# Patient Record
Sex: FEMALE | Race: BLACK OR AFRICAN AMERICAN | Employment: UNEMPLOYED | URBAN - METROPOLITAN AREA
[De-identification: names, ages, dates, MRNs, and addresses within clinical notes are randomized per-mention and may not be internally consistent; named-entity substitution may affect disease eponyms.]

---

## 2015-01-14 LAB — COLONOSCOPY, EXTERNAL: NORMAL

## 2017-01-17 ENCOUNTER — OFFICE VISIT (OUTPATIENT)
Dept: VASCULAR SURGERY | Age: 52
End: 2017-01-17

## 2017-01-17 VITALS
HEIGHT: 65 IN | DIASTOLIC BLOOD PRESSURE: 74 MMHG | HEART RATE: 60 BPM | BODY MASS INDEX: 18.33 KG/M2 | SYSTOLIC BLOOD PRESSURE: 110 MMHG | WEIGHT: 110 LBS

## 2017-01-17 DIAGNOSIS — I83.93 SPIDER VEINS OF BOTH LOWER EXTREMITIES: Primary | ICD-10-CM

## 2017-01-17 NOTE — MR AVS SNAPSHOT
Visit Information Date & Time Provider Department Dept. Phone Encounter #  
 1/17/2017 11:45 AM Chuyita Amin, [address] [facility] [phone] [id number] Your Appointments 2/13/2017 11:00 AM  
ROUTINE CARE with MD Yasmany Ramirez ([facility]) Appt Note: 3 mo f/u  
 [address] [address]  
[phone]  
  
   
 14 [address] Upcoming Health Maintenance Date Due COLONOSCOPY 11/26/1981 DTaP/Tdap/Td series (1 - Tdap) 11/26/1984 PAP AKA CERVICAL CYTOLOGY 11/26/1984 INFLUENZA AGE 9 TO ADULT 8/1/2016 BREAST CANCER SCRN MAMMOGRAM 12/13/2018 Allergies as of 1/17/2017  Review Complete On: 1/17/2017 By: Erica Bland LPN No Known Allergies Current Immunizations  Never Reviewed No immunizations on file. Not reviewed this visit Vitals BP Pulse Height(growth percentile) Weight(growth percentile) BMI OB Status 110/74 (BP 1 Location: Left arm, BP Patient Position: Sitting) 60 5' 5\" (1.651 m) 110 lb (49.9 kg) 18.3 kg/m2 Hysterectomy Smoking Status Former Smoker Vitals History BMI and BSA Data Body Mass Index Body Surface Area  
 18.3 kg/m 2 1.51 m 2 Preferred Pharmacy Pharmacy Name Phone [facility] [address] [place] [address] 32 Your Updated Medication List  
  
   
This list is accurate as of: 1/17/17 12:08 PM.  Always use your most recent med list.  
  
  
  
  
 dexlansoprazole 30 mg capsule Commonly known as:  DEXILANT Take 1 Cap by mouth daily. hydrocortisone 2.5 % rectal cream  
Commonly known as:  PROCTOZONE-HC Insert  into rectum four (4) times daily. methylPREDNISolone 4 mg tablet Commonly known as:  Cleta Loss Per dose pack instructions  
  
 ondansetron 4 mg disintegrating tablet Commonly known as:  ZOFRAN ODT Take 1 Tab by mouth every eight (8) hours as needed for Nausea. penicillin v potassium 500 mg tablet Commonly known as:  VEETID Take 500 mg by mouth four (4) times daily. traMADol 50 mg tablet Commonly known as:  ULTRAM  
Take 1 Tab by mouth every six (6) hours as needed for Pain. Max Daily Amount: 200 mg. Introducing [facility]! Thuy Melendez introduces [employer] patient portal. Now you can access parts of your medical record, email your doctor's office, and request medication refills online. 1. In your internet browser, go to [email or web address] 2. Click on the First Time User? Click Here link in the Sign In box. You will see the New Member Sign Up page. 3. Enter your [employer] Access Code exactly as it appears below. You will not need to use this code after youve completed the sign-up process. If you do not sign up before the expiration date, you must request a new code. · [employer] Access Code: [email or web address] Expires: 2/9/2017 12:42 PM 
 
4. Enter the last four digits of your Social Security Number (xxxx) and Date of Birth (mm/dd/yyyy) as indicated and click Submit. You will be taken to the next sign-up page. 5. Create a [employer] ID. This will be your [employer] login ID and cannot be changed, so think of one that is secure and easy to remember. 6. Create a [employer] password. You can change your password at any time. 7. Enter your Password Reset Question and Answer. This can be used at a later time if you forget your password. 8. Enter your e-mail address. You will receive e-mail notification when new information is available in [address] 9. Click Sign Up. You can now view and download portions of your medical record. 10. Click the Download Summary menu link to download a portable copy of your medical information.  
 
If you have questions, please visit the Frequently Asked Questions section of the [employer]. Remember, [email or web address] is NOT to be used for urgent needs. For medical emergencies, dial 911. Now available from your iPhone and Android! Please provide this summary of care documentation to your next provider. Your primary care clinician is listed as Claude Hicks. If you have any questions after today's visit, please call [phone].

## 2017-01-18 NOTE — PROGRESS NOTES
Ms Braden Painter is here for follow up of her venous reflux study  She had presented in the fall with complaints of burning, stiffness, and heaviness in her legs  She has numerous superficial small varicose and spider veins scattered throughout both legs, including her thighs and shins    She had to delay her reflux study due to having been out of town. She did have this done and is here to follow up today    I had previously explained need to initiate use of compression stockings which she has done but she cannot tell that they are having any effect  She cannot take NSAIDs due to chronic GI issues    I reviewed her reflux study as follows:  Bilateral Reflux:  1. No evidence of deep vein thrombosis or reflux in the common  femoral, proximal deep femoral, femoral, popliteal, posterior tibial  and peroneal veins bilaterally. 2. Pulsatile venous flow bilaterally. 3. The great and small saphenous veins bilaterally were too small to  insonate with doppler. 4. Multiphasic posterior tibial artery flow bilaterally. I explained she is not a candidate for any ablation or surgical procedures for her veins  With what veins she does have, sclerotherapy would be the only option    I did explain the process of sclerotherapy and what would be expected, but no guarantees that doing injections would have any effect on her symptoms  Explained that some of her symptoms could be other etiology but that we could certainly try to do the injections and see what the outcome would be  She questions if insurance would approve it.  We will look into it for her and let her know

## 2017-02-06 ENCOUNTER — OFFICE VISIT (OUTPATIENT)
Dept: NEUROLOGY | Age: 52
End: 2017-02-06

## 2017-02-06 VITALS
SYSTOLIC BLOOD PRESSURE: 120 MMHG | WEIGHT: 108.2 LBS | TEMPERATURE: 98.7 F | HEIGHT: 65 IN | OXYGEN SATURATION: 98 % | RESPIRATION RATE: 18 BRPM | BODY MASS INDEX: 18.03 KG/M2 | DIASTOLIC BLOOD PRESSURE: 77 MMHG | HEART RATE: 73 BPM

## 2017-02-06 DIAGNOSIS — F33.9 EPISODE OF RECURRENT MAJOR DEPRESSIVE DISORDER, UNSPECIFIED DEPRESSION EPISODE SEVERITY (HCC): ICD-10-CM

## 2017-02-06 DIAGNOSIS — G89.4 CHRONIC PAIN SYNDROME: Primary | ICD-10-CM

## 2017-02-06 DIAGNOSIS — F22 PARANOID DISORDER (HCC): ICD-10-CM

## 2017-02-06 NOTE — LETTER
2/6/2017 2:05 PM 
 
Patient:  Sharlene Solis YOB: 1965 Date of Visit: 2/6/2017 Dear Shannon Orona MD 
[address] [place] [address] [address] [facility] [address] VIA In Basket 
 : Thank you for referring Ms. Farzana Soriano to me for evaluation/treatment. Below are the relevant portions of my assessment and plan of care. Sharlene Solis is a 46 y.o., left handed female, with no significant medical history who comes in with numerous complaints. She says that her entire body is aching. This has been going on for years. This seemed to get worse in the last several months. She also complains of stiffness in the left leg and left arm. She feels stiffness in the left arm and gets spasm in the left arm. She gets spasms and weakness of the left leg after walking. She gets pain in the knees and in the wrists. This is there daily. She gets clumsy with walking due to this. She has shortness of breathe with ambulation. She also gets shortness of breath if she's just sitting. 
 
constant blurring of her vision. She has watering of the eyes and twitching of her eyes. She also has left sided headaches. She has these every day for several hours each day. She has constant tingling of her hands and feet. Her chest has tightness in it all the time. She has tightness of her abdomen. She has itching of the teeth. She admits to a hairy feeling of the tongue. She basically answers yes to everything I ask. She complains of memory and thinking problems for years. .  She just can't concentrate to remember anything. Social History; she's single, lives alone. Has one 32year old son. Quit smoking about 1 year ago. Prior was smoking 1/2 pack per day. Doesn't drink nor use illicit drugs. Last worked in 2004 as an  for the department of .   She stopped working because of irritable bowel syndrome and some sort of cognitive disability. Family History; father  from heart disease, also had diabetes. Mother alive with dementia. Brothers with hypertension, sister with hypertension, diabetes. Current Outpatient Prescriptions Medication Sig Dispense Refill  LACTOBACILLUS ACIDOPHILUS (PROBIOTIC PO) Take  by mouth.  penicillin v potassium (VEETID) 500 mg tablet Take 500 mg by mouth four (4) times daily.  ondansetron (ZOFRAN ODT) 4 mg disintegrating tablet Take 1 Tab by mouth every eight (8) hours as needed for Nausea. 12 Tab 0  
 traMADol (ULTRAM) 50 mg tablet Take 1 Tab by mouth every six (6) hours as needed for Pain. Max Daily Amount: 200 mg. 20 Tab 0  
 hydrocortisone (PROCTOZONE-HC) 2.5 % rectal cream Insert  into rectum four (4) times daily. 30 g 0  
 methylPREDNISolone (MEDROL DOSEPACK) 4 mg tablet Per dose pack instructions 1 Dose Pack 0  
 dexlansoprazole (DEXILANT) 30 mg capsule Take 1 Cap by mouth daily. 30 Cap 1 Past Medical History Diagnosis Date  Chest pain 2014  
  neg EKG, non recurrent  Chronic pain   
  lower back and legs  Depression  Fibroids  Headache(784.0)  Hiatal hernia  IBS (irritable bowel syndrome)  Microscopic hematuria  Rectal bleeding Past Surgical History Procedure Laterality Date  Hx hysterectomy  Hx gi  Hx breast biopsy Right 2015 RIGHT BREAST BIOPSY WITH NEEDLE LOCALIZATION ULTRASOUND performed by Alis Torres MD at [address] Cholecystectomy  Hx tubal ligation Allergies Allergen Reactions  Amoxicillin Rash and Nausea Only Patient Active Problem List  
Diagnosis Code  Microscopic hematuria R31.29  
 Hematuria, undiagnosed cause R31.9  S/P cystoscopy Z98.890  
 Rectal bleeding K62.5  Chronic pain G89.29  Diffuse cystic mastopathy N60.19 Review of Systems: she admits to having every complaint under the ROS. As above otherwise 11 point review of systems positive including;  
Constitutional no fever or chills Skin has rash or itching HENT  has tinnitus, hearing lose Eyes has diplopia vision lose Respiratory has shortness of breath Cardiovascular has chest pain, dyspnea on exertion Gastrointestinal has nausea,  constipation Genitourinary has incontinence as well as urinary frequency Musculoskeletal has joint pain or swelling Endocrine has weight change lost weight fluctuations. Neurological as above in HPI PHYSICAL EXAMINATION:   
 
VITAL SIGNS:   
Visit Vitals  /77  Pulse 73  Temp 98.7 °F (37.1 °C) (Oral)  Resp 18  Ht 5' 5\" (1.651 m)  Wt 49.1 kg (108 lb 3.2 oz)  SpO2 98%  BMI 18.01 kg/m2 GENERAL: The patient is well developed, well nourished, and in no apparent distress. EXTREMITIES: No clubbing, cyanosis, or edema is identified. Pulses 2+ and symmetrical.  Muscle tone is normal. 
HEAD:   Ear, nose, and throat appear to be without trauma. The patient is normocephalic. NEUROLOGIC EXAMINATION 
 
MENTAL STATUS: The patient is awake, alert, and oriented x 4. Fund of knowledge is adequate. Speech is fluent and memory appears to be intact, both long and short term. She can remember 2/3 objects in 3 minutes. She does display some paranoid behavior during the exam.   
CRANIAL NERVES: II  Visual fields are full to confrontation. Funduscopic examination reveals flat disks bilaterally. Pupils are both 4 mm and briskly reactive to light and accommodation. III, IV, VI  Extraocular movements are intact and there is no nystagmus. V  Facial sensation is intact to pinprick and light touch. VII  Face is symmetrical.  
VIII - Hearing is present. IX, X, [address] rises symmetrically. Gag is present. Tongue is in the midline. XI - Shoulder shrugging and head turning intact MOTOR:  The patient is 5/5 in all four limbs without any drift. Fine finger movements are symmetrical.  Isolated motor group testing reveals no focal abnormalities. Tone is normal.  Sensory examination is intact to pinprick, light touch and position sense testing. Reflexes are 2+ and symmetrical. Plantars are down going. Cerebellar examination reveals no gross ataxia or dysmetria. Gait is very stiff legged, she stumbles and is hesitant but doesn't fall. The gait is an atasia abasia type gait. CBC:  
Lab Results Component Value Date/Time WBC 5.3 12/14/2016 08:00 AM  
 RBC 4.38 12/14/2016 08:00 AM  
 HGB 13.5 12/14/2016 08:00 AM  
 HCT 39.5 12/14/2016 08:00 AM  
 PLATELET 324 56/84/1403 08:00 AM  
 
BMP:  
Lab Results Component Value Date/Time Glucose 84 12/14/2016 08:00 AM  
 Sodium 137 12/14/2016 08:00 AM  
 Potassium 3.4 12/14/2016 08:00 AM  
 Chloride 99 12/14/2016 08:00 AM  
 CO2 31 12/14/2016 08:00 AM  
 BUN 15 12/14/2016 08:00 AM  
 Creatinine 0.87 12/14/2016 08:00 AM  
 Calcium 9.2 12/14/2016 08:00 AM  
 
CMP:  
Lab Results Component Value Date/Time Glucose 84 12/14/2016 08:00 AM  
 Sodium 137 12/14/2016 08:00 AM  
 Potassium 3.4 12/14/2016 08:00 AM  
 Chloride 99 12/14/2016 08:00 AM  
 CO2 31 12/14/2016 08:00 AM  
 BUN 15 12/14/2016 08:00 AM  
 Creatinine 0.87 12/14/2016 08:00 AM  
 Calcium 9.2 12/14/2016 08:00 AM  
 Anion gap 7 12/14/2016 08:00 AM  
 BUN/Creatinine ratio 17 12/14/2016 08:00 AM  
 Alk. phosphatase 75 12/14/2016 08:00 AM  
 Protein, total 8.6 12/14/2016 08:00 AM  
 Albumin 4.0 12/14/2016 08:00 AM  
 Globulin 4.6 12/14/2016 08:00 AM  
 A-G Ratio 0.9 12/14/2016 08:00 AM  
 
Coagulation: No results found for: PTP, INR, APTT, PTTT Cardiac markers:  
Lab Results Component Value Date/Time   11/11/2014 01:35 PM  
 CK-MB Index 0.6 11/11/2014 01:35 PM  
  
 
 
Impression: Patient with numerous constitutional complaints but nothing abnormal on her examination except some paranoid ideation. I don't think this patient is suffering from a primary neurologic problem, but more likely is suffering from some form of pervasive psychiatric disorder. The plethora of complaints, abnormal gait without falling and the prior disability from what sounds like a psychiatric disorder, convinces me this is not a primary neurologic problem. Plan: Would strongly suggest getting this unfortunate woman to psychiatric care for what appears to be a depression and possibly other diagnosis. No workup to be performed at this time from my standpoint. I'm not going to schedule a follow up, but please don't hesitate to contact me for any changes in her status you'd like me to address. If you have questions, please do not hesitate to call me. I look forward to following Ms. Elsa Lofton along with you.  
 
 
 
Sincerely, 
 
 
Jessy Turner MD

## 2017-02-06 NOTE — COMMUNICATION BODY
Alfonzo Cornelius is a 46 y.o., left handed female, with no significant medical history who comes in with numerous complaints. She says that her entire body is aching. This has been going on for years. This seemed to get worse in the last several months. She also complains of stiffness in the left leg and left arm. She feels stiffness in the left arm and gets spasm in the left arm. She gets spasms and weakness of the left leg after walking. She gets pain in the knees and in the wrists. This is there daily. She gets clumsy with walking due to this. She has shortness of breathe with ambulation. She also gets shortness of breath if she's just sitting.    constant blurring of her vision. She has watering of the eyes and twitching of her eyes. She also has left sided headaches. She has these every day for several hours each day. She has constant tingling of her hands and feet. Her chest has tightness in it all the time. She has tightness of her abdomen. She has itching of the teeth. She admits to a hairy feeling of the tongue. She basically answers yes to everything I ask. She complains of memory and thinking problems for years. .  She just can't concentrate to remember anything. Social History; she's single, lives alone. Has one 32year old son. Quit smoking about 1 year ago. Prior was smoking 1/2 pack per day. Doesn't drink nor use illicit drugs. Last worked in  as an  for the department of . She stopped working because of irritable bowel syndrome and some sort of cognitive disability. Family History; father  from heart disease, also had diabetes. Mother alive with dementia. Brothers with hypertension, sister with hypertension, diabetes. Current Outpatient Prescriptions   Medication Sig Dispense Refill    LACTOBACILLUS ACIDOPHILUS (PROBIOTIC PO) Take  by mouth.       penicillin v potassium (VEETID) 500 mg tablet Take 500 mg by mouth four (4) times daily.  ondansetron (ZOFRAN ODT) 4 mg disintegrating tablet Take 1 Tab by mouth every eight (8) hours as needed for Nausea. 12 Tab 0    traMADol (ULTRAM) 50 mg tablet Take 1 Tab by mouth every six (6) hours as needed for Pain. Max Daily Amount: 200 mg. 20 Tab 0    hydrocortisone (PROCTOZONE-HC) 2.5 % rectal cream Insert  into rectum four (4) times daily. 30 g 0    methylPREDNISolone (MEDROL DOSEPACK) 4 mg tablet Per dose pack instructions 1 Dose Pack 0    dexlansoprazole (DEXILANT) 30 mg capsule Take 1 Cap by mouth daily. 30 Cap 1       Past Medical History   Diagnosis Date    Chest pain 11/2014     neg EKG, non recurrent    Chronic pain      lower back and legs    Depression     Fibroids     Headache(784.0)     Hiatal hernia     IBS (irritable bowel syndrome)     Microscopic hematuria     Rectal bleeding        Past Surgical History   Procedure Laterality Date    Hx hysterectomy      Hx gi      Hx breast biopsy Right 1/21/2015     RIGHT BREAST BIOPSY WITH NEEDLE LOCALIZATION ULTRASOUND performed by Stephane Ma MD at [facility]    Cholecystectomy      Hx tubal ligation         Allergies   Allergen Reactions    Amoxicillin Rash and Nausea Only       Patient Active Problem List   Diagnosis Code    Microscopic hematuria R31.29    Hematuria, undiagnosed cause R31.9    S/P cystoscopy Z98.890    Rectal bleeding K62.5    Chronic pain G89.29    Diffuse cystic mastopathy N60.19         Review of Systems: she admits to having every complaint under the ROS.   As above otherwise 11 point review of systems positive including;   Constitutional no fever or chills  Skin has rash or itching  HENT  has tinnitus, hearing lose  Eyes has diplopia vision lose  Respiratory has shortness of breath  Cardiovascular has chest pain, dyspnea on exertion  Gastrointestinal has nausea,  constipation  Genitourinary has incontinence as well as urinary frequency  Musculoskeletal has joint pain or swelling  Endocrine has weight change lost weight fluctuations. Neurological as above in HPI      PHYSICAL EXAMINATION:      VITAL SIGNS:    Visit Vitals    /77    Pulse 73    Temp 98.7 °F (37.1 °C) (Oral)    Resp 18    Ht 5' 5\" (1.651 m)    Wt 49.1 kg (108 lb 3.2 oz)    SpO2 98%    BMI 18.01 kg/m2       GENERAL: The patient is well developed, well nourished, and in no apparent distress. EXTREMITIES: No clubbing, cyanosis, or edema is identified. Pulses 2+ and symmetrical.  Muscle tone is normal.  HEAD:   Ear, nose, and throat appear to be without trauma. The patient is normocephalic. NEUROLOGIC EXAMINATION    MENTAL STATUS: The patient is awake, alert, and oriented x 4. Fund of knowledge is adequate. Speech is fluent and memory appears to be intact, both long and short term. She can remember 2/3 objects in 3 minutes. She does display some paranoid behavior during the exam.    CRANIAL NERVES: II - Visual fields are full to confrontation. Funduscopic examination reveals flat disks bilaterally. Pupils are both 4 mm and briskly reactive to light and accommodation. III, IV, VI - Extraocular movements are intact and there is no nystagmus. V - Facial sensation is intact to pinprick and light touch. VII - Face is symmetrical.   VIII - Hearing is present. IX, X, XII- Palate rises symmetrically. Gag is present. Tongue is in the midline. XI - Shoulder shrugging and head turning intact  MOTOR:  The patient is 5/5 in all four limbs without any drift. Fine finger movements are symmetrical.  Isolated motor group testing reveals no focal abnormalities. Tone is normal.  Sensory examination is intact to pinprick, light touch and position sense testing. Reflexes are 2+ and symmetrical. Plantars are down going. Cerebellar examination reveals no gross ataxia or dysmetria. Gait is very stiff legged, she stumbles and is hesitant but doesn't fall.   The gait is an atasia abasia type gait.       CBC:   Lab Results   Component Value Date/Time    WBC 5.3 12/14/2016 08:00 AM    RBC 4.38 12/14/2016 08:00 AM    HGB 13.5 12/14/2016 08:00 AM    HCT 39.5 12/14/2016 08:00 AM    PLATELET 342 46/78/1473 08:00 AM     BMP:   Lab Results   Component Value Date/Time    Glucose 84 12/14/2016 08:00 AM    Sodium 137 12/14/2016 08:00 AM    Potassium 3.4 12/14/2016 08:00 AM    Chloride 99 12/14/2016 08:00 AM    CO2 31 12/14/2016 08:00 AM    BUN 15 12/14/2016 08:00 AM    Creatinine 0.87 12/14/2016 08:00 AM    Calcium 9.2 12/14/2016 08:00 AM     CMP:   Lab Results   Component Value Date/Time    Glucose 84 12/14/2016 08:00 AM    Sodium 137 12/14/2016 08:00 AM    Potassium 3.4 12/14/2016 08:00 AM    Chloride 99 12/14/2016 08:00 AM    CO2 31 12/14/2016 08:00 AM    BUN 15 12/14/2016 08:00 AM    Creatinine 0.87 12/14/2016 08:00 AM    Calcium 9.2 12/14/2016 08:00 AM    Anion gap 7 12/14/2016 08:00 AM    BUN/Creatinine ratio 17 12/14/2016 08:00 AM    Alk. phosphatase 75 12/14/2016 08:00 AM    Protein, total 8.6 12/14/2016 08:00 AM    Albumin 4.0 12/14/2016 08:00 AM    Globulin 4.6 12/14/2016 08:00 AM    A-G Ratio 0.9 12/14/2016 08:00 AM     Coagulation: No results found for: PTP, INR, APTT, PTTT  Cardiac markers:   Lab Results   Component Value Date/Time     11/11/2014 01:35 PM    CK-MB Index 0.6 11/11/2014 01:35 PM          Impression: Patient with numerous constitutional complaints but nothing abnormal on her examination except some paranoid ideation. I don't think this patient is suffering from a primary neurologic problem, but more likely is suffering from some form of pervasive psychiatric disorder. The plethora of complaints, abnormal gait without falling and the prior disability from what sounds like a psychiatric disorder, convinces me this is not a primary neurologic problem.     Plan: Would strongly suggest getting this unfortunate woman to psychiatric care for what appears to be a depression and possibly other diagnosis. No workup to be performed at this time from my standpoint. I'm not going to schedule a follow up, but please don't hesitate to contact me for any changes in her status you'd like me to address.

## 2017-02-06 NOTE — PROGRESS NOTES
Cassidy Osorio is a 46 y.o., left handed female, with no significant medical history who comes in with numerous complaints. She says that her entire body is aching. This has been going on for years. This seemed to get worse in the last several months. She also complains of stiffness in the left leg and left arm. She feels stiffness in the left arm and gets spasm in the left arm. She gets spasms and weakness of the left leg after walking. She gets pain in the knees and in the wrists. This is there daily. She gets clumsy with walking due to this. She has shortness of breathe with ambulation. She also gets shortness of breath if she's just sitting.    constant blurring of her vision. She has watering of the eyes and twitching of her eyes. She also has left sided headaches. She has these every day for several hours each day. She has constant tingling of her hands and feet. Her chest has tightness in it all the time. She has tightness of her abdomen. She has itching of the teeth. She admits to a hairy feeling of the tongue. She basically answers yes to everything I ask. She complains of memory and thinking problems for years. .  She just can't concentrate to remember anything. Social History; she's single, lives alone. Has one 32year old son. Quit smoking about 1 year ago. Prior was smoking 1/2 pack per day. Doesn't drink nor use illicit drugs. Last worked in  as an  for the department of . She stopped working because of irritable bowel syndrome and some sort of cognitive disability. Family History; father  from heart disease, also had diabetes. Mother alive with dementia. Brothers with hypertension, sister with hypertension, diabetes. Current Outpatient Prescriptions   Medication Sig Dispense Refill    LACTOBACILLUS ACIDOPHILUS (PROBIOTIC PO) Take  by mouth.       penicillin v potassium (VEETID) 500 mg tablet Take 500 mg by mouth four (4) times daily.  ondansetron (ZOFRAN ODT) 4 mg disintegrating tablet Take 1 Tab by mouth every eight (8) hours as needed for Nausea. 12 Tab 0    traMADol (ULTRAM) 50 mg tablet Take 1 Tab by mouth every six (6) hours as needed for Pain. Max Daily Amount: 200 mg. 20 Tab 0    hydrocortisone (PROCTOZONE-HC) 2.5 % rectal cream Insert  into rectum four (4) times daily. 30 g 0    methylPREDNISolone (MEDROL DOSEPACK) 4 mg tablet Per dose pack instructions 1 Dose Pack 0    dexlansoprazole (DEXILANT) 30 mg capsule Take 1 Cap by mouth daily. 30 Cap 1       Past Medical History   Diagnosis Date    Chest pain 11/2014     neg EKG, non recurrent    Chronic pain      lower back and legs    Depression     Fibroids     Headache(784.0)     Hiatal hernia     IBS (irritable bowel syndrome)     Microscopic hematuria     Rectal bleeding        Past Surgical History   Procedure Laterality Date    Hx hysterectomy      Hx gi      Hx breast biopsy Right 1/21/2015     RIGHT BREAST BIOPSY WITH NEEDLE LOCALIZATION ULTRASOUND performed by Celina Woodard MD at [address] [facility]    Cholecystectomy      Hx tubal ligation         Allergies   Allergen Reactions    Amoxicillin Rash and Nausea Only       Patient Active Problem List   Diagnosis Code    Microscopic hematuria R31.29    Hematuria, undiagnosed cause R31.9    S/P cystoscopy Z98.890    Rectal bleeding K62.5    Chronic pain G89.29    Diffuse cystic mastopathy N60.19         Review of Systems: she admits to having every complaint under the ROS.   As above otherwise 11 point review of systems positive including;   Constitutional no fever or chills  Skin has rash or itching  HENT  has tinnitus, hearing lose  Eyes has diplopia vision lose  Respiratory has shortness of breath  Cardiovascular has chest pain, dyspnea on exertion  Gastrointestinal has nausea,  constipation  Genitourinary has incontinence as well as urinary frequency  Musculoskeletal has joint pain or swelling  Endocrine has weight change lost weight fluctuations. Neurological as above in HPI      PHYSICAL EXAMINATION:      VITAL SIGNS:    Visit Vitals    /77    Pulse 73    Temp 98.7 °F (37.1 °C) (Oral)    Resp 18    Ht 5' 5\" (1.651 m)    Wt 49.1 kg (108 lb 3.2 oz)    SpO2 98%    BMI 18.01 kg/m2       GENERAL: The patient is well developed, well nourished, and in no apparent distress. EXTREMITIES: No clubbing, cyanosis, or edema is identified. Pulses 2+ and symmetrical.  Muscle tone is normal.  HEAD:   Ear, nose, and throat appear to be without trauma. The patient is normocephalic. NEUROLOGIC EXAMINATION    MENTAL STATUS: The patient is awake, alert, and oriented x 4. Fund of knowledge is adequate. Speech is fluent and memory appears to be intact, both long and short term. She can remember 2/3 objects in 3 minutes. She does display some paranoid behavior during the exam.    CRANIAL NERVES: II - Visual fields are full to confrontation. Funduscopic examination reveals flat disks bilaterally. Pupils are both 4 mm and briskly reactive to light and accommodation. III, IV, VI - Extraocular movements are intact and there is no nystagmus. V - Facial sensation is intact to pinprick and light touch. VII - Face is symmetrical.   VIII - Hearing is present. IX, X, XII- Palate rises symmetrically. Gag is present. Tongue is in the midline. XI - Shoulder shrugging and head turning intact  MOTOR:  The patient is 5/5 in all four limbs without any drift. Fine finger movements are symmetrical.  Isolated motor group testing reveals no focal abnormalities. Tone is normal.  Sensory examination is intact to pinprick, light touch and position sense testing. Reflexes are 2+ and symmetrical. Plantars are down going. Cerebellar examination reveals no gross ataxia or dysmetria. Gait is very stiff legged, she stumbles and is hesitant but doesn't fall.   The gait is an atasia abasia type gait.       CBC:   Lab Results   Component Value Date/Time    WBC 5.3 12/14/2016 08:00 AM    RBC 4.38 12/14/2016 08:00 AM    HGB 13.5 12/14/2016 08:00 AM    HCT 39.5 12/14/2016 08:00 AM    PLATELET 476 88/05/2182 08:00 AM     BMP:   Lab Results   Component Value Date/Time    Glucose 84 12/14/2016 08:00 AM    Sodium 137 12/14/2016 08:00 AM    Potassium 3.4 12/14/2016 08:00 AM    Chloride 99 12/14/2016 08:00 AM    CO2 31 12/14/2016 08:00 AM    BUN 15 12/14/2016 08:00 AM    Creatinine 0.87 12/14/2016 08:00 AM    Calcium 9.2 12/14/2016 08:00 AM     CMP:   Lab Results   Component Value Date/Time    Glucose 84 12/14/2016 08:00 AM    Sodium 137 12/14/2016 08:00 AM    Potassium 3.4 12/14/2016 08:00 AM    Chloride 99 12/14/2016 08:00 AM    CO2 31 12/14/2016 08:00 AM    BUN 15 12/14/2016 08:00 AM    Creatinine 0.87 12/14/2016 08:00 AM    Calcium 9.2 12/14/2016 08:00 AM    Anion gap 7 12/14/2016 08:00 AM    BUN/Creatinine ratio 17 12/14/2016 08:00 AM    Alk. phosphatase 75 12/14/2016 08:00 AM    Protein, total 8.6 12/14/2016 08:00 AM    Albumin 4.0 12/14/2016 08:00 AM    Globulin 4.6 12/14/2016 08:00 AM    A-G Ratio 0.9 12/14/2016 08:00 AM     Coagulation: No results found for: PTP, INR, APTT, PTTT  Cardiac markers:   Lab Results   Component Value Date/Time     11/11/2014 01:35 PM    CK-MB Index 0.6 11/11/2014 01:35 PM          Impression: Patient with numerous constitutional complaints but nothing abnormal on her examination except some paranoid ideation. I don't think this patient is suffering from a primary neurologic problem, but more likely is suffering from some form of pervasive psychiatric disorder. The plethora of complaints, abnormal gait without falling and the prior disability from what sounds like a psychiatric disorder, convinces me this is not a primary neurologic problem.     Plan: Would strongly suggest getting this unfortunate woman to psychiatric care for what appears to be a depression and possibly other diagnosis. No workup to be performed at this time from my standpoint. I'm not going to schedule a follow up, but please don't hesitate to contact me for any changes in her status you'd like me to address.

## 2017-02-13 ENCOUNTER — OFFICE VISIT (OUTPATIENT)
Dept: FAMILY MEDICINE CLINIC | Facility: CLINIC | Age: 52
End: 2017-02-13

## 2017-02-13 VITALS
RESPIRATION RATE: 14 BRPM | TEMPERATURE: 96.5 F | HEART RATE: 66 BPM | HEIGHT: 65 IN | OXYGEN SATURATION: 100 % | DIASTOLIC BLOOD PRESSURE: 75 MMHG | SYSTOLIC BLOOD PRESSURE: 115 MMHG | WEIGHT: 107 LBS | BODY MASS INDEX: 17.83 KG/M2

## 2017-02-13 DIAGNOSIS — F22 PARANOID DELUSION (HCC): ICD-10-CM

## 2017-02-13 DIAGNOSIS — K59.00 CONSTIPATION, UNSPECIFIED CONSTIPATION TYPE: ICD-10-CM

## 2017-02-13 DIAGNOSIS — M54.50 RIGHT-SIDED LOW BACK PAIN WITHOUT SCIATICA, UNSPECIFIED CHRONICITY: Primary | ICD-10-CM

## 2017-02-13 DIAGNOSIS — J02.9 PHARYNGITIS, UNSPECIFIED ETIOLOGY: ICD-10-CM

## 2017-02-13 DIAGNOSIS — J30.2 SEASONAL ALLERGIC RHINITIS, UNSPECIFIED ALLERGIC RHINITIS TRIGGER: ICD-10-CM

## 2017-02-13 RX ORDER — AMOXICILLIN 250 MG
2 CAPSULE ORAL DAILY
Qty: 60 TAB | Refills: 3 | Status: SHIPPED | OUTPATIENT
Start: 2017-02-13 | End: 2017-09-26 | Stop reason: SDUPTHER

## 2017-02-13 NOTE — PATIENT INSTRUCTIONS
Managing Your Allergies: Care Instructions  Your Care Instructions  Managing your allergies is an important part of staying healthy. Your doctor will help you find out what may be causing the allergies. Common causes of allergy symptoms are house dust and dust mites, animal dander, mold, and pollen. As soon as you know what triggers your symptoms, try to reduce your exposure to your triggers. This can help prevent allergy symptoms, asthma, and other health problems. Ask your doctor about allergy medicine or immunotherapy. These treatments may help reduce or prevent allergy symptoms. Follow-up care is a key part of your treatment and safety. Be sure to make and go to all appointments, and call your doctor if you are having problems. It's also a good idea to know your test results and keep a list of the medicines you take. How can you care for yourself at home? · If you think that dust or dust mites are causing your allergies:  ¨ Wash sheets, pillowcases, and other bedding every week in hot water. ¨ Use airtight, dust-proof covers for pillows, duvets, and mattresses. Avoid plastic covers, because they tend to tear quickly and do not \"breathe. \" Wash according to the instructions. ¨ Remove extra blankets and pillows that you don't need. ¨ Use blankets that are machine-washable. ¨ Don't use home humidifiers. They can help mites live longer. · Use air-conditioning. Change or clean all filters every month. Keep windows closed. Use high-efficiency air filters. Don't use window or attic fans, which draw dust into the air. · If you're allergic to pet dander, keep pets outside or, at the very least, out of your bedroom. Old carpet and cloth-covered furniture can hold a lot of animal dander. You may need to replace them. · Look for signs of cockroaches. Use cockroach baits to get rid of them. Then clean your home well. · If you're allergic to mold, don't keep indoor plants, because molds can grow in soil.  Get rid of furniture, rugs, and drapes that smell musty. Check for mold in the bathroom. · If you're allergic to pollen, stay inside when pollen counts are high. · Don't smoke or let anyone else smoke in your house. Don't use fireplaces or wood-burning stoves. Avoid paint fumes, perfumes, and other strong odors. When should you call for help? Give an epinephrine shot if:  · You think you are having a severe allergic reaction. · You have symptoms in more than one body area, such as mild nausea and an itchy mouth. After giving an epinephrine shot call 911, even if you feel better. Call 911 if:  · You have symptoms of a severe allergic reaction. These may include:  ¨ Sudden raised, red areas (hives) all over your body. ¨ Swelling of the throat, mouth, lips, or tongue. ¨ Trouble breathing. ¨ Passing out (losing consciousness). Or you may feel very lightheaded or suddenly feel weak, confused, or restless. · You have been given an epinephrine shot, even if you feel better. Call your doctor now or seek immediate medical care if:  · You have symptoms of an allergic reaction, such as:  ¨ A rash or hives (raised, red areas on the skin). ¨ Itching. ¨ Swelling. ¨ Belly pain, nausea, or vomiting. Watch closely for changes in your health, and be sure to contact your doctor if:  · Your allergies get worse. · You need help controlling your allergies. · You have questions about allergy testing. · You do not get better as expected. Where can you learn more? Go to http://lazarus-delano.info/. Enter L249 in the search box to learn more about \"Managing Your Allergies: Care Instructions. \"  Current as of: February 12, 2016  Content Version: 11.1  © 1046-9158 [employer]. Care instructions adapted under license by [employer] (which disclaims liability or warranty for this information).  If you have questions about a medical condition or this instruction, always ask your healthcare professional. [address] any warranty or liability for your use of this information. Back Stretches: Exercises  Your Care Instructions  Here are some examples of exercises for stretching your back. Start each exercise slowly. Ease off the exercise if you start to have pain. Your doctor or physical therapist will tell you when you can start these exercises and which ones will work best for you. How to do the exercises  Overhead stretch    1. Stand comfortably with your feet shoulder-width apart. 2. Looking straight ahead, raise both arms over your head and reach toward the ceiling. Do not allow your head to tilt back. 3. Hold for 15 to 30 seconds, then lower your arms to your sides. 4. Repeat 2 to 4 times. Side stretch    1. Stand comfortably with your feet shoulder-width apart. 2. Raise one arm over your head, and then lean to the other side. 3. Slide your hand down your leg as you let the weight of your arm gently stretch your side muscles. Hold for 15 to 30 seconds. 4. Repeat 2 to 4 times on each side. Press-up    1. Lie on your stomach, supporting your body with your forearms. 2. Press your elbows down into the floor to raise your upper back. As you do this, relax your stomach muscles and allow your back to arch without using your back muscles. As your press up, do not let your hips or pelvis come off the floor. 3. Hold for 15 to 30 seconds, then relax. 4. Repeat 2 to 4 times. Relax and rest    1. Lie on your back with a rolled towel under your neck and a pillow under your knees. Extend your arms comfortably to your sides. 2. Relax and breathe normally. 3. Remain in this position for about 10 minutes. 4. If you can, do this 2 or 3 times each day. Follow-up care is a key part of your treatment and safety. Be sure to make and go to all appointments, and call your doctor if you are having problems.  It's also a good idea to know your test results and keep a list of the medicines you take. Where can you learn more? Go to http://lazarus-delano.info/. Enter B094 in the search box to learn more about \"Back Stretches: Exercises. \"  Current as of: May 23, 2016  Content Version: 11.1  © 5576-9207 [employer]. Care instructions adapted under license by [facility] (which disclaims liability or warranty for this information). If you have questions about a medical condition or this instruction, always ask your healthcare professional. [address] any warranty or liability for your use of this information. Constipation: Care Instructions  Your Care Instructions  Constipation means that you have a hard time passing stools (bowel movements). People pass stools from 3 times a day to once every 3 days. What is normal for you may be different. Constipation may occur with pain in the rectum and cramping. The pain may get worse when you try to pass stools. Sometimes there are small amounts of bright red blood on toilet paper or the surface of stools. This is because of enlarged veins near the rectum (hemorrhoids). A few changes in your diet and lifestyle may help you avoid ongoing constipation. Your doctor may also prescribe medicine to help loosen your stool. Some medicines can cause constipation. These include pain medicines and antidepressants. Tell your doctor about all the medicines you take. Your doctor may want to make a medicine change to ease your symptoms. Follow-up care is a key part of your treatment and safety. Be sure to make and go to all appointments, and call your doctor if you are having problems. It's also a good idea to know your test results and keep a list of the medicines you take. How can you care for yourself at home? · Drink plenty of fluids, enough so that your urine is light yellow or clear like water.  If you have kidney, heart, or liver disease and have to limit fluids, talk with your doctor before you increase the amount of fluids you drink. · Include high-fiber foods in your diet each day. These include fruits, vegetables, beans, and whole grains. · Get at least 30 minutes of exercise on most days of the week. Walking is a good choice. You also may want to do other activities, such as running, swimming, cycling, or playing tennis or team sports. · Take a fiber supplement, such as Citrucel or Metamucil, every day. Read and follow all instructions on the label. · Schedule time each day for a bowel movement. A daily routine may help. Take your time having your bowel movement. · Support your feet with a small step stool when you sit on the toilet. This helps flex your hips and places your pelvis in a squatting position. · Your doctor may recommend an over-the-counter laxative to relieve your constipation. Examples are Milk of Magnesia and MiraLax. Read and follow all instructions on the label. Do not use laxatives on a long-term basis. When should you call for help? Call your doctor now or seek immediate medical care if:  · You have new or worse belly pain. · You have new or worse nausea or vomiting. · You have blood in your stools. Watch closely for changes in your health, and be sure to contact your doctor if:  · Your constipation is getting worse. · You do not get better as expected. Where can you learn more? Go to http://lazarus-delano.info/. Enter [phone] in the search box to learn more about \"Constipation: Care Instructions. \"  Current as of: May 27, 2016  Content Version: 11.1  © 7873-7136 [employer]. Care instructions adapted under license by [employer] (which disclaims liability or warranty for this information). If you have questions about a medical condition or this instruction, always ask your healthcare professional. [address] any warranty or liability for your use of this information.        Sore Throat: Care Instructions  Your Care Instructions    Infection by bacteria or a virus causes most sore throats. Cigarette smoke, dry air, air pollution, allergies, and yelling can also cause a sore throat. Sore throats can be painful and annoying. Fortunately, most sore throats go away on their own. If you have a bacterial infection, your doctor may prescribe antibiotics. Follow-up care is a key part of your treatment and safety. Be sure to make and go to all appointments, and call your doctor if you are having problems. It's also a good idea to know your test results and keep a list of the medicines you take. How can you care for yourself at home? · If your doctor prescribed antibiotics, take them as directed. Do not stop taking them just because you feel better. You need to take the full course of antibiotics. · Gargle with warm salt water once an hour to help reduce swelling and relieve discomfort. Use 1 teaspoon of salt mixed in 1 cup of warm water. · Take an over-the-counter pain medicine, such as acetaminophen (Tylenol), ibuprofen (Advil, Motrin), or naproxen (Aleve). Read and follow all instructions on the label. · Be careful when taking over-the-counter cold or flu medicines and Tylenol at the same time. Many of these medicines have acetaminophen, which is Tylenol. Read the labels to make sure that you are not taking more than the recommended dose. Too much acetaminophen (Tylenol) can be harmful. · Drink plenty of fluids. Fluids may help soothe an irritated throat. Hot fluids, such as tea or soup, may help decrease throat pain. · Use over-the-counter throat lozenges to soothe pain. Regular cough drops or hard candy may also help. These should not be given to young children because of the risk of choking. · Do not smoke or allow others to smoke around you. If you need help quitting, talk to your doctor about stop-smoking programs and medicines. These can increase your chances of quitting for good.   · Use a vaporizer or humidifier to add moisture to your bedroom. Follow the directions for cleaning the machine. When should you call for help? Call your doctor now or seek immediate medical care if:  · You have new or worse trouble swallowing. · Your sore throat gets much worse on one side. Watch closely for changes in your health, and be sure to contact your doctor if you do not get better as expected. Where can you learn more? Go to http://lazarus-delano.info/. Enter [phone] in the search box to learn more about \"Sore Throat: Care Instructions. \"  Current as of: July 29, 2016  Content Version: 11.1  © 9675-5242 [employer]. Care instructions adapted under license by [employer] (which disclaims liability or warranty for this information). If you have questions about a medical condition or this instruction, always ask your healthcare professional. [place] [address] any warranty or liability for your use of this information.

## 2017-02-13 NOTE — PROGRESS NOTES
S:    HPI:    Dana Hoffmann is a 47 yo [facility] [employer] female presenting to the clinic for a 3mo f/u concerning chronic pain. Pt endorses \"pain in everything\". She specifically points to her LUQ stating this pain has been present since \"2002 or 2004\" and has progressively gotten worse. Pain rated 8/10. This pain is exasperated with eating. Pt also reports L sided frontal HA, 6/10, that comes and goes 2-3x per wk. Denies photophobia, auras, nausea, vomiting. Pt states she has tried ibuprofen \"a long time ago\" with moderate relief. Pt denies, fever, chills, SOB, chest pain. Endorses constipation with BM every 3 days. Of note, pt discussed a more thorough h/o her cc's with Dr. Ligia Del Rio:    Per patient's request, Wilman Roland did not perform PE      *ATTENTION:  This note has been created by a medical student for educational purposes only. Please do not refer to the content of this note for clinical decision-making, billing, or other purposes. Please see attending physicians note to obtain clinical information on this patient. *

## 2017-02-13 NOTE — MR AVS SNAPSHOT
Visit Information Date & Time Provider Department Dept. Phone Encounter #  
 2/13/2017 11:00 AM Bolivar Vega MD [facility] [phone] Follow-up Instructions Return in about 3 months (around 5/13/2017), or if symptoms worsen or fail to improve, for constipation, back pain. Upcoming Health Maintenance Date Due COLONOSCOPY 11/26/1981 DTaP/Tdap/Td series (1 - Tdap) 11/26/1984 PAP AKA CERVICAL CYTOLOGY 11/26/1984 INFLUENZA AGE 9 TO ADULT 8/1/2016 BREAST CANCER SCRN MAMMOGRAM 12/13/2018 Allergies as of 2/13/2017  Review Complete On: 2/13/2017 By: Tayla Pacheco Severity Noted Reaction Type Reactions Amoxicillin  02/06/2017    Rash, Nausea Only Current Immunizations  Never Reviewed No immunizations on file. Not reviewed this visit You Were Diagnosed With   
  
 Codes Comments Right-sided low back pain without sciatica, unspecified chronicity    -  Primary ICD-10-CM: M54.5 ICD-9-CM: 724.2 Pharyngitis, unspecified etiology     ICD-10-CM: J02.9 ICD-9-CM: 078 Seasonal allergic rhinitis, unspecified allergic rhinitis trigger     ICD-10-CM: J30.2 ICD-9-CM: 477.9 Constipation, unspecified constipation type     ICD-10-CM: K59.00 ICD-9-CM: 564.00 Vitals BP Pulse Temp Resp Height(growth percentile) Weight(growth percentile) 115/75 66 96.5 °F (35.8 °C) 14 5' 5\" (1.651 m) 107 lb (48.5 kg) SpO2 BMI OB Status Smoking Status 100% 17.81 kg/m2 Hysterectomy Former Smoker Vitals History BMI and BSA Data Body Mass Index Body Surface Area  
 17.81 kg/m 2 1.49 m 2 Preferred Pharmacy [facility] Phone [email or web address] [address] [facility] Your Updated Medication List  
  
   
This list is accurate as of: 2/13/17 11:16 AM.  Always use your most recent med list.  
  
  
  
  
 dexlansoprazole 30 mg capsule Commonly known as:  DEXILANT Take 1 Cap by mouth daily. hydrocortisone 2.5 % rectal cream  
Commonly known as:  PROCTOZONE-HC Insert  into rectum four (4) times daily. methylPREDNISolone 4 mg tablet Commonly known as:  Elverna Lizzy Per dose pack instructions  
  
 ondansetron 4 mg disintegrating tablet Commonly known as:  ZOFRAN ODT Take 1 Tab by mouth every eight (8) hours as needed for Nausea. penicillin v potassium 500 mg tablet Commonly known as:  VEETID Take 500 mg by mouth four (4) times daily. PROBIOTIC PO Take  by mouth. senna-docusate 8.6-50 mg per tablet Commonly known as:  SENNA WITH DOCUSATE SODIUM Take 2 Tabs by mouth daily. traMADol 50 mg tablet Commonly known as:  ULTRAM  
Take 1 Tab by mouth every six (6) hours as needed for Pain. Max Daily Amount: 200 mg. Prescriptions Sent to Pharmacy Refills  
 senna-docusate (SENNA WITH DOCUSATE SODIUM) 8.6-50 mg per tablet 3 Sig: Take 2 Tabs by mouth daily. Class: Normal  
 Pharmacy: [address] #: [phone] Route: Oral  
  
We Performed the Following REFERRAL TO PHYSICAL THERAPY [ZAI24 Custom] Comments:  
 Please evaluate patient for back pain. Follow-up Instructions Return in about 3 months (around 5/13/2017), or if symptoms worsen or fail to improve, for constipation, back pain. Referral Information Referral ID Referred By Referred To  
  
 [id number] [place] Not Available Visits Status Start Date End Date 1 New Request 2/13/17 2/13/18 If your referral has a status of pending review or denied, additional information will be sent to support the outcome of this decision. Patient Instructions Managing Your Allergies: Care Instructions Your Care Instructions Managing your allergies is an important part of staying healthy. Your doctor will help you find out what may be causing the allergies. Common causes of allergy symptoms are house dust and dust mites, animal dander, mold, and pollen. As soon as you know what triggers your symptoms, try to reduce your exposure to your triggers. This can help prevent allergy symptoms, asthma, and other health problems. Ask your doctor about allergy medicine or immunotherapy. These treatments may help reduce or prevent allergy symptoms. Follow-up care is a key part of your treatment and safety. Be sure to make and go to all appointments, and call your doctor if you are having problems. It's also a good idea to know your test results and keep a list of the medicines you take. How can you care for yourself at home? · If you think that dust or dust mites are causing your allergies: 
¨ Wash sheets, pillowcases, and other bedding every week in hot water. ¨ Use airtight, dust-proof covers for pillows, duvets, and mattresses. Avoid plastic covers, because they tend to tear quickly and do not \"breathe. \" Wash according to the instructions. ¨ Remove extra blankets and pillows that you don't need. ¨ Use blankets that are machine-washable. ¨ Don't use home humidifiers. They can help mites live longer. · Use air-conditioning. Change or clean all filters every month. Keep windows closed. Use high-efficiency air filters. Don't use window or attic fans, which draw dust into the air. · If you're allergic to pet dander, keep pets outside or, at the very least, out of your bedroom. Old carpet and cloth-covered furniture can hold a lot of animal dander. You may need to replace them. · Look for signs of cockroaches. Use cockroach baits to get rid of them. Then clean your home well. · If you're allergic to mold, don't keep indoor plants, because molds can grow in soil. Get rid of furniture, rugs, and drapes that smell musty. Check for mold in the bathroom. · If you're allergic to pollen, stay inside when pollen counts are high. · Don't smoke or let anyone else smoke in your house. Don't use fireplaces or wood-burning stoves. Avoid paint fumes, perfumes, and other strong odors. When should you call for help? Give an epinephrine shot if: 
· You think you are having a severe allergic reaction. · You have symptoms in more than one body area, such as mild nausea and an itchy mouth. After giving an epinephrine shot call 911, even if you feel better. Call 911 if: 
· You have symptoms of a severe allergic reaction. These may include: 
¨ Sudden raised, red areas (hives) all over your body. ¨ Swelling of the throat, mouth, lips, or tongue. ¨ Trouble breathing. ¨ Passing out (losing consciousness). Or you may feel very lightheaded or suddenly feel weak, confused, or restless. · You have been given an epinephrine shot, even if you feel better. Call your doctor now or seek immediate medical care if: 
· You have symptoms of an allergic reaction, such as: ¨ A rash or hives (raised, red areas on the skin). ¨ Itching. ¨ Swelling. ¨ Belly pain, nausea, or vomiting. Watch closely for changes in your health, and be sure to contact your doctor if: 
· Your allergies get worse. · You need help controlling your allergies. · You have questions about allergy testing. · You do not get better as expected. Where can you learn more? Go to http://lazarus-delano.info/. Enter L249 in the search box to learn more about \"Managing Your Allergies: Care Instructions. \" Current as of: February 12, 2016 Content Version: 11.1 © 5868-5122 [employer]. Care instructions adapted under license by [employer] (which disclaims liability or warranty for this information).  If you have questions about a medical condition or this instruction, always ask your healthcare professional. [employer] disclaims any warranty or liability for your use of this information. Back Stretches: Exercises Your Care Instructions Here are some examples of exercises for stretching your back. Start each exercise slowly. Ease off the exercise if you start to have pain. Your doctor or physical therapist will tell you when you can start these exercises and which ones will work best for you. How to do the exercises Overhead stretch 1. Stand comfortably with your feet shoulder-width apart. 2. Looking straight ahead, raise both arms over your head and reach toward the ceiling. Do not allow your head to tilt back. 3. Hold for 15 to 30 seconds, then lower your arms to your sides. 4. Repeat 2 to 4 times. Side stretch 1. Stand comfortably with your feet shoulder-width apart. 2. Raise one arm over your head, and then lean to the other side. 3. Slide your hand down your leg as you let the weight of your arm gently stretch your side muscles. Hold for 15 to 30 seconds. 4. Repeat 2 to 4 times on each side. Press-up 1. Lie on your stomach, supporting your body with your forearms. 2. Press your elbows down into the floor to raise your upper back. As you do this, relax your stomach muscles and allow your back to arch without using your back muscles. As your press up, do not let your hips or pelvis come off the floor. 3. Hold for 15 to 30 seconds, then relax. 4. Repeat 2 to 4 times. Relax and rest 
 
1. Lie on your back with a rolled towel under your neck and a pillow under your knees. Extend your arms comfortably to your sides. 2. Relax and breathe normally. 3. Remain in this position for about 10 minutes. 4. If you can, do this 2 or 3 times each day. Follow-up care is a key part of your treatment and safety. Be sure to make and go to all appointments, and call your doctor if you are having problems. It's also a good idea to know your test results and keep a list of the medicines you take. Where can you learn more? Go to http://lazarus-delano.info/. Enter B639 in the search box to learn more about \"Back Stretches: Exercises. \" Current as of: May 23, 2016 Content Version: 11.1 © 2725-5265 [employer]rooms. Care instructions adapted under license by [employer] (which disclaims liability or warranty for this information). If you have questions about a medical condition or this instruction, always ask your healthcare professional. [place] [address] any warranty or liability for your use of this information. Constipation: Care Instructions Your Care Instructions Constipation means that you have a hard time passing stools (bowel movements). People pass stools from 3 times a day to once every 3 days. What is normal for you may be different. Constipation may occur with pain in the rectum and cramping. The pain may get worse when you try to pass stools. Sometimes there are small amounts of bright red blood on toilet paper or the surface of stools. This is because of enlarged veins near the rectum (hemorrhoids). A few changes in your diet and lifestyle may help you avoid ongoing constipation. Your doctor may also prescribe medicine to help loosen your stool. Some medicines can cause constipation. These include pain medicines and antidepressants. Tell your doctor about all the medicines you take. Your doctor may want to make a medicine change to ease your symptoms. Follow-up care is a key part of your treatment and safety. Be sure to make and go to all appointments, and call your doctor if you are having problems. It's also a good idea to know your test results and keep a list of the medicines you take. How can you care for yourself at home? · Drink plenty of fluids, enough so that your urine is light yellow or clear like water.  If you have kidney, heart, or liver disease and have to limit fluids, talk with your doctor before you increase the amount of fluids you drink. · Include high-fiber foods in your diet each day. These include fruits, vegetables, beans, and whole grains. · Get at least 30 minutes of exercise on most days of the week. Walking is a good choice. You also may want to do other activities, such as running, swimming, cycling, or playing tennis or team sports. · Take a fiber supplement, such as Citrucel or Metamucil, every day. Read and follow all instructions on the label. · Schedule time each day for a bowel movement. A daily routine may help. Take your time having your bowel movement. · Support your feet with a small step stool when you sit on the toilet. This helps flex your hips and places your pelvis in a squatting position. · Your doctor may recommend an over-the-counter laxative to relieve your constipation. Examples are Milk of Magnesia and MiraLax. Read and follow all instructions on the label. Do not use laxatives on a long-term basis. When should you call for help? Call your doctor now or seek immediate medical care if: 
· You have new or worse belly pain. · You have new or worse nausea or vomiting. · You have blood in your stools. Watch closely for changes in your health, and be sure to contact your doctor if: 
· Your constipation is getting worse. · You do not get better as expected. Where can you learn more? Go to http://lazarus-delano.info/. Enter [phone] in the search box to learn more about \"Constipation: Care Instructions. \" Current as of: May 27, 2016 Content Version: 11.1 © 1784-3150 [employer]. Care instructions adapted under license by [employer] (which disclaims liability or warranty for this information).  If you have questions about a medical condition or this instruction, always ask your healthcare professional. [address] any warranty or liability for your use of this information. Sore Throat: Care Instructions Your Care Instructions Infection by bacteria or a virus causes most sore throats. Cigarette smoke, dry air, air pollution, allergies, and yelling can also cause a sore throat. Sore throats can be painful and annoying. Fortunately, most sore throats go away on their own. If you have a bacterial infection, your doctor may prescribe antibiotics. Follow-up care is a key part of your treatment and safety. Be sure to make and go to all appointments, and call your doctor if you are having problems. It's also a good idea to know your test results and keep a list of the medicines you take. How can you care for yourself at home? · If your doctor prescribed antibiotics, take them as directed. Do not stop taking them just because you feel better. You need to take the full course of antibiotics. · Gargle with warm salt water once an hour to help reduce swelling and relieve discomfort. Use 1 teaspoon of salt mixed in 1 cup of warm water. · Take an over-the-counter pain medicine, such as acetaminophen (Tylenol), ibuprofen (Advil, Motrin), or naproxen (Aleve). Read and follow all instructions on the label. · Be careful when taking over-the-counter cold or flu medicines and Tylenol at the same time. Many of these medicines have acetaminophen, which is Tylenol. Read the labels to make sure that you are not taking more than the recommended dose. Too much acetaminophen (Tylenol) can be harmful. · Drink plenty of fluids. Fluids may help soothe an irritated throat. Hot fluids, such as tea or soup, may help decrease throat pain. · Use over-the-counter throat lozenges to soothe pain. Regular cough drops or hard candy may also help. These should not be given to young children because of the risk of choking. · Do not smoke or allow others to smoke around you. If you need help quitting, talk to your doctor about stop-smoking programs and medicines. These can increase your chances of quitting for good. · Use a vaporizer or humidifier to add moisture to your bedroom. Follow the directions for cleaning the machine. When should you call for help? Call your doctor now or seek immediate medical care if: 
· You have new or worse trouble swallowing. · Your sore throat gets much worse on one side. Watch closely for changes in your health, and be sure to contact your doctor if you do not get better as expected. Where can you learn more? Go to http://lazarus-delano.info/. Enter [phone] in the search box to learn more about \"Sore Throat: Care Instructions. \" Current as of: July 29, 2016 Content Version: 11.1 © 1200-6915 [employer]. Care instructions adapted under license by [employer] (which disclaims liability or warranty for this information). If you have questions about a medical condition or this instruction, always ask your healthcare professional. [place] [address] any warranty or liability for your use of this information. Introducing [facility]! Rizwana Allred introduces Efficient Frontier patient portal. Now you can access parts of your medical record, email your doctor's office, and request medication refills online. 1. In your internet browser, go to [email or web address] 2. Click on the First Time User? Click Here link in the Sign In box. You will see the New Member Sign Up page. 3. Enter your Efficient Frontier Access Code exactly as it appears below. You will not need to use this code after youve completed the sign-up process. If you do not sign up before the expiration date, you must request a new code. · Efficient Frontier Access Code: [email or web address] Expires: 5/14/2017 11:16 AM 
 
4. Enter the last four digits of your Social Security Number (xxxx) and Date of Birth (mm/dd/yyyy) as indicated and click Submit. You will be taken to the next sign-up page. 5. Create a DNage ID. This will be your DNage login ID and cannot be changed, so think of one that is secure and easy to remember. 6. Create a DNage password. You can change your password at any time. 7. Enter your Password Reset Question and Answer. This can be used at a later time if you forget your password. 8. Enter your e-mail address. You will receive e-mail notification when new information is available in [address] 9. Click Sign Up. You can now view and download portions of your medical record. 10. Click the Download Summary menu link to download a portable copy of your medical information. If you have questions, please visit the Frequently Asked Questions section of the DNage website. Remember, DNage is NOT to be used for urgent needs. For medical emergencies, dial 911. Now available from your iPhone and Android! Please provide this summary of care documentation to your next provider. Your primary care clinician is listed as Jyoti Nicholson. If you have any questions after today's visit, please call [phone].

## 2017-02-13 NOTE — PROGRESS NOTES
Chief Complaint   Patient presents with    Pain Upper Quadrant     HPI:    Chronic abdominal pain with hx of constipation. She tells me she was having generalized abd pain and nausea. Pt  has had pain \"for years\" but worse last night. She sees Berto  (GI) for this and cannot take lactulose due to making her nauseated. Stopped Prilosec due to rectal bleeding. No fever, chills, vomiting, diarrhea, urinary complaints, or any other complaints or symptoms. Last EGD/colonoscopy 2015. She requests a pill she can carry in her purse as she feels \"people are putting stuff in her medication\"    She has been seeing [facility]. She tells me they tried to put her on medication the first visit and was not happy about this. + paranoia + anxiety + insomnia denies SI and HI. She has a follow-up appt. Sore throat, dry mouth, hx of allergies, not taking any medication. Not interested in Rx denies cold symptoms, chest pain, wheezing, sob.    c/o back pain primarily right sided, non-radiating denies numbness, fever, tingling or bowel and bladder changes.  She was seeing ortho in past for this and has done physical therapy which has helped in past.    Patient Active Problem List   Diagnosis Code    Microscopic hematuria R31.29    Hematuria, undiagnosed cause R31.9    S/P cystoscopy Z98.890    Rectal bleeding K62.5    Chronic pain G89.29    Diffuse cystic mastopathy N60.19       Review of Systems   Complete ROS negative except where noted in HPI    Objective:     Visit Vitals    /75    Pulse 66    Temp 96.5 °F (35.8 °C)    Resp 14    Ht 5' 5\" (1.651 m)    Wt 107 lb (48.5 kg)    SpO2 100%    BMI 17.81 kg/m2     No exam data present    Constitutional: The patient appears well, NAD, Alert & Oriented x 3  Psych: Normal Mood, Normal Behavior  ENT: RAMSEY, EOMI, no scleral icterus  Lungs: CTAB,  Normal effort , Good air entry, No W/R/R   Cardiovascular:RRR, No M/R/G, S1 and S2 normal  GI: soft, non tender, +BS, no guarding or rebound  Extremities: negative LE edema, feet: warm, good capillary refill    Jackson was seen today for pain upper quadrant. Diagnoses and all orders for this visit:    Right-sided low back pain without sciatica, unspecified chronicity  -     REFERRAL TO PHYSICAL THERAPY    Pharyngitis, unspecified etiology    Seasonal allergic rhinitis, unspecified allergic rhinitis trigger    Constipation, unspecified constipation type  -     senna-docusate (SENNA WITH DOCUSATE SODIUM) 8.6-50 mg per tablet; Take 2 Tabs by mouth daily. Paranoid delusion [facility])    Patient does not want any new medications for her throat. Discussed home remedies. Discussed continuing to see psychiatry for counseling and medication management  Reviewed Neurology note    I have discussed the diagnosis with the patient and the intended plan as seen in the above orders. The patient has received an after-visit summary and questions were answered concerning future plans. I have discussed medication side effects and warnings with the patient as well. I have reviewed the plan of care with the patient, accepted their input and they are in agreement with the treatment goals. Patient verbalizes understanding. Follow-up Disposition:  Return in about 3 months (around 5/13/2017), or if symptoms worsen or fail to improve, for constipation, back pain.

## 2017-08-03 ENCOUNTER — HOSPITAL ENCOUNTER (EMERGENCY)
Age: 52
Discharge: HOME | End: 2017-08-03
Attending: EMERGENCY MEDICINE
Payer: MEDICARE

## 2017-08-03 ENCOUNTER — APPOINTMENT (OUTPATIENT)
Dept: CT IMAGING | Age: 52
End: 2017-08-03
Attending: EMERGENCY MEDICINE
Payer: MEDICARE

## 2017-08-03 VITALS
OXYGEN SATURATION: 100 % | RESPIRATION RATE: 18 BRPM | HEART RATE: 78 BPM | DIASTOLIC BLOOD PRESSURE: 76 MMHG | TEMPERATURE: 98.1 F | HEIGHT: 63 IN | WEIGHT: 111 LBS | SYSTOLIC BLOOD PRESSURE: 106 MMHG | BODY MASS INDEX: 19.67 KG/M2

## 2017-08-03 DIAGNOSIS — G89.29 CHRONIC RIGHT-SIDED LOW BACK PAIN WITHOUT SCIATICA: ICD-10-CM

## 2017-08-03 DIAGNOSIS — R11.2 NON-INTRACTABLE VOMITING WITH NAUSEA, UNSPECIFIED VOMITING TYPE: ICD-10-CM

## 2017-08-03 DIAGNOSIS — R10.32 ABDOMINAL PAIN, LLQ (LEFT LOWER QUADRANT): Primary | ICD-10-CM

## 2017-08-03 DIAGNOSIS — M54.50 CHRONIC RIGHT-SIDED LOW BACK PAIN WITHOUT SCIATICA: ICD-10-CM

## 2017-08-03 DIAGNOSIS — K76.89 LIVER CYST: ICD-10-CM

## 2017-08-03 LAB
ALBUMIN SERPL BCP-MCNC: 4 G/DL (ref 3.4–5)
ALBUMIN/GLOB SERPL: 0.9 {RATIO} (ref 0.8–1.7)
ALP SERPL-CCNC: 60 U/L (ref 45–117)
ALT SERPL-CCNC: 15 U/L (ref 13–56)
ANION GAP BLD CALC-SCNC: 5 MMOL/L (ref 3–18)
APPEARANCE UR: CLEAR
AST SERPL W P-5'-P-CCNC: 7 U/L (ref 15–37)
BASOPHILS # BLD AUTO: 0 K/UL (ref 0–0.06)
BASOPHILS # BLD: 0 % (ref 0–2)
BILIRUB SERPL-MCNC: 0.4 MG/DL (ref 0.2–1)
BILIRUB UR QL: NEGATIVE
BUN SERPL-MCNC: 15 MG/DL (ref 7–18)
BUN/CREAT SERPL: 19 (ref 12–20)
CALCIUM SERPL-MCNC: 9 MG/DL (ref 8.5–10.1)
CHLORIDE SERPL-SCNC: 105 MMOL/L (ref 100–108)
CO2 SERPL-SCNC: 30 MMOL/L (ref 21–32)
COLOR UR: YELLOW
CREAT SERPL-MCNC: 0.81 MG/DL (ref 0.6–1.3)
DIFFERENTIAL METHOD BLD: NORMAL
EOSINOPHIL # BLD: 0.1 K/UL (ref 0–0.4)
EOSINOPHIL NFR BLD: 2 % (ref 0–5)
ERYTHROCYTE [DISTWIDTH] IN BLOOD BY AUTOMATED COUNT: 13.5 % (ref 11.6–14.5)
GLOBULIN SER CALC-MCNC: 4.3 G/DL (ref 2–4)
GLUCOSE SERPL-MCNC: 89 MG/DL (ref 74–99)
GLUCOSE UR STRIP.AUTO-MCNC: NEGATIVE MG/DL
HCT VFR BLD AUTO: 38.8 % (ref 35–45)
HGB BLD-MCNC: 13 G/DL (ref 12–16)
HGB UR QL STRIP: NEGATIVE
KETONES UR QL STRIP.AUTO: NEGATIVE MG/DL
LEUKOCYTE ESTERASE UR QL STRIP.AUTO: NEGATIVE
LIPASE SERPL-CCNC: 381 U/L (ref 73–393)
LYMPHOCYTES # BLD AUTO: 49 % (ref 21–52)
LYMPHOCYTES # BLD: 3 K/UL (ref 0.9–3.6)
MCH RBC QN AUTO: 29.8 PG (ref 24–34)
MCHC RBC AUTO-ENTMCNC: 33.5 G/DL (ref 31–37)
MCV RBC AUTO: 89 FL (ref 74–97)
MONOCYTES # BLD: 0.3 K/UL (ref 0.05–1.2)
MONOCYTES NFR BLD AUTO: 4 % (ref 3–10)
NEUTS SEG # BLD: 2.7 K/UL (ref 1.8–8)
NEUTS SEG NFR BLD AUTO: 45 % (ref 40–73)
NITRITE UR QL STRIP.AUTO: NEGATIVE
PH UR STRIP: 5.5 [PH] (ref 5–8)
PLATELET # BLD AUTO: 216 K/UL (ref 135–420)
PMV BLD AUTO: 10.7 FL (ref 9.2–11.8)
POTASSIUM SERPL-SCNC: 3.6 MMOL/L (ref 3.5–5.5)
PROT SERPL-MCNC: 8.3 G/DL (ref 6.4–8.2)
PROT UR STRIP-MCNC: NEGATIVE MG/DL
RBC # BLD AUTO: 4.36 M/UL (ref 4.2–5.3)
SODIUM SERPL-SCNC: 140 MMOL/L (ref 136–145)
SP GR UR REFRACTOMETRY: 1.02 (ref 1–1.03)
UROBILINOGEN UR QL STRIP.AUTO: 0.2 EU/DL (ref 0.2–1)
WBC # BLD AUTO: 6 K/UL (ref 4.6–13.2)

## 2017-08-03 PROCEDURE — 85025 COMPLETE CBC W/AUTO DIFF WBC: CPT | Performed by: EMERGENCY MEDICINE

## 2017-08-03 PROCEDURE — 81003 URINALYSIS AUTO W/O SCOPE: CPT | Performed by: EMERGENCY MEDICINE

## 2017-08-03 PROCEDURE — 87086 URINE CULTURE/COLONY COUNT: CPT | Performed by: EMERGENCY MEDICINE

## 2017-08-03 PROCEDURE — 74011250636 HC RX REV CODE- 250/636: Performed by: EMERGENCY MEDICINE

## 2017-08-03 PROCEDURE — 99284 EMERGENCY DEPT VISIT MOD MDM: CPT

## 2017-08-03 PROCEDURE — 96375 TX/PRO/DX INJ NEW DRUG ADDON: CPT

## 2017-08-03 PROCEDURE — 96374 THER/PROPH/DIAG INJ IV PUSH: CPT

## 2017-08-03 PROCEDURE — 80053 COMPREHEN METABOLIC PANEL: CPT | Performed by: EMERGENCY MEDICINE

## 2017-08-03 PROCEDURE — 83690 ASSAY OF LIPASE: CPT | Performed by: EMERGENCY MEDICINE

## 2017-08-03 PROCEDURE — 74176 CT ABD & PELVIS W/O CONTRAST: CPT

## 2017-08-03 RX ORDER — MORPHINE SULFATE 2 MG/ML
2 INJECTION, SOLUTION INTRAMUSCULAR; INTRAVENOUS
Status: COMPLETED | OUTPATIENT
Start: 2017-08-03 | End: 2017-08-03

## 2017-08-03 RX ORDER — TRAMADOL HYDROCHLORIDE 50 MG/1
50 TABLET ORAL
Qty: 14 TAB | Refills: 0 | Status: SHIPPED | OUTPATIENT
Start: 2017-08-03 | End: 2017-08-11

## 2017-08-03 RX ORDER — ONDANSETRON 2 MG/ML
4 INJECTION INTRAMUSCULAR; INTRAVENOUS
Status: COMPLETED | OUTPATIENT
Start: 2017-08-03 | End: 2017-08-03

## 2017-08-03 RX ORDER — ONDANSETRON 4 MG/1
4 TABLET, FILM COATED ORAL
Qty: 8 TAB | Refills: 0 | Status: SHIPPED | OUTPATIENT
Start: 2017-08-03 | End: 2017-09-26

## 2017-08-03 RX ADMIN — ONDANSETRON 4 MG: 2 INJECTION INTRAMUSCULAR; INTRAVENOUS at 11:39

## 2017-08-03 RX ADMIN — Medication 2 MG: at 11:39

## 2017-08-03 NOTE — DISCHARGE INSTRUCTIONS
Abdominal Pain: Care Instructions  Your Care Instructions    Abdominal pain has many possible causes. Some aren't serious and get better on their own in a few days. Others need more testing and treatment. If your pain continues or gets worse, you need to be rechecked and may need more tests to find out what is wrong. You may need surgery to correct the problem. Don't ignore new symptoms, such as fever, nausea and vomiting, urination problems, pain that gets worse, and dizziness. These may be signs of a more serious problem. Your doctor may have recommended a follow-up visit in the next 8 to 12 hours. If you are not getting better, you may need more tests or treatment. The doctor has checked you carefully, but problems can develop later. If you notice any problems or new symptoms, get medical treatment right away. Follow-up care is a key part of your treatment and safety. Be sure to make and go to all appointments, and call your doctor if you are having problems. It's also a good idea to know your test results and keep a list of the medicines you take. How can you care for yourself at home? · Rest until you feel better. · To prevent dehydration, drink plenty of fluids, enough so that your urine is light yellow or clear like water. Choose water and other caffeine-free clear liquids until you feel better. If you have kidney, heart, or liver disease and have to limit fluids, talk with your doctor before you increase the amount of fluids you drink. · If your stomach is upset, eat mild foods, such as rice, dry toast or crackers, bananas, and applesauce. Try eating several small meals instead of two or three large ones. · Wait until 48 hours after all symptoms have gone away before you have spicy foods, alcohol, and drinks that contain caffeine. · Do not eat foods that are high in fat. · Avoid anti-inflammatory medicines such as aspirin, ibuprofen (Advil, Motrin), and naproxen (Aleve).  These can cause stomach upset. Talk to your doctor if you take daily aspirin for another health problem. When should you call for help? Call 911 anytime you think you may need emergency care. For example, call if:  · You passed out (lost consciousness). · You pass maroon or very bloody stools. · You vomit blood or what looks like coffee grounds. · You have new, severe belly pain. Call your doctor now or seek immediate medical care if:  · Your pain gets worse, especially if it becomes focused in one area of your belly. · You have a new or higher fever. · Your stools are black and look like tar, or they have streaks of blood. · You have unexpected vaginal bleeding. · You have symptoms of a urinary tract infection. These may include:  ¨ Pain when you urinate. ¨ Urinating more often than usual.  ¨ Blood in your urine. · You are dizzy or lightheaded, or you feel like you may faint. Watch closely for changes in your health, and be sure to contact your doctor if:  · You are not getting better after 1 day (24 hours). Where can you learn more? Go to http://lazarusExtension Entertainmentdelano.info/. Enter V264 in the search box to learn more about \"Abdominal Pain: Care Instructions. \"  Current as of: March 20, 2017  Content Version: 11.3  © 7405-0244 [employer]. Care instructions adapted under license by [employer] (which disclaims liability or warranty for this information). If you have questions about a medical condition or this instruction, always ask your healthcare professional. [place] [address] any warranty or liability for your use of this information. Nausea and Vomiting: Care Instructions  Your Care Instructions    When you are nauseated, you may feel weak and sweaty and notice a lot of saliva in your mouth. Nausea often leads to vomiting. Most of the time you do not need to worry about nausea and vomiting, but they can be signs of other illnesses.   Two common causes of nausea and vomiting are stomach flu and food poisoning. Nausea and vomiting from viral stomach flu will usually start to improve within 24 hours. Nausea and vomiting from food poisoning may last from 12 to 48 hours. The doctor has checked you carefully, but problems can develop later. If you notice any problems or new symptoms, get medical treatment right away. Follow-up care is a key part of your treatment and safety. Be sure to make and go to all appointments, and call your doctor if you are having problems. It's also a good idea to know your test results and keep a list of the medicines you take. How can you care for yourself at home? · To prevent dehydration, drink plenty of fluids, enough so that your urine is light yellow or clear like water. Choose water and other caffeine-free clear liquids until you feel better. If you have kidney, heart, or liver disease and have to limit fluids, talk with your doctor before you increase the amount of fluids you drink. · Rest in bed until you feel better. · When you are able to eat, try clear soups, mild foods, and liquids until all symptoms are gone for 12 to 48 hours. Other good choices include dry toast, crackers, cooked cereal, and gelatin dessert, such as Jell-O. When should you call for help? Call 911 anytime you think you may need emergency care. For example, call if:  · You passed out (lost consciousness). Call your doctor now or seek immediate medical care if:  · You have symptoms of dehydration, such as:  ¨ Dry eyes and a dry mouth. ¨ Passing only a little dark urine. ¨ Feeling thirstier than usual.  · You have new or worsening belly pain. · You have a new or higher fever. · You vomit blood or what looks like coffee grounds. Watch closely for changes in your health, and be sure to contact your doctor if:  · You have ongoing nausea and vomiting. · Your vomiting is getting worse. · Your vomiting lasts longer than 2 days.   · You are not getting better as expected. Where can you learn more? Go to http://lazarus-delano.info/. Enter 25 [id number] in the search box to learn more about \"Nausea and Vomiting: Care Instructions. \"  Current as of: March 20, 2017  Content Version: 11.3  © 8937-9757 [employer]. Care instructions adapted under license by [employer] (which disclaims liability or warranty for this information). If you have questions about a medical condition or this instruction, always ask your healthcare professional. [place] [address] any warranty or liability for your use of this information. Low Back Pain: Exercises  Your Care Instructions  Here are some examples of typical rehabilitation exercises for your condition. Start each exercise slowly. Ease off the exercise if you start to have pain. Your doctor or physical therapist will tell you when you can start these exercises and which ones will work best for you. How to do the exercises  Press-up    1. Lie on your stomach, supporting your body with your forearms. 2. Press your elbows down into the floor to raise your upper back. As you do this, relax your stomach muscles and allow your back to arch without using your back muscles. As your press up, do not let your hips or pelvis come off the floor. 3. Hold for 15 to 30 seconds, then relax. 4. Repeat 2 to 4 times. Alternate arm and leg (bird dog) exercise    Note: Do this exercise slowly. Try to keep your body straight at all times, and do not let one hip drop lower than the other. 1. Start on the floor, on your hands and knees. 2. Tighten your belly muscles. 3. Raise one leg off the floor, and hold it straight out behind you. Be careful not to let your hip drop down, because that will twist your trunk. 4. Hold for about 6 seconds, then lower your leg and switch to the other leg. 5. Repeat 8 to 12 times on each leg.   6. Over time, work up to holding for 10 to 30 seconds each time.  7. If you feel stable and secure with your leg raised, try raising the opposite arm straight out in front of you at the same time. Knee-to-chest exercise    1. Lie on your back with your knees bent and your feet flat on the floor. 2. Bring one knee to your chest, keeping the other foot flat on the floor (or keeping the other leg straight, whichever feels better on your lower back). 3. Keep your lower back pressed to the floor. Hold for at least 15 to 30 seconds. 4. Relax, and lower the knee to the starting position. 5. Repeat with the other leg. Repeat 2 to 4 times with each leg. 6. To get more stretch, put your other leg flat on the floor while pulling your knee to your chest.  Curl-ups    1. Lie on the floor on your back with your knees bent at a 90-degree angle. Your feet should be flat on the floor, about 12 inches from your buttocks. 2. Cross your arms over your chest. If this bothers your neck, try putting your hands behind your neck (not your head), with your elbows spread apart. 3. Slowly tighten your belly muscles and raise your shoulder blades off the floor. 4. Keep your head in line with your body, and do not press your chin to your chest.  5. Hold this position for 1 or 2 seconds, then slowly lower yourself back down to the floor. 6. Repeat 8 to 12 times. Pelvic tilt exercise    1. Lie on your back with your knees bent. 2. \"Brace\" your stomach. This means to tighten your muscles by pulling in and imagining your belly button moving toward your spine. You should feel like your back is pressing to the floor and your hips and pelvis are rocking back. 3. Hold for about 6 seconds while you breathe smoothly. 4. Repeat 8 to 12 times. Heel dig bridging    1. Lie on your back with both knees bent and your ankles bent so that only your heels are digging into the floor. Your knees should be bent about 90 degrees.   2. Then push your heels into the floor, squeeze your buttocks, and lift your hips off the floor until your shoulders, hips, and knees are all in a straight line. 3. Hold for about 6 seconds as you continue to breathe normally, and then slowly lower your hips back down to the floor and rest for up to 10 seconds. 4. Do 8 to 12 repetitions. Hamstring stretch in doorway    1. Lie on your back in a doorway, with one leg through the open door. 2. Slide your leg up the wall to straighten your knee. You should feel a gentle stretch down the back of your leg. 3. Hold the stretch for at least 15 to 30 seconds. Do not arch your back, point your toes, or bend either knee. Keep one heel touching the floor and the other heel touching the wall. 4. Repeat with your other leg. 5. Do 2 to 4 times for each leg. Hip flexor stretch    1. Kneel on the floor with one knee bent and one leg behind you. Place your forward knee over your foot. Keep your other knee touching the floor. 2. Slowly push your hips forward until you feel a stretch in the upper thigh of your rear leg. 3. Hold the stretch for at least 15 to 30 seconds. Repeat with your other leg. 4. Do 2 to 4 times on each side. Wall sit    1. Stand with your back 10 to 12 inches away from a wall. 2. Lean into the wall until your back is flat against it. 3. Slowly slide down until your knees are slightly bent, pressing your lower back into the wall. 4. Hold for about 6 seconds, then slide back up the wall. 5. Repeat 8 to 12 times. Follow-up care is a key part of your treatment and safety. Be sure to make and go to all appointments, and call your doctor if you are having problems. It's also a good idea to know your test results and keep a list of the medicines you take. Where can you learn more? Go to http://lazarus-delano.info/. Enter J203 in the search box to learn more about \"Low Back Pain: Exercises. \"  Current as of: March 21, 2017  Content Version: 11.3  © 8882-5352 [employer].  Care instructions adapted under license by [address] (which disclaims liability or warranty for this information). If you have questions about a medical condition or this instruction, always ask your healthcare professional. [address] any warranty or liability for your use of this information.

## 2017-08-03 NOTE — ED PROVIDER NOTES
HPI Comments: 10:19 AM    Antonio Brewster is a 46 y.o. female presents to ED c/o abd pain with nausea and vomiting onset 1 week ago. Pt states that her abd pain is located on the left side of her abd and is 8/10 in severity. She also has body aches primarily in the lower right back for a \"long time\" but no current back pain. Pt notes that she has had some weight lost but admits this is over past several years. PMHx include irritable bowel syndrome. PSHx include colonoscopy (3 times; last one was 2 years ago). FHx include HTN and diabetes. NKDA. Pt denies fever, diarrhea, dysuria, fever, SOB, cough, rhinorrhea, vaginal problems and any other sxs or complaints. Patient is a 46 y.o. female presenting with vomiting and dizziness. The history is provided by the patient. No  was used. Vomiting    Associated symptoms include abdominal pain (left side). Pertinent negatives include no chills, no fever, no diarrhea, no headaches, no myalgias, no cough and no headaches. Dizziness    Associated symptoms include abdominal pain (left side), nausea, vomiting and light-headedness. Pertinent negatives include no chest pain, no fever, no congestion, no headaches and no back pain.         Past Medical History:   Diagnosis Date    Chest pain 11/2014    neg EKG, non recurrent    Chronic pain     lower back and legs    Depression     Fibroids     Headache     Hiatal hernia     IBS (irritable bowel syndrome)     Microscopic hematuria     Rectal bleeding        Past Surgical History:   Procedure Laterality Date    COLONOSCOPY,DIAGNOSTIC      HX BREAST BIOPSY Right 1/21/2015    RIGHT BREAST BIOPSY WITH NEEDLE LOCALIZATION ULTRASOUND performed by Carina Calvillo MD at [facility] MAIN OR    HX GI      HX HYSTERECTOMY      HX TUBAL LIGATION           Family History:   Problem Relation Age of Onset    HIV/AIDS Brother     Diabetes Father     Cancer Brother     Hypertension Sister     Diabetes Brother     Hypertension Sister     Hypertension Sister     Hypertension Brother        Social History     Social History    Marital status: SINGLE     Spouse name: N/A    Number of children: N/A    Years of education: N/A     Occupational History    Not on file. Social History Main Topics    Smoking status: Former Smoker     Packs/day: 0.00    Smokeless tobacco: Former User     Quit date: 02/2016    Alcohol use No    Drug use: No    Sexual activity: Yes     Partners: Male     Other Topics Concern    Not on file     Social History Narrative         ALLERGIES: Amoxicillin    Review of Systems   Constitutional: Negative for chills and fever. HENT: Negative for congestion, rhinorrhea and sore throat. Eyes: Negative for redness and visual disturbance. Respiratory: Negative for cough, shortness of breath and wheezing. Cardiovascular: Negative for chest pain. Gastrointestinal: Positive for abdominal pain (left side), nausea and vomiting. Negative for diarrhea. Genitourinary: Negative for dysuria, frequency, hematuria, pelvic pain, vaginal bleeding, vaginal discharge and vaginal pain. Musculoskeletal: Negative for back pain, myalgias, neck pain and neck stiffness. Skin: Negative for pallor and rash. Neurological: Positive for light-headedness. Negative for headaches. Hematological: Does not bruise/bleed easily. Psychiatric/Behavioral: Negative. All other systems reviewed and are negative. Vitals:    08/03/17 0950 08/03/17 1042 08/03/17 1046   BP: 106/76     Pulse: 78     Resp: 18     Temp: 98.1 °F (36.7 °C)     SpO2: 100%  100%   Weight: 45.9 kg (101 lb 2 oz) 50.3 kg (111 lb)    Height:  5' 3\" (1.6 m)             Physical Exam   Constitutional: She is oriented to person, place, and time. She appears well-developed and well-nourished. No distress. HENT:   Head: Normocephalic and atraumatic.    Mouth/Throat: Oropharynx is clear and moist.   Eyes: Conjunctivae are normal. Pupils are equal, round, and reactive to light. No scleral icterus. Neck: Normal range of motion. Neck supple. Cardiovascular: Intact distal pulses. Capillary refill < 3 seconds   Pulmonary/Chest: Effort normal and breath sounds normal. No respiratory distress. She has no wheezes. Abdominal: Soft. Bowel sounds are normal. She exhibits no distension. Epigastric and left abd tenderness. Mild guarding on LLQ. Abd soft. Good bowel sounds. Musculoskeletal: Normal range of motion. She exhibits no edema. No back TTP. No midline spinal TTP. Lymphadenopathy:     She has no cervical adenopathy. Neurological: She is alert and oriented to person, place, and time. No cranial nerve deficit. Coordination normal.   Skin: Skin is warm and dry. No rash noted. She is not diaphoretic. No erythema. Psychiatric: She has a normal mood and affect. Nursing note and vitals reviewed. MDM  Number of Diagnoses or Management Options  Abdominal pain, LLQ (left lower quadrant):   Chronic right-sided low back pain without sciatica:   Liver cyst:   Non-intractable vomiting with nausea, unspecified vomiting type:   Diagnosis management comments: ddx infectious, metabolic, diverticular, biliary, food poison, dehydration, neoplasm, hernia, IBS    Labs, IVF, analgesia, zofran, CT abd pelvis    Labs reassuring    Nothing acute on CT    I have reassessed the patient. I have discussed the workup, results and plan with the patient and patient is in agreement. Patient is feeling better. Patient will be prescribed tramadol, zofran. Patient was discharge in stable condition. Patient was given outpatient follow up. Patient is to return to emergency department if any new or worsening condition.         Amount and/or Complexity of Data Reviewed  Clinical lab tests: ordered and reviewed  Tests in the radiology section of CPT®: ordered and reviewed  Tests in the medicine section of CPT®: ordered and reviewed  Review and summarize past medical records: yes  Independent visualization of images, tracings, or specimens: yes    Risk of Complications, Morbidity, and/or Mortality  Presenting problems: moderate  Diagnostic procedures: moderate  Management options: moderate    Patient Progress  Patient progress: improved    ED Course       Procedures    Vitals:  Patient Vitals for the past 12 hrs:   Temp Pulse Resp BP SpO2   08/03/17 1046 - - - - 100 %   08/03/17 0950 98.1 °F (36.7 °C) 78 18 106/76 100 %       Medications ordered:   Medications   ondansetron (ZOFRAN) injection 4 mg (4 mg IntraVENous Given 8/3/17 1139)   morphine injection 2 mg (2 mg IntraVENous Given 8/3/17 1139)         Lab findings:  Recent Results (from the past 12 hour(s))   CBC WITH AUTOMATED DIFF    Collection Time: 08/03/17 10:23 AM   Result Value Ref Range    WBC 6.0 4.6 - 13.2 K/uL    RBC 4.36 4.20 - 5.30 M/uL    HGB 13.0 12.0 - 16.0 g/dL    HCT 38.8 35.0 - 45.0 %    MCV 89.0 74.0 - 97.0 FL    MCH 29.8 24.0 - 34.0 PG    MCHC 33.5 31.0 - 37.0 g/dL    RDW 13.5 11.6 - 14.5 %    PLATELET 863 949 - 880 K/uL    MPV 10.7 9.2 - 11.8 FL    NEUTROPHILS 45 40 - 73 %    LYMPHOCYTES 49 21 - 52 %    MONOCYTES 4 3 - 10 %    EOSINOPHILS 2 0 - 5 %    BASOPHILS 0 0 - 2 %    ABS. NEUTROPHILS 2.7 1.8 - 8.0 K/UL    ABS. LYMPHOCYTES 3.0 0.9 - 3.6 K/UL    ABS. MONOCYTES 0.3 0.05 - 1.2 K/UL    ABS. EOSINOPHILS 0.1 0.0 - 0.4 K/UL    ABS.  BASOPHILS 0.0 0.0 - 0.06 K/UL    DF AUTOMATED     METABOLIC PANEL, COMPREHENSIVE    Collection Time: 08/03/17 10:23 AM   Result Value Ref Range    Sodium 140 136 - 145 mmol/L    Potassium 3.6 3.5 - 5.5 mmol/L    Chloride 105 100 - 108 mmol/L    CO2 30 21 - 32 mmol/L    Anion gap 5 3.0 - 18 mmol/L    Glucose 89 74 - 99 mg/dL    BUN 15 7.0 - 18 MG/DL    Creatinine 0.81 0.6 - 1.3 MG/DL    BUN/Creatinine ratio 19 12 - 20      GFR est AA >60 >60 ml/min/1.73m2    GFR est non-AA >60 >60 ml/min/1.73m2    Calcium 9.0 8.5 - 10.1 MG/DL    Bilirubin, total 0.4 0.2 - 1.0 MG/DL ALT (SGPT) 15 13 - 56 U/L    AST (SGOT) 7 (L) 15 - 37 U/L    Alk. phosphatase 60 45 - 117 U/L    Protein, total 8.3 (H) 6.4 - 8.2 g/dL    Albumin 4.0 3.4 - 5.0 g/dL    Globulin 4.3 (H) 2.0 - 4.0 g/dL    A-G Ratio 0.9 0.8 - 1.7     LIPASE    Collection Time: 08/03/17 10:23 AM   Result Value Ref Range    Lipase 381 73 - 393 U/L         X-Ray, CT or other radiology findings or impressions:  CT ABD PELV WO CONT   Final Result      IMPRESSION  IMPRESSION:     1. No acute findings along the gastrointestinal tract.     2.  Tiny hepatic hypodensities suggestive of hepatic cysts.     3. Atypical ill-defined calcific appearance to the renal pyramids, potentially  suggestive of medullary sponge kidney. Tiny punctate stone suggested in the  right kidney lower pole region.     4.  Nonspecific questionable prominence of the right side of the vaginal cuff. Possibly an artifact related to the presence of contracted distal small bowel  producing volume averaging artifact. Recommend correlation with vaginal exam.         Disposition:  Diagnosis:   1. Abdominal pain, LLQ (left lower quadrant)    2. Non-intractable vomiting with nausea, unspecified vomiting type    3. Chronic right-sided low back pain without sciatica    4. Liver cyst        Disposition: home    Follow-up Information     Follow up With Details Comments Contact Info    Chandrika Andrade MD Call in 1 day Call primary care physician in 1 day for follow up. [address] [place]      [facility] EMERGENCY DEPT  If symptoms worsen [address] [place] [address] [address] [facility]    Tresa Milan MD Call in 2 days Call in two days for follow up [facility]  [phone]             Patient's Medications   Start Taking    ONDANSETRON HCL (ZOFRAN, AS HYDROCHLORIDE,) 4 MG TABLET    Take 1 Tab by mouth every eight (8) hours as needed for Nausea. TRAMADOL (ULTRAM) 50 MG TABLET    Take 1 Tab by mouth every six (6) hours as needed for Pain. Max Daily Amount: 200 mg. Continue Taking    LACTOBACILLUS ACIDOPHILUS (PROBIOTIC PO)    Take  by mouth. SENNA-DOCUSATE (SENNA WITH DOCUSATE SODIUM) 8.6-50 MG PER TABLET    Take 2 Tabs by mouth daily. These Medications have changed    No medications on file   Stop Taking    No medications on file       Scribe Attestation:   Marie Villeda, acting as a scribe for and in the presence of Michelle Retana DO on August 03, 2017 at 10:20 AM     Signed by: Mike Day, August 03, 2017 at 10:20 AM     Provider Attestation:   I personally performed the services described in the documentation, reviewed the documentation, as recorded by the scribe in my presence, and it accurately and completely records my words and actions.      Reviewed and signed by:  Michelle Retana DO

## 2017-08-04 ENCOUNTER — PATIENT OUTREACH (OUTPATIENT)
Dept: FAMILY MEDICINE CLINIC | Facility: CLINIC | Age: 52
End: 2017-08-04

## 2017-08-04 NOTE — PROGRESS NOTES
Nurse Navigator attempted to contact patient seen at [facility] ED on 8/3/17 for c/o abdominal pain and nause. Message left introducing myself, the purpose of the call and giving my contact information.   Requested that patient call back at his/her earliest convenience

## 2017-08-05 LAB
BACTERIA SPEC CULT: NORMAL
SERVICE CMNT-IMP: NORMAL

## 2017-08-06 ENCOUNTER — HOSPITAL ENCOUNTER (EMERGENCY)
Age: 52
Discharge: HOME | End: 2017-08-06
Attending: EMERGENCY MEDICINE | Admitting: EMERGENCY MEDICINE
Payer: MEDICARE

## 2017-08-06 VITALS
OXYGEN SATURATION: 100 % | WEIGHT: 100.5 LBS | HEART RATE: 70 BPM | TEMPERATURE: 98 F | DIASTOLIC BLOOD PRESSURE: 74 MMHG | RESPIRATION RATE: 14 BRPM | BODY MASS INDEX: 17.81 KG/M2 | SYSTOLIC BLOOD PRESSURE: 102 MMHG | HEIGHT: 63 IN

## 2017-08-06 DIAGNOSIS — K58.9 IRRITABLE BOWEL SYNDROME, UNSPECIFIED TYPE: ICD-10-CM

## 2017-08-06 DIAGNOSIS — R10.32 ABDOMINAL PAIN, LLQ (LEFT LOWER QUADRANT): Primary | ICD-10-CM

## 2017-08-06 LAB
ALBUMIN SERPL BCP-MCNC: 3.8 G/DL (ref 3.4–5)
ALBUMIN/GLOB SERPL: 0.9 {RATIO} (ref 0.8–1.7)
ALP SERPL-CCNC: 58 U/L (ref 45–117)
ALT SERPL-CCNC: 14 U/L (ref 13–56)
ANION GAP BLD CALC-SCNC: 4 MMOL/L (ref 3–18)
APPEARANCE UR: NORMAL
AST SERPL W P-5'-P-CCNC: 7 U/L (ref 15–37)
BASOPHILS # BLD AUTO: 0 K/UL (ref 0–0.1)
BASOPHILS # BLD: 0 % (ref 0–2)
BILIRUB SERPL-MCNC: 0.3 MG/DL (ref 0.2–1)
BILIRUB UR QL: NEGATIVE
BUN SERPL-MCNC: 15 MG/DL (ref 7–18)
BUN/CREAT SERPL: 19 (ref 12–20)
CALCIUM SERPL-MCNC: 8.9 MG/DL (ref 8.5–10.1)
CHLORIDE SERPL-SCNC: 106 MMOL/L (ref 100–108)
CO2 SERPL-SCNC: 31 MMOL/L (ref 21–32)
COLOR UR: YELLOW
CREAT SERPL-MCNC: 0.78 MG/DL (ref 0.6–1.3)
DIFFERENTIAL METHOD BLD: ABNORMAL
EOSINOPHIL # BLD: 0.1 K/UL (ref 0–0.4)
EOSINOPHIL NFR BLD: 2 % (ref 0–5)
ERYTHROCYTE [DISTWIDTH] IN BLOOD BY AUTOMATED COUNT: 13.5 % (ref 11.6–14.5)
GLOBULIN SER CALC-MCNC: 4.2 G/DL (ref 2–4)
GLUCOSE SERPL-MCNC: 82 MG/DL (ref 74–99)
GLUCOSE UR STRIP.AUTO-MCNC: NEGATIVE MG/DL
HCT VFR BLD AUTO: 35.8 % (ref 35–45)
HGB BLD-MCNC: 12.2 G/DL (ref 12–16)
HGB UR QL STRIP: NEGATIVE
KETONES UR QL STRIP.AUTO: NEGATIVE MG/DL
LEUKOCYTE ESTERASE UR QL STRIP.AUTO: NEGATIVE
LYMPHOCYTES # BLD AUTO: 49 % (ref 21–52)
LYMPHOCYTES # BLD: 3 K/UL (ref 0.9–3.6)
MCH RBC QN AUTO: 30.3 PG (ref 24–34)
MCHC RBC AUTO-ENTMCNC: 34.1 G/DL (ref 31–37)
MCV RBC AUTO: 89.1 FL (ref 74–97)
MONOCYTES # BLD: 0.4 K/UL (ref 0.05–1.2)
MONOCYTES NFR BLD AUTO: 7 % (ref 3–10)
NEUTS SEG # BLD: 2.6 K/UL (ref 1.8–8)
NEUTS SEG NFR BLD AUTO: 42 % (ref 40–73)
NITRITE UR QL STRIP.AUTO: NEGATIVE
PH UR STRIP: 6 [PH] (ref 5–8)
PLATELET # BLD AUTO: 208 K/UL (ref 135–420)
PMV BLD AUTO: 10.9 FL (ref 9.2–11.8)
POTASSIUM SERPL-SCNC: 3.8 MMOL/L (ref 3.5–5.5)
PROT SERPL-MCNC: 8 G/DL (ref 6.4–8.2)
PROT UR STRIP-MCNC: NEGATIVE MG/DL
RBC # BLD AUTO: 4.02 M/UL (ref 4.2–5.3)
SODIUM SERPL-SCNC: 141 MMOL/L (ref 136–145)
SP GR UR REFRACTOMETRY: 1.02 (ref 1–1.03)
UROBILINOGEN UR QL STRIP.AUTO: 1 EU/DL (ref 0.2–1)
WBC # BLD AUTO: 6.1 K/UL (ref 4.6–13.2)

## 2017-08-06 PROCEDURE — 81003 URINALYSIS AUTO W/O SCOPE: CPT | Performed by: EMERGENCY MEDICINE

## 2017-08-06 PROCEDURE — 77030005563 HC CATH URETH INT MMGH -A

## 2017-08-06 PROCEDURE — 96361 HYDRATE IV INFUSION ADD-ON: CPT

## 2017-08-06 PROCEDURE — 85025 COMPLETE CBC W/AUTO DIFF WBC: CPT | Performed by: EMERGENCY MEDICINE

## 2017-08-06 PROCEDURE — 99284 EMERGENCY DEPT VISIT MOD MDM: CPT

## 2017-08-06 PROCEDURE — 74011250636 HC RX REV CODE- 250/636: Performed by: EMERGENCY MEDICINE

## 2017-08-06 PROCEDURE — 51701 INSERT BLADDER CATHETER: CPT

## 2017-08-06 PROCEDURE — 96360 HYDRATION IV INFUSION INIT: CPT

## 2017-08-06 PROCEDURE — 80053 COMPREHEN METABOLIC PANEL: CPT | Performed by: EMERGENCY MEDICINE

## 2017-08-06 RX ADMIN — SODIUM CHLORIDE 500 ML: 900 INJECTION, SOLUTION INTRAVENOUS at 11:07

## 2017-08-06 NOTE — ED TRIAGE NOTES
Patient arrived via friend due to nausea/vomiting, worsening abdominal pain, back pain and rectal bleeding. Patient states she was seen here a few days ago for the same thing but the pain is worse and she has noted blood in her stool. Patient unsure of fever at home but c/o sweating and chills. Unsteady gait noted in triage. Patient also states, \"My hands get red and wrinkle up sometimes and I dont know why. \"

## 2017-08-06 NOTE — ED PROVIDER NOTES
HPI Comments: Pt with history of IBS, chronic pain and depression, presents to ED with complaint of ongoing LLQ abdominal pain when she eats. She states she has also seen some red blood on her stools recently. She reports having regular bowel movements. No nausea or vomiting, no lightheadedness or dizziness, no palpitations, no dyspnea, no fevers or chills \"but I'm always cold but then I sweat\". She has not followed up with her PMD and doesn't know when she is scheduled to see GI. She states she is not being treated for her chronic pain at this time. She states that she would like a repeat urine test done today, UA and UCx from 8/3 are negative and she denies any active urinary complaints. No other acute symptoms or complaints were noted. Past Medical History:   Diagnosis Date    Chest pain 11/2014    neg EKG, non recurrent    Chronic pain     lower back and legs    Depression     Fibroids     Headache     Hiatal hernia     IBS (irritable bowel syndrome)     Microscopic hematuria     Rectal bleeding        Past Surgical History:   Procedure Laterality Date    COLONOSCOPY,DIAGNOSTIC      HX BREAST BIOPSY Right 1/21/2015    RIGHT BREAST BIOPSY WITH NEEDLE LOCALIZATION ULTRASOUND performed by Willis Marvin MD at [facility] MAIN OR    HX GI      HX HYSTERECTOMY      HX TUBAL LIGATION           Family History:   Problem Relation Age of Onset    HIV/AIDS Brother     Diabetes Father     Cancer Brother     Hypertension Sister     Diabetes Brother     Hypertension Sister     Hypertension Sister     Hypertension Brother        Social History     Social History    Marital status: SINGLE     Spouse name: N/A    Number of children: N/A    Years of education: N/A     Occupational History    Not on file.      Social History Main Topics    Smoking status: Former Smoker     Packs/day: 0.00    Smokeless tobacco: Former User     Quit date: 02/2016    Alcohol use No    Drug use: No    Sexual activity: Yes     Partners: Male     Other Topics Concern    Not on file     Social History Narrative         ALLERGIES: Amoxicillin    Review of Systems   Constitutional: Negative for chills, diaphoresis and fever. HENT: Negative. Eyes: Negative for visual disturbance. Respiratory: Negative for chest tightness and shortness of breath. Cardiovascular: Negative for chest pain, palpitations and leg swelling. Gastrointestinal: Positive for abdominal pain and anal bleeding. Negative for constipation, diarrhea, nausea and vomiting. Endocrine: Negative. Genitourinary: Negative for dysuria and hematuria. Musculoskeletal: Negative. Negative for back pain, neck pain and neck stiffness. Skin: Negative for rash. Allergic/Immunologic: Negative. Neurological: Negative for dizziness, syncope, light-headedness, numbness and headaches. Hematological: Does not bruise/bleed easily. Vitals:    08/06/17 1051   BP: 111/77   Pulse: 76   Resp: 14   Temp: 98 °F (36.7 °C)   SpO2: 100%   Weight: 45.6 kg (100 lb 8 oz)   Height: 5' 3\" (1.6 m)            Physical Exam   Constitutional: She is oriented to person, place, and time. She appears well-developed and well-nourished. No distress. Resting comfortably on stretcher. Thin body habitus   HENT:   Head: Normocephalic and atraumatic. MM moist   Eyes: Conjunctivae and EOM are normal. No scleral icterus. Sclera clear bilaterally   Neck: Neck supple. No JVD present. Non-tender to palpation   Cardiovascular: Normal rate, regular rhythm and normal heart sounds. Exam reveals no gallop and no friction rub. No murmur heard. Pulmonary/Chest: Effort normal and breath sounds normal. No respiratory distress. She has no wheezes. She has no rales. She exhibits no tenderness. No crepitance with palpation   Abdominal: Soft. Bowel sounds are normal. She exhibits no distension and no mass. There is no tenderness. There is no rebound and no guarding. Genitourinary:   Genitourinary Comments: No CVA tenderness   Musculoskeletal: She exhibits no edema or tenderness. Normal inspection of upper extremities. No edema noted to bilateral lower extremities   Lymphadenopathy:     She has no cervical adenopathy. Neurological: She is alert and oriented to person, place, and time. No cranial nerve deficit. She exhibits normal muscle tone. Normal motor and sensation bilaterally to upper and lower extremities   Skin: Skin is warm and dry. No rash noted. She is not diaphoretic. Psychiatric:   Normal mood and affect. Vitals reviewed. MDM  Number of Diagnoses or Management Options  Abdominal pain, LLQ (left lower quadrant):   Irritable bowel syndrome, unspecified type:   Diagnosis management comments: Pt with complaint of ongoing LLQ abdominal pain, no acute change in symptoms since her previous ED eval done 8/3. Abdominal exam is benign, VS are reassuring. Reviewed lab and CT findings from 8/3, no acute process identified. Will repeat labs and UA per pt request.  Reassess but anticipate discharge to home with PMD and GI follow up as planned. Pt advised that she missed her May follow up appointment with her PMD and advised that the office tried to contact her on Friday without success. Pt states she did not call her PMD office to schedule follow up as recommended at her last ED discharge. Encouraged her to schedule follow up appointment tomorrow morning. Reviewed results with pt. She states she will follow up with her PMD as suggested.        Amount and/or Complexity of Data Reviewed  Clinical lab tests: ordered and reviewed  Tests in the radiology section of CPT®: reviewed  Decide to obtain previous medical records or to obtain history from someone other than the patient: yes  Obtain history from someone other than the patient: yes  Independent visualization of images, tracings, or specimens: yes    Risk of Complications, Morbidity, and/or Mortality  Presenting problems: moderate  Management options: moderate    Patient Progress  Patient progress: stable    ED Course       Procedures

## 2017-08-06 NOTE — DISCHARGE INSTRUCTIONS
Abdominal Pain: Care Instructions  Your Care Instructions    Abdominal pain has many possible causes. Some aren't serious and get better on their own in a few days. Others need more testing and treatment. If your pain continues or gets worse, you need to be rechecked and may need more tests to find out what is wrong. You may need surgery to correct the problem. Don't ignore new symptoms, such as fever, nausea and vomiting, urination problems, pain that gets worse, and dizziness. These may be signs of a more serious problem. Your doctor may have recommended a follow-up visit in the next 8 to 12 hours. If you are not getting better, you may need more tests or treatment. The doctor has checked you carefully, but problems can develop later. If you notice any problems or new symptoms, get medical treatment right away. Follow-up care is a key part of your treatment and safety. Be sure to make and go to all appointments, and call your doctor if you are having problems. It's also a good idea to know your test results and keep a list of the medicines you take. How can you care for yourself at home? · Rest until you feel better. · To prevent dehydration, drink plenty of fluids, enough so that your urine is light yellow or clear like water. Choose water and other caffeine-free clear liquids until you feel better. If you have kidney, heart, or liver disease and have to limit fluids, talk with your doctor before you increase the amount of fluids you drink. · If your stomach is upset, eat mild foods, such as rice, dry toast or crackers, bananas, and applesauce. Try eating several small meals instead of two or three large ones. · Wait until 48 hours after all symptoms have gone away before you have spicy foods, alcohol, and drinks that contain caffeine. · Do not eat foods that are high in fat. · Avoid anti-inflammatory medicines such as aspirin, ibuprofen (Advil, Motrin), and naproxen (Aleve).  These can cause stomach upset. Talk to your doctor if you take daily aspirin for another health problem. When should you call for help? Call 911 anytime you think you may need emergency care. For example, call if:  · You passed out (lost consciousness). · You pass maroon or very bloody stools. · You vomit blood or what looks like coffee grounds. · You have new, severe belly pain. Call your doctor now or seek immediate medical care if:  · Your pain gets worse, especially if it becomes focused in one area of your belly. · You have a new or higher fever. · Your stools are black and look like tar, or they have streaks of blood. · You have unexpected vaginal bleeding. · You have symptoms of a urinary tract infection. These may include:  ¨ Pain when you urinate. ¨ Urinating more often than usual.  ¨ Blood in your urine. · You are dizzy or lightheaded, or you feel like you may faint. Watch closely for changes in your health, and be sure to contact your doctor if:  · You are not getting better after 1 day (24 hours). Where can you learn more? Go to http://lazarusAcronisdelano.info/. Enter M875 in the search box to learn more about \"Abdominal Pain: Care Instructions. \"  Current as of: March 20, 2017  Content Version: 11.3  © 0552-1091 [employer]. Care instructions adapted under license by [employer] (which disclaims liability or warranty for this information). If you have questions about a medical condition or this instruction, always ask your healthcare professional. [place] [address] any warranty or liability for your use of this information. Irritable Bowel Syndrome: Care Instructions  Your Care Instructions  Irritable bowel syndrome, or IBS, is a problem with the intestines that causes belly pain, bloating, gas, constipation, and diarrhea. The cause of IBS is not well known.  IBS can last for many years, but it does not get worse over time or lead to serious disease. Most people can control their symptoms by changing their diet and reducing stress. Follow-up care is a key part of your treatment and safety. Be sure to make and go to all appointments, and call your doctor if you are having problems. It's also a good idea to know your test results and keep a list of the medicines you take. How can you care for yourself at home? · For constipation:  ¨ Include fruits, vegetables, beans, and whole grains in your diet each day. These foods are high in fiber. ¨ Drink plenty of fluids, enough so that your urine is light yellow or clear like water. If you have kidney, heart, or liver disease and have to limit fluids, talk with your doctor before you increase the amount of fluids you drink. ¨ Get some exercise every day. Build up slowly to 30 to 60 minutes a day on 5 or more days of the week. ¨ Take a fiber supplement, such as Citrucel or Metamucil, every day if needed. Read and follow all instructions on the label. ¨ Schedule time each day for a bowel movement. Having a daily routine may help. Take your time and do not strain when having a bowel movement. · If you often have diarrhea, limit foods and drinks that make it worse. These are different for each person but may include caffeine (found in coffee, tea, chocolate, and cola drinks), alcohol, fatty foods, gas-producing foods (such as beans, cabbage, and broccoli), some dairy products, and spicy foods. Do not eat candy or gum that contains sorbitol. · Keep a daily diary of what you eat and what symptoms you have. This may help find foods that cause you problems. · Eat slowly. Try to make mealtime relaxing. · Find ways to reduce stress. · Get at least 30 minutes of exercise on most days of the week. Exercise can help reduce tension and prevent constipation. Walking is a good choice. You also may want to do other activities, such as running, swimming, cycling, or playing tennis or team sports.   When should you call for help? Call your doctor now or seek immediate medical care if:  · Your pain is different than usual or occurs with fever. · You lose weight without trying, or you lose your appetite and you do not know why. · Your symptoms often wake you from sleep. · Your stools are black and tarlike or have streaks of blood. Watch closely for changes in your health, and be sure to contact your doctor if:  · Your IBS symptoms get worse or begin to disrupt your day-to-day life. · You become more tired than usual.  · Your home treatment stops working. Where can you learn more? Go to http://lazarus-delano.info/. Enter X738 in the search box to learn more about \"Irritable Bowel Syndrome: Care Instructions. \"  Current as of: August 9, 2016  Content Version: 11.3  © 9634-2017 [employer]. Care instructions adapted under license by [employer] (which disclaims liability or warranty for this information). If you have questions about a medical condition or this instruction, always ask your healthcare professional. [place] [address] any warranty or liability for your use of this information.

## 2017-08-06 NOTE — ED NOTES
Assumed care of patient at this time. Blood drawn and sent to the lab. Patient tolerated well call bell within reach. No additional wants or needs noted. Patient is currently complaining of scattered bruising and being able to see her veins since she he lost weight.

## 2017-08-06 NOTE — ED NOTES
Pt was in ED a few days ago for abd pain, and has returned today because of bleeding in her rectum area. She says when she wipes her bottom she finds dark red blood on the toilet paper. Pt says all up and down her stomach is painful. Pt states that her body starts tingling and every time she eats her hands shrivel up.

## 2017-08-07 ENCOUNTER — PATIENT OUTREACH (OUTPATIENT)
Dept: FAMILY MEDICINE CLINIC | Facility: CLINIC | Age: 52
End: 2017-08-07

## 2017-08-07 NOTE — PROGRESS NOTES
Patient seen in Ed for abdominal pain and rectal bleeding. Nurse Navigator attempted to contact patient to set up Terrie Contreras appointment. Message left introducing myself, the purpose of the call and giving my contact information.   Requested that patient call back at his/her earliest convenience

## 2017-08-11 ENCOUNTER — OFFICE VISIT (OUTPATIENT)
Dept: FAMILY MEDICINE CLINIC | Facility: CLINIC | Age: 52
End: 2017-08-11

## 2017-08-11 VITALS
DIASTOLIC BLOOD PRESSURE: 83 MMHG | SYSTOLIC BLOOD PRESSURE: 124 MMHG | HEIGHT: 63 IN | OXYGEN SATURATION: 99 % | TEMPERATURE: 96.7 F | WEIGHT: 100 LBS | RESPIRATION RATE: 12 BRPM | HEART RATE: 75 BPM | BODY MASS INDEX: 17.72 KG/M2

## 2017-08-11 DIAGNOSIS — R35.0 URINARY FREQUENCY: Primary | ICD-10-CM

## 2017-08-11 DIAGNOSIS — R53.83 FATIGUE, UNSPECIFIED TYPE: ICD-10-CM

## 2017-08-11 DIAGNOSIS — E55.9 VITAMIN D DEFICIENCY: ICD-10-CM

## 2017-08-11 DIAGNOSIS — N30.01 ACUTE CYSTITIS WITH HEMATURIA: ICD-10-CM

## 2017-08-11 DIAGNOSIS — I83.813 VARICOSE VEINS OF BOTH LOWER EXTREMITIES WITH PAIN: ICD-10-CM

## 2017-08-11 DIAGNOSIS — M54.50 LEFT-SIDED LOW BACK PAIN WITHOUT SCIATICA, UNSPECIFIED CHRONICITY: ICD-10-CM

## 2017-08-11 DIAGNOSIS — L98.9 SKIN LESION OF BACK: ICD-10-CM

## 2017-08-11 DIAGNOSIS — M79.10 MYALGIA: ICD-10-CM

## 2017-08-11 DIAGNOSIS — G25.81 RESTLESS LEGS SYNDROME: ICD-10-CM

## 2017-08-11 LAB
BILIRUB UR QL STRIP: NEGATIVE
GLUCOSE UR-MCNC: NEGATIVE MG/DL
KETONES P FAST UR STRIP-MCNC: NEGATIVE MG/DL
PH UR STRIP: 5.5 [PH] (ref 4.6–8)
PROT UR QL STRIP: NORMAL MG/DL
SP GR UR STRIP: 1.02 (ref 1–1.03)
UA UROBILINOGEN AMB POC: NORMAL (ref 0.2–1)
URINALYSIS CLARITY POC: CLEAR
URINALYSIS COLOR POC: YELLOW
URINE BLOOD POC: NORMAL
URINE LEUKOCYTES POC: NORMAL
URINE NITRITES POC: NEGATIVE

## 2017-08-11 RX ORDER — HYDROCORTISONE ACETATE 0.5 %
300 CREAM (GRAM) TOPICAL DAILY
Qty: 90 CAP | Refills: 1 | Status: SHIPPED | OUTPATIENT
Start: 2017-08-11 | End: 2017-09-26

## 2017-08-11 RX ORDER — TIZANIDINE 4 MG/1
4 TABLET ORAL
Qty: 90 TAB | Refills: 0 | Status: SHIPPED | OUTPATIENT
Start: 2017-08-11 | End: 2017-09-26 | Stop reason: SDDI

## 2017-08-11 RX ORDER — HYDROCODONE BITARTRATE AND ACETAMINOPHEN 5; 325 MG/1; MG/1
TABLET ORAL
COMMUNITY
Start: 2017-08-10 | End: 2017-08-20

## 2017-08-11 RX ORDER — PRAMIPEXOLE DIHYDROCHLORIDE 0.25 MG/1
0.25 TABLET ORAL
Qty: 30 TAB | Refills: 1 | Status: SHIPPED | OUTPATIENT
Start: 2017-08-11 | End: 2017-09-26 | Stop reason: SDDI

## 2017-08-11 RX ORDER — CEPHALEXIN 250 MG/1
500 CAPSULE ORAL
COMMUNITY
Start: 2017-08-10 | End: 2017-08-17

## 2017-08-11 NOTE — PATIENT INSTRUCTIONS
Back Stretches: Exercises  Your Care Instructions  Here are some examples of exercises for stretching your back. Start each exercise slowly. Ease off the exercise if you start to have pain. Your doctor or physical therapist will tell you when you can start these exercises and which ones will work best for you. How to do the exercises  Overhead stretch    1. Stand comfortably with your feet shoulder-width apart. 2. Looking straight ahead, raise both arms over your head and reach toward the ceiling. Do not allow your head to tilt back. 3. Hold for 15 to 30 seconds, then lower your arms to your sides. 4. Repeat 2 to 4 times. Side stretch    1. Stand comfortably with your feet shoulder-width apart. 2. Raise one arm over your head, and then lean to the other side. 3. Slide your hand down your leg as you let the weight of your arm gently stretch your side muscles. Hold for 15 to 30 seconds. 4. Repeat 2 to 4 times on each side. Press-up    1. Lie on your stomach, supporting your body with your forearms. 2. Press your elbows down into the floor to raise your upper back. As you do this, relax your stomach muscles and allow your back to arch without using your back muscles. As your press up, do not let your hips or pelvis come off the floor. 3. Hold for 15 to 30 seconds, then relax. 4. Repeat 2 to 4 times. Relax and rest    1. Lie on your back with a rolled towel under your neck and a pillow under your knees. Extend your arms comfortably to your sides. 2. Relax and breathe normally. 3. Remain in this position for about 10 minutes. 4. If you can, do this 2 or 3 times each day. Follow-up care is a key part of your treatment and safety. Be sure to make and go to all appointments, and call your doctor if you are having problems. It's also a good idea to know your test results and keep a list of the medicines you take. Where can you learn more? Go to http://lazarus-delano.info/.   Enter Y434 in the search box to learn more about \"Back Stretches: Exercises. \"  Current as of: March 21, 2017  Content Version: 11.3  © 7684-8789 [employer]. Care instructions adapted under license by [employer] (which disclaims liability or warranty for this information). If you have questions about a medical condition or this instruction, always ask your healthcare professional. [address] any warranty or liability for your use of this information. Fatigue: Care Instructions  Your Care Instructions  Fatigue is a feeling of tiredness, exhaustion, or lack of energy. You may feel fatigue because of too much or not enough activity. It can also come from stress, lack of sleep, boredom, and poor diet. Many medical problems, such as viral infections, can cause fatigue. Emotional problems, especially depression, are often the cause of fatigue. Fatigue is most often a symptom of another problem. Treatment for fatigue depends on the cause. For example, if you have fatigue because you have a certain health problem, treating this problem also treats your fatigue. If depression or anxiety is the cause, treatment may help. Follow-up care is a key part of your treatment and safety. Be sure to make and go to all appointments, and call your doctor if you are having problems. It's also a good idea to know your test results and keep a list of the medicines you take. How can you care for yourself at home? · Get regular exercise. But don't overdo it. Go back and forth between rest and exercise. · Get plenty of rest.  · Eat a healthy diet. Do not skip meals, especially breakfast.  · Reduce your use of caffeine, tobacco, and alcohol. Caffeine is most often found in coffee, tea, cola drinks, and chocolate. · Limit medicines that can cause fatigue. This includes tranquilizers and cold and allergy medicines. When should you call for help?   Watch closely for changes in your health, and be sure to contact your doctor if:  · You have new symptoms such as fever or a rash. · Your fatigue gets worse. · You have been feeling down, depressed, or hopeless. Or you may have lost interest in things that you usually enjoy. · You are not getting better as expected. Where can you learn more? Go to http://lazarus-delano.info/. Enter N154 in the search box to learn more about \"Fatigue: Care Instructions. \"  Current as of: March 20, 2017  Content Version: 11.3  © 7676-7117 [employer]. Care instructions adapted under license by [employer] (which disclaims liability or warranty for this information). If you have questions about a medical condition or this instruction, always ask your healthcare professional. [address] any warranty or liability for your use of this information. Varicose Veins: Care Instructions  Your Care Instructions  Varicose veins are twisted, enlarged veins near the surface of the skin. They develop most often in the legs and ankles. Some people may be more likely than others to get varicose veins because of aging or hormone changes or because a parent has them. Being overweight or pregnant can make varicose veins worse. Jobs that require standing for long periods of time also can make them worse. Follow-up care is a key part of your treatment and safety. Be sure to make and go to all appointments, and call your doctor if you are having problems. It's also a good idea to know your test results and keep a list of the medicines you take. How can you care for yourself at home? · Wear compression stockings during the day to help relieve symptoms. They improve blood flow and are the main treatment for varicose veins. Talk to your doctor about which ones to get and where to get them. · Prop up your legs at or above the level of your heart when possible.  This helps keep the blood from pooling in your lower legs and improves blood flow to the rest of your body. · Avoid sitting and standing for long periods. This puts added stress on your veins. · Get regular exercise, and control your weight. Walk, bicycle, or swim to improve blood flow in your legs. · If you bump your leg so hard that you know it is likely to bruise, prop up your leg and put ice or a cold pack on the area for 10 to 20 minutes at a time. Try to do this every 1 to 2 hours for the next 3 days (when you are awake) or until the swelling goes down. Put a thin cloth between the ice and your skin. · If you cut or scratch the skin over a vein, it may bleed a lot. Prop up your leg and apply firm pressure with a clean bandage over the site of the bleeding. Continue to apply pressure for a full 15 minutes. Do not check sooner to see if the bleeding has stopped. If the bleeding has not stopped after 15 minutes, apply pressure again for another 15 minutes. You can repeat this up to 3 times for a total of 45 minutes. If you have a blood clot in a varicose vein, you may have tenderness and swelling over the vein. The vein may feel firm. Be sure to call your doctor right away if you have these symptoms. If your doctor has told you how to care for the clot, follow his or her instructions. Care may include the following:  · Prop up your leg and apply heat with a warm, damp cloth or a heating pad set on low (put a towel or cloth between your leg and the heating pad to prevent burns). · Ask your doctor if you can take an over-the-counter pain medicine, such as acetaminophen (Tylenol), ibuprofen (Advil, Motrin), or naproxen (Aleve). Be safe with medicines. Read and follow all instructions on the label. When should you call for help? Call 911 anytime you think you may need emergency care. For example, call if:  · You have sudden chest pain and shortness of breath, or you cough up blood.   Call your doctor now or seek immediate medical care if:  · You have signs of a blood clot, such as:  ¨ Pain in your calf, back of the knee, thigh, or groin. ¨ Redness and swelling in your leg or groin. · A varicose vein begins to bleed and you cannot stop it. · You have a tender lump in your leg. · You get an open sore. Watch closely for changes in your health, and be sure to contact your doctor if:  · Your varicose vein symptoms do not improve with home treatment. Where can you learn more? Go to http://lazarus-delano.info/. Enter H728 in the search box to learn more about \"Varicose Veins: Care Instructions. \"  Current as of: March 20, 2017  Content Version: 11.3  © 9714-7733 [employer]. Care instructions adapted under license by [employer] (which disclaims liability or warranty for this information). If you have questions about a medical condition or this instruction, always ask your healthcare professional. [address] any warranty or liability for your use of this information.

## 2017-08-11 NOTE — MR AVS SNAPSHOT
Visit Information Date & Time Provider Department Dept. Phone Encounter #  
 8/11/2017  9:45 AM Jasmin Fernández MD [facility] [phone] [id number] Follow-up Instructions Return in about 4 weeks (around 9/8/2017), or if symptoms worsen or fail to improve, for Chronic complaints. Upcoming Health Maintenance Date Due COLONOSCOPY 11/26/1981 DTaP/Tdap/Td series (1 - Tdap) 11/26/1984 PAP AKA CERVICAL CYTOLOGY 11/26/1984 INFLUENZA AGE 9 TO ADULT 8/1/2017 BREAST CANCER SCRN MAMMOGRAM 12/13/2018 Allergies as of 8/11/2017  Review Complete On: 8/11/2017 By: Senia Marcos Severity Noted Reaction Type Reactions Amoxicillin  02/06/2017    Rash, Nausea Only Current Immunizations  Never Reviewed No immunizations on file. Not reviewed this visit You Were Diagnosed With   
  
 Codes Comments Urinary frequency    -  Primary ICD-10-CM: R35.0 ICD-9-CM: 788.41 Acute cystitis with hematuria     ICD-10-CM: N30.01 
ICD-9-CM: 595.0 Left-sided low back pain without sciatica, unspecified chronicity     ICD-10-CM: M54.5 ICD-9-CM: 724.2 Varicose veins of both lower extremities with pain     ICD-10-CM: P20.466 ICD-9-CM: 454.8 Restless legs syndrome     ICD-10-CM: G25.81 ICD-9-CM: 333.94 Skin lesion of back     ICD-10-CM: L98.9 ICD-9-CM: 709.9 Myalgia     ICD-10-CM: M79.1 ICD-9-CM: 729.1 Fatigue, unspecified type     ICD-10-CM: R53.83 ICD-9-CM: 780.79 Vitals BP Pulse Temp Resp Height(growth percentile) Weight(growth percentile) 124/83 75 96.7 °F (35.9 °C) 12 5' 3\" (1.6 m) 100 lb (45.4 kg) SpO2 BMI OB Status Smoking Status 99% 17.71 kg/m2 Hysterectomy Former Smoker Vitals History BMI and BSA Data Body Mass Index Body Surface Area  
 17.71 kg/m 2 1.42 m 2 Preferred Pharmacy Pharmacy Name Phone [facility] [address] [place] [address] [address] Your Updated Medication List  
  
   
This list is accurate as of: 8/11/17 10:31 AM.  Always use your most recent med list.  
  
  
  
  
 cephALEXin 250 mg capsule Commonly known as:  KEFLEX  
500 mg.  
  
 horse chestnut 300 mg Cap Take 300 mg by mouth daily. HYDROcodone-acetaminophen 5-325 mg per tablet Commonly known as:  Fletcher Schwabwig Take 1 Tab by Mouth Every 4 Hours As Needed for Pain for up to 10 days. ondansetron hcl 4 mg tablet Commonly known as:  ZOFRAN (AS HYDROCHLORIDE) Take 1 Tab by mouth every eight (8) hours as needed for Nausea. pramipexole 0.25 mg tablet Commonly known as:  MIRAPEX Take 1 Tab by mouth nightly. PROBIOTIC PO Take  by mouth. senna-docusate 8.6-50 mg per tablet Commonly known as:  SENNA WITH DOCUSATE SODIUM Take 2 Tabs by mouth daily. tiZANidine 4 mg tablet Commonly known as:  Gage Vigil Take 1 Tab by mouth three (3) times daily as needed. traMADol 50 mg tablet Commonly known as:  ULTRAM  
Take 1 Tab by mouth every six (6) hours as needed for Pain. Max Daily Amount: 200 mg. Prescriptions Sent to Pharmacy Refills  
 tiZANidine (ZANAFLEX) 4 mg tablet 0 Sig: Take 1 Tab by mouth three (3) times daily as needed. Class: Normal  
 Pharmacy: [facility] [address] [place] [address] Ph #: [phone] Route: Oral  
 horse chestnut 300 mg cap 1 Sig: Take 300 mg by mouth daily. Class: Normal  
 Pharmacy: [facility] [address] [place] [address] Ph #: [phone] Route: Oral  
 pramipexole (MIRAPEX) 0.25 mg tablet 1 Sig: Take 1 Tab by mouth nightly. Class: Normal  
 Pharmacy: [facility] [address] [place] [address] Ph #: [phone] Route: Oral  
  
We Performed the Following AMB POC URINALYSIS DIP STICK AUTO W/O MICRO [13231 CPT(R)] REFERRAL TO DERMATOLOGY [REF19 Custom] Comments:  
 Please evaluate patient for skin lesion on back. Follow-up Instructions Return in about 4 weeks (around 9/8/2017), or if symptoms worsen or fail to improve, for Chronic complaints. To-Do List   
 08/11/2017 Lab:  KALLIE COMPREHENSIVE PANEL   
  
 08/11/2017 Lab:  FOLATE   
  
 08/11/2017 Lab:  RA + CCP ABS   
  
 08/11/2017 Lab:  SED RATE (ESR)   
  
 08/11/2017 Lab:  TSH 3RD GENERATION   
  
 08/11/2017 Lab:  VITAMIN B12   
  
 08/11/2017 Lab:  VITAMIN D, 25 HYDROXY Referral Information Referral ID Referred By Referred To  
  
 [id number] Rolan Christiansen Not Available Visits Status Start Date End Date 1 New Request 8/11/17 8/11/18 If your referral has a status of pending review or denied, additional information will be sent to support the outcome of this decision. Patient Instructions Back Stretches: Exercises Your Care Instructions Here are some examples of exercises for stretching your back. Start each exercise slowly. Ease off the exercise if you start to have pain. Your doctor or physical therapist will tell you when you can start these exercises and which ones will work best for you. How to do the exercises Overhead stretch 1. Stand comfortably with your feet shoulder-width apart. 2. Looking straight ahead, raise both arms over your head and reach toward the ceiling. Do not allow your head to tilt back. 3. Hold for 15 to 30 seconds, then lower your arms to your sides. 4. Repeat 2 to 4 times. Side stretch 1. Stand comfortably with your feet shoulder-width apart. 2. Raise one arm over your head, and then lean to the other side. 3. Slide your hand down your leg as you let the weight of your arm gently stretch your side muscles. Hold for 15 to 30 seconds. 4. Repeat 2 to 4 times on each side. Press-up 1. Lie on your stomach, supporting your body with your forearms. 2. Press your elbows down into the floor to raise your upper back. As you do this, relax your stomach muscles and allow your back to arch without using your back muscles. As your press up, do not let your hips or pelvis come off the floor. 3. Hold for 15 to 30 seconds, then relax. 4. Repeat 2 to 4 times. Relax and rest 
 
1. Lie on your back with a rolled towel under your neck and a pillow under your knees. Extend your arms comfortably to your sides. 2. Relax and breathe normally. 3. Remain in this position for about 10 minutes. 4. If you can, do this 2 or 3 times each day. Follow-up care is a key part of your treatment and safety. Be sure to make and go to all appointments, and call your doctor if you are having problems. It's also a good idea to know your test results and keep a list of the medicines you take. Where can you learn more? Go to http://lazarus-delano.info/. Enter D186 in the search box to learn more about \"Back Stretches: Exercises. \" Current as of: March 21, 2017 Content Version: 11.3 © 3408-3410 [employer]. Care instructions adapted under license by [employer] (which disclaims liability or warranty for this information). If you have questions about a medical condition or this instruction, always ask your healthcare professional. [place] [address] any warranty or liability for your use of this information. Fatigue: Care Instructions Your Care Instructions Fatigue is a feeling of tiredness, exhaustion, or lack of energy. You may feel fatigue because of too much or not enough activity. It can also come from stress, lack of sleep, boredom, and poor diet. Many medical problems, such as viral infections, can cause fatigue. Emotional problems, especially depression, are often the cause of fatigue. Fatigue is most often a symptom of another problem. Treatment for fatigue depends on the cause. For example, if you have fatigue because you have a certain health problem, treating this problem also treats your fatigue. If depression or anxiety is the cause, treatment may help. Follow-up care is a key part of your treatment and safety. Be sure to make and go to all appointments, and call your doctor if you are having problems. It's also a good idea to know your test results and keep a list of the medicines you take. How can you care for yourself at home? · Get regular exercise. But don't overdo it. Go back and forth between rest and exercise. · Get plenty of rest. 
· Eat a healthy diet. Do not skip meals, especially breakfast. 
· Reduce your use of caffeine, tobacco, and alcohol. Caffeine is most often found in coffee, tea, cola drinks, and chocolate. · Limit medicines that can cause fatigue. This includes tranquilizers and cold and allergy medicines. When should you call for help? Watch closely for changes in your health, and be sure to contact your doctor if: 
· You have new symptoms such as fever or a rash. · Your fatigue gets worse. · You have been feeling down, depressed, or hopeless. Or you may have lost interest in things that you usually enjoy. · You are not getting better as expected. Where can you learn more? Go to http://lazarus-delano.info/. Enter Q745 in the search box to learn more about \"Fatigue: Care Instructions. \" Current as of: March 20, 2017 Content Version: 11.3 © 2418-9671 [employer]. Care instructions adapted under license by [employer] (which disclaims liability or warranty for this information). If you have questions about a medical condition or this instruction, always ask your healthcare professional. [address] any warranty or liability for your use of this information. Varicose Veins: Care Instructions Your Care Instructions Varicose veins are twisted, enlarged veins near the surface of the skin. They develop most often in the legs and ankles. Some people may be more likely than others to get varicose veins because of aging or hormone changes or because a parent has them. Being overweight or pregnant can make varicose veins worse. Jobs that require standing for long periods of time also can make them worse. Follow-up care is a key part of your treatment and safety. Be sure to make and go to all appointments, and call your doctor if you are having problems. It's also a good idea to know your test results and keep a list of the medicines you take. How can you care for yourself at home? · Wear compression stockings during the day to help relieve symptoms. They improve blood flow and are the main treatment for varicose veins. Talk to your doctor about which ones to get and where to get them. · Prop up your legs at or above the level of your heart when possible. This helps keep the blood from pooling in your lower legs and improves blood flow to the rest of your body. · Avoid sitting and standing for long periods. This puts added stress on your veins. · Get regular exercise, and control your weight. Walk, bicycle, or swim to improve blood flow in your legs. · If you bump your leg so hard that you know it is likely to bruise, prop up your leg and put ice or a cold pack on the area for 10 to 20 minutes at a time. Try to do this every 1 to 2 hours for the next 3 days (when you are awake) or until the swelling goes down. Put a thin cloth between the ice and your skin. · If you cut or scratch the skin over a vein, it may bleed a lot. Prop up your leg and apply firm pressure with a clean bandage over the site of the bleeding. Continue to apply pressure for a full 15 minutes. Do not check sooner to see if the bleeding has stopped.  If the bleeding has not stopped after 15 minutes, apply pressure again for another 15 minutes. You can repeat this up to 3 times for a total of 45 minutes. If you have a blood clot in a varicose vein, you may have tenderness and swelling over the vein. The vein may feel firm. Be sure to call your doctor right away if you have these symptoms. If your doctor has told you how to care for the clot, follow his or her instructions. Care may include the following: · Prop up your leg and apply heat with a warm, damp cloth or a heating pad set on low (put a towel or cloth between your leg and the heating pad to prevent burns). · Ask your doctor if you can take an over-the-counter pain medicine, such as acetaminophen (Tylenol), ibuprofen (Advil, Motrin), or naproxen (Aleve). Be safe with medicines. Read and follow all instructions on the label. When should you call for help? Call 911 anytime you think you may need emergency care. For example, call if: 
· You have sudden chest pain and shortness of breath, or you cough up blood. Call your doctor now or seek immediate medical care if: 
· You have signs of a blood clot, such as: 
¨ Pain in your calf, back of the knee, thigh, or groin. ¨ Redness and swelling in your leg or groin. · A varicose vein begins to bleed and you cannot stop it. · You have a tender lump in your leg. · You get an open sore. Watch closely for changes in your health, and be sure to contact your doctor if: 
· Your varicose vein symptoms do not improve with home treatment. Where can you learn more? Go to http://lazarus-delano.info/. Enter E350 in the search box to learn more about \"Varicose Veins: Care Instructions. \" Current as of: March 20, 2017 Content Version: 11.3 © 7697-9836 [employer]. Care instructions adapted under license by [employer] (which disclaims liability or warranty for this information).  If you have questions about a medical condition or this instruction, always ask your healthcare professional. [facility] any warranty or liability for your use of this information. Introducing [facility]! Caitlin Dempsey introduces [employer] patient portal. Now you can access parts of your medical record, email your doctor's office, and request medication refills online. 1. In your internet browser, go to [email or web address] 2. Click on the First Time User? Click Here link in the Sign In box. You will see the New Member Sign Up page. 3. Enter your [employer] Access Code exactly as it appears below. You will not need to use this code after youve completed the sign-up process. If you do not sign up before the expiration date, you must request a new code. · [employer] Access Code: [email or web address] Expires: 10/22/2017  1:07 PM 
 
4. Enter the last four digits of your Social Security Number (xxxx) and Date of Birth (mm/dd/yyyy) as indicated and click Submit. You will be taken to the next sign-up page. 5. Create a [employer] ID. This will be your [employer] login ID and cannot be changed, so think of one that is secure and easy to remember. 6. Create a [employer] password. You can change your password at any time. 7. Enter your Password Reset Question and Answer. This can be used at a later time if you forget your password. 8. Enter your e-mail address. You will receive e-mail notification when new information is available in [address] 9. Click Sign Up. You can now view and download portions of your medical record. 10. Click the Download Summary menu link to download a portable copy of your medical information. If you have questions, please visit the Frequently Asked Questions section of the [employer] website. Remember, [employer] is NOT to be used for urgent needs. For medical emergencies, dial 911. Now available from your iPhone and Android! Please provide this summary of care documentation to your next provider. Your primary care clinician is listed as Katty Kelly. If you have any questions after today's visit, please call [phone].

## 2017-08-11 NOTE — PROGRESS NOTES
Chief Complaint   Patient presents with    Pain Upper Quadrant     left    Back Pain    Leg Pain     both,feet   [facility] Follow Up     seen at [facility]     both    Urinary Frequency     HPI:  Patient complains of pain and burning with urination, blood in urine, increased frequency and urgency. She was seen two weeks ago at [facility] ED for symptoms. Patient says urinalysis and CT were negative. She was seen at [facility] last night for symptoms. Urinalysis revealed UTI. She was given IV ABs and a prescription for AB. She plans on getting prescription filled after leaving office today. Chronic abdominal pain with hx of constipation. She tells me she was having generalized/ left upper abd pain and nausea. Pt  has had pain \"for years\" but worse last night. She sees Berto  (GI) for this and cannot take lactulose due to making her nauseated. Stopped Prilosec due to rectal bleeding. No fever, chills, vomiting, diarrhea, urinary complaints, or any other complaints or symptoms. Last EGD/colonoscopy 2015. She requests a pill she can carry in her purse as she feels \"people are putting stuff in her medication\"    She has been seeing [facility]. She tells me they tried to put her on medication the first visit and was not happy about this. + paranoia + anxiety + insomnia denies SI and HI. She has a follow-up appt. c/o back pain primarily right sided, non-radiating denies numbness, fever, tingling or bowel and bladder changes. She was seeing ortho in past for this and has done physical therapy which has helped in past.    numerous complaints. She says that her entire body is aching. This has been going on for years. This seemed to get worse in the last several months. She also complains of stiffness in the bilateral legs. She feels stiffness and spasm. She gets spasms and weakness of the legs after walking.     She gets pain in the knees and in the wrists. This is there daily. She gets clumsy with walking due to this. constant blurring of her vision. She has watering of the eyes and twitching of her eyes. She has seen eye specialist who has given reading glasses. She has seen Neurology who states she needs psychiatric counseling and treatment.     She also has left sided headaches. She has these every day for several hours each day.       She has constant tingling of her hands and feet. Her chest has tightness in it all the time. She admits to a hairy feeling of the tongue. Patient Active Problem List   Diagnosis Code    Microscopic hematuria R31.29    Hematuria, undiagnosed cause R31.9    S/P cystoscopy Z98.890    Rectal bleeding K62.5    Chronic pain G89.29    Diffuse cystic mastopathy N60.19       Review of Systems   Complete ROS negative except where noted in HPI    Objective:     Visit Vitals    /83    Pulse 75    Temp 96.7 °F (35.9 °C)    Resp 12    Ht 5' 3\" (1.6 m)    Wt 100 lb (45.4 kg)    SpO2 99%    BMI 17.71 kg/m2     No exam data present    Constitutional: The patient appears well, NAD, Alert & Oriented x 3  Psych: Normal Mood, Normal Behavior  ENT: RAMSEY, EOMI, no scleral icterus  Lungs: CTAB,  Normal effort , Good air entry, No W/R/R   Cardiovascular:RRR, No M/R/G, S1 and S2 normal  GI: soft, non tender, +BS, no guarding or rebound  Extremities: negative LE edema, feet: warm, good capillary refill + varicose veins, no Jose's sign  Skin: 2 cm black skin lesion non-erythematous nonblanching    Diagnoses and all orders for this visit:    1. Urinary frequency  -     AMB POC URINALYSIS DIP STICK AUTO W/O MICRO    2. Acute cystitis with hematuria    3. Left-sided low back pain without sciatica, unspecified chronicity  -     tiZANidine (ZANAFLEX) 4 mg tablet; Take 1 Tab by mouth three (3) times daily as needed. 4. Varicose veins of both lower extremities with pain  -     horse chestnut 300 mg cap; Take 300 mg by mouth daily.   -     Comp Stocking,Knee,Regular,Sml misc; Use daily and remove at night    5. Restless legs syndrome  -     pramipexole (MIRAPEX) 0.25 mg tablet; Take 1 Tab by mouth nightly. 6. Skin lesion of back  -     REFERRAL TO DERMATOLOGY    7. Myalgia  -     VITAMIN D, 25 HYDROXY; Future  -     VITAMIN B12; Future  -     SED RATE (ESR); Future  -     RA + CCP ABS; Future  -     KALLIE COMPREHENSIVE PANEL; Future  -     FOLATE; Future  -     TSH 3RD GENERATION; Future    8. Fatigue, unspecified type  -     VITAMIN D, 25 HYDROXY; Future  -     VITAMIN B12; Future  -     SED RATE (ESR); Future  -     RA + CCP ABS; Future  -     KALLIE COMPREHENSIVE PANEL; Future  -     FOLATE; Future  -     TSH 3RD GENERATION; Future    9. Vitamin D deficiency  -     VITAMIN D, 25 HYDROXY; Future      Discussed continuing to see psychiatry for counseling and medication management  Reviewed Neurology and Vascular Surgery note  ER visits and labwork reviewed. Urge patient to pickup zofran and keflex. I will refer to urology if her symptoms persist.    I have discussed the diagnosis with the patient and the intended plan as seen in the above orders. The patient has received an after-visit summary and questions were answered concerning future plans. I have discussed medication side effects and warnings with the patient as well. I have reviewed the plan of care with the patient, accepted their input and they are in agreement with the treatment goals. Patient verbalizes understanding. Follow-up Disposition:  Return in about 4 weeks (around 9/8/2017), or if symptoms worsen or fail to improve, for Chronic complaints.

## 2017-08-11 NOTE — PROGRESS NOTES
*ATTENTION:  This note has been created by a medical student for educational purposes only. Please do not refer to the content of this note for clinical decision-making, billing, or other purposes. Please see attending physicians note to obtain clinical information on this patient. *     Chester Parks, a 46year old female, presents today with leg and foot pain , urinary frequency, and chronic abdominal pain. Patient complains of \"pain everywhere\" for years. Today, her primary pain concern is in her feet and legs. She endorses foot and ankle discoloration, lower leg tingling, and swelling bilaterally. Patient is currently in pain and says she has pain everyday. Patient complains of pain and burning with urination, blood in urine, increased frequency and urgency. She was seen two weeks ago at [facility] ED for symptoms. Patient says urinalysis and CT were negative. She was seen at [facility] last night for symptoms. Urinalysis revealed UTI. She was given IV ABs and a prescription for AB. She plans on getting prescription filled after leaving office today. Patient reports abdominal pain with nausea and constipation for years. She denies any vomiting or diarrhea. She endorses a history of rectal bleeding with hemorrhoids. Patient is concerned about losing weight. She says she eats small portions because her stomach is causing her pain. She sees gastroenterology Monday. Patient has seen psychiatry in the past and plans to make an appointment again in the near future. She does not take any medication or currently see any other physicians for care.

## 2017-08-13 LAB — BACTERIA UR CULT: NO GROWTH

## 2017-08-14 ENCOUNTER — HOSPITAL ENCOUNTER (OUTPATIENT)
Dept: LAB | Age: 52
Discharge: HOME | End: 2017-08-14

## 2017-08-14 PROCEDURE — 99001 SPECIMEN HANDLING PT-LAB: CPT | Performed by: FAMILY MEDICINE

## 2017-08-16 LAB
25(OH)D3+25(OH)D2 SERPL-MCNC: 30.3 NG/ML (ref 30–100)
CCP IGA+IGG SERPL IA-ACNC: 7 UNITS (ref 0–19)
CENTROMERE B AB SER-ACNC: <0.2 AI (ref 0–0.9)
CHROMATIN AB SERPL-ACNC: <0.2 AI (ref 0–0.9)
DSDNA AB SER-ACNC: <1 IU/ML (ref 0–9)
ENA JO1 AB SER-ACNC: <0.2 AI (ref 0–0.9)
ENA RNP AB SER-ACNC: <0.2 AI (ref 0–0.9)
ENA SCL70 AB SER-ACNC: <0.2 AI (ref 0–0.9)
ENA SM AB SER-ACNC: <0.2 AI (ref 0–0.9)
ENA SS-A AB SER-ACNC: <0.2 AI (ref 0–0.9)
ENA SS-B AB SER-ACNC: <0.2 AI (ref 0–0.9)
ERYTHROCYTE [SEDIMENTATION RATE] IN BLOOD BY WESTERGREN METHOD: 12 MM/HR (ref 0–40)
FOLATE SERPL-MCNC: 12.3 NG/ML
RHEUMATOID FACT SERPL-ACNC: <10 IU/ML (ref 0–13.9)
SEE BELOW, 164869: NORMAL
TSH SERPL DL<=0.005 MIU/L-ACNC: 1.07 UIU/ML (ref 0.45–4.5)
VIT B12 SERPL-MCNC: 1078 PG/ML (ref 211–946)

## 2017-09-14 ENCOUNTER — HOSPITAL ENCOUNTER (EMERGENCY)
Age: 52
Discharge: HOME | End: 2017-09-14
Attending: EMERGENCY MEDICINE
Payer: MEDICARE

## 2017-09-14 ENCOUNTER — APPOINTMENT (OUTPATIENT)
Dept: GENERAL RADIOLOGY | Age: 52
End: 2017-09-14
Attending: EMERGENCY MEDICINE
Payer: MEDICARE

## 2017-09-14 ENCOUNTER — APPOINTMENT (OUTPATIENT)
Dept: CT IMAGING | Age: 52
End: 2017-09-14
Attending: EMERGENCY MEDICINE
Payer: MEDICARE

## 2017-09-14 VITALS
TEMPERATURE: 98.5 F | RESPIRATION RATE: 18 BRPM | HEART RATE: 82 BPM | DIASTOLIC BLOOD PRESSURE: 65 MMHG | OXYGEN SATURATION: 99 % | SYSTOLIC BLOOD PRESSURE: 99 MMHG

## 2017-09-14 DIAGNOSIS — R93.89 ABNORMAL CT SCAN: ICD-10-CM

## 2017-09-14 DIAGNOSIS — S01.81XA FACIAL LACERATION, INITIAL ENCOUNTER: Primary | ICD-10-CM

## 2017-09-14 DIAGNOSIS — S09.90XA HEAD TRAUMA, INITIAL ENCOUNTER: ICD-10-CM

## 2017-09-14 LAB
ALBUMIN SERPL-MCNC: 3.6 G/DL (ref 3.4–5)
ALBUMIN/GLOB SERPL: 0.8 {RATIO} (ref 0.8–1.7)
ALP SERPL-CCNC: 63 U/L (ref 45–117)
ALT SERPL-CCNC: 18 U/L (ref 13–56)
ANION GAP SERPL CALC-SCNC: 5 MMOL/L (ref 3–18)
AST SERPL-CCNC: 11 U/L (ref 15–37)
ATRIAL RATE: 83 BPM
BASOPHILS # BLD: 0 K/UL (ref 0–0.1)
BASOPHILS NFR BLD: 0 % (ref 0–2)
BILIRUB SERPL-MCNC: 0.3 MG/DL (ref 0.2–1)
BUN SERPL-MCNC: 14 MG/DL (ref 7–18)
BUN/CREAT SERPL: 17 (ref 12–20)
CALCIUM SERPL-MCNC: 8.9 MG/DL (ref 8.5–10.1)
CALCULATED P AXIS, ECG09: 82 DEGREES
CALCULATED R AXIS, ECG10: 83 DEGREES
CALCULATED T AXIS, ECG11: 82 DEGREES
CHLORIDE SERPL-SCNC: 104 MMOL/L (ref 100–108)
CK MB CFR SERPL CALC: NORMAL % (ref 0–4)
CK MB SERPL-MCNC: <1 NG/ML (ref 5–25)
CK SERPL-CCNC: 92 U/L (ref 26–192)
CO2 SERPL-SCNC: 30 MMOL/L (ref 21–32)
CREAT SERPL-MCNC: 0.81 MG/DL (ref 0.6–1.3)
DIAGNOSIS, 93000: NORMAL
DIFFERENTIAL METHOD BLD: ABNORMAL
EOSINOPHIL # BLD: 0.1 K/UL (ref 0–0.4)
EOSINOPHIL NFR BLD: 1 % (ref 0–5)
ERYTHROCYTE [DISTWIDTH] IN BLOOD BY AUTOMATED COUNT: 13.5 % (ref 11.6–14.5)
GLOBULIN SER CALC-MCNC: 4.4 G/DL (ref 2–4)
GLUCOSE SERPL-MCNC: 130 MG/DL (ref 74–99)
HCT VFR BLD AUTO: 36.2 % (ref 35–45)
HGB BLD-MCNC: 12.5 G/DL (ref 12–16)
LYMPHOCYTES # BLD: 1.9 K/UL (ref 0.9–3.6)
LYMPHOCYTES NFR BLD: 30 % (ref 21–52)
MCH RBC QN AUTO: 30.6 PG (ref 24–34)
MCHC RBC AUTO-ENTMCNC: 34.5 G/DL (ref 31–37)
MCV RBC AUTO: 88.5 FL (ref 74–97)
MONOCYTES # BLD: 0.4 K/UL (ref 0.05–1.2)
MONOCYTES NFR BLD: 6 % (ref 3–10)
NEUTS SEG # BLD: 4 K/UL (ref 1.8–8)
NEUTS SEG NFR BLD: 63 % (ref 40–73)
P-R INTERVAL, ECG05: 176 MS
PLATELET # BLD AUTO: 222 K/UL (ref 135–420)
PMV BLD AUTO: 10.6 FL (ref 9.2–11.8)
POTASSIUM SERPL-SCNC: 3.5 MMOL/L (ref 3.5–5.5)
PROT SERPL-MCNC: 8 G/DL (ref 6.4–8.2)
Q-T INTERVAL, ECG07: 378 MS
QRS DURATION, ECG06: 84 MS
QTC CALCULATION (BEZET), ECG08: 444 MS
RBC # BLD AUTO: 4.09 M/UL (ref 4.2–5.3)
SODIUM SERPL-SCNC: 139 MMOL/L (ref 136–145)
TROPONIN I SERPL-MCNC: <0.02 NG/ML (ref 0–0.04)
VENTRICULAR RATE, ECG03: 83 BPM
WBC # BLD AUTO: 6.3 K/UL (ref 4.6–13.2)

## 2017-09-14 PROCEDURE — 70450 CT HEAD/BRAIN W/O DYE: CPT

## 2017-09-14 PROCEDURE — 99284 EMERGENCY DEPT VISIT MOD MDM: CPT

## 2017-09-14 PROCEDURE — 93005 ELECTROCARDIOGRAM TRACING: CPT

## 2017-09-14 PROCEDURE — 75810000293 HC SIMP/SUPERF WND  RPR

## 2017-09-14 PROCEDURE — 82550 ASSAY OF CK (CPK): CPT | Performed by: EMERGENCY MEDICINE

## 2017-09-14 PROCEDURE — 77030002996 HC SUT SLK J&J -A

## 2017-09-14 PROCEDURE — 77030031132 HC SUT NYL COVD -A

## 2017-09-14 PROCEDURE — 85025 COMPLETE CBC W/AUTO DIFF WBC: CPT | Performed by: EMERGENCY MEDICINE

## 2017-09-14 PROCEDURE — 80053 COMPREHEN METABOLIC PANEL: CPT | Performed by: EMERGENCY MEDICINE

## 2017-09-14 PROCEDURE — 73130 X-RAY EXAM OF HAND: CPT

## 2017-09-14 NOTE — ED NOTES
I have reviewed discharge instructions with the patient. The patient verbalized understanding. Patientt looks comfortable, left ED in stable condition. Vital signs stable. Sutures intact to right side of face. No acute distress noted, no new complaints noted at this time.

## 2017-09-14 NOTE — DISCHARGE INSTRUCTIONS
REMOVE SUTURES IN 5 DAYS    HEAD CT DOES NOT INDICATE TRAUMA BUT IT IS NOT NORMAL; FOLLOW UP WITH YOUR DOCTOR TO HAVE IT EVALUATED. Head Injury: Care Instructions  Your Care Instructions  Most injuries to the head are minor. Bumps, cuts, and scrapes on the head and face usually heal well and can be treated the same as injuries to other parts of the body. Although it's rare, once in a while a more serious problem shows up after you are home. So it's good to be on the lookout for symptoms for a day or two. Follow-up care is a key part of your treatment and safety. Be sure to make and go to all appointments, and call your doctor if you are having problems. It's also a good idea to know your test results and keep a list of the medicines you take. How can you care for yourself at home? · Follow your doctor's instructions. He or she will tell you if you need someone to watch you closely for the next 24 hours or longer. · Take it easy for the next few days or more if you are not feeling well. · Ask your doctor when it's okay for you to go back to activities like driving a car, riding a bike, or operating machinery. When should you call for help? Call 911 anytime you think you may need emergency care. For example, call if:  · You have a seizure. · You passed out (lost consciousness). · You are confused or can't stay awake. Call your doctor now or seek immediate medical care if:  · You have new or worse vomiting. · You feel less alert. · You have new weakness or numbness in any part of your body. Watch closely for changes in your health, and be sure to contact your doctor if:  · You do not get better as expected. · You have new symptoms, such as headaches, trouble concentrating, or changes in mood. Where can you learn more? Go to http://lazarus-delano.info/. Enter L725 in the search box to learn more about \"Head Injury: Care Instructions. \"  Current as of: October 14, 2016  Content Version: 11.3  © 0761-3337 [employer]. Care instructions adapted under license by [employer] (which disclaims liability or warranty for this information). If you have questions about a medical condition or this instruction, always ask your healthcare professional. [address] any warranty or liability for your use of this information. Cuts on the Face Closed With Stitches: Care Instructions  Your Care Instructions  A cut on your face can be on your chin, cheek, nose, forehead, eyelid, lip, or ear. The doctor used stitches to close the cut. Using stitches helps the cut heal and reduces scarring. The doctor may also have called in a specialist, such as a plastic surgeon, to close the cut. If the cut went deep and through the skin, the doctor may have put in two layers of stitches. The deeper layer brings the deep part of the cut together. These stitches will dissolve and don't need to be removed. The stitches in the upper layer are the ones you see on the cut. You will probably have a bandage. You will need to have the stitches removed, usually in 3 to 5 days. The doctor has checked you carefully, but problems can develop later. If you notice any problems or new symptoms, get medical treatment right away. Follow-up care is a key part of your treatment and safety. Be sure to make and go to all appointments, and call your doctor if you are having problems. It's also a good idea to know your test results and keep a list of the medicines you take. How can you care for yourself at home? · Keep the cut dry for the first 24 to 48 hours. After this, you can shower if your doctor okays it. Pat the cut dry. · Don't soak the cut, such as in a bathtub. Your doctor will tell you when it's safe to get the cut wet. · If your doctor told you how to care for your cut, follow your doctor's instructions.  If you did not get instructions, follow this general advice:  ¨ After the first 24 to 48 hours, wash around the cut with clean water 2 times a day. Don't use hydrogen peroxide or alcohol, which can slow healing. ¨ You may cover the cut with a thin layer of petroleum jelly, such as Vaseline, and a nonstick bandage. ¨ Apply more petroleum jelly and replace the bandage as needed. · Avoid any activity that could cause your cut to reopen. · Do not remove the stitches on your own. Your doctor will tell you when to come back to have the stitches removed. · Be safe with medicines. Take pain medicines exactly as directed. ¨ If the doctor gave you a prescription medicine for pain, take it as prescribed. ¨ If you are not taking a prescription pain medicine, ask your doctor if you can take an over-the-counter medicine. When should you call for help? Call your doctor now or seek immediate medical care if:  · You have new pain, or your pain gets worse. · The skin near the cut is cold or pale or changes color. · You have tingling, weakness, or numbness near the cut. · The cut starts to bleed, and blood soaks through the bandage. Oozing small amounts of blood is normal.  · You have symptoms of infection, such as:  ¨ Increased pain, swelling, warmth, or redness around the cut. ¨ Red streaks leading from the cut. ¨ Pus draining from the cut. ¨ A fever. Watch closely for changes in your health, and be sure to contact your doctor if:  · You do not get better as expected. Where can you learn more? Go to http://lazarus-delano.info/. Enter H091 in the search box to learn more about \"Cuts on the Face Closed With Stitches: Care Instructions. \"  Current as of: March 20, 2017  Content Version: 11.3  © 9771-8991 [employer]. Care instructions adapted under license by [employer] (which disclaims liability or warranty for this information).  If you have questions about a medical condition or this instruction, always ask your healthcare professional. [employer] disclaims any warranty or liability for your use of this information.

## 2017-09-14 NOTE — ED PROVIDER NOTES
HPI Comments: 2:50 AM  The patient is a 46 y.o. female, former smoker with noted past medical hx who is transported to the ED via EMS for an episode of syncope which occured just PTA. Pt states that at the time of the incident, she had bent her leg to put on her socks but she suddenly felt dizzy, fell, hit her head and lost consciousness. Pt presents with 2cm facial laceration to the right of her right eye from the fall as well as with back pain, chest pain, right hand pain and an abrasion between 4th and 5th fingers of her right hand. No medication intake reported. No other complaints at this time. The history is provided by the patient. No  was used. Past Medical History:   Diagnosis Date    Chest pain 11/2014    neg EKG, non recurrent    Chronic pain     lower back and legs    Depression     Fibroids     Headache     Hiatal hernia     IBS (irritable bowel syndrome)     Microscopic hematuria     Rectal bleeding        Past Surgical History:   Procedure Laterality Date    COLONOSCOPY,DIAGNOSTIC      HX BREAST BIOPSY Right 1/21/2015    RIGHT BREAST BIOPSY WITH NEEDLE LOCALIZATION ULTRASOUND performed by Carina Calvillo MD at [facility] MAIN OR    HX GI      HX HYSTERECTOMY      HX TUBAL LIGATION           Family History:   Problem Relation Age of Onset    HIV/AIDS Brother     Diabetes Father     Cancer Brother     Hypertension Sister     Diabetes Brother     Hypertension Sister     Hypertension Sister     Hypertension Brother        Social History     Social History    Marital status: SINGLE     Spouse name: N/A    Number of children: N/A    Years of education: N/A     Occupational History    Not on file.      Social History Main Topics    Smoking status: Former Smoker     Packs/day: 0.00    Smokeless tobacco: Former User     Quit date: 02/2016    Alcohol use No    Drug use: No    Sexual activity: Yes     Partners: Male     Other Topics Concern    Not on file     Social History Narrative         ALLERGIES: Amoxicillin    Review of Systems   Cardiovascular: Positive for chest pain. Musculoskeletal: Positive for back pain. Right hand pain   Skin:        Laceration to the right of right eye   Abrasion b/w 4th and 5th fingers of right hand over [address]   Neurological: Positive for dizziness and syncope. Positive for fall    All other systems reviewed and are negative. Vitals:    09/14/17 0201   BP: 108/73   Pulse: 86   Temp: 97.9 °F (36.6 °C)   SpO2: 100%            Physical Exam   Cardiovascular: Normal rate, regular rhythm and normal heart sounds. Exam reveals no gallop and no friction rub. No murmur heard. Pulmonary/Chest: Effort normal and breath sounds normal. No respiratory distress. Skin:   2cm laceration lateral to the right eye  Small laceration b/w 4th and 5th fingers of right hand over MC  No bruises noticed on the pt's back        MDM  Number of Diagnoses or Management Options  Abnormal CT scan:   Facial laceration, initial encounter:   Head trauma, initial encounter:   Diagnosis management comments: 46year old female fell in the bathroom and sustained a right facial lac. There was a reported LOC. Will order a CT, repair the laceration. Pain right hand admitted to . Will order an X ray       Amount and/or Complexity of Data Reviewed  Clinical lab tests: ordered and reviewed  Tests in the radiology section of CPT®: ordered and reviewed      ED Course       Wound Repair  Date/Time: 9/14/2017 3:38 AM  Performed by: attendingPreparation: skin prepped with ChloraPrep and sterile field established  Pre-procedure re-eval: Immediately prior to the procedure, the patient was reevaluated and found suitable for the planned procedure and any planned medications.   Location details: face (right side of right eye)  Wound length:2.5 cm or less  Anesthesia: local infiltration    Anesthesia:  Local Anesthetic: lidocaine 1% without epinephrine  Foreign bodies: no foreign bodies  Skin closure: silk and 6-0 nylon  Technique: simple  Patient tolerance: Patient tolerated the procedure well with no immediate complications  My total time at bedside, performing this procedure was 1-15 minutes. Vitals:  Patient Vitals for the past 12 hrs:   Temp Pulse BP SpO2   09/14/17 0201 97.9 °F (36.6 °C) 86 108/73 100 %       Medications Ordered:  Medications - No data to display    Lab Findings:  Recent Results (from the past 12 hour(s))   EKG, 12 LEAD, INITIAL    Collection Time: 09/14/17  2:06 AM   Result Value Ref Range    Ventricular Rate 83 BPM    Atrial Rate 83 BPM    P-R Interval 176 ms    QRS Duration 84 ms    Q-T Interval 378 ms    QTC Calculation (Bezet) 444 ms    Calculated P Axis 82 degrees    Calculated R Axis 83 degrees    Calculated T Axis 82 degrees    Diagnosis       Normal sinus rhythm  Possible Left atrial enlargement  Borderline ECG  When compared with ECG of 12-JAN-2015 15:12,  No significant change was found     CBC WITH AUTOMATED DIFF    Collection Time: 09/14/17  2:28 AM   Result Value Ref Range    WBC 6.3 4.6 - 13.2 K/uL    RBC 4.09 (L) 4.20 - 5.30 M/uL    HGB 12.5 12.0 - 16.0 g/dL    HCT 36.2 35.0 - 45.0 %    MCV 88.5 74.0 - 97.0 FL    MCH 30.6 24.0 - 34.0 PG    MCHC 34.5 31.0 - 37.0 g/dL    RDW 13.5 11.6 - 14.5 %    PLATELET 760 356 - 874 K/uL    MPV 10.6 9.2 - 11.8 FL    NEUTROPHILS 63 40 - 73 %    LYMPHOCYTES 30 21 - 52 %    MONOCYTES 6 3 - 10 %    EOSINOPHILS 1 0 - 5 %    BASOPHILS 0 0 - 2 %    ABS. NEUTROPHILS 4.0 1.8 - 8.0 K/UL    ABS. LYMPHOCYTES 1.9 0.9 - 3.6 K/UL    ABS. MONOCYTES 0.4 0.05 - 1.2 K/UL    ABS. EOSINOPHILS 0.1 0.0 - 0.4 K/UL    ABS.  BASOPHILS 0.0 0.0 - 0.1 K/UL    DF AUTOMATED     METABOLIC PANEL, COMPREHENSIVE    Collection Time: 09/14/17  2:28 AM   Result Value Ref Range    Sodium 139 136 - 145 mmol/L    Potassium 3.5 3.5 - 5.5 mmol/L    Chloride 104 100 - 108 mmol/L    CO2 30 21 - 32 mmol/L    Anion gap 5 3.0 - 18 mmol/L    Glucose 130 (H) 74 - 99 mg/dL    BUN 14 7.0 - 18 MG/DL    Creatinine 0.81 0.6 - 1.3 MG/DL    BUN/Creatinine ratio 17 12 - 20      GFR est AA >60 >60 ml/min/1.73m2    GFR est non-AA >60 >60 ml/min/1.73m2    Calcium 8.9 8.5 - 10.1 MG/DL    Bilirubin, total 0.3 0.2 - 1.0 MG/DL    ALT (SGPT) 18 13 - 56 U/L    AST (SGOT) 11 (L) 15 - 37 U/L    Alk. phosphatase 63 45 - 117 U/L    Protein, total 8.0 6.4 - 8.2 g/dL    Albumin 3.6 3.4 - 5.0 g/dL    Globulin 4.4 (H) 2.0 - 4.0 g/dL    A-G Ratio 0.8 0.8 - 1.7     CARDIAC PANEL,(CK, CKMB & TROPONIN)    Collection Time: 09/14/17  2:28 AM   Result Value Ref Range    CK 92 26 - 192 U/L    CK - MB <1.0 <3.6 ng/ml    CK-MB Index  0.0 - 4.0 %     CALCULATION NOT PERFORMED WHEN RESULT IS BELOW LINEAR LIMIT    Troponin-I, Qt. <0.02 0.0 - 0.045 NG/ML       EKG Interpretation by ED physician:  Rhythm: NSR  Rate: 83 bpm  Interpretation: Possible left atrial enlargement; No STEMI   EKG interpret by Mckinley Saleh MD 2:15 AM          X-ray, CT or radiology findings or impressions:  XR HAND RT MIN 3 V  3:56 AM  Preliminary Reading by Dr. Surinder House    No acute findings      Ct Head Wo Cont    Result Date: 9/14/2017  CT of the head without contrast HISTORY: Dizziness, fall, possible syncopal episode. COMPARISON: None All CT scans at this facility are performed using dose optimization technique as appropriate to a performed exam, to include automated exposure control, adjustment of the MA and/or kV according to patient size (including appropriate matching for site-specific examinations) or use of  iterative reconstruction technique. FINDINGS: Patchy white matter lucencies in the posterior periventricular, deep parietal lobes and centrum semiovale. Mild symmetric hypodensities in the central cerebellar hemispheres. Normal gray-white matter differentiation and no acute cortical infarct is identified. No mass effect or masses.  Normal brain volumes and ventricular spaces for age. No acute intracranial hemorrhage. Visualized paranasal sinuses and mastoid air cells are clear. No acute osseous abnormality. Impression: Nonspecific patchy cerebral white matter and cerebellar lucencies. Possibly age indeterminate ischemic changes or areas of demyelination. Diagnosis:   1. Facial laceration, initial encounter    2. Head trauma, initial encounter    3. Abnormal CT scan        Disposition: Home    Follow-up Information     Follow up With Details Comments Contact Info    Jasmin Fernández MD Schedule an appointment as soon as possible for a visit in 2 days follow up for abnormal Head CT result  [address]      [facility] EMERGENCY DEPT  As needed, If symptoms worsen [address] [facility] [address]  [phone]           Patient's Medications   Start Taking    No medications on file   Continue Taking    COMP STOCKING,KNEE,REGULAR,SML MISC    Use daily and remove at night    HORSE CHESTNUT 300 MG CAP    Take 300 mg by mouth daily. ONDANSETRON HCL (ZOFRAN, AS HYDROCHLORIDE,) 4 MG TABLET    Take 1 Tab by mouth every eight (8) hours as needed for Nausea. PRAMIPEXOLE (MIRAPEX) 0.25 MG TABLET    Take 1 Tab by mouth nightly. SENNA-DOCUSATE (SENNA WITH DOCUSATE SODIUM) 8.6-50 MG PER TABLET    Take 2 Tabs by mouth daily. TIZANIDINE (ZANAFLEX) 4 MG TABLET    Take 1 Tab by mouth three (3) times daily as needed. These Medications have changed    No medications on file   Stop Taking    No medications on file       Scribe Attestation      Fatuma Billingsley acting as a scribe for and in the presence of Clarence Edwards MD      September 14, 2017 at 2:51 AM       Provider Attestation:      I personally performed the services described in the documentation, reviewed the documentation, as recorded by the scribe in my presence, and it accurately and completely records my words and actions.  September 14, 2017 at 2:51 AM - Ivonne Dalton MD

## 2017-09-14 NOTE — ED NOTES
Patient arrived via medic for dizziness , fall and possible syncopal episode while in the bathroom.  Patient is now alert and oriented, she reports recalling

## 2017-09-15 ENCOUNTER — PATIENT OUTREACH (OUTPATIENT)
Dept: FAMILY MEDICINE CLINIC | Facility: CLINIC | Age: 52
End: 2017-09-15

## 2017-09-15 NOTE — PROGRESS NOTES
Nurse Navigator attempted to contact patient. Patient was discharged from [facility] ED on 9/14/17 for c/o fall/syncope. Message left introducing myself, the purpose of the call and giving my contact information.   Requested that patient call back at his/her earliest convenience

## 2017-09-19 ENCOUNTER — PATIENT OUTREACH (OUTPATIENT)
Dept: FAMILY MEDICINE CLINIC | Facility: CLINIC | Age: 52
End: 2017-09-19

## 2017-09-19 NOTE — LETTER
9/19/2017 4:40 PM 
 
Ms. Lorie Rojas [address] [facility] [address] Dear Ms. Lorie Rojas I am a Nurse Navigator working with Thanh Grayson MD 
 Part of my job is to follow up with patients who have been in the emergency department or hospitalized to see how they are feeling, answer any questions they may have about their visit and also make sure they have a follow-up appointment to see their primary care doctor. I have been unable to reach you by telephone and wanted to make sure that you scheduled a follow-up appointment to come in and talk to Greta Reinoso MD about your recent visit to the hospital. Please call our office to schedule your appointment. In the meantime, if you have any questions or concerns, please feel free to call me at the number listed above. Thank you for allowing us to participate in your care! Sincerely, Wendi Fitzgerald RN

## 2017-09-19 NOTE — PROGRESS NOTES
Call placed to patient at only number listed for patient. No answer and there was no way to leave a message. Unable to reach patient by phone x 2 attempts letter sent to arrange DUY BUSTOS appointment.

## 2017-09-21 ENCOUNTER — DOCUMENTATION ONLY (OUTPATIENT)
Dept: FAMILY MEDICINE CLINIC | Facility: CLINIC | Age: 52
End: 2017-09-21

## 2017-09-26 ENCOUNTER — OFFICE VISIT (OUTPATIENT)
Dept: FAMILY MEDICINE CLINIC | Facility: CLINIC | Age: 52
End: 2017-09-26

## 2017-09-26 VITALS
SYSTOLIC BLOOD PRESSURE: 105 MMHG | RESPIRATION RATE: 14 BRPM | WEIGHT: 98 LBS | HEART RATE: 72 BPM | TEMPERATURE: 97.6 F | BODY MASS INDEX: 17.36 KG/M2 | HEIGHT: 63 IN | DIASTOLIC BLOOD PRESSURE: 83 MMHG

## 2017-09-26 DIAGNOSIS — R90.89 ABNORMAL CT OF BRAIN: ICD-10-CM

## 2017-09-26 DIAGNOSIS — R55 SYNCOPE, UNSPECIFIED SYNCOPE TYPE: ICD-10-CM

## 2017-09-26 DIAGNOSIS — R30.0 BURNING WITH URINATION: ICD-10-CM

## 2017-09-26 DIAGNOSIS — R07.81 RIB PAIN ON RIGHT SIDE: ICD-10-CM

## 2017-09-26 DIAGNOSIS — F20.0 PARANOID SCHIZOPHRENIA (HCC): ICD-10-CM

## 2017-09-26 DIAGNOSIS — K62.5 RECTAL BLEEDING: ICD-10-CM

## 2017-09-26 DIAGNOSIS — K59.00 CONSTIPATION, UNSPECIFIED CONSTIPATION TYPE: Primary | ICD-10-CM

## 2017-09-26 RX ORDER — QUETIAPINE FUMARATE 50 MG/1
50 TABLET, FILM COATED ORAL
Qty: 30 TAB | Refills: 1 | Status: SHIPPED | OUTPATIENT
Start: 2017-09-26 | End: 2018-03-14

## 2017-09-26 RX ORDER — AMOXICILLIN 250 MG
2 CAPSULE ORAL DAILY
Qty: 60 TAB | Refills: 3 | Status: SHIPPED | OUTPATIENT
Start: 2017-09-26 | End: 2018-03-14

## 2017-09-26 NOTE — MR AVS SNAPSHOT
Visit Information Date & Time Provider Department Dept. Phone Encounter #  
 9/26/2017 12:45 PM Batool Pratt MD [phone] Follow-up Instructions Return in about 4 weeks (around 10/24/2017), or if symptoms worsen or fail to improve, for Schizophrenia, Paranoia. Upcoming Health Maintenance Date Due COLONOSCOPY 11/26/1981 DTaP/Tdap/Td series (1 - Tdap) 11/26/1984 PAP AKA CERVICAL CYTOLOGY 11/26/1984 INFLUENZA AGE 9 TO ADULT 8/1/2017 BREAST CANCER SCRN MAMMOGRAM 12/13/2018 Allergies as of 9/26/2017  Review Complete On: 9/26/2017 By: Batool Pratt MD  
  
 Severity Noted Reaction Type Reactions Amoxicillin  02/06/2017    Rash, Nausea Only Current Immunizations  Never Reviewed No immunizations on file. Not reviewed this visit You Were Diagnosed With   
  
 Codes Comments Constipation, unspecified constipation type    -  Primary ICD-10-CM: K59.00 ICD-9-CM: 564.00 Rectal bleeding     ICD-10-CM: K62.5 ICD-9-CM: 569.3 Burning with urination     ICD-10-CM: R30.0 ICD-9-CM: 729. 1 [facility] ([facility]     ICD-10-CM: F20.0 ICD-9-CM: 295.30 Syncope, unspecified syncope type     ICD-10-CM: R55 
ICD-9-CM: 780. 2 Abnormal CT of brain     ICD-10-CM: R90.89 ICD-9-CM: 793.0 Vitals BP Pulse Temp Resp Height(growth percentile) Weight(growth percentile) 105/83 72 97.6 °F (36.4 °C) 14 5' 3\" (1.6 m) 98 lb (44.5 kg) BMI OB Status Smoking Status 17.36 kg/m2 Hysterectomy Former Smoker Vitals History BMI and BSA Data Body Mass Index Body Surface Area  
 17.36 kg/m 2 1.41 m 2 Preferred Pharmacy Pharm[facility] Name Phone [facility] Your Updated Medication List  
  
   
This list is accurate as of: 9/26/17  1:57 PM.  Always use your most recent med list.  
  
  
  
  
 ondansetron hcl 4 mg tablet Commonly known as:  ZOFRAN (AS HYDROCHLORIDE) Take 1 Tab by mouth every eight (8) hours as needed for Nausea. QUEtiapine 50 mg tablet Commonly known as:  SEROquel Take 1 Tab by mouth nightly. senna-docusate 8.6-50 mg per tablet Commonly known as:  SENNA WITH DOCUSATE SODIUM Take 2 Tabs by mouth daily. Prescriptions Sent to Pharmacy Refills  
 senna-docusate (SENNA WITH DOCUSATE SODIUM) 8.6-50 mg per tablet 3 Sig: Take 2 Tabs by mouth daily. Class: Normal  
 Pharmacy: [address], [facility] Ph #: [phone] Route: Oral  
 QUEtiapine (SEROQUEL) 50 mg tablet 1 Sig: Take 1 Tab by mouth nightly. Class: Normal  
 Pharmacy: [place] [address] [address] [facility] Ph #: [phone] Route: Oral  
  
We Performed the Following AMB POC URINALYSIS DIP STICK AUTO W/O MICRO [18362 CPT(R)] REFERRAL TO NEUROLOGY [KRJ88 Custom] Comments:  
 Please evaluate patient for syncope. REFERRAL TO NEUROPSYCHOLOGY [WAX81 Custom] Comments:  
 Paranoid schizophrenia Follow-up Instructions Return in about 4 weeks (around 10/24/2017), or if symptoms worsen or fail to improve, for Schizophrenia, Paranoia. Referral Information Referral ID Referred By Referred To  
  
 [id number] Natalie Sou[facility] Nicholas Stoddard [address] [facility], [address] Phone: [phone] Fax: [phone] Visits Status Start Date End Date 1 New Request 9/26/17 9/26/18 If your referral has a status of pending review or denied, additional information will be sent to support the outcome of this decision. Referral ID Referred By Referred To  
 [id number] [facility], [address] [facility], PHD  
   [place] [address] [address] Yasmany Johnson Phone: [phone] Fax: [phone] Visits Status Start Date End Date 1 New Request 9/26/17 9/26/18 If your referral has a status of pending review or denied, additional information will be sent to support the outcome of this decision. Patient Instructions Constipation: Care Instructions Your Care Instructions Constipation means that you have a hard time passing stools (bowel movements). People pass stools from 3 times a day to once every 3 days. What is normal for you may be different. Constipation may occur with pain in the rectum and cramping. The pain may get worse when you try to pass stools. Sometimes there are small amounts of bright red blood on toilet paper or the surface of stools. This is because of enlarged veins near the rectum (hemorrhoids). A few changes in your diet and lifestyle may help you avoid ongoing constipation. Your doctor may also prescribe medicine to help loosen your stool. Some medicines can cause constipation. These include pain medicines and antidepressants. Tell your doctor about all the medicines you take. Your doctor may want to make a medicine change to ease your symptoms. Follow-up care is a key part of your treatment and safety. Be sure to make and go to all appointments, and call your doctor if you are having problems. It's also a good idea to know your test results and keep a list of the medicines you take. How can you care for yourself at home? · Drink plenty of fluids, enough so that your urine is light yellow or clear like water. If you have kidney, heart, or liver disease and have to limit fluids, talk with your doctor before you increase the amount of fluids you drink. · Include high-fiber foods in your diet each day. These include fruits, vegetables, beans, and whole grains. · Get at least 30 minutes of exercise on most days of the week.  Walking is a good choice. You also may want to do other activities, such as running, swimming, cycling, or playing tennis or team sports. · Take a fiber supplement, such as Citrucel or Metamucil, every day. Read and follow all instructions on the label. · Schedule time each day for a bowel movement. A daily routine may help. Take your time having your bowel movement. · Support your feet with a small step stool when you sit on the toilet. This helps flex your hips and places your pelvis in a squatting position. · Your doctor may recommend an over-the-counter laxative to relieve your constipation. Examples are Milk of Magnesia and MiraLax. Read and follow all instructions on the label. Do not use laxatives on a long-term basis. When should you call for help? Call your doctor now or seek immediate medical care if: 
· You have new or worse belly pain. · You have new or worse nausea or vomiting. · You have blood in your stools. Watch closely for changes in your health, and be sure to contact your doctor if: 
· Your constipation is getting worse. · You do not get better as expected. Where can you learn more? Go to http://lazarus-delano.info/. Enter 21  in the search box to learn more about \"Constipation: Care Instructions. \" Current as of: March 20, 2017 Content Version: 11.3 © 4176-6301 VenueJam. Care instructions adapted under license by [employer] (which disclaims liability or warranty for this information). If you have questions about a medical condition or this instruction, always ask your healthcare professional. [place] [address] any warranty or liability for your use of this information. Medicine for Schizophrenia: Care Instructions Your Care Instructions Medicine is the best treatment for schizophrenia. But it can be hard to take the medicine. This may be because: 
· You have severe side effects. · You don't believe you are ill. · You feel better. You may think you no longer need medicine. · You forget to take your medicine. This might be because of confused thinking or depression. · You have a drug or alcohol problem that gets in the way. · You don't want to be reminded that you have a mental health problem. Taking medicine every day reminds you. But if you stop taking your medicine, you probably will have a relapse. A relapse means your symptoms return or get worse after you have been feeling better. As long as you are taking medicines, you will need to see your doctor on a regular basis. You may need to go to a hospital while you are changing or stopping medicines. Follow-up care is a key part of your treatment and safety. Be sure to make and go to all appointments, and call your doctor if you are having problems. It's also a good idea to know your test results and keep a list of the medicines you take. What medicines are used for schizophrenia? Many types of medicines can help you. It might be best to use more than one, but it may take time to find which medicines work well for you. This may be frustrating. But your doctor and family can support you during this time. Medicines used most often include: · First-generation antipsychotics. Examples are chlorpromazine, haloperidol (Haldol), and perphenazine. They are used to reduce anxiety and agitation. They also keep you from hearing or seeing things that aren't there (hallucinations) and from believing things that aren't true (delusions). · Second-generation antipsychotics. Examples are aripiprazole (Abilify) and risperidone (Risperdal). These medicines keep you from hearing or seeing things that aren't there (hallucinations) and from believing things that aren't true (delusions). They also help the negative symptoms, like not caring about things or finding it hard to say how you feel. These medicines may have fewer side effects than first-generation medicines. These medicines sometimes have severe side effects. Always talk to your doctor about how they are working and how you are feeling. If you feel that a medicine isn't right for you, your doctor can help you find a new one. Don't stop taking your medicines unless you talk to your doctor. How can you care for yourself at home? Take your medicine · Be safe with medicines. Take your medicines exactly as prescribed. Call your doctor if you think you are having a problem with your medicine. · If you are having trouble taking your medicines or feel you don't need to take them, talk to your doctor. Your doctor may be able to change the medicine or the amount you take. Ask about long-acting medicines · Ask your doctor about long-acting medicines that are injected (shots). You get a shot every week or every few weeks. This may be a good choice because: 
¨ You have a set day and time to get the shot. ¨ If you don't show up for your shot, your doctor knows right away. ¨ The medicine stays in your body longer. If you are a little late for a shot, you have more time to get help before your symptoms return. ¨ You are not reminded every day that you have a mental health problem. ¨ You don't have to carry pills with you. Have a routine · Make a schedule for taking your medicines. Follow it every day. · Identify things you do every day at the same time, such as brushing your teeth. Use these activities to help remind you to take your medicines. · Set your watch alarm or a kitchen timer to remind you when to take your medicines. Or ask a family member to help you remember to take your medicines. · Keep the numbers for these national suicide hotlines: 6-467-489-TALK (4-106.300.4599) and 9-881-IFJFFGG (5-372.361.6008). If you or someone you know talks about suicide or about feeling hopeless, get help right away. Use a pillbox · Use a plastic pillbox with dividers for each day's medicines.  It can have a few or many compartments. Some have timers you can program. Choose one that fits your needs. · Put your pillbox in a place where it will remind you to take your medicines. For example, if you need to take medicine 3 times a day with meals, put those medicines in a pillbox near where you eat. · Keep one pill in its original bottle. Then if you forget what a pill is for, you can find the bottle it came from. When should you call for help? Call 911 anytime you think you may need emergency care. For example, call if: 
· You are thinking about suicide or are threatening suicide. · You feel you cannot stop from hurting yourself or someone else. · You hear voices that tell you to hurt yourself or someone else or to do something illegal, such as destroy property or steal. 
Call your doctor now or seek immediate medical care if: 
· You show warning signs of suicide, such as talking about death or spending long periods of time alone. · You hear voices. · You think someone is trying to harm you. · You cannot concentrate or are easily confused. · You are drinking a lot of alcohol or using illegal drugs. · You have a hard time taking care of basic needs, such as grooming. · You have signs of neuroleptic malignant syndrome, a side effect of a medicine you may be taking. Signs include: ¨ A fever of 102°F to 103°F. 
¨ A fast or irregular heartbeat. ¨ Rapid breathing. ¨ Severe sweating. · You have signs of tardive dyskinesia, a side effect of a medicine you may be taking. These include: ¨ Lip-smacking or continuous chewing. ¨ Tongue-twitching or thrusting the tongue out of the mouth. ¨ Quick and jerky movements (tics) of the head. Watch closely for changes in your health, and be sure to contact your doctor if: 
· Your symptoms come back or are getting worse after you have been getting better. · You cannot go to your counseling sessions. · You are not taking your medicines or you are thinking about not taking them. Where can you learn more? Go to http://lazarus-delano.info/. Enter W840 in the search box to learn more about \"Medicine for Schizophrenia: Care Instructions. \" Current as of: August 10, 2016 Content Version: 11.3 © 3459-4692 Shelfari. Care instructions adapted under license by [employer] (which disclaims liability or warranty for this information). If you have questions about a medical condition or this instruction, always ask your healthcare professional. [address] any warranty or liability for your use of this information. Introducing [facility] [place] [facility]! [facility] introduces ReferralCandy patient portal. Now you can access parts of your medical record, email your doctor's office, and request medication refills online. 1. In your internet browser, go to [email or web address] 2. Click on the First Time User? Click Here link in the Sign In box. You will see the New Member Sign Up page. 3. Enter your ReferralCandy Access Code exactly as it appears below. You will not need to use this code after youve completed the sign-up process. If you do not sign up before the expiration date, you must request a new code. · ReferralCandy Access Code: [email or web address] Expires: 10/22/2017  1:07 PM 
 
4. Enter the last four digits of your Social Security Number (xxxx) and Date of Birth (mm/dd/yyyy) as indicated and click Submit. You will be taken to the next sign-up page. 5. Create a Tarenat ID. This will be your ReferralCandy login ID and cannot be changed, so think of one that is secure and easy to remember. 6. Create a ReferralCandy password. You can change your password at any time. 7. Enter your Password Reset Question and Answer. This can be used at a later time if you forget your password. 8. Enter your e-mail address.  You will receive e-mail notification when new information is available in Cellrox. 9. Click Sign Up. You can now view and download portions of your medical record. 10. Click the Download Summary menu link to download a portable copy of your medical information. If you have questions, please visit the Frequently Asked Questions section of the Cellrox website. Remember, Cellrox is NOT to be used for urgent needs. For medical emergencies, dial 911. Now available from your iPhone and Android! Please provide this summary of care documentation to your next provider. Your primary care clinician is listed as Melinda Oneal. If you have any questions after today's visit, please call [phone].

## 2017-09-26 NOTE — PROGRESS NOTES
Chief Complaint   Patient presents with   [facility] Annual Wellness Visit    Sweats    Loss of Consciousness     seen at [facility] 09/14/[facility], stitches on right side of face    Urinary Burning     HPI:  Hospital follow-up  Syncope which occured before arriving at hospital. Pt states that at the time of the incident, she had bent her leg to put on her socks but she suddenly felt dizzy, fell, hit her head and lost consciousness. Pt presented with 2cm facial laceration to the right of her right eye from the fall as well as with back pain, chest pain, right hand pain and an abrasion between 4th and 5th fingers of her right hand. She would like suture removed today. She is still having right sided chest and back pain.     Rectal bleeding with plan to f/u with GI, tells me she has not picked up medication for hemorrhoids  Urinary burning    Patient friend/partner John Lezama presents for visit to discuss patient plan of care. They went to see her psychiatrist and patient has schizophrenia and paranoia. They request started medication until patient can see a medication manager at [address] [facility]. Patient denies SI and HI.     Concern for vaginal cyst    Patient Active Problem List   Diagnosis Code    Microscopic hematuria R31.29    Hematuria, undiagnosed cause R31.9    S/P cystoscopy Z98.890    Rectal bleeding K62.5    Chronic pain G89.29    Diffuse cystic mastopathy N60.19       Review of Systems   Complete ROS negative except where noted in HPI    Objective:     Visit Vitals    /83    Pulse 72    Temp 97.6 °F (36.4 °C)    Resp 14    Ht 5' 3\" (1.6 m)    Wt 98 lb (44.5 kg)    BMI 17.36 kg/m2     No exam data present    Constitutional: The patient appears well, NAD, Alert & Oriented x 3  Psych: Normal Mood, Normal Behavior  ENT: RAMSEY, EOMI, no scleral icterus  Lungs: CTAB,  Normal effort , Good air entry, No W/R/R   Cardiovascular:RRR, No M/R/G, S1 and S2 normal  Chest: ttp on right chest wall  GI: soft, nttp, +BS  : exam unremarkable  Extremities: negative LE edema, feet: warm, good capillary refill    Diagnoses and all orders for this visit:    1. Constipation, unspecified constipation type  -     senna-docusate (SENNA WITH DOCUSATE SODIUM) 8.6-50 mg per tablet; Take 2 Tabs by mouth daily. 2. Rectal bleeding    3. Burning with urination    4. Paranoid schizophrenia (HCC)  -     REFERRAL TO NEUROPSYCHOLOGY  -     QUEtiapine (SEROQUEL) 50 mg tablet; Take 1 Tab by mouth nightly. 5. Syncope, unspecified syncope type  -     REFERRAL TO NEUROLOGY    6. Abnormal CT of brain    7. Rib pain on right side  -     XR RIBS RT UNI 2 V; Future    Patient unable to give urine sample. Recommend she return for this. Reassured patient of normal vaginal/perineal tissue. Medical records reviewed. Medical records requested from Psychiatry. I have discussed the diagnosis with the patient and the intended plan as seen in the above orders. The patient has received an after-visit summary and questions were answered concerning future plans. I have discussed medication side effects and warnings with the patient as well. I have reviewed the plan of care with the patient, accepted their input and they are in agreement with the treatment goals. Patient verbalizes understanding. Follow-up Disposition:  Return in about 4 weeks (around 10/24/2017), or if symptoms worsen or fail to improve, for Schizophrenia, Paranoia.

## 2017-09-26 NOTE — PATIENT INSTRUCTIONS
Constipation: Care Instructions  Your Care Instructions  Constipation means that you have a hard time passing stools (bowel movements). People pass stools from 3 times a day to once every 3 days. What is normal for you may be different. Constipation may occur with pain in the rectum and cramping. The pain may get worse when you try to pass stools. Sometimes there are small amounts of bright red blood on toilet paper or the surface of stools. This is because of enlarged veins near the rectum (hemorrhoids). A few changes in your diet and lifestyle may help you avoid ongoing constipation. Your doctor may also prescribe medicine to help loosen your stool. Some medicines can cause constipation. These include pain medicines and antidepressants. Tell your doctor about all the medicines you take. Your doctor may want to make a medicine change to ease your symptoms. Follow-up care is a key part of your treatment and safety. Be sure to make and go to all appointments, and call your doctor if you are having problems. It's also a good idea to know your test results and keep a list of the medicines you take. How can you care for yourself at home? · Drink plenty of fluids, enough so that your urine is light yellow or clear like water. If you have kidney, heart, or liver disease and have to limit fluids, talk with your doctor before you increase the amount of fluids you drink. · Include high-fiber foods in your diet each day. These include fruits, vegetables, beans, and whole grains. · Get at least 30 minutes of exercise on most days of the week. Walking is a good choice. You also may want to do other activities, such as running, swimming, cycling, or playing tennis or team sports. · Take a fiber supplement, such as Citrucel or Metamucil, every day. Read and follow all instructions on the label. · Schedule time each day for a bowel movement. A daily routine may help.  Take your time having your bowel movement. · Support your feet with a small step stool when you sit on the toilet. This helps flex your hips and places your pelvis in a squatting position. · Your doctor may recommend an over-the-counter laxative to relieve your constipation. Examples are Milk of Magnesia and MiraLax. Read and follow all instructions on the label. Do not use laxatives on a long-term basis. When should you call for help? Call your doctor now or seek immediate medical care if:  · You have new or worse belly pain. · You have new or worse nausea or vomiting. · You have blood in your stools. Watch closely for changes in your health, and be sure to contact your doctor if:  · Your constipation is getting worse. · You do not get better as expected. Where can you learn more? Go to http://lazarus-delano.info/. Enter [phone] in the search box to learn more about \"Constipation: Care Instructions. \"  Current as of: March 20, 2017  Content Version: 11.3  © 6919-5764 [employer]. Care instructions adapted under license by [employer] (which disclaims liability or warranty for this information). If you have questions about a medical condition or this instruction, always ask your healthcare professional. [address] any warranty or liability for your use of this information. Medicine for Schizophrenia: Care Instructions  Your Care Instructions  Medicine is the best treatment for schizophrenia. But it can be hard to take the medicine. This may be because:  · You have severe side effects. · You don't believe you are ill. · You feel better. You may think you no longer need medicine. · You forget to take your medicine. This might be because of confused thinking or depression. · You have a drug or alcohol problem that gets in the way. · You don't want to be reminded that you have a mental health problem. Taking medicine every day reminds you.   But if you stop taking your medicine, you probably will have a relapse. A relapse means your symptoms return or get worse after you have been feeling better. As long as you are taking medicines, you will need to see your doctor on a regular basis. You may need to go to a hospital while you are changing or stopping medicines. Follow-up care is a key part of your treatment and safety. Be sure to make and go to all appointments, and call your doctor if you are having problems. It's also a good idea to know your test results and keep a list of the medicines you take. What medicines are used for schizophrenia? Many types of medicines can help you. It might be best to use more than one, but it may take time to find which medicines work well for you. This may be frustrating. But your doctor and family can support you during this time. Medicines used most often include:  · First-generation antipsychotics. Examples are chlorpromazine, haloperidol (Haldol), and perphenazine. They are used to reduce anxiety and agitation. They also keep you from hearing or seeing things that aren't there (hallucinations) and from believing things that aren't true (delusions). · Second-generation antipsychotics. Examples are aripiprazole (Abilify) and risperidone (Risperdal). These medicines keep you from hearing or seeing things that aren't there (hallucinations) and from believing things that aren't true (delusions). They also help the negative symptoms, like not caring about things or finding it hard to say how you feel. These medicines may have fewer side effects than first-generation medicines. These medicines sometimes have severe side effects. Always talk to your doctor about how they are working and how you are feeling. If you feel that a medicine isn't right for you, your doctor can help you find a new one. Don't stop taking your medicines unless you talk to your doctor. How can you care for yourself at home? Take your medicine  · Be safe with medicines. Take your medicines exactly as prescribed. Call your doctor if you think you are having a problem with your medicine. · If you are having trouble taking your medicines or feel you don't need to take them, talk to your doctor. Your doctor may be able to change the medicine or the amount you take. Ask about long-acting medicines  · Ask your doctor about long-acting medicines that are injected (shots). You get a shot every week or every few weeks. This may be a good choice because:  ¨ You have a set day and time to get the shot. ¨ If you don't show up for your shot, your doctor knows right away. ¨ The medicine stays in your body longer. If you are a little late for a shot, you have more time to get help before your symptoms return. ¨ You are not reminded every day that you have a mental health problem. ¨ You don't have to carry pills with you. Have a routine  · Make a schedule for taking your medicines. Follow it every day. · Identify things you do every day at the same time, such as brushing your teeth. Use these activities to help remind you to take your medicines. · Set your watch alarm or a kitchen timer to remind you when to take your medicines. Or ask a family member to help you remember to take your medicines. · Keep the numbers for these national suicide hotlines: 1-214-800-TALK (1-387.634.7039) and 0-373-WMXBJSO (7-317.218.6445). If you or someone you know talks about suicide or about feeling hopeless, get help right away. Use a pillbox  · Use a plastic pillbox with dividers for each day's medicines. It can have a few or many compartments. Some have timers you can program. Choose one that fits your needs. · Put your pillbox in a place where it will remind you to take your medicines. For example, if you need to take medicine 3 times a day with meals, put those medicines in a pillbox near where you eat. · Keep one pill in its original bottle.  Then if you forget what a pill is for, you can find the bottle it came from. When should you call for help? Call 911 anytime you think you may need emergency care. For example, call if:  · You are thinking about suicide or are threatening suicide. · You feel you cannot stop from hurting yourself or someone else. · You hear voices that tell you to hurt yourself or someone else or to do something illegal, such as destroy property or steal.  Call your doctor now or seek immediate medical care if:  · You show warning signs of suicide, such as talking about death or spending long periods of time alone. · You hear voices. · You think someone is trying to harm you. · You cannot concentrate or are easily confused. · You are drinking a lot of alcohol or using illegal drugs. · You have a hard time taking care of basic needs, such as grooming. · You have signs of neuroleptic malignant syndrome, a side effect of a medicine you may be taking. Signs include:  ¨ A fever of 102°F to 103°F.  ¨ A fast or irregular heartbeat. ¨ Rapid breathing. ¨ Severe sweating. · You have signs of tardive dyskinesia, a side effect of a medicine you may be taking. These include:  ¨ Lip-smacking or continuous chewing. ¨ Tongue-twitching or thrusting the tongue out of the mouth. ¨ Quick and jerky movements (tics) of the head. Watch closely for changes in your health, and be sure to contact your doctor if:  · Your symptoms come back or are getting worse after you have been getting better. · You cannot go to your counseling sessions. · You are not taking your medicines or you are thinking about not taking them. Where can you learn more? Go to http://lazarus-delano.info/. Enter Q679 in the search box to learn more about \"Medicine for Schizophrenia: Care Instructions. \"  Current as of: August 10, 2016  Content Version: 11.3  © 5023-8599 [employer], [employer].  Care instructions adapted under license by [employer] (which disclaims liability or warranty for this information). If you have questions about a medical condition or this instruction, always ask your healthcare professional. [place] [address] any warranty or liability for your use of this information.

## 2017-10-17 ENCOUNTER — DOCUMENTATION ONLY (OUTPATIENT)
Dept: FAMILY MEDICINE CLINIC | Facility: CLINIC | Age: 52
End: 2017-10-17

## 2018-03-14 ENCOUNTER — OFFICE VISIT (OUTPATIENT)
Dept: FAMILY MEDICINE CLINIC | Facility: CLINIC | Age: 53
End: 2018-03-14

## 2018-03-14 VITALS
WEIGHT: 113 LBS | HEIGHT: 63 IN | RESPIRATION RATE: 12 BRPM | DIASTOLIC BLOOD PRESSURE: 71 MMHG | SYSTOLIC BLOOD PRESSURE: 119 MMHG | TEMPERATURE: 97 F | BODY MASS INDEX: 20.02 KG/M2 | OXYGEN SATURATION: 100 % | HEART RATE: 81 BPM

## 2018-03-14 DIAGNOSIS — Z71.1 CONCERN ABOUT STD IN FEMALE WITHOUT DIAGNOSIS: ICD-10-CM

## 2018-03-14 DIAGNOSIS — R82.90 BAD ODOR OF URINE: ICD-10-CM

## 2018-03-14 DIAGNOSIS — M79.10 MYALGIA: ICD-10-CM

## 2018-03-14 DIAGNOSIS — N95.1 HOT FLUSHES, PERIMENOPAUSAL: Primary | ICD-10-CM

## 2018-03-14 DIAGNOSIS — R73.09 ELEVATED GLUCOSE: ICD-10-CM

## 2018-03-14 DIAGNOSIS — F41.9 ANXIETY: ICD-10-CM

## 2018-03-14 NOTE — PROGRESS NOTES
HISTORY OF PRESENT ILLNESS  Reyes Free is a 45 y.o. female. HPI Comments: Presents with multiple somatic complaints, including generalized pain (for many years), hot flashes/sweats, frequent urination. She has many vague complaints of strange feelings in her hands and feet. She admits to paranoid feelings, but she is no longer seeing Psychiatry. She says that her care coordinator advised her to ask me whether she can get help at home, because she has trouble getting up her stairs. She also says that her tongue bleeds (will be seeing a dentist soon). She did have a mildly elevated glucose in August (other labs normal). She would like to be tested for HIV, and she notes a bad smell of her urine. Excessive Sweating   Associated symptoms include abdominal pain. Pertinent negatives include no chest pain and no shortness of breath. Chills    Pertinent negatives include no chest pain and no shortness of breath. Leg Pain    Associated symptoms include back pain. Abdominal Pain   Associated symptoms include abdominal pain. Pertinent negatives include no chest pain and no shortness of breath. Fatigue   Associated symptoms include abdominal pain. Pertinent negatives include no chest pain and no shortness of breath.        Past Medical History:   Diagnosis Date    Chest pain 11/2014    neg EKG, non recurrent    Chronic pain     lower back and legs    Depression     Fibroids     Headache(784.0)     Hiatal hernia     IBS (irritable bowel syndrome)     Microscopic hematuria     Rectal bleeding        Past Surgical History:   Procedure Laterality Date    COLONOSCOPY,DIAGNOSTIC      HX BREAST BIOPSY Right 1/21/2015    RIGHT BREAST BIOPSY WITH NEEDLE LOCALIZATION ULTRASOUND performed by Paty Dacosta MD at [facility] MAIN OR    HX GI      HX HYSTERECTOMY      HX TUBAL LIGATION         History   Smoking Status    Former Smoker    Packs/day: 0.00   Smokeless Tobacco    Former User    Quit date: 02/2016       Review of Systems   Constitutional: Positive for chills, diaphoresis and fatigue. Respiratory: Negative for shortness of breath. Cardiovascular: Negative for chest pain. Gastrointestinal: Positive for abdominal pain. Musculoskeletal: Positive for back pain, joint pain and myalgias. Visit Vitals    /71    Pulse 81    Temp 97 °F (36.1 °C)    Resp 12    Ht 5' 3\" (1.6 m)    Wt 113 lb (51.3 kg)    SpO2 100%    BMI 20.02 kg/m2       Physical Exam   Constitutional: She is oriented to person, place, and time. She appears well-developed and well-nourished. No distress. HENT:   Right Ear: Tympanic membrane, external ear and ear canal normal.   Left Ear: Tympanic membrane, external ear and ear canal normal.   Nose: Nose normal.   Mouth/Throat: Oropharynx is clear and moist.   No tongue lesions   Neck: Neck supple. Carotid bruit is not present. No thyromegaly present. Cardiovascular: Normal rate, regular rhythm and intact distal pulses. Exam reveals no gallop and no friction rub. No murmur heard. Pulmonary/Chest: Effort normal and breath sounds normal. No respiratory distress. Musculoskeletal: She exhibits no edema. Lymphadenopathy:     She has no cervical adenopathy. Neurological: She is alert and oriented to person, place, and time. Skin: Skin is warm and dry. No rash noted. No erythema. Psychiatric: Her speech is normal and behavior is normal. Judgment normal. Her mood appears anxious. Thought content is paranoid. Cognition and memory are normal.       ASSESSMENT and PLAN    ICD-10-CM ICD-9-CM    1. Hot flushes, perimenopausal N95.1 627.2 [facility] AND    2. Myalgia M79.1 729.1    3. Concern about STD in female without diagnosis Z71.1 V65.5 HIV 1/2 AG/AB, 4TH GENERATION,W RFLX CONFIRM   4. Anxiety F41.9 300.00    5. Bad odor of urine R82.90 791.9 CULTURE, URINE      URINALYSIS W/ RFLX MICROSCOPIC   6.  Elevated glucose M97.31 151.45 METABOLIC PANEL, BASIC     Follow-up Disposition:  Return in about 3 months (around 6/14/2018). the following changes in treatment are made: Okay to take Tylenol for pain. lab results and schedule of future lab studies reviewed with patient  Advised to call [facility] about returning there, as I believe she needs medication. Plan of care reviewed - patient verbalize(s) understanding and agreement.

## 2018-03-14 NOTE — MR AVS SNAPSHOT
[address] [facility] [address] 
[phone] Patient: Juan Rudolph MRN: [id number] :1965 Visit Information Date & Time Provider Department Dept. Phone Encounter #  
 3/14/2018 10:15 AM Trell Metzger MD [facility] [phone] [id number] Follow-up Instructions Return in about 3 months (around 2018). Upcoming Health Maintenance Date Due DTaP/Tdap/Td series (1 - Tdap) 1984 BREAST CANCER SCRN MAMMOGRAM 2018 COLONOSCOPY 2025 Allergies as of 3/14/2018  Review Complete On: 3/14/2018 By: Trell Metzger MD  
  
 Severity Noted Reaction Type Reactions Amoxicillin  2017    Rash, Nausea Only Current Immunizations  Never Reviewed No immunizations on file. Not reviewed this visit You Were Diagnosed With   
  
 Codes Comments Hot flushes, perimenopausal    -  Primary ICD-10-CM: N95.1 ICD-9-CM: 627.2 Myalgia     ICD-10-CM: M79.1 ICD-9-CM: 729.1 Concern about STD in female without diagnosis     ICD-10-CM: Z71.1 ICD-9-CM: V65.5 Anxiety     ICD-10-CM: F41.9 ICD-9-CM: 300.00 Bad odor of urine     ICD-10-CM: R82.90 ICD-9-CM: 791.9 Elevated glucose     ICD-10-CM: R73.09 
ICD-9-CM: 790.29 Vitals BP Pulse Temp Resp Height(growth percentile) Weight(growth percentile) 119/71 81 97 °F (36.1 °C) 12 5' 3\" (1.6 m) 113 lb (51.3 kg) SpO2 BMI OB Status Smoking Status 100% 20.02 kg/m2 Hysterectomy Former Smoker Vitals History BMI and BSA Data Body Mass Index Body Surface Area 20.02 kg/m 2 1.51 m 2 Preferred Pharmacy Pharmacy Name Phone [facility] [address], [address] [place] [phone] Your Updated Medication List  
  
Notice  As of 3/14/2018 11:56 AM  
 You have not been prescribed any medications. Follow-up Instructions Return in about 3 months (around 6/14/2018). To-Do List   
 Around 03/14/2018 Microbiology:  CULTURE, URINE   
  
 03/14/2018 Lab:  [address] AND [facility] Around 03/14/2018 Lab:  HIV 1/2 AG/AB, 4TH GENERATION,W RFLX CONFIRM Around 03/14/2018 Lab:  METABOLIC PANEL, BASIC   
  
 03/14/2018 Lab:  URINALYSIS W/ RFLX MICROSCOPIC Introducing [facility]! [employer] introduces [employer] patient portal. Now you can access parts of your medical record, email your doctor's office, and request medication refills online. 1. In your internet browser, go to [email or web address] 2. Click on the First Time User? Click Here link in the Sign In box. You will see the New Member Sign Up page. 3. Enter your [employer] Access Code exactly as it appears below. You will not need to use this code after youve completed the sign-up process. If you do not sign up before the expiration date, you must request a new code. · [employer] Access Code: [email or web address] Expires: 6/12/2018 11:56 AM 
 
4. Enter the last four digits of your Social Security Number (xxxx) and Date of Birth (mm/dd/yyyy) as indicated and click Submit. You will be taken to the next sign-up page. 5. Create a [employer] ID. This will be your [employer] login ID and cannot be changed, so think of one that is secure and easy to remember. 6. Create a [employer] password. You can change your password at any time. 7. Enter your Password Reset Question and Answer. This can be used at a later time if you forget your password. 8. Enter your e-mail address. You will receive e-mail notification when new information is available in [address] 9. Click Sign Up. You can now view and download portions of your medical record. 10. Click the Download Summary menu link to download a portable copy of your medical information.  
 
If you have questions, please visit the Frequently Asked Questions section of the [email or web address]. Remember, [employer] is NOT to be used for urgent needs. For medical emergencies, dial 911. Now available from your iPhone and Android! Please provide this summary of care documentation to your next provider. Your primary care clinician is listed as Bryn Mayfield. If you have any questions after today's visit, please call [phone].

## 2018-03-21 ENCOUNTER — TELEPHONE (OUTPATIENT)
Dept: FAMILY MEDICINE CLINIC | Facility: CLINIC | Age: 53
End: 2018-03-21

## 2018-03-21 NOTE — TELEPHONE ENCOUNTER
Mrs. Carlos Wyatt RN case manager for [employer] called stating that patient need a Cane and [facility] services she needs home health to help her with her ADL's.

## 2018-03-26 NOTE — TELEPHONE ENCOUNTER
Chart reviewed. Her problem list doesn't include any diagnosis that might justify either Home Health or a cane. Please call back and find out what is going on.

## 2018-05-15 ENCOUNTER — TELEPHONE (OUTPATIENT)
Dept: FAMILY MEDICINE CLINIC | Facility: CLINIC | Age: 53
End: 2018-05-15

## 2018-05-15 NOTE — TELEPHONE ENCOUNTER
Per Dr. Brian Kohler certificate of patient status for [place] , for patient single  visit on 03/14/18 signed/dated/faxed on 05/15/18 to # [phone].

## 2018-05-19 ENCOUNTER — HOSPITAL ENCOUNTER (EMERGENCY)
Age: 53
Discharge: HOME | End: 2018-05-19
Attending: EMERGENCY MEDICINE
Payer: MEDICARE

## 2018-05-19 ENCOUNTER — APPOINTMENT (OUTPATIENT)
Dept: GENERAL RADIOLOGY | Age: 53
End: 2018-05-19
Attending: EMERGENCY MEDICINE
Payer: MEDICARE

## 2018-05-19 VITALS
TEMPERATURE: 98.2 F | RESPIRATION RATE: 16 BRPM | SYSTOLIC BLOOD PRESSURE: 112 MMHG | WEIGHT: 113 LBS | OXYGEN SATURATION: 100 % | DIASTOLIC BLOOD PRESSURE: 65 MMHG | HEART RATE: 87 BPM | BODY MASS INDEX: 20.02 KG/M2

## 2018-05-19 DIAGNOSIS — M79.10 MYALGIA: ICD-10-CM

## 2018-05-19 DIAGNOSIS — R60.9 DEPENDENT EDEMA: Primary | ICD-10-CM

## 2018-05-19 LAB
ALBUMIN SERPL-MCNC: 3.6 G/DL (ref 3.4–5)
ALBUMIN/GLOB SERPL: 0.9 {RATIO} (ref 0.8–1.7)
ALP SERPL-CCNC: 73 U/L (ref 45–117)
ALT SERPL-CCNC: 23 U/L (ref 13–56)
ANION GAP SERPL CALC-SCNC: 6 MMOL/L (ref 3–18)
APPEARANCE UR: CLEAR
APTT PPP: 27.5 SEC (ref 23–36.4)
AST SERPL-CCNC: 16 U/L (ref 15–37)
BASOPHILS # BLD: 0 K/UL (ref 0–0.06)
BASOPHILS NFR BLD: 0 % (ref 0–2)
BILIRUB SERPL-MCNC: 0.2 MG/DL (ref 0.2–1)
BILIRUB UR QL: NEGATIVE
BNP SERPL-MCNC: 23 PG/ML (ref 0–900)
BUN SERPL-MCNC: 10 MG/DL (ref 7–18)
BUN/CREAT SERPL: 13 (ref 12–20)
CALCIUM SERPL-MCNC: 9 MG/DL (ref 8.5–10.1)
CHLORIDE SERPL-SCNC: 103 MMOL/L (ref 100–108)
CO2 SERPL-SCNC: 31 MMOL/L (ref 21–32)
COLOR UR: YELLOW
CREAT SERPL-MCNC: 0.76 MG/DL (ref 0.6–1.3)
D DIMER PPP FEU-MCNC: <0.27 UG/ML(FEU)
DIFFERENTIAL METHOD BLD: ABNORMAL
EOSINOPHIL # BLD: 0.1 K/UL (ref 0–0.4)
EOSINOPHIL NFR BLD: 1 % (ref 0–5)
ERYTHROCYTE [DISTWIDTH] IN BLOOD BY AUTOMATED COUNT: 13.7 % (ref 11.6–14.5)
GLOBULIN SER CALC-MCNC: 3.9 G/DL (ref 2–4)
GLUCOSE SERPL-MCNC: 94 MG/DL (ref 74–99)
GLUCOSE UR STRIP.AUTO-MCNC: NEGATIVE MG/DL
HCT VFR BLD AUTO: 34.5 % (ref 35–45)
HGB BLD-MCNC: 11.6 G/DL (ref 12–16)
HGB UR QL STRIP: NEGATIVE
INR PPP: 1 (ref 0.8–1.2)
KETONES UR QL STRIP.AUTO: NEGATIVE MG/DL
LEUKOCYTE ESTERASE UR QL STRIP.AUTO: NEGATIVE
LYMPHOCYTES # BLD: 2.4 K/UL (ref 0.9–3.6)
LYMPHOCYTES NFR BLD: 55 % (ref 21–52)
MAGNESIUM SERPL-MCNC: 2.1 MG/DL (ref 1.6–2.6)
MCH RBC QN AUTO: 29.8 PG (ref 24–34)
MCHC RBC AUTO-ENTMCNC: 33.6 G/DL (ref 31–37)
MCV RBC AUTO: 88.7 FL (ref 74–97)
MONOCYTES # BLD: 0.3 K/UL (ref 0.05–1.2)
MONOCYTES NFR BLD: 7 % (ref 3–10)
NEUTS SEG # BLD: 1.7 K/UL (ref 1.8–8)
NEUTS SEG NFR BLD: 37 % (ref 40–73)
NITRITE UR QL STRIP.AUTO: NEGATIVE
PH UR STRIP: 6 [PH] (ref 5–8)
PLATELET # BLD AUTO: 208 K/UL (ref 135–420)
PMV BLD AUTO: 10.4 FL (ref 9.2–11.8)
POTASSIUM SERPL-SCNC: 4.1 MMOL/L (ref 3.5–5.5)
PROT SERPL-MCNC: 7.5 G/DL (ref 6.4–8.2)
PROT UR STRIP-MCNC: NEGATIVE MG/DL
PROTHROMBIN TIME: 12.7 SEC (ref 11.5–15.2)
RBC # BLD AUTO: 3.89 M/UL (ref 4.2–5.3)
SODIUM SERPL-SCNC: 140 MMOL/L (ref 136–145)
SP GR UR REFRACTOMETRY: 1.02 (ref 1–1.03)
TROPONIN I SERPL-MCNC: <0.02 NG/ML (ref 0–0.04)
TSH SERPL DL<=0.05 MIU/L-ACNC: 0.73 UIU/ML (ref 0.36–3.74)
UROBILINOGEN UR QL STRIP.AUTO: 1 EU/DL (ref 0.2–1)
WBC # BLD AUTO: 4.5 K/UL (ref 4.6–13.2)

## 2018-05-19 PROCEDURE — 85730 THROMBOPLASTIN TIME PARTIAL: CPT | Performed by: EMERGENCY MEDICINE

## 2018-05-19 PROCEDURE — 85025 COMPLETE CBC W/AUTO DIFF WBC: CPT | Performed by: EMERGENCY MEDICINE

## 2018-05-19 PROCEDURE — 83001 ASSAY OF GONADOTROPIN (FSH): CPT | Performed by: EMERGENCY MEDICINE

## 2018-05-19 PROCEDURE — 85610 PROTHROMBIN TIME: CPT | Performed by: EMERGENCY MEDICINE

## 2018-05-19 PROCEDURE — 80053 COMPREHEN METABOLIC PANEL: CPT | Performed by: EMERGENCY MEDICINE

## 2018-05-19 PROCEDURE — 93005 ELECTROCARDIOGRAM TRACING: CPT

## 2018-05-19 PROCEDURE — 85379 FIBRIN DEGRADATION QUANT: CPT | Performed by: EMERGENCY MEDICINE

## 2018-05-19 PROCEDURE — 81003 URINALYSIS AUTO W/O SCOPE: CPT | Performed by: EMERGENCY MEDICINE

## 2018-05-19 PROCEDURE — 71046 X-RAY EXAM CHEST 2 VIEWS: CPT

## 2018-05-19 PROCEDURE — 84443 ASSAY THYROID STIM HORMONE: CPT | Performed by: EMERGENCY MEDICINE

## 2018-05-19 PROCEDURE — 99284 EMERGENCY DEPT VISIT MOD MDM: CPT

## 2018-05-19 PROCEDURE — 87086 URINE CULTURE/COLONY COUNT: CPT | Performed by: EMERGENCY MEDICINE

## 2018-05-19 PROCEDURE — 83735 ASSAY OF MAGNESIUM: CPT | Performed by: EMERGENCY MEDICINE

## 2018-05-19 PROCEDURE — 84484 ASSAY OF TROPONIN QUANT: CPT | Performed by: EMERGENCY MEDICINE

## 2018-05-19 PROCEDURE — 83880 ASSAY OF NATRIURETIC PEPTIDE: CPT | Performed by: EMERGENCY MEDICINE

## 2018-05-19 RX ORDER — FUROSEMIDE 10 MG/ML
20 INJECTION INTRAMUSCULAR; INTRAVENOUS
Status: DISCONTINUED | OUTPATIENT
Start: 2018-05-19 | End: 2018-05-19 | Stop reason: HOSPADM

## 2018-05-19 RX ORDER — CYCLOBENZAPRINE HCL 10 MG
10 TABLET ORAL
Qty: 15 TAB | Refills: 0 | Status: SHIPPED | OUTPATIENT
Start: 2018-05-19 | End: 2018-06-15

## 2018-05-19 RX ORDER — NAPROXEN 500 MG/1
500 TABLET ORAL 2 TIMES DAILY WITH MEALS
Qty: 20 TAB | Refills: 0 | Status: SHIPPED | OUTPATIENT
Start: 2018-05-19 | End: 2018-05-29

## 2018-05-19 NOTE — ED PROVIDER NOTES
EMERGENCY DEPARTMENT HISTORY AND PHYSICAL EXAM    8:56 AM      Date: 5/19/2018  Patient Name: Shonda Figueredo    History of Presenting Illness     Chief Complaint   Patient presents with    Leg Pain    Leg Swelling         History Provided By: Patient    Chief Complaint: Leg swelling and Leg Pain   Duration:  3 days   Timing:  Constant  Location: Bilateral calf swelling and leg pain   Quality: Reports tingling sensation in legs   Severity: Patient did not dictate   Modifying Factors: Patient reports swelling and pain after walking and lifting heavy boxes while moving into her apartment. Associated Symptoms:  Also reports SOB that is brought on at night. Also notes other symptoms, such as tingling in back and bilateral legs, headache, and bilateral hand pain. Denies other symptoms. Additional History (Context): Shonda Figueredo is a 45 y.o. female with PMHx of Headache, Depression, Chronic Pain, IBS, Fibroids, Rectal Bleeding, and a PSHx of a Breast Biopsy, and a Cholecystectomy who presents with leg edema and leg pain onset 3 days ago. Patient states that she was moving into her new apartment, and was lifting heavy boxes and walking. Notes that after moving, she noticed bilateral leg swelling. Notes that she is currently sleeping on an air mattress, and is not keeping her legs elevated. Also reports SOB that is brought on at night. Also notes other chronic symptoms, such as tingling in back and bilateral legs, headache, and bilateral hand pain. Denies other symptoms. Patient notes that her PCP is Aparna Hinkle, who sent the patient in for blood work. Patient notes that the blood work was ordered a few weeks ago, but was unable to come in for it until today. Patient reports former Tobacco use, and denies EtOH use. Notes that she is currently unemployed due to her inability to lift more than 5 pounds due to her back. No other concerns were expressed at this time.      PCP: Te Rueda Tere Haney MD        Past History     Past Medical History:  Past Medical History:   Diagnosis Date    Chest pain 11/2014    neg EKG, non recurrent    Chronic pain     lower back and legs    Depression     Fibroids     Headache(784.0)     Hiatal hernia     IBS (irritable bowel syndrome)     Microscopic hematuria     Rectal bleeding        Past Surgical History:  Past Surgical History:   Procedure Laterality Date    COLONOSCOPY,DIAGNOSTIC      HX BREAST BIOPSY Right 1/21/2015    RIGHT BREAST BIOPSY WITH NEEDLE LOCALIZATION ULTRASOUND performed by Jj Ramey MD at [facility] MAIN OR    HX GI      HX HYSTERECTOMY      HX TUBAL LIGATION         Family History:  Family History   Problem Relation Age of Onset    HIV/AIDS Brother     Diabetes Father     Cancer Brother     Hypertension Sister     Diabetes Brother     Hypertension Sister     Hypertension Sister     Hypertension Brother        Social History:  Social History   Substance Use Topics    Smoking status: Former Smoker     Packs/day: 0.00    Smokeless tobacco: Former User     Quit date: 02/2016    Alcohol use No       Allergies: Allergies   Allergen Reactions    Amoxicillin Rash and Nausea Only         Review of Systems     Review of Systems   Constitutional: Negative for activity change, fatigue and fever. HENT: Negative for congestion and rhinorrhea. Eyes: Negative for visual disturbance. Respiratory: Positive for shortness of breath (at night ). Cardiovascular: Positive for leg swelling (bilateral). Negative for chest pain and palpitations. Gastrointestinal: Negative for abdominal pain, diarrhea, nausea and vomiting. Genitourinary: Negative for dysuria and hematuria. Musculoskeletal: Positive for arthralgias (bilateral hand pain), back pain (chronic ) and myalgias (Bilateral leg pain ). Skin: Negative for rash. Neurological: Positive for headaches. Negative for dizziness, weakness and light-headedness.    All other systems reviewed and are negative. Physical Exam     Visit Vitals    /72 (BP 1 Location: Left arm, BP Patient Position: At rest)    Pulse 76    Temp 98.2 °F (36.8 °C)    Resp 16    Wt 51.3 kg (113 lb)    SpO2 100%    BMI 20.02 kg/m2     Physical Exam   Constitutional: She is oriented to person, place, and time. She appears well-developed. No distress. Thin    HENT:   Head: Normocephalic and atraumatic. Right Ear: External ear normal.   Left Ear: External ear normal.   Nose: Nose normal.   Mouth/Throat: Oropharynx is clear and moist.   Eyes: Conjunctivae and EOM are normal. Pupils are equal, round, and reactive to light. No scleral icterus. Neck: Normal range of motion. Neck supple. No JVD present. No tracheal deviation present. No thyromegaly present. Diffuse neck pain, FROM    Cardiovascular: Normal rate, regular rhythm, normal heart sounds and intact distal pulses. Exam reveals no gallop and no friction rub. No murmur heard. Pulmonary/Chest: Effort normal and breath sounds normal. She exhibits no tenderness. Abdominal: Soft. Bowel sounds are normal. She exhibits no distension. There is tenderness. There is no rebound and no guarding. Diffuse pain    Musculoskeletal: She exhibits no edema or tenderness. Pitting edema B LE, distal pulses are equal    Lymphadenopathy:     She has no cervical adenopathy. Neurological: She is alert and oriented to person, place, and time. No cranial nerve deficit. Coordination normal.   Diffuse paresthesia, no arm or leg drift    Skin: Skin is warm and dry. Psychiatric:   Peculiar affect, hard to keep focused   Nursing note and vitals reviewed.         Diagnostic Study Results     Labs -  Recent Results (from the past 12 hour(s))   EKG, 12 LEAD, INITIAL    Collection Time: 05/19/18  9:14 AM   Result Value Ref Range    Ventricular Rate 79 BPM    Atrial Rate 79 BPM    P-R Interval 192 ms    QRS Duration 94 ms    Q-T Interval 398 ms    QTC Calculation (Bezet) 456 ms    Calculated P Axis 77 degrees    Calculated R Axis 80 degrees    Calculated T Axis 71 degrees    Diagnosis       Normal sinus rhythm  Normal ECG  When compared with ECG of 14-SEP-2017 02:06,  No significant change was found     CBC WITH AUTOMATED DIFF    Collection Time: 05/19/18  9:17 AM   Result Value Ref Range    WBC 4.5 (L) 4.6 - 13.2 K/uL    RBC 3.89 (L) 4.20 - 5.30 M/uL    HGB 11.6 (L) 12.0 - 16.0 g/dL    HCT 34.5 (L) 35.0 - 45.0 %    MCV 88.7 74.0 - 97.0 FL    MCH 29.8 24.0 - 34.0 PG    MCHC 33.6 31.0 - 37.0 g/dL    RDW 13.7 11.6 - 14.5 %    PLATELET 739 426 - 951 K/uL    MPV 10.4 9.2 - 11.8 FL    NEUTROPHILS 37 (L) 40 - 73 %    LYMPHOCYTES 55 (H) 21 - 52 %    MONOCYTES 7 3 - 10 %    EOSINOPHILS 1 0 - 5 %    BASOPHILS 0 0 - 2 %    ABS. NEUTROPHILS 1.7 (L) 1.8 - 8.0 K/UL    ABS. LYMPHOCYTES 2.4 0.9 - 3.6 K/UL    ABS. MONOCYTES 0.3 0.05 - 1.2 K/UL    ABS. EOSINOPHILS 0.1 0.0 - 0.4 K/UL    ABS. BASOPHILS 0.0 0.0 - 0.06 K/UL    DF AUTOMATED     METABOLIC PANEL, COMPREHENSIVE    Collection Time: 05/19/18  9:17 AM   Result Value Ref Range    Sodium 140 136 - 145 mmol/L    Potassium 4.1 3.5 - 5.5 mmol/L    Chloride 103 100 - 108 mmol/L    CO2 31 21 - 32 mmol/L    Anion gap 6 3.0 - 18 mmol/L    Glucose 94 74 - 99 mg/dL    BUN 10 7.0 - 18 MG/DL    Creatinine 0.76 0.6 - 1.3 MG/DL    BUN/Creatinine ratio 13 12 - 20      GFR est AA >60 >60 ml/min/1.73m2    GFR est non-AA >60 >60 ml/min/1.73m2    Calcium 9.0 8.5 - 10.1 MG/DL    Bilirubin, total 0.2 0.2 - 1.0 MG/DL    ALT (SGPT) 23 13 - 56 U/L    AST (SGOT) 16 15 - 37 U/L    Alk.  phosphatase 73 45 - 117 U/L    Protein, total 7.5 6.4 - 8.2 g/dL    Albumin 3.6 3.4 - 5.0 g/dL    Globulin 3.9 2.0 - 4.0 g/dL    A-G Ratio 0.9 0.8 - 1.7     MAGNESIUM    Collection Time: 05/19/18  9:17 AM   Result Value Ref Range    Magnesium 2.1 1.6 - 2.6 mg/dL   PROTHROMBIN TIME + INR    Collection Time: 05/19/18  9:17 AM   Result Value Ref Range    Prothrombin time 12.7 11.5 - 15.2 sec    INR 1.0 0.8 - 1.2     PTT    Collection Time: 05/19/18  9:17 AM   Result Value Ref Range    aPTT 27.5 23.0 - 36.4 SEC   TSH 3RD GENERATION    Collection Time: 05/19/18  9:17 AM   Result Value Ref Range    TSH 0.73 0.36 - 3.74 uIU/mL   NT-PRO BNP    Collection Time: 05/19/18  9:17 AM   Result Value Ref Range    NT pro-BNP 23 0 - 900 PG/ML   TROPONIN I    Collection Time: 05/19/18  9:17 AM   Result Value Ref Range    Troponin-I, Qt. <0.02 0.0 - 0.045 NG/ML   D DIMER    Collection Time: 05/19/18  9:17 AM   Result Value Ref Range    D DIMER <0.27 <0.46 ug/ml(FEU)   URINALYSIS W/ RFLX MICROSCOPIC    Collection Time: 05/19/18  9:50 AM   Result Value Ref Range    Color YELLOW      Appearance CLEAR      Specific gravity 1.025 1.005 - 1.030      pH (UA) 6.0 5.0 - 8.0      Protein NEGATIVE  NEG mg/dL    Glucose NEGATIVE  NEG mg/dL    Ketone NEGATIVE  NEG mg/dL    Bilirubin NEGATIVE  NEG      Blood NEGATIVE  NEG      Urobilinogen 1.0 0.2 - 1.0 EU/dL    Nitrites NEGATIVE  NEG      Leukocyte Esterase NEGATIVE  NEG         Radiologic Studies -   XR CHEST PA LAT    (Results Pending)         Medical Decision Making   I am the first provider for this patient. I reviewed the vital signs, available nursing notes, past medical history, past surgical history, family history and social history. Vital Signs-Reviewed the patient's vital signs. Pulse Oximetry Analysis -  100% on room air (Interpretation)    Cardiac Monitor:  Rate: 76bpm  Rhythm:  Normal Sinus Rhythm     EKG: Interpreted by the EP. Time Interpreted: 9:14AM   Rate: 79bpm   Rhythm: Normal Sinus Rhythm    Interpretation: No STEMI    Comparison: When compared with EKG of 9/14/17, no significant change was found.      Records Reviewed: Nursing Notes (Time of Review: 8:56 AM)    CXR NAP, mild hyperinflation read Feliciana Councilman, DO 10:52 AM      ED Course: Progress Notes, Reevaluation, and Consults:    Provider Notes (Medical Decision Making): Pt is a 48yo female with a hx of chronic pain, depression, IBS, ROMAN presents to the ED with multiple vague complaints of pain, paresthesia but after a lengthy interview appears to be mostly focused on her new B LE edema that began after she moved to a new apt. Pt has no dyspnea and has been sleeping on an air matress. Pt has been connected with [facility] and was seen in March. She was supposed to have labs done including HIV, FSH/LH. Will follow cardiac labs, add the PMD requested meds, CXR then reevaluate. Kurt Randhawa, DO 9:24 AM    Pt cardiac labs, d-dimer, albumin, CXR are reassuring. Suspect she has dependent edema and will give one dose of lasix, compression stockings, and plan for close outpatient care. Pt is to follow with PMD this week to follow HIV, hormone levels. Kurt Randhawa DO 10:56 AM      Diagnosis     Clinical Impression:   1. Dependent edema    2. Myalgia        Disposition: Discharged     Follow-up Information     Follow up With Details Comments Contact Sandoval Khan MD Go in 2 days  [facility] [address] [place] [address]  [phone]      [facility] EMERGENCY DEPT Go to If symptoms worsen [address] [place] [address]  [phone]           Patient's Medications   Start Taking    COMP [address]    Wear daily while walking to prevent swelling    CYCLOBENZAPRINE (FLEXERIL) 10 MG TABLET    Take 1 Tab by mouth three (3) times daily as needed for Muscle Spasm(s). NAPROXEN (NAPROSYN) 500 MG TABLET    Take 1 Tab by mouth two (2) times daily (with meals) for 10 days.    Continue Taking    No medications on file   These Medications have changed    No medications on file   Stop Taking    No medications on file     _______________________________    Attestations:  Scribe [id number] [id number] acting as a scribe for and in the presence of Ric Sanabria MD      May 19, 2018 at 8:56 AM       Provider Attestation:      I personally performed the services described in the documentation, reviewed the documentation, as recorded by the scribe in my presence, and it accurately and completely records my words and actions.  May 19, 2018 at 8:56 AM - Pj Loaiza MD    _______________________________

## 2018-05-19 NOTE — DISCHARGE INSTRUCTIONS
Leg and Ankle Edema: Care Instructions  Your Care Instructions  Swelling in the legs, ankles, and feet is called edema. It is common after you sit or stand for a while. Long plane flights or car rides often cause swelling in the legs and feet. You may also have swelling if you have to stand for long periods of time at your job. Problems with the veins in the legs (varicose veins) and changes in hormones can also cause swelling. Sometimes the swelling in the ankles and feet is caused by a more serious problem, such as heart failure, infection, blood clots, or liver or kidney disease. Follow-up care is a key part of your treatment and safety. Be sure to make and go to all appointments, and call your doctor if you are having problems. It's also a good idea to know your test results and keep a list of the medicines you take. How can you care for yourself at home? · If your doctor gave you medicine, take it as prescribed. Call your doctor if you think you are having a problem with your medicine. · Whenever you are resting, raise your legs up. Try to keep the swollen area higher than the level of your heart. · Take breaks from standing or sitting in one position. ¨ Walk around to increase the blood flow in your lower legs. ¨ Move your feet and ankles often while you stand, or tighten and relax your leg muscles. · Wear support stockings. Put them on in the morning, before swelling gets worse. · Eat a balanced diet. Lose weight if you need to. · Limit the amount of salt (sodium) in your diet. Salt holds fluid in the body and may increase swelling. When should you call for help? Call 911 anytime you think you may need emergency care. For example, call if:  ? · You have symptoms of a blood clot in your lung (called a pulmonary embolism). These may include:  ¨ Sudden chest pain. ¨ Trouble breathing. ¨ Coughing up blood.    ?Call your doctor now or seek immediate medical care if:  ? · You have signs of a blood clot, such as:  ¨ Pain in your calf, back of the knee, thigh, or groin. ¨ Redness and swelling in your leg or groin. ? · You have symptoms of infection, such as:  ¨ Increased pain, swelling, warmth, or redness. ¨ Red streaks or pus. ¨ A fever. ? Watch closely for changes in your health, and be sure to contact your doctor if:  ? · Your swelling is getting worse. ? · You have new or worsening pain in your legs. ? · You do not get better as expected. Where can you learn more? Go to http://lazarus-delano.info/. Enter D528 in the search box to learn more about \"Leg and Ankle Edema: Care Instructions. \"  Current as of: March 20, 2017  Content Version: 11.4  © 7071-5383 [employer]. Care instructions adapted under license by [employer] (which disclaims liability or warranty for this information). If you have questions about a medical condition or this instruction, always ask your healthcare professional. [place] [address] any warranty or liability for your use of this information. Muscle Aches: Care Instructions  Your Care Instructions  Muscle aches have many possible causes. Some common ones are overuse, tension, and injuries such as a strained muscle. An infection such as the flu can cause muscle aches. Or the aches may be caused by some medicines, such as statins. Muscle aches may also be a symptom of a disease like lupus or fibromyalgia. Myalgia is the medical term for muscle aches. The doctor will do a physical exam and ask questions to try to find what is causing your pain. You may also have tests such as blood tests or imaging tests like X-rays. These can help find or rule out serious problems. The doctor has checked you carefully, but problems can develop later. If you notice any problems or new symptoms, get medical treatment right away. Follow-up care is a key part of your treatment and safety.  Be sure to make and go to all appointments, and call your doctor if you are having problems. It's also a good idea to know your test results and keep a list of the medicines you take. How can you care for yourself at home? · Rest the area that hurts. You may need to stop or reduce the activity that causes your symptoms. Then you can return to it slowly. · Put ice or a cold pack on the area for 10 to 20 minutes at a time to ease pain. Put a thin cloth between the ice and your skin. · Take an over-the-counter pain medicine, such as acetaminophen (Tylenol), ibuprofen (Advil, Motrin), or naproxen (Aleve). Be safe with medicines. Read and follow all instructions on the label. When should you call for help? Call your doctor now or seek immediate medical care if:  · Your pain gets worse. · You have new symptoms, such as a fever, swelling, or a rash. Watch closely for changes in your health, and be sure to contact your doctor if:  · You do not get better as expected. Where can you learn more? Go to Flashtalking.be  Enter G355 in the search box to learn more about \"Muscle Aches: Care Instructions. \"   © 9885-9213 [employer]. Care instructions adapted under license by [employer] (which disclaims liability or warranty for this information). This care instruction is for use with your licensed healthcare professional. If you have questions about a medical condition or this instruction, always ask your healthcare professional. [place] [address] any warranty or liability for your use of this information.   Content Version: 61.5.214969; Current as of: November 20, 2015

## 2018-05-20 LAB
ATRIAL RATE: 79 BPM
BACTERIA SPEC CULT: NORMAL
CALCULATED P AXIS, ECG09: 77 DEGREES
CALCULATED R AXIS, ECG10: 80 DEGREES
CALCULATED T AXIS, ECG11: 71 DEGREES
DIAGNOSIS, 93000: NORMAL
P-R INTERVAL, ECG05: 192 MS
Q-T INTERVAL, ECG07: 398 MS
QRS DURATION, ECG06: 94 MS
QTC CALCULATION (BEZET), ECG08: 456 MS
SERVICE CMNT-IMP: NORMAL
VENTRICULAR RATE, ECG03: 79 BPM

## 2018-05-21 LAB
FSH SERPL-ACNC: 123 MIU/ML
LH SERPL-ACNC: 57.7 MIU/ML

## 2018-05-25 ENCOUNTER — HOSPITAL ENCOUNTER (EMERGENCY)
Age: 53
Discharge: HOME | End: 2018-05-25
Attending: EMERGENCY MEDICINE
Payer: MEDICARE

## 2018-05-25 DIAGNOSIS — K64.2 GRADE III HEMORRHOIDS: Primary | ICD-10-CM

## 2018-05-25 LAB
ALBUMIN SERPL-MCNC: 4.2 G/DL (ref 3.4–5)
ALBUMIN/GLOB SERPL: 1.1 {RATIO} (ref 0.8–1.7)
ALP SERPL-CCNC: 70 U/L (ref 45–117)
ALT SERPL-CCNC: 23 U/L (ref 13–56)
ANION GAP SERPL CALC-SCNC: 4 MMOL/L (ref 3–18)
AST SERPL-CCNC: 15 U/L (ref 15–37)
BASOPHILS # BLD: 0 K/UL (ref 0–0.06)
BASOPHILS NFR BLD: 0 % (ref 0–2)
BILIRUB SERPL-MCNC: 0.4 MG/DL (ref 0.2–1)
BUN SERPL-MCNC: 10 MG/DL (ref 7–18)
BUN/CREAT SERPL: 15 (ref 12–20)
CALCIUM SERPL-MCNC: 9.3 MG/DL (ref 8.5–10.1)
CHLORIDE SERPL-SCNC: 104 MMOL/L (ref 100–108)
CO2 SERPL-SCNC: 31 MMOL/L (ref 21–32)
CREAT SERPL-MCNC: 0.68 MG/DL (ref 0.6–1.3)
DIFFERENTIAL METHOD BLD: ABNORMAL
EOSINOPHIL # BLD: 0 K/UL (ref 0–0.4)
EOSINOPHIL NFR BLD: 1 % (ref 0–5)
ERYTHROCYTE [DISTWIDTH] IN BLOOD BY AUTOMATED COUNT: 13.8 % (ref 11.6–14.5)
ERYTHROCYTE [SEDIMENTATION RATE] IN BLOOD: 29 MM/HR (ref 0–30)
GLOBULIN SER CALC-MCNC: 3.7 G/DL (ref 2–4)
GLUCOSE SERPL-MCNC: 82 MG/DL (ref 74–99)
HCT VFR BLD AUTO: 34.4 % (ref 35–45)
HGB BLD-MCNC: 11.4 G/DL (ref 12–16)
LYMPHOCYTES # BLD: 2.3 K/UL (ref 0.9–3.6)
LYMPHOCYTES NFR BLD: 48 % (ref 21–52)
MAGNESIUM SERPL-MCNC: 2.1 MG/DL (ref 1.6–2.6)
MCH RBC QN AUTO: 29.4 PG (ref 24–34)
MCHC RBC AUTO-ENTMCNC: 33.1 G/DL (ref 31–37)
MCV RBC AUTO: 88.7 FL (ref 74–97)
MONOCYTES # BLD: 0.2 K/UL (ref 0.05–1.2)
MONOCYTES NFR BLD: 5 % (ref 3–10)
NEUTS SEG # BLD: 2.1 K/UL (ref 1.8–8)
NEUTS SEG NFR BLD: 46 % (ref 40–73)
PLATELET # BLD AUTO: 202 K/UL (ref 135–420)
PMV BLD AUTO: 10.7 FL (ref 9.2–11.8)
POTASSIUM SERPL-SCNC: 3.5 MMOL/L (ref 3.5–5.5)
PROT SERPL-MCNC: 7.9 G/DL (ref 6.4–8.2)
RBC # BLD AUTO: 3.88 M/UL (ref 4.2–5.3)
SODIUM SERPL-SCNC: 139 MMOL/L (ref 136–145)
WBC # BLD AUTO: 4.7 K/UL (ref 4.6–13.2)

## 2018-05-25 PROCEDURE — 85025 COMPLETE CBC W/AUTO DIFF WBC: CPT | Performed by: EMERGENCY MEDICINE

## 2018-05-25 PROCEDURE — 99283 EMERGENCY DEPT VISIT LOW MDM: CPT

## 2018-05-25 PROCEDURE — 85652 RBC SED RATE AUTOMATED: CPT | Performed by: EMERGENCY MEDICINE

## 2018-05-25 PROCEDURE — 96374 THER/PROPH/DIAG INJ IV PUSH: CPT

## 2018-05-25 PROCEDURE — 83735 ASSAY OF MAGNESIUM: CPT | Performed by: EMERGENCY MEDICINE

## 2018-05-25 PROCEDURE — 74011250636 HC RX REV CODE- 250/636: Performed by: EMERGENCY MEDICINE

## 2018-05-25 PROCEDURE — 93970 EXTREMITY STUDY: CPT

## 2018-05-25 PROCEDURE — 80053 COMPREHEN METABOLIC PANEL: CPT | Performed by: EMERGENCY MEDICINE

## 2018-05-25 RX ORDER — KETOROLAC TROMETHAMINE 30 MG/ML
30 INJECTION, SOLUTION INTRAMUSCULAR; INTRAVENOUS
Status: COMPLETED | OUTPATIENT
Start: 2018-05-25 | End: 2018-05-25

## 2018-05-25 RX ORDER — HYDROCORTISONE 25 MG/G
CREAM TOPICAL 4 TIMES DAILY
Qty: 30 G | Refills: 0 | Status: SHIPPED | OUTPATIENT
Start: 2018-05-25 | End: 2018-06-15

## 2018-05-25 RX ADMIN — KETOROLAC TROMETHAMINE 30 MG: 30 INJECTION INTRAMUSCULAR; INTRAVENOUS at 13:51

## 2018-05-25 RX ADMIN — SODIUM CHLORIDE 1000 ML: 900 INJECTION, SOLUTION INTRAVENOUS at 13:51

## 2018-05-25 NOTE — ED TRIAGE NOTES
Pt to ED with EMS with c/o vaginal spotting that has become worse today.  Pt also has complaints of left sided abdominal pain and lower extremity swelling

## 2018-05-25 NOTE — DISCHARGE INSTRUCTIONS
Hemorrhoids: Care Instructions  Your Care Instructions    Hemorrhoids are enlarged veins that develop in the anal canal. Bleeding during bowel movements, itching, swelling, and rectal pain are the most common symptoms. They can be uncomfortable at times, but hemorrhoids rarely are a serious problem. You can treat most hemorrhoids with simple changes to your diet and bowel habits. These changes include eating more fiber and not straining to pass stools. Most hemorrhoids do not need surgery or other treatment unless they are very large and painful or bleed a lot. Follow-up care is a key part of your treatment and safety. Be sure to make and go to all appointments, and call your doctor if you are having problems. It's also a good idea to know your test results and keep a list of the medicines you take. How can you care for yourself at home? · Sit in a few inches of warm water (sitz bath) 3 times a day and after bowel movements. The warm water helps with pain and itching. · Put ice on your anal area several times a day for 10 minutes at a time. Put a thin cloth between the ice and your skin. Follow this by placing a warm, wet towel on the area for another 10 to 20 minutes. · Take pain medicines exactly as directed. ¨ If the doctor gave you a prescription medicine for pain, take it as prescribed. ¨ If you are not taking a prescription pain medicine, ask your doctor if you can take an over-the-counter medicine. · Keep the anal area clean, but be gentle. Use water and a fragrance-free soap, such as [place], or use baby wipes or medicated pads, such as Tucks. · Wear cotton underwear and loose clothing to decrease moisture in the anal area. · Eat more fiber. Include foods such as whole-grain breads and cereals, raw vegetables, raw and dried fruits, and beans. · Drink plenty of fluids, enough so that your urine is light yellow or clear like water.  If you have kidney, heart, or liver disease and have to limit fluids, talk with your doctor before you increase the amount of fluids you drink. · Use a stool softener that contains bran or psyllium. You can save money by buying bran or psyllium (available in bulk at most health food stores) and sprinkling it on foods or stirring it into fruit juice. Or you can use a product such as Metamucil or Hydrocil. · Practice healthy bowel habits. ¨ Go to the bathroom as soon as you have the urge. ¨ Avoid straining to pass stools. Relax and give yourself time to let things happen naturally. ¨ Do not hold your breath while passing stools. ¨ Do not read while sitting on the toilet. Get off the toilet as soon as you have finished. · Take your medicines exactly as prescribed. Call your doctor if you think you are having a problem with your medicine. When should you call for help? Call 911 anytime you think you may need emergency care. For example, call if:  ? · You pass maroon or very bloody stools. ?Call your doctor now or seek immediate medical care if:  ? · You have increased pain. ? · You have increased bleeding. ? Watch closely for changes in your health, and be sure to contact your doctor if:  ? · Your symptoms have not improved after 3 or 4 days. Where can you learn more? Go to http://lazarus-delano.info/. Enter F228 in the search box to learn more about \"Hemorrhoids: Care Instructions. \"  Current as of: May 12, 2017  Content Version: 11.4  © 9263-5950 [employer]. Care instructions adapted under license by [employer] (which disclaims liability or warranty for this information). If you have questions about a medical condition or this instruction, always ask your healthcare professional. [place] [address] any warranty or liability for your use of this information.

## 2018-05-25 NOTE — ED PROVIDER NOTES
EMERGENCY DEPARTMENT HISTORY AND PHYSICAL EXAM    1:19 PM      Date: 5/25/2018  Patient Name: Stephen Gilmore    History of Presenting Illness     Chief Complaint   Patient presents with    Abdominal Pain    Vaginal Bleeding    Ankle swelling         History Provided By: Patient    Chief Complaint: Rectal Bleeding   Duration:  Today  Timing:  Worsening  Location: Rectum; abdomen  Quality: Cramping  Severity: Moderate  Modifying Factors: Notes the use of 1 sanitary pad. Reports compliance with her lasix, but notes that it has not improved her swelling. Associated Symptoms: Patient also reports dizziness, abdominal cramping, and bilateral leg swelling. Patient also reports other complaints, such as bilateral hand and feet tingling, bilateral leg pain, and back tingling, which are chronic for approximately 1-2 years. Additional History (Context): Stephen Gilmore is a 45 y.o. female with Headache, Depression, Rectal Bleeding, IBS, GERD, and PSHx of Hysterectomy who presents with rectal bleeding onset Today. Patient reports rectal spotting for days, but notes that she noticed worsening rectal bleeding today after having a bowel movement. Notes the use of 1 sanitary pad. Also reports dizziness, abdominal cramping, and bilateral leg swelling. Notes that she was here on 5/19/18 for bilateral lower extremity swelling, for which she was given Lasix. Reports compliance with her lasix, but notes that it has not improved her swelling. Notes that she sees her PCP next week. Patient also reports other complaints, such as bilateral hand and feet tingling, bilateral leg pain, and back tingling, which are chronic for approximately 1-2 years. No other concerns were expressed at this time. PCP: Crissy Griffin MD    Current Outpatient Prescriptions   Medication Sig Dispense Refill    hydrocortisone (ANUSOL-HC) 2.5 % rectal cream Insert  into rectum four (4) times daily.  30 g 0    naproxen (NAPROSYN) 500 mg tablet Take 1 Tab by mouth two (2) times daily (with meals) for 10 days. 20 Tab 0    cyclobenzaprine (FLEXERIL) 10 mg tablet Take 1 Tab by mouth three (3) times daily as needed for Muscle Spasm(s). 15 Tab 0    Comp Stocking,Knee,Long,Medium misc Wear daily while walking to prevent swelling 1 Each 0       Past History     Past Medical History:  Past Medical History:   Diagnosis Date    Chest pain 11/2014    neg EKG, non recurrent    Chronic pain     lower back and legs    Depression     Fibroids     Headache(784.0)     Hiatal hernia     IBS (irritable bowel syndrome)     Microscopic hematuria     Rectal bleeding        Past Surgical History:  Past Surgical History:   Procedure Laterality Date    COLONOSCOPY,DIAGNOSTIC      HX BREAST BIOPSY Right 1/21/2015    RIGHT BREAST BIOPSY WITH NEEDLE LOCALIZATION ULTRASOUND performed by Romayne Bennetts, MD at [facility] MAIN OR    HX GI      HX HYSTERECTOMY      HX TUBAL LIGATION         Family History:  Family History   Problem Relation Age of Onset    HIV/AIDS Brother     Diabetes Father     Cancer Brother     Hypertension Sister     Diabetes Brother     Hypertension Sister     Hypertension Sister     Hypertension Brother        Social History:  Social History   Substance Use Topics    Smoking status: Former Smoker     Packs/day: 0.00    Smokeless tobacco: Former User     Quit date: 02/2016    Alcohol use No       Allergies: Allergies   Allergen Reactions    Amoxicillin Rash and Nausea Only         Review of Systems     Review of Systems   Constitutional: Negative for chills and fever. Respiratory: Negative for shortness of breath. Cardiovascular: Positive for leg swelling. Negative for chest pain. Gastrointestinal: Positive for abdominal pain and anal bleeding. Negative for diarrhea, nausea and vomiting.    Musculoskeletal: Positive for arthralgias (+) bilateral feet pain, bilateral hand pain, back pain (chronic ) and myalgias (+) bilateral leg pain . Neurological: Positive for dizziness. All other systems reviewed and are negative. Physical Exam   There were no vitals taken for this visit. Physical Exam   Constitutional: She is oriented to person, place, and time. She appears well-developed and well-nourished. No distress. HENT:   Head: Normocephalic and atraumatic. Eyes: Conjunctivae and EOM are normal. Right eye exhibits no discharge. Left eye exhibits no discharge. No scleral icterus. Neck: Normal range of motion. Neck supple. No tracheal deviation present. Cardiovascular: Normal rate, regular rhythm and normal heart sounds. No murmur heard. Pulmonary/Chest: Effort normal and breath sounds normal. No respiratory distress. She has no wheezes. She has no rales. Abdominal: Soft. She exhibits no distension. There is no tenderness. There is no rebound and no guarding. Genitourinary:   Genitourinary Comments: Rectal Exam:  -External Hemorrhoid at the 6 o'clock position  -No active bleeding  -brown hemoccult positive stool    Musculoskeletal: Normal range of motion. She exhibits no edema or deformity. TTP to bilateral calves   Neurological: She is alert and oriented to person, place, and time. No cranial nerve deficit. Skin: Skin is warm and dry. She is not diaphoretic. Psychiatric: She has a normal mood and affect.  Her behavior is normal. Judgment and thought content normal.         Diagnostic Study Results     Labs -  Recent Results (from the past 12 hour(s))   CBC WITH AUTOMATED DIFF    Collection Time: 05/25/18  1:55 PM   Result Value Ref Range    WBC 4.7 4.6 - 13.2 K/uL    RBC 3.88 (L) 4.20 - 5.30 M/uL    HGB 11.4 (L) 12.0 - 16.0 g/dL    HCT 34.4 (L) 35.0 - 45.0 %    MCV 88.7 74.0 - 97.0 FL    MCH 29.4 24.0 - 34.0 PG    MCHC 33.1 31.0 - 37.0 g/dL    RDW 13.8 11.6 - 14.5 %    PLATELET 579 952 - 864 K/uL    MPV 10.7 9.2 - 11.8 FL    NEUTROPHILS 46 40 - 73 %    LYMPHOCYTES 48 21 - 52 %    MONOCYTES 5 3 - 10 %    EOSINOPHILS 1 0 - 5 %    BASOPHILS 0 0 - 2 %    ABS. NEUTROPHILS 2.1 1.8 - 8.0 K/UL    ABS. LYMPHOCYTES 2.3 0.9 - 3.6 K/UL    ABS. MONOCYTES 0.2 0.05 - 1.2 K/UL    ABS. EOSINOPHILS 0.0 0.0 - 0.4 K/UL    ABS. BASOPHILS 0.0 0.0 - 0.06 K/UL    DF AUTOMATED     METABOLIC PANEL, COMPREHENSIVE    Collection Time: 05/25/18  1:55 PM   Result Value Ref Range    Sodium 139 136 - 145 mmol/L    Potassium 3.5 3.5 - 5.5 mmol/L    Chloride 104 100 - 108 mmol/L    CO2 31 21 - 32 mmol/L    Anion gap 4 3.0 - 18 mmol/L    Glucose 82 74 - 99 mg/dL    BUN 10 7.0 - 18 MG/DL    Creatinine 0.68 0.6 - 1.3 MG/DL    BUN/Creatinine ratio 15 12 - 20      GFR est AA >60 >60 ml/min/1.73m2    GFR est non-AA >60 >60 ml/min/1.73m2    Calcium 9.3 8.5 - 10.1 MG/DL    Bilirubin, total 0.4 0.2 - 1.0 MG/DL    ALT (SGPT) 23 13 - 56 U/L    AST (SGOT) 15 15 - 37 U/L    Alk. phosphatase 70 45 - 117 U/L    Protein, total 7.9 6.4 - 8.2 g/dL    Albumin 4.2 3.4 - 5.0 g/dL    Globulin 3.7 2.0 - 4.0 g/dL    A-G Ratio 1.1 0.8 - 1.7     MAGNESIUM    Collection Time: 05/25/18  1:55 PM   Result Value Ref Range    Magnesium 2.1 1.6 - 2.6 mg/dL   SED RATE (ESR)    Collection Time: 05/25/18  1:55 PM   Result Value Ref Range    Sed rate, automated 29 0 - 30 mm/hr       Radiologic Studies -   DUPLEX LOWER EXT VENOUS BILAT      Right leg :  1. Deep veins visualized include the common femoral, femoral,  popliteal, posterior tibial and peroneal veins. 2. No evidence of deep venous thrombosis detected in the veins  visualized. 3. Superficial veins visualized include the great saphenous vein. 4. No evidence of superficial thrombosis detected. 5. Normal multiphasic flow in the posterior tibial artery. Left leg :  1. Deep veins visualized include the common femoral, femoral,  popliteal, posterior tibial and peroneal veins. 2. No evidence of deep venous thrombosis detected in the veins  visualized. 3. Superficial veins visualized include the great saphenous vein.   4. No evidence of superficial thrombosis detected. 5. Normal multiphasic flow in the posterior tibial artery. Medical Decision Making   I am the first provider for this patient. I reviewed the vital signs, available nursing notes, past medical history, past surgical history, family history and social history. Vital Signs-Reviewed the patient's vital signs. Records Reviewed: Nursing Notes and Triage notes  (Time of Review: 1:19 PM)      Provider Notes (Medical Decision Making): Patient presents with rectal bleeding from hemorrhoid. Other labs are unremarkable. Will discharge the patient with Anusol. Patient was instructed to follow-up with her PCP for her leg pain and chronic issues. Diagnosis     Clinical Impression:   1. Grade III hemorrhoids        Disposition: Discharged     Follow-up Information     Follow up With Details Comments Re[address], MD Schedule an appointment as soon as possible for a visit  [address] [address]  [phone]      [facility] EMERGENCY DEPT  If symptoms worsen [address] [place] [id number]  [phone]           Patient's Medications   Start Taking    HYDROCORTISONE (ANUSOL-HC) 2.5 % RECTAL CREAM    Insert  into rectum four (4) times daily. Continue Taking    COMP STOCKING,KNEE,LONG,MEDIUM MISC    Wear daily while walking to prevent swelling    CYCLOBENZAPRINE (FLEXERIL) 10 MG TABLET    Take 1 Tab by mouth three (3) times daily as needed for Muscle Spasm(s). NAPROXEN (NAPROSYN) 500 MG TABLET    Take 1 Tab by mouth two (2) times daily (with meals) for 10 days.    These Medications have changed    No medications on file   Stop Taking    No medications on file     _______________________________    Attestations:  [address] [place] [address] acting as a scribe for and in the presence of Elizabeth Pearson MD      May 25, 2018 at 1:19 PM       Provider Attestation:      I personally performed the services described in the documentation, reviewed the documentation, as recorded by the scribe in my presence, and it accurately and completely records my words and actions.  May 25, 2018 at 1:19 PM - Radha Qureshi MD    _______________________________

## 2018-05-25 NOTE — PROCEDURES
[facility]  *** [facility] ***    Name: Fer Valdivia  MRN: [id number]  : 1965  HIS Order #: [id number]  [id number] [facility] Visit #: [id number]  Date: 25 May 2018    TYPE OF TEST: Peripheral Venous Testing    REASON FOR TEST  Limb swelling    Right Leg:-  Deep venous thrombosis:           No  Superficial venous thrombosis:    No  Deep venous insufficiency:        Not examined  Superficial venous insufficiency: Not examined    Left Leg:-  Deep venous thrombosis:           No  Superficial venous thrombosis:    No  Deep venous insufficiency:        Not examined  Superficial venous insufficiency: Not examined      INTERPRETATION/FINDINGS  Duplex images were obtained using 2-D gray scale, color flow, and  spectral Doppler analysis. Right leg :  1. Deep veins visualized include the common femoral, femoral,  popliteal, posterior tibial and peroneal veins. 2. No evidence of deep venous thrombosis detected in the veins  visualized. 3. Superficial veins visualized include the great saphenous vein. 4. No evidence of superficial thrombosis detected. 5. Normal multiphasic flow in the posterior tibial artery. Left leg :  1. Deep veins visualized include the common femoral, femoral,  popliteal, posterior tibial and peroneal veins. 2. No evidence of deep venous thrombosis detected in the veins  visualized. 3. Superficial veins visualized include the great saphenous vein. 4. No evidence of superficial thrombosis detected. 5. Normal multiphasic flow in the posterior tibial artery. ADDITIONAL COMMENTS    I have personally reviewed the data relevant to the interpretation of  this  study.     TECHNOLOGIST: Hazel Galindo, [address] [place] [address]  Signed: 2018 02:50 PM    PHYSICIAN: Bhargav Greco MD  Signed: 2018 04:35 PM

## 2018-06-15 ENCOUNTER — OFFICE VISIT (OUTPATIENT)
Dept: FAMILY MEDICINE CLINIC | Facility: CLINIC | Age: 53
End: 2018-06-15

## 2018-06-15 VITALS
RESPIRATION RATE: 16 BRPM | HEIGHT: 63 IN | DIASTOLIC BLOOD PRESSURE: 71 MMHG | TEMPERATURE: 96.6 F | WEIGHT: 110 LBS | HEART RATE: 91 BPM | BODY MASS INDEX: 19.49 KG/M2 | OXYGEN SATURATION: 98 % | SYSTOLIC BLOOD PRESSURE: 126 MMHG

## 2018-06-15 DIAGNOSIS — H69.81 DYSFUNCTION OF RIGHT EUSTACHIAN TUBE: ICD-10-CM

## 2018-06-15 DIAGNOSIS — K64.9 HEMORRHOIDS, UNSPECIFIED HEMORRHOID TYPE: Primary | ICD-10-CM

## 2018-06-15 DIAGNOSIS — F20.0 PARANOID SCHIZOPHRENIA (HCC): ICD-10-CM

## 2018-06-15 DIAGNOSIS — R26.81 UNSTEADY GAIT: ICD-10-CM

## 2018-06-15 DIAGNOSIS — L30.9 ECZEMA, UNSPECIFIED TYPE: ICD-10-CM

## 2018-06-15 DIAGNOSIS — Z11.4 SCREENING FOR HIV WITHOUT PRESENCE OF RISK FACTORS: ICD-10-CM

## 2018-06-15 RX ORDER — FLUTICASONE PROPIONATE 50 MCG
2 SPRAY, SUSPENSION (ML) NASAL DAILY
Qty: 1 BOTTLE | Refills: 2 | Status: SHIPPED | OUTPATIENT
Start: 2018-06-15 | End: 2018-09-26 | Stop reason: SDUPTHER

## 2018-06-15 NOTE — MR AVS SNAPSHOT
[address] [place] [address] 
[phone] Patient: Kaylah Brito MRN: [id number] :1965 Visit Information Date & Time Provider Department Dept. Phone Encounter #  
 6/15/2018 10:15 AM Maldonado Mullins MD [facility] [phone] [id number] Follow-up Instructions Return in about 3 months (around 9/15/2018). Upcoming Health Maintenance Date Due DTaP/Tdap/Td series (1 - Tdap) 1984 MEDICARE YEARLY EXAM 3/14/2018 Influenza Age 5 to Adult 2018 BREAST CANCER SCRN MAMMOGRAM 2018 COLONOSCOPY 2025 Allergies as of 6/15/2018  Review Complete On: 6/15/2018 By: Maldonado Mullins MD  
  
 Severity Noted Reaction Type Reactions Amoxicillin  2017    Rash, Nausea Only Current Immunizations  Never Reviewed No immunizations on file. Not reviewed this visit You Were Diagnosed With   
  
 Codes Comments Hemorrhoids, unspecified hemorrhoid type    -  Primary ICD-10-CM: K64.9 ICD-9-CM: 403. 6 Dysfunction of right eustachian tube     ICD-10-CM: H69.81 ICD-9-CM: 381.81 Unsteady gait     ICD-10-CM: R26.81 
ICD-9-CM: 917. 2 Paranoid schizophrenia ([facility].)     ICD-10-CM: F20.0 ICD-9-CM: 295.30 Eczema, unspecified type     ICD-10-CM: L30.9 ICD-9-CM: 692.9 Vitals BP Pulse Temp Resp Height(growth percentile) Weight(growth percentile) 126/71 (BP 1 Location: Left arm, BP Patient Position: Sitting) 91 96.6 °F (35.9 °C) (Oral) 16 5' 3\" (1.6 m) 110 lb (49.9 kg) SpO2 BMI OB Status Smoking Status 98% 19.49 kg/m2 Hysterectomy Former Smoker BMI and BSA Data Body Mass Index Body Surface Area  
 19.49 kg/m 2 1.49 m 2 Preferred Pharmacy Pharmacy Name Phone [address], [address] [place] [phone] Your Updated Medication List  
  
 This list is accurate as of 6/15/18 11:48 AM.  Always use your most recent med list.  
  
  
  
  
 Adam Howelld Wear daily while walking to prevent swelling  
  
 fluticasone 50 mcg/actuation nasal spray Commonly known as:  [place] 2 Sprays by Both Nostrils route daily. Indications: Allergic Rhinitis  
  
 white petrolatum-mineral oil topical cream  
Commonly known as:  EUCERIN Apply  to affected area two (2) times daily as needed for Dry Skin. Prescriptions Sent to Pharmacy Refills  
 fluticasone (FLONASE) 50 mcg/actuation nasal spray 2 Si Sprays by Both Nostrils route daily. Indications: Allergic Rhinitis Class: Normal  
 Pharmacy: [facility] DR YURY ALVAREZ [address] [place] [address] [facility] Ph #: [phone] Route: Both Nostrils  
 white petrolatum-mineral oil (EUCERIN) topical cream 5 Sig: Apply  to affected area two (2) times daily as needed for Dry Skin. Class: Normal  
 Pharmacy: [facility] DR YURY ALVAREZ [address] [place] [address] [facility] Ph #: [phone] Route: Topical  
  
We Performed the Following REFERRAL TO COLON AND RECTAL SURGERY [REF17 Custom] Comments:  
 Please evaluate for rectal bleeding, attributed to hemorrhoids. Had colonoscopy in . REFERRAL TO PHYSICAL THERAPY [MDY27 Custom] Comments:  
 Please evaluate patient with unsteady gait, for consideration of a cane or other assistive device. Follow-up Instructions Return in about 3 months (around 9/15/2018). Referral Information Referral ID Referred By Referred To  
  
 [id number] Kaitlin Deluca, [facility], [address] Phone: [phone] Fax: [phone] Visits Status Start Date End Date 1 New Request 6/15/18 6/15/19  If your referral has a status of pending review or denied, additional information will be sent to support the outcome of this decision. Referral ID Referred By Referred To  
 [id number] [facility] [address] [facility]  
   [facility] [facility]  
   [address] Phone: [phone] Fax: [phone] Visits Status Start Date End Date 1 New Request 6/15/18 6/15/19 If your referral has a status of pending review or denied, additional information will be sent to support the outcome of this decision. Introducing [facility]! Grant Solo introduces [employer] patient portal. Now you can access parts of your medical record, email your doctor's office, and request medication refills online. 1. In your internet browser, go to [email or web address] 2. Click on the First Time User? Click Here link in the Sign In box. You will see the New Member Sign Up page. 3. Enter your [employer] Access Code exactly as it appears below. You will not need to use this code after youve completed the sign-up process. If you do not sign up before the expiration date, you must request a new code. · [employer] Access Code: [email or web address] Expires: 9/13/2018 11:48 AM 
 
4. Enter the last four digits of your Social Security Number (xxxx) and Date of Birth (mm/dd/yyyy) as indicated and click Submit. You will be taken to the next sign-up page. 5. Create a [employer] ID. This will be your [employer] login ID and cannot be changed, so think of one that is secure and easy to remember. 6. Create a [employer] password. You can change your password at any time. 7. Enter your Password Reset Question and Answer. This can be used at a later time if you forget your password. 8. Enter your e-mail address. You will receive e-mail notification when new information is available in [address] 9. Click Sign Up. You can now view and download portions of your medical record.  
10. Click the Download Summary menu link to download a portable copy of your medical information. If you have questions, please visit the Frequently Asked Questions section of the [employer] website. Remember, [employer] is NOT to be used for urgent needs. For medical emergencies, dial 911. Now available from your iPhone and Android! Please provide this summary of care documentation to your next provider. Your primary care clinician is listed as Madie Pretty. If you have any questions after today's visit, please call [phone].

## 2018-06-15 NOTE — PROGRESS NOTES
Chief Complaint   Patient presents with    Results     S/P ER visit    Anxiety     follow up       Pt preferred language for health care discussion is english. Is someone accompanying this pt? no    Is the patient using any DME equipment during OV? no    Depression Screening:  PHQ over the last two weeks 6/15/2018   PHQ Not Done -   Little interest or pleasure in doing things Several days   Feeling down, depressed or hopeless Several days   Total Score PHQ 2 2   Trouble falling or staying asleep, or sleeping too much -   Feeling tired or having little energy -   Poor appetite or overeating -   Feeling bad about yourself - or that you are a failure or have let yourself or your family down -   Trouble concentrating on things such as school, work, reading or watching TV -   Moving or speaking so slowly that other people could have noticed; or the opposite being so fidgety that others notice -   Thoughts of being better off dead, or hurting yourself in some way -   PHQ 9 Score -   How difficult have these problems made it for you to do your work, take care of your home and get along with others -       Learning Assessment:  Learning Assessment 6/15/2018   PRIMARY LEARNER Patient   CO-LEARNER CAREGIVER -   PRIMARY LANGUAGE ENGLISH   LEARNER PREFERENCE PRIMARY DEMONSTRATION   ANSWERED BY patient   RELATIONSHIP SELF       Health Maintenance reviewed and discussed per provider. Yes    Pt is due for   Health Maintenance Due   Topic Date Due    DTaP/Tdap/Td series (1 - Tdap) 11/26/1984    MEDICARE YEARLY EXAM  03/14/2018   . Please order/place referral if appropriate. Coordination of Care:  1. Have you been to the ER, urgent care clinic since your last visit? Hospitalized since your last visit? Yes, [facility]    2. Have you seen or consulted any other health care providers outside of the [facility] since your last visit? Include any pap smears or colon screening.  no

## 2018-06-15 NOTE — PROGRESS NOTES
HISTORY OF PRESENT ILLNESS  Zoila Reveles is a 45 y.o. female. HPI Comments: Presents in follow-up after ER visits in late May for leg swelling, hemorrhoidal bleeding. She says that the suppositories she got were not effective. She had a colonoscopy in 2015, but has never had hemorrhoids before. She declines any psychiatric treatment, but feels that she needs treatment for anxiety. . Continues to complain of tingling in many areas of her body, including the left side of her face. She says that she is unsteady on her feet, and has trouble walking around. She had a normal neurological exam a few years ago (medication for hemorrhoids was on her medication list at that time). Her right ear hurts intermittently. She wants an HIV test.    Results   Pertinent negatives include no chest pain and no shortness of breath. Anxiety   Pertinent negatives include no chest pain and no shortness of breath. Past Medical History:   Diagnosis Date    Chest pain 11/2014    neg EKG, non recurrent    Chronic pain     lower back and legs    Depression     Fibroids     Headache(784.0)     Hiatal hernia     IBS (irritable bowel syndrome)     Microscopic hematuria     Rectal bleeding        Past Surgical History:   Procedure Laterality Date    COLONOSCOPY,DIAGNOSTIC      HX BREAST BIOPSY Right 1/21/2015    RIGHT BREAST BIOPSY WITH NEEDLE LOCALIZATION ULTRASOUND performed by Romayne Bennetts, MD at [facility] MAIN OR    HX GI      HX HYSTERECTOMY      HX TUBAL LIGATION         History   Smoking Status    Former Smoker    Packs/day: 0.00   Smokeless Tobacco    Former User    Quit date: 02/2016     Current Outpatient Prescriptions   Medication Sig    Comp Stocking,Knee,Long,Medium misc Wear daily while walking to prevent swelling     No current facility-administered medications for this visit. Review of Systems   Constitutional: Negative for chills and fever. HENT: Positive for ear pain.     Respiratory: Negative for shortness of breath. Cardiovascular: Positive for leg swelling. Negative for chest pain. Gastrointestinal: Negative for nausea and vomiting. Visit Vitals    /71 (BP 1 Location: Left arm, BP Patient Position: Sitting)    Pulse 91    Temp 96.6 °F (35.9 °C) (Oral)    Resp 16    Ht 5' 3\" (1.6 m)    Wt 110 lb (49.9 kg)    SpO2 98%    BMI 19.49 kg/m2       Physical Exam   Constitutional: She is oriented to person, place, and time. She appears well-developed and well-nourished. No distress. HENT:   Right Ear: Tympanic membrane, external ear and ear canal normal.   Left Ear: Tympanic membrane, external ear and ear canal normal.   Nose: Mucosal edema present. Mouth/Throat: Oropharynx is clear and moist.   Neck: Neck supple. No thyromegaly present. Cardiovascular: Normal rate and regular rhythm. Exam reveals no gallop and no friction rub. No murmur heard. Pulmonary/Chest: Effort normal and breath sounds normal. No respiratory distress. Musculoskeletal: She exhibits no edema. Lymphadenopathy:     She has no cervical adenopathy. Neurological: She is alert and oriented to person, place, and time. Skin: Skin is warm and dry. Psychiatric: She has a normal mood and affect. Her behavior is normal. Judgment and thought content normal.       ASSESSMENT and PLAN    ICD-10-CM ICD-9-CM    1. Hemorrhoids, unspecified hemorrhoid type K64.9 455.6 REFERRAL TO COLON AND RECTAL SURGERY   2. Dysfunction of right eustachian tube H69.81 381.81 fluticasone (FLONASE) 50 mcg/actuation nasal spray   3. Unsteady gait R26.81 781.2 REFERRAL TO PHYSICAL THERAPY   4. Paranoid schizophrenia ([facility] F20.0 295.30    5. Eczema, unspecified type L30.9 692.9 white petrolatum-mineral oil (EUCERIN) topical cream   6. Screening for HIV without presence of risk factors Z11.4 V73.89 HIV 1/2 AG/AB, 4TH GENERATION,W RFLX CONFIRM     Follow-up Disposition:  Return in about 3 months (around 9/15/2018).   the following changes in treatment are made: Add Flonase to reduce right ear pain. Referral to PT for selection of suitable cane, and training in use. Referral to Colorectal for hemorrhoids. HIV testing at her request.    I explained to the patient that I believe that many of her physical symptoms are the result of her untreated schizophrenia (basically somatic delusions), and that her life could be significantly improved with medications for this. She indicated that she will call [facility] about returning there    reviewed medications and side effects in detail  Plan of care reviewed - patient verbalize(s) understanding and agreement.

## 2018-06-28 ENCOUNTER — OFFICE VISIT (OUTPATIENT)
Dept: SURGERY | Age: 53
End: 2018-06-28

## 2018-06-28 VITALS
SYSTOLIC BLOOD PRESSURE: 94 MMHG | BODY MASS INDEX: 19.84 KG/M2 | RESPIRATION RATE: 18 BRPM | HEIGHT: 63 IN | TEMPERATURE: 98.1 F | HEART RATE: 75 BPM | DIASTOLIC BLOOD PRESSURE: 60 MMHG | WEIGHT: 112 LBS

## 2018-06-28 DIAGNOSIS — K64.2 GRADE III HEMORRHOIDS: Primary | ICD-10-CM

## 2018-06-28 NOTE — MR AVS SNAPSHOT
101[address] [facility] 240 200 [facility] 
[phone] Patient: Antonio Brewster MRN: [id number] :1965 Visit Information Date & Time Provider Department Dept. Phone Encounter #  
 2018 10:00 AM Gage Kelly  [address] [id number] Your Appointments 2018 11:15 AM  
ROUTINE CARE with Rober Tafoya MD  
[facility] ([facility]) Appt Note: 3 month f/u appt. 14 [facility] 1 [facility] [address]  
[phone]  
  
   
 [address] Upcoming Health Maintenance Date Due DTaP/Tdap/Td series (1 - Tdap) 1986 MEDICARE YEARLY EXAM 3/14/2018 Influenza Age 5 to Adult 2018 BREAST CANCER SCRN MAMMOGRAM 2018 COLONOSCOPY 2025 Allergies as of 2018  Review Complete On: 2018 By: Gage Kelly MD  
  
 Severity Noted Reaction Type Reactions Amoxicillin  2017    Rash, Nausea Only Current Immunizations  Never Reviewed No immunizations on file. Not reviewed this visit You Were Diagnosed With   
  
 Codes Comments Grade III hemorrhoids    -  Primary ICD-10-CM: I43.0 ICD-9-CM: 455.0 Vitals BP Pulse Temp Resp Height(growth percentile) Weight(growth percentile) 94/60 75 98.1 °F (36.7 °C) 18 5' 3\" (1.6 m) 112 lb (50.8 kg) BMI OB Status Smoking Status 19.84 kg/m2 Hysterectomy Former Smoker BMI and BSA Data Body Mass Index Body Surface Area  
 19.84 kg/m 2 1.5 m 2 Preferred Pharmacy Pharmacy Name Phone [address] [place] [address], [address] [place] [address] [phone] Your Updated Medication List  
  
   
This list is accurate as of 18 10:50 AM.  Always use your most recent med list.  
  
  
  
  
 [address] Wear daily while walking to prevent swelling  
  
 fluticasone 50 mcg/actuation nasal spray Commonly known as:  Leafy Few 2 Sprays by Both Nostrils route daily. Indications: Allergic Rhinitis  
  
 white petrolatum-mineral oil topical cream  
Commonly known as:  EUCERIN Apply  to affected area two (2) times daily as needed for Dry Skin. To-Do List   
 06/29/2018 12:00 PM  
  Appointment with Lorraine Razo, PT at [facility] PT [address] [facility] ([phone]) Patient Instructions High fiber diet, start citrucel fiber powder one adult dose in large glass of water daily if symptons do not improve call office to make appointment to discuss possible surgery Introducing [facility]! Gisele Jordan introduces Spacious App patient portal. Now you can access parts of your medical record, email your doctor's office, and request medication refills online. 1. In your internet browser, go to [email or web address] 2. Click on the First Time User? Click Here link in the Sign In box. You will see the New Member Sign Up page. 3. Enter your Spacious App Access Code exactly as it appears below. You will not need to use this code after youve completed the sign-up process. If you do not sign up before the expiration date, you must request a new code. · Spacious App Access Code: [email or web address] Expires: 9/13/2018 11:48 AM 
 
4. Enter the last four digits of your Social Security Number (xxxx) and Date of Birth (mm/dd/yyyy) as indicated and click Submit. You will be taken to the next sign-up page. 5. Create a Gojeet ID. This will be your Spacious App login ID and cannot be changed, so think of one that is secure and easy to remember. 6. Create a Spacious App password. You can change your password at any time. 7. Enter your Password Reset Question and Answer. This can be used at a later time if you forget your password. 8. Enter your e-mail address. You will receive e-mail notification when new information is available in [address] 9. Click Sign Up. You can now view and download portions of your medical record. 10. Click the Download Summary menu link to download a portable copy of your medical information. If you have questions, please visit the Frequently Asked Questions section of the [employer] website. Remember, [employer] Project is NOT to be used for urgent needs. For medical emergencies, dial 911. Now available from your iPhone and Android! Please provide this summary of care documentation to your next provider. Your primary care clinician is listed as Monet Smith. If you have any questions after today's visit, please call [phone].

## 2018-06-28 NOTE — LETTER
6/28/2018 10:51 AM 
 
Patient:  Dia Son YOB: 1965 Date of Visit: 6/28/2018 Renan Valente MD 
14 [facility] [address] [place] [address] VIA In Basket Dear Luis Alberto, I saw Ricarda Barajas in the office today regarding her hemorrhoids. She is noted symptoms of itching, pain and swelling over the last 6-7 years. She also sees blood on the toilet paper when wiping. She does have some underlying constipation and is not currently on a bowel regimen. Her exam shows grade 3 hemorrhoidal disease. I tell her that given her underlying constipation a trial of fiber supplementation with Citrucel is warranted. Because of the advanced nature of her hemorrhoids this may not be sufficient, and I asked her to return to the office to discuss hemorrhoidectomy if her symptoms persist. 
 
Thank you very much for your referral of Ms. Veronika Cormier. If you have questions, please do not hesitate to call me. I look forward to following Ms. Muñiz Even along with you and will keep you updated as to her progress. Sincerely, Monet Ortiz MD

## 2018-06-28 NOTE — PATIENT INSTRUCTIONS
High fiber diet, start citrucel fiber powder one adult dose in large glass of water daily if symptons do not improve call office to make appointment to discuss possible surgery

## 2018-06-29 ENCOUNTER — HOSPITAL ENCOUNTER (OUTPATIENT)
Dept: PHYSICAL THERAPY | Age: 53
Discharge: HOME | End: 2018-06-29
Payer: MEDICARE

## 2018-06-29 PROCEDURE — 97162 PT EVAL MOD COMPLEX 30 MIN: CPT

## 2018-06-29 PROCEDURE — G8978 MOBILITY CURRENT STATUS: HCPCS

## 2018-06-29 PROCEDURE — 97110 THERAPEUTIC EXERCISES: CPT

## 2018-06-29 PROCEDURE — G8979 MOBILITY GOAL STATUS: HCPCS

## 2018-06-29 NOTE — PROGRESS NOTES
PT DAILY TREATMENT NOTE/NEURO EVAL 3-16    Patient Name: Hoang Gilman  Date:2018  : 1965  [x]  Patient  Verified  Payor: BLUE CROSS MEDICARE / Plan: [address] [place] [address] HMO / Product Type: Managed Care Medicare /    In time: 12:05   Out time: 12:50  Total Treatment Time (min): 45  Total Timed Codes (min): 8     Visit #: 1 of     Treatment Area: Unsteadiness on feet [R26.81]  Unsteady gait [R26.81]    SUBJECTIVE  Pain Level (0-10 scale): 8/10  []constant []intermittent []improving []worsening []no change since onset    Any medication changes, allergies to medications, adverse drug reactions, diagnosis change, or new procedure performed?: [x] No    [] Yes (see summary sheet for update)  Subjective functional status/changes:     Mechanism of Injury: pt. Reports she leans to the side when walking. She gets foot pain and tingling in her legs. It also occurs in standing and sitting with feet down on ground. Has been to the hospital for this is the past. Reports her feet also get swollen. Last fall was in May, fell after standing up and when bending over to pick something up. Felt some dizziness and just off balance. Reports gets dizzy when walking. Reports left eye has burning pain and sensitivity to light, saw an eye doctor yesterday. Has leg and back pain that has been going on for many years. Difficulty going up and down stairs. Painful to stand on kitchen floor. Has a friend that comes over and helps some. Painful to clean. Recent weight loss because she hasn't had much of an appetite because she has been feeling nauseous. Physician is aware. Increased urinary frequency. Jaw pain on left side.    Living Situation: lives alone  Pt Goals: to walk better    OBJECTIVE/EXAMINATION    8 min Therapeutic Exercise:  [] See flow sheet : HEP   Rationale: increase ROM and increase strength to improve the patients ability to increase ease of ADLs          With   [x] TE   [] TA   [] neuro   [] other: Patient Education: [x] Review HEP    [] Progressed/Changed HEP based on:   [] positioning   [] body mechanics   [] transfers   [] heat/ice application    [] other:      Physical Therapy Evaluation - Neurologic      Gait: [] Normal    [x] Abnormal    Device: none     Describe: ataxic gait pattern. Decreased speed and increased instability when attempted to use cane. Strength (MMT):  Shoulder L (1-5) R (1-5)   Shoulder Flexion     Shoulder Ext     Shoulder ABD     Shoulder ADD     Shoulder IR     Shoulder ER                                               Hip L (1-5) R (1-5)   Hip Flexion 4- 3   Hip Ext     Hip ABD     Hip ADD     Hip ER     Hip IR       Knee L (1-5) R (1-5)   Knee Flexion 3 3   Knee Extension 4- 4-   Ankle PF 4- 3   Ankle DF 4- 3   Other     Ankle MMT tested in sitting    Sensation: good sensation to light palpation in B LE    Reflexes: [] Not Tested   Left Right   Biceps (C5)     Brachioradiais (C6)     Triceps (C7)     Knee Jerk (L4) 3+ 3+   Ankle Jerk (SI) 2+ 2+     Babinski test. Negative bilaterally         Standing Balance: Static:   [] Good    [x] Fair    [] Poor     Dynamic:   [] Good    [] Fair    [x] Poor       Functional Mobility        Other test /comments:  30 second sit to stand test: with UE support: 4x  Pt. Has excessive posterior lean during sit to stand transfers, with poor weight bearing throughout left side.    TUG test: 22 seconds       Pain Level (0-10 scale) post treatment: 8/10    ASSESSMENT/Changes in Function:      [x]  See Plan of Care  []  See progress note/recertification  []  See Discharge Summary         Progress towards goals / Updated goals:  See POC    PLAN  []  Upgrade activities as tolerated     [x]  Continue plan of care  []  Update interventions per flow sheet       []  Discharge due to:_  []  Other:_      Nilo Fonseca, PT 6/29/2018  12:06 PM

## 2018-06-29 NOTE — PROGRESS NOTES
[facility]  [address] [place] [address]  [phone] fax    Plan of Care/ Statement of Necessity for Physical Therapy Services    Patient name: Liliana Sales Start of Care: 2018   Referral source: Tiffanie Arshad MD : 1965    Medical Diagnosis: Unsteadiness on feet [R26.81]  Unsteady gait [R26.81]   Onset Date: gradually worsened over the years    Treatment Diagnosis:  Difficulty with ambulation   Prior Hospitalization: see medical history Provider#: [id number]   Medications: Verified on Patient summary List    Comorbidities: depression, arthritis, latex allergy    Prior Level of Function: Ind with ambulation, lives alone but has a friend that stops by sometimes      The Plan of Care and following information is based on the information from the initial evaluation. Assessment/ key information:  Pt. Is a 45year old female c/o difficulty with walking as well and LE and back pain. Most recent fall was in May and occurred when bending over to pick something off the ground. She reports having some dizziness and sensitivity to light in left eye. She also has difficulty going up/down stairs and with standing long enough to cook or clean. She has a significant decrease in LE strength bilaterally with more weakness on right compared to left side. She has good sensation to light touch in B LE. Patella reflexes with 3+ bilaterally and achilles was 2+ bilaterally. Negative Babinski test. She has decreased balance with Rhomberg eyes open and refused to perform with eyes closed secondary to fear of falling. She required UE support to perform a sit to stand transfer and was able to perform 4 in 30 seconds. During sit to stand transfers she has excessive posterior lean. TUG test was 22 seconds. Pt. Gait pattern worsened when attempted to use a cane, so pt.  May be more appropriate for a walker at this time to improve safety with ambulation. Skilled PT is medically necessary in order to improve LE strength and balance for increased ease of ambulation and improved safety at home. Evaluation Complexity History MEDIUM  Complexity : 1-2 comorbidities / personal factors will impact the outcome/ POC ; Examination MEDIUM Complexity : 3 Standardized tests and measures addressing body structure, function, activity limitation and / or participation in recreation  ;Presentation MEDIUM Complexity : Evolving with changing characteristics  ; Clinical Decision Making MEDIUM Complexity : FOTO score of 26-74  Overall Complexity Rating: MEDIUM  Problem List: pain affecting function, decrease ROM, decrease strength, impaired gait/ balance, decrease ADL/ functional abilitiies, decrease activity tolerance, decrease flexibility/ joint mobility and decrease transfer abilities   Treatment Plan may include any combination of the following: Therapeutic exercise, Therapeutic activities, Neuromuscular re-education, Physical agent/modality, Gait/balance training, Manual therapy, Patient education, Self Care training, Functional mobility training and Home safety training  Patient / Family readiness to learn indicated by: asking questions  Persons(s) to be included in education: patient (P)  Barriers to Learning/Limitations: only able to attend once a week  Patient Goal (s): to walk better  Patient Self Reported Health Status: poor  Rehabilitation Potential: fair    Short Term Goals: To be accomplished in 1 weeks:  1. Patient will demonstrate compliance with HEP in order to improve LE strength    Long Term Goals: To be accomplished in 6 weeks:  1. Patient will improve FOTO score by 11 points in order to demonstrate a significant improvement in function. 2. Patient will improve 30 second sit to stand test to 8x in order to increase ease of transfers at home. 3. Patient will improve TUG test to 15 seconds in order to increase ease of ambulation at home.    4. Patient will improve B knee strength to 4/5 in order to increase ease of ambulation. Frequency / Duration: Patient to be seen 1-2 times per week for 6 weeks. Patient/ Caregiver education and instruction: Diagnosis, prognosis, exercises   [x]  Plan of care has been reviewed with LEISA    G-Codes (GP)  Mobility   Current  CL= 60-79%   Goal  CL= 60-79%    The severity rating is based on clinical judgment and the FOTO score. Certification Period: 6/29/18-8/28/18  Nellie Nolen, PT 6/29/2018 1:39 PM    ________________________________________________________________________    I certify that the above Therapy Services are being furnished while the patient is under my care. I agree with the treatment plan and certify that this therapy is necessary.     Physician's Signature:____________________  Date:____________Time: _________    Please sign and return to [facility] [employer]  [address] [place] [address]  [phone] fax

## 2018-07-11 ENCOUNTER — HOSPITAL ENCOUNTER (OUTPATIENT)
Dept: PHYSICAL THERAPY | Age: 53
Discharge: HOME | End: 2018-07-11
Payer: MEDICARE

## 2018-07-11 ENCOUNTER — HOSPITAL ENCOUNTER (OUTPATIENT)
Dept: LAB | Age: 53
Discharge: HOME | End: 2018-07-11

## 2018-07-11 PROCEDURE — 97110 THERAPEUTIC EXERCISES: CPT

## 2018-07-11 PROCEDURE — 99001 SPECIMEN HANDLING PT-LAB: CPT | Performed by: FAMILY MEDICINE

## 2018-07-11 PROCEDURE — 97116 GAIT TRAINING THERAPY: CPT

## 2018-07-11 NOTE — PROGRESS NOTES
PT DAILY TREATMENT NOTE - [facility]     Patient Name: Alfonzo Cornelius  Date:2018  : 1965  [x]  Patient  Verified  Payor: BLUE CROSS MEDICARE / Plan: [address] [place] [address] HMO / Product Type: Managed Care Medicare /    In time:130  Out time:201  Total Treatment Time (min): 31  Total Timed Codes (min): 31  1:1 Treatment Time ( only): 31   Visit #: 2 of     Treatment Area: Unsteadiness on feet [R26.81]  Unsteady gait [R26.81]    SUBJECTIVE  Pain Level (0-10 scale): 8/10  Any medication changes, allergies to medications, adverse drug reactions, diagnosis change, or new procedure performed?: [x] No    [] Yes (see summary sheet for update)  Subjective functional status/changes:   [] No changes reported  Pt stated that she has pain in both legs and feet    OBJECTIVE    23 min Therapeutic Exercise:  [x] See flow sheet :   Rationale: increase ROM and increase strength to improve the patients ability to increase ease with ADLs    8 min Gait Training:  __75_ feet with _RW__ device on level surfaces with _CG__ level of assist   Rationale: With   [x] TE   [] TA   [] neuro   [] other: Patient Education: [x] Review HEP    [] Progressed/Changed HEP based on:   [] positioning   [] body mechanics   [] transfers   [] heat/ice application    [] other:      Other Objective/Functional Measures:   Pt was very unsafe with ambulating using RW, needed contact guard assist  Had significant difficulty with turning walker  Has massimo cross gate at times  Had minimal LOB with static balance exercises  Got dizzy when lying supine even with HOB raised     Pain Level (0-10 scale) post treatment: 8/10    ASSESSMENT/Changes in Function:   Initiated therex today per flow sheet.  Pt put forth good effort with all activities    Patient will continue to benefit from skilled PT services to address functional mobility deficits, address ROM deficits, address strength deficits and address imbalance/dizziness to attain remaining goals. [x]  See Plan of Care  []  See progress note/recertification  []  See Discharge Summary         Progress towards goals / Updated goals:  Short Term Goals: To be accomplished in 1 weeks:  1. Patient will demonstrate compliance with HEP in order to improve LE strength     Long Term Goals: To be accomplished in 6 weeks:  1. Patient will improve FOTO score by 11 points in order to demonstrate a significant improvement in function. 2. Patient will improve 30 second sit to stand test to 8x in order to increase ease of transfers at home. 3. Patient will improve TUG test to 15 seconds in order to increase ease of ambulation at home. 4. Patient will improve B knee strength to 4/5 in order to increase ease of ambulation.      PLAN  []  Upgrade activities as tolerated     [x]  Continue plan of care  []  Update interventions per flow sheet       []  Discharge due to:_  []  Other:_      Tanya Hollins PTA 7/11/2018  3:50 PM    Future Appointments  Date Time Provider Gus Dumont   7/18/2018 1:30 PM Tanya Hollins PTA [facility]   7/25/2018 11:00 AM Tanya Hollins PTA [facility]   8/1/2018 11:00 AM Ruben Romano, PT TIYTOSA [facility]   8/8/2018 10:00 AM Ruben Romano, PT UHZMIAM [facility]   8/14/2018 3:30 PM MD CYNTHIA Winslow DORIE [facility]   8/15/2018 10:00 AM Ruben Romano, PT OHXRMLN [facility]   9/17/2018 11:15 AM MD CYNTHIA Winslow [place]  10.

## 2018-07-12 LAB — HIV 1+2 AB+HIV1 P24 AG SERPL QL IA: NON REACTIVE

## 2018-07-18 ENCOUNTER — APPOINTMENT (OUTPATIENT)
Dept: PHYSICAL THERAPY | Age: 53
End: 2018-07-18
Payer: MEDICARE

## 2018-07-25 ENCOUNTER — APPOINTMENT (OUTPATIENT)
Dept: PHYSICAL THERAPY | Age: 53
End: 2018-07-25
Payer: MEDICARE

## 2018-07-31 NOTE — PROGRESS NOTES
Attempted to contact pt at  number, no answer. Lvm for pt to return call to office at [phone]. Will continue to try to contact pt.

## 2018-08-01 ENCOUNTER — APPOINTMENT (OUTPATIENT)
Dept: PHYSICAL THERAPY | Age: 53
End: 2018-08-01
Payer: MEDICARE

## 2018-08-08 ENCOUNTER — APPOINTMENT (OUTPATIENT)
Dept: PHYSICAL THERAPY | Age: 53
End: 2018-08-08
Payer: MEDICARE

## 2018-08-14 ENCOUNTER — OFFICE VISIT (OUTPATIENT)
Dept: FAMILY MEDICINE CLINIC | Facility: CLINIC | Age: 53
End: 2018-08-14

## 2018-08-14 VITALS
HEIGHT: 63 IN | WEIGHT: 117 LBS | OXYGEN SATURATION: 99 % | HEART RATE: 72 BPM | DIASTOLIC BLOOD PRESSURE: 70 MMHG | RESPIRATION RATE: 18 BRPM | TEMPERATURE: 95.9 F | SYSTOLIC BLOOD PRESSURE: 128 MMHG | BODY MASS INDEX: 20.73 KG/M2

## 2018-08-14 DIAGNOSIS — L65.9 HAIR LOSS: ICD-10-CM

## 2018-08-14 DIAGNOSIS — R60.0 PEDAL EDEMA: ICD-10-CM

## 2018-08-14 DIAGNOSIS — J30.9 ALLERGIC RHINITIS, UNSPECIFIED SEASONALITY, UNSPECIFIED TRIGGER: Primary | ICD-10-CM

## 2018-08-14 DIAGNOSIS — K64.2 GRADE III HEMORRHOIDS: ICD-10-CM

## 2018-08-14 RX ORDER — LORATADINE 10 MG/1
10 TABLET ORAL DAILY
Qty: 30 TAB | Refills: 3 | Status: SHIPPED | OUTPATIENT
Start: 2018-08-14 | End: 2018-09-26 | Stop reason: SDUPTHER

## 2018-08-14 NOTE — PROGRESS NOTES
Chief Complaint   Patient presents with    Leg Swelling       Pt preferred language for health care discussion is English.     Is someone accompanying this pt? no    Is the patient using any DME equipment during OV? no    Pt currently taking Antiplatelet therapy? no    Depression Screening:  PHQ over the last two weeks 8/14/2018 6/15/2018 3/14/2018 9/26/2017 8/11/2017 2/13/2017 12/23/2016   PHQ Not Done - - Active Diagnosis of Depression or Bipolar Disorder - - - -   Little interest or pleasure in doing things Not at all Several days - Not at all Nearly every day Not at all Not at all   Feeling down, depressed, irritable, or hopeless Not at all Several days - Several days Nearly every day Not at all Not at all   Total Score PHQ 2 0 2 - 1 6 0 0   Trouble falling or staying asleep, or sleeping too much - - - - Nearly every day - -   Feeling tired or having little energy - - - - Nearly every day - -   Poor appetite, weight loss, or overeating - - - - Nearly every day - -   Feeling bad about yourself - or that you are a failure or have let yourself or your family down - - - - Nearly every day - -   Trouble concentrating on things such as school, work, reading, or watching TV - - - - More than half the days - -   Moving or speaking so slowly that other people could have noticed; or the opposite being so fidgety that others notice - - - - Several days - -   Thoughts of being better off dead, or hurting yourself in some way - - - - Not at all - -   PHQ 9 Score - - - - 21 - -   How difficult have these problems made it for you to do your work, take care of your home and get along with others - - - - Very difficult - -       Learning Assessment:  Learning Assessment 6/15/2018 1/17/2017 10/6/2016 6/10/2015   PRIMARY LEARNER Patient Patient Patient Patient   CO-LEARNER CAREGIVER - - - No   PRIMARY LANGUAGE ENGLISH ENGLISH ENGLISH ENGLISH   LEARNER PREFERENCE PRIMARY DEMONSTRATION LISTENING LISTENING LISTENING   ANSWERED BY patient patient patient patient   RELATIONSHIP SELF SELF SELF SELF       Abuse Screening:  Abuse Screening Questionnaire 8/14/2018   Do you ever feel afraid of your partner? N   Are you in a relationship with someone who physically or mentally threatens you? N   Is it safe for you to go home? Y       Health Maintenance reviewed, discussed with patient and ordered per Provider. Health Maintenance Due   Topic Date Due    DTaP/Tdap/Td series (1 - Tdap) 11/26/1984    MEDICARE YEARLY EXAM  03/14/2018    Influenza Age 9 to Adult  08/01/2018       Any and all required ARINA forms filled out by patient and faxed, confirmation received. Coordination of Care:  1. Have you been to the ER, urgent care clinic since your last visit? Hospitalized in the last 30 days? no    2. Have you seen or consulted any other health care providers outside of the [address] [place] [address] in the last 30 days? Include any pap smears or colon screening.  no

## 2018-08-14 NOTE — PROGRESS NOTES
Spoke with patient, patient stated that she would prefer to receive any and all results today at appointment. Understanding verbalized.

## 2018-08-14 NOTE — MR AVS SNAPSHOT
[address] [facility] 
 
 
 [address] [facility] [address] [facility] [address] 
[phone] Patient: Marshal Mireles MRN: [id number] :1965 Visit Information Date & Time Provider Department Dept. Phone Encounter #  
 2018  3:30 PM Kayla Henley MD [facility] [phone] [id number] Follow-up Instructions Return as planned. Your Appointments 2018 11:15 AM  
ROUTINE CARE with MD [facility] ([facility]) Appt Note: 3 month f/u appt. [address] [facility] [address] [facility] [address]  
[phone]  
  
   
 14 [address] Upcoming Health Maintenance Date Due DTaP/Tdap/Td series (1 - Tdap) 1984 MEDICARE YEARLY EXAM 3/14/2018 Influenza Age 5 to Adult 2018 BREAST CANCER SCRN MAMMOGRAM 2018 COLONOSCOPY 2025 Allergies as of 2018  Review Complete On: 2018 By: Kayla Henley MD  
  
 Severity Noted Reaction Type Reactions Amoxicillin  2017    Rash, Nausea Only Current Immunizations  Never Reviewed No immunizations on file. Not reviewed this visit You Were Diagnosed With   
  
 Codes Comments Allergic rhinitis, unspecified seasonality, unspecified trigger    -  Primary ICD-10-CM: J30.9 ICD-9-CM: 477.9 Grade III hemorrhoids     ICD-10-CM: A36.0 ICD-9-CM: 455.0 Hair loss     ICD-10-CM: L65.9 ICD-9-CM: 704.00 Pedal edema     ICD-10-CM: R60.0 ICD-9-CM: 537. 3 Vitals BP Pulse Temp Resp Height(growth percentile) Weight(growth percentile) 128/70 72 95.9 °F (35.5 °C) (Oral) 18 5' 3\" (1.6 m) 117 lb (53.1 kg) SpO2 BMI OB Status Smoking Status 99% 20.73 kg/m2 Hysterectomy Former Smoker BMI and BSA Data Body Mass Index Body Surface Area 20.73 kg/m 2 1.54 m 2 Preferred Pharmacy Pharmacy Name Phone [facility] [address] [facility], [address] [facility] [phone] Your Updated Medication List  
  
   
This list is accurate as of 8/14/18  5:01 PM.  Always use your most recent med list.  
  
  
  
  
 Milena Reusing Wear daily while walking to prevent swelling  
  
 fluticasone 50 mcg/actuation nasal spray Commonly known as:  Fowler Blizzard 2 Sprays by Both Nostrils route daily. Indications: Allergic Rhinitis  
  
 loratadine 10 mg tablet Commonly known as:  [place] Fleeting Take 1 Tab by mouth daily. Indications: Allergic Rhinitis  
  
 white petrolatum-mineral oil topical cream  
Commonly known as:  EUCERIN Apply  to affected area two (2) times daily as needed for Dry Skin. Prescriptions Sent to Pharmacy Refills  
 loratadine (CLARITIN) 10 mg tablet 3 Sig: Take 1 Tab by mouth daily. Indications: Allergic Rhinitis Class: Normal  
 Pharmacy: [address] [facility] [address] [facility], [address] [facility] #: [phone] Route: Oral  
  
We Performed the Following REFERRAL TO DERMATOLOGY [REF19 Custom] Comments:  
 Please evaluate for hair loss and facial eruptions. Follow-up Instructions Return as planned. To-Do List   
 08/24/2018 11:00 AM  
  Appointment with Ruben Romano PT at [facility] PT [address] [facility] ([phone]) Referral Information Referral ID Referred By Referred To  
  
 [id number] Josephine Matias MD   
   [address] [address] Phone: [phone] Fax: [phone] Visits Status Start Date End Date 1 New Request 8/14/18 8/14/19 If your referral has a status of pending review or denied, additional information will be sent to support the outcome of this decision. Introducing [facility]!    
 [employer] introduces "[employer] patient portal. Now you can access parts of your medical record, email your doctor's office, and request medication refills online. 1. In your internet browser, go to [email or web address] 2. Click on the First Time User? Click Here link in the Sign In box. You will see the New Member Sign Up page. 3. Enter your [employer] Access Code exactly as it appears below. You will not need to use this code after youve completed the sign-up process. If you do not sign up before the expiration date, you must request a new code. · [employer] Access Code: [email or web address] Expires: 9/13/2018 11:48 AM 
 
4. Enter the last four digits of your Social Security Number (xxxx) and Date of Birth (mm/dd/yyyy) as indicated and click Submit. You will be taken to the next sign-up page. 5. Create a [employer] ID. This will be your [employer] login ID and cannot be changed, so think of one that is secure and easy to remember. 6. Create a [employer] password. You can change your password at any time. 7. Enter your Password Reset Question and Answer. This can be used at a later time if you forget your password. 8. Enter your e-mail address. You will receive e-mail notification when new information is available in [address] 9. Click Sign Up. You can now view and download portions of your medical record. 10. Click the Download Summary menu link to download a portable copy of your medical information. If you have questions, please visit the Frequently Asked Questions section of the [employer] website. Remember, [employer] is NOT to be used for urgent needs. For medical emergencies, dial 911. Now available from your iPhone and Android! Please provide this summary of care documentation to your next provider. Your primary care clinician is listed as Sariah Castillo. If you have any questions after today's visit, please call [phone].

## 2018-08-14 NOTE — PROGRESS NOTES
HISTORY OF PRESENT ILLNESS  Maria Teresa Richey is a 45 y.o. female. HPI Comments: Seen today for a variety of complaints. She is still complaining of swelling in her legs - found she couldn't wear the compression stocking that were prescribed for her (by the podiatrist). She does have an appointment soon with him. She is still having trouble with her skin despite Eucerin, and trouble with crackling in her ears despite Flonase. She has been seeing PT, but not as often as she would like due to other appointments. She didn't realized she was supposed to follow-up with Dr. Irvin Schumacher if her hemorrhoids continued to bother her (and they do). Also complains of hair loss and scalp itching. Her therapist thinks she needs to be medicaied, but she needs to see a psychiatrist for this. Leg Swelling         Past Medical History:   Diagnosis Date    Chest pain 11/2014    neg EKG, non recurrent    Chronic pain     lower back and legs    Depression     Fibroids     GERD (gastroesophageal reflux disease)     Headache(784.0)     Hiatal hernia     IBS (irritable bowel syndrome)     Microscopic hematuria     Rectal bleeding     Stool color black     irritable bowel       Past Surgical History:   Procedure Laterality Date    COLONOSCOPY,DIAGNOSTIC      HX BREAST BIOPSY Right 1/21/2015    RIGHT BREAST BIOPSY WITH NEEDLE LOCALIZATION ULTRASOUND performed by Charlie Garcia MD at [facility] MAIN OR    HX CHOLECYSTECTOMY      HX GI      HX HYSTERECTOMY      HX TUBAL LIGATION         History   Smoking Status    Former Smoker    Packs/day: 0.00    Quit date: 6/28/2016   Smokeless Tobacco    Former User    Quit date: 02/2016     Current Outpatient Prescriptions   Medication Sig    fluticasone (FLONASE) 50 mcg/actuation nasal spray 2 Sprays by Both Nostrils route daily. Indications:  Allergic Rhinitis    white petrolatum-mineral oil (EUCERIN) topical cream Apply  to affected area two (2) times daily as needed for Dry Skin.    Comp Stocking,Knee,Long,Medium misc Wear daily while walking to prevent swelling     No current facility-administered medications for this visit. Review of Systems   HENT: Positive for congestion. Cardiovascular: Positive for leg swelling. Musculoskeletal: Positive for back pain. Skin: Positive for itching and rash. Neurological: Positive for weakness. Visit Vitals    /70    Pulse 72    Temp 95.9 °F (35.5 °C) (Oral)    Resp 18    Ht 5' 3\" (1.6 m)    Wt 117 lb (53.1 kg)    SpO2 99%    BMI 20.73 kg/m2       Physical Exam   Constitutional: She is oriented to person, place, and time. She appears well-developed and well-nourished. No distress. HENT:   Right Ear: Tympanic membrane, external ear and ear canal normal.   Left Ear: Tympanic membrane, external ear and ear canal normal.   Nose: Mucosal edema present. Mouth/Throat: Oropharynx is clear and moist.   Neck: Neck supple. No thyromegaly present. Cardiovascular: Normal rate and intact distal pulses. Exam reveals no gallop and no friction rub. No murmur heard. Pulmonary/Chest: Effort normal and breath sounds normal. No respiratory distress. Musculoskeletal: She exhibits edema (trace at worst). Lymphadenopathy:     She has no cervical adenopathy. Neurological: She is alert and oriented to person, place, and time. Skin: Skin is warm and dry. No rash noted. Scalp normal except for loss around the edges  Skin on legs entirely normal   Psychiatric: She has a normal mood and affect. Her behavior is normal. Judgment and thought content normal.       ASSESSMENT and PLAN    ICD-10-CM ICD-9-CM    1. Allergic rhinitis, unspecified seasonality, unspecified trigger J30.9 477.9 loratadine (CLARITIN) 10 mg tablet   2. Grade III hemorrhoids K64.2 455.0    3. Hair loss L65.9 704.00 REFERRAL TO DERMATOLOGY   4. Pedal edema R60.0 782.3      Follow-up Disposition:  Return as planned.   the following changes in treatment are made: Add Claritin for nasal symptoms. Referral to Derm. Advised to arrange follow-up with her podiatrist, Dr. Leslie Peña, and a psychiatrist at [address] [place], [place] [address]where her therapist works. I remain convinced that she needs psychiatric medication. I have tried to reassure her that her legs are normal, without rash, redness or significant swelling. reviewed medications and side effects in detail  Plan of care reviewed - patient verbalize(s) understanding and agreement.

## 2018-08-15 ENCOUNTER — APPOINTMENT (OUTPATIENT)
Dept: PHYSICAL THERAPY | Age: 53
End: 2018-08-15
Payer: MEDICARE

## 2018-08-24 ENCOUNTER — HOSPITAL ENCOUNTER (OUTPATIENT)
Dept: PHYSICAL THERAPY | Age: 53
Discharge: HOME | End: 2018-08-24
Payer: MEDICARE

## 2018-08-24 PROCEDURE — G8979 MOBILITY GOAL STATUS: HCPCS

## 2018-08-24 PROCEDURE — 97164 PT RE-EVAL EST PLAN CARE: CPT

## 2018-08-24 PROCEDURE — 97110 THERAPEUTIC EXERCISES: CPT

## 2018-08-24 PROCEDURE — G8978 MOBILITY CURRENT STATUS: HCPCS

## 2018-08-24 NOTE — PROGRESS NOTES
PT DAILY TREATMENT NOTE - [facility]     Patient Name: Santiago Leslie  Date:2018  : 1965  [x]  Patient  Verified  Payor: BLUE CROSS MEDICARE / Plan: [address] [place] [address] HMO / Product Type: Managed Care Medicare /    In time: 11:00  Out time: 11:30  Total Treatment Time (min): 30  Total Timed Codes (min): 30  1:1 Treatment Time ( [address] only): 30   Visit #: 3 of     Treatment Area: Unsteadiness on feet [R26.81]  Unsteady gait [R26.81]    SUBJECTIVE  Pain Level (0-10 scale): 8/10  Any medication changes, allergies to medications, adverse drug reactions, diagnosis change, or new procedure performed?: [x] No    [] Yes (see summary sheet for update)  Subjective functional status/changes:   [] No changes reported  Pt. Reports she is feeling about the same.  She reports gap in treatment from having a lot of other appointments    OBJECTIVE    15 min []Eval                  [x]Re-Eval       15 min Therapeutic Exercise:  [x] See flow sheet :   Rationale: increase ROM, increase strength and improve balance to improve the patients ability to increase ease of ambulation           With   [] TE   [] TA   [] neuro   [] other: Patient Education: [x] Review HEP    [] Progressed/Changed HEP based on:   [] positioning   [] body mechanics   [] transfers   [] heat/ice application    [] other:      Other Objective/Functional Measures:   30 second sit to stand test: unable to perform without UE support  TUG test: 14 seconds, 13 seconds, 14 seconds  Knee MMT ext: right: left: 4+ flex 4+/5 : right: 4+/5 left: 4+/5    Pain Level (0-10 scale) post treatment: 8/10    ASSESSMENT/Changes in Function:      []  See Plan of Care  [x]  See progress note/recertification  []  See Discharge Summary         Progress towards goals / Updated goals:  See re-cert note    PLAN  []  Upgrade activities as tolerated     [x]  Continue plan of care  []  Update interventions per flow sheet       []  Discharge due to:_  [] Other:_      Britany Balbuena, PT 8/24/2018  11:02 AM    Future Appointments  Date Time Provider Gus Dumont   9/26/2018 11:30 AM Clayton Aguilar MD [facility] 10.

## 2018-09-11 ENCOUNTER — HOSPITAL ENCOUNTER (OUTPATIENT)
Dept: PHYSICAL THERAPY | Age: 53
Discharge: HOME | End: 2018-09-11
Payer: MEDICARE

## 2018-09-11 PROCEDURE — 97530 THERAPEUTIC ACTIVITIES: CPT

## 2018-09-11 PROCEDURE — 97110 THERAPEUTIC EXERCISES: CPT

## 2018-09-11 NOTE — PROGRESS NOTES
PT DAILY TREATMENT NOTE - [facility]     Patient Name: Renee Perez  Date:2018  : 1965  [x]  Patient  Verified  Payor: Rosendo Poornima / Plan: [address] [place] [address] HMO / Product Type: Managed Care Medicare /    In time:1030  Out time:1100  Total Treatment Time (min): 38  Total Timed Codes (min): 38  1:1 Treatment Time ([facility] only): 45   Visit #: 4 of 12    Treatment Area: Unsteadiness on feet [R26.81]  Unsteady gait [R26.81]    SUBJECTIVE  Pain Level (0-10 scale): 7/10  Any medication changes, allergies to medications, adverse drug reactions, diagnosis change, or new procedure performed?: [x] No    [] Yes (see summary sheet for update)  Subjective functional status/changes:   [] No changes reported  Reports her back hurts a lot and she continues to be worried about loss of balance. She had a fall a long time ago and has never been the same since. OBJECTIVE    23 min Therapeutic Exercise:  [] See flow sheet :   Rationale: increase ROM and increase strength to improve the patients ability to eas ease with ADL's    15 min Therapeutic Activity:  []  See flow sheet :   Rationale: improve coordination, improve balance and increase proprioception  to improve the patients ability to decrease fall risk             With   [] TE   [] TA   [] neuro   [] other: Patient Education: [x] Review HEP    [] Progressed/Changed HEP based on:   [] positioning   [] body mechanics   [] transfers   [] heat/ice application    [] other:      Other Objective/Functional Measures: Ambulate 161 ft with SPC with HHA x1. Pt is unstable with SPC but refuses to use a rolling walker. Pt fatigues quickly and reports back pain during standing activities. Performed sit to stand from arnoldo /low table w/o UE (med high)     Pain Level (0-10 scale) post treatment: 7/10    ASSESSMENT/Changes in Function: slow progress. Pt presents with Neuro like sx with severe gait Ataxia. She does not use an AD currently.  She agrees to get a a caneShe does not like where she lives because the house is old and uneven. She reports not having much family and friend support. Patient will continue to benefit from skilled PT services to modify and progress therapeutic interventions, address functional mobility deficits, address ROM deficits, address strength deficits, analyze and address soft tissue restrictions, analyze and cue movement patterns, analyze and modify body mechanics/ergonomics, assess and modify postural abnormalities and address imbalance/dizziness to attain remaining goals. []  See Plan of Care  []  See progress note/recertification  []  See Discharge Summary         Progress towards goals / Updated goals:  1. Patient will improve FOTO score by 11 points in order to demonstrate a significant improvement in function. 2. Patient will perform a sit to stand transfer without UE support in order to increase ease of transfers at home. MET. 9/11/18  3.  Patient will improve TUG test to 10 seconds in order to increase ease of ambulation at home.        PLAN  [x]  Upgrade activities as tolerated     [x]  Continue plan of care  []  Update interventions per flow sheet       []  Discharge due to:_  []  Other:_      Peng Dallas, PT 9/11/2018  10:47 AM    Future Appointments  Date Time Provider Gus Dumont   9/26/2018 11:30 AM MD [facility] [facility].

## 2018-09-26 ENCOUNTER — OFFICE VISIT (OUTPATIENT)
Dept: FAMILY MEDICINE CLINIC | Facility: CLINIC | Age: 53
End: 2018-09-26

## 2018-09-26 VITALS
HEIGHT: 63 IN | BODY MASS INDEX: 22.01 KG/M2 | OXYGEN SATURATION: 98 % | RESPIRATION RATE: 16 BRPM | TEMPERATURE: 97.9 F | HEART RATE: 73 BPM | WEIGHT: 124.2 LBS | DIASTOLIC BLOOD PRESSURE: 70 MMHG | SYSTOLIC BLOOD PRESSURE: 117 MMHG

## 2018-09-26 DIAGNOSIS — M54.42 CHRONIC LEFT-SIDED LOW BACK PAIN WITH LEFT-SIDED SCIATICA: Primary | ICD-10-CM

## 2018-09-26 DIAGNOSIS — R68.89 SPELLS OF DECREASED ATTENTIVENESS: ICD-10-CM

## 2018-09-26 DIAGNOSIS — J30.89 PERENNIAL ALLERGIC RHINITIS: ICD-10-CM

## 2018-09-26 DIAGNOSIS — R26.81 UNSTEADY GAIT: ICD-10-CM

## 2018-09-26 DIAGNOSIS — R90.89 ABNORMAL CT OF BRAIN: ICD-10-CM

## 2018-09-26 DIAGNOSIS — G89.29 CHRONIC LEFT-SIDED LOW BACK PAIN WITH LEFT-SIDED SCIATICA: Primary | ICD-10-CM

## 2018-09-26 RX ORDER — LORATADINE 10 MG/1
10 TABLET ORAL DAILY
Qty: 30 TAB | Refills: 11 | Status: SHIPPED | OUTPATIENT
Start: 2018-09-26 | End: 2019-01-21 | Stop reason: SINTOL

## 2018-09-26 RX ORDER — MELOXICAM 15 MG/1
15 TABLET ORAL DAILY
Qty: 30 TAB | Refills: 11 | Status: SHIPPED | OUTPATIENT
Start: 2018-09-26 | End: 2019-01-21

## 2018-09-26 RX ORDER — FLUTICASONE PROPIONATE 50 MCG
2 SPRAY, SUSPENSION (ML) NASAL DAILY
Qty: 1 BOTTLE | Refills: 11 | Status: SHIPPED | OUTPATIENT
Start: 2018-09-26 | End: 2019-08-27 | Stop reason: SDUPTHER

## 2018-09-26 NOTE — PROGRESS NOTES
Chief Complaint   Patient presents with    Pain (Chronic)     pt still complains of back, leg and bilateral foot pain       Pt preferred language for health care discussion is English.     Is someone accompanying this pt? no    Is the patient using any DME equipment during OV? no    Pt currently taking Antiplatelet therapy? no    Depression Screening:  PHQ over the last two weeks 9/26/2018 8/14/2018 6/15/2018 3/14/2018 9/26/2017 8/11/2017 2/13/2017   PHQ Not Done Active Diagnosis of Depression or Bipolar Disorder - - Active Diagnosis of Depression or Bipolar Disorder - - -   Little interest or pleasure in doing things Not at all Not at all Several days - Not at all Nearly every day Not at all   Feeling down, depressed, irritable, or hopeless More than half the days Not at all Several days - Several days Nearly every day Not at all   Total Score PHQ 2 2 0 2 - 1 6 0   Trouble falling or staying asleep, or sleeping too much - - - - - Nearly every day -   Feeling tired or having little energy - - - - - Nearly every day -   Poor appetite, weight loss, or overeating - - - - - Nearly every day -   Feeling bad about yourself - or that you are a failure or have let yourself or your family down - - - - - Nearly every day -   Trouble concentrating on things such as school, work, reading, or watching TV - - - - - More than half the days -   Moving or speaking so slowly that other people could have noticed; or the opposite being so fidgety that others notice - - - - - Several days -   Thoughts of being better off dead, or hurting yourself in some way - - - - - Not at all -   PHQ 9 Score - - - - - 21 -   How difficult have these problems made it for you to do your work, take care of your home and get along with others - - - - - Very difficult -       Learning Assessment:  Learning Assessment 6/15/2018 1/17/2017 10/6/2016 6/10/2015   PRIMARY LEARNER Patient Patient Patient Patient   CO-LEARNER CAREGIVER - - - No   PRIMARY LANGUAGE ENGLISH ENGLISH ENGLISH ENGLISH   LEARNER PREFERENCE PRIMARY DEMONSTRATION LISTENING LISTENING LISTENING   ANSWERED BY patient patient patient patient   RELATIONSHIP SELF SELF SELF SELF       Abuse Screening:  Abuse Screening Questionnaire 8/14/2018   Do you ever feel afraid of your partner? N   Are you in a relationship with someone who physically or mentally threatens you? N   Is it safe for you to go home? Y       Health Maintenance reviewed, discussed with patient and ordered per Provider. Health Maintenance Due   Topic Date Due    DTaP/Tdap/Td series (1 - Tdap) 11/26/1984    Shingrix Vaccine Age 50> (1 of 2) 11/26/2013    Influenza Age 5 to Adult  08/01/2018    MEDICARE YEARLY EXAM  09/24/2018    BREAST CANCER SCRN MAMMOGRAM  12/13/2018       Any and all required ARINA forms filled out by patient and faxed, confirmation received. Coordination of Care:  1. Have you been to the ER, urgent care clinic since your last visit? Hospitalized in the last 30 days? no    2. Have you seen or consulted any other health care providers outside of the [address] [place] [address] in the last 30 days? Include any pap smears or colon screening.  no

## 2018-09-26 NOTE — PROGRESS NOTES
HISTORY OF PRESENT ILLNESS  Jordy Valdivia is a 45 y.o. female. HPI Comments: Presents with complaint of gradual worsening of her left-sided LBP with left sciatica for years. She is still going to PT. Also concerned about her history of headaches and spells - had a CT after a fall in 2017 that showed possible demyelination - never saw Neurology about this. She finds Flonase somewhat helpful, but never got Claritin from the pharmacy. Past Medical History:   Diagnosis Date    Chest pain 11/2014    neg EKG, non recurrent    Chronic pain     lower back and legs    Depression     Fibroids     GERD (gastroesophageal reflux disease)     Headache(784.0)     Hiatal hernia     IBS (irritable bowel syndrome)     Microscopic hematuria     Rectal bleeding     Stool color black     irritable bowel       Past Surgical History:   Procedure Laterality Date    COLONOSCOPY,DIAGNOSTIC      HX BREAST BIOPSY Right 1/21/2015    RIGHT BREAST BIOPSY WITH NEEDLE LOCALIZATION ULTRASOUND performed by Alyson Forman MD at [facility] MAIN OR    HX CHOLECYSTECTOMY      HX GI      HX HYSTERECTOMY      HX TUBAL LIGATION         History   Smoking Status    Former Smoker    Packs/day: 0.00    Quit date: 6/28/2016   Smokeless Tobacco    Former User    Quit date: 02/2016     Current Outpatient Prescriptions   Medication Sig    fluticasone (FLONASE) 50 mcg/actuation nasal spray 2 Sprays by Both Nostrils route daily. Indications: Allergic Rhinitis    white petrolatum-mineral oil (EUCERIN) topical cream Apply  to affected area two (2) times daily as needed for Dry Skin.  Comp Stocking,Knee,Long,Medium misc Wear daily while walking to prevent swelling    loratadine (CLARITIN) 10 mg tablet Take 1 Tab by mouth daily. Indications: Allergic Rhinitis     No current facility-administered medications for this visit. Review of Systems   Constitutional: Negative for chills and fever. HENT: Positive for congestion. Respiratory: Negative for shortness of breath. Cardiovascular: Negative for chest pain. Gastrointestinal: Negative for nausea and vomiting. Musculoskeletal: Positive for back pain. Neurological: Positive for headaches. Spells     Visit Vitals    /70    Pulse 73    Temp 97.9 °F (36.6 °C) (Oral)    Resp 16    Ht 5' 3\" (1.6 m)    Wt 124 lb 3.2 oz (56.3 kg)    SpO2 98%    BMI 22 kg/m2       Physical Exam   Constitutional: She is oriented to person, place, and time. She appears well-developed and well-nourished. No distress. Neck: Neck supple. No thyromegaly present. Cardiovascular: Normal rate and regular rhythm. Exam reveals no gallop and no friction rub. No murmur heard. Pulmonary/Chest: Effort normal and breath sounds normal. No respiratory distress. Musculoskeletal:        Lumbar back: She exhibits decreased range of motion (small limitation of flexion) and tenderness (left PVM - minimal on right). She exhibits no spasm. Lymphadenopathy:     She has no cervical adenopathy. Neurological: She is alert and oriented to person, place, and time. Difficulty rising from a chair, unsteady on her feet   Skin: Skin is warm and dry. Psychiatric: She has a normal mood and affect. Her behavior is normal. Judgment and thought content normal.       ASSESSMENT and PLAN    ICD-10-CM ICD-9-CM    1. Chronic left-sided low back pain with left-sided sciatica M54.42 724.2 meloxicam (MOBIC) 15 mg tablet    G89.29 724.3      338.29    2. Spells of decreased attentiveness R68.89 780.99 REFERRAL TO NEUROLOGY   3. Abnormal CT of brain R90.89 793.0 REFERRAL TO NEUROLOGY   4. Unsteady gait R26.81 781.2 REFERRAL TO NEUROLOGY   5. Perennial allergic rhinitis J30.89 477.8 fluticasone (FLONASE) 50 mcg/actuation nasal spray      loratadine (CLARITIN) 10 mg tablet     Follow-up Disposition:  Return in about 3 months (around 12/26/2018).   the following changes in treatment are made: Add Meloxicam for back pain. Refill Flonase and Claritin. Referral to Neurology. She has also agreed to see a psychiatrist (will try to arrange this through her therapist). reviewed medications and side effects in detail  Plan of care reviewed - patient verbalize(s) understanding and agreement.

## 2018-09-26 NOTE — MR AVS SNAPSHOT
[address] [place] [address] [address] [place] [address] 
[phone] Patient: Maria Teresa Richey MRN: [id number] :1965 Visit Information Date & Time Provider Department Dept. Phone Encounter #  
 2018 11:30 AM Briana Carballo MD [facility] [phone] [id number] Follow-up Instructions Return in about 3 months (around 2018). Upcoming Health Maintenance Date Due DTaP/Tdap/Td series (1 - Tdap) 1984 Shingrix Vaccine Age 50> (1 of 2) 2013 Influenza Age 5 to Adult 2018 MEDICARE YEARLY EXAM 2018 BREAST CANCER SCRN MAMMOGRAM 2018 COLONOSCOPY 2025 Allergies as of 2018  Review Complete On: 2018 By: Briana Carballo MD  
  
 Severity Noted Reaction Type Reactions Amoxicillin  2017    Rash, Nausea Only Current Immunizations  Never Reviewed No immunizations on file. Not reviewed this visit You Were Diagnosed With   
  
 Codes Comments Chronic left-sided low back pain with left-sided sciatica    -  Primary ICD-10-CM: M54.42, G89.29 ICD-9-CM: 724.2, 724.3, 338.29 Spells of decreased attentiveness     ICD-10-CM: R68.89 ICD-9-CM: 780.99 Abnormal CT of brain     ICD-10-CM: R90.89 ICD-9-CM: 793.0 Unsteady gait     ICD-10-CM: R26.81 
ICD-9-CM: 203. 2 Perennial allergic rhinitis     ICD-10-CM: J30.89 ICD-9-CM: 477.8 Vitals BP Pulse Temp Resp Height(growth percentile) Weight(growth percentile) 117/70 73 97.9 °F (36.6 °C) (Oral) 16 5' 3\" (1.6 m) 124 lb 3.2 oz (56.3 kg) SpO2 BMI OB Status Smoking Status 98% 22 kg/m2 Hysterectomy Former Smoker BMI and BSA Data Body Mass Index Body Surface Area  
 22 kg/m 2 1.58 m 2 Preferred Pharmacy Pharmacy Name Phone [facility] [address] [place] [address], [facility] [phone] Your Updated Medication List  
  
   
This list is accurate as of 18 12:39 PM.  Always use your most recent med list.  
  
  
  
  
 Comp [address] Wear daily while walking to prevent swelling  
  
 fluticasone 50 mcg/actuation nasal spray Commonly known as:  Lennice Boss 2 Sprays by Both Nostrils route daily. Indications: Allergic Rhinitis  
  
 loratadine 10 mg tablet Commonly known as:  Nidhi Deshler Take 1 Tab by mouth daily. Indications: Allergic Rhinitis  
  
 meloxicam 15 mg tablet Commonly known as:  MOBIC Take 1 Tab by mouth daily. white petrolatum-mineral oil topical cream  
Commonly known as:  EUCERIN Apply  to affected area two (2) times daily as needed for Dry Skin. Prescriptions Sent to Pharmacy Refills  
 fluticasone (FLONASE) 50 mcg/actuation nasal spray 11 Si Sprays by Both Nostrils route daily. Indications: Allergic Rhinitis Class: Normal  
 Pharmacy: [facility] DR YURY ALVAREZ [address] [place] [address] [facility] Ph #: [phone] Route: Both Nostrils  
 meloxicam (MOBIC) 15 mg tablet 11 Sig: Take 1 Tab by mouth daily. Class: Normal  
 Pharmacy: [facility] DR YURY ALVAREZ [address] [place] [address] [facility] Ph #: [phone] Route: Oral  
 loratadine (CLARITIN) 10 mg tablet 11 Sig: Take 1 Tab by mouth daily. Indications: Allergic Rhinitis Class: Normal  
 Pharmacy: [facility] DR YURY ALVAREZ [address] [place] [address] [facility] Ph #: [phone] Route: Oral  
  
We Performed the Following REFERRAL TO NEUROLOGY [KUB16 Custom] Comments:  
 Please evaluate for persistent unsteady gait, difficulty in rising from chairs, spells of inattentiveness - had a CT last year that suggested possible demyelinating disease. Does have paranoid schizophrenia, but I don't think this explains her symptoms. Follow-up Instructions Return in about 3 months (around 2018). Referral Information Referral ID Referred By Referred To  
  
 [id number] Helen Hicks, [address] Roberto steve, [address] Phone: [phone] Fax: [phone] Visits Status Start Date End Date 1 New Request 9/26/18 9/26/19 If your referral has a status of pending review or denied, additional information will be sent to support the outcome of this decision. Introducing [facility]! [employer] introduces [employer] patient portal. Now you can access parts of your medical record, email your doctor's office, and request medication refills online. 1. In your internet browser, go to [email or web address] 2. Click on the First Time User? Click Here link in the Sign In box. You will see the New Member Sign Up page. 3. Enter your [employer] Access Code exactly as it appears below. You will not need to use this code after youve completed the sign-up process. If you do not sign up before the expiration date, you must request a new code. · [employer] Access Code: [email or web address] Expires: 12/25/2018 12:39 PM 
 
4. Enter the last four digits of your Social Security Number (xxxx) and Date of Birth (mm/dd/yyyy) as indicated and click Submit. You will be taken to the next sign-up page. 5. Create a [employer] ID. This will be your [employer] login ID and cannot be changed, so think of one that is secure and easy to remember. 6. Create a [employer] password. You can change your password at any time. 7. Enter your Password Reset Question and Answer. This can be used at a later time if you forget your password. 8. Enter your e-mail address. You will receive e-mail notification when new information is available in [address] 9. Click Sign Up. You can now view and download portions of your medical record. 10. Click the Download Summary menu link to download a portable copy of your medical information. If you have questions, please visit the Frequently Asked Questions section of the [employer] website. Remember, Zoom is NOT to be used for urgent needs. For medical emergencies, dial 911. Now available from your iPhone and Android! Please provide this summary of care documentation to your next provider. Your primary care clinician is listed as Al Stanford. If you have any questions after today's visit, please call [phone].

## 2018-10-16 ENCOUNTER — TELEPHONE (OUTPATIENT)
Dept: FAMILY MEDICINE CLINIC | Facility: CLINIC | Age: 53
End: 2018-10-16

## 2018-10-16 DIAGNOSIS — R26.81 UNSTEADY GAIT: Primary | ICD-10-CM

## 2018-10-16 NOTE — TELEPHONE ENCOUNTER
Patient called requesting a new referral for physical therapy  At the [facility] on [facility] she needs it for back legs and balance please advise.

## 2018-10-30 ENCOUNTER — PATIENT OUTREACH (OUTPATIENT)
Dept: FAMILY MEDICINE CLINIC | Age: 53
End: 2018-10-30

## 2018-10-30 NOTE — PROGRESS NOTES
Patient has graduated from the Transitions of Care Coordination  program on 10/30/2018. Patient's symptoms are stable at this time. Patient/family has the ability to self-manage. Care management goals have been completed at this time. No further nurse navigator follow up scheduled. Goals Addressed     None          Pt has nurse navigator's contact information for any further questions, concerns, or needs.   Patients upcoming visits:    Future Appointments   Date Time Provider Gus Dumont   1/21/2019 11:00 AM MD CYNTHIA Wade

## 2018-11-26 NOTE — PROGRESS NOTES
[facility] - Ezio Cordon  [address] [facility]  [phone] fax    Physical Therapy Discharge Summary    Patient name: Patria Perkins Start of Care:  18   Referral source: Gladis Traylor MD : 1965   Medical/Treatment Diagnosis: Unsteadiness on feet [R26.81]  Unsteady gait [R26.81]  Payor: BLUE CROSS MEDICARE / Plan: [address] [place] [address] PPO / Product Type: Managed Care Medicare /  Onset Date: gradually worsened over the years     Prior Hospitalization: see medical history Provider#: [id number]   Medications: Verified on Patient Summary List    Comorbidities:  Depression, arthritis, latex allergy  Prior Level of Function: Ind with ambulation, lives alone but has a friend that stops by sometimes      Visits from Start of Care: 4    Missed Visits: 0    Reporting Period : 18 to 18    Summary of Care:  Goal:  Patient will improve FOTO score by 11 points in order to demonstrate a significant improvement in function. Status at last note/certification: 27  Status at discharge: not met    Goal: Patient will perform a sit to stand transfer without UE support in order to increase ease of transfers at home. Status at last note/certification: unable  Status at discharge: not met    Goal: Patient will improve TUG test to 10 seconds in order to increase ease of ambulation at home. Status at last note/certification: 13 seconds  Status at discharge: not met    Pt. Was seen for 1 visit following last progress note and then did not return to PT. She will be D/C at this time. The severity rating is based on clinical judgment and the FOTO score.       ASSESSMENT/RECOMMENDATIONS:  [x]Discontinue therapy: []Patient has reached or is progressing toward set goals      [x]Patient is non-compliant or has abdicated      []Due to lack of appreciable progress towards set goals    Fani Singh, PT 2018 3:24 PM

## 2019-01-16 ENCOUNTER — HOSPITAL ENCOUNTER (OUTPATIENT)
Dept: VASCULAR SURGERY | Age: 54
Discharge: HOME | End: 2019-01-16
Attending: PODIATRIST
Payer: MEDICARE

## 2019-01-16 DIAGNOSIS — M79.661 RIGHT CALF PAIN: ICD-10-CM

## 2019-01-16 DIAGNOSIS — I73.9 PAD (PERIPHERAL ARTERY DISEASE) (HCC): ICD-10-CM

## 2019-01-16 LAB
LEFT ABI: 0.96
LEFT ANTERIOR TIBIAL: 115 MMHG
LEFT ARM BP: 121 MMHG
LEFT CALF PRESSURE: 118 MMHG
LEFT POSTERIOR TIBIAL: 116 MMHG
RIGHT ABI: 1
RIGHT ANTERIOR TIBIAL: 115 MMHG
RIGHT ARM BP: 115 MMHG
RIGHT CALF PRESSURE: 122 MMHG
RIGHT POSTERIOR TIBIAL: 121 MMHG

## 2019-01-16 PROCEDURE — 93923 UPR/LXTR ART STDY 3+ LVLS: CPT

## 2019-01-21 ENCOUNTER — DOCUMENTATION ONLY (OUTPATIENT)
Dept: FAMILY MEDICINE CLINIC | Facility: CLINIC | Age: 54
End: 2019-01-21

## 2019-01-21 ENCOUNTER — OFFICE VISIT (OUTPATIENT)
Dept: FAMILY MEDICINE CLINIC | Facility: CLINIC | Age: 54
End: 2019-01-21

## 2019-01-21 VITALS
HEIGHT: 63 IN | DIASTOLIC BLOOD PRESSURE: 74 MMHG | OXYGEN SATURATION: 98 % | TEMPERATURE: 97.4 F | SYSTOLIC BLOOD PRESSURE: 127 MMHG | HEART RATE: 70 BPM | WEIGHT: 122 LBS | BODY MASS INDEX: 21.62 KG/M2 | RESPIRATION RATE: 16 BRPM

## 2019-01-21 DIAGNOSIS — F20.0 PARANOID SCHIZOPHRENIA (HCC): ICD-10-CM

## 2019-01-21 DIAGNOSIS — M25.561 RIGHT KNEE PAIN, UNSPECIFIED CHRONICITY: ICD-10-CM

## 2019-01-21 DIAGNOSIS — Z13.39 SCREENING FOR ALCOHOLISM: ICD-10-CM

## 2019-01-21 DIAGNOSIS — Z00.00 INITIAL MEDICARE ANNUAL WELLNESS VISIT: Primary | ICD-10-CM

## 2019-01-21 DIAGNOSIS — Z12.31 ENCOUNTER FOR SCREENING MAMMOGRAM FOR MALIGNANT NEOPLASM OF BREAST: ICD-10-CM

## 2019-01-21 DIAGNOSIS — R26.81 UNSTEADY GAIT: ICD-10-CM

## 2019-01-21 NOTE — PROGRESS NOTES
This is an Initial Medicare Annual Wellness Exam (AWV) (Performed 12 months after IPPE or effective date of Medicare Part B enrollment, Once in a lifetime)    I have reviewed the patient's medical history in detail and updated the computerized patient record. History     Past Medical History:   Diagnosis Date    Chest pain 2014    neg EKG, non recurrent    Chronic pain     lower back and legs    Depression     Fibroids     GERD (gastroesophageal reflux disease)     Headache(784.0)     Hiatal hernia     IBS (irritable bowel syndrome)     Microscopic hematuria     Rectal bleeding     Stool color black     irritable bowel      Past Surgical History:   Procedure Laterality Date    COLONOSCOPY,DIAGNOSTIC      HX BREAST BIOPSY Right 2015    RIGHT BREAST BIOPSY WITH NEEDLE LOCALIZATION ULTRASOUND performed by Joanne Ormond, MD at [address] [place] [address] HX CHOLECYSTECTOMY      HX GI      HX HYSTERECTOMY      HX TUBAL LIGATION       Current Outpatient Medications   Medication Sig Dispense Refill    fluticasone (FLONASE) 50 mcg/actuation nasal spray 2 Sprays by Both Nostrils route daily. Indications:  Allergic Rhinitis 1 Bottle 11    Comp Stocking,Knee,Long,Medium misc Wear daily while walking to prevent swelling 1 Each 0     Allergies   Allergen Reactions    Amoxicillin Rash and Nausea Only     Family History   Problem Relation Age of Onset    HIV/AIDS Brother     Diabetes Father     Cancer Brother     Hypertension Sister     Diabetes Brother     Hypertension Sister     Hypertension Sister     Hypertension Brother      Social History     Tobacco Use    Smoking status: Former Smoker     Packs/day: 0.00     Last attempt to quit: 2016     Years since quittin.5    Smokeless tobacco: Former User     Quit date: 2016   Substance Use Topics    Alcohol use: No     Alcohol/week: 6.0 oz     Types: 10 Cans of beer per week     Patient Active Problem List   Diagnosis Code    Microscopic hematuria R31.29    Rectal bleeding K62.5    Chronic pain G89.29    Diffuse cystic mastopathy N60.19    Paranoid schizophrenia (HCC) F20.0    Chronic left-sided low back pain with left-sided sciatica M54.42, G89.29    Spells of decreased attentiveness R68.89    Unsteady gait R26.81    Perennial allergic rhinitis J30.89       Depression Risk Factor Screening:     PHQ over the last two weeks 1/21/2019   PHQ Not Done -   Little interest or pleasure in doing things Several days   Feeling down, depressed, irritable, or hopeless Not at all   Total Score PHQ 2 1   Trouble falling or staying asleep, or sleeping too much -   Feeling tired or having little energy -   Poor appetite, weight loss, or overeating -   Feeling bad about yourself - or that you are a failure or have let yourself or your family down -   Trouble concentrating on things such as school, work, reading, or watching TV -   Moving or speaking so slowly that other people could have noticed; or the opposite being so fidgety that others notice -   Thoughts of being better off dead, or hurting yourself in some way -   PHQ 9 Score -   How difficult have these problems made it for you to do your work, take care of your home and get along with others -     Alcohol Risk Factor Screening: You do not drink alcohol or very rarely. Functional Ability and Level of Safety:     Hearing Loss  Hearing is good. Activities of Daily Living  The home contains: no safety equipment. Patient does total self care    Fall Risk  No flowsheet data found.     Abuse Screen  Patient is not abused    Cognitive Screening   Evaluation of Cognitive Function:  Has your family/caregiver stated any concerns about your memory: no  Normal    Patient Care Team   Patient Care Team:  Marce King MD as PCP - General [facility])  Dickson Pedroza as Physician Assistant (Physician Assistant)  Chaparro Puga MD (Obstetrics & Gynecology)  Dickson Najera (Physician Assistant)    Assessment/Plan   Education and counseling provided:  Are appropriate based on today's review and evaluation  Screening Mammography    Diagnoses and all orders for this visit:    1. Initial Medicare annual wellness visit    2. Screening for alcoholism  -     [place] ANNUAL ALCOHOL SCREEN 15 MIN    3. Encounter for screening mammogram for malignant neoplasm of breast  -     ANDRA MAMMO BI SCREENING INCL CAD;  Future         Health Maintenance Due   Topic Date Due    DTaP/Tdap/Td series (1 - Tdap) 11/26/1984    Shingrix Vaccine Age 50> (1 of 2) 11/26/2013    BREAST CANCER SCRN MAMMOGRAM  12/13/2018

## 2019-01-21 NOTE — PROGRESS NOTES
HISTORY OF PRESENT ILLNESS  Gabino Terrazas is a 52 y.o. female. Presents with several issues. She wonders if she is eligible for a personal care aide to help her with food preparation and other household tasks. She complains that her shoes hurt her feet, despite recommendations from Podiatry (sees them in follow-up this week). She still complains of leg pain, heaviness and swelling, and she isn't in PT because she had trouble doing it. Her balance is still off. She complains of pain and popping in her right knee. Review of Systems   Respiratory: Negative for shortness of breath. Cardiovascular: Negative for chest pain. Musculoskeletal:        Leg pain and swelling   Skin: Positive for rash. Neurological:        Balance trouble     Visit Vitals  /74 (BP 1 Location: Right arm, BP Patient Position: Sitting)   Pulse 70   Temp 97.4 °F (36.3 °C) (Oral)   Resp 16   Ht 5' 3\" (1.6 m)   Wt 122 lb (55.3 kg)   LMP  (LMP Unknown)   SpO2 98%   BMI 21.61 kg/m²       Physical Exam   Constitutional: She is oriented to person, place, and time. She appears well-developed and well-nourished. No distress. Neck: Neck supple. Carotid bruit is not present. No thyromegaly present. Cardiovascular: Normal rate, regular rhythm and intact distal pulses. Exam reveals no gallop and no friction rub. No murmur heard. Pulmonary/Chest: Effort normal and breath sounds normal. No respiratory distress. Lymphadenopathy:     She has no cervical adenopathy. Neurological: She is alert and oriented to person, place, and time. Skin: Skin is warm and dry. Psychiatric: She has a normal mood and affect. Her behavior is normal. Judgment and thought content normal.       ASSESSMENT and PLAN    ICD-10-CM ICD-9-CM    1. Right knee pain, unspecified chronicity M25.561 719.46 REFERRAL TO ORTHOPEDICS   2. Unsteady gait R26.81 781.2 REFERRAL TO HOME HEALTH      AMB SUPPLY ORDER   3.  Paranoid schizophrenia ([facility].) F20.0 295.30      Follow-up Disposition:  Return in about 1 year (around 1/21/2020), or if symptoms worsen or fail to improve. Three months for routine visist.  the following changes in treatment are made: Referral to Ortho. Will request home health evaluation, and order a cane for her. reviewed medications and side effects in detail  Plan of care reviewed - patient verbalize(s) understanding and agreement.

## 2019-01-21 NOTE — PATIENT INSTRUCTIONS
Medicare Wellness Visit, Female     The best way to live healthy is to have a lifestyle where you eat a well-balanced diet, exercise regularly, limit alcohol use, and quit all forms of tobacco/nicotine, if applicable. Regular preventive services are another way to keep healthy. Preventive services (vaccines, screening tests, monitoring & exams) can help personalize your care plan, which helps you manage your own care. Screening tests can find health problems at the earliest stages, when they are easiest to treat. [facility] follows the current, evidence-based guidelines published by the [facility] ([facility]) when recommending preventive services for our patients. Because we follow these guidelines, sometimes recommendations change over time as research supports it. (For example, mammograms used to be recommended annually. Even though Medicare will still pay for an annual mammogram, the newer guidelines recommend a mammogram every two years for women of average risk.)  Of course, you and your doctor may decide to screen more often for some diseases, based on your risk and your health status. Preventive services for you include:  - Medicare offers their members a free annual wellness visit, which is time for you and your primary care provider to discuss and plan for your preventive service needs. Take advantage of this benefit every year!  -All adults over the age of 72 should receive the recommended pneumonia vaccines. Current USPSTF guidelines recommend a series of two vaccines for the best pneumonia protection.   -All adults should have a flu vaccine yearly and a tetanus vaccine every 10 years. All adults age 61 and older should receive a shingles vaccine once in their lifetime.    -A bone mass density test is recommended when a woman turns 65 to screen for osteoporosis. This test is only recommended one time, as a screening.  Some providers will use this same test as a disease monitoring tool if you already have osteoporosis. -All adults age 38-68 who are overweight should have a diabetes screening test once every three years.   -Other screening tests and preventive services for persons with diabetes include: an eye exam to screen for diabetic retinopathy, a kidney function test, a foot exam, and stricter control over your cholesterol.   -Cardiovascular screening for adults with routine risk involves an electrocardiogram (ECG) at intervals determined by your doctor.   -Colorectal cancer screenings should be done for adults age 54-65 with no increased risk factors for colorectal cancer. There are a number of acceptable methods of screening for this type of cancer. Each test has its own benefits and drawbacks. Discuss with your doctor what is most appropriate for you during your annual wellness visit. The different tests include: colonoscopy (considered the best screening method), a fecal occult blood test, a fecal DNA test, and sigmoidoscopy. -Breast cancer screenings are recommended every other year for women of normal risk, age 54-69.  -Cervical cancer screenings for women over age 72 are only recommended with certain risk factors.   -All adults born between [facility] should be screened once for Hepatitis C. Here is a list of your current Health Maintenance items (your personalized list of preventive services) with a due date:  Health Maintenance Due   Topic Date Due    DTaP/Tdap/Td  (1 - Tdap) 11/26/1984    Shingles Vaccine (1 of 2) 11/26/2013    Flu Vaccine  08/01/2018    Annual Well Visit  10/12/2018    Breast Cancer Screening  12/13/2018         Medicare Wellness Visit, Female     The best way to live healthy is to have a lifestyle where you eat a well-balanced diet, exercise regularly, limit alcohol use, and quit all forms of tobacco/nicotine, if applicable. Regular preventive services are another way to keep healthy.  Preventive services (vaccines, screening tests, monitoring & exams) can help personalize your care plan, which helps you manage your own care. Screening tests can find health problems at the earliest stages, when they are easiest to treat. [facility] follows the current, evidence-based guidelines published by the [facility] ([facility]) when recommending preventive services for our patients. Because we follow these guidelines, sometimes recommendations change over time as research supports it. (For example, mammograms used to be recommended annually. Even though Medicare will still pay for an annual mammogram, the newer guidelines recommend a mammogram every two years for women of average risk.)  Of course, you and your doctor may decide to screen more often for some diseases, based on your risk and your health status. Preventive services for you include:  - Medicare offers their members a free annual wellness visit, which is time for you and your primary care provider to discuss and plan for your preventive service needs. Take advantage of this benefit every year!  -All adults over the age of 72 should receive the recommended pneumonia vaccines. Current USPSTF guidelines recommend a series of two vaccines for the best pneumonia protection.   -All adults should have a flu vaccine yearly and a tetanus vaccine every 10 years. All adults age 61 and older should receive a shingles vaccine once in their lifetime.    -A bone mass density test is recommended when a woman turns 65 to screen for osteoporosis. This test is only recommended one time, as a screening. Some providers will use this same test as a disease monitoring tool if you already have osteoporosis.   -All adults age 38-68 who are overweight should have a diabetes screening test once every three years.   -Other screening tests and preventive services for persons with diabetes include: an eye exam to screen for diabetic retinopathy, a kidney function test, a foot exam, and stricter control over your cholesterol.   -Cardiovascular screening for adults with routine risk involves an electrocardiogram (ECG) at intervals determined by your doctor.   -Colorectal cancer screenings should be done for adults age 54-65 with no increased risk factors for colorectal cancer. There are a number of acceptable methods of screening for this type of cancer. Each test has its own benefits and drawbacks. Discuss with your doctor what is most appropriate for you during your annual wellness visit. The different tests include: colonoscopy (considered the best screening method), a fecal occult blood test, a fecal DNA test, and sigmoidoscopy. -Breast cancer screenings are recommended every other year for women of normal risk, age 54-69.  -Cervical cancer screenings for women over age 72 are only recommended with certain risk factors.   -All adults born between [facility] should be screened once for Hepatitis C.      Here is a list of your current Health Maintenance items (your personalized list of preventive services) with a due date:  Health Maintenance Due   Topic Date Due    DTaP/Tdap/Td  (1 - Tdap) 11/26/1984    Shingles Vaccine (1 of 2) 11/26/2013    Breast Cancer Screening  12/13/2018

## 2019-01-21 NOTE — PROGRESS NOTES
Faxed over order for single point cane to [facility] [phone], as well as faxed over referral for a personal care aide to [address] [place] [address]. Both with confirmations. Will await to see if either place will be able to provide services and if insurance will cover.

## 2019-01-21 NOTE — PROGRESS NOTES
This is the Subsequent Medicare Annual Wellness Exam, performed 12 months or more after the Initial AWV or the last Subsequent AWV I have reviewed the patient's medical history in detail and updated the computerized patient record. History Past Medical History:  
Diagnosis Date  Chest pain 11/2014  
 neg EKG, non recurrent  Chronic pain   
 lower back and legs  Depression  Fibroids  GERD (gastroesophageal reflux disease)  Headache(784.0)  Hiatal hernia  IBS (irritable bowel syndrome)  Microscopic hematuria  Rectal bleeding  Stool color black   
 irritable bowel Past Surgical History:  
Procedure Laterality Date  COLONOSCOPY,DIAGNOSTIC    
 HX BREAST BIOPSY Right 1/21/2015 RIGHT BREAST BIOPSY WITH NEEDLE LOCALIZATION ULTRASOUND performed by Ediosn De Souza MD at [place] [address]  HX GI    
 HX HYSTERECTOMY  HX TUBAL LIGATION Current Outpatient Medications Medication Sig Dispense Refill  fluticasone (FLONASE) 50 mcg/actuation nasal spray 2 Sprays by Both Nostrils route daily. Indications: Allergic Rhinitis 1 Bottle 11  Comp Stocking,Knee,Long,Medium misc Wear daily while walking to prevent swelling 1 Each 0  
 meloxicam (MOBIC) 15 mg tablet Take 1 Tab by mouth daily. 30 Tab 11  
 loratadine (CLARITIN) 10 mg tablet Take 1 Tab by mouth daily. Indications: Allergic Rhinitis 30 Tab 11  
 white petrolatum-mineral oil (EUCERIN) topical cream Apply  to affected area two (2) times daily as needed for Dry Skin. 454 g 5 Allergies Allergen Reactions  Amoxicillin Rash and Nausea Only Family History Problem Relation Age of Onset  HIV/AIDS Brother  Diabetes Father  Cancer Brother  Hypertension Sister  Diabetes Brother  Hypertension Sister  Hypertension Sister  Hypertension Brother Social History Tobacco Use  Smoking status: Former Smoker Packs/day: 0.00 Last attempt to quit: 2016 Years since quittin.5  Smokeless tobacco: Former User Quit date: 2016 Substance Use Topics  Alcohol use: No  
  Alcohol/week: 6.0 oz Types: 10 Cans of beer per week Patient Active Problem List  
Diagnosis Code  Microscopic hematuria R31.29  
 Rectal bleeding K62.5  Chronic pain G89.29  Diffuse cystic mastopathy N60.19  
 Paranoid schizophrenia ([facility] [address] F20.0  Chronic left-sided low back pain with left-sided sciatica M54.42, G89.29  Spells of decreased attentiveness R68.89  
 Unsteady gait R26.81  
 Perennial allergic rhinitis J30.89 Depression Risk Factor Screening: PHQ over the last two weeks 2019 PHQ Not Done - Little interest or pleasure in doing things Several days Feeling down, depressed, irritable, or hopeless Not at all Total Score PHQ 2 1 Trouble falling or staying asleep, or sleeping too much - Feeling tired or having little energy - Poor appetite, weight loss, or overeating - Feeling bad about yourself - or that you are a failure or have let yourself or your family down - Trouble concentrating on things such as school, work, reading, or watching TV - Moving or speaking so slowly that other people could have noticed; or the opposite being so fidgety that others notice - Thoughts of being better off dead, or hurting yourself in some way -  
PHQ 9 Score - How difficult have these problems made it for you to do your work, take care of your home and get along with others - Alcohol Risk Factor Screening: You do not drink alcohol or very rarely. Functional Ability and Level of Safety:  
Hearing Loss Hearing is good. Activities of Daily Living The home contains: no safety equipment. Patient does total self care Fall Risk No flowsheet data found. Abuse Screen Patient is not abused Cognitive Screening Evaluation of Cognitive Function: Has your family/caregiver stated any concerns about your memory: no 
Normal 
 
Patient Care Team  
Patient Care Team: 
Etienne Mcdaniels MD as PCP - General (Family Practice) Jaiden Donaldson, RN as Nurse Navigator [facility], [address] [place] [address] as Physician Assistant (Physician Assistant) Edgardo Rashid MD (Vascular Surgery) Julieta Stephens MD (Obstetrics & Gynecology) Karrie Lima MD as Physician (Gynecologic Oncology) RODO Elizondo (Physician Assistant) Assessment/Plan Education and counseling provided: 
Are appropriate based on today's review and evaluation {There are no diagnoses linked to this encounter. (Refresh or delete this SmartLink)} Health Maintenance Due Topic Date Due  
 DTaP/Tdap/Td series (1 - Tdap) 11/26/1984  Shingrix Vaccine Age 50> (1 of 2) 11/26/2013  Influenza Age 5 to Adult  08/01/2018  MEDICARE YEARLY EXAM  10/12/2018  BREAST CANCER SCRN MAMMOGRAM  12/13/2018

## 2019-01-31 ENCOUNTER — TELEPHONE (OUTPATIENT)
Dept: ORTHOPEDIC SURGERY | Facility: CLINIC | Age: 54
End: 2019-01-31

## 2019-01-31 ENCOUNTER — OFFICE VISIT (OUTPATIENT)
Dept: ORTHOPEDIC SURGERY | Facility: CLINIC | Age: 54
End: 2019-01-31

## 2019-01-31 VITALS
DIASTOLIC BLOOD PRESSURE: 69 MMHG | RESPIRATION RATE: 18 BRPM | TEMPERATURE: 95.3 F | SYSTOLIC BLOOD PRESSURE: 133 MMHG | OXYGEN SATURATION: 96 % | HEART RATE: 66 BPM | BODY MASS INDEX: 22.68 KG/M2 | WEIGHT: 128 LBS | HEIGHT: 63 IN

## 2019-01-31 DIAGNOSIS — M22.41 CHONDROMALACIA OF RIGHT PATELLA: ICD-10-CM

## 2019-01-31 DIAGNOSIS — M54.16 LUMBAR RADICULOPATHY: ICD-10-CM

## 2019-01-31 DIAGNOSIS — M17.0 PRIMARY OSTEOARTHRITIS OF BOTH KNEES: Primary | ICD-10-CM

## 2019-01-31 DIAGNOSIS — G89.29 CHRONIC PAIN OF LEFT KNEE: ICD-10-CM

## 2019-01-31 DIAGNOSIS — M94.262 CHONDROMALACIA, LEFT KNEE: ICD-10-CM

## 2019-01-31 DIAGNOSIS — G89.29 CHRONIC PAIN OF RIGHT KNEE: ICD-10-CM

## 2019-01-31 DIAGNOSIS — M25.561 CHRONIC PAIN OF RIGHT KNEE: ICD-10-CM

## 2019-01-31 DIAGNOSIS — M25.562 CHRONIC PAIN OF LEFT KNEE: ICD-10-CM

## 2019-01-31 DIAGNOSIS — M54.50 LUMBAR PAIN: ICD-10-CM

## 2019-01-31 RX ORDER — BUPIVACAINE HYDROCHLORIDE 2.5 MG/ML
8 INJECTION, SOLUTION EPIDURAL; INFILTRATION; INTRACAUDAL ONCE
Qty: 8 ML | Refills: 0
Start: 2019-01-31 | End: 2019-01-31

## 2019-01-31 RX ORDER — BETAMETHASONE SODIUM PHOSPHATE AND BETAMETHASONE ACETATE 3; 3 MG/ML; MG/ML
6 INJECTION, SUSPENSION INTRA-ARTICULAR; INTRALESIONAL; INTRAMUSCULAR; SOFT TISSUE ONCE
Qty: 0.5 ML | Refills: 0
Start: 2019-01-31 | End: 2019-01-31

## 2019-01-31 RX ORDER — BUPIVACAINE HYDROCHLORIDE 2.5 MG/ML
4 INJECTION, SOLUTION EPIDURAL; INFILTRATION; INTRACAUDAL ONCE
Qty: 4 ML | Refills: 0
Start: 2019-01-31 | End: 2019-01-31

## 2019-01-31 NOTE — TELEPHONE ENCOUNTER
Patient called for . Patient said she saw Jak Boo today . That  gave her an injection in her knee and back. Patient is requesting to know the name of the injections he gave her. Patient tel. [phone].

## 2019-01-31 NOTE — PROGRESS NOTES
Patient: Jesica Crow                MRN: [id number]       SSN: [id number]  YOB: 1965        AGE: 52 y.o. SEX: female    PCP: Carlito Owen MD  01/31/19    Chief Complaint   Patient presents with    Knee Pain     Right    Back Pain   total body pain  HISTORY:  Jesica Crow is a 52 y.o. female who is seen for bilateral knee and back pain. She notes total body pain -arms, legs, back. She notes numbness and tingling in her legs and popping in both knees. She notes significant back pain. She has been limping recently which she attributes to stiffness over her entire body. She notes swelling in her feet and calves. She has a fullness in her right popliteal space. She was previously in physical therapy. She has been experiencing knee pain for the past few years. She reports no h/o injury. She notes pain with standing, walking, and stair climbing. She reports startup pain and difficulty with everyday knee activities. Pain Assessment  1/31/2019   Location of Pain Back   Location Modifiers -   Severity of Pain 9   Quality of Pain Aching; Throbbing   Duration of Pain Persistent   Frequency of Pain Constant   Aggravating Factors Bending   Limiting Behavior Yes   Relieving Factors Nothing   Relieving Factors Comment -   Result of Injury No     Occupation, etc:  Ms. Gladis Valentine  receives social security disability benefits for depression, IBS, and chronic pain since 2010. She does not like too much noise and states that she previously lived in loud places. She was previously a  for an  with the [employer] but has not worked since 2006. She has a male friend who comes to see her often. She has a son, a grand son, and 2 grand daughters who he does not see much since her son is busy. She is a diet controlled diabetic. Current weight is 128 pounds. She is 5'3\" tall.       Lab Results   Component Value Date/Time Hemoglobin A1c 5.9 03/31/2010 02:24 PM    Hemoglobin A1c (POC) 5.5 11/11/2016 01:00 PM     Weight Metrics 1/31/2019 1/21/2019 9/26/2018 8/14/2018 6/28/2018 6/15/2018 5/19/2018   Weight 128 lb 122 lb 124 lb 3.2 oz 117 lb 112 lb 110 lb 113 lb   BMI 22.67 kg/m2 21.61 kg/m2 22 kg/m2 20.73 kg/m2 19.84 kg/m2 19.49 kg/m2 20.02 kg/m2       Patient Active Problem List   Diagnosis Code    Microscopic hematuria R31.29    Rectal bleeding K62.5    Chronic pain G89.29    Diffuse cystic mastopathy N60.19    Paranoid schizophrenia ([facility] [address] F20.0    Chronic left-sided low back pain with left-sided sciatica M54.42, G89.29    Spells of decreased attentiveness R68.89    Unsteady gait R26.81    Perennial allergic rhinitis J30.89     REVIEW OF SYSTEMS: All Below are Negative except: See HPI   Constitutional: negative for fever, chills, and weight loss. Cardiovascular: negative for chest pain, claudication, leg swelling, SOB, PABLO   Gastrointestinal: Negative for pain, N/V/C/D, Blood in stool or urine, dysuria,  hematuria, incontinence, pelvic pain. Musculoskeletal: See HPI   Neurological: Negative for dizziness and weakness. Negative for headaches, Visual changes, confusion, seizures   Phychiatric/Behavioral: Negative for depression, memory loss, substance  abuse. Extremities: Negative for hair changes, rash, or skin lesion changes. Hematologic: Negative for bleeding problems, bruising, pallor or swollen lymph  nodes   Peripheral Vascular: No calf pain, no circulation deficits.     Social History     Socioeconomic History    Marital status: SINGLE     Spouse name: Not on file    Number of children: Not on file    Years of education: Not on file    Highest education level: Not on file   Social Needs    Financial resource strain: Not on file    Food insecurity - worry: Not on file    Food insecurity - inability: Not on file    Transportation needs - medical: Not on file   [employer] needs - non-medical: Not on file   Occupational History    Not on file   Tobacco Use    Smoking status: Former Smoker     Packs/day: 0.00     Last attempt to quit: 2016     Years since quittin.5    Smokeless tobacco: Former User     Quit date: 2016   Substance and Sexual Activity    Alcohol use: No     Alcohol/week: 6.0 oz     Types: 10 Cans of beer per week    Drug use: No    Sexual activity: Yes     Partners: Male   Other Topics Concern    Not on file   Social History Narrative    Not on file      Allergies   Allergen Reactions    Amoxicillin Rash and Nausea Only      Current Outpatient Medications   Medication Sig    betamethasone (CELESTONE SOLUSPAN) 6 mg/mL injection 1 mL by Intra artICUlar route once for 1 dose.  bupivacaine, PF, (MARCAINE, PF,) 0.25 % (2.5 mg/mL) injection 8 mL by Intra artICUlar route once for 1 dose.  betamethasone (CELESTONE SOLUSPAN) 6 mg/mL injection 1 mL by Intra artICUlar route once for 1 dose.  bupivacaine, PF, (MARCAINE, PF,) 0.25 % (2.5 mg/mL) injection 4 mL by Intra artICUlar route once for 1 dose.  fluticasone (FLONASE) 50 mcg/actuation nasal spray 2 Sprays by Both Nostrils route daily. Indications: Allergic Rhinitis    Comp Stocking,Knee,Long,Medium misc Wear daily while walking to prevent swelling     No current facility-administered medications for this visit.        PHYSICAL EXAMINATION:  Visit Vitals  /69   Pulse 66   Temp 95.3 °F (35.2 °C) (Oral)   Resp 18   Ht 5' 3\" (1.6 m)   Wt 128 lb (58.1 kg)   LMP  (LMP Unknown)   SpO2 96%   BMI 22.67 kg/m²      ORTHO EXAMINATION:  Examination Right knee Left knee   Skin Intact Intact   Range of motion 120-0 120-0   Effusion - -   Medial joint line tenderness + +   Lateral joint line tenderness - -   Popliteal tenderness - -   Osteophytes palpable + +   Kwakus - -   Patella crepitus + at 30 degrees + at 30 degrees   Anterior drawer - -   Lateral laxity - -   Medial laxity - -   Varus deformity - -   Valgus deformity - - Pretibial edema +1 +1   Calf tenderness - -     Examination Lumbar Thoracic   Skin Intact Intact   Tenderness + -   Tightness - -   Lordosis Normal N/A   Kyphosis N/A Normal   Scoliosis - -   Flexion Fingertips to ankle N/A   Extension 10 N/A   Knee reflexes Normal N/A   Ankle reflexes Normal N/A   Straight leg raise - N/A   Calf tenderness - N/A     TIMEOUT:  Chart reviewed for the following:   Patito Talley MD, have reviewed the History, Physical and updated the Allergic reactions for [place] [address]to [address] [place] performed immediately prior to start of procedure:  Patito Talley MD, have performed the following reviews on Dia Son prior to the start of the procedure:            * Patient was identified by name and date of birth   * Agreement on procedure being performed was verified  * Risks and Benefits explained to the patient  * Procedure site verified and marked as necessary  * Patient was positioned for comfort  * Consent was obtained     Time: 10:38 AM     Date of procedure: 1/31/2019    Procedure performed by:  Mercedes Rushing MD    Ms. Antonino Tucker tolerated the procedure well with no complications. DUPLEXLOWER EXT VENOUS 5/25/18  INTERPRETATION/FINDINGS  Duplex images were obtained using 2-D gray scale, color flow, and spectral Doppler analysis. Right leg :  1. Deep veins visualized include the common femoral, femoral, popliteal, posterior tibial and peroneal veins. 2. No evidence of deep venous thrombosis detected in the veins visualized. 3. Superficial veins visualized include the great saphenous vein. 4. No evidence of superficial thrombosis detected. 5. Normal multiphasic flow in the posterior tibial artery. Left leg :  1. Deep veins visualized include the common femoral, femoral, popliteal, posterior tibial and peroneal veins. 2. No evidence of deep venous thrombosis detected in the veins visualized.   3. Superficial veins visualized include the great saphenous vein. 4. No evidence of superficial thrombosis detected. 5. Normal multiphasic flow in the posterior tibial artery. RADIOGRAPHS:   XR RIGHT KNEE 1/31/19 SAMANTHA  IMPRESSION:  Three views - No fractures, no effusion, mild medial joint space narrowing, + osteophytes present. IKDC Grade B. Mild condylar squaring     IMPRESSION:      ICD-10-CM ICD-9-CM    1. Primary osteoarthritis of both knees M17.0 715.16 betamethasone (CELESTONE SOLUSPAN) 6 mg/mL injection      BETAMETHASONE ACETATE & SODIUM PHOSPHATE INJECTION 3 MG EA.      DRAIN/INJECT LARGE JOINT/BURSA      bupivacaine, PF, (MARCAINE, PF,) 0.25 % (2.5 mg/mL) injection   2. Chronic pain of right knee M25.561 719.46 betamethasone (CELESTONE SOLUSPAN) 6 mg/mL injection    G89.29 338.29 BETAMETHASONE ACETATE & SODIUM PHOSPHATE INJECTION 3 MG EA.      DRAIN/INJECT LARGE JOINT/BURSA      bupivacaine, PF, (MARCAINE, PF,) 0.25 % (2.5 mg/mL) injection      AMB POC X-RAY KNEE 3 VIEW   3. Chondromalacia of right patella M22.41 717.7 betamethasone (CELESTONE SOLUSPAN) 6 mg/mL injection      BETAMETHASONE ACETATE & SODIUM PHOSPHATE INJECTION 3 MG EA.      DRAIN/INJECT LARGE JOINT/BURSA      bupivacaine, PF, (MARCAINE, PF,) 0.25 % (2.5 mg/mL) injection      AMB POC X-RAY KNEE 3 VIEW   4. Lumbar pain M54.5 724.2 betamethasone (CELESTONE SOLUSPAN) 6 mg/mL injection      BETAMETHASONE ACETATE & SODIUM PHOSPHATE INJECTION 3 MG EA. THER/PROPH/DIAG INJECTION, SUBCUT/IM      bupivacaine, PF, (MARCAINE, PF,) 0.25 % (2.5 mg/mL) injection      REFERRAL TO SPINE SURGERY   5. Lumbar radiculopathy M54.16 724.4 betamethasone (CELESTONE SOLUSPAN) 6 mg/mL injection      BETAMETHASONE ACETATE & SODIUM PHOSPHATE INJECTION 3 MG EA. THER/PROPH/DIAG INJECTION, SUBCUT/IM      bupivacaine, PF, (MARCAINE, PF,) 0.25 % (2.5 mg/mL) injection      REFERRAL TO SPINE SURGERY   6.  Chronic pain of left knee M25.562 719.46 betamethasone (CELESTONE SOLUSPAN) 6 mg/mL injection G89.29 338.29 BETAMETHASONE ACETATE & SODIUM PHOSPHATE INJECTION 3 MG EA.      DRAIN/INJECT LARGE JOINT/BURSA      bupivacaine, PF, (MARCAINE, PF,) 0.25 % (2.5 mg/mL) injection   7. Chondromalacia, left knee M94.262 717.7 betamethasone (CELESTONE SOLUSPAN) 6 mg/mL injection      BETAMETHASONE ACETATE & SODIUM PHOSPHATE INJECTION 3 MG EA.      DRAIN/INJECT LARGE JOINT/BURSA      bupivacaine, PF, (MARCAINE, PF,) 0.25 % (2.5 mg/mL) injection     PLAN:  After discussing treatment options, patient's knees and paralumbar regions were injected with 4 cc Marcaine and 1/2 cc Celestone. There is no need for surgery at this time. She will follow up at the spine center.      Scribed by Des Dailey MD [facility]) as dictated by Des Dailey MD

## 2019-02-01 ENCOUNTER — HOSPITAL ENCOUNTER (OUTPATIENT)
Dept: MAMMOGRAPHY | Age: 54
Discharge: HOME | End: 2019-02-01
Attending: FAMILY MEDICINE
Payer: MEDICAID

## 2019-02-01 DIAGNOSIS — Z12.31 ENCOUNTER FOR SCREENING MAMMOGRAM FOR MALIGNANT NEOPLASM OF BREAST: ICD-10-CM

## 2019-02-01 PROCEDURE — 77067 SCR MAMMO BI INCL CAD: CPT

## 2019-02-06 ENCOUNTER — TELEPHONE (OUTPATIENT)
Dept: FAMILY MEDICINE CLINIC | Facility: CLINIC | Age: 54
End: 2019-02-06

## 2019-02-06 NOTE — TELEPHONE ENCOUNTER
Phone call to [facility] with [facility] in regards to patient getting a PCA (personal care aide). She states that their office is only able to provide a bath aide, however per her supervisor  should be contacted in order get a PCA out to her home. Inquired at to how our office was to go about doing that and was informed by [facility] that she will get all information needed from her supervisor and give our office a call back with update and/or procedure and process of how to go about contacting  and getting patient a personal care aide to assist with ADL's. Verbal agreement and understanding met.

## 2019-02-06 NOTE — TELEPHONE ENCOUNTER
[facility] [phone] she needs to get in contact with  so they can do a UAI in order for elig for bta services

## 2019-02-12 ENCOUNTER — DOCUMENTATION ONLY (OUTPATIENT)
Dept: NEUROLOGY | Age: 54
End: 2019-02-12

## 2019-02-12 NOTE — PROGRESS NOTES
Chart review reveals scheduled visit for patient previously seen chronic pain syndrome. Referred back by Halle Hedrick MD to discuss spells of decreased attentiveness, abnormal brain CT, and unsteady gait.

## 2019-02-15 ENCOUNTER — APPOINTMENT (OUTPATIENT)
Dept: GENERAL RADIOLOGY | Age: 54
End: 2019-02-15
Attending: EMERGENCY MEDICINE
Payer: MEDICARE

## 2019-02-15 ENCOUNTER — HOSPITAL ENCOUNTER (EMERGENCY)
Age: 54
Discharge: HOME | End: 2019-02-15
Attending: EMERGENCY MEDICINE
Payer: MEDICARE

## 2019-02-15 VITALS
OXYGEN SATURATION: 100 % | TEMPERATURE: 97.1 F | DIASTOLIC BLOOD PRESSURE: 67 MMHG | HEART RATE: 80 BPM | SYSTOLIC BLOOD PRESSURE: 138 MMHG | RESPIRATION RATE: 20 BRPM

## 2019-02-15 DIAGNOSIS — S63.502A SPRAIN OF LEFT WRIST, INITIAL ENCOUNTER: Primary | ICD-10-CM

## 2019-02-15 LAB
ANION GAP BLD CALC-SCNC: 14 MMOL/L (ref 10–20)
BUN BLD-MCNC: 9 MG/DL (ref 7–18)
CA-I BLD-MCNC: 1.14 MMOL/L (ref 1.12–1.32)
CHLORIDE BLD-SCNC: 102 MMOL/L (ref 100–108)
CO2 BLD-SCNC: 28 MMOL/L (ref 19–24)
CREAT UR-MCNC: 0.7 MG/DL (ref 0.6–1.3)
GLUCOSE BLD STRIP.AUTO-MCNC: 96 MG/DL (ref 74–106)
HCT VFR BLD CALC: 36 % (ref 36–49)
HGB BLD-MCNC: 12.2 G/DL (ref 12–16)
POTASSIUM BLD-SCNC: 4 MMOL/L (ref 3.5–5.5)
SODIUM BLD-SCNC: 139 MMOL/L (ref 136–145)

## 2019-02-15 PROCEDURE — 80047 BASIC METABLC PNL IONIZED CA: CPT

## 2019-02-15 PROCEDURE — 74011250636 HC RX REV CODE- 250/636: Performed by: EMERGENCY MEDICINE

## 2019-02-15 PROCEDURE — 73110 X-RAY EXAM OF WRIST: CPT

## 2019-02-15 PROCEDURE — 99283 EMERGENCY DEPT VISIT LOW MDM: CPT

## 2019-02-15 PROCEDURE — 96372 THER/PROPH/DIAG INJ SC/IM: CPT

## 2019-02-15 RX ORDER — NAPROXEN 500 MG/1
500 TABLET ORAL 2 TIMES DAILY WITH MEALS
Qty: 20 TAB | Refills: 0 | Status: SHIPPED | OUTPATIENT
Start: 2019-02-15 | End: 2019-02-20 | Stop reason: SINTOL

## 2019-02-15 RX ORDER — KETOROLAC TROMETHAMINE 30 MG/ML
60 INJECTION, SOLUTION INTRAMUSCULAR; INTRAVENOUS
Status: COMPLETED | OUTPATIENT
Start: 2019-02-15 | End: 2019-02-15

## 2019-02-15 RX ADMIN — KETOROLAC TROMETHAMINE 60 MG: 30 INJECTION, SOLUTION INTRAMUSCULAR at 13:26

## 2019-02-15 NOTE — DISCHARGE INSTRUCTIONS
Patient Education   ICE AND ELEVATE. KEEP SPLINT ON FOR 10-14 DAYS     Hand Sprain: Care Instructions  Your Care Instructions  A hand sprain occurs when you stretch or tear a ligament in your hand. Ligaments are the tough tissues that connect one bone to another. Most hand sprains will heal with treatment you can do at home. Follow-up care is a key part of your treatment and safety. Be sure to make and go to all appointments, and call your doctor if you are having problems. It's also a good idea to know your test results and keep a list of the medicines you take. How can you care for yourself at home? · If your doctor gave you a splint or immobilizer, wear it as directed. This will help keep swelling down and help your hand heal.  · Follow your doctor's directions for exercise and other activity. · For the first 2 days after your injury, avoid things that might increase swelling, such as hot showers, hot tubs, or hot packs. · Put ice or a cold pack on your hand for 10 to 20 minutes at a time to stop swelling. Try this every 1 to 2 hours for 3 days (when you are awake) or until the swelling goes down. Put a thin cloth between the ice pack and your skin. Keep your splint dry. · After 2 or 3 days, if your swelling is gone, put a heating pad (set on low) or a warm cloth on your hand. Some experts suggest that you go back and forth between hot and cold treatments. · Prop up your hand on a pillow when you ice it or anytime you sit or lie down. Try to keep it above the level of your heart. This will help reduce swelling. · Take pain medicines exactly as directed. ? If the doctor gave you a prescription medicine for pain, take it as prescribed. ? If you are not taking a prescription pain medicine, ask your doctor if you can take an over-the-counter medicine. · Return to your usual level of activity slowly. When should you call for help?   Call your doctor now or seek immediate medical care if:    · Your pain is worse.     · You have new or increased swelling in your hand.     · You cannot move your hand.     · You have tingling, weakness, or numbness in your hand or fingers.     · Your hand or fingers are cool or pale or change color.     · You have a fever.     · Your hand or fingers are red.    Watch closely for changes in your health, and be sure to contact your doctor if:    · Your hand does not get better as expected. Where can you learn more? Go to http://lazarus-delano.info/. Enter M151 in the search box to learn more about \"Hand Sprain: Care Instructions. \"  Current as of: September 20, 2018  Content Version: 11.9  © 8189-0956 [employer]. Care instructions adapted under license by [employer] (which disclaims liability or warranty for this information). If you have questions about a medical condition or this instruction, always ask your healthcare professional. [address] any warranty or liability for your use of this information.

## 2019-02-15 NOTE — ED PROVIDER NOTES
EMERGENCY DEPARTMENT HISTORY AND PHYSICAL EXAM    11:57 AM      Date: 2/15/2019  Patient Name: Sarita Carrasquillo    History of Presenting Illness     Chief Complaint   Patient presents with    Arm Pain         History Provided By: Patient    Additional History (Context): Sarita Carrasquillo is a 52 y.o. female with Gout who presents with acute left arm stiffness/pain that started PTA. Pt has been in PT for arthritis for the last week. Takes no medications. Pt normally walks with a staggered gait. She has an allergy to muscle relaxer's. She says she gets dizzy when she stands up. She uses gold bond eczema medication on her hands. She has not been dx with carpal tunnel. PCP: Pamela Hooks MD      Chief Complaint: left arm pain  Duration:  PTA  Timing:  Acute  Location: left arm  Quality: stiffness  Modifying Factors: She says she gets dizzy when she stands up. Associated Symptoms: dizziness upon standing    Current Outpatient Medications   Medication Sig Dispense Refill    naproxen (NAPROSYN) 500 mg tablet Take 1 Tab by mouth two (2) times daily (with meals). 20 Tab 0    naproxen (NAPROSYN) 500 mg tablet Take 1 Tab by mouth two (2) times daily (with meals). 20 Tab 0    fluticasone (FLONASE) 50 mcg/actuation nasal spray 2 Sprays by Both Nostrils route daily. Indications:  Allergic Rhinitis 1 Bottle 11    Comp Stocking,Knee,Long,Medium misc Wear daily while walking to prevent swelling 1 Each 0       Past History     Past Medical History:  Past Medical History:   Diagnosis Date    Chest pain 11/2014    neg EKG, non recurrent    Chronic pain     lower back and legs    Depression     Fibroids     GERD (gastroesophageal reflux disease)     Headache(784.0)     Hiatal hernia     IBS (irritable bowel syndrome)     Microscopic hematuria     Rectal bleeding     Stool color black     irritable bowel       Past Surgical History:  Past Surgical History:   Procedure Laterality Date    COLONOSCOPY,DIAGNOSTIC      HX BREAST BIOPSY Right 2015    RIGHT BREAST BIOPSY WITH NEEDLE LOCALIZATION ULTRASOUND performed by Basilio Shields MD at [facility] MAIN OR    HX CHOLECYSTECTOMY      HX GI      HX HYSTERECTOMY      HX TUBAL LIGATION         Family History:  Family History   Problem Relation Age of Onset    HIV/AIDS Brother     Diabetes Father     Cancer Brother     Hypertension Sister     Diabetes Brother     Hypertension Sister     Hypertension Sister     Hypertension Brother        Social History:  Social History     Tobacco Use    Smoking status: Former Smoker     Packs/day: 0.00     Last attempt to quit: 2016     Years since quittin.6    Smokeless tobacco: Former User     Quit date: 2016   Substance Use Topics    Alcohol use: No     Alcohol/week: 6.0 oz     Types: 10 Cans of beer per week    Drug use: No       Allergies: Allergies   Allergen Reactions    Amoxicillin Rash and Nausea Only         Review of Systems       Review of Systems   Constitutional: Negative. Negative for chills, diaphoresis and fever. HENT: Negative. Negative for congestion, rhinorrhea and sore throat. Eyes: Negative. Negative for pain, discharge and redness. Respiratory: Negative. Negative for cough, chest tightness, shortness of breath and wheezing. Cardiovascular: Negative. Negative for chest pain. Gastrointestinal: Negative. Negative for abdominal pain, constipation, diarrhea, nausea and vomiting. Genitourinary: Negative. Negative for dysuria, flank pain, frequency, hematuria and urgency. Musculoskeletal: Negative. Negative for back pain and neck pain. Positive for left arm stiffness   Skin: Negative. Negative for rash. Neurological: Positive for dizziness. Negative for syncope, weakness, numbness and headaches. Psychiatric/Behavioral: Negative. All other systems reviewed and are negative.         Physical Exam     Visit Vitals  /67 (BP 1 Location: Right arm, BP Patient Position: Sitting)   Pulse 80   Temp 97.1 °F (36.2 °C)   Resp 20   LMP  (LMP Unknown)   SpO2 100%         Physical Exam   Constitutional: She is oriented to person, place, and time. She appears well-developed and well-nourished. Non-toxic appearance. She does not have a sickly appearance. She does not appear ill. No distress. HENT:   Head: Normocephalic and atraumatic. Mouth/Throat: Oropharynx is clear and moist. No oropharyngeal exudate. Eyes: Conjunctivae and EOM are normal. Pupils are equal, round, and reactive to light. No scleral icterus. Neck: Normal range of motion. Neck supple. No hepatojugular reflux and no JVD present. No tracheal deviation present. No thyromegaly present. Cardiovascular: Normal rate, regular rhythm, S1 normal, S2 normal, normal heart sounds, intact distal pulses and normal pulses. Exam reveals no gallop, no S3 and no S4. No murmur heard. Pulses:       Radial pulses are 2+ on the right side, and 2+ on the left side. Dorsalis pedis pulses are 2+ on the right side, and 2+ on the left side. Pulmonary/Chest: Effort normal and breath sounds normal. No respiratory distress. She has no decreased breath sounds. She has no wheezes. She has no rhonchi. She has no rales. Abdominal: Soft. Normal appearance and bowel sounds are normal. She exhibits no distension and no mass. There is no hepatosplenomegaly. There is no tenderness. There is no rigidity, no rebound, no guarding, no CVA tenderness, no tenderness at McBurney's point and negative Washington's sign. Musculoskeletal: Normal range of motion. She exhibits no edema. Left wrist: She exhibits tenderness. She exhibits normal range of motion, no bony tenderness, no swelling, no effusion, no crepitus, no deformity and no laceration. Unable to straighten up wrist  No pain illicited with movement    Strength 5/5 throughout with exception of left wrist 3/5.      Lymphadenopathy:        Head (right side): No submental, no submandibular, no preauricular and no occipital adenopathy present. Head (left side): No submental, no submandibular, no preauricular and no occipital adenopathy present. She has no cervical adenopathy. Right: No supraclavicular adenopathy present. Left: No supraclavicular adenopathy present. Neurological: She is alert and oriented to person, place, and time. She has normal strength and normal reflexes. She is not disoriented. No cranial nerve deficit or sensory deficit. Coordination and gait normal. GCS eye subscore is 4. GCS verbal subscore is 5. GCS motor subscore is 6. Grossly intact    Skin: Skin is warm, dry and intact. No rash noted. She is not diaphoretic. Psychiatric: She has a normal mood and affect. Her speech is normal and behavior is normal. Judgment and thought content normal. Cognition and memory are normal.   Nursing note and vitals reviewed. Diagnostic Study Results     Labs -  Recent Results (from the past 12 hour(s))   POC CHEM8    Collection Time: 02/15/19  2:18 PM   Result Value Ref Range    CO2, POC 28 (H) 19 - 24 MMOL/L    Glucose, POC 96 74 - 106 MG/DL    BUN, POC 9 7 - 18 MG/DL    Creatinine, POC 0.7 0.6 - 1.3 MG/DL    GFRAA, POC >60 >60 ml/min/1.73m2    GFRNA, POC >60 >60 ml/min/1.73m2    Sodium,  136 - 145 MMOL/L    Potassium, POC 4.0 3.5 - 5.5 MMOL/L    Calcium, ionized (POC) 1.14 1.12 - 1.32 mmol/L    Chloride,  100 - 108 MMOL/L    Anion gap, POC 14 10 - 20      Hematocrit, POC 36 36 - 49 %    Hemoglobin, POC 12.2 12 - 16 G/DL       Radiologic Studies -   XR WRIST LT AP/LAT/OBL MIN 3V   Final Result   IMPRESSION:   No evidence of acute fracture or dislocation. If there is clinical concern for   radiographically occult fracture (i.e. anatomic snuffbox tenderness) recommend   splinting and short-term follow-up radiograph in 10-14 days.             Medical Decision Making   Provider Notes (Medical Decision Making):  MDM  Number of Diagnoses or Management Options  Sprain of left wrist, initial encounter:   Diagnosis management comments: Wrist contusion  Arthritis   Gout        I am the first provider for this patient. I reviewed the vital signs, available nursing notes, past medical history, past surgical history, family history and social history. Vital Signs-Reviewed the patient's vital signs. Records Reviewed: Nursing Notes and Old Medical Records (Time of Review: 11:57 AM)    ED Course: Progress Notes, Reevaluation, and Consults:  Labs essentially normal. Chest X-Ray showed No acute process. 11:57 AM 2/15/2019      Diagnosis       I have reassessed the patient. Patient is feeling better and will be discharged with wrist splint. Patient will be prescribed Naproxen. Patient was discharged in stable condition. Patient is to return to emergency department if any new or worsening condition. Clinical Impression:   1. Sprain of left wrist, initial encounter        Disposition: discharge    Follow-up Information     Follow up With Specialties Details Why Contact Info    Santosh Piedra MD [facility] Go in 3 days For Follow up [address] [address]  [phone]      [facility] EMERGENCY DEPT Emergency Medicine Go to As needed, If symptoms worsen [address]  [phone]           _______________________________    Attestations:  [address] acting as a scribe for and in the presence of [address] [place], [place] [address]Marivel DO      February 15, 2019 at 12:02 PM       Provider Attestation:      I personally performed the services described in the documentation, reviewed the documentation, as recorded by the scribe in my presence, and it accurately and completely records my words and actions.  February 15, 2019 at 12:02 PM - Lesly Hilario DO    _______________________________

## 2019-02-15 NOTE — ED TRIAGE NOTES
Patient arrived via EMS c/o left arm stiffness. Patient was able to ambulate from EMS stretcher to the bed with assistance from Paramedic and this nurse. Patient advised that she has recently started PT and reports being stiff all over.

## 2019-02-18 ENCOUNTER — TELEPHONE (OUTPATIENT)
Dept: FAMILY MEDICINE CLINIC | Facility: CLINIC | Age: 54
End: 2019-02-18

## 2019-02-18 DIAGNOSIS — G89.29 CHRONIC LEFT-SIDED LOW BACK PAIN WITH LEFT-SIDED SCIATICA: Primary | ICD-10-CM

## 2019-02-18 DIAGNOSIS — M54.42 CHRONIC LEFT-SIDED LOW BACK PAIN WITH LEFT-SIDED SCIATICA: Primary | ICD-10-CM

## 2019-02-18 NOTE — TELEPHONE ENCOUNTER
Patient calling requesting a medication to be sent to the pharmacy. When she was in the er they prescribed her naproxen but fell out and its harsh on her stomach. Wants to know can she be called in something else. Please advise. And also something cheap.

## 2019-02-19 ENCOUNTER — OFFICE VISIT (OUTPATIENT)
Dept: NEUROLOGY | Age: 54
End: 2019-02-19

## 2019-02-19 VITALS
HEIGHT: 63 IN | HEART RATE: 76 BPM | WEIGHT: 131 LBS | TEMPERATURE: 98.1 F | SYSTOLIC BLOOD PRESSURE: 112 MMHG | OXYGEN SATURATION: 99 % | DIASTOLIC BLOOD PRESSURE: 60 MMHG | BODY MASS INDEX: 23.21 KG/M2 | RESPIRATION RATE: 18 BRPM

## 2019-02-19 DIAGNOSIS — G47.10 HYPERSOMNOLENCE: Primary | ICD-10-CM

## 2019-02-19 DIAGNOSIS — R40.20 LOSS OF CONSCIOUSNESS (HCC): ICD-10-CM

## 2019-02-19 NOTE — PROGRESS NOTES
Re:  Ruby Jacobs,Follow up visit     2/19/2019 11:43 AM    SSN: [id number]    Subjective:   Stephen Gilmore returns for follow up of lose of consciousness spells. who comes in with spells of inattentiveness. She states she has constant episodes of lose of consciousness all day long. She does this numerous times during the day. She claims to feel sleepy all the time. She states that she doesn't sleep well at night, getting no better than 2-3 hours of broken sleep. When she falls unconscious during the day she can sleep for an hour. Additionally she has complaints of chronic pain throughout her body in various locations. Back pain, arm pain, arm spasms etc.      Medications:    Current Outpatient Medications   Medication Sig Dispense Refill    fluticasone (FLONASE) 50 mcg/actuation nasal spray 2 Sprays by Both Nostrils route daily. Indications: Allergic Rhinitis 1 Bottle 11    naproxen (NAPROSYN) 500 mg tablet Take 1 Tab by mouth two (2) times daily (with meals). 20 Tab 0    naproxen (NAPROSYN) 500 mg tablet Take 1 Tab by mouth two (2) times daily (with meals).  20 Tab 0    Comp Stocking,Knee,Long,Medium misc Wear daily while walking to prevent swelling 1 Each 0       Vital signs:    Visit Vitals  /60 (BP 1 Location: Left arm, BP Patient Position: Sitting)   Pulse 76   Temp 98.1 °F (36.7 °C) (Oral)   Resp 18   Ht 5' 3\" (1.6 m)   Wt 59.4 kg (131 lb)   LMP  (LMP Unknown)   SpO2 99%   BMI 23.21 kg/m²       Review of Systems:   As above otherwise 11 point review of systems negative including;   Constitutional no fever or chills  Skin denies rash or itching  HEENT  Denies tinnitus, hearing lose  Eyes denies diplopia vision lose  Respiratory denies sortness of breath  Cardiovascular denies chest pain, dyspnea on exertion  Gastrointestinal denies nausea, vomiting, diarrhea, constipation  Genitourinary denies incontinence  Musculoskeletal denies joint pain or swelling  Endocrine denies weight change  Hematology denies easy bruising or bleeding   Neurological as above in HPI      Patient Active Problem List   Diagnosis Code    Microscopic hematuria R31.29    Rectal bleeding K62.5    Chronic pain G89.29    Diffuse cystic mastopathy N60.19    Paranoid schizophrenia ([address] F20.0    Chronic left-sided low back pain with left-sided sciatica M54.42, G89.29    Spells of decreased attentiveness R68.89    Unsteady gait R26.81    Perennial allergic rhinitis J30.89         Objective: The patient is awake, alert, and oriented x 4. Fund of knowledge is adequate. Speech is fluent and memory is intact. Cranial Nerves: II - Visual fields are full to confrontation. III, IV, VI - Extraocular movements are intact. There is no nystagmus. V - Facial sensation is intact to pinprick. VII - Face is symmetrical.  VIII - Hearing is present. IX, X, XII - Palate is symmetrical.   XI - Shoulder shrugging and head turning intact  Motor: The patient moves all four limbs fairly well and symmetrically. Tone is normal. Reflexes are 2+ and symmetrical. Plantars are down going. Gait is, antalgic, very unsteady, but she doesn't fall.     CBC:   Lab Results   Component Value Date/Time    WBC 4.7 05/25/2018 01:55 PM    RBC 3.88 (L) 05/25/2018 01:55 PM    HGB 11.4 (L) 05/25/2018 01:55 PM    HCT 34.4 (L) 05/25/2018 01:55 PM    PLATELET 693 47/79/9278 01:55 PM     BMP:   Lab Results   Component Value Date/Time    Glucose 82 05/25/2018 01:55 PM    Sodium 139 05/25/2018 01:55 PM    Potassium 3.5 05/25/2018 01:55 PM    Chloride 104 05/25/2018 01:55 PM    CO2 31 05/25/2018 01:55 PM    BUN 10 05/25/2018 01:55 PM    Creatinine 0.68 05/25/2018 01:55 PM    Calcium 9.3 05/25/2018 01:55 PM     CMP:   Lab Results   Component Value Date/Time    Glucose 82 05/25/2018 01:55 PM    Sodium 139 05/25/2018 01:55 PM    Potassium 3.5 05/25/2018 01:55 PM    Chloride 104 05/25/2018 01:55 PM    CO2 31 05/25/2018 01:55 PM    BUN 10 05/25/2018 01:55 PM    Creatinine 0.68 05/25/2018 01:55 PM    Calcium 9.3 05/25/2018 01:55 PM    Anion gap 4 05/25/2018 01:55 PM    BUN/Creatinine ratio 15 05/25/2018 01:55 PM    Alk. phosphatase 70 05/25/2018 01:55 PM    Protein, total 7.9 05/25/2018 01:55 PM    Albumin 4.2 05/25/2018 01:55 PM    Globulin 3.7 05/25/2018 01:55 PM    A-G Ratio 1.1 05/25/2018 01:55 PM     Coagulation:   Lab Results   Component Value Date/Time    Prothrombin time 12.7 05/19/2018 09:17 AM    INR 1.0 05/19/2018 09:17 AM    aPTT 27.5 05/19/2018 09:17 AM     Cardiac markers:   Lab Results   Component Value Date/Time    CK 92 09/14/2017 02:28 AM    CK-MB Index  09/14/2017 02:28 AM     CALCULATION NOT PERFORMED WHEN RESULT IS BELOW LINEAR LIMIT       Assessment:  What sounds like hypersomnolence in this patient with significant long term depession. Plan: Will get a routine EEG and a sleep study. RTC in 6 weeks. Sincerely,        Garrison Ortiz.  Noris Ortega M.D.

## 2019-02-19 NOTE — LETTER
2/19/2019 11:52 AM 
 
Patient:  Gabino Terrazas YOB: 1965 Date of Visit: 2/19/2019 Dear Holly Gay MD 
[address] [facility] [address] [place] [address] VIA In Basket 
 : Thank you for referring Ms. Amish Nuñez to me for evaluation/treatment. Below are the relevant portions of my assessment and plan of care. If you have questions, please do not hesitate to call me. I look forward to following Ms. Lilibeth Meza along with you.  
 
 
 
Sincerely, 
 
 
Galileo Ewing MD

## 2019-02-19 NOTE — PROGRESS NOTES
Jesica Crow is a 52 y.o. female  patient in to discuss spells of decreased attentiveness, abnormal brain CT, and unsteady gait; previously seen for chronic pain syndrome; referred back by Alina Arora MD.

## 2019-02-20 RX ORDER — NABUMETONE 500 MG/1
500 TABLET, FILM COATED ORAL 2 TIMES DAILY
Qty: 60 TAB | Refills: 2 | Status: SHIPPED | OUTPATIENT
Start: 2019-02-20 | End: 2019-08-27 | Stop reason: SDUPTHER

## 2019-02-20 NOTE — TELEPHONE ENCOUNTER
Phone call to patient advised of new prescription being sent in as well as previous message per Dr. Olivier Ruiz in regards to cost. Patient will contact pharmacy and if any questions or concerns will contact our office. Otherwise patient will  new prescription.

## 2019-02-25 ENCOUNTER — OFFICE VISIT (OUTPATIENT)
Dept: FAMILY MEDICINE CLINIC | Facility: CLINIC | Age: 54
End: 2019-02-25

## 2019-02-25 VITALS
DIASTOLIC BLOOD PRESSURE: 60 MMHG | SYSTOLIC BLOOD PRESSURE: 120 MMHG | HEART RATE: 85 BPM | HEIGHT: 63 IN | BODY MASS INDEX: 23.57 KG/M2 | TEMPERATURE: 96.8 F | RESPIRATION RATE: 20 BRPM | OXYGEN SATURATION: 97 % | WEIGHT: 133 LBS

## 2019-02-25 DIAGNOSIS — S69.92XD INJURY OF LEFT WRIST, SUBSEQUENT ENCOUNTER: Primary | ICD-10-CM

## 2019-02-25 DIAGNOSIS — L30.9 ECZEMA, UNSPECIFIED TYPE: ICD-10-CM

## 2019-02-25 RX ORDER — TRIAMCINOLONE ACETONIDE 1 MG/G
CREAM TOPICAL 2 TIMES DAILY
Qty: 45 G | Refills: 11 | Status: SHIPPED | OUTPATIENT
Start: 2019-02-25 | End: 2020-03-11

## 2019-02-25 NOTE — PROGRESS NOTES
HISTORY OF PRESENT ILLNESS  Rocio Velázquez is a 52 y.o. female. Seen in follow-up after her ER visit for left wrist sprain in PT. She also has at least 20 other complaints (various rashes, multiple joint problems, nose cracking). She does say that she feels better in the left wrist splint. Relafen may be helping her pain somewhat, though she thinks that it makes her a bit short of breath. When asked about her psychiatric treatment, she says she would like something for anxiety, but that her therapist can't prescribe and sent her back to me for medication. Review of Systems   Respiratory: Positive for shortness of breath. Cardiovascular: Negative for chest pain. Musculoskeletal: Positive for back pain and joint pain. Skin: Positive for rash. Visit Vitals  /60   Pulse 85   Temp 96.8 °F (36 °C) (Oral)   Resp 20   Ht 5' 3\" (1.6 m)   Wt 133 lb (60.3 kg)   LMP  (LMP Unknown)   SpO2 97%   BMI 23.56 kg/m²       Physical Exam   Constitutional: She is oriented to person, place, and time. She appears well-developed and well-nourished. No distress. Neck: Neck supple. No thyromegaly present. Cardiovascular: Normal rate and regular rhythm. Exam reveals no gallop and no friction rub. No murmur heard. Pulmonary/Chest: Effort normal and breath sounds normal. No respiratory distress. Musculoskeletal:   Left hand - no swelling or tenderness, but she holds the hand in ulnar deviation and slight flexion, and actively resists movement away from this position   Lymphadenopathy:     She has no cervical adenopathy. Neurological: She is alert and oriented to person, place, and time. Skin: Skin is warm and dry. Rash (dry rash on dorsum of right hand) noted. Psychiatric: She has a normal mood and affect. Her behavior is normal. Judgment and thought content normal.       ASSESSMENT and PLAN    ICD-10-CM ICD-9-CM    1. Injury of left wrist, subsequent encounter S69. 92XD V58.89 REFERRAL TO ORTHOPEDICS     959.3    2. Eczema, unspecified type L30.9 692.9 triamcinolone acetonide (KENALOG) 0.1 % topical cream     Follow-up Disposition:  Return as planned in April.  the following changes in treatment are made: Add TAC cream for eczema. Referral to Hand Surgery (I think there may be something to her left hand problem). I continue to believe that her untreated schizophrenia is producing almost all of her many somatic complaints, but she is resistant to this idea. reviewed medications and side effects in detail  Plan of care reviewed - patient verbalize(s) understanding and agreement.

## 2019-03-11 ENCOUNTER — HOSPITAL ENCOUNTER (OUTPATIENT)
Dept: NEUROLOGY | Age: 54
Discharge: HOME | End: 2019-03-11
Attending: PSYCHIATRY & NEUROLOGY
Payer: MEDICARE

## 2019-03-11 DIAGNOSIS — R40.20 LOSS OF CONSCIOUSNESS (HCC): ICD-10-CM

## 2019-03-11 DIAGNOSIS — G47.10 HYPERSOMNOLENCE: ICD-10-CM

## 2019-03-11 PROCEDURE — 95819 EEG AWAKE AND ASLEEP: CPT

## 2019-03-13 NOTE — PROCEDURES
[facility]  EEG    Name:  Merly Pedro  MR#:   [id number]  :  1965  ACCOUNT #:  [de-identified]  DATE OF SERVICE:  2019    REFERRING PHYSICIAN:  Ardia Kussmaul, MD    EEG Number:  [id number]    CLINICAL HISTORY:  A 78-year-old female with episodes of alterations of consciousness and inattentiveness. MEDICATIONS:  Naproxen and fluticasone. EEG REPORT:  This is a 16-channel routine EEG done using the international 10-20 electrode placement system. The predominant background consists of 8 Hz posterior predominant rhythms which attenuate with eye opening. There were no abnormal photoparoxysmal responses. Hyperventilation did not alter the background. Periods of 6 to 7 Hz central predominant slowing consistent with drowsiness, which is more irregular and during the second part of the test, some staged sleep is observed evidenced by central vertex waves, K-complexes, and few sleep spindles. No epileptiform abnormalities were observed. IMPRESSION:  This is a normal awake and sleep EEG.         Ion Centeno MD      [id number]  D:  2019 16:24  T:  2019 16:22  JOB #:  [id number]

## 2019-03-25 ENCOUNTER — HOSPITAL ENCOUNTER (OUTPATIENT)
Dept: SLEEP MEDICINE | Age: 54
Discharge: HOME | End: 2019-03-25
Payer: MEDICARE

## 2019-03-25 DIAGNOSIS — G47.10 HYPERSOMNOLENCE: ICD-10-CM

## 2019-03-25 DIAGNOSIS — R40.20 LOSS OF CONSCIOUSNESS (HCC): ICD-10-CM

## 2019-03-25 PROCEDURE — 95810 POLYSOM 6/> YRS 4/> PARAM: CPT

## 2019-03-26 VITALS — HEART RATE: 75 BPM | SYSTOLIC BLOOD PRESSURE: 100 MMHG | DIASTOLIC BLOOD PRESSURE: 65 MMHG

## 2019-03-27 ENCOUNTER — OFFICE VISIT (OUTPATIENT)
Dept: ORTHOPEDIC SURGERY | Facility: CLINIC | Age: 54
End: 2019-03-27

## 2019-03-27 ENCOUNTER — DOCUMENTATION ONLY (OUTPATIENT)
Dept: NEUROLOGY | Age: 54
End: 2019-03-27

## 2019-03-27 VITALS
RESPIRATION RATE: 16 BRPM | TEMPERATURE: 97.5 F | OXYGEN SATURATION: 95 % | BODY MASS INDEX: 24.17 KG/M2 | SYSTOLIC BLOOD PRESSURE: 131 MMHG | WEIGHT: 136.4 LBS | DIASTOLIC BLOOD PRESSURE: 74 MMHG | HEIGHT: 63 IN | HEART RATE: 81 BPM

## 2019-03-27 DIAGNOSIS — S63.502A SPRAIN OF LEFT WRIST, INITIAL ENCOUNTER: Primary | ICD-10-CM

## 2019-03-27 DIAGNOSIS — M25.532 LEFT WRIST PAIN: ICD-10-CM

## 2019-03-27 DIAGNOSIS — S62.025A CLOSED NONDISPLACED FRACTURE OF MIDDLE THIRD OF SCAPHOID BONE OF LEFT WRIST, INITIAL ENCOUNTER: ICD-10-CM

## 2019-03-27 NOTE — PATIENT INSTRUCTIONS
Wrist Sprain: Care Instructions  Your Care Instructions    Your wrist hurts because you have stretched or torn ligaments, which connect the bones in your wrist.  Wrist sprains usually take from 2 to 10 weeks to heal, but some take longer. Usually, the more pain you have, the more severe your wrist sprain is and the longer it will take to heal. You can heal faster and regain strength in your wrist with good home treatment. Follow-up care is a key part of your treatment and safety. Be sure to make and go to all appointments, and call your doctor if you are having problems. It's also a good idea to know your test results and keep a list of the medicines you take. How can you care for yourself at home? · Prop up your arm on a pillow when you ice it or anytime you sit or lie down for the next 3 days. Try to keep your wrist above the level of your heart. This will help reduce swelling. · Put ice or cold packs on your wrist for 10 to 20 minutes at a time. Try to do this every 1 to 2 hours for the next 3 days (when you are awake) or until the swelling goes down. Put a thin cloth between the ice pack and your skin. · After 2 or 3 days, if your swelling is gone, apply a heating pad set on low or a warm cloth to your wrist. This helps keep your wrist flexible. Some doctors suggest that you go back and forth between hot and cold. · If you have an elastic bandage, keep it on for the next 24 to 36 hours. The bandage should be snug but not so tight that it causes numbness or tingling. To rewrap the wrist, wrap the bandage around the hand a few times, beginning at the fingers. Then wrap it around the hand between the thumb and index finger, ending by circling the wrist several times. · If your doctor gave you a splint or brace, wear it as directed to protect your wrist until it has healed. · Take pain medicines exactly as directed. ? If the doctor gave you a prescription medicine for pain, take it as prescribed. ?  If you are not taking a prescription pain medicine, ask your doctor if you can take an over-the-counter medicine. · Try not to use your injured wrist and hand. When should you call for help? Call your doctor now or seek immediate medical care if:    · Your hand or fingers are cool or pale or change color.    Watch closely for changes in your health, and be sure to contact your doctor if:    · Your pain gets worse.     · Your wrist has not improved after 1 week. Where can you learn more? Go to http://alzarus-delano.info/. Enter G541 in the search box to learn more about \"Wrist Sprain: Care Instructions. \"  Current as of: September 20, 2018  Content Version: 11.9  © 7666-4006 [employer]. Care instructions adapted under license by [employer] (which disclaims liability or warranty for this information). If you have questions about a medical condition or this instruction, always ask your healthcare professional. [address] any warranty or liability for your use of this information.

## 2019-03-27 NOTE — PROGRESS NOTES
Antonio Brewster is a 52 y.o. female right handed individual, not currently working. Worker's Compensation and legal considerations: not known. Vitals:    03/27/19 1022   BP: 131/74   Pulse: 81   Resp: 16   Temp: 97.5 °F (36.4 °C)   TempSrc: Oral   SpO2: 95%   Weight: 136 lb 6.4 oz (61.9 kg)   Height: 5' 3\" (1.6 m)   PainSc:   8           Chief Complaint   Patient presents with    Wrist Pain     Left wrist pain          HPI: Patient comes in today after injuring her left wrist 6 weeks ago by getting it stuck in between the air conditioner in the wall. She went to the emergency department and had negative x-rays at that time was given a brace for a sprain. Today is her first appointment with me due to scheduling issues with the patient. She reports no improvement in her pain since the injury.     Date of onset: 2/15/2019    Injury: Yes: Comment: Wrist stuck between wall and air conditioner    Prior Treatment:  Yes: Comment: ED placed into a Velcro splint    Numbness/ Tingling: No    ROS: Review of Systems - General ROS: negative  Respiratory ROS: no cough, shortness of breath, or wheezing  Cardiovascular ROS: no chest pain or dyspnea on exertion  Musculoskeletal ROS: positive for - pain in wrist - left  Neurological ROS: negative  Dermatological ROS: negative    Past Medical History:   Diagnosis Date    Chest pain 11/2014    neg EKG, non recurrent    Chronic pain     lower back and legs    Depression     Fibroids     GERD (gastroesophageal reflux disease)     Headache(784.0)     Hiatal hernia     IBS (irritable bowel syndrome)     Microscopic hematuria     Rectal bleeding     Stool color black     irritable bowel       Past Surgical History:   Procedure Laterality Date    COLONOSCOPY,DIAGNOSTIC      HX BREAST BIOPSY Right 1/21/2015    RIGHT BREAST BIOPSY WITH NEEDLE LOCALIZATION ULTRASOUND performed by Carina Calvillo MD at [address] [place] [address] HX CHOLECYSTECTOMY      HX GI      HX HYSTERECTOMY      HX TUBAL LIGATION         Current Outpatient Medications   Medication Sig Dispense Refill    triamcinolone acetonide (KENALOG) 0.1 % topical cream Apply  to affected area two (2) times a day. 45 g 11    nabumetone (RELAFEN) 500 mg tablet Take 1 Tab by mouth two (2) times a day. 60 Tab 2    fluticasone (FLONASE) 50 mcg/actuation nasal spray 2 Sprays by Both Nostrils route daily. Indications: Allergic Rhinitis 1 Bottle 11    Comp Stocking,Knee,Long,Medium misc Wear daily while walking to prevent swelling 1 Each 0       Allergies   Allergen Reactions    Naproxen Shortness of Breath    Amoxicillin Rash and Nausea Only         PE:     Left Wrist: There is tenderness to palpation about the wrist diffusely especially with range of motion. There may be some tenderness to palpation about the anatomic snuffbox and the scaphoid tubercle. Overall it is difficult to focally pinpoint her tenderness. Imaging: Plain films of the left wrist 3 view including scaphoid view does not show any acute fracture dislocation. There may be a cortical irregularity noted about the mid aspect of the scaphoid. Previous plane films in ED on 2/15/2019 were positioned poorly but with no obvious fracture or dislocation as below per the radiologist  IMPRESSION:  No evidence of acute fracture or dislocation. If there is clinical concern for  radiographically occult fracture (i.e. anatomic snuffbox tenderness) recommend  splinting and short-term follow-up radiograph in 10-14 days. ICD-10-CM ICD-9-CM    1. Sprain of left wrist, initial encounter S63.502A 842.00 MRI WRIST LT WO CONT      AMB SUPPLY ORDER   2.  Left wrist pain M25.532 719.43 AMB POC XRAY, WRIST; COMPLETE, 3+ VIE      AMB SUPPLY ORDER   3. Closed nondisplaced fracture of middle third of scaphoid bone of left wrist, initial encounter S62.025A 814.01 MRI WRIST LT WO CONT      AMB SUPPLY ORDER       Plan:     Left thumb spica brace    Left MRI of the wrist without contrast follow-up after MRI I told the patient to keep her    Brace on with the exception of showering in the MRI.     Plan was reviewed with patient, who verbalized agreement and understanding of the plan

## 2019-04-01 ENCOUNTER — OFFICE VISIT (OUTPATIENT)
Dept: NEUROLOGY | Age: 54
End: 2019-04-01

## 2019-04-01 VITALS
HEIGHT: 63 IN | BODY MASS INDEX: 24.03 KG/M2 | DIASTOLIC BLOOD PRESSURE: 82 MMHG | SYSTOLIC BLOOD PRESSURE: 120 MMHG | RESPIRATION RATE: 18 BRPM | HEART RATE: 58 BPM | WEIGHT: 135.6 LBS | OXYGEN SATURATION: 98 % | TEMPERATURE: 97.7 F

## 2019-04-01 DIAGNOSIS — G89.4 CHRONIC PAIN SYNDROME: ICD-10-CM

## 2019-04-01 DIAGNOSIS — G47.10 HYPERSOMNOLENCE: Primary | ICD-10-CM

## 2019-04-01 RX ORDER — NORTRIPTYLINE HYDROCHLORIDE 25 MG/1
25 CAPSULE ORAL
Qty: 60 CAP | Refills: 5 | Status: SHIPPED | OUTPATIENT
Start: 2019-04-01 | End: 2019-05-30 | Stop reason: SDUPTHER

## 2019-04-01 NOTE — PROGRESS NOTES
Re:  Britany Jacobs,Follow up visit     4/1/2019 11:14 AM    SSN: [id number]    Subjective:   Ken Fuchs returns for follow up of lose of consciousness spells. who comes in with spells of inattentiveness. She states she has constant episodes of lose of consciousness all day long. She does this numerous times during the day. She claims to feel sleepy all the time. She states that she doesn't sleep well at night, getting no better than 2-3 hours of broken sleep. When she falls unconscious during the day she can sleep for an hour. There's been nochange in her symptoms. Additionally she has complaints of chronic pain throughout her body in various locations. Back pain, arm pain, arm spasms etc.      Medications:    Current Outpatient Medications   Medication Sig Dispense Refill    triamcinolone acetonide (KENALOG) 0.1 % topical cream Apply  to affected area two (2) times a day. 45 g 11    nabumetone (RELAFEN) 500 mg tablet Take 1 Tab by mouth two (2) times a day. 60 Tab 2    fluticasone (FLONASE) 50 mcg/actuation nasal spray 2 Sprays by Both Nostrils route daily. Indications:  Allergic Rhinitis 1 Bottle 11    Comp Stocking,Knee,Long,Medium misc Wear daily while walking to prevent swelling 1 Each 0       Vital signs:    Visit Vitals  /82 (BP 1 Location: Left arm, BP Patient Position: Sitting)   Pulse (!) 58   Temp 97.7 °F (36.5 °C) (Oral)   Resp 18   Ht 5' 3\" (1.6 m)   Wt 61.5 kg (135 lb 9.6 oz)   LMP  (LMP Unknown)   SpO2 98%   BMI 24.02 kg/m²       Review of Systems:   As above otherwise 11 point review of systems negative including;   Constitutional no fever or chills  Skin denies rash or itching  HEENT  Denies tinnitus, hearing lose  Eyes denies diplopia vision lose  Respiratory denies sortness of breath  Cardiovascular denies chest pain, dyspnea on exertion  Gastrointestinal denies nausea, vomiting, diarrhea, constipation  Genitourinary denies incontinence  Musculoskeletal denies joint pain or swelling  Endocrine denies weight change  Hematology denies easy bruising or bleeding   Neurological as above in HPI      Patient Active Problem List   Diagnosis Code    Microscopic hematuria R31.29    Rectal bleeding K62.5    Chronic pain G89.29    Diffuse cystic mastopathy N60.19    Paranoid schizophrenia ([facility] [address] F20.0    Chronic left-sided low back pain with left-sided sciatica M54.42, G89.29    Spells of decreased attentiveness R68.89    Unsteady gait R26.81    Perennial allergic rhinitis J30.89         Objective: The patient is awake, alert, and oriented x 4. Fund of knowledge is adequate. Speech is fluent and memory is intact. Cranial Nerves: II - Visual fields are full to confrontation. III, IV, VI - Extraocular movements are intact. There is no nystagmus. V - Facial sensation is intact to pinprick. VII - Face is symmetrical.  VIII - Hearing is present. IX, X, XII - Palate is symmetrical.   XI - Shoulder shrugging and head turning intact  Motor: The patient moves all four limbs fairly well and symmetrically. Tone is normal. Reflexes are 2+ and symmetrical. Plantars are down going. Gait is, antalgic, very unsteady, but she doesn't fall, no change from last time. She denies falling.       CBC:   Lab Results   Component Value Date/Time    WBC 4.7 05/25/2018 01:55 PM    RBC 3.88 (L) 05/25/2018 01:55 PM    HGB 11.4 (L) 05/25/2018 01:55 PM    HCT 34.4 (L) 05/25/2018 01:55 PM    PLATELET 114 74/51/0600 01:55 PM     BMP:   Lab Results   Component Value Date/Time    Glucose 82 05/25/2018 01:55 PM    Sodium 139 05/25/2018 01:55 PM    Potassium 3.5 05/25/2018 01:55 PM    Chloride 104 05/25/2018 01:55 PM    CO2 31 05/25/2018 01:55 PM    BUN 10 05/25/2018 01:55 PM    Creatinine 0.68 05/25/2018 01:55 PM    Calcium 9.3 05/25/2018 01:55 PM     CMP:   Lab Results   Component Value Date/Time    Glucose 82 05/25/2018 01:55 PM    Sodium 139 05/25/2018 01:55 PM Potassium 3.5 2018 01:55 PM    Chloride 104 2018 01:55 PM    CO2 31 2018 01:55 PM    BUN 10 2018 01:55 PM    Creatinine 0.68 2018 01:55 PM    Calcium 9.3 2018 01:55 PM    Anion gap 4 2018 01:55 PM    BUN/Creatinine ratio 15 2018 01:55 PM    Alk. phosphatase 70 2018 01:55 PM    Protein, total 7.9 2018 01:55 PM    Albumin 4.2 2018 01:55 PM    Globulin 3.7 2018 01:55 PM    A-G Ratio 1.1 2018 01:55 PM     Coagulation:   Lab Results   Component Value Date/Time    Prothrombin time 12.7 2018 09:17 AM    INR 1.0 2018 09:17 AM    aPTT 27.5 2018 09:17 AM     Cardiac markers:   Lab Results   Component Value Date/Time    CK 92 2017 02:28 AM    CK-MB Index  2017 02:28 AM     CALCULATION NOT PERFORMED WHEN RESULT IS BELOW LINEAR LIMIT     [facility]  EEG     Name:  Ronny Llamas  MR#:   [id number]  :  1965  ACCOUNT #:  [de-identified]  DATE OF SERVICE:  2019     REFERRING PHYSICIAN:  Chiara Lau MD     EEG Number:  [id number]     CLINICAL HISTORY:  A 57-year-old female with episodes of alterations of consciousness and inattentiveness.     MEDICATIONS:  Naproxen and fluticasone.     EEG REPORT:  This is a 16-channel routine EEG done using the international 10-20 electrode placement system. The predominant background consists of 8 Hz posterior predominant rhythms which attenuate with eye opening. There were no abnormal photoparoxysmal responses. Hyperventilation did not alter the background. Periods of 6 to 7 Hz central predominant slowing consistent with drowsiness, which is more irregular and during the second part of the test, some staged sleep is observed evidenced by central vertex waves, K-complexes, and few sleep spindles. No epileptiform abnormalities were observed.     IMPRESSION:  This is a normal awake and sleep EEG.   Petros Rivas MD     She had the sleep study done last week, but it hasn't been scored as of yet. Assessment:  What sounds like hypersomnolence in this patient with significant long term depession. Plan:  Need to get sleep study scored. Will start Pamelor to help with sleep and chronic pain. RTC in 6 weeks. Sincerely,        Dio Crump.  Clary Mcintyre M.D.

## 2019-04-01 NOTE — PROGRESS NOTES
Reba Duffy is a 52 y.o. female in today for follow-up on hypersomnolence and EEG results. Learning assessment previously completed 6/15/2018; primary language is [place]. 1. Have you been to the ER, urgent care clinic since your last visit? Hospitalized since your last visit? No    2. Have you seen or consulted any other health care providers outside of the [address] [place] [address] since your last visit? Include any pap smears or colon screening.  No

## 2019-04-11 ENCOUNTER — HOSPITAL ENCOUNTER (OUTPATIENT)
Dept: MRI IMAGING | Age: 54
Discharge: HOME | End: 2019-04-11
Attending: ORTHOPAEDIC SURGERY
Payer: MEDICARE

## 2019-04-11 DIAGNOSIS — S63.502A SPRAIN OF LEFT WRIST, INITIAL ENCOUNTER: ICD-10-CM

## 2019-04-11 DIAGNOSIS — S62.025A CLOSED NONDISPLACED FRACTURE OF MIDDLE THIRD OF SCAPHOID BONE OF LEFT WRIST, INITIAL ENCOUNTER: ICD-10-CM

## 2019-04-11 PROCEDURE — 73221 MRI JOINT UPR EXTREM W/O DYE: CPT

## 2019-04-17 ENCOUNTER — OFFICE VISIT (OUTPATIENT)
Dept: FAMILY MEDICINE CLINIC | Facility: CLINIC | Age: 54
End: 2019-04-17

## 2019-04-17 VITALS
DIASTOLIC BLOOD PRESSURE: 74 MMHG | OXYGEN SATURATION: 96 % | HEART RATE: 66 BPM | TEMPERATURE: 97.3 F | WEIGHT: 134 LBS | RESPIRATION RATE: 16 BRPM | BODY MASS INDEX: 23.74 KG/M2 | SYSTOLIC BLOOD PRESSURE: 124 MMHG | HEIGHT: 63 IN

## 2019-04-17 DIAGNOSIS — M79.604 BILATERAL LEG PAIN: ICD-10-CM

## 2019-04-17 DIAGNOSIS — M79.605 BILATERAL LEG PAIN: ICD-10-CM

## 2019-04-17 DIAGNOSIS — Z87.898 HISTORY OF PERIPHERAL EDEMA: ICD-10-CM

## 2019-04-17 DIAGNOSIS — H93.12 TINNITUS OF LEFT EAR: ICD-10-CM

## 2019-04-17 DIAGNOSIS — Z13.6 SCREENING FOR CARDIOVASCULAR CONDITION: ICD-10-CM

## 2019-04-17 DIAGNOSIS — Z13.21 ENCOUNTER FOR VITAMIN DEFICIENCY SCREENING: ICD-10-CM

## 2019-04-17 DIAGNOSIS — M79.604 PAIN IN BOTH LOWER EXTREMITIES: ICD-10-CM

## 2019-04-17 DIAGNOSIS — Z13.29 SCREENING FOR THYROID DISORDER: ICD-10-CM

## 2019-04-17 DIAGNOSIS — R05.9 COUGH: ICD-10-CM

## 2019-04-17 DIAGNOSIS — Z13.6 SCREENING FOR CARDIOVASCULAR CONDITION: Primary | ICD-10-CM

## 2019-04-17 DIAGNOSIS — M79.605 PAIN IN BOTH LOWER EXTREMITIES: ICD-10-CM

## 2019-04-17 DIAGNOSIS — M79.672 PAIN IN BOTH FEET: ICD-10-CM

## 2019-04-17 DIAGNOSIS — M79.671 PAIN IN BOTH FEET: ICD-10-CM

## 2019-04-17 RX ORDER — GABAPENTIN 100 MG/1
100 CAPSULE ORAL 2 TIMES DAILY
Qty: 60 CAP | Refills: 2 | Status: SHIPPED | OUTPATIENT
Start: 2019-04-17 | End: 2019-04-24 | Stop reason: SINTOL

## 2019-04-17 NOTE — PROGRESS NOTES
Maria Isabel Bui is a 52 y.o. female presenting today for Establish Care  . HPI:  Maria Isabel Bui presents to the office today to establish care with the practice. Patient presents today with multiple complaints. She does have a past medical history for sciatica, chronic pain, paranoid schizophrenia, and unsteady gait. Patient presents today ambulating with a walker. Her complaints begins with a history of burning sensation, IBS. Patient is followed by gastroenterology and was instructed to schedule appointment. Patient is also complaining about tinnitus, lower extremity, sharp shooting pains. She also presents today complaining about intermittent extremity edema with intermittent cough. She denies any chest pain, palpitation, dyspnea, wheezing, abdominal pain, nausea, vomiting, diarrhea constipation. She is negative for headache and dizziness. Review of Systems   Constitutional: Negative for chills. HENT: Positive for tinnitus. Negative for congestion. Respiratory: Positive for cough. Negative for sputum production and shortness of breath. Cardiovascular: Positive for leg swelling. Negative for chest pain and palpitations. Gastrointestinal: Negative for abdominal pain, nausea and vomiting. Genitourinary: Negative for dysuria and frequency. Musculoskeletal: Positive for myalgias. Neurological: Negative for headaches. Psychiatric/Behavioral: The patient is nervous/anxious. Allergies   Allergen Reactions    Naproxen Shortness of Breath    Shellfish Derived Nausea and Vomiting     SEVERE NAUSEA AND VOMITING    Amoxicillin Rash and Nausea Only       Current Outpatient Medications   Medication Sig Dispense Refill    gabapentin (NEURONTIN) 100 mg capsule Take 1 Cap by mouth two (2) times a day. 60 Cap 2    nabumetone (RELAFEN) 500 mg tablet Take 1 Tab by mouth two (2) times a day.  60 Tab 2    fluticasone (FLONASE) 50 mcg/actuation nasal spray 2 Sprays by Both Nostrils route daily. Indications: Allergic Rhinitis 1 Bottle 11    Comp Stocking,Knee,Long,Medium misc Wear daily while walking to prevent swelling 1 Each 0    nortriptyline (PAMELOR) 25 mg capsule Take 1 Cap by mouth nightly. May increase to 2 pills in 3 days 60 Cap 5    triamcinolone acetonide (KENALOG) 0.1 % topical cream Apply  to affected area two (2) times a day.  45 g 11       Past Medical History:   Diagnosis Date    Chest pain 2014    neg EKG, non recurrent    Chronic pain     lower back and legs    Depression     Fibroids     GERD (gastroesophageal reflux disease)     Headache(784.0)     Hiatal hernia     IBS (irritable bowel syndrome)     Microscopic hematuria     Rectal bleeding     Stool color black     irritable bowel       Past Surgical History:   Procedure Laterality Date    COLONOSCOPY,DIAGNOSTIC      HX BREAST BIOPSY Right 2015    RIGHT BREAST BIOPSY WITH NEEDLE LOCALIZATION ULTRASOUND performed by Mercedes Jones MD at [facility] MAIN OR    HX CHOLECYSTECTOMY      HX GI      HX HYSTERECTOMY      HX TUBAL LIGATION         Social History     Socioeconomic History    Marital status: SINGLE     Spouse name: Not on file    Number of children: Not on file    Years of education: Not on file    Highest education level: Not on file   Occupational History    Not on file   Social Needs    Financial resource strain: Not on file    Food insecurity:     Worry: Not on file     Inability: Not on file    Transportation needs:     Medical: Not on file     Non-medical: Not on file   Tobacco Use    Smoking status: Former Smoker     Packs/day: 0.00     Last attempt to quit: 2016     Years since quittin.8    Smokeless tobacco: Former User     Quit date: 2016   Substance and Sexual Activity    Alcohol use: No     Alcohol/week: 6.0 oz     Types: 10 Cans of beer per week    Drug use: No    Sexual activity: Yes     Partners: Male   Lifestyle    Physical activity:     Days per week: Not on file     Minutes per session: Not on file    Stress: Not on file   Relationships    Social connections:     Talks on phone: Not on file     Gets together: Not on file     Attends Yarsanism service: Not on file     Active member of club or organization: Not on file     Attends meetings of clubs or organizations: Not on file     Relationship status: Not on file    Intimate partner violence:     Fear of current or ex partner: Not on file     Emotionally abused: Not on file     Physically abused: Not on file     Forced sexual activity: Not on file   Other Topics Concern    Not on file   Social History Narrative    Not on file       Patient does not have an advanced directive on file    Vitals:    04/17/19 1109   BP: 124/74   Pulse: 66   Resp: 16   Temp: 97.3 °F (36.3 °C)   TempSrc: Tympanic   SpO2: 96%   Weight: 134 lb (60.8 kg)   Height: 5' 3\" (1.6 m)   PainSc:   8   PainLoc: Abdomen       Physical Exam   Constitutional: No distress. Cardiovascular: Normal rate, regular rhythm and normal heart sounds. Pulmonary/Chest: Effort normal and breath sounds normal.   Musculoskeletal: She exhibits edema (trace). Neurological: She is alert. Skin: Skin is warm. Nursing note and vitals reviewed. Admission on 02/15/2019, Discharged on 02/15/2019   Component Date Value Ref Range Status    CO2, POC 02/15/2019 28* 19 - 24 MMOL/L Final    Glucose, POC 02/15/2019 96  74 - 106 MG/DL Final    BUN, POC 02/15/2019 9  7 - 18 MG/DL Final    Creatinine, POC 02/15/2019 0.7  0.6 - 1.3 MG/DL Final    GFRAA, POC 02/15/2019 >60  >60 ml/min/1.73m2 Final    GFRNA, POC 02/15/2019 >60  >60 ml/min/1.73m2 Final    Comment: Estimated GFR is calculated using the IDMS-traceable Modification of Diet in Renal Disease (MDRD) Study equation, reported for both  Americans (GFRAA) and non- Americans (GFRNA), and normalized to 1.73m2 body surface area.  The physician must decide which value applies to the patient. The MDRD study equation should only be used in individuals age 25 or older. It has not been validated for the following: pregnant women, patients with serious comorbid conditions, or on certain medications, or persons with extremes of body size, muscle mass, or nutritional status.  Sodium, POC 02/15/2019 139  136 - 145 MMOL/L Final    Potassium, POC 02/15/2019 4.0  3.5 - 5.5 MMOL/L Final    Calcium, ionized (POC) 02/15/2019 1.14  1.12 - 1.32 mmol/L Final    Chloride, POC 02/15/2019 102  100 - 108 MMOL/L Final    Anion gap, POC 02/15/2019 14  10 - 20   Final    Hematocrit, POC 02/15/2019 36  36 - 49 % Final    Hemoglobin, POC 02/15/2019 12.2  12 - 16 G/DL Final       .No results found for any visits on 04/17/19. Assessment / Plan:      ICD-10-CM ICD-9-CM    1. Screening for cardiovascular condition Z13.6 V81.2 CBC WITH AUTOMATED DIFF      LIPID PANEL      METABOLIC PANEL, COMPREHENSIVE   2. Tinnitus of left ear H93.12 388.30 REFERRAL TO ENT-OTOLARYNGOLOGY   3. Encounter for vitamin deficiency screening Z13.21 V77.99 CBC WITH AUTOMATED DIFF      VITAMIN D, 25 HYDROXY   4. Screening for thyroid disorder Z13.29 V77.0 TSH 3RD GENERATION   5. Bilateral leg pain M79.604 729.5 SED RATE (ESR)    M79.605     6. Cough R05 786.2    7. History of peripheral edema Z87.898 V13.89 NT-PRO BNP   8. Pain in both lower extremities M79.604 729.5 gabapentin (NEURONTIN) 100 mg capsule    M79.605     9. Pain in both feet M79.671 729.5 gabapentin (NEURONTIN) 100 mg capsule    M79.672       Fasting labs ordered  Referral to ENT for chronic tinnitus  Set rate order for history of bilateral leg and feet pain  Gabapentin prescription given  Screening for vitamin D and thyroid disease  Lower extremity edema cough-BNP ordered    Follow-up and Dispositions    · Return in about 2 months (around 6/17/2019), or if symptoms worsen or fail to improve.          I asked the patient if she  had any questions and answered her  questions.   The patient stated that she understands the treatment plan and agrees with the treatment plan

## 2019-04-17 NOTE — PROGRESS NOTES
Chief Complaint   Patient presents with   [address]         Is someone accompanying this pt? no    Is the patient using any DME equipment during OV? yes    Depression Screening:  3 most recent PHQ Screens 4/17/2019 3/27/2019 2/25/2019 1/31/2019 1/21/2019 9/26/2018 8/14/2018   PHQ Not Done - - - Patient Decline - Active Diagnosis of Depression or Bipolar Disorder -   Little interest or pleasure in doing things Not at all Not at all Not at all - Several days Not at all Not at all   Feeling down, depressed, irritable, or hopeless Not at all Not at all Not at all - Not at all More than half the days Not at all   Total Score PHQ 2 0 0 0 - 1 2 0   Trouble falling or staying asleep, or sleeping too much - - - - - - -   Feeling tired or having little energy - - - - - - -   Poor appetite, weight loss, or overeating - - - - - - -   Feeling bad about yourself - or that you are a failure or have let yourself or your family down - - - - - - -   Trouble concentrating on things such as school, work, reading, or watching TV - - - - - - -   Moving or speaking so slowly that other people could have noticed; or the opposite being so fidgety that others notice - - - - - - -   Thoughts of being better off dead, or hurting yourself in some way - - - - - - -   PHQ 9 Score - - - - - - -   How difficult have these problems made it for you to do your work, take care of your home and get along with others - - - - - - -       Learning Assessment:  Learning Assessment 6/15/2018 1/17/2017 10/6/2016 6/10/2015   PRIMARY LEARNER Patient Patient Patient Patient   CO-LEARNER CAREGIVER - - - No   PRIMARY LANGUAGE ENGLISH ENGLISH ENGLISH ENGLISH   LEARNER PREFERENCE PRIMARY DEMONSTRATION LISTENING LISTENING LISTENING   ANSWERED BY patient patient patient patient   RELATIONSHIP SELF SELF SELF SELF       Abuse Screening:  Abuse Screening Questionnaire 8/14/2018   Do you ever feel afraid of your partner?  N   Are you in a relationship with someone who physically or mentally threatens you? N   Is it safe for you to go home? Y       Fall Risk  No flowsheet data found. Health Maintenance reviewed and discussed per provider. Pt is due for   Health Maintenance Due   Topic    DTaP/Tdap/Td series (1 - Tdap)    Shingrix Vaccine Age 50> (1 of 2)    Please order/place referral if appropriate. Pt currently taking Antiplatelet therapy? no      Coordination of Care:  1. Have you been to the ER, urgent care clinic since your last visit? Hospitalized since your last visit? no    2. Have you seen or consulted any other health care providers outside of the [address] [place] [address] since your last visit? Include any pap smears or colon screening. yes    Please see Red banners under Allergies, Med rec, Immunizations to remove outside inquires. All correct information has been verified with patient and added to chart.

## 2019-04-24 ENCOUNTER — OFFICE VISIT (OUTPATIENT)
Dept: ORTHOPEDIC SURGERY | Age: 54
End: 2019-04-24

## 2019-04-24 VITALS
HEART RATE: 78 BPM | BODY MASS INDEX: 23.74 KG/M2 | RESPIRATION RATE: 16 BRPM | HEIGHT: 63 IN | WEIGHT: 134 LBS | SYSTOLIC BLOOD PRESSURE: 131 MMHG | DIASTOLIC BLOOD PRESSURE: 78 MMHG | OXYGEN SATURATION: 100 % | TEMPERATURE: 98.2 F

## 2019-04-24 DIAGNOSIS — R29.6 FALLS FREQUENTLY: ICD-10-CM

## 2019-04-24 DIAGNOSIS — R26.89 IMBALANCE: ICD-10-CM

## 2019-04-24 DIAGNOSIS — R20.2 PARESTHESIA OF LEFT UPPER EXTREMITY: Primary | ICD-10-CM

## 2019-04-24 DIAGNOSIS — R29.2 HYPERREFLEXIA: ICD-10-CM

## 2019-04-24 DIAGNOSIS — M54.50 NONSPECIFIC PAIN IN THE LUMBAR REGION: ICD-10-CM

## 2019-04-24 NOTE — PATIENT INSTRUCTIONS
Preventing Falls: Care Instructions  Your Care Instructions    Getting around your home safely can be a challenge if you have injuries or health problems that make it easy for you to fall. Loose rugs and furniture in walkways are among the dangers for many older people who have problems walking or who have poor eyesight. People who have conditions such as arthritis, osteoporosis, or dementia also have to be careful not to fall. You can make your home safer with a few simple measures. Follow-up care is a key part of your treatment and safety. Be sure to make and go to all appointments, and call your doctor if you are having problems. It's also a good idea to know your test results and keep a list of the medicines you take. How can you care for yourself at home? Taking care of yourself  · You may get dizzy if you do not drink enough water. To prevent dehydration, drink plenty of fluids, enough so that your urine is light yellow or clear like water. Choose water and other caffeine-free clear liquids. If you have kidney, heart, or liver disease and have to limit fluids, talk with your doctor before you increase the amount of fluids you drink. · Exercise regularly to improve your strength, muscle tone, and balance. Walk if you can. Swimming may be a good choice if you cannot walk easily. · Have your vision and hearing checked each year or any time you notice a change. If you have trouble seeing and hearing, you might not be able to avoid objects and could lose your balance. · Know the side effects of the medicines you take. Ask your doctor or pharmacist whether the medicines you take can affect your balance. Sleeping pills or sedatives can affect your balance. · Limit the amount of alcohol you drink. Alcohol can impair your balance and other senses. · Ask your doctor whether calluses or corns on your feet need to be removed.  If you wear loose-fitting shoes because of calluses or corns, you can lose your balance and fall. · Talk to your doctor if you have numbness in your feet. Preventing falls at home  · Remove raised doorway thresholds, throw rugs, and clutter. Repair loose carpet or raised areas in the floor. · Move furniture and electrical cords to keep them out of walking paths. · Use nonskid floor wax, and wipe up spills right away, especially on ceramic tile floors. · If you use a walker or cane, put rubber tips on it. If you use crutches, clean the bottoms of them regularly with an abrasive pad, such as steel wool. · Keep your house well lit, especially [address], and outside walkways. Use night-lights in areas such as hallways and bathrooms. Add extra light switches or use remote switches (such as switches that go on or off when you clap your hands) to make it easier to turn lights on if you have to get up during the night. · Install sturdy handrails on stairways. · Move items in your cabinets so that the things you use a lot are on the lower shelves (about waist level). · Keep a cordless phone and a flashlight with new batteries by your bed. If possible, put a phone in each of the main rooms of your house, or carry a cell phone in case you fall and cannot reach a phone. Or, you can wear a device around your neck or wrist. You push a button that sends a signal for help. · Wear low-heeled shoes that fit well and give your feet good support. Use footwear with nonskid soles. Check the heels and soles of your shoes for wear. Repair or replace worn heels or soles. · Do not wear socks without shoes on wood floors. · Walk on the grass when the sidewalks are slippery. If you live in an area that gets snow and ice in the winter, sprinkle salt on slippery steps and sidewalks. Preventing falls in the bath  · Install grab bars and nonskid mats inside and outside your shower or tub and near the toilet and sinks. · Use shower chairs and bath benches.   · Use a hand-held shower head that will allow you to sit while showering. · Get into a tub or shower by putting the weaker leg in first. Get out of a tub or shower with your strong side first.  · Repair loose toilet seats and consider installing a raised toilet seat to make getting on and off the toilet easier. · Keep your bathroom door unlocked while you are in the shower. Where can you learn more? Go to http://lazarus-delano.info/. Enter 0476 [id number] in the search box to learn more about \"Preventing Falls: Care Instructions. \"  Current as of: March 15, 2018  Content Version: 11.9  © 1810-4445 [employer]. Care instructions adapted under license by [employer] (which disclaims liability or warranty for this information). If you have questions about a medical condition or this instruction, always ask your healthcare professional. [place] [address] any warranty or liability for your use of this information. Preventing Outdoor Falls: Care Instructions  Your Care Instructions    Worries about falls don't need to keep you indoors. Outdoor activities like walking have big benefits for your health. You will need to watch your step and learn a few safety measures. If you are worried about having a fall outdoors, ask your doctor about exercises, classes, or physical therapy that may help. You can learn ways to gain strength, flexibility, and balance. Ask if it might help to use a cane or walker. You can make your time outdoors safer with a few simple measures. Follow-up care is a key part of your treatment and safety. Be sure to make and go to all appointments, and call your doctor if you are having problems. It's also a good idea to know your test results and keep a list of the medicines you take. How can you prevent falls outdoors? · Wear shoes with firm soles and low heels. If you have to walk on an icy surface, use grippers that can be worn over your shoes in bad weather.   · Be extra careful if weather is bad. Walk on the grass when the sidewalks are slick. If you live in a place that gets snow and ice in the winter, sprinkle salt on slippery stairs and sidewalks. · Be careful getting on or off buses and trains or getting in and out of cars. If handrails are available, use them. · Be careful when you cross the street. Look for crosswalks or places where curb cuts or ramps are present. · Try not to hurry, especially if you are carrying something. · Be cautious in parking lots or garages. There may be curbs or changes in pavement, or the height of the pavement may vary. · Make sure to wear the correct eyeglasses, if you need them. Reading glasses or bifocals can make it harder to see hazards that might be in your way. · If you are walking outdoors for exercise, try to:  ? Walk in well-lighted, well-maintained areas. These include high school or college tracks, shopping malls, and public spaces. ? Walk with a partner. ? Watch out for cracked sidewalks, curbs, changes in the height of the pavement, exposed tree roots, and debris such as fallen leaves or branches. Where can you learn more? Go to http://lazarus-delano.info/. Enter J999 in the search box to learn more about \"Preventing Outdoor Falls: Care Instructions. \"  Current as of: March 15, 2018  Content Version: 11.9  © 5020-3959 [employer]. Care instructions adapted under license by [employer]: [employer] (which disclaims liability or warranty for this information). If you have questions about a medical condition or this instruction, always ask your healthcare professional. [place] [address] any warranty or liability for your use of this information. Chronic Pain: Care Instructions  Your Care Instructions    Chronic pain is pain that lasts a long time (months or even years) and may or may not have a clear cause.  It is different from acute pain, which usually does have a clear cause--like an injury or illness--and gets better over time. Chronic pain:  · Lasts over time but may vary from day to day. · Does not go away despite efforts to end it. · May disrupt your sleep and lead to fatigue. · May cause depression or anxiety. · May make your muscles tense, causing more pain. · Can disrupt your work, hobbies, home life, and relationships with friends and family. Chronic pain is a very real condition. It is not just in your head. Treatment can help and usually includes several methods used together, such as medicines, physical therapy, exercise, and other treatments. Learning how to relax and changing negative thought patterns can also help you cope. Chronic pain is complex. Taking an active role in your treatment will help you better manage your pain. Tell your doctor if you have trouble dealing with your pain. You may have to try several things before you find what works best for you. Follow-up care is a key part of your treatment and safety. Be sure to make and go to all appointments, and call your doctor if you are having problems. It's also a good idea to know your test results and keep a list of the medicines you take. How can you care for yourself at home? · Pace yourself. Break up large jobs into smaller tasks. Save harder tasks for days when you have less pain, or go back and forth between hard tasks and easier ones. Take rest breaks. · Relax, and reduce stress. Relaxation techniques such as deep breathing or meditation can help. · Keep moving. Gentle, daily exercise can help reduce pain over the long run. Try low- or no-impact exercises such as walking, swimming, and stationary biking. Do stretches to stay flexible. · Try heat, cold packs, and massage. · Get enough sleep. Chronic pain can make you tired and drain your energy. Talk with your doctor if you have trouble sleeping because of pain. · Think positive. Your thoughts can affect your pain level.  Do things that you enjoy to distract yourself when you have pain instead of focusing on the pain. See a movie, read a book, listen to music, or spend time with a friend. · If you think you are depressed, talk to your doctor about treatment. · Keep a daily pain diary. Record how your moods, thoughts, sleep patterns, activities, and medicine affect your pain. You may find that your pain is worse during or after certain activities or when you are feeling a certain emotion. Having a record of your pain can help you and your doctor find the best ways to treat your pain. · Take pain medicines exactly as directed. ? If the doctor gave you a prescription medicine for pain, take it as prescribed. ? If you are not taking a prescription pain medicine, ask your doctor if you can take an over-the-counter medicine. Reducing constipation caused by pain medicine  · Include fruits, vegetables, beans, and whole grains in your diet each day. These foods are high in fiber. · Drink plenty of fluids, enough so that your urine is light yellow or clear like water. If you have kidney, heart, or liver disease and have to limit fluids, talk with your doctor before you increase the amount of fluids you drink. · If your doctor recommends it, get more exercise. Walking is a good choice. Bit by bit, increase the amount you walk every day. Try for at least 30 minutes on most days of the week. · Schedule time each day for a bowel movement. A daily routine may help. Take your time and do not strain when having a bowel movement. When should you call for help? Call your doctor now or seek immediate medical care if:    · Your pain gets worse or is out of control.     · You feel down or blue, or you do not enjoy things like you once did. You may be depressed, which is common in people with chronic pain.  Depression can be treated.     · You have vomiting or cramps for more than 2 hours.    Watch closely for changes in your health, and be sure to contact your doctor if:    · You cannot sleep because of pain.     · You are very worried or anxious about your pain.     · You have trouble taking your pain medicine.     · You have any concerns about your pain medicine.     · You have trouble with bowel movements, such as:  ? No bowel movement in 3 days. ? Blood in the anal area, in your stool, or on the toilet paper. ? Diarrhea for more than 24 hours. Where can you learn more? Go to http://lazarus-delano.info/. Enter N004 in the search box to learn more about \"Chronic Pain: Care Instructions. \"  Current as of: Stephy 3, 2018  Content Version: 11.9  © 7218-6169 [employer]. Care instructions adapted under license by [employer] (which disclaims liability or warranty for this information). If you have questions about a medical condition or this instruction, always ask your healthcare professional. [address] any warranty or liability for your use of this information.

## 2019-04-24 NOTE — PROGRESS NOTES
[address], [address]  [place], [facility]  Phone: [phone]  Fax: [phone]        Bryan Still  : 1965  PCP: Aminata Garces NP      NEW PATIENT      ASSESSMENT AND PLAN     Diagnoses and all orders for this visit:    1. Paresthesia of left upper extremity  -     MRI CERV SPINE WO CONT; Future    2. Falls frequently    3. Hyperreflexia    4. Imbalance    5. Nonspecific pain in the lumbar region  -     AMB POC XRAY, SPINE, LUMBOSACRAL; 4+       1. Advised to stay active as tolerated. 2. Start Pamelor as per neuro  3. MRI cervical spine - progressive weakness BUE, frequent falls, imbalance, increased reflexes. 4. Given information on fall prevention and chronic pain    F/U after MRI      CHIEF COMPLAINT  Sanjuanita Lynch is seen today in consultation at the request of Dr. Rehana Bueno for complaints of back pain. HISTORY OF PRESENT ILLNESS  Sanjuanita Lynch is a 52 y.o. female. LHD. Today pt c/o back pain of multiple year duration. Pt denies any specific incident or injury that caused their pain. Pt saw Dr. Will Petty earlier this month with dx of chronic pain syndrome, started on Pamelor. Mild degenerative changes on last MRI. EEG normal.    Pt denies starting Pamelor. Pt reports imbalance since a fall a while ago. She admits to falling since then including last month when her L hand twisted up. Pt notes her house floor in unlevel and she has difficulty with stairs. She c/o swelling in her foot. She notes her R eye is bothered currently. Location of pain: mid and low back, neck  Does pain radiate into extremities: between shoulder blades. LUE tingling and pain  Does patient have weakness: admits to dropping objects. Pt denies saddle paresthesias. Medications pt is on: Pamelor 25mg 1-2 tab QHS, not taking. Relafen 500mg BID.  Denies persistent fevers, chills, weight changes, neurogenic bowel or bladder symptoms. Treatments patient has tried:  Physical therapy:Yes Imbalance 2018  Doing HEP: some  Non-opioid medications: Yes Failed Gabapentin - chest pain, SOB  Spinal injections: No  Spinal surgery- No.   Last L MRI 2013: disc bulge L4-5     reviewed. PMHx of chronic pain, depression, GERD, IBS. ED 2/15/19 for sprain of L wrist.    Pain Assessment  4/24/2019   Location of Pain Back;Leg;Arm   Location Modifiers Left;Right   Severity of Pain 8   Quality of Pain Dull;Aching; Other (Comment)   Quality of Pain Comment stabbing   Duration of Pain Persistent   Frequency of Pain Constant   Aggravating Factors Walking   Limiting Behavior -   Relieving Factors Rest   Relieving Factors Comment -   Result of Injury -   Type of Injury Fall         PAST MEDICAL HISTORY   Past Medical History:   Diagnosis Date    Chest pain 11/2014    neg EKG, non recurrent    Chronic pain     lower back and legs    Depression     Fibroids     GERD (gastroesophageal reflux disease)     Headache(784.0)     Hiatal hernia     IBS (irritable bowel syndrome)     Microscopic hematuria     Rectal bleeding     Stool color black     irritable bowel       Past Surgical History:   Procedure Laterality Date    COLONOSCOPY,DIAGNOSTIC      HX BREAST BIOPSY Right 1/21/2015    RIGHT BREAST BIOPSY WITH NEEDLE LOCALIZATION ULTRASOUND performed by Edmund Sanchez MD at [address] [place] [address] [facility] HX CHOLECYSTECTOMY      HX GI      HX HYSTERECTOMY      HX TUBAL LIGATION         MEDICATIONS      Current Outpatient Medications   Medication Sig Dispense Refill    nabumetone (RELAFEN) 500 mg tablet Take 1 Tab by mouth two (2) times a day. 60 Tab 2    fluticasone (FLONASE) 50 mcg/actuation nasal spray 2 Sprays by Both Nostrils route daily. Indications: Allergic Rhinitis 1 Bottle 11    nortriptyline (PAMELOR) 25 mg capsule Take 1 Cap by mouth nightly.  May increase to 2 pills in 3 days 60 Cap 5    triamcinolone acetonide (KENALOG) 0.1 % topical cream Apply  to affected area two (2) times a day.  45 g 11    Comp Stocking,Knee,Long,Medium misc Wear daily while walking to prevent swelling 1 Each 0       ALLERGIES    Allergies   Allergen Reactions    Naproxen Shortness of Breath    Shellfish Derived Nausea and Vomiting     SEVERE NAUSEA AND VOMITING    Amoxicillin Rash and Nausea Only          SOCIAL HISTORY    Social History     Socioeconomic History    Marital status: SINGLE     Spouse name: Not on file    Number of children: Not on file    Years of education: Not on file    Highest education level: Not on file   Occupational History    Not on file   Social Needs    Financial resource strain: Not on file    Food insecurity:     Worry: Not on file     Inability: Not on file    Transportation needs:     Medical: Not on file     Non-medical: Not on file   Tobacco Use    Smoking status: Former Smoker     Packs/day: 0.00     Last attempt to quit: 2016     Years since quittin.8    Smokeless tobacco: Former User     Quit date: 2016   Substance and Sexual Activity    Alcohol use: No     Alcohol/week: 6.0 oz     Types: 10 Cans of beer per week    Drug use: No    Sexual activity: Yes     Partners: Male   Lifestyle    Physical activity:     Days per week: Not on file     Minutes per session: Not on file    Stress: Not on file   Relationships    Social connections:     Talks on phone: Not on file     Gets together: Not on file     Attends Mu-ism service: Not on file     Active member of club or organization: Not on file     Attends meetings of clubs or organizations: Not on file     Relationship status: Not on file    Intimate partner violence:     Fear of current or ex partner: Not on file     Emotionally abused: Not on file     Physically abused: Not on file     Forced sexual activity: Not on file   Other Topics Concern    Not on file   Social History Narrative    Not on file       FAMILY HISTORY    Family History   Problem Relation Age of Onset    HIV/AIDS Brother     Diabetes Father     Cancer Brother     Hypertension Sister     Diabetes Brother     Hypertension Sister     Hypertension Sister     Hypertension Brother          REVIEW OF SYSTEMS  Review of Systems   Constitutional: Negative for chills, fever and weight loss. Respiratory: Negative for shortness of breath. Cardiovascular: Negative for chest pain. Gastrointestinal: Negative for constipation. Negative for fecal incontinence   Genitourinary: Negative for dysuria. Negative for urinary incontinence   Musculoskeletal: Positive for falls. Per HPI   Skin: Negative for rash. Neurological: Positive for tingling and focal weakness. Negative for dizziness, tremors and headaches. Endo/Heme/Allergies: Does not bruise/bleed easily. Psychiatric/Behavioral: The patient does not have insomnia. PHYSICAL EXAMINATION  Visit Vitals  /78 (BP 1 Location: Left arm, BP Patient Position: Sitting)   Pulse 78   Temp 98.2 °F (36.8 °C) (Oral)   Resp 16   Ht 5' 3\" (1.6 m)   Wt 134 lb (60.8 kg)   LMP  (LMP Unknown)   SpO2 100%   BMI 23.74 kg/m²          Accompanied by self. Constitutional:  Well developed, well nourished, in no acute distress. Psychiatric: Affect and mood are appropriate. Integumentary: No rashes or abrasions noted on exposed areas. Cardiovascular/Peripheral Vascular: Intact l pulses. No peripheral edema is noted BLE. Lymphatic:  No evidence of lymphedema. No cervical lymphadenopathy. SPINE/MUSCULOSKELETAL EXAM    Cervical spine:  Neck is midline. Normal muscle tone. No focal atrophy is noted. Tenderness to palpation B/L upper trapezii. Negative Spurling's sign. Negative Tinel's sign. Negative Mccormick's sign. ThoracoLumbar spine:  No rash, ecchymosis, or gross obliquity. No fasciculations. No focal atrophy is noted. Limited lumbar ROM all planes. Tenderness to palpation L lower thoracic.  No tenderness to palpation at the sciatic notch. SI joints non-tender. Trochanters non tender. MOTOR:      Biceps  Triceps Deltoids Wrist Ext Wrist Flex Hand Intrin   Right 4/5 4/5 4/5 4/5 4/5 4/5   Left 4/5 4/5 4/5 4/5 4/5 4/5         Hip Flex Quads Hamstrings Ankle DF EHL Ankle PF   Right 4/5 4/5 4/5 4/5 4/5 4/5   Left 4/5 4/5 4/5 4/5 4/5 4/5        DTRs are 3+ biceps, triceps, brachioradialis, patella, and Achilles. No clonus. Ambulation with single point cane. FWB. Very guarded going from sit to stand. Full body tremor with standing and any ROM. RADIOGRAPHS  Lumbar spine xray films reviewed:  1) normal alignment  2) no instability    Written by Isac Machado, as dictated by Emeli Baker MD.    I, Dr. Emeli Baker MD, confirm that all documentation is accurate. Ms. Albaro Covarrubias may have a reminder for a \"due or due soon\" health maintenance. I have asked that she contact her primary care provider for follow-up on this health maintenance.

## 2019-05-10 ENCOUNTER — HOSPITAL ENCOUNTER (OUTPATIENT)
Dept: MRI IMAGING | Age: 54
Discharge: HOME | End: 2019-05-10
Attending: PHYSICAL MEDICINE & REHABILITATION
Payer: MEDICARE

## 2019-05-10 DIAGNOSIS — R20.2 PARESTHESIA OF LEFT UPPER EXTREMITY: ICD-10-CM

## 2019-05-10 PROCEDURE — 72141 MRI NECK SPINE W/O DYE: CPT

## 2019-05-13 ENCOUNTER — TELEPHONE (OUTPATIENT)
Dept: ORTHOPEDIC SURGERY | Age: 54
End: 2019-05-13

## 2019-05-13 NOTE — TELEPHONE ENCOUNTER
Call patent. She needs to go back to Dr. Zoraida Washburn, neuro. There was an abnormal signal noted incidentally at base of brain.

## 2019-05-14 NOTE — TELEPHONE ENCOUNTER
03:10pm Patient calling nurse back ref call of today.  Patient asked to be called back today before office closes

## 2019-05-14 NOTE — TELEPHONE ENCOUNTER
Spoke with patient, informed of Dr. Prasanna Garrison message below. Patient stated that she is scheduled to see Dr. Lion Estrella on 5/22/19. No further actions needed at this time.

## 2019-05-20 ENCOUNTER — DOCUMENTATION ONLY (OUTPATIENT)
Dept: NEUROLOGY | Age: 54
End: 2019-05-20

## 2019-05-30 ENCOUNTER — OFFICE VISIT (OUTPATIENT)
Dept: ORTHOPEDIC SURGERY | Age: 54
End: 2019-05-30

## 2019-05-30 VITALS
OXYGEN SATURATION: 100 % | SYSTOLIC BLOOD PRESSURE: 114 MMHG | BODY MASS INDEX: 24.1 KG/M2 | WEIGHT: 136 LBS | HEIGHT: 63 IN | RESPIRATION RATE: 14 BRPM | TEMPERATURE: 98.1 F | HEART RATE: 72 BPM | DIASTOLIC BLOOD PRESSURE: 72 MMHG

## 2019-05-30 DIAGNOSIS — G89.4 CHRONIC PAIN SYNDROME: ICD-10-CM

## 2019-05-30 DIAGNOSIS — G93.9 LESION OF PONS: ICD-10-CM

## 2019-05-30 DIAGNOSIS — M48.02 CERVICAL STENOSIS OF SPINE: Primary | ICD-10-CM

## 2019-05-30 DIAGNOSIS — R29.2 HYPERREFLEXIA: ICD-10-CM

## 2019-05-30 RX ORDER — NORTRIPTYLINE HYDROCHLORIDE 25 MG/1
25 CAPSULE ORAL
Qty: 60 CAP | Refills: 1 | Status: SHIPPED | OUTPATIENT
Start: 2019-05-30 | End: 2021-02-25

## 2019-05-30 NOTE — PROGRESS NOTES
Jacob [facility] [address]  [address], [address]  [place], [address] [place] [address] Street  Phone: [phone]  Fax: [phone]        Yfn Serrano  : 1965  PCP: Yvette Robison NP    PROGRESS NOTE      ASSESSMENT AND PLAN    Diagnoses and all orders for this visit:    1. Cervical stenosis of spine    2. Lesion of savannah    3. Hyperreflexia    4. Chronic pain syndrome  -     nortriptyline (PAMELOR) 25 mg capsule; Take 1 Cap by mouth nightly. May increase to 2 pills in 3 days       1. Diffuse complaints, would monitor cervical issues clinically once whlte matter disease work up complete   2. Re-ordered Pamelor, pt reports pharmacy did not have Rx .   3. F/u with Dr. Trevor Schulte for abnormal signal in brainstem, . Has upcoming appointment next week. 4. Given information on cervical spine stenosis    F/U PRN      HISTORY OF PRESENT ILLNESS  Alfonzo Cornelius is a 52 y.o. female. LHD. Last visit pt was sent to have a cervical spine MRI. Images reviewed with the pt. Last OV instructed to start Pamelor as instructed by neurology. Pt is uncertain she has picked up Pamelor because [facility] said one medication was not called in she cannot remember which. Pt notes she leans to one side while walking. She states her legs and feet bother her. She notes she had injections in her feet with some benefit. She c/o frequent popping in various joints. She denies feet flapping. She c/o R>L eye issues. She reports hot spells, wonders if she is in menopause. She states she does not sleep well at night, but will fall asleep during the day for an hour or so at a time. She notes imbalance and states even in the shower she is shaky. Pt states she missed an appointment with Dr. Trevor Schulte due to transportation failure. Her next appointment is 19. She admits if she does not write things down she will forget it.     Location of pain: low back, neck  Does pain radiate into extremities: between shoulder blades. LUE tingling and pain. R>L LE tingling, aching  Does patient have weakness: admits to dropping objects   Pt denies saddle paresthesias. Medications pt is on: Pamelor 25mg 1-2 tab QHS, not taking. Relafen 500mg BID, some benefit. Pt denies recent ED visits or hospitalizations. Denies persistent fevers, chills, weight changes, neurogenic bowel or bladder symptoms. Treatments patient has tried:  Physical therapy:Yes Imbalance 2018  Doing HEP: some  Non-opioid medications: Yes Failed Gabapentin - chest pain, SOB  Spinal injections: No  Spinal surgery- No.   Last L MRI 2013: disc bulge L4-5  Last C MRI 5/2019: mild to mod stenosis. Abnormal signal in savannah. EEG 4/2019: normal awake and asleep.  reviewed. PMHx of chronic pain, depression, GERD, IBS. ED 2/15/19 for sprain of L wrist.    Pain Assessment  5/30/2019   Location of Pain Neck;Back;Leg;Arm   Location Modifiers Right;Left   Severity of Pain 8   Quality of Pain Dull;Aching; Ethel Babinski; Other (Comment)   Quality of Pain Comment numbness and tingling   Duration of Pain Persistent   Frequency of Pain Constant   Aggravating Factors Walking;Standing   Aggravating Factors Comment pain is always there   Limiting Behavior Some   Relieving Factors Rest   Relieving Factors Comment -   Result of Injury Yes   Work-Related Injury No   Type of Injury Fall         MRI Results (most recent):  Results from Hospital Encounter encounter on 05/10/19   MRI CERV SPINE WO CONT    Narrative Sagittal and axial multisequence MR images of cervical spine were obtained. HISTORY: Progressive weakness in bilateral upper extremity. Minimal reversal of curvature at C3 and C4. No spondylolisthesis. No compression  fracture or pathologic marrow signal. Paraspinous soft tissues are unremarkable. Sagittal images show abnormal high signal at the savannah,  cervical medullary  junction and in the upper cervical spinal cord (at mid C2 level).     No cerebellar tonsillar ectopia    C2-C3: Minimal posterior disc bulge. No central stenosis or cord contact. Moderate right and mild left foraminal stenosis. C3-C4: Posterior disc bulge and central disc protrusion contacting spinal cord. Posterior CSF is effaced. Moderate central stenosis with AP canal measuring 8.8  mm. Severe right and moderate right foraminal stenosis when combined with facet  hypertrophy. C4-C5: Similar posterior disc bulge and central disc protrusion contacting  spinal cord with moderate central stenosis. No cord edema. Severe right and  moderate left foraminal stenosis. C5-C6: Mild posterior disc bulge, contacting without compressing spinal cord. Mild central stenosis. Severe right and moderate left foraminal stenosis. Right  foraminal perineural cyst.    C6-C7: No disc herniation or central stenosis. Mild foraminal narrowing. Perineural cyst in the right foramen. C7-T1: No disc herniation or central stenosis. Mild foraminal narrowing with  facet hypertrophy. Impression IMPRESSION:  1. Abnormal signal in the savannah, cervicomedullary junction and proximal spinal  cord. Findings are suspicious for demyelinating disease, clinical correlation? MRI of the brain may also be indicated for further evaluation. 2. Disc disease most prominent at C3-C4, C4-C5 and C5-C6 with central stenosis,  cord contact but no edema. Foraminal stenosis at multiple levels as described.             PAST MEDICAL HISTORY   Past Medical History:   Diagnosis Date    Chest pain 11/2014    neg EKG, non recurrent    Chronic pain     lower back and legs    Depression     Fibroids     GERD (gastroesophageal reflux disease)     Headache(784.0)     Hiatal hernia     IBS (irritable bowel syndrome)     Microscopic hematuria     Rectal bleeding     Stool color black     irritable bowel       Past Surgical History:   Procedure Laterality Date    COLONOSCOPY,DIAGNOSTIC      HX BREAST BIOPSY Right 1/21/2015    RIGHT BREAST BIOPSY WITH NEEDLE LOCALIZATION ULTRASOUND performed by Jared House MD at [facility] MA[facility] OR    HX CHOLECYSTECTOMY      HX GI      HX HYSTERECTOMY      HX TUBAL LIGATION     . MEDICATIONS      Current Outpatient Medications   Medication Sig Dispense Refill    nortriptyline (PAMELOR) 25 mg capsule Take 1 Cap by mouth nightly. May increase to 2 pills in 3 days 60 Cap 1    nabumetone (RELAFEN) 500 mg tablet Take 1 Tab by mouth two (2) times a day. 60 Tab 2    fluticasone (FLONASE) 50 mcg/actuation nasal spray 2 Sprays by Both Nostrils route daily. Indications: Allergic Rhinitis 1 Bottle 11    triamcinolone acetonide (KENALOG) 0.1 % topical cream Apply  to affected area two (2) times a day.  45 g 11    Comp Stocking,Knee,Long,Medium misc Wear daily while walking to prevent swelling 1 Each 0        ALLERGIES    Allergies   Allergen Reactions    Naproxen Shortness of Breath    Shellfish Derived Nausea and Vomiting     SEVERE NAUSEA AND VOMITING    Amoxicillin Rash and Nausea Only          SOCIAL HISTORY    Social History     Socioeconomic History    Marital status: SINGLE     Spouse name: Not on file    Number of children: Not on file    Years of education: Not on file    Highest education level: Not on file   Occupational History    Not on file   Social Needs    Financial resource strain: Not on file    Food insecurity:     Worry: Not on file     Inability: Not on file    Transportation needs:     Medical: Not on file     Non-medical: Not on file   Tobacco Use    Smoking status: Former Smoker     Packs/day: 0.00     Last attempt to quit: 2016     Years since quittin.9    Smokeless tobacco: Former User     Quit date: 2016   Substance and Sexual Activity    Alcohol use: No     Alcohol/week: 6.0 oz     Types: 10 Cans of beer per week    Drug use: No    Sexual activity: Yes     Partners: Male   Lifestyle    Physical activity:     Days per week: Not on file     Minutes per session: Not on file  Stress: Not on file   Relationships    Social connections:     Talks on phone: Not on file     Gets together: Not on file     Attends Episcopal service: Not on file     Active member of club or organization: Not on file     Attends meetings of clubs or organizations: Not on file     Relationship status: Not on file    Intimate partner violence:     Fear of current or ex partner: Not on file     Emotionally abused: Not on file     Physically abused: Not on file     Forced sexual activity: Not on file   Other Topics Concern    Not on file   Social History Narrative    Not on file     Socioeconomic History    Marital status: SINGLE     Spouse name: Not on file    Number of children: Not on file    Years of education: Not on file    Highest education level: Not on file   Occupational History    Not on file   Social Needs    Financial resource strain: Not on file    Food insecurity:     Worry: Not on file     Inability: Not on file    Transportation needs:     Medical: Not on file     Non-medical: Not on file   Tobacco Use    Smoking status: Former Smoker     Packs/day: 0.00     Last attempt to quit: 2016     Years since quittin.9    Smokeless tobacco: Former User     Quit date: 2016   Substance and Sexual Activity    Alcohol use: No     Alcohol/week: 6.0 oz     Types: 10 Cans of beer per week    Drug use: No    Sexual activity: Yes     Partners: Male   Lifestyle    Physical activity:     Days per week: Not on file     Minutes per session: Not on file    Stress: Not on file   Relationships    Social connections:     Talks on phone: Not on file     Gets together: Not on file     Attends Episcopal service: Not on file     Active member of club or organization: Not on file     Attends meetings of clubs or organizations: Not on file     Relationship status: Not on file    Intimate partner violence:     Fear of current or ex partner: Not on file     Emotionally abused: Not on file Physically abused: Not on file     Forced sexual activity: Not on file   Other Topics Concern    Not on file   Social History Narrative    Not on file      Problem Relation Age of Onset    HIV/AIDS Brother     Diabetes Father     Cancer Brother     Hypertension Sister     Diabetes Brother     Hypertension Sister     Hypertension Sister     Hypertension Brother        REVIEW OF SYSTEMS  Review of Systems   Constitutional: Negative for chills, fever and weight loss. Respiratory: Negative for shortness of breath. Cardiovascular: Negative for chest pain. Gastrointestinal: Negative for constipation. Negative for fecal incontinence   Genitourinary: Negative for dysuria. Negative for urinary incontinence   Musculoskeletal: Positive for falls, joint pain and myalgias. Per HPI   Skin: Negative for rash. Neurological: Positive for tingling. Negative for dizziness, tremors, focal weakness and headaches. Endo/Heme/Allergies: Does not bruise/bleed easily. Psychiatric/Behavioral: The patient has insomnia. PHYSICAL EXAMINATION  Visit Vitals  /72   Pulse 72   Temp 98.1 °F (36.7 °C) (Oral)   Resp 14   Ht 5' 3\" (1.6 m)   Wt 136 lb (61.7 kg)   LMP  (LMP Unknown)   SpO2 100%   BMI 24.09 kg/m²         Accompanied by self. Constitutional:  Well developed, well nourished, in no acute distress. Psychiatric: Affect and mood are appropriate. Integumentary: No rashes or abrasions noted on exposed areas. Cardiovascular/Peripheral Vascular: Intact l pulses. No peripheral edema is noted BLE. Lymphatic:  No evidence of lymphedema. No cervical lymphadenopathy. SPINE/MUSCULOSKELETAL EXAM    Cervical spine:  Neck is midline. Normal muscle tone. No focal atrophy is noted. Tenderness to palpation B/L upper trapezii, L medial scapular border. Negative Spurling's sign. Negative Tinel's sign. Negative Mccormick's sign. Lumbar spine:  No rash, ecchymosis, or gross obliquity. No fasciculations. No focal atrophy is noted. R knee pain with ROM. Tenderness to palpation midline lumbar spine. No tenderness to palpation at the sciatic notch. SI joints non-tender. Trochanters non tender. MOTOR:        Biceps  Triceps Deltoids Wrist Ext Wrist Flex Hand Intrin   Right +4/5 +4/5 +4/5 +4/5 +4/5 +4/5   Left +4/5 +4/5 +4/5 +4/5 +4/5 +4/5           Hip Flex  Quads Hamstrings Ankle DF EHL Ankle PF   Right +4/5 +4/5 +4/5 +4/5 +4/5 +4/5   Left +4/5 +4/5 +4/5 +4/5 +4/5 +4/5       DTRs are 3+ biceps, triceps, brachioradialis, patella, and Achilles. No clonus. Straight Leg raise negative. Ambulation with single point cane. FWB. Written by Lennox Isaacs, as dictated by Aniceto Caldera MD.    I, Dr. Aniceto Caldera MD, confirm that all documentation is accurate. Ms. Loreta Anguiano may have a reminder for a \"due or due soon\" health maintenance. I have asked that she contact her primary care provider for follow-up on this health maintenance.

## 2019-05-30 NOTE — PATIENT INSTRUCTIONS
Cervical Spinal Stenosis: Care Instructions  Your Care Instructions    Spinal stenosis is a narrowing of the canal that surrounds the spinal cord and nerve roots. Sometimes bone and other tissue grow into this canal and press on the nerves that branch out from the spinal cord. This can happen as a part of aging. When the narrowing happens in your neck, it's called cervical spinal stenosis. It often causes stiffness, pain, numbness, and weakness in the neck, shoulders, arms, hands, or legs. It can even cause problems with your balance, coordination, and bowel or bladder control. But some people have no symptoms. You may be able to get relief from the symptoms of spinal stenosis by taking pain medicine. Your doctor may suggest physical therapy and exercises to keep your spine strong and flexible. Some people try steroid shots to reduce swelling. If pain and numbness in your neck, arms, or legs are still so bad that you cannot do your normal activities, you may need surgery. Follow-up care is a key part of your treatment and safety. Be sure to make and go to all appointments, and call your doctor if you are having problems. It's also a good idea to know your test results and keep a list of the medicines you take. How can you care for yourself at home? · Ask your doctor if you can take an over-the-counter pain medicine, such as acetaminophen (Tylenol), ibuprofen (Advil, Motrin), or naproxen (Aleve). Be safe with medicines. Read and follow all instructions on the label. · Do not take two or more pain medicines at the same time unless the doctor told you to. Many pain medicines have acetaminophen, which is Tylenol. Too much acetaminophen (Tylenol) can be harmful. · Change positions often when you are standing or sitting. This may reduce pressure on the spinal cord and its nerves. · When you rest, use pillows or towel rolls to support your neck and head in a comfortable position.   · Follow your doctor's instructions about activity. He or she may tell you not to do sports or activities that could injure your neck. · Stretch your neck and shoulders as your doctor or physical therapist recommends. If your doctor says it is okay to do them, these exercises may help:  ? Neck stretches to the side. Keep your shoulders relaxed and slowly tilt your head straight over toward one shoulder. Hold for 15 seconds. Let the weight of your head stretch your muscles. Then do the same toward the other shoulder. ? Neck rotations. Keep your chin level and slowly turn your head to one side. Hold for 15 seconds. Then do the same to the other side. ? Shoulder rolls. Roll your shoulders up, then back, and then down in a smooth, circular motion. Repeat several times. When should you call for help? Call 911 anytime you think you may need emergency care. For example, call if:    · You are unable to move an arm or a leg at all.   [facility] your doctor now or seek immediate medical care if:    · You have new or worse symptoms in your arms, legs, belly, or buttocks. Symptoms may include:  ? Numbness or tingling. ? Weakness. ? Pain.     · You lose bladder or bowel control.    Watch closely for changes in your health, and be sure to contact your doctor if:    · You do not get better as expected. Where can you learn more? Go to http://lazarus-delano.info/. Enter  in the search box to learn more about \"Cervical Spinal Stenosis: Care Instructions. \"  Current as of: September 20, 2018  Content Version: 11.9  © 5292-5753 [employer], [employer]orporated. Care instructions adapted under license by [employer] (which disclaims liability or warranty for this information). If you have questions about a medical condition or this instruction, always ask your healthcare professional. [place] [address] any warranty or liability for your use of this information.

## 2019-06-05 ENCOUNTER — OFFICE VISIT (OUTPATIENT)
Dept: NEUROLOGY | Age: 54
End: 2019-06-05

## 2019-06-05 VITALS
TEMPERATURE: 98.6 F | HEART RATE: 78 BPM | WEIGHT: 137.2 LBS | HEIGHT: 63 IN | DIASTOLIC BLOOD PRESSURE: 84 MMHG | OXYGEN SATURATION: 99 % | SYSTOLIC BLOOD PRESSURE: 122 MMHG | RESPIRATION RATE: 16 BRPM | BODY MASS INDEX: 24.31 KG/M2

## 2019-06-05 DIAGNOSIS — G89.4 CHRONIC PAIN SYNDROME: Primary | ICD-10-CM

## 2019-06-05 DIAGNOSIS — R93.0 ABNORMAL MRI SCAN, HEAD: ICD-10-CM

## 2019-06-05 NOTE — PROGRESS NOTES
Re:  Kassandra Jacobs,Follow up visit     6/5/2019 1:36 PM    SSN: [id number]    Subjective:   Radha Danielle returns for follow up of lose of consciousness spells. who comes in with spells of inattentiveness. She states she has constant episodes of lose of consciousness all day long. She does this numerous times during the day. She claims to feel sleepy all the time. She states that she doesn't sleep well at night, getting no better than 2-3 hours of broken sleep. When she falls unconscious during the day she can sleep for an hour. There's been nochange in her symptoms. Additionally she has complaints of chronic pain throughout her body in various locations. Back pain, arm pain, arm spasms etc.      Medications:    Current Outpatient Medications   Medication Sig Dispense Refill    nortriptyline (PAMELOR) 25 mg capsule Take 1 Cap by mouth nightly. May increase to 2 pills in 3 days 60 Cap 1    triamcinolone acetonide (KENALOG) 0.1 % topical cream Apply  to affected area two (2) times a day. 45 g 11    nabumetone (RELAFEN) 500 mg tablet Take 1 Tab by mouth two (2) times a day. 60 Tab 2    fluticasone (FLONASE) 50 mcg/actuation nasal spray 2 Sprays by Both Nostrils route daily. Indications:  Allergic Rhinitis 1 Bottle 11    Comp Stocking,Knee,Long,Medium misc Wear daily while walking to prevent swelling 1 Each 0       Vital signs:    Visit Vitals  /84 (BP 1 Location: Left arm, BP Patient Position: Sitting)   Pulse 78   Temp 98.6 °F (37 °C) (Oral)   Resp 16   Ht 5' 3\" (1.6 m)   Wt 62.2 kg (137 lb 3.2 oz)   LMP  (LMP Unknown)   SpO2 99%   BMI 24.30 kg/m²       Review of Systems:   As above otherwise 11 point review of systems negative including;   Constitutional no fever or chills  Skin denies rash or itching  HEENT  Denies tinnitus, hearing lose  Eyes denies diplopia vision lose  Respiratory denies sortness of breath  Cardiovascular denies chest pain, dyspnea on exertion  Gastrointestinal denies nausea, vomiting, diarrhea, constipation  Genitourinary denies incontinence  Musculoskeletal denies joint pain or swelling  Endocrine denies weight change  Hematology denies easy bruising or bleeding   Neurological as above in HPI      Patient Active Problem List   Diagnosis Code    Microscopic hematuria R31.29    Rectal bleeding K62.5    Chronic pain G89.29    Diffuse cystic mastopathy N60.19    Paranoid schizophrenia ([facility] F20.0    Chronic left-sided low back pain with left-sided sciatica M54.42, G89.29    Spells of decreased attentiveness R68.89    Unsteady gait R26.81    Perennial allergic rhinitis J30.89         Objective: The patient is awake, alert, and oriented x 4. Fund of knowledge is adequate. Speech is fluent and memory is intact. Cranial Nerves: II - Visual fields are full to confrontation. III, IV, VI - Extraocular movements are intact. There is no nystagmus. V - Facial sensation is intact to pinprick. VII - Face is symmetrical.  VIII - Hearing is present. IX, X, XII - Palate is symmetrical.   XI - Shoulder shrugging and head turning intact  Motor: The patient moves all four limbs fairly well and symmetrically. Tone is normal. Reflexes are 2+ and symmetrical. Plantars are down going. Gait is, antalgic, very unsteady, but she doesn't fall, no change from last time. She denies falling.       CBC:   Lab Results   Component Value Date/Time    WBC 4.7 05/25/2018 01:55 PM    RBC 3.88 (L) 05/25/2018 01:55 PM    HGB 11.4 (L) 05/25/2018 01:55 PM    HCT 34.4 (L) 05/25/2018 01:55 PM    PLATELET 941 81/21/0825 01:55 PM     BMP:   Lab Results   Component Value Date/Time    Glucose 82 05/25/2018 01:55 PM    Sodium 139 05/25/2018 01:55 PM    Potassium 3.5 05/25/2018 01:55 PM    Chloride 104 05/25/2018 01:55 PM    CO2 31 05/25/2018 01:55 PM    BUN 10 05/25/2018 01:55 PM    Creatinine 0.68 05/25/2018 01:55 PM    Calcium 9.3 05/25/2018 01:55 PM     CMP:   Lab Results   Component Value Date/Time    Glucose 82 05/25/2018 01:55 PM    Sodium 139 05/25/2018 01:55 PM    Potassium 3.5 05/25/2018 01:55 PM    Chloride 104 05/25/2018 01:55 PM    CO2 31 05/25/2018 01:55 PM    BUN 10 05/25/2018 01:55 PM    Creatinine 0.68 05/25/2018 01:55 PM    Calcium 9.3 05/25/2018 01:55 PM    Anion gap 4 05/25/2018 01:55 PM    BUN/Creatinine ratio 15 05/25/2018 01:55 PM    Alk. phosphatase 70 05/25/2018 01:55 PM    Protein, total 7.9 05/25/2018 01:55 PM    Albumin 4.2 05/25/2018 01:55 PM    Globulin 3.7 05/25/2018 01:55 PM    A-G Ratio 1.1 05/25/2018 01:55 PM     Coagulation:   Lab Results   Component Value Date/Time    Prothrombin time 12.7 05/19/2018 09:17 AM    INR 1.0 05/19/2018 09:17 AM    aPTT 27.5 05/19/2018 09:17 AM     Cardiac markers:   Lab Results   Component Value Date/Time    CK 92 09/14/2017 02:28 AM    CK-MB Index  09/14/2017 02:28 AM     CALCULATION NOT PERFORMED WHEN RESULT IS BELOW LINEAR LIMIT     Sagittal and axial multisequence MR images of cervical spine were obtained.     HISTORY: Progressive weakness in bilateral upper extremity.     Minimal reversal of curvature at C3 and C4. No spondylolisthesis. No compression  fracture or pathologic marrow signal. Paraspinous soft tissues are unremarkable.     Sagittal images show abnormal high signal at the [place],  cervical medullary  junction and in the upper cervical spinal cord (at mid C2 level).     No cerebellar tonsillar ectopia     C2-C3: Minimal posterior disc bulge. No central stenosis or cord contact. Moderate right and mild left foraminal stenosis.     C3-C4: Posterior disc bulge and central disc protrusion contacting spinal cord. Posterior CSF is effaced. Moderate central stenosis with AP canal measuring 8.8  mm.  Severe right and moderate right foraminal stenosis when combined with facet  hypertrophy.     C4-C5: Similar posterior disc bulge and central disc protrusion contacting  spinal cord with moderate central stenosis. No cord edema. Severe right and  moderate left foraminal stenosis.     C5-C6: Mild posterior disc bulge, contacting without compressing spinal cord. Mild central stenosis. Severe right and moderate left foraminal stenosis. Right  foraminal perineural cyst.     C6-C7: No disc herniation or central stenosis. Mild foraminal narrowing. Perineural cyst in the right foramen.     C7-T1: No disc herniation or central stenosis. Mild foraminal narrowing with  facet hypertrophy.     IMPRESSION  IMPRESSION:  1. Abnormal signal in the cecilia, cervicomedullary junction and proximal spinal  cord. Findings are suspicious for demyelinating disease, clinical correlation? MRI of the brain may also be indicated for further evaluation. 2. Disc disease most prominent at C3-C4, C4-C5 and C5-C6 with central stenosis,  cord contact but no edema. Foraminal stenosis at multiple levels as described. Sleep study was benign. Assessment:  Very abnormal Cervical MRI with abnormalities in the Medullary cervical junction and the Cecilia. Does she have MS? .    Plan:  Needs MRI scan of the brain with and without contrast.  Will see her back in 3 weeks. Sincerely,        Arnoldo Mata.  Cameron Le M.D.

## 2019-06-05 NOTE — PROGRESS NOTES
Sharlene Solis is a 52 y.o. female in today for follow-up on hypersomnolence and chronic pain syndrome. Learning assessment previously completed 6/15/2018; primary language is [place]. 1. Have you been to the ER, urgent care clinic since your last visit? Hospitalized since your last visit? No    2. Have you seen or consulted any other health care providers outside of the [address] [place] [address] since your last visit? Include any pap smears or colon screening.  No normal... Well appearing, well nourished, awake, alert, oriented to person, place, time/situation and in no apparent distress.

## 2019-06-05 NOTE — LETTER
6/5/19 Patient: Kaitlynn Segura YOB: 1965 Date of Visit: 6/5/2019 Eri Jasmine NP 
[address] [place] [address] [address] [facility] [address] VIA In Basket Dear Eri Jasmine NP, Thank you for referring Ms. Randy Dejesus to [facility] for evaluation. My notes for this consultation are attached. If you have questions, please do not hesitate to call me. I look forward to following your patient along with you.  
 
 
Sincerely, 
 
Pascual Trent MD

## 2019-06-18 LAB
25(OH)D3+25(OH)D2 SERPL-MCNC: 24.6 NG/ML (ref 30–100)
ALBUMIN SERPL-MCNC: 4.4 G/DL (ref 3.5–5.5)
ALBUMIN/GLOB SERPL: 1.5 {RATIO} (ref 1.2–2.2)
ALP SERPL-CCNC: 91 IU/L (ref 39–117)
ALT SERPL-CCNC: 17 IU/L (ref 0–32)
AST SERPL-CCNC: 18 IU/L (ref 0–40)
BASOPHILS # BLD AUTO: 0 X10E3/UL (ref 0–0.2)
BASOPHILS NFR BLD AUTO: 0 %
BILIRUB SERPL-MCNC: 0.3 MG/DL (ref 0–1.2)
BUN SERPL-MCNC: 12 MG/DL (ref 6–24)
BUN/CREAT SERPL: 18 (ref 9–23)
CALCIUM SERPL-MCNC: 9.3 MG/DL (ref 8.7–10.2)
CHLORIDE SERPL-SCNC: 104 MMOL/L (ref 96–106)
CHOLEST SERPL-MCNC: 205 MG/DL (ref 100–199)
CO2 SERPL-SCNC: 25 MMOL/L (ref 20–29)
CREAT SERPL-MCNC: 0.68 MG/DL (ref 0.57–1)
EOSINOPHIL # BLD AUTO: 0.1 X10E3/UL (ref 0–0.4)
EOSINOPHIL NFR BLD AUTO: 1 %
ERYTHROCYTE [DISTWIDTH] IN BLOOD BY AUTOMATED COUNT: 14.3 % (ref 12.3–15.4)
ERYTHROCYTE [SEDIMENTATION RATE] IN BLOOD BY WESTERGREN METHOD: 26 MM/HR (ref 0–40)
GLOBULIN SER CALC-MCNC: 2.9 G/DL (ref 1.5–4.5)
GLUCOSE SERPL-MCNC: 89 MG/DL (ref 65–99)
HCT VFR BLD AUTO: 35.5 % (ref 34–46.6)
HDLC SERPL-MCNC: 78 MG/DL
HGB BLD-MCNC: 11.8 G/DL (ref 11.1–15.9)
IMM GRANULOCYTES # BLD AUTO: 0 X10E3/UL (ref 0–0.1)
IMM GRANULOCYTES NFR BLD AUTO: 0 %
LDLC SERPL CALC-MCNC: 117 MG/DL (ref 0–99)
LYMPHOCYTES # BLD AUTO: 2.9 X10E3/UL (ref 0.7–3.1)
LYMPHOCYTES NFR BLD AUTO: 47 %
MCH RBC QN AUTO: 29.5 PG (ref 26.6–33)
MCHC RBC AUTO-ENTMCNC: 33.2 G/DL (ref 31.5–35.7)
MCV RBC AUTO: 89 FL (ref 79–97)
MONOCYTES # BLD AUTO: 0.5 X10E3/UL (ref 0.1–0.9)
MONOCYTES NFR BLD AUTO: 8 %
NEUTROPHILS # BLD AUTO: 2.6 X10E3/UL (ref 1.4–7)
NEUTROPHILS NFR BLD AUTO: 44 %
NT-PROBNP SERPL-MCNC: 45 PG/ML (ref 0–287)
PLATELET # BLD AUTO: 252 X10E3/UL (ref 150–450)
POTASSIUM SERPL-SCNC: 4 MMOL/L (ref 3.5–5.2)
PROT SERPL-MCNC: 7.3 G/DL (ref 6–8.5)
RBC # BLD AUTO: 4 X10E6/UL (ref 3.77–5.28)
SODIUM SERPL-SCNC: 142 MMOL/L (ref 134–144)
TRIGL SERPL-MCNC: 48 MG/DL (ref 0–149)
TSH SERPL DL<=0.005 MIU/L-ACNC: 0.51 UIU/ML (ref 0.45–4.5)
VLDLC SERPL CALC-MCNC: 10 MG/DL (ref 5–40)
WBC # BLD AUTO: 6 X10E3/UL (ref 3.4–10.8)

## 2019-06-20 ENCOUNTER — HOSPITAL ENCOUNTER (OUTPATIENT)
Dept: MRI IMAGING | Age: 54
Discharge: HOME | End: 2019-06-20
Attending: PSYCHIATRY & NEUROLOGY
Payer: MEDICARE

## 2019-06-20 DIAGNOSIS — G89.4 CHRONIC PAIN SYNDROME: ICD-10-CM

## 2019-06-20 DIAGNOSIS — R93.0 ABNORMAL MRI SCAN, HEAD: ICD-10-CM

## 2019-06-20 PROCEDURE — 74011636320 HC RX REV CODE- 636/320: Performed by: PSYCHIATRY & NEUROLOGY

## 2019-06-20 PROCEDURE — 70553 MRI BRAIN STEM W/O & W/DYE: CPT

## 2019-06-20 PROCEDURE — A9575 INJ GADOTERATE MEGLUMI 0.1ML: HCPCS | Performed by: PSYCHIATRY & NEUROLOGY

## 2019-06-20 RX ADMIN — GADOTERATE MEGLUMINE 13 ML: 376.9 INJECTION INTRAVENOUS at 14:31

## 2019-06-21 ENCOUNTER — OFFICE VISIT (OUTPATIENT)
Dept: FAMILY MEDICINE CLINIC | Facility: CLINIC | Age: 54
End: 2019-06-21

## 2019-06-21 VITALS
OXYGEN SATURATION: 96 % | SYSTOLIC BLOOD PRESSURE: 132 MMHG | HEART RATE: 72 BPM | HEIGHT: 63 IN | DIASTOLIC BLOOD PRESSURE: 76 MMHG | RESPIRATION RATE: 18 BRPM | TEMPERATURE: 97.1 F | BODY MASS INDEX: 24.52 KG/M2 | WEIGHT: 138.4 LBS

## 2019-06-21 DIAGNOSIS — F20.0 PARANOID SCHIZOPHRENIA (HCC): ICD-10-CM

## 2019-06-21 DIAGNOSIS — M79.10 MYALGIA: Primary | ICD-10-CM

## 2019-06-21 NOTE — PROGRESS NOTES
Erlin Mo is a  52 y.o. female presents today for office visit for routine follow up. 1. Have you been to the ER, urgent care clinic or hospitalized since your last visit? NO    2. Have you seen or consulted any other health care providers outside of the [address] [place] [address] since your last visit (Include any pap smears or colon screening)?  NO

## 2019-06-21 NOTE — PROGRESS NOTES
Sharlene Solis is a 52 y.o. female presenting today for Other (F/U)  . HPI:  Sharlene Solis presents to the office today for allergic rhinitis and chronic pain. Patient presents today stating she is hurting all over. She is complaining of lumbar back pain today. She notes she had fasting labs prior to today visit and presents today for results. She presents today ambulating with a cane. She notes that  Dr. Luigi Correa is her Orthopedic physician and she is currently taking Relafen for her lumbar pain. She is also followed by Dr. Thuy Buchanan and reports she is scheduled for an MRI of the Brain. Review of Systems   Respiratory: Negative for cough. Cardiovascular: Negative for chest pain and palpitations. Gastrointestinal: Negative for abdominal pain, diarrhea, nausea and vomiting. Musculoskeletal: Positive for back pain, myalgias and neck pain. Neurological: Negative for dizziness and headaches. Psychiatric/Behavioral:        History of paranoid schizophrenia       Allergies   Allergen Reactions    Naproxen Shortness of Breath    Shellfish Derived Nausea and Vomiting     SEVERE NAUSEA AND VOMITING    Amoxicillin Rash and Nausea Only       Current Outpatient Medications   Medication Sig Dispense Refill    triamcinolone acetonide (KENALOG) 0.1 % topical cream Apply  to affected area two (2) times a day. 45 g 11    nabumetone (RELAFEN) 500 mg tablet Take 1 Tab by mouth two (2) times a day. 60 Tab 2    fluticasone (FLONASE) 50 mcg/actuation nasal spray 2 Sprays by Both Nostrils route daily. Indications: Allergic Rhinitis 1 Bottle 11    Comp Stocking,Knee,Long,Medium misc Wear daily while walking to prevent swelling 1 Each 0    nortriptyline (PAMELOR) 25 mg capsule Take 1 Cap by mouth nightly.  May increase to 2 pills in 3 days 60 Cap 1       Past Medical History:   Diagnosis Date    Chest pain 11/2014    neg EKG, non recurrent    Chronic pain     lower back and legs    Depression     Fibroids     GERD (gastroesophageal reflux disease)     Headache(784.0)     Hiatal hernia     IBS (irritable bowel syndrome)     Microscopic hematuria     Rectal bleeding     Stool color black     irritable bowel       Past Surgical History:   Procedure Laterality Date    COLONOSCOPY,DIAGNOSTIC      HX BREAST BIOPSY Right 2015    RIGHT BREAST BIOPSY WITH NEEDLE LOCALIZATION ULTRASOUND performed by Jarad Salguero MD at [facility] MAIN OR    HX CHOLECYSTECTOMY      HX GI      HX HYSTERECTOMY      HX TUBAL LIGATION         Social History     Socioeconomic History    Marital status: SINGLE     Spouse name: Not on file    Number of children: Not on file    Years of education: Not on file    Highest education level: Not on file   Occupational History    Not on file   Social Needs    Financial resource strain: Not on file    Food insecurity:     Worry: Not on file     Inability: Not on file    Transportation needs:     Medical: Not on file     Non-medical: Not on file   Tobacco Use    Smoking status: Former Smoker     Packs/day: 0.00     Last attempt to quit: 2016     Years since quittin.9    Smokeless tobacco: Former User     Quit date: 2016   Substance and Sexual Activity    Alcohol use: No     Alcohol/week: 6.0 oz     Types: 10 Cans of beer per week    Drug use: No    Sexual activity: Yes     Partners: Male   Lifestyle    Physical activity:     Days per week: Not on file     Minutes per session: Not on file    Stress: Not on file   Relationships    Social connections:     Talks on phone: Not on file     Gets together: Not on file     Attends [employer] service: Not on file     Active member of club or organization: Not on file     Attends meetings of clubs or organizations: Not on file     Relationship status: Not on file    Intimate partner violence:     Fear of current or ex partner: Not on file     Emotionally abused: Not on file     Physically abused: Not on file     Forced sexual activity: Not on file   Other Topics Concern    Not on file   Social History Narrative    Not on file       Patient does not have an advanced directive on file    Vitals:    06/21/19 1136   BP: 132/76   Pulse: 72   Resp: 18   Temp: 97.1 °F (36.2 °C)   TempSrc: Oral   SpO2: 96%   Weight: 138 lb 6.4 oz (62.8 kg)   Height: 5' 3\" (1.6 m)   PainSc:   0 - No pain       Physical Exam   Constitutional: No distress. Neck: Normal range of motion. Neck supple. Cardiovascular: Normal rate, regular rhythm and normal heart sounds. Pulmonary/Chest: Effort normal and breath sounds normal.   Abdominal: Soft. Bowel sounds are normal.   Lymphadenopathy:     She has no cervical adenopathy. Neurological: She is alert. Nursing note and vitals reviewed. Orders Only on 04/17/2019   Component Date Value Ref Range Status    WBC 06/17/2019 6.0  3.4 - 10.8 x10E3/uL Final    RBC 06/17/2019 4.00  3.77 - 5.28 x10E6/uL Final    HGB 06/17/2019 11.8  11.1 - 15.9 g/dL Final    HCT 06/17/2019 35.5  34.0 - 46.6 % Final    MCV 06/17/2019 89  79 - 97 fL Final    MCH 06/17/2019 29.5  26.6 - 33.0 pg Final    MCHC 06/17/2019 33.2  31.5 - 35.7 g/dL Final    RDW 06/17/2019 14.3  12.3 - 15.4 % Final    PLATELET 08/69/4215 461  150 - 450 x10E3/uL Final    NEUTROPHILS 06/17/2019 44  Not Estab. % Final    Lymphocytes 06/17/2019 47  Not Estab. % Final    MONOCYTES 06/17/2019 8  Not Estab. % Final    EOSINOPHILS 06/17/2019 1  Not Estab. % Final    BASOPHILS 06/17/2019 0  Not Estab. % Final    ABS. NEUTROPHILS 06/17/2019 2.6  1.4 - 7.0 x10E3/uL Final    Abs Lymphocytes 06/17/2019 2.9  0.7 - 3.1 x10E3/uL Final    ABS. MONOCYTES 06/17/2019 0.5  0.1 - 0.9 x10E3/uL Final    ABS. EOSINOPHILS 06/17/2019 0.1  0.0 - 0.4 x10E3/uL Final    ABS. BASOPHILS 06/17/2019 0.0  0.0 - 0.2 x10E3/uL Final    IMMATURE GRANULOCYTES 06/17/2019 0  Not Estab. % Final    ABS. IMM.  GRANS. 06/17/2019 0.0  0.0 - 0.1 x10E3/uL Final    Cholesterol, total 06/17/2019 205* 100 - 199 mg/dL Final    Triglyceride 06/17/2019 48  0 - 149 mg/dL Final    HDL Cholesterol 06/17/2019 78  >39 mg/dL Final    VLDL, calculated 06/17/2019 10  5 - 40 mg/dL Final    LDL, calculated 06/17/2019 117* 0 - 99 mg/dL Final    Glucose 06/17/2019 89  65 - 99 mg/dL Final    BUN 06/17/2019 12  6 - 24 mg/dL Final    Creatinine 06/17/2019 0.68  0.57 - 1.00 mg/dL Final    GFR est non-AA 06/17/2019 99  >59 mL/min/1.73 Final    GFR est AA 06/17/2019 114  >59 mL/min/1.73 Final    BUN/Creatinine ratio 06/17/2019 18  9 - 23 Final    Sodium 06/17/2019 142  134 - 144 mmol/L Final    Potassium 06/17/2019 4.0  3.5 - 5.2 mmol/L Final    Chloride 06/17/2019 104  96 - 106 mmol/L Final    CO2 06/17/2019 25  20 - 29 mmol/L Final    Calcium 06/17/2019 9.3  8.7 - 10.2 mg/dL Final    Protein, total 06/17/2019 7.3  6.0 - 8.5 g/dL Final    Albumin 06/17/2019 4.4  3.5 - 5.5 g/dL Final    GLOBULIN, TOTAL 06/17/2019 2.9  1.5 - 4.5 g/dL Final    A-G Ratio 06/17/2019 1.5  1.2 - 2.2 Final    Bilirubin, total 06/17/2019 0.3  0.0 - 1.2 mg/dL Final    Alk. phosphatase 06/17/2019 91  39 - 117 IU/L Final    AST (SGOT) 06/17/2019 18  0 - 40 IU/L Final    ALT (SGPT) 06/17/2019 17  0 - 32 IU/L Final    VITAMIN D, 25-HYDROXY 06/17/2019 24.6* 30.0 - 100.0 ng/mL Final    Comment: Vitamin D deficiency has been defined by the [facility] practice guideline as a  level of serum 25-OH vitamin D less than 20 ng/mL (1,2). The Endocrine Society went on to further define vitamin D  insufficiency as a level between 21 and 29 ng/mL (2). 1. IOM ([place] of Medicine). 2010. Dietary reference     intakes for calcium and D. [facility]: The     [employer]. 2. Talon MOLINA, Alex ZAVALA, Lissette COLMENARES, et al.     Evaluation, treatment, and prevention of vitamin D     deficiency: an Endocrine Society clinical practice     guideline. JCEM.  2011 Jul; 96(7):1911-30.  TSH 06/17/2019 0.507  0.450 - 4.500 uIU/mL Final    Sed rate (ESR) 06/17/2019 26  0 - 40 mm/hr Final    PROBNP 06/17/2019 45  0 - 287 pg/mL Final    Comment: The following cut-points have been suggested for the  use of proBNP for the diagnostic evaluation of heart  failure (HF) in patients with acute dyspnea:  Modality                     Age           Optimal Cut                             (years)            Point  ------------------------------------------------------  Diagnosis (rule in HF)        <50            450 pg/mL                            50 - 75            900 pg/mL                                >75           1800 pg/mL  Exclusion (rule out HF)  Age independent     300 pg/mL         . No results found for any visits on 06/21/19. Assessment / Plan:      ICD-10-CM ICD-9-CM    1. Myalgia M79.10 729.1    2. [facility] ([facility] F20.0 295.30        Follow-up and Dispositions    · Return in about 6 months (around 12/21/2019). I asked the patient if she  had any questions and answered her  questions. The patient stated that she understands the treatment plan and agrees with the treatment plan    This document was created with a voice activated dictation system and may contain transcription errors.

## 2019-06-27 ENCOUNTER — OFFICE VISIT (OUTPATIENT)
Dept: NEUROLOGY | Age: 54
End: 2019-06-27

## 2019-06-27 VITALS
DIASTOLIC BLOOD PRESSURE: 78 MMHG | RESPIRATION RATE: 20 BRPM | BODY MASS INDEX: 24.1 KG/M2 | HEART RATE: 69 BPM | WEIGHT: 136 LBS | SYSTOLIC BLOOD PRESSURE: 120 MMHG | OXYGEN SATURATION: 98 % | TEMPERATURE: 98.4 F | HEIGHT: 63 IN

## 2019-06-27 DIAGNOSIS — G35 MULTIPLE SCLEROSIS (HCC): Primary | ICD-10-CM

## 2019-06-27 NOTE — PROGRESS NOTES
Re:  Rosemarie Jacobs,Follow up visit     6/27/2019 1:36 PM    SSN: [id number]    Subjective:   Soraya Manriquez returns for follow up of lose of consciousness spells. who comes in with spells of inattentiveness. She states she has constant episodes of lose of consciousness all day long. She does this numerous times during the day. She claims to feel sleepy all the time. She states that she doesn't sleep well at night, getting no better than 2-3 hours of broken sleep. When she falls unconscious during the day she can sleep for an hour. There's been nochange in her symptoms. Additionally she has complaints of chronic pain throughout her body in various locations. Back pain, arm pain, arm spasms etc.  She's had chronic pain for no less 5 years. Medications:    Current Outpatient Medications   Medication Sig Dispense Refill    nabumetone (RELAFEN) 500 mg tablet Take 1 Tab by mouth two (2) times a day. 60 Tab 2    nortriptyline (PAMELOR) 25 mg capsule Take 1 Cap by mouth nightly. May increase to 2 pills in 3 days 60 Cap 1    triamcinolone acetonide (KENALOG) 0.1 % topical cream Apply  to affected area two (2) times a day. 45 g 11    fluticasone (FLONASE) 50 mcg/actuation nasal spray 2 Sprays by Both Nostrils route daily. Indications:  Allergic Rhinitis 1 Bottle 11    Comp Stocking,Knee,Long,Medium misc Wear daily while walking to prevent swelling 1 Each 0       Vital signs:    Visit Vitals  /78 (BP 1 Location: Left arm, BP Patient Position: Sitting)   Pulse 69   Temp 98.4 °F (36.9 °C) (Oral)   Resp 20   Ht 5' 3\" (1.6 m)   Wt 61.7 kg (136 lb)   LMP  (LMP Unknown)   SpO2 98%   BMI 24.09 kg/m²       Review of Systems:   As above otherwise 11 point review of systems negative including;   Constitutional no fever or chills  Skin denies rash or itching  HEENT  Denies tinnitus, hearing lose  Eyes denies diplopia vision lose  Respiratory denies sortness of breath  Cardiovascular denies chest pain, dyspnea on exertion  Gastrointestinal denies nausea, vomiting, diarrhea, constipation  Genitourinary denies incontinence  Musculoskeletal denies joint pain or swelling  Endocrine denies weight change  Hematology denies easy bruising or bleeding   Neurological as above in HPI      Patient Active Problem List   Diagnosis Code    Microscopic hematuria R31.29    Rectal bleeding K62.5    Chronic pain G89.29    Diffuse cystic mastopathy N60.19    Paranoid schizophrenia ([facility] [address] F20.0    Chronic left-sided low back pain with left-sided sciatica M54.42, G89.29    Spells of decreased attentiveness R68.89    Unsteady gait R26.81    Perennial allergic rhinitis J30.89         Objective: The patient is awake, alert, and oriented x 4. Fund of knowledge is adequate. Speech is fluent and memory is intact. Cranial Nerves: II - Visual fields are full to confrontation. III, IV, VI - Extraocular movements are intact. There is no nystagmus. V - Facial sensation is intact to pinprick. VII - Face is symmetrical.  VIII - Hearing is present. IX, X, XII - Palate is symmetrical.   XI - Shoulder shrugging and head turning intact  Motor: The patient moves all four limbs fairly well and symmetrically. Tone is normal. Reflexes are 2+ and symmetrical. Plantars are down going. Gait is, antalgic, very unsteady, but she doesn't fall, no change from last time. She denies falling.       CBC:   Lab Results   Component Value Date/Time    WBC 6.0 06/17/2019 11:08 AM    RBC 4.00 06/17/2019 11:08 AM    HGB 11.8 06/17/2019 11:08 AM    HCT 35.5 06/17/2019 11:08 AM    PLATELET 512 13/93/8039 11:08 AM     BMP:   Lab Results   Component Value Date/Time    Glucose 89 06/17/2019 11:08 AM    Sodium 142 06/17/2019 11:08 AM    Potassium 4.0 06/17/2019 11:08 AM    Chloride 104 06/17/2019 11:08 AM    CO2 25 06/17/2019 11:08 AM    BUN 12 06/17/2019 11:08 AM    Creatinine 0.68 06/17/2019 11:08 AM    Calcium 9.3 06/17/2019 11:08 AM     CMP:   Lab Results   Component Value Date/Time    Glucose 89 06/17/2019 11:08 AM    Sodium 142 06/17/2019 11:08 AM    Potassium 4.0 06/17/2019 11:08 AM    Chloride 104 06/17/2019 11:08 AM    CO2 25 06/17/2019 11:08 AM    BUN 12 06/17/2019 11:08 AM    Creatinine 0.68 06/17/2019 11:08 AM    Calcium 9.3 06/17/2019 11:08 AM    Anion gap 4 05/25/2018 01:55 PM    BUN/Creatinine ratio 18 06/17/2019 11:08 AM    Alk. phosphatase 91 06/17/2019 11:08 AM    Protein, total 7.3 06/17/2019 11:08 AM    Albumin 4.4 06/17/2019 11:08 AM    Globulin 3.7 05/25/2018 01:55 PM    A-G Ratio 1.5 06/17/2019 11:08 AM     Coagulation:   Lab Results   Component Value Date/Time    Prothrombin time 12.7 05/19/2018 09:17 AM    INR 1.0 05/19/2018 09:17 AM    aPTT 27.5 05/19/2018 09:17 AM     Cardiac markers:   Lab Results   Component Value Date/Time    CK 92 09/14/2017 02:28 AM    CK-MB Index  09/14/2017 02:28 AM     CALCULATION NOT PERFORMED WHEN RESULT IS BELOW LINEAR LIMIT     Final result (Exam End: 6/20/2019 14:34) Provider Status: Open   Study Result     MRI HEAD WITHOUT AND WITH CONTRAST     CPT CODE: 29714     INDICATION: Abnormal cervical MRI with signal abnormality in the brainstem and  spine. Headache. Sleep disorder. Chronic pain syndrome.     TECHNIQUE: MRI performed consisting of sagittal T1 FLAIR, axial T1, T2 and FLAIR  sequences with additional diffusion imaging and coronal and axial gadolinium  enhanced sequences.  -13 cc Dotarem administered     COMPARISON: Cervical MRI 5/10/2019. Head CT 9/14/2017.     FINDINGS:  There are numerous fairly discrete rounded to oval high T2 signal lesions  throughout the subcortical and deep hemispheric white matter bilaterally, more  clustered in the periventricular white matter on both left and right. On T1 sequences majority of these appear as quite low signal, though not simply  cystic.   Several areas are more coalescent in the periventricular white matter  bilaterally predominantly posterior parietal, but also involving the bilateral  temporal regions, right greater than left. Sagittal images show involvement of the corpus callosum bilaterally with largest  volume in the posterior parietal region. Several images on axial series measured. -5.6 mm lesion high left parietal region image 39 series 6.  -7.3 mm right posterior callosal lesion image 34 series 6.  -10 mm ovoid left parietal-occipital region image 35 series 6.  -6 mm ovoid lesion posterior superior right basal ganglia image 29 series 6.     Redemonstrated are more irregular patchy high signal lesions in the posterior  midline savannah, left posterior paramedian savannah and right lateral savannah at the  junction with the cerebellar peduncle.  -5 mm ovoid lesion superior medial left cerebellum image 16.  -Ill-defined conglomerate of lesions in the anterior right cerebellar hemisphere  measuring 18 x 16 mm image 10 series 6.  -Lobulated 8 x 4 mm lesion left side of the medulla image 7.     No diffusion abnormalities to suggest acute infarct or ischemia by imaging. Partially included paranasal sinuses and mastoid air cells appear free of  disease. Sella grossly unremarkable within the limitations of a nondedicated exam.     Associated with conglomerate high signal in the anterior medial right cerebellar  hemisphere is a linear low signal focus extending to the medial surface of the  cerebellar hemisphere on T2 and FLAIR sequences. Postcontrast there is small  area of branching or wispy enhancement associated, image 10 series 13. Findings  suggest a small congenital vascular malformation such as a developmental venous  anomaly.  No evidence of hemorrhage.     Following contrast administration:  -equivocal enhancement of a 4.3 mm lesion in the left parietal white matter,  image 33.  -Small amount of peripheral linear enhancement associated with more vague  triangular white matter hyperintensity in the right parietal corona radiata on  image 33.  -Equivocal minimal enhancement associated with a 5 mm left parasagittal parietal  lesion image 39. There is minimal associated precontrast hyperintense signal at  this same level.  -No appreciable enhancement associated with any of the other lesions        IMPRESSION  IMPRESSION:  1. Numerous discrete hemispheric and callosal lesions with other areas of more  conglomerate coalescent high signal..  -Several lesions also present within the brainstem and cerebellum as above  -Findings would be consistent with demyelinating process such as multiple  sclerosis.     2. Equivocal vague enhancement associated with several of the more poorly  delineated hemispheric lesions as discussed above. Difficult definitive  assessment as above. May be more relatively acute     3. No diffusion abnormalities to suggest acute infarct or ischemia by imaging.     4. Small vascular malformation right cerebellar hemisphere as above, without  evidence of hemorrhage. .           Assessment:  Very abnormal brain MRI consistent with Multiple Sclerosis. Plan:  Screening blood work already done and then will start her on Aubagio. She's concerned about anything she has to take more than once a day and at this time I think the oral pill's the way to go. RTC in 4 months     Sincerely,        Celine Pantoja M.D.

## 2019-06-27 NOTE — LETTER
6/27/19 Patient: Renee Perez YOB: 1965 Date of Visit: 6/27/2019 Chaparrita Bermudez NP 
[address] [place] [address] VIA In Basket Dear Chaparrita Bermudez NP, Thank you for referring Ms. Mei Prasad to [facility] for evaluation. My notes for this consultation are attached. If you have questions, please do not hesitate to call me. I look forward to following your patient along with you.  
 
 
Sincerely, 
 
Christiano Botello MD

## 2019-06-27 NOTE — PROGRESS NOTES
Abebe Morton is a 52 y.o. female in today to discuss MRI results. Learning assessment previously completed 6/15/2018; primary language is [place]. 1. Have you been to the ER, urgent care clinic since your last visit? Hospitalized since your last visit? No    2. Have you seen or consulted any other health care providers outside of the [address] [place] [address] since your last visit? Include any pap smears or colon screening.  No

## 2019-07-01 ENCOUNTER — TELEPHONE (OUTPATIENT)
Dept: NEUROLOGY | Age: 54
End: 2019-07-01

## 2019-07-01 DIAGNOSIS — Z11.1 SCREENING-PULMONARY TB: Primary | ICD-10-CM

## 2019-07-01 NOTE — TELEPHONE ENCOUNTER
Patient calling to request labs for TB testing per phone call from [address]. Please advise.

## 2019-07-17 ENCOUNTER — OFFICE VISIT (OUTPATIENT)
Dept: ORTHOPEDIC SURGERY | Facility: CLINIC | Age: 54
End: 2019-07-17

## 2019-07-17 VITALS
HEART RATE: 72 BPM | TEMPERATURE: 97.4 F | BODY MASS INDEX: 24.1 KG/M2 | WEIGHT: 136 LBS | HEIGHT: 63 IN | DIASTOLIC BLOOD PRESSURE: 77 MMHG | SYSTOLIC BLOOD PRESSURE: 126 MMHG | RESPIRATION RATE: 16 BRPM | OXYGEN SATURATION: 100 %

## 2019-07-17 DIAGNOSIS — G56.03 CARPAL TUNNEL SYNDROME, BILATERAL: Primary | ICD-10-CM

## 2019-07-17 NOTE — PROGRESS NOTES
Esther Moore is a 52 y.o. female right handed individual, not currently working. Worker's Compensation and legal considerations: not known. Vitals:    07/17/19 1133   BP: 126/77   Pulse: 72   Resp: 16   Temp: 97.4 °F (36.3 °C)   TempSrc: Oral   SpO2: 100%   Weight: 136 lb (61.7 kg)   Height: 5' 3\" (1.6 m)   PainSc:   7           Chief Complaint   Patient presents with    Wrist Pain     left wrist pain, f/u appt. HPI: Patient comes in today for follow-up after having an MRI in April after an injury. She reports that her wrist on the left is much better. She denies any wrist pain but says that she gets pain in her fingers bilaterally that her thumbs lock up and that she has numbness and tingling bilaterally. Initial HPI: Patient comes in today after injuring her left wrist 6 weeks ago by getting it stuck in between the air conditioner in the wall. She went to the emergency department and had negative x-rays at that time was given a brace for a sprain. Today is her first appointment with me due to scheduling issues with the patient. She reports no improvement in her pain since the injury.     Date of onset: 2/15/2019    Injury: Yes: Comment: Wrist stuck between wall and air conditioner    Prior Treatment:  Yes: Comment: ED placed into a Velcro splint    Numbness/ Tingling: No    ROS: Review of Systems - General ROS: negative  Respiratory ROS: no cough, shortness of breath, or wheezing  Cardiovascular ROS: no chest pain or dyspnea on exertion  Musculoskeletal ROS: positive for - pain in wrist - left  Neurological ROS: negative  Dermatological ROS: negative    Past Medical History:   Diagnosis Date    Chest pain 11/2014    neg EKG, non recurrent    Chronic pain     lower back and legs    Depression     Fibroids     GERD (gastroesophageal reflux disease)     Headache(784.0)     Hiatal hernia     IBS (irritable bowel syndrome)     Microscopic hematuria     Rectal bleeding     Stool color black     irritable bowel       Past Surgical History:   Procedure Laterality Date    COLONOSCOPY,DIAGNOSTIC      HX BREAST BIOPSY Right 1/21/2015    RIGHT BREAST BIOPSY WITH NEEDLE LOCALIZATION ULTRASOUND performed by Lorie Gamble MD at [address] [place] [address] HX CHOLECYSTECTOMY      HX GI      HX HYSTERECTOMY      HX TUBAL LIGATION         Current Outpatient Medications   Medication Sig Dispense Refill    teriflunomide (AUBAGIO) 14 mg tab Take 14 mg by mouth daily. 30 Tab 11    triamcinolone acetonide (KENALOG) 0.1 % topical cream Apply  to affected area two (2) times a day. 45 g 11    nabumetone (RELAFEN) 500 mg tablet Take 1 Tab by mouth two (2) times a day. 60 Tab 2    fluticasone (FLONASE) 50 mcg/actuation nasal spray 2 Sprays by Both Nostrils route daily. Indications: Allergic Rhinitis 1 Bottle 11    Comp Stocking,Knee,Long,Medium misc Wear daily while walking to prevent swelling 1 Each 0    nortriptyline (PAMELOR) 25 mg capsule Take 1 Cap by mouth nightly. May increase to 2 pills in 3 days 60 Cap 1       Allergies   Allergen Reactions    Naproxen Shortness of Breath    Shellfish Derived Nausea and Vomiting     SEVERE NAUSEA AND VOMITING    Amoxicillin Rash and Nausea Only         PE:     NEUROVASCULAR    Examination L R Examination L R   Carpal Comp. + + Pronator Comp. - -   Carpal Tinel + + Pronator Tinel - -   Phalen's + + Pronator Stress - -   Cubital Comp. - - Guyon Comp. - -   Cubital Tinel - - Guyon Tinel - -   Elbow Hyperflexion - - Adson's - -   Spurling's - - SC Comp. - -   PCB Median abn - - SC Tinel - -   Radial Tinel - - IC Comp. - -   Digital Tinel - - IC Tinel - -   Radial 2-Pt WNL WNL Ulnar 2-Pt WNL WNL     Radial Pulse: 2+  Capillary Refill: < 2 sec  [employer]: Not Performed  [employer]: Not Performed      Left Wrist: There is no tenderness to palpation about the wrist.  Range of motion is full sensation is intact distally and cap refill is brisk.     Imaging:     MRI 4/2019:    IMPRESSION:  1. No scaphoid fracture or carpal fracture. 2. Likely possible partial thickness tear of the scapholunate ligament but the  dorsal portion remains intact. 3. Mild extensor tendinosis/tenosynovitis. 4. Inflammation or partial thickness tear in the ulnar attachment of the trachea  fibrocartilage, meniscal homolog. Previous plane films in ED on 2/15/2019 were positioned poorly but with no obvious fracture or dislocation as below per the radiologist  IMPRESSION:  No evidence of acute fracture or dislocation. If there is clinical concern for  radiographically occult fracture (i.e. anatomic snuffbox tenderness) recommend  splinting and short-term follow-up radiograph in 10-14 days. ICD-10-CM ICD-9-CM    1.  Carpal tunnel syndrome, bilateral G56.03 354.0 AMB SUPPLY ORDER      EMG TWO EXTREMITIES UPPER      NCV/LAT MOTOR PER NERVE UP/LT      NCV/LAT MOTOR PER NERVE UP/RT       Plan:     Bilateral upper extremity EMG and nerve conduction studies    Bilateral carpal tunnel braces    Follow-up after EMG    Plan was reviewed with patient, who verbalized agreement and understanding of the plan

## 2019-07-17 NOTE — PROGRESS NOTES
1. Have you been to the ER, urgent care clinic since your last visit? Hospitalized since your last visit? No    2. Have you seen or consulted any other health care providers outside of the [address] [place] [address] since your last visit? Include any pap smears or colon screening.  No

## 2019-07-29 ENCOUNTER — HOSPITAL ENCOUNTER (OUTPATIENT)
Dept: LAB | Age: 54
Discharge: HOME | End: 2019-07-29

## 2019-07-29 ENCOUNTER — OFFICE VISIT (OUTPATIENT)
Dept: NEUROLOGY | Age: 54
End: 2019-07-29

## 2019-07-29 VITALS
HEIGHT: 63 IN | WEIGHT: 100.4 LBS | BODY MASS INDEX: 17.79 KG/M2 | HEART RATE: 78 BPM | RESPIRATION RATE: 20 BRPM | SYSTOLIC BLOOD PRESSURE: 100 MMHG | DIASTOLIC BLOOD PRESSURE: 72 MMHG | TEMPERATURE: 98.3 F | OXYGEN SATURATION: 99 %

## 2019-07-29 DIAGNOSIS — G35 MULTIPLE SCLEROSIS (HCC): Primary | ICD-10-CM

## 2019-07-29 LAB — XX-LABCORP SPECIMEN COL,LCBCF: NORMAL

## 2019-07-29 PROCEDURE — 99001 SPECIMEN HANDLING PT-LAB: CPT

## 2019-07-29 NOTE — LETTER
7/29/19 Patient: Pia Sosa YOB: 1965 Date of Visit: 7/29/2019 Rosemary Sherman NP 
[address] [place] [address] VIA In Basket Dear Rosemary Sherman NP, Thank you for referring Ms. Jose Jolley to [address] [place] [address] for evaluation. My notes for this consultation are attached. If you have questions, please do not hesitate to call me. I look forward to following your patient along with you. Sincerely, Christel Pimentel NP

## 2019-07-29 NOTE — PATIENT INSTRUCTIONS
Patient instructions:  -Have TB test done  -If negative, will start the Aubagio  -After 1 month on the Aubagio, have hepatic function panel done  -Follow up after 1 month on the medication

## 2019-07-29 NOTE — PROGRESS NOTES
[facility]  [address] [facility], [address], [place], [address]  [address] Roberto Betts, [address]  Office:  [phone]  Fax: [phone]  Chief Complaint   Patient presents with    Multiple Sclerosis       HPI: Pham Tirado is a 52-year-old left-handed female who presents in follow-up for MS. She was last seen at the practice on 6/27/2019 by Dr. Nidhi Drummond. She had been having episodes of loss of consciousness and spells of inattentiveness. It was noted that she states that she has constant episodes of loss of consciousness all day long throughout the day. She feels sleepy all the time. She does not sleep well at night; she gets broken sleep. During the day when she falls unconscious she can sleep for an hour. There was no change in her symptoms. She also has chronic pain throughout her body located in the back, arm, arm spasms. She has had chronic pain for at least 5 years. She had an MRI of the cervical spine in May 2019 with abnormal signal in the savannah, cervical medullary junction, and proximal spinal cord. Findings suspicious for demyelinating disease. She had an MRI of the brain in June 2019 reporting numerous discrete hemispheric and callosal lesions with other areas of more conglomerate coalescent high signal.  Several lesions also presen within the brainstem and cerebellum. Findings consistent with demyelinating process such as multiple sclerosis equivocal vague enhancement associated with several of the more poorly delineated hemispheric lesions. At her last visit, she received screening blood work, and she was to start on Aubagio. She wanted to take the medication once a day. She has TB test pending. She presents today in follow-up. Her pain is the same. It is located in the legs, back, feet, and stomach. She has no falls since 2 weeks ago in which she fell to her knees.   Her friend was taking her to her home town to see her mom before she passed, and she lost her balance and fell forward into a lamp. She did not hit her head or face. She uses a cane sometimes for ambulation. She reports that her mother just passed away on the 10th. She denies any recent infections. She reports environmental allergies and she is going to be getting allergy shots. She is seeing Dr. Giovanni Aase. She is allergic to pollen. She has not started the Aubagio yet. She has not had a TB test yet. She denies any recent loss of consciousness, but she falls asleep while sitting in the chair. She reports problems with her short-term memory. She had an issue with her left hand in which it twisted when she was turning the heat off so she saw a hand specialist for left hand paresthesias and numbness, and she has an EMG/NCS ordered which she has not had yet. She reports some fatigue at times. She gets tired fast.  She has some heat intolerance but she is also going through menopause. She had some loss of vision last year and she saw the ophthalmologist and she has cataracts in both eyes. She has some blurry vision but no loss of vision episodes. Her ophthalmologist is Dr. Marleni Stacy at [facility]. She reports problems from the left eye from being hit by a soccer ball a few years ago. She has photophobia. She reports urinary frequency. She does not drive. She lives on her own.     Past Medical History:   Diagnosis Date    Chest pain 11/2014    neg EKG, non recurrent    Chronic pain     lower back and legs    Depression     Fibroids     GERD (gastroesophageal reflux disease)     Headache(784.0)     Hiatal hernia     IBS (irritable bowel syndrome)     Microscopic hematuria     Rectal bleeding     Stool color black     irritable bowel       Past Surgical History:   Procedure Laterality Date    COLONOSCOPY,DIAGNOSTIC      HX BREAST BIOPSY Right 1/21/2015    RIGHT BREAST BIOPSY WITH NEEDLE LOCALIZATION ULTRASOUND performed by Valerie Kaplan MD at [facility] MAIN OR    HX CHOLECYSTECTOMY      HX GI      HX HYSTERECTOMY      HX TUBAL LIGATION         Current Outpatient Medications   Medication Sig Dispense Refill    teriflunomide (AUBAGIO) 14 mg tab Take 14 mg by mouth daily. 30 Tab 11    nortriptyline (PAMELOR) 25 mg capsule Take 1 Cap by mouth nightly. May increase to 2 pills in 3 days 60 Cap 1    triamcinolone acetonide (KENALOG) 0.1 % topical cream Apply  to affected area two (2) times a day. 45 g 11    nabumetone (RELAFEN) 500 mg tablet Take 1 Tab by mouth two (2) times a day. 60 Tab 2    fluticasone (FLONASE) 50 mcg/actuation nasal spray 2 Sprays by Both Nostrils route daily. Indications: Allergic Rhinitis 1 Bottle 11    Comp Stocking,Knee,Long,Medium misc Wear daily while walking to prevent swelling 1 Each 0        Allergies   Allergen Reactions    Naproxen Shortness of Breath    Shellfish Derived Nausea and Vomiting     SEVERE NAUSEA AND VOMITING    Amoxicillin Rash and Nausea Only       Social History     Tobacco Use    Smoking status: Former Smoker     Packs/day: 0.00     Last attempt to quit: 6/28/2016     Years since quitting: 3.0    Smokeless tobacco: Former User     Quit date: 02/2016   Substance Use Topics    Alcohol use: No     Alcohol/week: 10.0 standard drinks     Types: 10 Cans of beer per week    Drug use: No       Family History   Problem Relation Age of Onset    HIV/AIDS Brother     Diabetes Father     Cancer Brother     Hypertension Sister     Diabetes Brother     Hypertension Sister     Hypertension Sister     Hypertension Brother        Review of Systems:  GENERAL: Denies fever. +fatigue. CARDIAC: No CP or SOB  PULMONARY: No cough or SOB  MUSCULOSKELETAL: +pain in the arms, hands, legs, back, stomach.   NEURO: SEE HPI    Physical Examination:  Visit Vitals  /72 (BP 1 Location: Left arm, BP Patient Position: Sitting)   Pulse 78   Temp 98.3 °F (36.8 °C) (Oral)   Resp 20   Ht 5' 3\" (1.6 m)   Wt 45.5 kg (100 lb 6.4 oz)   LMP (LMP Unknown)   SpO2 99%   BMI 17.79 kg/m²       The patient is awake, alert, and oriented x 4. Affect a bit flat. Fund of knowledge is adequate. Speech is fluent and memory is intact. Cranial Nerves: II - Visual fields are full to confrontation. III, IV, VI - Extraocular movements are intact. Bilateral end gaze nystagmus. V - Facial sensation is intact to pinprick. VII - Face is symmetrical.  VIII - Hearing is present. IX, X, XII - Palate is symmetrical.   XI - Shoulder shrugging and head turning intact  Motor: The patient moves all four limbs fairly well and symmetrically. Tone is normal. Reflexes are 2+ and symmetrical. Plantars are down going. Gait is, antalgic, very unsteady, but she doesn't fall. Ambulates with cane. MRI CERV SPINE WO CONT 5/10/2019:    Study Result     Sagittal and axial multisequence MR images of cervical spine were obtained.     HISTORY: Progressive weakness in bilateral upper extremity.     Minimal reversal of curvature at C3 and C4. No spondylolisthesis. No compression  fracture or pathologic marrow signal. Paraspinous soft tissues are unremarkable.     Sagittal images show abnormal high signal at the savannah,  cervical medullary  junction and in the upper cervical spinal cord (at mid C2 level).     No cerebellar tonsillar ectopia     C2-C3: Minimal posterior disc bulge. No central stenosis or cord contact. Moderate right and mild left foraminal stenosis.     C3-C4: Posterior disc bulge and central disc protrusion contacting spinal cord. Posterior CSF is effaced. Moderate central stenosis with AP canal measuring 8.8  mm. Severe right and moderate right foraminal stenosis when combined with facet  hypertrophy.     C4-C5: Similar posterior disc bulge and central disc protrusion contacting  spinal cord with moderate central stenosis. No cord edema.  Severe right and  moderate left foraminal stenosis.     C5-C6: Mild posterior disc bulge, contacting without compressing spinal cord.  Mild central stenosis. Severe right and moderate left foraminal stenosis. Right  foraminal perineural cyst.     C6-C7: No disc herniation or central stenosis. Mild foraminal narrowing. Perineural cyst in the right foramen.     C7-T1: No disc herniation or central stenosis. Mild foraminal narrowing with  facet hypertrophy.     IMPRESSION  IMPRESSION:  1. Abnormal signal in the savannah, cervicomedullary junction and proximal spinal  cord. Findings are suspicious for demyelinating disease, clinical correlation? MRI of the brain may also be indicated for further evaluation. 2. Disc disease most prominent at C3-C4, C4-C5 and C5-C6 with central stenosis,  cord contact but no edema. Foraminal stenosis at multiple levels as described. MRI BRAIN W WO CONT 6/20/2019:    Result Information     Status: Final result (Exam End: 6/20/2019 14:34) Provider Status: Reviewed   Study Result     MRI HEAD WITHOUT AND WITH CONTRAST     CPT CODE: 72303     INDICATION: Abnormal cervical MRI with signal abnormality in the brainstem and  spine. Headache. Sleep disorder. Chronic pain syndrome.     TECHNIQUE: MRI performed consisting of sagittal T1 FLAIR, axial T1, T2 and FLAIR  sequences with additional diffusion imaging and coronal and axial gadolinium  enhanced sequences.  -13 cc Dotarem administered     COMPARISON: Cervical MRI 5/10/2019. Head CT 9/14/2017.     FINDINGS:  There are numerous fairly discrete rounded to oval high T2 signal lesions  throughout the subcortical and deep hemispheric white matter bilaterally, more  clustered in the periventricular white matter on both left and right. On T1 sequences majority of these appear as quite low signal, though not simply  cystic. Several areas are more coalescent in the periventricular white matter  bilaterally predominantly posterior parietal, but also involving the bilateral  temporal regions, right greater than left.   Sagittal images show involvement of the corpus callosum bilaterally with largest  volume in the posterior parietal region. Several images on axial series measured. -5.6 mm lesion high left parietal region image 39 series 6.  -7.3 mm right posterior callosal lesion image 34 series 6.  -10 mm ovoid left parietal-occipital region image 35 series 6.  -6 mm ovoid lesion posterior superior right basal ganglia image 29 series 6.     Redemonstrated are more irregular patchy high signal lesions in the posterior  midline savannah, left posterior paramedian savannah and right lateral savannah at the  junction with the cerebellar peduncle.  -5 mm ovoid lesion superior medial left cerebellum image 16.  -Ill-defined conglomerate of lesions in the anterior right cerebellar hemisphere  measuring 18 x 16 mm image 10 series 6.  -Lobulated 8 x 4 mm lesion left side of the medulla image 7.     No diffusion abnormalities to suggest acute infarct or ischemia by imaging. Partially included paranasal sinuses and mastoid air cells appear free of  disease. Sella grossly unremarkable within the limitations of a nondedicated exam.     Associated with conglomerate high signal in the anterior medial right cerebellar  hemisphere is a linear low signal focus extending to the medial surface of the  cerebellar hemisphere on T2 and FLAIR sequences. Postcontrast there is small  area of branching or wispy enhancement associated, image 10 series 13. Findings  suggest a small congenital vascular malformation such as a developmental venous  anomaly. No evidence of hemorrhage.     Following contrast administration:  -equivocal enhancement of a 4.3 mm lesion in the left parietal white matter,  image 33.  -Small amount of peripheral linear enhancement associated with more vague  triangular white matter hyperintensity in the right parietal corona radiata on  image 33.  -Equivocal minimal enhancement associated with a 5 mm left parasagittal parietal  lesion image 39.  There is minimal associated precontrast hyperintense signal at  this same level.  -No appreciable enhancement associated with any of the other lesions        IMPRESSION  IMPRESSION:  1. Numerous discrete hemispheric and callosal lesions with other areas of more  conglomerate coalescent high signal..  -Several lesions also present within the brainstem and cerebellum as above  -Findings would be consistent with demyelinating process such as multiple  sclerosis.     2. Equivocal vague enhancement associated with several of the more poorly  delineated hemispheric lesions as discussed above. Difficult definitive  assessment as above. May be more relatively acute     3. No diffusion abnormalities to suggest acute infarct or ischemia by imaging.     4. Small vascular malformation right cerebellar hemisphere as above, without  evidence of hemorrhage. .           CMP  6/18/2019  9:38 AM - [place], [facility] Lab Results In     Component Value Flag Ref Range Units Status   Glucose 89   65 - 99 mg/dL Final   BUN 12   6 - 24 mg/dL Final   Creatinine 0.68   0.57 - 1.00 mg/dL Final   GFR est non-AA 99   >59 mL/min/1.73 Final   GFR est    >59 mL/min/1.73 Final   BUN/Creatinine ratio 18   9 - 23  Final   Sodium 142   134 - 144 mmol/L Final   Potassium 4.0   3.5 - 5.2 mmol/L Final   Chloride 104   96 - 106 mmol/L Final   CO2 25   20 - 29 mmol/L Final   Calcium 9.3   8.7 - 10.2 mg/dL Final   Protein, total 7.3   6.0 - 8.5 g/dL Final   Albumin 4.4   3.5 - 5.5 g/dL Final   GLOBULIN, TOTAL 2.9   1.5 - 4.5 g/dL Final   A-G Ratio 1.5   1.2 - 2.2  Final   Bilirubin, total 0.3   0.0 - 1.2 mg/dL Final   Alk. phosphatase 91   39 - 117 IU/L Final   AST (SGOT) 18   0 - 40 IU/L Final   ALT (SGPT) 17   0 - 32 IU/L Final       Impression/Plan: This is a 52-year-old left-handed female who presents in follow-up for MS. She has had lesions noted in the cervical cord and brain on her recent MRIs in May and June.   At her last visit she has had screening blood work ordered for TB and she has had a discussion about starting on Aubagio so she can take an oral once a day pill. She has not had the TB test so we will await the TB testing prior to starting her on the medication. She has had recent hepatic function tests which show normal function. Discussed with her that we will need these monthly for the first 6 months on the medication. We will follow-up for her TB result and let her know to start on the medication when she receives it if negative testing. She will follow-up in 1 month after starting the medication. Diagnoses and all orders for this visit:    1. Multiple sclerosis ([facility] [address]  -     HEPATIC FUNCTION PANEL; Future    Total time 35 minutes with 20 minutes spent in counseling. Signed By: Christel Pimentel NP      This note will not be viewable in [address] PLEASE NOTE:   Portions of this document may have been produced using voice recognition software. Unrecognized errors in transcription may be present.

## 2019-07-29 NOTE — PROGRESS NOTES
Gay Padilla is a 52 y.o. female in today for follow-up on MS. Learning assessment previously completed 6/15/2018; primary language is [place]. 1. Have you been to the ER, urgent care clinic since your last visit? Hospitalized since your last visit? No    2. Have you seen or consulted any other health care providers outside of the [address] [place] [address] since your last visit? Include any pap smears or colon screening.  No

## 2019-08-07 ENCOUNTER — TELEPHONE (OUTPATIENT)
Dept: NEUROLOGY | Age: 54
End: 2019-08-07

## 2019-08-08 NOTE — TELEPHONE ENCOUNTER
Spoke with patient, made aware of provider's comments regarding results. She reports not having the Aubagio yet. I will follow-up.

## 2019-08-15 ENCOUNTER — TELEPHONE (OUTPATIENT)
Dept: NEUROLOGY | Age: 54
End: 2019-08-15

## 2019-08-15 DIAGNOSIS — G35 MULTIPLE SCLEROSIS (HCC): Primary | ICD-10-CM

## 2019-08-16 NOTE — TELEPHONE ENCOUNTER
Called patient and discussed that we need to consider another medication since Aubagio was not approved. Spoke to her about Tecfidera, discussed side effects. She mentions a rash that she has to her arms, legs, face, and she has concern for infection; she's been to the dermatologist and has a steroid cream and oral steroid. She reports having a follow up appointment to see Dr. Bonnie Ferguson next week on the 23rd. Asked that note be sent to us and will review the note prior to initiating the medication. She is also concerned about home safety and risk for falls so will order home health referral for home safety evaluation. She would like a caretaker. Will follow up with patient after receiving dermatology notes.

## 2019-08-17 ENCOUNTER — HOME HEALTH ADMISSION (OUTPATIENT)
Dept: HOME HEALTH SERVICES | Facility: HOME HEALTH | Age: 54
End: 2019-08-17
Payer: MEDICARE

## 2019-08-19 ENCOUNTER — HOME CARE VISIT (OUTPATIENT)
Dept: SCHEDULING | Facility: HOME HEALTH | Age: 54
End: 2019-08-19
Payer: MEDICARE

## 2019-08-19 ENCOUNTER — HOME CARE VISIT (OUTPATIENT)
Dept: HOME HEALTH SERVICES | Facility: HOME HEALTH | Age: 54
End: 2019-08-19

## 2019-08-19 PROCEDURE — 400013 HH SOC

## 2019-08-19 PROCEDURE — G0151 HHCP-SERV OF PT,EA 15 MIN: HCPCS

## 2019-08-20 ENCOUNTER — HOME CARE VISIT (OUTPATIENT)
Dept: HOME HEALTH SERVICES | Facility: HOME HEALTH | Age: 54
End: 2019-08-20
Payer: MEDICARE

## 2019-08-20 ENCOUNTER — HOME CARE VISIT (OUTPATIENT)
Dept: SCHEDULING | Facility: HOME HEALTH | Age: 54
End: 2019-08-20
Payer: MEDICARE

## 2019-08-20 VITALS
SYSTOLIC BLOOD PRESSURE: 100 MMHG | OXYGEN SATURATION: 97 % | TEMPERATURE: 97.8 F | DIASTOLIC BLOOD PRESSURE: 66 MMHG | HEART RATE: 88 BPM

## 2019-08-20 VITALS
OXYGEN SATURATION: 99 % | HEART RATE: 70 BPM | DIASTOLIC BLOOD PRESSURE: 70 MMHG | TEMPERATURE: 97.5 F | SYSTOLIC BLOOD PRESSURE: 114 MMHG

## 2019-08-20 VITALS
TEMPERATURE: 97.1 F | DIASTOLIC BLOOD PRESSURE: 70 MMHG | OXYGEN SATURATION: 98 % | SYSTOLIC BLOOD PRESSURE: 90 MMHG | HEART RATE: 68 BPM

## 2019-08-20 PROCEDURE — G0152 HHCP-SERV OF OT,EA 15 MIN: HCPCS

## 2019-08-20 PROCEDURE — G0151 HHCP-SERV OF PT,EA 15 MIN: HCPCS

## 2019-08-20 NOTE — Clinical Note
Please provide Rx for a tub transfer bench and commode to increase pt safety. Fax can be sent directly to [phone] at [phone]. Thank you- Any questions I can be reached at  Kyung Machado OTR/L 
 
SECOND ATTEMPT-  first sent to Dr. Yeni William on 8/20/19

## 2019-08-20 NOTE — Clinical Note
Please provide Rx for a tub transfer bench and commode to increase pt safety. Fax can be sent directly to [phone] at [phone]. Thank you- Any questions I can be reached at  Lupillo Golden OTR/L

## 2019-08-21 ENCOUNTER — HOME CARE VISIT (OUTPATIENT)
Dept: HOME HEALTH SERVICES | Facility: HOME HEALTH | Age: 54
End: 2019-08-21
Payer: MEDICARE

## 2019-08-21 DIAGNOSIS — M54.42 CHRONIC LEFT-SIDED LOW BACK PAIN WITH LEFT-SIDED SCIATICA: ICD-10-CM

## 2019-08-21 DIAGNOSIS — G89.29 CHRONIC LEFT-SIDED LOW BACK PAIN WITH LEFT-SIDED SCIATICA: ICD-10-CM

## 2019-08-21 PROCEDURE — G0157 HHC PT ASSISTANT EA 15: HCPCS

## 2019-08-21 RX ORDER — NABUMETONE 500 MG/1
500 TABLET, FILM COATED ORAL 2 TIMES DAILY
Qty: 60 TAB | Refills: 2 | OUTPATIENT
Start: 2019-08-21

## 2019-08-21 NOTE — TELEPHONE ENCOUNTER
Requested Prescriptions     Pending Prescriptions Disp Refills    nabumetone (RELAFEN) 500 mg tablet 60 Tab 2     Sig: Take 1 Tab by mouth two (2) times a day.

## 2019-08-22 ENCOUNTER — HOME CARE VISIT (OUTPATIENT)
Dept: SCHEDULING | Facility: HOME HEALTH | Age: 54
End: 2019-08-22
Payer: MEDICARE

## 2019-08-22 VITALS
RESPIRATION RATE: 18 BRPM | HEART RATE: 75 BPM | DIASTOLIC BLOOD PRESSURE: 78 MMHG | SYSTOLIC BLOOD PRESSURE: 133 MMHG | OXYGEN SATURATION: 97 % | TEMPERATURE: 97.7 F

## 2019-08-22 VITALS
RESPIRATION RATE: 14 BRPM | OXYGEN SATURATION: 96 % | TEMPERATURE: 98.4 F | SYSTOLIC BLOOD PRESSURE: 126 MMHG | DIASTOLIC BLOOD PRESSURE: 78 MMHG | HEART RATE: 87 BPM

## 2019-08-22 DIAGNOSIS — G89.29 CHRONIC LEFT-SIDED LOW BACK PAIN WITH LEFT-SIDED SCIATICA: ICD-10-CM

## 2019-08-22 DIAGNOSIS — M54.42 CHRONIC LEFT-SIDED LOW BACK PAIN WITH LEFT-SIDED SCIATICA: ICD-10-CM

## 2019-08-22 PROCEDURE — G0158 HHC OT ASSISTANT EA 15: HCPCS

## 2019-08-22 RX ORDER — NABUMETONE 500 MG/1
500 TABLET, FILM COATED ORAL 2 TIMES DAILY
Qty: 60 TAB | Refills: 2 | OUTPATIENT
Start: 2019-08-22

## 2019-08-22 NOTE — TELEPHONE ENCOUNTER
2 pt. Identifiers confirmed. Pt. Notified of provider comments, \"Please ask this patient to get this refill from Ortho. She should also always take food with this medication\" Pt verbalized understanding and had no further questions at this time.

## 2019-08-22 NOTE — TELEPHONE ENCOUNTER
This medication was last prescribed by patient's PCP. They would not refill it and advised her to contact our office for a refill. Please sign if appropriate. Last Visit: 5/30/19 with MD Rocio Terry  Next Appointment: none  Previous Refill Encounter(s): 2/20/19 #60 with 2 refills    Requested Prescriptions     Pending Prescriptions Disp Refills    nabumetone (RELAFEN) 500 mg tablet 60 Tab 2     Sig: Take 1 Tab by mouth two (2) times a day.

## 2019-08-23 ENCOUNTER — HOME CARE VISIT (OUTPATIENT)
Dept: SCHEDULING | Facility: HOME HEALTH | Age: 54
End: 2019-08-23
Payer: MEDICARE

## 2019-08-23 PROCEDURE — G0153 HHCP-SVS OF S/L PATH,EA 15MN: HCPCS

## 2019-08-25 VITALS
OXYGEN SATURATION: 94 % | HEART RATE: 66 BPM | SYSTOLIC BLOOD PRESSURE: 113 MMHG | DIASTOLIC BLOOD PRESSURE: 79 MMHG | TEMPERATURE: 97.8 F

## 2019-08-26 ENCOUNTER — HOME CARE VISIT (OUTPATIENT)
Dept: HOME HEALTH SERVICES | Facility: HOME HEALTH | Age: 54
End: 2019-08-26
Payer: MEDICARE

## 2019-08-26 VITALS
RESPIRATION RATE: 14 BRPM | OXYGEN SATURATION: 97 % | HEART RATE: 86 BPM | SYSTOLIC BLOOD PRESSURE: 132 MMHG | DIASTOLIC BLOOD PRESSURE: 78 MMHG | TEMPERATURE: 98.4 F

## 2019-08-26 PROCEDURE — G0158 HHC OT ASSISTANT EA 15: HCPCS

## 2019-08-27 ENCOUNTER — TELEPHONE (OUTPATIENT)
Dept: NEUROLOGY | Age: 54
End: 2019-08-27

## 2019-08-27 ENCOUNTER — HOME CARE VISIT (OUTPATIENT)
Dept: SCHEDULING | Facility: HOME HEALTH | Age: 54
End: 2019-08-27
Payer: MEDICARE

## 2019-08-27 ENCOUNTER — OFFICE VISIT (OUTPATIENT)
Dept: FAMILY MEDICINE CLINIC | Facility: CLINIC | Age: 54
End: 2019-08-27

## 2019-08-27 VITALS
TEMPERATURE: 96.2 F | OXYGEN SATURATION: 100 % | SYSTOLIC BLOOD PRESSURE: 113 MMHG | RESPIRATION RATE: 16 BRPM | HEIGHT: 63 IN | WEIGHT: 140.4 LBS | DIASTOLIC BLOOD PRESSURE: 80 MMHG | BODY MASS INDEX: 24.88 KG/M2 | HEART RATE: 74 BPM

## 2019-08-27 DIAGNOSIS — J30.89 PERENNIAL ALLERGIC RHINITIS: ICD-10-CM

## 2019-08-27 DIAGNOSIS — M54.42 CHRONIC LEFT-SIDED LOW BACK PAIN WITH LEFT-SIDED SCIATICA: ICD-10-CM

## 2019-08-27 DIAGNOSIS — R35.0 FREQUENCY OF MICTURITION: Primary | ICD-10-CM

## 2019-08-27 DIAGNOSIS — G35 MS (MULTIPLE SCLEROSIS) (HCC): Primary | ICD-10-CM

## 2019-08-27 DIAGNOSIS — G89.29 CHRONIC LEFT-SIDED LOW BACK PAIN WITH LEFT-SIDED SCIATICA: ICD-10-CM

## 2019-08-27 LAB
BILIRUB UR QL STRIP: NEGATIVE
GLUCOSE UR-MCNC: NEGATIVE MG/DL
KETONES P FAST UR STRIP-MCNC: NEGATIVE MG/DL
PH UR STRIP: 5.5 [PH] (ref 4.6–8)
PROT UR QL STRIP: NEGATIVE
SP GR UR STRIP: 1.02 (ref 1–1.03)
UA UROBILINOGEN AMB POC: NORMAL (ref 0.2–1)
URINALYSIS CLARITY POC: CLEAR
URINALYSIS COLOR POC: YELLOW
URINE BLOOD POC: NEGATIVE
URINE LEUKOCYTES POC: NEGATIVE
URINE NITRITES POC: NEGATIVE

## 2019-08-27 PROCEDURE — G0153 HHCP-SVS OF S/L PATH,EA 15MN: HCPCS

## 2019-08-27 RX ORDER — NABUMETONE 500 MG/1
500 TABLET, FILM COATED ORAL 2 TIMES DAILY
Qty: 60 TAB | Refills: 0 | Status: SHIPPED | OUTPATIENT
Start: 2019-08-27 | End: 2019-10-18 | Stop reason: SDUPTHER

## 2019-08-27 RX ORDER — FLUTICASONE PROPIONATE 50 MCG
2 SPRAY, SUSPENSION (ML) NASAL DAILY
Qty: 1 BOTTLE | Refills: 11 | Status: SHIPPED | OUTPATIENT
Start: 2019-08-27 | End: 2019-12-19 | Stop reason: SDUPTHER

## 2019-08-27 NOTE — PROGRESS NOTES
Gay Padilla presents today for   Chief Complaint   Patient presents with    Follow-up     medication refill, Patient stated that she have blurry vision for 6 months and its getting worst       Is someone accompanying this pt? no    Is the patient using any DME equipment during OV? no    Depression Screening:  3 most recent PHQ Screens 8/27/2019   PHQ Not Done -   Little interest or pleasure in doing things Not at all   Feeling down, depressed, irritable, or hopeless Not at all   Total Score PHQ 2 0   Trouble falling or staying asleep, or sleeping too much -   Feeling tired or having little energy -   Poor appetite, weight loss, or overeating -   Feeling bad about yourself - or that you are a failure or have let yourself or your family down -   Trouble concentrating on things such as school, work, reading, or watching TV -   Moving or speaking so slowly that other people could have noticed; or the opposite being so fidgety that others notice -   Thoughts of being better off dead, or hurting yourself in some way -   PHQ 9 Score -   How difficult have these problems made it for you to do your work, take care of your home and get along with others -       Learning Assessment:  Learning Assessment 6/15/2018   PRIMARY LEARNER Patient   CO-LEARNER CAREGIVER -   PRIMARY LANGUAGE ENGLISH   LEARNER PREFERENCE PRIMARY DEMONSTRATION   ANSWERED BY patient   RELATIONSHIP SELF       Abuse Screening:  Abuse Screening Questionnaire 8/14/2018   Do you ever feel afraid of your partner? N   Are you in a relationship with someone who physically or mentally threatens you? N   Is it safe for you to go home? Y       Fall Risk  No flowsheet data found. Health Maintenance reviewed and discussed and ordered per Provider.     Health Maintenance Due   Topic Date Due    DTaP/Tdap/Td series (1 - Tdap) 11/26/1984    Shingrix Vaccine Age 50> (1 of 2) 11/26/2013    Influenza Age 5 to Adult  08/01/2019           Coordination of Care:  1. Have you been to the ER, urgent care clinic since your last visit? Hospitalized since your last visit? no    2. Have you seen or consulted any other health care providers outside of the [address] [place] [address] since your last visit? Include any pap smears or colon screening.  no

## 2019-08-27 NOTE — PROGRESS NOTES
Joyce Dobson is a 52 y.o. female presenting today for Follow-up (medication refill, Patient stated that she have blurry vision for 6 months and its getting worst)      HPI:  Joyce Dobson presents to the office today for blurred vision x 6 months or more. Patient notes her vision is progressively worse. She notes she had bifocals added to her glasses but she notes she still is unable to see. She also notes the glasses her the bridge of her nose. She does not know the name of her Ophthalmologist but does have the information at home regarding how to contact the provider. She also presents to the practice complaining of urinaryfrequency. She denies any urgency or dysuria. She notes she was recently diagnosed with MS by her Neurologist and is awaiting a follow-up appointment. Review of Systems   Respiratory: Negative for cough and sputum production. Cardiovascular: Negative for chest pain, palpitations and leg swelling. Gastrointestinal: Negative for nausea and vomiting. Genitourinary: Positive for frequency. Negative for dysuria. Musculoskeletal: Positive for myalgias. Allergies   Allergen Reactions    Naproxen Shortness of Breath    Shellfish Derived Nausea and Vomiting     SEVERE NAUSEA AND VOMITING    Amoxicillin Rash and Nausea Only       Current Outpatient Medications   Medication Sig Dispense Refill    fluticasone propionate (FLONASE) 50 mcg/actuation nasal spray 2 Sprays by Both Nostrils route daily. Indications: inflammation of the nose due to an allergy 1 Bottle 11    nabumetone (RELAFEN) 500 mg tablet Take 1 Tab by mouth two (2) times a day. 60 Tab 0    predniSONE (DELTASONE) 20 mg tablet Take 20 mg by mouth daily.  biotin (VITAMIN B7) 5 mg tablet Take 5 mg by mouth daily.  teriflunomide (AUBAGIO) 14 mg tab Take 14 mg by mouth daily. 30 Tab 11    nortriptyline (PAMELOR) 25 mg capsule Take 1 Cap by mouth nightly.  May increase to 2 pills in 3 days 60 Cap 1    triamcinolone acetonide (KENALOG) 0.1 % topical cream Apply  to affected area two (2) times a day. (Patient taking differently: Apply  to affected area two (2) times a day.  Apply a thin layer) 45 g 11    Comp Stocking,Knee,Long,Medium misc Wear daily while walking to prevent swelling 1 Each 0       Past Medical History:   Diagnosis Date    Chest pain 11/2014    neg EKG, non recurrent    Chronic pain     lower back and legs    Depression     Fibroids     GERD (gastroesophageal reflux disease)     Headache(784.0)     Hiatal hernia     IBS (irritable bowel syndrome)     Microscopic hematuria     Rectal bleeding     Stool color black     irritable bowel       Past Surgical History:   Procedure Laterality Date    COLONOSCOPY,DIAGNOSTIC      HX BREAST BIOPSY Right 1/21/2015    RIGHT BREAST BIOPSY WITH NEEDLE LOCALIZATION ULTRASOUND performed by Shalom Leger MD at [facility] MAIN OR    HX CHOLECYSTECTOMY      HX GI      HX HYSTERECTOMY      HX TUBAL LIGATION         Social History     Socioeconomic History    Marital status: SINGLE     Spouse name: Not on file    Number of children: Not on file    Years of education: Not on file    Highest education level: Not on file   Occupational History    Not on file   Social Needs    Financial resource strain: Not on file    Food insecurity:     Worry: Not on file     Inability: Not on file    Transportation needs:     Medical: Not on file     Non-medical: Not on file   Tobacco Use    Smoking status: Former Smoker     Packs/day: 0.00     Last attempt to quit: 6/28/2016     Years since quitting: 3.1    Smokeless tobacco: Former User     Quit date: 02/2016   Substance and Sexual Activity    Alcohol use: No     Alcohol/week: 10.0 standard drinks     Types: 10 Cans of beer per week    Drug use: No    Sexual activity: Yes     Partners: Male   Lifestyle    Physical activity:     Days per week: Not on file     Minutes per session: Not on file  Stress: Not on file   Relationships    Social connections:     Talks on phone: Not on file     Gets together: Not on file     Attends Buddhism service: Not on file     Active member of club or organization: Not on file     Attends meetings of clubs or organizations: Not on file     Relationship status: Not on file    Intimate partner violence:     Fear of current or ex partner: Not on file     Emotionally abused: Not on file     Physically abused: Not on file     Forced sexual activity: Not on file   Other Topics Concern    Not on file   Social History Narrative    Not on file       Patient does not have an advanced directive on file    Vitals:    08/27/19 1054   BP: 113/80   Pulse: 74   Resp: 16   Temp: 96.2 °F (35.7 °C)   TempSrc: Oral   SpO2: 100%   Weight: 140 lb 6.4 oz (63.7 kg)   Height: 5' 3\" (1.6 m)   PainSc:   8       Physical Exam   Cardiovascular: Normal rate, regular rhythm and normal heart sounds. Pulmonary/Chest: Effort normal and breath sounds normal.   Neurological:   Patient is ambulating with a cane   Nursing note and vitals reviewed. Office Visit on 08/27/2019   Component Date Value Ref Range Status    Color (UA POC) 08/27/2019 Yellow   Final    Clarity (UA POC) 08/27/2019 Clear   Final    Glucose (UA POC) 08/27/2019 Negative  Negative Final    Bilirubin (UA POC) 08/27/2019 Negative  Negative Final    Ketones (UA POC) 08/27/2019 Negative  Negative Final    Specific gravity (UA POC) 08/27/2019 1.025  1.001 - 1.035 Final    Blood (UA POC) 08/27/2019 Negative  Negative Final    pH (UA POC) 08/27/2019 5.5  4.6 - 8.0 Final    Protein (UA POC) 08/27/2019 Negative  Negative Final    Urobilinogen (UA POC) 08/27/2019 0.2 mg/dL  0.2 - 1 Final    Nitrites (UA POC) 08/27/2019 Negative  Negative Final    Leukocyte esterase (UA POC) 08/27/2019 Negative  Negative Final   Hospital Outpatient Visit on 07/29/2019   Component Date Value Ref Range Status    [facility]. 07/29/2019 Specimens collected/sent to [facility]. Please direct inquiries to ([phone]). Final       .  Results for orders placed or performed in visit on 08/27/19   AMB POC URINALYSIS DIP STICK AUTO W/O MICRO   Result Value Ref Range    Color (UA POC) Yellow     Clarity (UA POC) Clear     Glucose (UA POC) Negative Negative    Bilirubin (UA POC) Negative Negative    Ketones (UA POC) Negative Negative    Specific gravity (UA POC) 1.025 1.001 - 1.035    Blood (UA POC) Negative Negative    pH (UA POC) 5.5 4.6 - 8.0    Protein (UA POC) Negative Negative    Urobilinogen (UA POC) 0.2 mg/dL 0.2 - 1    Nitrites (UA POC) Negative Negative    Leukocyte esterase (UA POC) Negative Negative       Assessment / Plan:      ICD-10-CM ICD-9-CM    1. Frequency of micturition R35.0 788.41 AMB POC URINALYSIS DIP STICK AUTO W/O MICRO   2. Perennial allergic rhinitis J30.89 477.8 fluticasone propionate (FLONASE) 50 mcg/actuation nasal spray   3. Chronic left-sided low back pain with left-sided sciatica M54.42 724.2 nabumetone (RELAFEN) 500 mg tablet    G89.29 724.3      338.29      Negative urine  Blurred vision- patient not wearing glasses and instructed to follow-up with the Ophthalmologist  Refilled Nabumetone rx  Refilled Flonased      Follow-up and Dispositions    · Return in about 3 months (around 11/27/2019), or if symptoms worsen or fail to improve. I asked the patient if she  had any questions and answered her  questions. The patient stated that she understands the treatment plan and agrees with the treatment plan    This document was created with a voice activated dictation system and may contain transcription errors.

## 2019-08-28 VITALS
OXYGEN SATURATION: 98 % | SYSTOLIC BLOOD PRESSURE: 109 MMHG | DIASTOLIC BLOOD PRESSURE: 74 MMHG | TEMPERATURE: 97.9 F | HEART RATE: 86 BPM

## 2019-08-29 ENCOUNTER — HOME CARE VISIT (OUTPATIENT)
Dept: SCHEDULING | Facility: HOME HEALTH | Age: 54
End: 2019-08-29
Payer: MEDICARE

## 2019-08-29 VITALS
TEMPERATURE: 97.3 F | DIASTOLIC BLOOD PRESSURE: 79 MMHG | HEART RATE: 80 BPM | OXYGEN SATURATION: 91 % | SYSTOLIC BLOOD PRESSURE: 122 MMHG

## 2019-08-29 PROCEDURE — G0158 HHC OT ASSISTANT EA 15: HCPCS

## 2019-08-29 PROCEDURE — G0153 HHCP-SVS OF S/L PATH,EA 15MN: HCPCS

## 2019-08-30 ENCOUNTER — TELEPHONE (OUTPATIENT)
Dept: NEUROLOGY | Age: 54
End: 2019-08-30

## 2019-08-30 ENCOUNTER — HOME CARE VISIT (OUTPATIENT)
Dept: SCHEDULING | Facility: HOME HEALTH | Age: 54
End: 2019-08-30
Payer: MEDICARE

## 2019-08-30 VITALS
HEART RATE: 75 BPM | RESPIRATION RATE: 18 BRPM | TEMPERATURE: 98 F | SYSTOLIC BLOOD PRESSURE: 110 MMHG | OXYGEN SATURATION: 97 % | DIASTOLIC BLOOD PRESSURE: 70 MMHG

## 2019-08-30 PROCEDURE — G0157 HHC PT ASSISTANT EA 15: HCPCS

## 2019-08-30 NOTE — TELEPHONE ENCOUNTER
Plan of Care rec'd and placed in provider's folder @ [facility].

## 2019-08-31 ENCOUNTER — HOME CARE VISIT (OUTPATIENT)
Dept: HOME HEALTH SERVICES | Facility: HOME HEALTH | Age: 54
End: 2019-08-31
Payer: MEDICARE

## 2019-08-31 VITALS
SYSTOLIC BLOOD PRESSURE: 105 MMHG | DIASTOLIC BLOOD PRESSURE: 72 MMHG | OXYGEN SATURATION: 99 % | TEMPERATURE: 96.8 F | HEART RATE: 69 BPM

## 2019-08-31 PROCEDURE — G0157 HHC PT ASSISTANT EA 15: HCPCS

## 2019-09-01 VITALS
DIASTOLIC BLOOD PRESSURE: 78 MMHG | RESPIRATION RATE: 18 BRPM | SYSTOLIC BLOOD PRESSURE: 133 MMHG | OXYGEN SATURATION: 99 % | TEMPERATURE: 98.1 F | HEART RATE: 85 BPM

## 2019-09-03 ENCOUNTER — HOME CARE VISIT (OUTPATIENT)
Dept: SCHEDULING | Facility: HOME HEALTH | Age: 54
End: 2019-09-03
Payer: MEDICARE

## 2019-09-03 PROCEDURE — G0158 HHC OT ASSISTANT EA 15: HCPCS

## 2019-09-03 NOTE — TELEPHONE ENCOUNTER
Spoke with Medical Record's in Dr. Mariana Irizarry office, fax to send records to [facility] for NP, Irvin Yanez.

## 2019-09-04 ENCOUNTER — HOME CARE VISIT (OUTPATIENT)
Dept: SCHEDULING | Facility: HOME HEALTH | Age: 54
End: 2019-09-04
Payer: MEDICARE

## 2019-09-04 VITALS
SYSTOLIC BLOOD PRESSURE: 117 MMHG | DIASTOLIC BLOOD PRESSURE: 78 MMHG | HEART RATE: 75 BPM | TEMPERATURE: 98 F | OXYGEN SATURATION: 97 %

## 2019-09-04 VITALS
OXYGEN SATURATION: 97 % | SYSTOLIC BLOOD PRESSURE: 117 MMHG | TEMPERATURE: 98 F | HEART RATE: 75 BPM | RESPIRATION RATE: 19 BRPM | DIASTOLIC BLOOD PRESSURE: 78 MMHG

## 2019-09-04 PROCEDURE — G0157 HHC PT ASSISTANT EA 15: HCPCS

## 2019-09-04 PROCEDURE — G0153 HHCP-SVS OF S/L PATH,EA 15MN: HCPCS

## 2019-09-05 ENCOUNTER — HOME CARE VISIT (OUTPATIENT)
Dept: SCHEDULING | Facility: HOME HEALTH | Age: 54
End: 2019-09-05
Payer: MEDICARE

## 2019-09-05 ENCOUNTER — TELEPHONE (OUTPATIENT)
Dept: NEUROLOGY | Age: 54
End: 2019-09-05

## 2019-09-05 VITALS
OXYGEN SATURATION: 98 % | DIASTOLIC BLOOD PRESSURE: 86 MMHG | HEART RATE: 87 BPM | SYSTOLIC BLOOD PRESSURE: 120 MMHG | TEMPERATURE: 98.7 F

## 2019-09-05 VITALS
SYSTOLIC BLOOD PRESSURE: 132 MMHG | RESPIRATION RATE: 18 BRPM | HEART RATE: 87 BPM | DIASTOLIC BLOOD PRESSURE: 78 MMHG | TEMPERATURE: 98.8 F | OXYGEN SATURATION: 94 %

## 2019-09-05 PROCEDURE — G0152 HHCP-SERV OF OT,EA 15 MIN: HCPCS

## 2019-09-06 ENCOUNTER — HOME CARE VISIT (OUTPATIENT)
Dept: SCHEDULING | Facility: HOME HEALTH | Age: 54
End: 2019-09-06
Payer: MEDICARE

## 2019-09-06 DIAGNOSIS — G35 MS (MULTIPLE SCLEROSIS) (HCC): Primary | ICD-10-CM

## 2019-09-06 NOTE — PROGRESS NOTES
Please fax the tub transfer bench and commode prescriptions to [facility] to [phone].

## 2019-09-10 ENCOUNTER — HOME CARE VISIT (OUTPATIENT)
Dept: SCHEDULING | Facility: HOME HEALTH | Age: 54
End: 2019-09-10
Payer: MEDICARE

## 2019-09-10 VITALS
SYSTOLIC BLOOD PRESSURE: 132 MMHG | TEMPERATURE: 96.9 F | OXYGEN SATURATION: 98 % | HEART RATE: 87 BPM | DIASTOLIC BLOOD PRESSURE: 78 MMHG

## 2019-09-10 PROCEDURE — G0158 HHC OT ASSISTANT EA 15: HCPCS

## 2019-09-11 ENCOUNTER — HOME CARE VISIT (OUTPATIENT)
Dept: SCHEDULING | Facility: HOME HEALTH | Age: 54
End: 2019-09-11
Payer: MEDICARE

## 2019-09-11 PROCEDURE — G0151 HHCP-SERV OF PT,EA 15 MIN: HCPCS

## 2019-09-11 PROCEDURE — G0153 HHCP-SVS OF S/L PATH,EA 15MN: HCPCS

## 2019-09-11 NOTE — TELEPHONE ENCOUNTER
Several attempts made to receive records, last left VM to medical record Dept in Dr. Anitha Talley office.

## 2019-09-11 NOTE — Clinical Note
Patient was seen for a PT re-assessment with LPTA. She is assessed to have had a decline in her balance, gait, and ability to safely move through her home. Patient will benefit from a follow up ASAP with MD or NP re: possible MS exacerbation/relapse, due to her decline in status, to assess for appropriate treatment options. The patient does not have any medications to address her MS symptoms at this time. Thuy Painter was denied by her insurance. Thank you for addressing this concern.

## 2019-09-12 ENCOUNTER — TELEPHONE (OUTPATIENT)
Dept: NEUROLOGY | Age: 54
End: 2019-09-12

## 2019-09-12 ENCOUNTER — HOME CARE VISIT (OUTPATIENT)
Dept: SCHEDULING | Facility: HOME HEALTH | Age: 54
End: 2019-09-12
Payer: MEDICARE

## 2019-09-12 ENCOUNTER — HOME CARE VISIT (OUTPATIENT)
Dept: HOME HEALTH SERVICES | Facility: HOME HEALTH | Age: 54
End: 2019-09-12
Payer: MEDICARE

## 2019-09-12 VITALS
SYSTOLIC BLOOD PRESSURE: 120 MMHG | TEMPERATURE: 98 F | DIASTOLIC BLOOD PRESSURE: 81 MMHG | OXYGEN SATURATION: 98 % | HEART RATE: 77 BPM

## 2019-09-12 VITALS
SYSTOLIC BLOOD PRESSURE: 136 MMHG | HEART RATE: 88 BPM | DIASTOLIC BLOOD PRESSURE: 78 MMHG | OXYGEN SATURATION: 98 % | TEMPERATURE: 98.7 F | RESPIRATION RATE: 14 BRPM

## 2019-09-12 VITALS
SYSTOLIC BLOOD PRESSURE: 110 MMHG | OXYGEN SATURATION: 97 % | HEART RATE: 82 BPM | DIASTOLIC BLOOD PRESSURE: 80 MMHG | TEMPERATURE: 97.8 F

## 2019-09-12 DIAGNOSIS — G35 MS (MULTIPLE SCLEROSIS) (HCC): ICD-10-CM

## 2019-09-12 PROCEDURE — G0158 HHC OT ASSISTANT EA 15: HCPCS

## 2019-09-12 NOTE — TELEPHONE ENCOUNTER
Called patient and left message on voicemail to call back the office. Dermatology notes received and reviewed; would like to start Tecfidera for her MS. Awaiting callback to discuss starting the medication and establish follow-up.

## 2019-09-12 NOTE — TELEPHONE ENCOUNTER
Occupational therapist says that patient has been waiting on tub bench. Nurse Furman Bloch is waiting on call back @ [phone].  Please advise

## 2019-09-13 ENCOUNTER — HOME CARE VISIT (OUTPATIENT)
Dept: SCHEDULING | Facility: HOME HEALTH | Age: 54
End: 2019-09-13
Payer: MEDICARE

## 2019-09-13 VITALS
SYSTOLIC BLOOD PRESSURE: 131 MMHG | DIASTOLIC BLOOD PRESSURE: 83 MMHG | OXYGEN SATURATION: 98 % | TEMPERATURE: 98.3 F | HEART RATE: 81 BPM

## 2019-09-13 PROCEDURE — G0153 HHCP-SVS OF S/L PATH,EA 15MN: HCPCS

## 2019-09-13 PROCEDURE — G0157 HHC PT ASSISTANT EA 15: HCPCS

## 2019-09-14 VITALS
HEART RATE: 64 BPM | RESPIRATION RATE: 18 BRPM | SYSTOLIC BLOOD PRESSURE: 117 MMHG | DIASTOLIC BLOOD PRESSURE: 80 MMHG | OXYGEN SATURATION: 97 % | TEMPERATURE: 97.5 F

## 2019-09-16 ENCOUNTER — TELEPHONE (OUTPATIENT)
Dept: NEUROLOGY | Age: 54
End: 2019-09-16

## 2019-09-16 ENCOUNTER — HOME CARE VISIT (OUTPATIENT)
Dept: HOME HEALTH SERVICES | Facility: HOME HEALTH | Age: 54
End: 2019-09-16
Payer: MEDICARE

## 2019-09-16 ENCOUNTER — HOME CARE VISIT (OUTPATIENT)
Dept: SCHEDULING | Facility: HOME HEALTH | Age: 54
End: 2019-09-16
Payer: MEDICARE

## 2019-09-16 PROCEDURE — G0157 HHC PT ASSISTANT EA 15: HCPCS

## 2019-09-16 PROCEDURE — G0158 HHC OT ASSISTANT EA 15: HCPCS

## 2019-09-16 NOTE — TELEPHONE ENCOUNTER
Patient states that NP Rg العلي called her to discuss what she believes was about medications. She says she was unable to chat with Rg العلي at the moment and was just returning the call.  Please advise

## 2019-09-17 ENCOUNTER — TELEPHONE (OUTPATIENT)
Dept: NEUROLOGY | Age: 54
End: 2019-09-17

## 2019-09-17 VITALS
SYSTOLIC BLOOD PRESSURE: 125 MMHG | HEART RATE: 75 BPM | DIASTOLIC BLOOD PRESSURE: 80 MMHG | OXYGEN SATURATION: 97 % | RESPIRATION RATE: 18 BRPM | TEMPERATURE: 98 F

## 2019-09-18 ENCOUNTER — HOME CARE VISIT (OUTPATIENT)
Dept: SCHEDULING | Facility: HOME HEALTH | Age: 54
End: 2019-09-18
Payer: MEDICARE

## 2019-09-18 VITALS
TEMPERATURE: 97.6 F | SYSTOLIC BLOOD PRESSURE: 100 MMHG | DIASTOLIC BLOOD PRESSURE: 84 MMHG | SYSTOLIC BLOOD PRESSURE: 132 MMHG | HEART RATE: 88 BPM | HEART RATE: 87 BPM | RESPIRATION RATE: 14 BRPM | TEMPERATURE: 98.6 F | OXYGEN SATURATION: 98 % | OXYGEN SATURATION: 98 % | DIASTOLIC BLOOD PRESSURE: 78 MMHG | RESPIRATION RATE: 16 BRPM

## 2019-09-18 DIAGNOSIS — G35 MULTIPLE SCLEROSIS (HCC): ICD-10-CM

## 2019-09-18 PROCEDURE — G0158 HHC OT ASSISTANT EA 15: HCPCS

## 2019-09-19 ENCOUNTER — TELEPHONE (OUTPATIENT)
Dept: NEUROLOGY | Age: 54
End: 2019-09-19

## 2019-09-20 ENCOUNTER — HOME CARE VISIT (OUTPATIENT)
Dept: SCHEDULING | Facility: HOME HEALTH | Age: 54
End: 2019-09-20
Payer: MEDICARE

## 2019-09-20 PROCEDURE — G0157 HHC PT ASSISTANT EA 15: HCPCS

## 2019-09-23 ENCOUNTER — HOME CARE VISIT (OUTPATIENT)
Dept: HOME HEALTH SERVICES | Facility: HOME HEALTH | Age: 54
End: 2019-09-23
Payer: MEDICARE

## 2019-09-23 VITALS
HEART RATE: 83 BPM | OXYGEN SATURATION: 97 % | TEMPERATURE: 97.8 F | SYSTOLIC BLOOD PRESSURE: 133 MMHG | RESPIRATION RATE: 18 BRPM | DIASTOLIC BLOOD PRESSURE: 81 MMHG

## 2019-09-23 VITALS
OXYGEN SATURATION: 97 % | TEMPERATURE: 98.1 F | HEART RATE: 81 BPM | RESPIRATION RATE: 18 BRPM | DIASTOLIC BLOOD PRESSURE: 82 MMHG | SYSTOLIC BLOOD PRESSURE: 137 MMHG

## 2019-09-23 PROCEDURE — G0157 HHC PT ASSISTANT EA 15: HCPCS

## 2019-09-24 ENCOUNTER — HOME CARE VISIT (OUTPATIENT)
Dept: SCHEDULING | Facility: HOME HEALTH | Age: 54
End: 2019-09-24
Payer: MEDICARE

## 2019-09-24 VITALS
RESPIRATION RATE: 16 BRPM | TEMPERATURE: 98.8 F | HEART RATE: 88 BPM | OXYGEN SATURATION: 98 % | SYSTOLIC BLOOD PRESSURE: 132 MMHG | DIASTOLIC BLOOD PRESSURE: 78 MMHG

## 2019-09-24 PROCEDURE — G0158 HHC OT ASSISTANT EA 15: HCPCS

## 2019-09-24 PROCEDURE — G0153 HHCP-SVS OF S/L PATH,EA 15MN: HCPCS

## 2019-09-25 VITALS
HEART RATE: 79 BPM | OXYGEN SATURATION: 98 % | DIASTOLIC BLOOD PRESSURE: 56 MMHG | SYSTOLIC BLOOD PRESSURE: 134 MMHG | TEMPERATURE: 98.6 F

## 2019-09-26 ENCOUNTER — TELEPHONE (OUTPATIENT)
Dept: NEUROLOGY | Age: 54
End: 2019-09-26

## 2019-09-27 ENCOUNTER — HOME CARE VISIT (OUTPATIENT)
Dept: SCHEDULING | Facility: HOME HEALTH | Age: 54
End: 2019-09-27
Payer: MEDICARE

## 2019-09-27 PROCEDURE — G0153 HHCP-SVS OF S/L PATH,EA 15MN: HCPCS

## 2019-09-30 ENCOUNTER — HOME CARE VISIT (OUTPATIENT)
Dept: SCHEDULING | Facility: HOME HEALTH | Age: 54
End: 2019-09-30
Payer: MEDICARE

## 2019-09-30 VITALS
TEMPERATURE: 98.4 F | HEART RATE: 78 BPM | DIASTOLIC BLOOD PRESSURE: 86 MMHG | OXYGEN SATURATION: 95 % | SYSTOLIC BLOOD PRESSURE: 120 MMHG

## 2019-09-30 VITALS
RESPIRATION RATE: 18 BRPM | TEMPERATURE: 98.1 F | HEART RATE: 75 BPM | DIASTOLIC BLOOD PRESSURE: 81 MMHG | SYSTOLIC BLOOD PRESSURE: 143 MMHG | OXYGEN SATURATION: 97 %

## 2019-09-30 PROCEDURE — G0157 HHC PT ASSISTANT EA 15: HCPCS

## 2019-10-01 ENCOUNTER — HOME CARE VISIT (OUTPATIENT)
Dept: SCHEDULING | Facility: HOME HEALTH | Age: 54
End: 2019-10-01
Payer: MEDICARE

## 2019-10-01 PROCEDURE — G0158 HHC OT ASSISTANT EA 15: HCPCS

## 2019-10-02 VITALS
OXYGEN SATURATION: 98 % | HEART RATE: 76 BPM | DIASTOLIC BLOOD PRESSURE: 78 MMHG | RESPIRATION RATE: 18 BRPM | SYSTOLIC BLOOD PRESSURE: 132 MMHG | TEMPERATURE: 96.7 F

## 2019-10-03 ENCOUNTER — TELEPHONE (OUTPATIENT)
Dept: NEUROLOGY | Age: 54
End: 2019-10-03

## 2019-10-03 DIAGNOSIS — G35 MS (MULTIPLE SCLEROSIS) (HCC): Primary | ICD-10-CM

## 2019-10-03 RX ORDER — DIMETHYL FUMARATE 120-240 MG
KIT ORAL
Qty: 60 CAP | Refills: 0 | Status: SHIPPED | OUTPATIENT
Start: 2019-10-03 | End: 2019-11-01 | Stop reason: DRUGHIGH

## 2019-10-03 NOTE — TELEPHONE ENCOUNTER
Prior auth rec'd for aubagio and placed in folder at [facility]

## 2019-10-04 ENCOUNTER — HOME CARE VISIT (OUTPATIENT)
Dept: HOME HEALTH SERVICES | Facility: HOME HEALTH | Age: 54
End: 2019-10-04
Payer: MEDICARE

## 2019-10-04 PROCEDURE — G0157 HHC PT ASSISTANT EA 15: HCPCS

## 2019-10-06 VITALS
OXYGEN SATURATION: 97 % | RESPIRATION RATE: 18 BRPM | DIASTOLIC BLOOD PRESSURE: 82 MMHG | TEMPERATURE: 98 F | HEART RATE: 82 BPM | SYSTOLIC BLOOD PRESSURE: 127 MMHG

## 2019-10-07 ENCOUNTER — HOME CARE VISIT (OUTPATIENT)
Dept: SCHEDULING | Facility: HOME HEALTH | Age: 54
End: 2019-10-07
Payer: MEDICARE

## 2019-10-07 PROCEDURE — G0157 HHC PT ASSISTANT EA 15: HCPCS

## 2019-10-08 VITALS
TEMPERATURE: 98 F | OXYGEN SATURATION: 97 % | RESPIRATION RATE: 17 BRPM | SYSTOLIC BLOOD PRESSURE: 127 MMHG | DIASTOLIC BLOOD PRESSURE: 82 MMHG | HEART RATE: 76 BPM

## 2019-10-10 ENCOUNTER — OFFICE VISIT (OUTPATIENT)
Dept: NEUROLOGY | Age: 54
End: 2019-10-10

## 2019-10-10 VITALS
OXYGEN SATURATION: 99 % | HEIGHT: 63 IN | WEIGHT: 147 LBS | TEMPERATURE: 98 F | DIASTOLIC BLOOD PRESSURE: 68 MMHG | SYSTOLIC BLOOD PRESSURE: 122 MMHG | HEART RATE: 71 BPM | RESPIRATION RATE: 16 BRPM | BODY MASS INDEX: 26.05 KG/M2

## 2019-10-10 DIAGNOSIS — G35 MS (MULTIPLE SCLEROSIS) (HCC): Primary | ICD-10-CM

## 2019-10-10 DIAGNOSIS — R26.89 IMPAIRED GAIT AND MOBILITY: ICD-10-CM

## 2019-10-10 NOTE — PROGRESS NOTES
[facility]  [address], [address], [place], [address]  [address] [place], [address]  Office:  [phone]  Fax: [phone]  Chief Complaint   Patient presents with    Follow-up    Neurologic Problem     MS       HPI: Tana Ortiz is a 47-year-old female who presents in follow-up for MS. She was not able to start on the Aubagio due to the insurance so she was to start on Tecfidera. She has not started on the medication yet. Awaiting insurance authorization. She has chronic pain throughout the legs, back, feet. She is on Pamelor 25 mg nightly and Relafen 500 mg twice daily for pain. She reports that her pain is 8 out of 10 in severity, and it is aching, tight, and tingling. Her legs feel tight or stiff and ache when she is walking or sitting. Her neck and back feel the same, aching and tingling. Her hands get tight. She gets charley horses in the feet which occur early in the morning when she is getting up to get dressed. She feels like she needs assistance with bathing. She has fallen in the tub before. She is been sleeping on an air mattress. She said that she is done with physical therapy but she may be getting more home physical therapy. She is keeping in contact with the physical therapist.  She said she was getting speech therapy for her memory and she finished last week. She has had home occupational therapy and a home care aide but she asks for more assistance at home with an aide. She says that she is so forgetful. She can be talking and then forget which she is trying to say. She wants to have help with her cooking. She has a friend but she says that he cannot be there to help her all the time. Additionally she has a problem with her eyes. She notes the instance when she was at the park with her grandson and there was a ball that somebody kicked that hit her in the left eye it has not been right since.   She has seen  [facility] but she needs a new ophthalmologist who takes her new insurance. She was told she has cataracts. She sees the gastroenterologist Dr. Gabino Love. She notes that she has IBS and reflux. She reports constipation. She was on Linzess before but had side effects. Past Medical History:   Diagnosis Date    Chest pain 11/2014    neg EKG, non recurrent    Chronic pain     lower back and legs    Depression     Fibroids     GERD (gastroesophageal reflux disease)     Headache(784.0)     Hiatal hernia     IBS (irritable bowel syndrome)     Microscopic hematuria     Rectal bleeding     Stool color black     irritable bowel       Past Surgical History:   Procedure Laterality Date    COLONOSCOPY,DIAGNOSTIC      HX BREAST BIOPSY Right 1/21/2015    RIGHT BREAST BIOPSY WITH NEEDLE LOCALIZATION ULTRASOUND performed by Liv Ramos MD at [address] [place] [address] HX CHOLECYSTECTOMY      HX GI      HX HYSTERECTOMY      HX TUBAL LIGATION         Current Outpatient Medications   Medication Sig Dispense Refill    Shower Chair XX jozef As needed for patient safety 1 Each 0    Bedside Commode XX Please provide a commode for patient safety 1 Each 0    OTHER Please provide a tub transfer bench 1 Each 0    hydrOXYzine HCl (ATARAX) 25 mg tablet Take 2 Tabs by mouth nightly.  fluticasone propionate (FLONASE) 50 mcg/actuation nasal spray 2 Sprays by Both Nostrils route daily. Indications: inflammation of the nose due to an allergy 1 Bottle 11    nabumetone (RELAFEN) 500 mg tablet Take 1 Tab by mouth two (2) times a day. 60 Tab 0    predniSONE (DELTASONE) 20 mg tablet Take 20 mg by mouth daily.  triamcinolone acetonide (KENALOG) 0.1 % topical cream Apply  to affected area two (2) times a day. (Patient taking differently: Apply  to affected area two (2) times a day.  Apply a thin layer) 45 g 11    Comp Stocking,Knee,Long,Medium misc Wear daily while walking to prevent swelling 1 Each 0    dimethyl fumarate (TECFIDERA) 120 mg (14)- 240 mg (46) cpDR Take 120 mg by mouth twice daily; after 7 days, increase to 240 mg by mouth twice daily. 60 Cap 0    biotin (VITAMIN B7) 5 mg tablet Take 5 mg by mouth daily.  nortriptyline (PAMELOR) 25 mg capsule Take 1 Cap by mouth nightly. May increase to 2 pills in 3 days 60 Cap 1        Allergies   Allergen Reactions    Naproxen Shortness of Breath    Shellfish Derived Nausea and Vomiting     SEVERE NAUSEA AND VOMITING    Amoxicillin Rash and Nausea Only       Social History     Tobacco Use    Smoking status: Former Smoker     Packs/day: 0.00     Last attempt to quit: 6/28/2016     Years since quitting: 3.2    Smokeless tobacco: Former User     Quit date: 02/2016   Substance Use Topics    Alcohol use: No     Alcohol/week: 10.0 standard drinks     Types: 10 Cans of beer per week    Drug use: No       Family History   Problem Relation Age of Onset    HIV/AIDS Brother     Diabetes Father     Cancer Brother     Hypertension Sister     Diabetes Brother     Hypertension Sister     Hypertension Sister     Hypertension Brother        Review of Systems:  GENERAL: Denies fever. +fatigue. CARDIAC: No CP or SOB  PULMONARY: No cough or SOB  MUSCULOSKELETAL: No new joint pain. +pain to the arms, hands, legs, back, abdomen. NEURO: SEE HPI    Physical Examination:  Visit Vitals  /68 (BP 1 Location: Right arm, BP Patient Position: Sitting)   Pulse 71   Temp 98 °F (36.7 °C) (Oral)   Resp 16   Ht 5' 3\" (1.6 m)   Wt 66.7 kg (147 lb)   LMP  (LMP Unknown)   SpO2 99%   BMI 26.04 kg/m²       The patient is awake, alert, and oriented x 4.  Affect a bit flat. Fund of knowledge is adequate. Mike Lakeisha is fluent and memory is intact. Speech is clear. Cranial Nerves: II - Visual fields are full to confrontation.  III, IV, VI - Extraocular movements are intact. V - Facial sensation is intact to light touch.   VII - Face is symmetrical.  VIII - Hearing is present.  IX, X, XII - Palate is symmetrical.   XI - Shoulder shrugging and head turning intact  Motor:  The patient moves all four limbs fairly well and symmetrically. No drift. Tone is normal. Reflexes are 2+ and symmetrical. Gait is antalgic and very unsteady, initially she ambulates over to the left then straightens out and able to ambulate straight, left knee is turned inward. She doesn't fall. She has a cane. Denies recent fall.           Impression/Plan: This is a 49-year-old left-handed female who presents in follow-up for MS. She has had lesions noted in the cervical cord and brain on her MRIs in May and June. Aubagio was to be started but it was not approved by her insurance company so Tecfidera was to be started. She has had blood work to include CBC and liver function tests. Discussed the medication and that this will hopefully be able to be obtained and started soon. Discussed administration of the medication, potential side effects, and the need for monitoring. Discussed taking the medication with a high fat high protein meal could help with potential flushing or GI side effects, but to call to let us know if she experiences side effects. We discussed starting one medication for now to make sure we can observe how she tolerates the medication. She will follow up in 1 month. We will obtain labs for monitoring thereafter. Diagnoses and all orders for this visit:    1. MS (multiple sclerosis) ([facility] [address]    2. Impaired gait and mobility      Total itme 35 minutes with 20 minutes spent in counseling. Signed By: Casimiro Viveros NP      This note will not be viewable in [address] PLEASE NOTE:   Portions of this document may have been produced using voice recognition software. Unrecognized errors in transcription may be present.

## 2019-10-10 NOTE — PATIENT INSTRUCTIONS
To start Tecfidera, please call your specialty pharmacy and schedule a delivery! [address]  [phone]    Tecfidera Help Line  [phone]        Dimethyl Fumarate (By mouth)   Dimethyl Fumarate (dye-METH-il FUE-ma-rate)  Treats multiple sclerosis (MS). Brand Name(s): Tecfidera, Tecfidera Starter Pack   There may be other brand names for this medicine. When This Medicine Should Not Be Used: This medicine is not right for everyone. Do not use it if you had an allergic reaction to dimethyl fumarate. How to Use This Medicine:   Delayed Release Capsule  · Your doctor will tell you how much medicine to use. Do not use more than directed. · You may have less skin flushing if you take this medicine with food. · Capsule: Swallow whole. Do not crush or chew it. Do not sprinkle the contents of the capsule on food. · Read and follow the patient instructions that come with this medicine. Talk to your doctor or pharmacist if you have any questions. · Missed dose: Take a dose as soon as you remember. If it is almost time for your next dose, wait until then and take a regular dose. Do not take extra medicine to make up for a missed dose. · Store the medicine in a closed container at room temperature, away from heat, moisture, and direct light. Once you have opened a bottle, throw away any unused medicine after 90 days. Drugs and Foods to Avoid:      Ask your doctor or pharmacist before using any other medicine, including over-the-counter medicines, vitamins, and herbal products. Warnings While Using This Medicine:   · Tell your doctor if you are pregnant or breastfeeding, or if you have any type of infection. · This medicine may cause the following problems:  ¨ May increase risk for infection (including progressive multifocal leukoencephalopathy)  ¨ Liver damage  · Your doctor will do lab tests at regular visits to check on the effects of this medicine. Keep all appointments.   · Keep all medicine out of the reach of children. Never share your medicine with anyone. Possible Side Effects While Using This Medicine:   Call your doctor right away if you notice any of these side effects:  · Allergic reaction: Itching or hives, swelling in your face or hands, swelling or tingling in your mouth or throat, chest tightness, trouble breathing  · Back pain, blurred vision, convulsions, dizziness, fever, headache, tiredness  · Change in how much or how often you urinate, lower back or side pain, painful or difficult urination  · Chest pain, trouble breathing  · Dark urine or pale stools, nausea, vomiting, loss of appetite, stomach pain, yellow skin or eyes  · Fever or chills, cough, sore throat, body aches  If you notice these less serious side effects, talk with your doctor:   · Warmth or redness in your face, neck, arms, or upper chest  If you notice other side effects that you think are caused by this medicine, tell your doctor. Call your doctor for medical advice about side effects. You may report side effects to FDA at 4-294-FDA-4440  © 2017 [facility] Information is for End User's use only and may not be sold, redistributed or otherwise used for commercial purposes. The above information is an  only. It is not intended as medical advice for individual conditions or treatments. Talk to your doctor, nurse or pharmacist before following any medical regimen to see if it is safe and effective for you. Multiple Sclerosis (MS): Care Instructions  Your Care Instructions  Multiple sclerosis, also called MS, is a disease that can affect the brain, spinal cord, and nerves to the eyes. MS can cause problems with muscle control and strength, vision, balance, feeling, and thinking. Whatever your symptoms are, taking medicine correctly and following your doctor's advice for home care can help you maintain your quality of life.   Follow-up care is a key part of your treatment and safety. Be sure to make and go to all appointments, and call your doctor if you are having problems. It's also a good idea to know your test results and keep a list of the medicines you take. How can you care for yourself at home? General care  · Take your medicines exactly as prescribed. Call your doctor if you think you are having a problem with your medicine. · Use a cane, walker, or scooter if your doctor suggests it. · Keep doing your normal activities as much as you can. · If you have problems urinating, press or tap your bladder area to help start urine flow. If you have trouble controlling your urine, plan your fluid intake and activities so that a toilet will be available when you need it. · Spend time with family and friends. Join a support group for people with MS if you want extra help. · Depression is common with this condition. Tell your doctor if you have trouble sleeping, are eating too much or are not hungry, or feel sad or tearful all the time. Depression can be treated with medicine and counseling. Diet and exercise  · Eat a balanced diet. · If you have problems swallowing, change how and what you eat:  ? Try thick drinks, such as milk shakes. They are easier to swallow than other fluids. ? Do not eat foods that crumble easily. These can cause choking. ? Use a  to prepare food. Soft foods need less chewing. ? Eat small meals often so that you do not get tired from eating larger meals. · Get exercise on most days. Work with your doctor to set up a program of walking, swimming, or other exercise that you are able to do. A physical therapist can teach you exercises if you cannot walk but can move your limbs and trunk. Or you can do exercises to help with coordination and balance. You can help improve muscle stiffness by doing exercises while lying in certain positions. When should you call for help?   Call your doctor now or seek immediate medical care if:    · You have a change in symptoms.     · You fall or have another injury.     · You have symptoms of a urinary infection. For example:  ? You have blood or pus in your urine. ? You have pain in your back just below your rib cage. This is called flank pain. ? You have a fever, chills, or body aches. ? It hurts to urinate. ? You have groin or belly pain.    Watch closely for changes in your health, and be sure to contact your doctor if:    · You want more information about MS or medicines.     · You have questions about alternative treatments. Do not use any other treatments without talking to your doctor first.   Where can you learn more? Go to http://lazarus-delano.info/. Enter T188 in the search box to learn more about \"Multiple Sclerosis (MS): Care Instructions. \"  Current as of: March 28, 2019  Content Version: 12.2  © 1081-3764 [employer]. Care instructions adapted under license by [employer] (which disclaims liability or warranty for this information). If you have questions about a medical condition or this instruction, always ask your healthcare professional. [address] any warranty or liability for your use of this information.

## 2019-10-11 ENCOUNTER — HOME CARE VISIT (OUTPATIENT)
Dept: SCHEDULING | Facility: HOME HEALTH | Age: 54
End: 2019-10-11
Payer: MEDICARE

## 2019-10-11 PROCEDURE — G0151 HHCP-SERV OF PT,EA 15 MIN: HCPCS

## 2019-10-12 VITALS
OXYGEN SATURATION: 98 % | HEART RATE: 75 BPM | TEMPERATURE: 98 F | DIASTOLIC BLOOD PRESSURE: 64 MMHG | SYSTOLIC BLOOD PRESSURE: 120 MMHG

## 2019-10-17 DIAGNOSIS — M54.42 CHRONIC LEFT-SIDED LOW BACK PAIN WITH LEFT-SIDED SCIATICA: ICD-10-CM

## 2019-10-17 DIAGNOSIS — G89.29 CHRONIC LEFT-SIDED LOW BACK PAIN WITH LEFT-SIDED SCIATICA: ICD-10-CM

## 2019-10-17 NOTE — TELEPHONE ENCOUNTER
Requested Prescriptions     Pending Prescriptions Disp Refills    nabumetone (RELAFEN) 500 mg tablet 60 Tab 0     Sig: Take 1 Tab by mouth two (2) times a day.

## 2019-10-18 ENCOUNTER — HOSPITAL ENCOUNTER (OUTPATIENT)
Dept: GENERAL RADIOLOGY | Age: 54
Discharge: HOME | End: 2019-10-18
Payer: MEDICARE

## 2019-10-18 DIAGNOSIS — M54.42 CHRONIC LEFT-SIDED LOW BACK PAIN WITH LEFT-SIDED SCIATICA: ICD-10-CM

## 2019-10-18 DIAGNOSIS — K59.00 CONSTIPATION: ICD-10-CM

## 2019-10-18 DIAGNOSIS — R14.0 BLOATING: ICD-10-CM

## 2019-10-18 DIAGNOSIS — G89.29 CHRONIC LEFT-SIDED LOW BACK PAIN WITH LEFT-SIDED SCIATICA: ICD-10-CM

## 2019-10-18 PROCEDURE — 74019 RADEX ABDOMEN 2 VIEWS: CPT

## 2019-10-22 ENCOUNTER — TELEPHONE (OUTPATIENT)
Dept: NEUROLOGY | Age: 54
End: 2019-10-22

## 2019-10-22 RX ORDER — NABUMETONE 500 MG/1
TABLET, FILM COATED ORAL
Qty: 60 TAB | Refills: 0 | Status: SHIPPED | OUTPATIENT
Start: 2019-10-22 | End: 2019-12-19 | Stop reason: SDUPTHER

## 2019-10-22 RX ORDER — NABUMETONE 500 MG/1
500 TABLET, FILM COATED ORAL 2 TIMES DAILY
Qty: 60 TAB | Refills: 1 | OUTPATIENT
Start: 2019-10-22

## 2019-10-22 NOTE — TELEPHONE ENCOUNTER
Pt states that she began Tecfidera this am and is now experiencing several side effects including, redness to her face, left side weakness & pain, along with bumps to the skin (unaware of location). Pt was advised per Nurse Jordon Elizabeth to contact [facility]. Pt states she has already and was advised to contact her prescribing physician. Please advise.

## 2019-10-22 NOTE — TELEPHONE ENCOUNTER
Spoke with Dr. Mirlande Denny (NP Joan Weinstein is out of the office today) He gave verbal orders for patient to take 400mg ibuprofen before each dose. Attempted to contact patient. Left message with instructions. Asked her to call [facility] office back and let me know she got the message.

## 2019-10-29 NOTE — TELEPHONE ENCOUNTER
Pt states she has started taking the 240mg of Tecfidera and her breathing isnt right. Pt requesting to speak with nurse or NP Cinthya Marmolejo.  Please advise

## 2019-11-01 RX ORDER — DIMETHYL FUMARATE 120 MG/1
120 CAPSULE ORAL 2 TIMES DAILY
Qty: 60 CAP | Refills: 1 | Status: SHIPPED | OUTPATIENT
Start: 2019-11-01 | End: 2019-11-11 | Stop reason: SINTOL

## 2019-11-01 NOTE — TELEPHONE ENCOUNTER
Spoke with patient, she reports being more tolerable on the lower dose of 120 mg. Her breathing does not bother her enough for a visit to the hospital.     Please advise further.

## 2019-11-01 NOTE — TELEPHONE ENCOUNTER
Spoke with patient; she did better on the 120 mg BID dose of Tecfidera. She said she started the 240 mg dose on Tuesday. She notes GI side effects, endorses possible flushing. Discussed with her to be evaluated if she's having shortness of breath- ED, urgent care, or PCP depending on her symptoms. Discussed with her to take the medication with high fat/high protein food twice daily to minimize the side effects. Will lower the Tecfidera to 120 mg twice daily for now and see if we can increase it in a few weeks. Otherwise we may need to change the medication. She has appointment set up already for 11/1/2019.

## 2019-11-11 ENCOUNTER — OFFICE VISIT (OUTPATIENT)
Dept: NEUROLOGY | Age: 54
End: 2019-11-11

## 2019-11-11 ENCOUNTER — TELEPHONE (OUTPATIENT)
Dept: FAMILY MEDICINE CLINIC | Facility: CLINIC | Age: 54
End: 2019-11-11

## 2019-11-11 VITALS
HEART RATE: 85 BPM | RESPIRATION RATE: 18 BRPM | OXYGEN SATURATION: 99 % | DIASTOLIC BLOOD PRESSURE: 60 MMHG | BODY MASS INDEX: 26.4 KG/M2 | HEIGHT: 63 IN | TEMPERATURE: 98.2 F | SYSTOLIC BLOOD PRESSURE: 100 MMHG | WEIGHT: 149 LBS

## 2019-11-11 DIAGNOSIS — G35 MS (MULTIPLE SCLEROSIS) (HCC): Primary | ICD-10-CM

## 2019-11-11 DIAGNOSIS — R26.89 IMPAIRED GAIT AND MOBILITY: ICD-10-CM

## 2019-11-11 NOTE — PATIENT INSTRUCTIONS
Teriflunomide (By mouth)   Teriflunomide (ter-i-FLOO-faye-mide)  Treats multiple sclerosis. Brand Name(s): Aubagio   There may be other brand names for this medicine. When This Medicine Should Not Be Used: This medicine is not right for everyone. Do not use it if you had an allergic reaction to teriflunomide or leflunomide, or if you are pregnant. How to Use This Medicine:   Tablet  · Your doctor will tell you how much medicine to use. Do not use more than directed. · This medicine should come with a Medication Guide. Ask your pharmacist for a copy if you do not have one. · Missed dose: Take a dose as soon as you remember. If it is almost time for your next dose, wait until then and take a regular dose. Do not take extra medicine to make up for a missed dose. · Store the medicine in a closed container at room temperature, away from heat, moisture, and direct light. Drugs and Foods to Avoid:   Ask your doctor or pharmacist before using any other medicine, including over-the-counter medicines, vitamins, and herbal products. · Do not use this medicine together with leflunomide. · Some medicines can affect how teriflunomide works. Tell your doctor if you are using any of the following:  ¨ Alosetron, cefaclor, cholestyramine, cimetidine, ciprofloxacin, duloxetine, furosemide, ketoprofen, methotrexate, mitoxantrone, nateglinide, paclitaxel, penicillin G, pioglitazone, repaglinide, rifampin, rosiglitazone, theophylline, tizanidine, or zidovudine  ¨ Birth control pills  ¨ Blood thinner (including warfarin)  ¨ Medicines that can weaken your immune system, including steroids or cancer medicines  ¨ Statin medicines (including atorvastatin, pravastatin, simvastatin)  · Talk to your doctor before you get flu shots or other vaccines. You should not receive live virus vaccines during treatment and for at least 6 months after you stop using this medicine.   Warnings While Using This Medicine:   · This medicine may cause birth defects if either partner is using it during conception or pregnancy. Tell your doctor right away if you or your partner becomes pregnant. Use an effective form of birth control to prevent pregnancy. · Teriflunomide may stay in your blood for up to 2 years after you stop using it. Your doctor can give you medicine to remove it from your body faster, if needed. Talk to your doctor if you have questions or concerns about this. · Tell your doctor if you are breastfeeding, or if you have kidney disease, liver disease, bone marrow problems, diabetes, high blood pressure, lung disease, nerve problems, any type of infection, or a history of tuberculosis. · This medicine may cause the following problems:  ¨ Liver problems  ¨ Lung problems  ¨ Increased risk of cancer  ¨ Peripheral neuropathy  ¨ Serious skin reactions  ¨ Serious allergic reactions that may involve multiple organs, such as your liver or kidneys  · This medicine may make you bleed, bruise, or get infections more easily. Take precautions to prevent illness and injury. Wash your hands often. · You will need to have a test for tuberculosis before you start to use this medicine. · Tell any doctor or dentist who treats you that you are using this medicine. This medicine may affect certain medical test results. · Your doctor will do lab tests at regular visits to check on the effects of this medicine. Keep all appointments. Your doctor will also need to check your blood pressure. · Keep all medicine out of the reach of children. Never share your medicine with anyone.   Possible Side Effects While Using This Medicine:   Call your doctor right away if you notice any of these side effects:  · Allergic reaction: Itching or hives, swelling in your face or hands, swelling or tingling in your mouth or throat, chest tightness, trouble breathing  · Blistering, peeling, red skin rash  · Cough, trouble breathing or swallowing  · Dark urine or pale stools, nausea, vomiting, loss of appetite, stomach pain, yellow skin or eyes  · Fast, slow, or pounding heartbeat  · Fever, chills, runny or stuffy nose, sore throat, body aches  · Numbness, tingling, or burning pain in your hands, arms, legs, or feet  · Swollen, painful, or tender lymph glands in your neck, armpit, or groin  · Unusual bleeding, bruising, or weakness  If you notice these less serious side effects, talk with your doctor:   · Diarrhea  · Hair loss  · Headache  If you notice other side effects that you think are caused by this medicine, tell your doctor. Call your doctor for medical advice about side effects. You may report side effects to FDA at 2-533-FDA-7635  © 2017 [facility] Information is for End User's use only and may not be sold, redistributed or otherwise used for commercial purposes. The above information is an  only. It is not intended as medical advice for individual conditions or treatments. Talk to your doctor, nurse or pharmacist before following any medical regimen to see if it is safe and effective for you.

## 2019-11-11 NOTE — PROGRESS NOTES
[facility]  [address] [facility], [address], [facility], [address]  [address] Roberto Betts, [address]  Office:  [phone]  Fax: [phone]  Chief Complaint   Patient presents with    Medication Evaluation       HPI: Chayito Enamorado presents today in follow-up for multiple sclerosis. She has started Tecfidera. She called a few weeks ago with report of side effects. She reports lower leg swelling, skin looks darker especially on elbows and knees, thinks her right hand is drier than the left. She's been to the dermatologist, taking different medications including prednisone 40 mg every morning and triamcinolone ointment. She said her face started breaking out. She thinks her breathing is affected. Right after taking the Tecfidera she starts breathing hard. She believes it is causing her side effects. She started 120 mg twice daily and things were ok and then when she went up to 240 mg twice daily the side effects were worse. Her skin is itchy. Dermatology notes were reviewed which indicates eczema which was prior to starting the Tecfidera. The last dermatology note was from 8/28/2019 and she was to start hydroxyzine oral tablets. Discussed via phone regarding her report of side effects on the Tecfidera, and the Tecfidera was lowered to 120 mg twice daily but she has not started the lowered dose. She said her balance is getting worse. Last MRI brain was in June 2019 and cervical spine was in May 2019. Both eyes are blurry- it was just the left, now it's both. The blurry vision started after starting Tecfidera for 3 days. It's still blurry. She reports the left side of her throat feels weird, thinks it's swollen or something goes up, going to see ENT again. Denies choking on foods. She would like home care aide assistance at home due to her balance impairments.     Past Medical History:   Diagnosis Date    Chest pain 11/2014    neg EKG, non recurrent    Chronic pain     lower back and legs    Depression     Fibroids     GERD (gastroesophageal reflux disease)     Headache(784.0)     Hiatal hernia     IBS (irritable bowel syndrome)     Microscopic hematuria     Rectal bleeding     Stool color black     irritable bowel       Past Surgical History:   Procedure Laterality Date    COLONOSCOPY,DIAGNOSTIC      HX BREAST BIOPSY Right 1/21/2015    RIGHT BREAST BIOPSY WITH NEEDLE LOCALIZATION ULTRASOUND performed by Otto Smith MD at [address] [place] [address] HX CHOLECYSTECTOMY      HX GI      HX HYSTERECTOMY      HX TUBAL LIGATION         Current Outpatient Medications   Medication Sig Dispense Refill    teriflunomide (AUBAGIO) 14 mg tab Take 14 mg by mouth daily. 30 Tab 5    nabumetone (RELAFEN) 500 mg tablet TAKE 1 TABLET BY MOUTH TWICE DAILY 60 Tab 0    Bedside Commode XX Please provide a commode for patient safety 1 Each 0    hydrOXYzine HCl (ATARAX) 25 mg tablet Take 2 Tabs by mouth nightly.  fluticasone propionate (FLONASE) 50 mcg/actuation nasal spray 2 Sprays by Both Nostrils route daily. Indications: inflammation of the nose due to an allergy 1 Bottle 11    biotin (VITAMIN B7) 5 mg tablet Take 5 mg by mouth daily.  nortriptyline (PAMELOR) 25 mg capsule Take 1 Cap by mouth nightly. May increase to 2 pills in 3 days 60 Cap 1    triamcinolone acetonide (KENALOG) 0.1 % topical cream Apply  to affected area two (2) times a day. (Patient taking differently: Apply  to affected area two (2) times a day. Apply a thin layer) 45 g 11    Comp Stocking,Knee,Long,Medium misc Wear daily while walking to prevent swelling 1 Each 0    Shower Chair XX jozef As needed for patient safety 1 Each 0    OTHER Please provide a tub transfer bench 1 Each 0    predniSONE (DELTASONE) 20 mg tablet Take 20 mg by mouth daily.           Allergies   Allergen Reactions    Naproxen Shortness of Breath    Shellfish Derived Nausea and Vomiting     SEVERE NAUSEA AND VOMITING  Amoxicillin Rash and Nausea Only       Social History     Tobacco Use    Smoking status: Former Smoker     Packs/day: 0.00     Last attempt to quit: 6/28/2016     Years since quitting: 3.3    Smokeless tobacco: Former User     Quit date: 02/2016   Substance Use Topics    Alcohol use: No     Alcohol/week: 10.0 standard drinks     Types: 10 Cans of beer per week    Drug use: No       Family History   Problem Relation Age of Onset    HIV/AIDS Brother     Diabetes Father     Cancer Brother     Hypertension Sister     Diabetes Brother     Hypertension Sister     Hypertension Sister     Hypertension Brother        Review of Systems:  GENERAL: Denies fever. +fatigue. CARDIAC: No CP or SOB. Reports SOB with Tecfidera. PULMONARY: No cough or SOB  MUSCULOSKELETAL: No new joint pain. +pain to the arms, hands, legs, back, abdomen. NEURO: SEE HPI  SKIN: +rash, pruritis, skin discoloration. Physical Examination:  Visit Vitals  /60 (BP 1 Location: Left arm, BP Patient Position: Sitting)   Pulse 85   Temp 98.2 °F (36.8 °C) (Oral)   Resp 18   Ht 5' 3\" (1.6 m)   Wt 67.6 kg (149 lb)   LMP  (LMP Unknown)   SpO2 99%   BMI 26.39 kg/m²       The patient is awake, alert, and oriented x 4.  Affect a bit flat.  Fund of knowledge is adequate.  Speech is fluent and memory is intact. Speech is clear. Cranial Nerves: II - Visual fields are full to confrontation.  III, IV, VI - Extraocular movements are intact. PERRL. V - Facial sensation is intact to light touch.  VII - Face is symmetrical.  VIII - Hearing is present.  IX, X, XII - Palate is symmetrical.   XI - Shoulder shrugging and head turning intact  Motor:  The patient moves all four limbs fairly well and symmetrically. No drift. Tone is normal. Reflexes are 2+ and symmetrical. Gait is antalgic and very unsteady, initially she ambulates over to the left then straightens out and able to ambulate straight, left knee is turned inward.   She doesn't fall. Pam Cameron ambulates with cane.         Impression/Plan: This is a 43-year-old left-handed female who presents in follow-up for MS. She has had numerous lesions noted on MRI of the brain in June 2019 which were located hemispheric, callosal, and within the brainstem and cerebellum and with abnormal signal in the [place], cervical medullary junction, and proximal spinal cord on her MRI of the cervical spine in May 2019. Will obtain MRI of the brain and cervical spine now due to her complaint of worsening symptoms of gait abnormality and blurry vision. Will stop the Tecfidera due to side effects and will move forward with starting Aubagio. This was previously denied by the insurance but now she has had side effects on Tecfidera. She has had CBC and hepatic function panel and will recheck. She has had TB test which was negative. Discussed potential side effects of the medication and provided written information. Discussed monitoring liver function tests monthly to start. Referral to home health for aide but I discussed with her that we may have difficulties again trying to obtain home health aide. Follow up in about 4-5 weeks. Will consider a course of IV steroids sooner when MRI results available. Discussed the patient with Dr. Bo Murphy. Diagnoses and all orders for this visit:    1. MS (multiple sclerosis) ([facility] [address]  -     CBC WITH AUTOMATED DIFF; Future  -     HEPATIC FUNCTION PANEL; Future  -     MRI BRAIN W WO CONT; Future  -     MRI CERV SPINE W WO CONT; Future  -     REFERRAL TO HOME HEALTH    2. Impaired gait and mobility  -     MRI BRAIN W WO CONT; Future  -     MRI CERV SPINE W WO CONT; Future  -     REFERRAL TO HOME HEALTH    Other orders  -     teriflunomide (AUBAGIO) 14 mg tab; Take 14 mg by mouth daily. Total time 35 minutes with 25 minutes spent in counseling. Signed By: Mireya Green NP      This note will not be viewable in [address]     PLEASE NOTE:   Portions of this document may have been produced using voice recognition software. Unrecognized errors in transcription may be present.

## 2019-11-11 NOTE — PROGRESS NOTES
Nusrat Tineo is a 52 y.o. female in today for follow-up on new medication. Learning assessment previously completed 6/15/2018; primary language is [place]. 1. Have you been to the ER, urgent care clinic since your last visit? Hospitalized since your last visit? No    2. Have you seen or consulted any other health care providers outside of the [address] [place] [address] since your last visit? Include any pap smears or colon screening.  No

## 2019-11-11 NOTE — TELEPHONE ENCOUNTER
Patient is asking for an order for home health . some kind of assistance please give  Her a call at [phone]

## 2019-11-19 DIAGNOSIS — G35 MULTIPLE SCLEROSIS (HCC): Primary | ICD-10-CM

## 2019-11-20 ENCOUNTER — HOME HEALTH ADMISSION (OUTPATIENT)
Dept: HOME HEALTH SERVICES | Facility: HOME HEALTH | Age: 54
End: 2019-11-20
Payer: MEDICARE

## 2019-11-21 ENCOUNTER — HOSPITAL ENCOUNTER (OUTPATIENT)
Dept: MRI IMAGING | Age: 54
Discharge: HOME | End: 2019-11-21
Attending: NURSE PRACTITIONER
Payer: MEDICARE

## 2019-11-21 ENCOUNTER — HOME CARE VISIT (OUTPATIENT)
Dept: SCHEDULING | Facility: HOME HEALTH | Age: 54
End: 2019-11-21

## 2019-11-21 ENCOUNTER — HOSPITAL ENCOUNTER (OUTPATIENT)
Dept: LAB | Age: 54
Discharge: HOME | End: 2019-11-21

## 2019-11-21 ENCOUNTER — APPOINTMENT (OUTPATIENT)
Dept: LAB | Age: 54
End: 2019-11-21
Attending: NURSE PRACTITIONER
Payer: MEDICARE

## 2019-11-21 VITALS — WEIGHT: 146 LBS | BODY MASS INDEX: 25.86 KG/M2

## 2019-11-21 DIAGNOSIS — R26.89 IMPAIRED GAIT AND MOBILITY: ICD-10-CM

## 2019-11-21 DIAGNOSIS — G35 MS (MULTIPLE SCLEROSIS) (HCC): ICD-10-CM

## 2019-11-21 LAB
CREAT UR-MCNC: 0.7 MG/DL (ref 0.6–1.3)
XX-LABCORP SPECIMEN COL,LCBCF: NORMAL

## 2019-11-21 PROCEDURE — 74011250636 HC RX REV CODE- 250/636: Performed by: NURSE PRACTITIONER

## 2019-11-21 PROCEDURE — 72156 MRI NECK SPINE W/O & W/DYE: CPT

## 2019-11-21 PROCEDURE — 99001 SPECIMEN HANDLING PT-LAB: CPT

## 2019-11-21 PROCEDURE — 82565 ASSAY OF CREATININE: CPT

## 2019-11-21 PROCEDURE — A9575 INJ GADOTERATE MEGLUMI 0.1ML: HCPCS | Performed by: NURSE PRACTITIONER

## 2019-11-21 PROCEDURE — 70553 MRI BRAIN STEM W/O & W/DYE: CPT

## 2019-11-21 RX ORDER — GADOTERATE MEGLUMINE 376.9 MG/ML
15 INJECTION INTRAVENOUS
Status: COMPLETED | OUTPATIENT
Start: 2019-11-21 | End: 2019-11-21

## 2019-11-21 RX ADMIN — GADOTERATE MEGLUMINE 15 ML: 376.9 INJECTION INTRAVENOUS at 16:45

## 2019-11-22 ENCOUNTER — DOCUMENTATION ONLY (OUTPATIENT)
Dept: NEUROLOGY | Age: 54
End: 2019-11-22

## 2019-11-22 LAB
ALBUMIN SERPL-MCNC: 4.5 G/DL (ref 3.5–5.5)
ALP SERPL-CCNC: 82 IU/L (ref 39–117)
ALT SERPL-CCNC: 13 IU/L (ref 0–32)
AST SERPL-CCNC: 16 IU/L (ref 0–40)
BASOPHILS # BLD AUTO: 0 X10E3/UL (ref 0–0.2)
BASOPHILS NFR BLD AUTO: 1 %
BILIRUB DIRECT SERPL-MCNC: 0.08 MG/DL (ref 0–0.4)
BILIRUB SERPL-MCNC: 0.2 MG/DL (ref 0–1.2)
EOSINOPHIL # BLD AUTO: 0.1 X10E3/UL (ref 0–0.4)
EOSINOPHIL NFR BLD AUTO: 1 %
ERYTHROCYTE [DISTWIDTH] IN BLOOD BY AUTOMATED COUNT: 13.1 % (ref 12.3–15.4)
HCT VFR BLD AUTO: 36.4 % (ref 34–46.6)
HGB BLD-MCNC: 12 G/DL (ref 11.1–15.9)
IMM GRANULOCYTES # BLD AUTO: 0 X10E3/UL (ref 0–0.1)
IMM GRANULOCYTES NFR BLD AUTO: 0 %
LYMPHOCYTES # BLD AUTO: 3.5 X10E3/UL (ref 0.7–3.1)
LYMPHOCYTES NFR BLD AUTO: 58 %
MCH RBC QN AUTO: 29.4 PG (ref 26.6–33)
MCHC RBC AUTO-ENTMCNC: 33 G/DL (ref 31.5–35.7)
MCV RBC AUTO: 89 FL (ref 79–97)
MONOCYTES # BLD AUTO: 0.5 X10E3/UL (ref 0.1–0.9)
MONOCYTES NFR BLD AUTO: 8 %
NEUTROPHILS # BLD AUTO: 1.9 X10E3/UL (ref 1.4–7)
NEUTROPHILS NFR BLD AUTO: 32 %
PLATELET # BLD AUTO: 259 X10E3/UL (ref 150–450)
PROT SERPL-MCNC: 7.5 G/DL (ref 6–8.5)
RBC # BLD AUTO: 4.08 X10E6/UL (ref 3.77–5.28)
SPECIMEN STATUS REPORT, ROLRST: NORMAL
WBC # BLD AUTO: 5.9 X10E3/UL (ref 3.4–10.8)

## 2019-11-25 ENCOUNTER — TELEPHONE (OUTPATIENT)
Dept: NEUROLOGY | Age: 54
End: 2019-11-25

## 2019-11-25 ENCOUNTER — HOME CARE VISIT (OUTPATIENT)
Dept: SCHEDULING | Facility: HOME HEALTH | Age: 54
End: 2019-11-25
Payer: MEDICARE

## 2019-11-25 PROCEDURE — 400013 HH SOC

## 2019-11-25 PROCEDURE — G0151 HHCP-SERV OF PT,EA 15 MIN: HCPCS

## 2019-11-25 NOTE — TELEPHONE ENCOUNTER
Request for prior auth for Aubagio rec'd and placed in folder at [facility]

## 2019-11-26 ENCOUNTER — TELEPHONE (OUTPATIENT)
Dept: NEUROLOGY | Age: 54
End: 2019-11-26

## 2019-11-26 ENCOUNTER — HOME CARE VISIT (OUTPATIENT)
Dept: HOME HEALTH SERVICES | Facility: HOME HEALTH | Age: 54
End: 2019-11-26
Payer: MEDICARE

## 2019-11-26 DIAGNOSIS — G35 MS (MULTIPLE SCLEROSIS) (HCC): Primary | ICD-10-CM

## 2019-11-26 NOTE — TELEPHONE ENCOUNTER
Called the patient and discussed results of MRI brain and cervical spine. Discussed the plan which was discussed with Dr. Salvador Schmitt. Will give Solu-Medrol infusions x 5 day course. Will obtain MRI thoracic spine. Will obtain labs for hep B. Will start Ocrevus after hep B test results (in place of Aubagio). Can consider giving initial doses and 6 months later dose of Ocrevus and then changing to Aubagio, versus remaining on Ocrevus.   Will also obtain JCV test.  She reports she still has a rash and I notified her to follow up with her dermatologist.

## 2019-11-27 ENCOUNTER — HOME CARE VISIT (OUTPATIENT)
Dept: SCHEDULING | Facility: HOME HEALTH | Age: 54
End: 2019-11-27
Payer: MEDICARE

## 2019-11-27 VITALS
TEMPERATURE: 98.3 F | DIASTOLIC BLOOD PRESSURE: 80 MMHG | SYSTOLIC BLOOD PRESSURE: 130 MMHG | HEART RATE: 70 BPM | OXYGEN SATURATION: 100 %

## 2019-11-27 PROCEDURE — G0157 HHC PT ASSISTANT EA 15: HCPCS

## 2019-11-29 ENCOUNTER — HOME CARE VISIT (OUTPATIENT)
Dept: SCHEDULING | Facility: HOME HEALTH | Age: 54
End: 2019-11-29
Payer: MEDICARE

## 2019-11-29 VITALS — SYSTOLIC BLOOD PRESSURE: 118 MMHG | HEART RATE: 67 BPM | DIASTOLIC BLOOD PRESSURE: 67 MMHG

## 2019-11-29 PROCEDURE — G0152 HHCP-SERV OF OT,EA 15 MIN: HCPCS

## 2019-12-02 ENCOUNTER — TELEPHONE (OUTPATIENT)
Dept: NEUROLOGY | Age: 54
End: 2019-12-02

## 2019-12-02 ENCOUNTER — HOME CARE VISIT (OUTPATIENT)
Dept: SCHEDULING | Facility: HOME HEALTH | Age: 54
End: 2019-12-02
Payer: MEDICARE

## 2019-12-02 NOTE — TELEPHONE ENCOUNTER
Contacted by Devi Corona from the infusion center; patient is having difficulty with transportation so is having a hard time scheduling IV infusions. We will reach out.

## 2019-12-03 ENCOUNTER — HOME CARE VISIT (OUTPATIENT)
Dept: SCHEDULING | Facility: HOME HEALTH | Age: 54
End: 2019-12-03
Payer: MEDICARE

## 2019-12-03 VITALS
TEMPERATURE: 97.5 F | DIASTOLIC BLOOD PRESSURE: 70 MMHG | HEART RATE: 67 BPM | OXYGEN SATURATION: 99 % | SYSTOLIC BLOOD PRESSURE: 110 MMHG

## 2019-12-03 PROCEDURE — G0157 HHC PT ASSISTANT EA 15: HCPCS

## 2019-12-04 ENCOUNTER — HOME CARE VISIT (OUTPATIENT)
Dept: SCHEDULING | Facility: HOME HEALTH | Age: 54
End: 2019-12-04
Payer: MEDICARE

## 2019-12-04 ENCOUNTER — HOSPITAL ENCOUNTER (OUTPATIENT)
Dept: LAB | Age: 54
Discharge: HOME | End: 2019-12-04

## 2019-12-04 ENCOUNTER — HOME CARE VISIT (OUTPATIENT)
Dept: HOME HEALTH SERVICES | Facility: HOME HEALTH | Age: 54
End: 2019-12-04
Payer: MEDICARE

## 2019-12-04 LAB — XX-LABCORP SPECIMEN COL,LCBCF: NORMAL

## 2019-12-04 PROCEDURE — G0158 HHC OT ASSISTANT EA 15: HCPCS

## 2019-12-04 PROCEDURE — 99001 SPECIMEN HANDLING PT-LAB: CPT

## 2019-12-05 LAB
HBV CORE AB SERPL QL IA: NEGATIVE
HBV SURFACE AG SERPL QL IA: NEGATIVE
SPECIMEN STATUS REPORT, ROLRST: NORMAL

## 2019-12-06 ENCOUNTER — HOME CARE VISIT (OUTPATIENT)
Dept: SCHEDULING | Facility: HOME HEALTH | Age: 54
End: 2019-12-06
Payer: MEDICARE

## 2019-12-06 ENCOUNTER — DOCUMENTATION ONLY (OUTPATIENT)
Dept: NEUROLOGY | Age: 54
End: 2019-12-06

## 2019-12-06 VITALS
TEMPERATURE: 98.3 F | SYSTOLIC BLOOD PRESSURE: 130 MMHG | OXYGEN SATURATION: 98 % | DIASTOLIC BLOOD PRESSURE: 80 MMHG | HEART RATE: 72 BPM

## 2019-12-06 PROCEDURE — G0157 HHC PT ASSISTANT EA 15: HCPCS

## 2019-12-09 ENCOUNTER — HOME CARE VISIT (OUTPATIENT)
Dept: SCHEDULING | Facility: HOME HEALTH | Age: 54
End: 2019-12-09
Payer: MEDICARE

## 2019-12-09 ENCOUNTER — TELEPHONE (OUTPATIENT)
Dept: NEUROLOGY | Age: 54
End: 2019-12-09

## 2019-12-09 VITALS
DIASTOLIC BLOOD PRESSURE: 70 MMHG | TEMPERATURE: 98.3 F | SYSTOLIC BLOOD PRESSURE: 110 MMHG | OXYGEN SATURATION: 99 % | HEART RATE: 80 BPM

## 2019-12-09 PROCEDURE — G0157 HHC PT ASSISTANT EA 15: HCPCS

## 2019-12-10 ENCOUNTER — HOSPITAL ENCOUNTER (OUTPATIENT)
Age: 54
Discharge: HOME | End: 2019-12-10
Attending: NURSE PRACTITIONER
Payer: MEDICARE

## 2019-12-10 VITALS
HEART RATE: 71 BPM | DIASTOLIC BLOOD PRESSURE: 62 MMHG | SYSTOLIC BLOOD PRESSURE: 100 MMHG | TEMPERATURE: 96.8 F | OXYGEN SATURATION: 96 %

## 2019-12-10 DIAGNOSIS — G35 MS (MULTIPLE SCLEROSIS) (HCC): ICD-10-CM

## 2019-12-10 PROCEDURE — A9575 INJ GADOTERATE MEGLUMI 0.1ML: HCPCS | Performed by: NURSE PRACTITIONER

## 2019-12-10 PROCEDURE — 72157 MRI CHEST SPINE W/O & W/DYE: CPT

## 2019-12-10 PROCEDURE — 74011636320 HC RX REV CODE- 636/320: Performed by: NURSE PRACTITIONER

## 2019-12-10 RX ADMIN — GADOTERATE MEGLUMINE 15 ML: 376.9 INJECTION INTRAVENOUS at 12:00

## 2019-12-11 ENCOUNTER — HOME CARE VISIT (OUTPATIENT)
Dept: SCHEDULING | Facility: HOME HEALTH | Age: 54
End: 2019-12-11
Payer: MEDICARE

## 2019-12-11 VITALS
TEMPERATURE: 98 F | DIASTOLIC BLOOD PRESSURE: 80 MMHG | SYSTOLIC BLOOD PRESSURE: 130 MMHG | HEART RATE: 83 BPM | OXYGEN SATURATION: 100 %

## 2019-12-11 PROCEDURE — G0157 HHC PT ASSISTANT EA 15: HCPCS

## 2019-12-16 ENCOUNTER — HOME CARE VISIT (OUTPATIENT)
Dept: HOME HEALTH SERVICES | Facility: HOME HEALTH | Age: 54
End: 2019-12-16
Payer: MEDICARE

## 2019-12-16 ENCOUNTER — HOSPITAL ENCOUNTER (OUTPATIENT)
Dept: INFUSION THERAPY | Age: 54
Discharge: HOME | End: 2019-12-16
Payer: MEDICARE

## 2019-12-16 VITALS
SYSTOLIC BLOOD PRESSURE: 126 MMHG | HEART RATE: 73 BPM | TEMPERATURE: 98.3 F | DIASTOLIC BLOOD PRESSURE: 78 MMHG | OXYGEN SATURATION: 98 % | RESPIRATION RATE: 20 BRPM

## 2019-12-16 PROCEDURE — 74011250636 HC RX REV CODE- 250/636: Performed by: INTERNAL MEDICINE

## 2019-12-16 PROCEDURE — 74011000258 HC RX REV CODE- 258: Performed by: NURSE PRACTITIONER

## 2019-12-16 PROCEDURE — 96365 THER/PROPH/DIAG IV INF INIT: CPT

## 2019-12-16 PROCEDURE — 74011250636 HC RX REV CODE- 250/636: Performed by: NURSE PRACTITIONER

## 2019-12-16 RX ORDER — SODIUM CHLORIDE 9 MG/ML
25 INJECTION, SOLUTION INTRAVENOUS CONTINUOUS
Status: DISCONTINUED | OUTPATIENT
Start: 2019-12-16 | End: 2019-12-17 | Stop reason: HOSPADM

## 2019-12-16 RX ORDER — SODIUM CHLORIDE 0.9 % (FLUSH) 0.9 %
10-40 SYRINGE (ML) INJECTION AS NEEDED
Status: DISCONTINUED | OUTPATIENT
Start: 2019-12-16 | End: 2019-12-20 | Stop reason: HOSPADM

## 2019-12-16 RX ADMIN — SODIUM CHLORIDE 1000 MG: 9 INJECTION, SOLUTION INTRAVENOUS at 10:59

## 2019-12-16 RX ADMIN — Medication 10 ML: at 10:55

## 2019-12-16 RX ADMIN — SODIUM CHLORIDE 25 ML/HR: 9 INJECTION, SOLUTION INTRAVENOUS at 11:01

## 2019-12-16 NOTE — PROGRESS NOTES
[facility] Progress Note    Date: 2019    Name: Daniel Dunne    MRN: [id number]         : 1965      Ms. Manuel Reyes was assessed and education was provided. Ms. Jacobs's vitals were reviewed and patient was observed for 5 minutes prior to treatment. Patient Vitals for the past 4 hrs:   Temp Pulse Resp BP SpO2   19 1234 98.3 °F (36.8 °C) 73 20 126/78 98 %   19 1044 98.1 °F (36.7 °C) 65 20 118/80 100 %         Visit Vitals  /78 (BP 1 Location: Left arm)   Pulse 73   Temp 98.3 °F (36.8 °C)   Resp 20   SpO2 98%   Breastfeeding No     PIV @22 started x1 attempt in right AC, brisk blood return noted. Solumedrol 1000mg IV was infused over one hour as ordered. Upon completion line flushed with 30cc NS, PIV dc'd 2x2 and coban applied. Ms. Manuel Reyes tolerated the infusion, and had no complaints. Patient armband removed and shredded. Ms. Manuel Reyes was discharged from [place] [address] in stable condition at 1240. She is to return on 19 at 1100 for her next appointment.     Eliecer Cisneros RN  2019  1:19 PM

## 2019-12-17 ENCOUNTER — HOSPITAL ENCOUNTER (OUTPATIENT)
Dept: INFUSION THERAPY | Age: 54
Discharge: HOME | End: 2019-12-17
Payer: MEDICARE

## 2019-12-17 VITALS
SYSTOLIC BLOOD PRESSURE: 117 MMHG | HEART RATE: 82 BPM | OXYGEN SATURATION: 99 % | TEMPERATURE: 98.1 F | RESPIRATION RATE: 18 BRPM | DIASTOLIC BLOOD PRESSURE: 74 MMHG

## 2019-12-17 PROCEDURE — 96366 THER/PROPH/DIAG IV INF ADDON: CPT

## 2019-12-17 PROCEDURE — 96365 THER/PROPH/DIAG IV INF INIT: CPT

## 2019-12-17 PROCEDURE — 74011250636 HC RX REV CODE- 250/636: Performed by: NURSE PRACTITIONER

## 2019-12-17 PROCEDURE — 74011000258 HC RX REV CODE- 258: Performed by: NURSE PRACTITIONER

## 2019-12-17 RX ORDER — DIPHENHYDRAMINE HYDROCHLORIDE 50 MG/ML
50 INJECTION, SOLUTION INTRAMUSCULAR; INTRAVENOUS AS NEEDED
Status: CANCELLED
Start: 2019-12-23

## 2019-12-17 RX ORDER — ACETAMINOPHEN 325 MG/1
650 TABLET ORAL AS NEEDED
Status: CANCELLED
Start: 2019-12-23

## 2019-12-17 RX ORDER — DIPHENHYDRAMINE HYDROCHLORIDE 50 MG/ML
50 INJECTION, SOLUTION INTRAMUSCULAR; INTRAVENOUS ONCE
Status: CANCELLED | OUTPATIENT
Start: 2019-12-23

## 2019-12-17 RX ORDER — SODIUM CHLORIDE 9 MG/ML
10 INJECTION INTRAMUSCULAR; INTRAVENOUS; SUBCUTANEOUS AS NEEDED
Status: CANCELLED | OUTPATIENT
Start: 2019-12-23

## 2019-12-17 RX ORDER — SODIUM CHLORIDE 9 MG/ML
25 INJECTION, SOLUTION INTRAVENOUS CONTINUOUS
Status: CANCELLED | OUTPATIENT
Start: 2019-12-23

## 2019-12-17 RX ORDER — SODIUM CHLORIDE 0.9 % (FLUSH) 0.9 %
10-40 SYRINGE (ML) INJECTION AS NEEDED
Status: DISCONTINUED | OUTPATIENT
Start: 2019-12-17 | End: 2019-12-21 | Stop reason: HOSPADM

## 2019-12-17 RX ORDER — EPINEPHRINE 1 MG/ML
0.3 INJECTION, SOLUTION, CONCENTRATE INTRAVENOUS AS NEEDED
Status: CANCELLED | OUTPATIENT
Start: 2019-12-23

## 2019-12-17 RX ORDER — ACETAMINOPHEN 325 MG/1
650 TABLET ORAL ONCE
Status: CANCELLED | OUTPATIENT
Start: 2019-12-23

## 2019-12-17 RX ORDER — HEPARIN 100 UNIT/ML
300-500 SYRINGE INTRAVENOUS AS NEEDED
Status: CANCELLED
Start: 2019-12-23

## 2019-12-17 RX ORDER — HYDROCORTISONE SODIUM SUCCINATE 100 MG/2ML
100 INJECTION, POWDER, FOR SOLUTION INTRAMUSCULAR; INTRAVENOUS AS NEEDED
Status: CANCELLED | OUTPATIENT
Start: 2019-12-23

## 2019-12-17 RX ORDER — ALBUTEROL SULFATE 0.83 MG/ML
2.5 SOLUTION RESPIRATORY (INHALATION) AS NEEDED
Status: CANCELLED
Start: 2019-12-23

## 2019-12-17 RX ORDER — ONDANSETRON 2 MG/ML
8 INJECTION INTRAMUSCULAR; INTRAVENOUS AS NEEDED
Status: CANCELLED | OUTPATIENT
Start: 2019-12-23

## 2019-12-17 RX ORDER — SODIUM CHLORIDE 0.9 % (FLUSH) 0.9 %
10 SYRINGE (ML) INJECTION AS NEEDED
Status: CANCELLED
Start: 2019-12-23

## 2019-12-17 RX ADMIN — Medication 10 ML: at 11:22

## 2019-12-17 RX ADMIN — Medication 10 ML: at 12:32

## 2019-12-17 RX ADMIN — SODIUM CHLORIDE 1000 MG: 9 INJECTION, SOLUTION INTRAVENOUS at 11:30

## 2019-12-18 ENCOUNTER — HOME CARE VISIT (OUTPATIENT)
Dept: SCHEDULING | Facility: HOME HEALTH | Age: 54
End: 2019-12-18
Payer: MEDICARE

## 2019-12-18 ENCOUNTER — HOME CARE VISIT (OUTPATIENT)
Dept: HOME HEALTH SERVICES | Facility: HOME HEALTH | Age: 54
End: 2019-12-18
Payer: MEDICARE

## 2019-12-18 ENCOUNTER — HOSPITAL ENCOUNTER (OUTPATIENT)
Dept: INFUSION THERAPY | Age: 54
Discharge: HOME | End: 2019-12-18
Payer: MEDICARE

## 2019-12-18 VITALS
HEART RATE: 80 BPM | OXYGEN SATURATION: 100 % | TEMPERATURE: 98.1 F | SYSTOLIC BLOOD PRESSURE: 150 MMHG | RESPIRATION RATE: 18 BRPM | DIASTOLIC BLOOD PRESSURE: 83 MMHG

## 2019-12-18 VITALS — SYSTOLIC BLOOD PRESSURE: 140 MMHG | DIASTOLIC BLOOD PRESSURE: 80 MMHG | HEART RATE: 87 BPM

## 2019-12-18 PROCEDURE — G0155 HHCP-SVS OF CSW,EA 15 MIN: HCPCS

## 2019-12-18 PROCEDURE — 74011250636 HC RX REV CODE- 250/636: Performed by: NURSE PRACTITIONER

## 2019-12-18 PROCEDURE — 74011000258 HC RX REV CODE- 258: Performed by: NURSE PRACTITIONER

## 2019-12-18 PROCEDURE — 96365 THER/PROPH/DIAG IV INF INIT: CPT

## 2019-12-18 PROCEDURE — G0151 HHCP-SERV OF PT,EA 15 MIN: HCPCS

## 2019-12-18 RX ORDER — SODIUM CHLORIDE 0.9 % (FLUSH) 0.9 %
5-10 SYRINGE (ML) INJECTION AS NEEDED
Status: DISCONTINUED | OUTPATIENT
Start: 2019-12-18 | End: 2019-12-22 | Stop reason: HOSPADM

## 2019-12-18 RX ADMIN — Medication 10 ML: at 12:49

## 2019-12-18 RX ADMIN — SODIUM CHLORIDE 1000 MG: 9 INJECTION, SOLUTION INTRAVENOUS at 11:40

## 2019-12-18 NOTE — PROGRESS NOTES
[facility] Progress Note    Date: 2019    Name: Gretel Hayes    MRN: [id number]         : 1965      Ms. Pa Jenkins was assessed and education was provided. Ms. Jacobs's vitals were reviewed and patient was observed for 5 minutes prior to treatment. Patient Vitals for the past 4 hrs:   Temp Pulse Resp BP SpO2   19 1248 98.1 °F (36.7 °C) 80 18 150/83 100 %         Visit Vitals  /83 (BP 1 Location: Left arm)   Pulse 80   Temp 98.1 °F (36.7 °C)   Resp 18   SpO2 100%     PIV @22 started x1 attempt in right AC, brisk blood return noted. Solumedrol 1000mg IV was infused over one hour as ordered. Upon completion line flushed with 30cc NS, PIV dc'd 2x2 and coban applied. Ms. Pa Jenkins tolerated the infusion, and had no complaints. Patient armband removed and shredded. Ms. Pa Jenkins was discharged from [place] [address] in stable condition at 1250. She is to return on 19 at 0900 for her next appointment.     Herman Hardin RN  2019

## 2019-12-19 ENCOUNTER — HOSPITAL ENCOUNTER (OUTPATIENT)
Dept: INFUSION THERAPY | Age: 54
Discharge: HOME | End: 2019-12-19
Payer: MEDICARE

## 2019-12-19 ENCOUNTER — OFFICE VISIT (OUTPATIENT)
Dept: FAMILY MEDICINE CLINIC | Facility: CLINIC | Age: 54
End: 2019-12-19

## 2019-12-19 VITALS
OXYGEN SATURATION: 100 % | RESPIRATION RATE: 18 BRPM | DIASTOLIC BLOOD PRESSURE: 84 MMHG | HEART RATE: 73 BPM | TEMPERATURE: 97.2 F | SYSTOLIC BLOOD PRESSURE: 154 MMHG

## 2019-12-19 VITALS
TEMPERATURE: 96.6 F | DIASTOLIC BLOOD PRESSURE: 80 MMHG | WEIGHT: 200 LBS | HEART RATE: 75 BPM | RESPIRATION RATE: 18 BRPM | SYSTOLIC BLOOD PRESSURE: 127 MMHG | HEIGHT: 63 IN | OXYGEN SATURATION: 97 % | BODY MASS INDEX: 35.44 KG/M2

## 2019-12-19 DIAGNOSIS — Z12.39 BREAST CANCER SCREENING: ICD-10-CM

## 2019-12-19 DIAGNOSIS — J30.89 PERENNIAL ALLERGIC RHINITIS: ICD-10-CM

## 2019-12-19 DIAGNOSIS — Z12.31 ENCOUNTER FOR SCREENING MAMMOGRAM FOR MALIGNANT NEOPLASM OF BREAST: ICD-10-CM

## 2019-12-19 DIAGNOSIS — M54.42 CHRONIC LEFT-SIDED LOW BACK PAIN WITH LEFT-SIDED SCIATICA: Primary | ICD-10-CM

## 2019-12-19 DIAGNOSIS — G89.29 CHRONIC LEFT-SIDED LOW BACK PAIN WITH LEFT-SIDED SCIATICA: Primary | ICD-10-CM

## 2019-12-19 PROCEDURE — 74011000258 HC RX REV CODE- 258: Performed by: NURSE PRACTITIONER

## 2019-12-19 PROCEDURE — 74011250636 HC RX REV CODE- 250/636: Performed by: NURSE PRACTITIONER

## 2019-12-19 PROCEDURE — 96365 THER/PROPH/DIAG IV INF INIT: CPT

## 2019-12-19 RX ORDER — SODIUM CHLORIDE 0.9 % (FLUSH) 0.9 %
10-40 SYRINGE (ML) INJECTION AS NEEDED
Status: DISCONTINUED | OUTPATIENT
Start: 2019-12-19 | End: 2019-12-23 | Stop reason: HOSPADM

## 2019-12-19 RX ORDER — PREDNISONE 20 MG/1
20 TABLET ORAL DAILY
Status: CANCELLED | OUTPATIENT
Start: 2019-12-19

## 2019-12-19 RX ORDER — FLUTICASONE PROPIONATE 50 MCG
2 SPRAY, SUSPENSION (ML) NASAL DAILY
Qty: 1 BOTTLE | Refills: 11 | Status: SHIPPED | OUTPATIENT
Start: 2019-12-19 | End: 2021-01-04 | Stop reason: SDUPTHER

## 2019-12-19 RX ORDER — NABUMETONE 500 MG/1
TABLET, FILM COATED ORAL
Qty: 60 TAB | Refills: 0 | Status: SHIPPED | OUTPATIENT
Start: 2019-12-19 | End: 2020-02-18 | Stop reason: SDUPTHER

## 2019-12-19 RX ADMIN — SODIUM CHLORIDE 1000 MG: 9 INJECTION, SOLUTION INTRAVENOUS at 09:33

## 2019-12-19 RX ADMIN — Medication 20 ML: at 10:45

## 2019-12-19 RX ADMIN — Medication 10 ML: at 09:28

## 2019-12-19 NOTE — PROGRESS NOTES
Delores Argueta is a 62 y.o. female presenting today for Back Pain (FOLLOW UP )    HPI:  Delores Argueta presents to the office today for back pain follow-up care. She presents requesting a refill for Relafen. Her back pain is improved with anti-inflammatories. Patient has a history of MS and was last seen by TONY Silva, at her neurologist office. Her last office visit was in November, 2019. She recently had an MRI of the brain, cervical and thoracic spine. She is ambulating with a cane today and generally feels okay. She is negative for chest pain or palpitation. She is complaining of congestion but denies any cough or wheezing. Review of Systems   Respiratory: Negative for cough and sputum production. Cardiovascular: Negative for chest pain and palpitations. Musculoskeletal: Positive for back pain. Neurological: Negative for dizziness and headaches. Allergies   Allergen Reactions    Naproxen Shortness of Breath    Shellfish Derived Nausea and Vomiting     SEVERE NAUSEA AND VOMITING    Amoxicillin Rash and Nausea Only       Current Outpatient Medications   Medication Sig Dispense Refill    nabumetone (RELAFEN) 500 mg tablet TAKE 1 TABLET BY MOUTH TWICE DAILY 60 Tab 0    fluticasone propionate (FLONASE) 50 mcg/actuation nasal spray 2 Sprays by Both Nostrils route daily. Indications: inflammation of the nose due to an allergy 1 Bottle 11    Bedside Commode XX Please provide a commode for patient safety 1 Each 0    hydrOXYzine HCl (ATARAX) 25 mg tablet Take 2 Tabs by mouth nightly.  Comp Stocking,Knee,Long,Medium misc Wear daily while walking to prevent swelling 1 Each 0    Shower Chair XX jozef As needed for patient safety 1 Each 0    OTHER Please provide a tub transfer bench 1 Each 0    predniSONE (DELTASONE) 20 mg tablet Take 20 mg by mouth daily.  biotin (VITAMIN B7) 5 mg tablet Take 5 mg by mouth daily.       nortriptyline (PAMELOR) 25 mg capsule Take 1 Cap by mouth nightly. May increase to 2 pills in 3 days 60 Cap 1    triamcinolone acetonide (KENALOG) 0.1 % topical cream Apply  to affected area two (2) times a day. (Patient taking differently: Apply  to affected area two (2) times a day.  Apply a thin layer) 45 g 11       Past Medical History:   Diagnosis Date    Chest pain 11/2014    neg EKG, non recurrent    Chronic pain     lower back and legs    Depression     Fibroids     GERD (gastroesophageal reflux disease)     Headache(784.0)     Hiatal hernia     IBS (irritable bowel syndrome)     Microscopic hematuria     Rectal bleeding     Stool color black     irritable bowel       Past Surgical History:   Procedure Laterality Date    COLONOSCOPY,DIAGNOSTIC      HX BREAST BIOPSY Right 1/21/2015    RIGHT BREAST BIOPSY WITH NEEDLE LOCALIZATION ULTRASOUND performed by Lance Nolasco MD at [facility] MAIN OR    HX CHOLECYSTECTOMY      HX GI      HX HYSTERECTOMY      HX TUBAL LIGATION         Social History     Socioeconomic History    Marital status: SINGLE     Spouse name: Not on file    Number of children: Not on file    Years of education: Not on file    Highest education level: Not on file   Occupational History    Not on file   Social Needs    Financial resource strain: Not on file    Food insecurity:     Worry: Not on file     Inability: Not on file    Transportation needs:     Medical: Not on file     Non-medical: Not on file   Tobacco Use    Smoking status: Former Smoker     Packs/day: 0.00     Last attempt to quit: 6/28/2016     Years since quitting: 3.4    Smokeless tobacco: Former User     Quit date: 02/2016   Substance and Sexual Activity    Alcohol use: No     Alcohol/week: 10.0 standard drinks     Types: 10 Cans of beer per week    Drug use: No    Sexual activity: Yes     Partners: Male   Lifestyle    Physical activity:     Days per week: Not on file     Minutes per session: Not on file    Stress: Not on file Relationships    Social connections:     Talks on phone: Not on file     Gets together: Not on file     Attends Mu-ism service: Not on file     Active member of club or organization: Not on file     Attends meetings of clubs or organizations: Not on file     Relationship status: Not on file    Intimate partner violence:     Fear of current or ex partner: Not on file     Emotionally abused: Not on file     Physically abused: Not on file     Forced sexual activity: Not on file   Other Topics Concern    Not on file   Social History Narrative    Not on file       Patient does not have an advanced directive on file    Vitals:    12/19/19 1509   BP: 127/80   Pulse: 75   Resp: 18   Temp: 96.6 °F (35.9 °C)   TempSrc: Oral   SpO2: 97%   Weight: 200 lb (90.7 kg)   Height: 5' 3\" (1.6 m)   PainSc:   6   PainLoc: Generalized       Physical Exam  Vitals signs and nursing note reviewed. Constitutional:       Appearance: Normal appearance. HENT:      Nose: Congestion present. Cardiovascular:      Rate and Rhythm: Normal rate and regular rhythm. Pulses: Normal pulses. Heart sounds: Normal heart sounds. Pulmonary:      Effort: Pulmonary effort is normal.      Breath sounds: Normal breath sounds. Abdominal:      General: Abdomen is flat. Bowel sounds are normal.   Skin:     General: Skin is warm. Neurological:      General: No focal deficit present. Mental Status: She is alert. Hospital Outpatient Visit on 12/04/2019   Component Date Value Ref Range Status    [facility]. 12/04/2019 Specimens collected/sent to [facility]. Please direct inquiries to ([phone]).     Final   Orders Only on 11/26/2019   Component Date Value Ref Range Status    Hep B surface Ag screen 12/04/2019 Negative  Negative Final    Hep B Core Ab, total 12/04/2019 Negative  Negative Final    SPECIMEN STATUS REPORT 12/04/2019 COMMENT   Final    Comment: Please note  Please note  The date and/or time of collection was not indicated on the  requisition as required by state and federal law. The date of  receipt of the specimen was used as the collection date if not  supplied. Hospital Outpatient Visit on 11/21/2019   Component Date Value Ref Range Status    [facility] SPEC[facility]. 11/21/2019 [facility] Please direct inquiries to ([phone]). Final   Hospital Outpatient Visit on 11/21/2019   Component Date Value Ref Range Status    Creatinine, POC 11/21/2019 0.7  0.6 - 1.3 MG/DL Final    GFRAA, POC 11/21/2019 >60  >60 ml/min/1.73m2 Final    GFRNA, POC 11/21/2019 >60  >60 ml/min/1.73m2 Final    Comment: Estimated GFR is calculated using the IDMS-traceable Modification of Diet in Renal Disease (MDRD) Study equation, reported for both  Americans (GFRAA) and non- Americans (GFRNA), and normalized to 1.73m2 body surface area. The physician must decide which value applies to the patient. The MDRD study equation should only be used in individuals age 25 or older. It has not been validated for the following: pregnant women, patients with serious comorbid conditions, or on certain medications, or persons with extremes of body size, muscle mass, or nutritional status.      Office Visit on 11/11/2019   Component Date Value Ref Range Status    WBC 11/21/2019 5.9  3.4 - 10.8 x10E3/uL Final    RBC 11/21/2019 4.08  3.77 - 5.28 x10E6/uL Final    HGB 11/21/2019 12.0  11.1 - 15.9 g/dL Final    HCT 11/21/2019 36.4  34.0 - 46.6 % Final    MCV 11/21/2019 89  79 - 97 fL Final    MCH 11/21/2019 29.4  26.6 - 33.0 pg Final    MCHC 11/21/2019 33.0  31.5 - 35.7 g/dL Final    RDW 11/21/2019 13.1  12.3 - 15.4 % Final    PLATELET 61/18/8727 295  150 - 450 x10E3/uL Final    NEUTROPHILS 11/21/2019 32  Not Estab. % Final    Lymphocytes 11/21/2019 58  Not Estab. % Final    MONOCYTES 11/21/2019 8  Not Estab. % Final    EOSINOPHILS 11/21/2019 1  Not Estab. % Final    BASOPHILS 11/21/2019 1  Not Estab. % Final    ABS. NEUTROPHILS 11/21/2019 1.9  1.4 - 7.0 x10E3/uL Final    Abs Lymphocytes 11/21/2019 3.5* 0.7 - 3.1 x10E3/uL Final    ABS. MONOCYTES 11/21/2019 0.5  0.1 - 0.9 x10E3/uL Final    ABS. EOSINOPHILS 11/21/2019 0.1  0.0 - 0.4 x10E3/uL Final    ABS. BASOPHILS 11/21/2019 0.0  0.0 - 0.2 x10E3/uL Final    IMMATURE GRANULOCYTES 11/21/2019 0  Not Estab. % Final    ABS. IMM. GRANS. 11/21/2019 0.0  0.0 - 0.1 x10E3/uL Final    Protein, total 11/21/2019 7.5  6.0 - 8.5 g/dL Final    Albumin 11/21/2019 4.5  3.5 - 5.5 g/dL Final    Bilirubin, total 11/21/2019 0.2  0.0 - 1.2 mg/dL Final    Bilirubin, direct 11/21/2019 0.08  0.00 - 0.40 mg/dL Final    Alk. phosphatase 11/21/2019 82  39 - 117 IU/L Final    AST (SGOT) 11/21/2019 16  0 - 40 IU/L Final    ALT (SGPT) 11/21/2019 13  0 - 32 IU/L Final    SPECIMEN STATUS REPORT 11/21/2019 COMMENT   Final    Comment: Please note  Please note  The date and/or time of collection was not indicated on the  requisition as required by state and federal law. The date of  receipt of the specimen was used as the collection date if not  supplied. .No results found for any visits on 12/19/19. Assessment / Plan:      ICD-10-CM ICD-9-CM    1. Chronic left-sided low back pain with left-sided sciatica M54.42 724.2 nabumetone (RELAFEN) 500 mg tablet    G89.29 724.3      338.29    2. Perennial allergic rhinitis J30.89 477.8 fluticasone propionate (FLONASE) 50 mcg/actuation nasal spray   3. Breast cancer screening Z12.39 V76.10 ANDRA MAMMO BI SCREENING INCL CAD   4. Encounter for screening mammogram for malignant neoplasm of breast  Z12.31 V76.12 ANDRA MAMMO BI SCREENING INCL CAD     Refill Relafen prescription  Flonase given for allergic symptoms  Ordered mammogram          I asked the patient if she  had any questions and answered her  questions.   The patient stated that she understands the treatment plan and agrees with the treatment plan    This document was created with a voice activated dictation system and may contain transcription errors.

## 2019-12-20 ENCOUNTER — HOSPITAL ENCOUNTER (OUTPATIENT)
Dept: INFUSION THERAPY | Age: 54
Discharge: HOME | End: 2019-12-20
Payer: MEDICARE

## 2019-12-20 VITALS
DIASTOLIC BLOOD PRESSURE: 84 MMHG | OXYGEN SATURATION: 100 % | HEART RATE: 72 BPM | RESPIRATION RATE: 18 BRPM | TEMPERATURE: 98.1 F | SYSTOLIC BLOOD PRESSURE: 138 MMHG

## 2019-12-20 PROCEDURE — 74011000258 HC RX REV CODE- 258: Performed by: NURSE PRACTITIONER

## 2019-12-20 PROCEDURE — 96365 THER/PROPH/DIAG IV INF INIT: CPT

## 2019-12-20 PROCEDURE — 74011250636 HC RX REV CODE- 250/636: Performed by: NURSE PRACTITIONER

## 2019-12-20 RX ORDER — SODIUM CHLORIDE 0.9 % (FLUSH) 0.9 %
10-40 SYRINGE (ML) INJECTION AS NEEDED
Status: DISCONTINUED | OUTPATIENT
Start: 2019-12-20 | End: 2019-12-24 | Stop reason: HOSPADM

## 2019-12-20 RX ADMIN — Medication 10 ML: at 11:02

## 2019-12-20 RX ADMIN — SODIUM CHLORIDE 1000 MG: 9 INJECTION, SOLUTION INTRAVENOUS at 11:02

## 2019-12-20 RX ADMIN — Medication 10 ML: at 12:08

## 2019-12-20 NOTE — PROGRESS NOTES
[facility] Progress Note    Date: 2019    Name: Jose G Heath    MRN: [id number]         : 1965     Solu Medrol infusion Day 5 of 5      Ms. Latrell Medina was assessed and education was provided. Denies any problems with infusions. Patient is very talkative. Ms. Jacobs's vitals were reviewed and patient was observed for 5 minutes prior to treatment. Visit Vitals  /84   Pulse 72   Temp 98.1 °F (36.7 °C)   Resp 18   SpO2 100%     Patient Vitals for the past 12 hrs:   Temp Pulse Resp BP SpO2   19 1213 -- 72 -- 138/84 --   19 1047 98.1 °F (36.7 °C) 75 18 (!) 153/91 100 %     IV started in [facility] w/24 gauge INT w/o difficulty. Good blood return obtained, followed with 10 ml NS flush. No pre-medications were ordered. Solu Medrol 1000 mg/100 ml was infused over 1 hours. IV flushed with 20 ml NS and removed. No irritation or drainage noted at site, gauze and Coban applied. Ms. Latrell Medina tolerated the infusion, and had no complaints. Patient armband removed and shredded. Ms. Latrell Medina was discharged from [place] [address] in stable condition at 1220. She is to return on 2019 at 1000 for her next appointment for [address] [place] [address].     Judit Carrillo RN  2019

## 2019-12-23 ENCOUNTER — HOSPITAL ENCOUNTER (OUTPATIENT)
Dept: INFUSION THERAPY | Age: 54
End: 2019-12-23
Payer: MEDICARE

## 2019-12-23 DIAGNOSIS — G35 MULTIPLE SCLEROSIS (HCC): ICD-10-CM

## 2019-12-27 ENCOUNTER — HOME CARE VISIT (OUTPATIENT)
Dept: HOME HEALTH SERVICES | Facility: HOME HEALTH | Age: 54
End: 2019-12-27
Payer: MEDICARE

## 2019-12-30 ENCOUNTER — DOCUMENTATION ONLY (OUTPATIENT)
Dept: NEUROLOGY | Age: 54
End: 2019-12-30

## 2019-12-30 ENCOUNTER — HOME CARE VISIT (OUTPATIENT)
Dept: HOME HEALTH SERVICES | Facility: HOME HEALTH | Age: 54
End: 2019-12-30
Payer: MEDICARE

## 2020-01-01 ENCOUNTER — HOME CARE VISIT (OUTPATIENT)
Dept: HOME HEALTH SERVICES | Facility: HOME HEALTH | Age: 55
End: 2020-01-01
Payer: MEDICARE

## 2020-01-03 ENCOUNTER — HOME CARE VISIT (OUTPATIENT)
Dept: HOME HEALTH SERVICES | Facility: HOME HEALTH | Age: 55
End: 2020-01-03
Payer: MEDICARE

## 2020-01-06 ENCOUNTER — HOSPITAL ENCOUNTER (OUTPATIENT)
Dept: INFUSION THERAPY | Age: 55
Discharge: HOME | End: 2020-01-06
Payer: MEDICARE

## 2020-01-06 VITALS
BODY MASS INDEX: 25.35 KG/M2 | SYSTOLIC BLOOD PRESSURE: 109 MMHG | TEMPERATURE: 98.5 F | RESPIRATION RATE: 16 BRPM | OXYGEN SATURATION: 100 % | WEIGHT: 148.5 LBS | HEART RATE: 91 BPM | DIASTOLIC BLOOD PRESSURE: 70 MMHG | HEIGHT: 64 IN

## 2020-01-06 DIAGNOSIS — G35 MULTIPLE SCLEROSIS (HCC): Primary | ICD-10-CM

## 2020-01-06 LAB
ALBUMIN SERPL-MCNC: 3.7 G/DL (ref 3.4–5)
ALBUMIN/GLOB SERPL: 0.9 {RATIO} (ref 0.8–1.7)
ALP SERPL-CCNC: 83 U/L (ref 45–117)
ALT SERPL-CCNC: 23 U/L (ref 13–56)
ANION GAP SERPL CALC-SCNC: 2 MMOL/L (ref 3–18)
AST SERPL-CCNC: 16 U/L (ref 10–38)
BASO+EOS+MONOS # BLD AUTO: 0.1 K/UL (ref 0–2.3)
BASO+EOS+MONOS NFR BLD AUTO: 2 % (ref 0.1–17)
BILIRUB SERPL-MCNC: 0.4 MG/DL (ref 0.2–1)
BUN SERPL-MCNC: 12 MG/DL (ref 7–18)
BUN/CREAT SERPL: 16 (ref 12–20)
CALCIUM SERPL-MCNC: 9.1 MG/DL (ref 8.5–10.1)
CHLORIDE SERPL-SCNC: 109 MMOL/L (ref 100–111)
CO2 SERPL-SCNC: 31 MMOL/L (ref 21–32)
CREAT SERPL-MCNC: 0.74 MG/DL (ref 0.6–1.3)
DIFFERENTIAL METHOD BLD: ABNORMAL
ERYTHROCYTE [DISTWIDTH] IN BLOOD BY AUTOMATED COUNT: 13.1 % (ref 11.5–14.5)
GLOBULIN SER CALC-MCNC: 3.9 G/DL (ref 2–4)
GLUCOSE SERPL-MCNC: 88 MG/DL (ref 74–99)
HCT VFR BLD AUTO: 35.7 % (ref 36–48)
HGB BLD-MCNC: 12 G/DL (ref 12–16)
LYMPHOCYTES # BLD: 2.5 K/UL (ref 1.1–5.9)
LYMPHOCYTES NFR BLD: 49 % (ref 14–44)
MCH RBC QN AUTO: 29.9 PG (ref 25–35)
MCHC RBC AUTO-ENTMCNC: 33.6 G/DL (ref 31–37)
MCV RBC AUTO: 89 FL (ref 78–102)
NEUTS SEG # BLD: 2.4 K/UL (ref 1.8–9.5)
NEUTS SEG NFR BLD: 49 % (ref 40–70)
PLATELET # BLD AUTO: 204 K/UL (ref 140–440)
POTASSIUM SERPL-SCNC: 3.9 MMOL/L (ref 3.5–5.5)
PROT SERPL-MCNC: 7.6 G/DL (ref 6.4–8.2)
RBC # BLD AUTO: 4.01 M/UL (ref 4.1–5.1)
SODIUM SERPL-SCNC: 142 MMOL/L (ref 136–145)
WBC # BLD AUTO: 5 K/UL (ref 4.5–13)

## 2020-01-06 PROCEDURE — 96375 TX/PRO/DX INJ NEW DRUG ADDON: CPT

## 2020-01-06 PROCEDURE — 96415 CHEMO IV INFUSION ADDL HR: CPT

## 2020-01-06 PROCEDURE — 74011250637 HC RX REV CODE- 250/637: Performed by: NURSE PRACTITIONER

## 2020-01-06 PROCEDURE — 85025 COMPLETE CBC W/AUTO DIFF WBC: CPT

## 2020-01-06 PROCEDURE — 74011250636 HC RX REV CODE- 250/636: Performed by: NURSE PRACTITIONER

## 2020-01-06 PROCEDURE — 96413 CHEMO IV INFUSION 1 HR: CPT

## 2020-01-06 PROCEDURE — 96366 THER/PROPH/DIAG IV INF ADDON: CPT

## 2020-01-06 PROCEDURE — 96365 THER/PROPH/DIAG IV INF INIT: CPT

## 2020-01-06 PROCEDURE — 80053 COMPREHEN METABOLIC PANEL: CPT

## 2020-01-06 RX ORDER — SODIUM CHLORIDE 0.9 % (FLUSH) 0.9 %
10-40 SYRINGE (ML) INJECTION AS NEEDED
Status: DISCONTINUED | OUTPATIENT
Start: 2020-01-06 | End: 2020-01-10 | Stop reason: HOSPADM

## 2020-01-06 RX ORDER — HEPARIN 100 UNIT/ML
300-500 SYRINGE INTRAVENOUS AS NEEDED
Status: CANCELLED
Start: 2020-01-08

## 2020-01-06 RX ORDER — DIPHENHYDRAMINE HYDROCHLORIDE 50 MG/ML
50 INJECTION, SOLUTION INTRAMUSCULAR; INTRAVENOUS AS NEEDED
Status: CANCELLED
Start: 2020-01-08

## 2020-01-06 RX ORDER — EPINEPHRINE 1 MG/ML
0.3 INJECTION, SOLUTION, CONCENTRATE INTRAVENOUS AS NEEDED
Status: CANCELLED | OUTPATIENT
Start: 2020-01-08

## 2020-01-06 RX ORDER — ACETAMINOPHEN 325 MG/1
650 TABLET ORAL AS NEEDED
Status: CANCELLED
Start: 2020-01-08

## 2020-01-06 RX ORDER — SODIUM CHLORIDE 9 MG/ML
25 INJECTION, SOLUTION INTRAVENOUS CONTINUOUS
Status: DISPENSED | OUTPATIENT
Start: 2020-01-06 | End: 2020-01-06

## 2020-01-06 RX ORDER — SODIUM CHLORIDE 0.9 % (FLUSH) 0.9 %
10 SYRINGE (ML) INJECTION AS NEEDED
Status: CANCELLED
Start: 2020-01-08

## 2020-01-06 RX ORDER — DIPHENHYDRAMINE HYDROCHLORIDE 50 MG/ML
50 INJECTION, SOLUTION INTRAMUSCULAR; INTRAVENOUS ONCE
Status: COMPLETED | OUTPATIENT
Start: 2020-01-06 | End: 2020-01-06

## 2020-01-06 RX ORDER — ACETAMINOPHEN 325 MG/1
650 TABLET ORAL ONCE
Status: CANCELLED | OUTPATIENT
Start: 2020-01-08

## 2020-01-06 RX ORDER — ONDANSETRON 2 MG/ML
8 INJECTION INTRAMUSCULAR; INTRAVENOUS AS NEEDED
Status: CANCELLED | OUTPATIENT
Start: 2020-01-08

## 2020-01-06 RX ORDER — HYDROCORTISONE SODIUM SUCCINATE 100 MG/2ML
100 INJECTION, POWDER, FOR SOLUTION INTRAMUSCULAR; INTRAVENOUS AS NEEDED
Status: CANCELLED | OUTPATIENT
Start: 2020-01-08

## 2020-01-06 RX ORDER — SODIUM CHLORIDE 9 MG/ML
25 INJECTION, SOLUTION INTRAVENOUS CONTINUOUS
Status: CANCELLED | OUTPATIENT
Start: 2020-01-08

## 2020-01-06 RX ORDER — SODIUM CHLORIDE 9 MG/ML
10 INJECTION INTRAMUSCULAR; INTRAVENOUS; SUBCUTANEOUS AS NEEDED
Status: CANCELLED | OUTPATIENT
Start: 2020-01-08

## 2020-01-06 RX ORDER — DIPHENHYDRAMINE HYDROCHLORIDE 50 MG/ML
50 INJECTION, SOLUTION INTRAMUSCULAR; INTRAVENOUS ONCE
Status: CANCELLED | OUTPATIENT
Start: 2020-01-08

## 2020-01-06 RX ORDER — ALBUTEROL SULFATE 0.83 MG/ML
2.5 SOLUTION RESPIRATORY (INHALATION) AS NEEDED
Status: CANCELLED
Start: 2020-01-08

## 2020-01-06 RX ORDER — ACETAMINOPHEN 325 MG/1
650 TABLET ORAL ONCE
Status: COMPLETED | OUTPATIENT
Start: 2020-01-06 | End: 2020-01-06

## 2020-01-06 RX ADMIN — Medication 10 ML: at 09:40

## 2020-01-06 RX ADMIN — ACETAMINOPHEN 650 MG: 325 TABLET ORAL at 10:03

## 2020-01-06 RX ADMIN — OCRELIZUMAB 300 MG: 300 INJECTION INTRAVENOUS at 10:55

## 2020-01-06 RX ADMIN — METHYLPREDNISOLONE SODIUM SUCCINATE 125 MG: 125 INJECTION, POWDER, FOR SOLUTION INTRAMUSCULAR; INTRAVENOUS at 10:08

## 2020-01-06 RX ADMIN — DIPHENHYDRAMINE HYDROCHLORIDE 50 MG: 50 INJECTION INTRAMUSCULAR; INTRAVENOUS at 10:05

## 2020-01-06 RX ADMIN — SODIUM CHLORIDE 25 ML/HR: 9 INJECTION, SOLUTION INTRAVENOUS at 09:55

## 2020-01-07 ENCOUNTER — HOME CARE VISIT (OUTPATIENT)
Dept: SCHEDULING | Facility: HOME HEALTH | Age: 55
End: 2020-01-07
Payer: MEDICARE

## 2020-01-08 ENCOUNTER — HOME CARE VISIT (OUTPATIENT)
Dept: HOME HEALTH SERVICES | Facility: HOME HEALTH | Age: 55
End: 2020-01-08
Payer: MEDICARE

## 2020-01-08 DIAGNOSIS — G35 MULTIPLE SCLEROSIS (HCC): ICD-10-CM

## 2020-01-09 ENCOUNTER — HOME CARE VISIT (OUTPATIENT)
Dept: SCHEDULING | Facility: HOME HEALTH | Age: 55
End: 2020-01-09
Payer: MEDICARE

## 2020-01-09 VITALS — OXYGEN SATURATION: 95 % | SYSTOLIC BLOOD PRESSURE: 120 MMHG | HEART RATE: 74 BPM | DIASTOLIC BLOOD PRESSURE: 65 MMHG

## 2020-01-09 PROCEDURE — G0151 HHCP-SERV OF PT,EA 15 MIN: HCPCS

## 2020-01-10 ENCOUNTER — HOME CARE VISIT (OUTPATIENT)
Dept: SCHEDULING | Facility: HOME HEALTH | Age: 55
End: 2020-01-10
Payer: MEDICARE

## 2020-01-10 VITALS — DIASTOLIC BLOOD PRESSURE: 80 MMHG | HEART RATE: 78 BPM | SYSTOLIC BLOOD PRESSURE: 120 MMHG | OXYGEN SATURATION: 96 %

## 2020-01-10 PROCEDURE — G0151 HHCP-SERV OF PT,EA 15 MIN: HCPCS

## 2020-01-13 ENCOUNTER — HOME CARE VISIT (OUTPATIENT)
Dept: SCHEDULING | Facility: HOME HEALTH | Age: 55
End: 2020-01-13
Payer: MEDICARE

## 2020-01-13 VITALS — HEART RATE: 76 BPM | OXYGEN SATURATION: 98 % | SYSTOLIC BLOOD PRESSURE: 115 MMHG | DIASTOLIC BLOOD PRESSURE: 65 MMHG

## 2020-01-13 PROCEDURE — G0151 HHCP-SERV OF PT,EA 15 MIN: HCPCS

## 2020-01-20 ENCOUNTER — HOSPITAL ENCOUNTER (OUTPATIENT)
Dept: INFUSION THERAPY | Age: 55
Discharge: HOME | End: 2020-01-20
Payer: MEDICARE

## 2020-01-20 VITALS
HEART RATE: 88 BPM | SYSTOLIC BLOOD PRESSURE: 124 MMHG | OXYGEN SATURATION: 100 % | DIASTOLIC BLOOD PRESSURE: 83 MMHG | RESPIRATION RATE: 16 BRPM | TEMPERATURE: 98.6 F

## 2020-01-20 DIAGNOSIS — G35 MULTIPLE SCLEROSIS (HCC): Primary | ICD-10-CM

## 2020-01-20 PROCEDURE — 96375 TX/PRO/DX INJ NEW DRUG ADDON: CPT

## 2020-01-20 PROCEDURE — 74011250637 HC RX REV CODE- 250/637: Performed by: NURSE PRACTITIONER

## 2020-01-20 PROCEDURE — 96415 CHEMO IV INFUSION ADDL HR: CPT

## 2020-01-20 PROCEDURE — 74011250636 HC RX REV CODE- 250/636: Performed by: NURSE PRACTITIONER

## 2020-01-20 PROCEDURE — 96413 CHEMO IV INFUSION 1 HR: CPT

## 2020-01-20 PROCEDURE — 96366 THER/PROPH/DIAG IV INF ADDON: CPT

## 2020-01-20 PROCEDURE — 96365 THER/PROPH/DIAG IV INF INIT: CPT

## 2020-01-20 RX ORDER — ACETAMINOPHEN 325 MG/1
650 TABLET ORAL ONCE
Status: COMPLETED | OUTPATIENT
Start: 2020-01-20 | End: 2020-01-20

## 2020-01-20 RX ORDER — DIPHENHYDRAMINE HYDROCHLORIDE 50 MG/ML
50 INJECTION, SOLUTION INTRAMUSCULAR; INTRAVENOUS ONCE
Status: COMPLETED | OUTPATIENT
Start: 2020-01-20 | End: 2020-01-20

## 2020-01-20 RX ORDER — DIPHENHYDRAMINE HYDROCHLORIDE 50 MG/ML
50 INJECTION, SOLUTION INTRAMUSCULAR; INTRAVENOUS ONCE
Status: CANCELLED | OUTPATIENT
Start: 2020-02-03

## 2020-01-20 RX ORDER — HYDROCORTISONE SODIUM SUCCINATE 100 MG/2ML
100 INJECTION, POWDER, FOR SOLUTION INTRAMUSCULAR; INTRAVENOUS AS NEEDED
Status: CANCELLED | OUTPATIENT
Start: 2020-02-03

## 2020-01-20 RX ORDER — SODIUM CHLORIDE 9 MG/ML
25 INJECTION, SOLUTION INTRAVENOUS CONTINUOUS
Status: CANCELLED | OUTPATIENT
Start: 2020-02-03

## 2020-01-20 RX ORDER — SODIUM CHLORIDE 9 MG/ML
25 INJECTION, SOLUTION INTRAVENOUS CONTINUOUS
Status: DISPENSED | OUTPATIENT
Start: 2020-01-20 | End: 2020-01-20

## 2020-01-20 RX ORDER — ACETAMINOPHEN 325 MG/1
650 TABLET ORAL ONCE
Status: CANCELLED | OUTPATIENT
Start: 2020-02-03

## 2020-01-20 RX ORDER — DIPHENHYDRAMINE HYDROCHLORIDE 50 MG/ML
50 INJECTION, SOLUTION INTRAMUSCULAR; INTRAVENOUS AS NEEDED
Status: CANCELLED
Start: 2020-02-03

## 2020-01-20 RX ORDER — EPINEPHRINE 1 MG/ML
0.3 INJECTION, SOLUTION, CONCENTRATE INTRAVENOUS AS NEEDED
Status: CANCELLED | OUTPATIENT
Start: 2020-02-03

## 2020-01-20 RX ORDER — HEPARIN 100 UNIT/ML
300-500 SYRINGE INTRAVENOUS AS NEEDED
Status: CANCELLED
Start: 2020-02-03

## 2020-01-20 RX ORDER — SODIUM CHLORIDE 9 MG/ML
10 INJECTION INTRAMUSCULAR; INTRAVENOUS; SUBCUTANEOUS AS NEEDED
Status: CANCELLED | OUTPATIENT
Start: 2020-02-03

## 2020-01-20 RX ORDER — ONDANSETRON 2 MG/ML
8 INJECTION INTRAMUSCULAR; INTRAVENOUS AS NEEDED
Status: CANCELLED | OUTPATIENT
Start: 2020-02-03

## 2020-01-20 RX ORDER — ACETAMINOPHEN 325 MG/1
650 TABLET ORAL AS NEEDED
Status: CANCELLED
Start: 2020-02-03

## 2020-01-20 RX ORDER — ALBUTEROL SULFATE 0.83 MG/ML
2.5 SOLUTION RESPIRATORY (INHALATION) AS NEEDED
Status: CANCELLED
Start: 2020-02-03

## 2020-01-20 RX ORDER — SODIUM CHLORIDE 0.9 % (FLUSH) 0.9 %
10 SYRINGE (ML) INJECTION AS NEEDED
Status: CANCELLED
Start: 2020-02-03

## 2020-01-20 RX ADMIN — SODIUM CHLORIDE 25 ML/HR: 9 INJECTION, SOLUTION INTRAVENOUS at 08:40

## 2020-01-20 RX ADMIN — DIPHENHYDRAMINE HYDROCHLORIDE 50 MG: 50 INJECTION INTRAMUSCULAR; INTRAVENOUS at 08:27

## 2020-01-20 RX ADMIN — ACETAMINOPHEN 650 MG: 325 TABLET ORAL at 08:27

## 2020-01-20 RX ADMIN — METHYLPREDNISOLONE SODIUM SUCCINATE 125 MG: 125 INJECTION, POWDER, FOR SOLUTION INTRAMUSCULAR; INTRAVENOUS at 08:27

## 2020-01-20 RX ADMIN — OCRELIZUMAB 300 MG: 300 INJECTION INTRAVENOUS at 09:00

## 2020-01-20 NOTE — PROGRESS NOTES
[facility] Progress Note    Date: 2020    Name: Daniel Dunne    MRN: [id number]         : 1965      Ms. Manuel Reyes arrived in the [facility] today at 96 86 26, in stable condition, here for Ocrevus (ocrelizumab) Infusion (Week 2 in the Induction Phase). She was assessed and education was provided. Ms. Jacobs's vitals were reviewed. Visit Vitals  /83   Pulse 69   Temp 98.4 °F (36.9 °C)   Resp 16   SpO2 100%         PIV # 22 G, was established in her right AC flush and brisk blood return noted.  ml IV Bag, was initiated, to infuse @ KVO, PRN, throughout treatment today. The following pre-medications were administered per order, and without incident:  Tylenol 650 mg PO, Benadryl 50 mg IV, & Solumedrol 125 mg IV. Ocrevus (Ocrelizumab) 300 mg IV, was administered over approximately 3 hours, using the following rate titration:  40 ml/hr X 30 minutes, 80 ml/hr X 30 minutes, 120 ml/hr X 30 minutes, 160 ml/hr X 30 minutes, 200 ml/hr until completion. After completion of the Ocrevus Infusion, Ms. Manuel Reyes was monitored for 1 hour per order, and without incident. After completion of the observation period, the PIV was removed and gauze/bandaid was applied. Ms. Manuel Reyes tolerated well, and had no complaints. Ms. Manuel Reyes was discharged from [place] [address] in stable condition at 1235. She is to follow up with her PCP.           Dana Mitchell RN  2020

## 2020-01-24 ENCOUNTER — HOME CARE VISIT (OUTPATIENT)
Dept: HOME HEALTH SERVICES | Facility: HOME HEALTH | Age: 55
End: 2020-01-24

## 2020-01-27 ENCOUNTER — OFFICE VISIT (OUTPATIENT)
Dept: NEUROLOGY | Age: 55
End: 2020-01-27

## 2020-01-27 VITALS
HEIGHT: 64 IN | TEMPERATURE: 98 F | BODY MASS INDEX: 26.77 KG/M2 | DIASTOLIC BLOOD PRESSURE: 86 MMHG | SYSTOLIC BLOOD PRESSURE: 130 MMHG | WEIGHT: 156.8 LBS | RESPIRATION RATE: 20 BRPM

## 2020-01-27 DIAGNOSIS — R26.89 IMPAIRED GAIT AND MOBILITY: ICD-10-CM

## 2020-01-27 DIAGNOSIS — G35 MS (MULTIPLE SCLEROSIS) (HCC): Primary | ICD-10-CM

## 2020-01-27 NOTE — PATIENT INSTRUCTIONS
Patient instructions:  -MRI of the brain in mid June  -Follow up here thereafter  -Physical therapy and speech therapy ordered  -Call with ophthalmologist info  -Plan is to Ocrevus infusion

## 2020-01-27 NOTE — PROGRESS NOTES
Harinder Stanford is a 62 y.o. female in today for follow-up on labs and imaging results. Learning assessment previously completed 6/15/2018; primary language is [place]. 1. Have you been to the ER, urgent care clinic since your last visit? Hospitalized since your last visit? No    2. Have you seen or consulted any other health care providers outside of the [address] [place] [address] since your last visit? Include any pap smears or colon screening.  No

## 2020-01-27 NOTE — PROGRESS NOTES
[facility]  [address], [address], [place], [address]  [address], [address]  Office:  [phone]  Fax: [phone]  Chief Complaint   Patient presents with    Multiple Sclerosis    Results     labs and imaging       HPI: Isidra Nichole presents in follow-up for multiple sclerosis. She started on Ocrevus this month. She reports doing well on the Ocrevus. She had the IV steroid infusions in December. She currently reports that her worst problem is the mobility. She has a cane. She reports some feet swelling. She had home health physical therapy. She had used a Rollator but it is damaged so she is going to get it fixed. In terms of her vision she had the blurry vision on the left, now it is blurry on the right. That has been for a few months. She reports she had to change ophthalmologists and she will not be able to see the new one April. Her other one is no longer within network. She was seen last in April 2019. She has some back pain and abdominal pain. She denies urinary incontinence. She reports skin dryness. She has been to the dermatologist in the past for dermatitis. She is on prednisone for this.     Past Medical History:   Diagnosis Date    Chest pain 11/2014    neg EKG, non recurrent    Chronic pain     lower back and legs    Depression     Fibroids     GERD (gastroesophageal reflux disease)     Headache(784.0)     Hiatal hernia     IBS (irritable bowel syndrome)     Microscopic hematuria     Rectal bleeding     Stool color black     irritable bowel       Past Surgical History:   Procedure Laterality Date    COLONOSCOPY,DIAGNOSTIC      HX BREAST BIOPSY Right 1/21/2015    RIGHT BREAST BIOPSY WITH NEEDLE LOCALIZATION ULTRASOUND performed by Lucretia Falcon MD at [address] HX CHOLECYSTECTOMY      HX GI      HX HYSTERECTOMY      HX TUBAL LIGATION         Current Outpatient Medications   Medication Sig Dispense Refill    linaCLOtide (LINZESS) 72 mcg cap capsule Take 72 mcg by mouth daily.  Omeprazole delayed release (PRILOSEC D/R) 20 mg tablet Take 20 mg by mouth daily.  nabumetone (RELAFEN) 500 mg tablet TAKE 1 TABLET BY MOUTH TWICE DAILY 60 Tab 0    fluticasone propionate (FLONASE) 50 mcg/actuation nasal spray 2 Sprays by Both Nostrils route daily. Indications: inflammation of the nose due to an allergy 1 Bottle 11    hydrOXYzine HCl (ATARAX) 25 mg tablet Take 2 Tabs by mouth nightly.  predniSONE (DELTASONE) 20 mg tablet Take 20 mg by mouth daily.  biotin (VITAMIN B7) 5 mg tablet Take 5 mg by mouth daily.  nortriptyline (PAMELOR) 25 mg capsule Take 1 Cap by mouth nightly. May increase to 2 pills in 3 days 60 Cap 1    triamcinolone acetonide (KENALOG) 0.1 % topical cream Apply  to affected area two (2) times a day. (Patient taking differently: Apply  to affected area two (2) times a day.  Apply a thin layer) 45 g 11    Shower Chair XX jozef As needed for patient safety 1 Each 0    Bedside Commode XX Please provide a commode for patient safety 1 Each 0    OTHER Please provide a tub transfer bench 1 Each 0    Comp Stocking,Knee,Long,Medium misc Wear daily while walking to prevent swelling 1 Each 0        Allergies   Allergen Reactions    Naproxen Shortness of Breath    Shellfish Derived Nausea and Vomiting     SEVERE NAUSEA AND VOMITING    Amoxicillin Rash and Nausea Only       Social History     Tobacco Use    Smoking status: Former Smoker     Packs/day: 0.00     Last attempt to quit: 6/28/2016     Years since quitting: 3.5    Smokeless tobacco: Former User     Quit date: 02/2016   Substance Use Topics    Alcohol use: No     Alcohol/week: 10.0 standard drinks     Types: 10 Cans of beer per week    Drug use: No       Family History   Problem Relation Age of Onset    HIV/AIDS Brother     Diabetes Father     Cancer Brother     Hypertension Sister     Diabetes Brother  Hypertension Sister     Hypertension Sister     Hypertension Brother        Review of Systems:  GENERAL: Denies fever or fatigue  CARDIAC: No CP or SOB  PULMONARY: No cough or SOB  MUSCULOSKELETAL: No new joint pain  NEURO: SEE HPI    Physical Examination:  Visit Vitals  /86 (BP 1 Location: Left arm, BP Patient Position: Sitting)   Pulse (!) (P) 59   Temp 98 °F (36.7 °C) (Oral)   Resp 20   Ht 5' 4\" (1.626 m)   Wt 71.1 kg (156 lb 12.8 oz)   LMP  (LMP Unknown)   SpO2 (P) 98%   BMI 26.91 kg/m²       The patient is awake, alert, and oriented x 4.  Affect a bit flat.  Fund of knowledge is adequate.  Speech is fluent and memory is intact.  Speech is clear. Cranial Nerves: II - Visual fields are full to confrontation.  III, IV, VI - Extraocular movements are intact. PERRL. V - Facial sensation is intact to light touch.  VII - Face is symmetrical.  VIII - Hearing is present.  IX, X, XII - Palate is symmetrical.   XI - Shoulder shrugging and head turning intact. Motor:  The patient moves all four limbs fairly well and symmetrically. No drift.  Tone is normal. Reflexes are increased and symmetrical. Gait is antalgic and appears unsteady. Left knee is turned inward.  She ambulates with cane.         Impression/Plan: This is a 57-year-old left-handed female who presents in follow-up for MS. She has had numerous lesions noted on MRI of the brain which were located hemispheric, callosal, and within the brainstem and cerebellum and with abnormal signal in the [place], cervical medullary junction, and proximal spinal cord on her MRI of the cervical spine. Her most recent MRI of the brain in November 2019 reported advanced findings of multiple sclerosis and continued evidence of mild amount of active disease, MRI of the cervical spine in November 2019 reported areas of demyelination in the cervical cord, and also MRI of the thoracic spine was obtained which revealed lesions in the thoracic cord as well.   She completed 5-day course of IV Solu-Medrol in December and was started on Ocrevus thereafter, with her first infusion on January 6 and second on January 20. She tolerated the infusions. Will obtain an MRI of the brain in about 6 months, around mid June and follow up here thereafter. The plan will be to continue Ocrevus which would be with next infusion in early July. Discussed with patient to continue therapy services which we will resume at this time because her therapy has ended. Will initiate home health physical therapy for gait and balance and speech therapy for cognition if she is able to but discussed with her to let me know if she cannot continue this and we can consider outpatient therapy services. Recommended ophthalmology evaluation related to her complaints of continued blurred vision. She indicates that she will be seeing a new ophthalmologist which she is not until April so I asked her to call me and let me know who this is and we can try to see if a sooner evaluation can be arranged. She had to change providers because of insurance. Follow up here in the end of June, earlier if needed. Diagnoses and all orders for this visit:    1. MS (multiple sclerosis) ([facility] [address]  -     MRI BRAIN W WO CONT; Future  -     REFERRAL TO HOME HEALTH    2. Impaired gait and mobility  -     REFERRAL TO HOME HEALTH      Total time 40 minutes with 25 minutes spent in counseling. Signed By: Rusty Del Castillo NP      This note will not be viewable in [address] PLEASE NOTE:   Portions of this document may have been produced using voice recognition software. Unrecognized errors in transcription may be present.

## 2020-01-28 ENCOUNTER — HOME HEALTH ADMISSION (OUTPATIENT)
Dept: HOME HEALTH SERVICES | Facility: HOME HEALTH | Age: 55
End: 2020-01-28
Payer: MEDICARE

## 2020-01-28 DIAGNOSIS — G35 MS (MULTIPLE SCLEROSIS) (HCC): Primary | ICD-10-CM

## 2020-01-29 ENCOUNTER — HOME CARE VISIT (OUTPATIENT)
Dept: HOME HEALTH SERVICES | Facility: HOME HEALTH | Age: 55
End: 2020-01-29

## 2020-01-29 ENCOUNTER — HOME CARE VISIT (OUTPATIENT)
Dept: SCHEDULING | Facility: HOME HEALTH | Age: 55
End: 2020-01-29
Payer: MEDICARE

## 2020-01-29 VITALS
DIASTOLIC BLOOD PRESSURE: 80 MMHG | TEMPERATURE: 98 F | HEART RATE: 75 BPM | OXYGEN SATURATION: 99 % | SYSTOLIC BLOOD PRESSURE: 120 MMHG

## 2020-01-29 PROCEDURE — 400013 HH SOC

## 2020-01-29 PROCEDURE — G0151 HHCP-SERV OF PT,EA 15 MIN: HCPCS

## 2020-01-30 ENCOUNTER — HOME CARE VISIT (OUTPATIENT)
Dept: HOME HEALTH SERVICES | Facility: HOME HEALTH | Age: 55
End: 2020-01-30
Payer: MEDICARE

## 2020-01-30 ENCOUNTER — TELEPHONE (OUTPATIENT)
Dept: FAMILY MEDICINE CLINIC | Facility: CLINIC | Age: 55
End: 2020-01-30

## 2020-01-30 NOTE — TELEPHONE ENCOUNTER
[facility] sent request over yesterday to have home health. She says this is an urgent request.  Please call [place] with any questions. She states the orders have to be signed by a doctor only. Please advise.

## 2020-01-30 NOTE — TELEPHONE ENCOUNTER
Pt is not a pt of Dr. Zoraida Toledo she is a pt of NP Sreekanth Bolanos home health order was signed by Dr. Zoraida Toledo and fax over today.

## 2020-01-31 ENCOUNTER — HOME CARE VISIT (OUTPATIENT)
Dept: HOME HEALTH SERVICES | Facility: HOME HEALTH | Age: 55
End: 2020-01-31
Payer: MEDICARE

## 2020-01-31 PROCEDURE — G0157 HHC PT ASSISTANT EA 15: HCPCS

## 2020-02-02 VITALS
HEART RATE: 68 BPM | SYSTOLIC BLOOD PRESSURE: 116 MMHG | DIASTOLIC BLOOD PRESSURE: 82 MMHG | TEMPERATURE: 97.7 F | OXYGEN SATURATION: 98 %

## 2020-02-03 ENCOUNTER — HOSPITAL ENCOUNTER (OUTPATIENT)
Dept: MAMMOGRAPHY | Age: 55
Discharge: HOME | End: 2020-02-03
Attending: NURSE PRACTITIONER
Payer: MEDICARE

## 2020-02-03 DIAGNOSIS — Z12.39 BREAST CANCER SCREENING: ICD-10-CM

## 2020-02-03 DIAGNOSIS — Z12.31 ENCOUNTER FOR SCREENING MAMMOGRAM FOR MALIGNANT NEOPLASM OF BREAST: ICD-10-CM

## 2020-02-03 PROCEDURE — 77067 SCR MAMMO BI INCL CAD: CPT

## 2020-02-04 ENCOUNTER — HOME CARE VISIT (OUTPATIENT)
Dept: SCHEDULING | Facility: HOME HEALTH | Age: 55
End: 2020-02-04
Payer: MEDICARE

## 2020-02-04 ENCOUNTER — TELEPHONE (OUTPATIENT)
Dept: NEUROLOGY | Age: 55
End: 2020-02-04

## 2020-02-04 VITALS
SYSTOLIC BLOOD PRESSURE: 112 MMHG | HEART RATE: 70 BPM | OXYGEN SATURATION: 98 % | TEMPERATURE: 97.7 F | DIASTOLIC BLOOD PRESSURE: 70 MMHG

## 2020-02-04 PROCEDURE — G0157 HHC PT ASSISTANT EA 15: HCPCS

## 2020-02-06 ENCOUNTER — HOME CARE VISIT (OUTPATIENT)
Dept: SCHEDULING | Facility: HOME HEALTH | Age: 55
End: 2020-02-06
Payer: MEDICARE

## 2020-02-06 ENCOUNTER — HOME CARE VISIT (OUTPATIENT)
Dept: HOME HEALTH SERVICES | Facility: HOME HEALTH | Age: 55
End: 2020-02-06
Payer: MEDICARE

## 2020-02-06 VITALS
SYSTOLIC BLOOD PRESSURE: 107 MMHG | TEMPERATURE: 97.7 F | HEART RATE: 75 BPM | OXYGEN SATURATION: 98 % | DIASTOLIC BLOOD PRESSURE: 69 MMHG

## 2020-02-06 PROCEDURE — G0155 HHCP-SVS OF CSW,EA 15 MIN: HCPCS

## 2020-02-06 PROCEDURE — G0157 HHC PT ASSISTANT EA 15: HCPCS

## 2020-02-07 ENCOUNTER — HOME CARE VISIT (OUTPATIENT)
Dept: SCHEDULING | Facility: HOME HEALTH | Age: 55
End: 2020-02-07
Payer: MEDICARE

## 2020-02-07 VITALS
SYSTOLIC BLOOD PRESSURE: 110 MMHG | OXYGEN SATURATION: 98 % | HEART RATE: 80 BPM | DIASTOLIC BLOOD PRESSURE: 74 MMHG | TEMPERATURE: 97.7 F

## 2020-02-07 PROCEDURE — G0157 HHC PT ASSISTANT EA 15: HCPCS

## 2020-02-08 ENCOUNTER — HOME CARE VISIT (OUTPATIENT)
Dept: HOME HEALTH SERVICES | Facility: HOME HEALTH | Age: 55
End: 2020-02-08
Payer: MEDICARE

## 2020-02-11 ENCOUNTER — HOME CARE VISIT (OUTPATIENT)
Dept: SCHEDULING | Facility: HOME HEALTH | Age: 55
End: 2020-02-11
Payer: MEDICARE

## 2020-02-11 ENCOUNTER — HOME CARE VISIT (OUTPATIENT)
Dept: HOME HEALTH SERVICES | Facility: HOME HEALTH | Age: 55
End: 2020-02-11
Payer: MEDICARE

## 2020-02-11 VITALS
OXYGEN SATURATION: 98 % | HEART RATE: 83 BPM | TEMPERATURE: 98.6 F | SYSTOLIC BLOOD PRESSURE: 110 MMHG | DIASTOLIC BLOOD PRESSURE: 78 MMHG

## 2020-02-11 PROCEDURE — G0153 HHCP-SVS OF S/L PATH,EA 15MN: HCPCS

## 2020-02-12 ENCOUNTER — HOME CARE VISIT (OUTPATIENT)
Dept: SCHEDULING | Facility: HOME HEALTH | Age: 55
End: 2020-02-12
Payer: MEDICARE

## 2020-02-12 ENCOUNTER — HOME CARE VISIT (OUTPATIENT)
Dept: HOME HEALTH SERVICES | Facility: HOME HEALTH | Age: 55
End: 2020-02-12
Payer: MEDICARE

## 2020-02-12 VITALS
SYSTOLIC BLOOD PRESSURE: 110 MMHG | OXYGEN SATURATION: 98 % | HEART RATE: 74 BPM | DIASTOLIC BLOOD PRESSURE: 74 MMHG | TEMPERATURE: 97.7 F

## 2020-02-12 PROCEDURE — G0157 HHC PT ASSISTANT EA 15: HCPCS

## 2020-02-13 ENCOUNTER — HOME CARE VISIT (OUTPATIENT)
Dept: SCHEDULING | Facility: HOME HEALTH | Age: 55
End: 2020-02-13
Payer: MEDICARE

## 2020-02-13 PROCEDURE — G0157 HHC PT ASSISTANT EA 15: HCPCS

## 2020-02-14 ENCOUNTER — HOME CARE VISIT (OUTPATIENT)
Dept: HOME HEALTH SERVICES | Facility: HOME HEALTH | Age: 55
End: 2020-02-14
Payer: MEDICARE

## 2020-02-14 ENCOUNTER — HOME CARE VISIT (OUTPATIENT)
Dept: SCHEDULING | Facility: HOME HEALTH | Age: 55
End: 2020-02-14
Payer: MEDICARE

## 2020-02-14 VITALS
DIASTOLIC BLOOD PRESSURE: 72 MMHG | SYSTOLIC BLOOD PRESSURE: 107 MMHG | HEART RATE: 76 BPM | TEMPERATURE: 97.7 F | OXYGEN SATURATION: 98 %

## 2020-02-14 PROCEDURE — G0157 HHC PT ASSISTANT EA 15: HCPCS

## 2020-02-16 VITALS
OXYGEN SATURATION: 98 % | HEART RATE: 77 BPM | SYSTOLIC BLOOD PRESSURE: 105 MMHG | TEMPERATURE: 97.7 F | DIASTOLIC BLOOD PRESSURE: 64 MMHG

## 2020-02-17 ENCOUNTER — TELEPHONE (OUTPATIENT)
Dept: NEUROLOGY | Age: 55
End: 2020-02-17

## 2020-02-17 ENCOUNTER — HOME CARE VISIT (OUTPATIENT)
Dept: SCHEDULING | Facility: HOME HEALTH | Age: 55
End: 2020-02-17
Payer: MEDICARE

## 2020-02-17 VITALS
OXYGEN SATURATION: 98 % | SYSTOLIC BLOOD PRESSURE: 114 MMHG | TEMPERATURE: 97.7 F | HEART RATE: 80 BPM | DIASTOLIC BLOOD PRESSURE: 70 MMHG

## 2020-02-17 DIAGNOSIS — G89.29 CHRONIC LEFT-SIDED LOW BACK PAIN WITH LEFT-SIDED SCIATICA: ICD-10-CM

## 2020-02-17 DIAGNOSIS — M54.42 CHRONIC LEFT-SIDED LOW BACK PAIN WITH LEFT-SIDED SCIATICA: ICD-10-CM

## 2020-02-17 PROCEDURE — G0157 HHC PT ASSISTANT EA 15: HCPCS

## 2020-02-17 NOTE — TELEPHONE ENCOUNTER
Requested Prescriptions     Pending Prescriptions Disp Refills    nabumetone (RELAFEN) 500 mg tablet 60 Tab 0     Sig: TAKE 1 TABLET BY MOUTH TWICE DAILY

## 2020-02-18 DIAGNOSIS — G89.29 CHRONIC LEFT-SIDED LOW BACK PAIN WITH LEFT-SIDED SCIATICA: ICD-10-CM

## 2020-02-18 DIAGNOSIS — M54.42 CHRONIC LEFT-SIDED LOW BACK PAIN WITH LEFT-SIDED SCIATICA: ICD-10-CM

## 2020-02-18 RX ORDER — NABUMETONE 500 MG/1
TABLET, FILM COATED ORAL
Qty: 60 TAB | Refills: 0 | OUTPATIENT
Start: 2020-02-18

## 2020-02-20 ENCOUNTER — HOME CARE VISIT (OUTPATIENT)
Dept: HOME HEALTH SERVICES | Facility: HOME HEALTH | Age: 55
End: 2020-02-20
Payer: MEDICARE

## 2020-02-20 RX ORDER — NABUMETONE 500 MG/1
TABLET, FILM COATED ORAL
Qty: 60 TAB | Refills: 0 | Status: SHIPPED | OUTPATIENT
Start: 2020-02-20 | End: 2020-03-24 | Stop reason: SDUPTHER

## 2020-02-21 ENCOUNTER — HOME CARE VISIT (OUTPATIENT)
Dept: SCHEDULING | Facility: HOME HEALTH | Age: 55
End: 2020-02-21
Payer: MEDICARE

## 2020-02-21 PROCEDURE — G0157 HHC PT ASSISTANT EA 15: HCPCS

## 2020-02-23 VITALS
SYSTOLIC BLOOD PRESSURE: 112 MMHG | TEMPERATURE: 97.7 F | OXYGEN SATURATION: 98 % | HEART RATE: 84 BPM | DIASTOLIC BLOOD PRESSURE: 72 MMHG

## 2020-02-25 ENCOUNTER — HOME CARE VISIT (OUTPATIENT)
Dept: HOME HEALTH SERVICES | Facility: HOME HEALTH | Age: 55
End: 2020-02-25
Payer: MEDICARE

## 2020-02-25 VITALS
SYSTOLIC BLOOD PRESSURE: 108 MMHG | DIASTOLIC BLOOD PRESSURE: 66 MMHG | TEMPERATURE: 97.7 F | HEART RATE: 75 BPM | OXYGEN SATURATION: 98 %

## 2020-02-25 PROCEDURE — G0157 HHC PT ASSISTANT EA 15: HCPCS

## 2020-02-27 ENCOUNTER — HOME CARE VISIT (OUTPATIENT)
Dept: HOME HEALTH SERVICES | Facility: HOME HEALTH | Age: 55
End: 2020-02-27
Payer: MEDICARE

## 2020-02-28 ENCOUNTER — HOME CARE VISIT (OUTPATIENT)
Dept: SCHEDULING | Facility: HOME HEALTH | Age: 55
End: 2020-02-28
Payer: MEDICARE

## 2020-02-28 ENCOUNTER — HOSPITAL ENCOUNTER (EMERGENCY)
Age: 55
Discharge: HOME | End: 2020-02-28
Attending: EMERGENCY MEDICINE
Payer: MEDICARE

## 2020-02-28 ENCOUNTER — APPOINTMENT (OUTPATIENT)
Dept: GENERAL RADIOLOGY | Age: 55
End: 2020-02-28
Attending: PHYSICIAN ASSISTANT
Payer: MEDICARE

## 2020-02-28 VITALS
TEMPERATURE: 98.3 F | SYSTOLIC BLOOD PRESSURE: 126 MMHG | WEIGHT: 154 LBS | OXYGEN SATURATION: 100 % | DIASTOLIC BLOOD PRESSURE: 84 MMHG | BODY MASS INDEX: 26.29 KG/M2 | HEART RATE: 75 BPM | RESPIRATION RATE: 18 BRPM | HEIGHT: 64 IN

## 2020-02-28 VITALS
SYSTOLIC BLOOD PRESSURE: 117 MMHG | OXYGEN SATURATION: 97 % | HEART RATE: 80 BPM | DIASTOLIC BLOOD PRESSURE: 72 MMHG | TEMPERATURE: 97.8 F

## 2020-02-28 DIAGNOSIS — R60.9 SWELLING: Primary | ICD-10-CM

## 2020-02-28 LAB
ALBUMIN SERPL-MCNC: 3.8 G/DL (ref 3.4–5)
ALBUMIN/GLOB SERPL: 0.9 {RATIO} (ref 0.8–1.7)
ALP SERPL-CCNC: 100 U/L (ref 45–117)
ALT SERPL-CCNC: 24 U/L (ref 13–56)
ANION GAP SERPL CALC-SCNC: 4 MMOL/L (ref 3–18)
APPEARANCE UR: CLEAR
AST SERPL-CCNC: 15 U/L (ref 10–38)
BASOPHILS # BLD: 0 K/UL (ref 0–0.1)
BASOPHILS NFR BLD: 0 % (ref 0–2)
BILIRUB SERPL-MCNC: 0.2 MG/DL (ref 0.2–1)
BILIRUB UR QL: NEGATIVE
BNP SERPL-MCNC: 39 PG/ML (ref 0–900)
BUN SERPL-MCNC: 10 MG/DL (ref 7–18)
BUN/CREAT SERPL: 13 (ref 12–20)
CALCIUM SERPL-MCNC: 9.1 MG/DL (ref 8.5–10.1)
CHLORIDE SERPL-SCNC: 109 MMOL/L (ref 100–111)
CO2 SERPL-SCNC: 30 MMOL/L (ref 21–32)
COLOR UR: YELLOW
CREAT SERPL-MCNC: 0.77 MG/DL (ref 0.6–1.3)
D DIMER PPP FEU-MCNC: 0.27 UG/ML(FEU)
DIFFERENTIAL METHOD BLD: ABNORMAL
EOSINOPHIL # BLD: 0.1 K/UL (ref 0–0.4)
EOSINOPHIL NFR BLD: 2 % (ref 0–5)
ERYTHROCYTE [DISTWIDTH] IN BLOOD BY AUTOMATED COUNT: 14.4 % (ref 11.6–14.5)
GLOBULIN SER CALC-MCNC: 4.1 G/DL (ref 2–4)
GLUCOSE SERPL-MCNC: 93 MG/DL (ref 74–99)
GLUCOSE UR STRIP.AUTO-MCNC: NEGATIVE MG/DL
HCT VFR BLD AUTO: 36 % (ref 35–45)
HGB BLD-MCNC: 12 G/DL (ref 12–16)
HGB UR QL STRIP: NEGATIVE
KETONES UR QL STRIP.AUTO: NEGATIVE MG/DL
LEUKOCYTE ESTERASE UR QL STRIP.AUTO: NEGATIVE
LIPASE SERPL-CCNC: 294 U/L (ref 73–393)
LYMPHOCYTES # BLD: 2.3 K/UL (ref 0.9–3.6)
LYMPHOCYTES NFR BLD: 44 % (ref 21–52)
MCH RBC QN AUTO: 29 PG (ref 24–34)
MCHC RBC AUTO-ENTMCNC: 33.3 G/DL (ref 31–37)
MCV RBC AUTO: 87 FL (ref 74–97)
MONOCYTES # BLD: 0.4 K/UL (ref 0.05–1.2)
MONOCYTES NFR BLD: 7 % (ref 3–10)
NEUTS SEG # BLD: 2.5 K/UL (ref 1.8–8)
NEUTS SEG NFR BLD: 47 % (ref 40–73)
NITRITE UR QL STRIP.AUTO: NEGATIVE
PH UR STRIP: 7.5 [PH] (ref 5–8)
PLATELET # BLD AUTO: 248 K/UL (ref 135–420)
PMV BLD AUTO: 10 FL (ref 9.2–11.8)
POTASSIUM SERPL-SCNC: 4.3 MMOL/L (ref 3.5–5.5)
PROT SERPL-MCNC: 7.9 G/DL (ref 6.4–8.2)
PROT UR STRIP-MCNC: NEGATIVE MG/DL
RBC # BLD AUTO: 4.14 M/UL (ref 4.2–5.3)
SODIUM SERPL-SCNC: 143 MMOL/L (ref 136–145)
SP GR UR REFRACTOMETRY: 1.02 (ref 1–1.03)
UROBILINOGEN UR QL STRIP.AUTO: 0.2 EU/DL (ref 0.2–1)
WBC # BLD AUTO: 5.3 K/UL (ref 4.6–13.2)

## 2020-02-28 PROCEDURE — 81003 URINALYSIS AUTO W/O SCOPE: CPT

## 2020-02-28 PROCEDURE — 80053 COMPREHEN METABOLIC PANEL: CPT

## 2020-02-28 PROCEDURE — 85025 COMPLETE CBC W/AUTO DIFF WBC: CPT

## 2020-02-28 PROCEDURE — 83690 ASSAY OF LIPASE: CPT

## 2020-02-28 PROCEDURE — 85379 FIBRIN DEGRADATION QUANT: CPT

## 2020-02-28 PROCEDURE — 400013 HH SOC

## 2020-02-28 PROCEDURE — 99284 EMERGENCY DEPT VISIT MOD MDM: CPT

## 2020-02-28 PROCEDURE — 71046 X-RAY EXAM CHEST 2 VIEWS: CPT

## 2020-02-28 PROCEDURE — 83880 ASSAY OF NATRIURETIC PEPTIDE: CPT

## 2020-02-28 PROCEDURE — G0151 HHCP-SERV OF PT,EA 15 MIN: HCPCS

## 2020-02-28 NOTE — ED TRIAGE NOTES
Bilateral leg, foot, and arm swelling and pain rates an 8. Left clavicular and left face swelling and pain as well. Has MS diagnosis. Left abdominal pain. \"I feel inflamed all over\".

## 2020-02-28 NOTE — DISCHARGE INSTRUCTIONS
Patient Education        Leg and Ankle Edema: Care Instructions  Your Care Instructions  Swelling in the legs, ankles, and feet is called edema. It is common after you sit or stand for a while. Long plane flights or car rides often cause swelling in the legs and feet. You may also have swelling if you have to stand for long periods of time at your job. Problems with the veins in the legs (varicose veins) and changes in hormones can also cause swelling. Sometimes the swelling in the ankles and feet is caused by a more serious problem, such as heart failure, infection, blood clots, or liver or kidney disease. Follow-up care is a key part of your treatment and safety. Be sure to make and go to all appointments, and call your doctor if you are having problems. It's also a good idea to know your test results and keep a list of the medicines you take. How can you care for yourself at home? · If your doctor gave you medicine, take it as prescribed. Call your doctor if you think you are having a problem with your medicine. · Whenever you are resting, raise your legs up. Try to keep the swollen area higher than the level of your heart. · Take breaks from standing or sitting in one position. ? Walk around to increase the blood flow in your lower legs. ? Move your feet and ankles often while you stand, or tighten and relax your leg muscles. · Wear support stockings. Put them on in the morning, before swelling gets worse. · Eat a balanced diet. Lose weight if you need to. · Limit the amount of salt (sodium) in your diet. Salt holds fluid in the body and may increase swelling. When should you call for help? Call 911 anytime you think you may need emergency care. For example, call if:    · You have symptoms of a blood clot in your lung (called a pulmonary embolism). These may include:  ? Sudden chest pain. ? Trouble breathing. ?  Coughing up blood.    Call your doctor now or seek immediate medical care if:    · You have signs of a blood clot, such as:  ? Pain in your calf, back of the knee, thigh, or groin. ? Redness and swelling in your leg or groin.     · You have symptoms of infection, such as:  ? Increased pain, swelling, warmth, or redness. ? Red streaks or pus. ? A fever.    Watch closely for changes in your health, and be sure to contact your doctor if:    · Your swelling is getting worse.     · You have new or worsening pain in your legs.     · You do not get better as expected. Where can you learn more? Go to http://lazarus-delano.info/. Enter J768 in the search box to learn more about \"Leg and Ankle Edema: Care Instructions. \"  Current as of: June 26, 2019  Content Version: 12.2  © 9182-9027 [employer]. Care instructions adapted under license by [employer] (which disclaims liability or warranty for this information). If you have questions about a medical condition or this instruction, always ask your healthcare professional. [place] [address] any warranty or liability for your use of this information.

## 2020-03-02 ENCOUNTER — HOME CARE VISIT (OUTPATIENT)
Dept: HOME HEALTH SERVICES | Facility: HOME HEALTH | Age: 55
End: 2020-03-02
Payer: MEDICARE

## 2020-03-03 ENCOUNTER — TELEPHONE (OUTPATIENT)
Dept: NEUROLOGY | Age: 55
End: 2020-03-03

## 2020-03-12 ENCOUNTER — ANESTHESIA EVENT (OUTPATIENT)
Dept: ENDOSCOPY | Age: 55
End: 2020-03-12
Payer: MEDICARE

## 2020-03-13 ENCOUNTER — HOSPITAL ENCOUNTER (OUTPATIENT)
Age: 55
Setting detail: OUTPATIENT SURGERY
Discharge: HOME | End: 2020-03-13
Attending: INTERNAL MEDICINE | Admitting: INTERNAL MEDICINE
Payer: MEDICARE

## 2020-03-13 ENCOUNTER — ANESTHESIA (OUTPATIENT)
Dept: ENDOSCOPY | Age: 55
End: 2020-03-13
Payer: MEDICARE

## 2020-03-13 VITALS
TEMPERATURE: 97.5 F | OXYGEN SATURATION: 100 % | HEART RATE: 78 BPM | BODY MASS INDEX: 26.91 KG/M2 | DIASTOLIC BLOOD PRESSURE: 85 MMHG | WEIGHT: 157.6 LBS | SYSTOLIC BLOOD PRESSURE: 129 MMHG | HEIGHT: 64 IN | RESPIRATION RATE: 18 BRPM

## 2020-03-13 PROCEDURE — 74011250636 HC RX REV CODE- 250/636: Performed by: NURSE ANESTHETIST, CERTIFIED REGISTERED

## 2020-03-13 PROCEDURE — 74011000250 HC RX REV CODE- 250: Performed by: NURSE ANESTHETIST, CERTIFIED REGISTERED

## 2020-03-13 PROCEDURE — 77030018846 HC SOL IRR STRL H20 ICUM -A: Performed by: INTERNAL MEDICINE

## 2020-03-13 PROCEDURE — 74011250637 HC RX REV CODE- 250/637: Performed by: NURSE ANESTHETIST, CERTIFIED REGISTERED

## 2020-03-13 PROCEDURE — 76040000019: Performed by: INTERNAL MEDICINE

## 2020-03-13 PROCEDURE — 77030008565 HC TBNG SUC IRR ERBE -B: Performed by: INTERNAL MEDICINE

## 2020-03-13 PROCEDURE — 88305 TISSUE EXAM BY PATHOLOGIST: CPT

## 2020-03-13 PROCEDURE — 77030019988 HC FCPS ENDOSC DISP BSC -B: Performed by: INTERNAL MEDICINE

## 2020-03-13 PROCEDURE — 76060000031 HC ANESTHESIA FIRST 0.5 HR: Performed by: INTERNAL MEDICINE

## 2020-03-13 RX ORDER — SODIUM CHLORIDE, SODIUM LACTATE, POTASSIUM CHLORIDE, CALCIUM CHLORIDE 600; 310; 30; 20 MG/100ML; MG/100ML; MG/100ML; MG/100ML
50 INJECTION, SOLUTION INTRAVENOUS CONTINUOUS
Status: CANCELLED | OUTPATIENT
Start: 2020-03-13

## 2020-03-13 RX ORDER — PROPOFOL 10 MG/ML
INJECTION, EMULSION INTRAVENOUS AS NEEDED
Status: DISCONTINUED | OUTPATIENT
Start: 2020-03-13 | End: 2020-03-13 | Stop reason: HOSPADM

## 2020-03-13 RX ORDER — LIDOCAINE HYDROCHLORIDE 10 MG/ML
0.1 INJECTION, SOLUTION EPIDURAL; INFILTRATION; INTRACAUDAL; PERINEURAL AS NEEDED
Status: DISCONTINUED | OUTPATIENT
Start: 2020-03-13 | End: 2020-03-13 | Stop reason: HOSPADM

## 2020-03-13 RX ORDER — SODIUM CHLORIDE 0.9 % (FLUSH) 0.9 %
5-40 SYRINGE (ML) INJECTION EVERY 8 HOURS
Status: CANCELLED | OUTPATIENT
Start: 2020-03-13

## 2020-03-13 RX ORDER — SODIUM CHLORIDE 0.9 % (FLUSH) 0.9 %
5-40 SYRINGE (ML) INJECTION EVERY 8 HOURS
Status: DISCONTINUED | OUTPATIENT
Start: 2020-03-13 | End: 2020-03-13 | Stop reason: HOSPADM

## 2020-03-13 RX ORDER — SODIUM CHLORIDE 0.9 % (FLUSH) 0.9 %
5-40 SYRINGE (ML) INJECTION AS NEEDED
Status: CANCELLED | OUTPATIENT
Start: 2020-03-13

## 2020-03-13 RX ORDER — LIDOCAINE HYDROCHLORIDE 20 MG/ML
INJECTION, SOLUTION EPIDURAL; INFILTRATION; INTRACAUDAL; PERINEURAL AS NEEDED
Status: DISCONTINUED | OUTPATIENT
Start: 2020-03-13 | End: 2020-03-13 | Stop reason: HOSPADM

## 2020-03-13 RX ORDER — SODIUM CHLORIDE 0.9 % (FLUSH) 0.9 %
5-40 SYRINGE (ML) INJECTION AS NEEDED
Status: DISCONTINUED | OUTPATIENT
Start: 2020-03-13 | End: 2020-03-13 | Stop reason: HOSPADM

## 2020-03-13 RX ORDER — FAMOTIDINE 20 MG/1
20 TABLET, FILM COATED ORAL ONCE
Status: COMPLETED | OUTPATIENT
Start: 2020-03-13 | End: 2020-03-13

## 2020-03-13 RX ORDER — SODIUM CHLORIDE, SODIUM LACTATE, POTASSIUM CHLORIDE, CALCIUM CHLORIDE 600; 310; 30; 20 MG/100ML; MG/100ML; MG/100ML; MG/100ML
25 INJECTION, SOLUTION INTRAVENOUS CONTINUOUS
Status: DISCONTINUED | OUTPATIENT
Start: 2020-03-13 | End: 2020-03-13 | Stop reason: HOSPADM

## 2020-03-13 RX ADMIN — FAMOTIDINE 20 MG: 20 TABLET, FILM COATED ORAL at 10:35

## 2020-03-13 RX ADMIN — LIDOCAINE HYDROCHLORIDE 60 MG: 20 INJECTION, SOLUTION EPIDURAL; INFILTRATION; INTRACAUDAL; PERINEURAL at 10:52

## 2020-03-13 RX ADMIN — PROPOFOL 100 MG: 10 INJECTION, EMULSION INTRAVENOUS at 10:52

## 2020-03-13 RX ADMIN — SODIUM CHLORIDE, SODIUM LACTATE, POTASSIUM CHLORIDE, AND CALCIUM CHLORIDE 25 ML/HR: 600; 310; 30; 20 INJECTION, SOLUTION INTRAVENOUS at 10:35

## 2020-03-13 RX ADMIN — PROPOFOL 30 MG: 10 INJECTION, EMULSION INTRAVENOUS at 10:55

## 2020-03-13 NOTE — ANESTHESIA PREPROCEDURE EVALUATION
Relevant Problems   No relevant active problems       Anesthetic History   No history of anesthetic complications            Review of Systems / Medical History  Patient summary reviewed, nursing notes reviewed and pertinent labs reviewed    Pulmonary  Within defined limits                 Neuro/Psych         Psychiatric history     Cardiovascular                  Exercise tolerance: >4 METS     GI/Hepatic/Renal     GERD           Endo/Other  Within defined limits           Other Findings              Physical Exam    Airway  Mallampati: II  TM Distance: 4 - 6 cm    Mouth opening: Normal     Cardiovascular  Regular rate and rhythm,  S1 and S2 normal,  no murmur, click, rub, or gallop  Rhythm: regular  Rate: normal         Dental  No notable dental hx       Pulmonary  Breath sounds clear to auscultation               Abdominal  GI exam deferred       Other Findings            Anesthetic Plan    ASA: 3  Anesthesia type: MAC          Induction: Intravenous  Anesthetic plan and risks discussed with: Patient

## 2020-03-13 NOTE — H&P
[email or web address]  [phone]      History and Physical    Patient: Santiago Leslie MRN: [id number]  SSN: [id number]    YOB: 1965  Age: 62 y.o. Sex: female      Subjective:      Santiago Leslie is a 62 y.o. female who presents with reflux, epigastric pain and dysphagia. Past Medical History:   Diagnosis Date    Chest pain 11/2014    neg EKG, non recurrent    Chronic pain     lower back and legs    Depression     Eczema     Fibroids     GERD (gastroesophageal reflux disease)     Headache(784.0)     Hiatal hernia     IBS (irritable bowel syndrome)     Microscopic hematuria     MS (multiple sclerosis) (HCC)     Rectal bleeding     Stool color black     irritable bowel     Past Surgical History:   Procedure Laterality Date    COLONOSCOPY,DIAGNOSTIC      HX BREAST BIOPSY Right 1/21/2015    RIGHT BREAST BIOPSY WITH NEEDLE LOCALIZATION ULTRASOUND performed by Ileana Marie MD at [address] MAIN OR    HX CHOLECYSTECTOMY      HX GI      HX HYSTERECTOMY      HX TUBAL LIGATION        Family History   Problem Relation Age of Onset    HIV/AIDS Brother     Diabetes Father     Cancer Brother     Hypertension Sister     Diabetes Brother     Hypertension Sister     Hypertension Sister     Hypertension Brother      Social History     Tobacco Use    Smoking status: Former Smoker     Packs/day: 0.00     Last attempt to quit: 6/28/2016     Years since quitting: 3.7    Smokeless tobacco: Former User     Quit date: 02/2016   Substance Use Topics    Alcohol use: No     Alcohol/week: 10.0 standard drinks     Types: 10 Cans of beer per week      Prior to Admission medications    Medication Sig Start Date End Date Taking? Authorizing Provider   psyllium (METAMUCIL) powd Take  by mouth daily.    Yes Provider, Historical   nabumetone (RELAFEN) 500 mg tablet TAKE 1 TABLET BY MOUTH TWICE DAILY 2/20/20  Yes Wilmer Pinto NP   ibuprofen 200 mg cap Take 1 Tab by mouth as needed for Pain. Yes Provider, Historical   Omeprazole delayed release (PRILOSEC D/R) 20 mg tablet Take 20 mg by mouth daily. Yes Provider, Historical   fluticasone propionate (FLONASE) 50 mcg/actuation nasal spray 2 Sprays by Both Nostrils route daily. Indications: inflammation of the nose due to an allergy 12/19/19  Yes Mariusz Maria NP   hydrOXYzine HCl (ATARAX) 25 mg tablet Take 2 Tabs by mouth nightly. Yes Provider, Historical   predniSONE (DELTASONE) 20 mg tablet Take 20 mg by mouth daily. Yes Provider, Historical   biotin (VITAMIN B7) 5 mg tablet Take 5 mg by mouth daily. Yes Provider, Historical   nortriptyline (PAMELOR) 25 mg capsule Take 1 Cap by mouth nightly. May increase to 2 pills in 3 days 5/30/19  Yes Marty Colunga MD   Shower Chair XX jozef As needed for patient safety 9/12/19   Rick Yadav MD   Bedside Commode XX Please provide a commode for patient safety 9/12/19   Rick Yadav MD   OTHER Please provide a tub transfer bench 9/6/19   Mariusz Maria NP   Comp Stocking,Knee,Long,Medium misc Wear daily while walking to prevent swelling 5/19/18   Preethi Hirsch MD        Allergies   Allergen Reactions    Naproxen Shortness of Breath    Shellfish Derived Nausea and Vomiting     SEVERE NAUSEA AND VOMITING    Amoxicillin Rash and Nausea Only       Review of Systems:  A comprehensive review of systems was negative except for that written in the History of Present Illness. Objective:     Vitals:    03/10/20 1227 03/11/20 1027   Weight: 69.9 kg (154 lb) 70.8 kg (156 lb)   Height: 5' 4\" (1.626 m)         Physical Exam:  GENERAL: alert, cooperative, no distress, appears stated age  LUNG: clear to auscultation bilaterally  HEART: regular rate and rhythm, S1, S2 normal, no murmur, click, rub or gallop  ABDOMEN: soft, non-tender. Bowel sounds normal. No masses,  no organomegaly  NEUROLOGIC: alert & oriented x 3    Assessment:     1. Reflux  2.  Epigastric pain  3. Dysphagia    Plan:     1. EGD    Signed By: Eron Viera MD     March 13, 2020      Eron Viera MD  [facility], [address] [place] [address] - [phone]  [email or web address]

## 2020-03-13 NOTE — DISCHARGE INSTRUCTIONS
Esophageal Dilation: What to Expect at [address] [place] [address]  After you have esophageal dilation, you will stay at the hospital or surgery center for 1 to 2 hours. This will allow the medicine to wear off. You will be able to go home after your doctor or nurse checks to make sure you are not having any problems. This care sheet gives you a general idea about how long it will take for you to recover. But each person recovers at a different pace. Follow the steps below to get better as quickly as possible. How can you care for yourself at home? Activity    · Rest as much as you need to after you go home.     · You should be able to go back to your usual activities the day after the procedure. Diet    · Follow your doctor's directions for eating after the procedure.     · Drink plenty of fluids (unless your doctor has told you not to). Medicines    · Your doctor will tell you if and when you can restart your medicines. He or she will also give you instructions about taking any new medicines.     · If you take aspirin or some other blood thinner, ask your doctor if and when to start taking it again. Make sure that you understand exactly what your doctor wants you to do.     · If you have a sore throat the day after the procedure, use an over-the-counter spray to numb your throat. Sucking on throat lozenges and gargling with warm salt water may also help relieve your symptoms. Follow-up care is a key part of your treatment and safety. Be sure to make and go to all appointments, and call your doctor if you are having problems. It's also a good idea to know your test results and keep a list of the medicines you take. When should you call for help? Call 911 anytime you think you may need emergency care.  For example, call if:    · You passed out (lost consciousness).     · You have trouble breathing.     · Your stools are maroon or very bloody    Call your doctor now or seek immediate medical care if:    · You have new or worse belly pain.     · You have a fever.     · You have new or more blood in your stools.     · You are sick to your stomach and cannot drink fluids.     · You cannot pass stools or gas.     · You have pain that does not get better after you take pain medicine.    Watch closely for changes in your health, and be sure to contact your doctor if:    · Your throat still hurts after a day or two.     · You do not get better as expected. Where can you learn more? Go to http://lazarus-delano.info/  Enter J014 in the search box to learn more about \"Esophageal Dilation: What to Expect at Home. \"  Current as of: August 11, 2019Content Version: 12.4  © 6200-4779 Healthwise, Incorporated. Care instructions adapted under license by [employer] (which disclaims liability or warranty for this information). If you have questions about a medical condition or this instruction, always ask your healthcare professional. [place] [address] any warranty or liability for your use of this information. Gastritis: Care Instructions  Your Care Instructions    Gastritis is a sore and upset stomach. It happens when something irritates the stomach lining. Many things can cause it. These include an infection such as the flu or something you ate or drank. Medicines or a sore on the lining of the stomach (ulcer) also can cause it. Your belly may bloat and ache. You may belch, vomit, and feel sick to your stomach. You should be able to relieve the problem by taking medicine. And it may help to change your diet. If gastritis lasts, your doctor may prescribe medicine. Follow-up care is a key part of your treatment and safety. Be sure to make and go to all appointments, and call your doctor if you are having problems. It's also a good idea to know your test results and keep a list of the medicines you take. How can you care for yourself at home?   · If your doctor prescribed antibiotics, take them as directed. Do not stop taking them just because you feel better. You need to take the full course of antibiotics. · Be safe with medicines. If your doctor prescribed medicine to decrease stomach acid, take it as directed. Call your doctor if you think you are having a problem with your medicine. · Do not take any other medicine, including over-the-counter pain relievers, without talking to your doctor first.  · If your doctor recommends over-the-counter medicine to reduce stomach acid, such as Pepcid AC, Prilosec, Tagamet HB, or Zantac 75, follow the directions on the label. · Drink plenty of fluids (enough so that your urine is light yellow or clear like water) to prevent dehydration. Choose water and other caffeine-free clear liquids. If you have kidney, heart, or liver disease and have to limit fluids, talk with your doctor before you increase the amount of fluids you drink. · Limit how much alcohol you drink. · Avoid coffee, tea, cola drinks, chocolate, and other foods with caffeine. They increase stomach acid. When should you call for help? Call 911 anytime you think you may need emergency care. For example, call if:    · You vomit blood or what looks like coffee grounds.     · You pass maroon or very bloody stools.    Call your doctor now or seek immediate medical care if:    · You start breathing fast and have not produced urine in the last 8 hours.     · You cannot keep fluids down.    Watch closely for changes in your health, and be sure to contact your doctor if:    · You do not get better as expected. Where can you learn more? Go to http://lazarus-delano.info/  Enter Z536 in the search box to learn more about \"Gastritis: Care Instructions. \"  Current as of: August 11, 2019Content Version: 12.4  © 0838-5224 Healthwise, Incorporated.   Care instructions adapted under license by [employer] (which disclaims liability or warranty for this information). If you have questions about a medical condition or this instruction, always ask your healthcare professional. [address] any warranty or liability for your use of this information. Learning About Schatzki's Ring  What is Schatzki's ring? A Schatzki's ring is a ring of tissue that forms inside the esophagus, the tube that carries food and liquid to your stomach. This ring makes the esophagus narrow in one area, close to where it meets the stomach. It can make it hard to swallow. You may feel like food gets stuck in your esophagus. Doctors aren't sure exactly what causes these rings. The ring is also something you can be born with. How is Schatzki's ring diagnosed? Your doctor may check your esophagus if you are having trouble swallowing or if you feel like food is getting stuck. The doctor will use a tool called an endoscope, or scope. It's a thin, flexible, lighted viewing tool. It goes into the mouth and down the throat. Your doctor can use it to check for any problems. The scope can also be used to take a sample of tissue to test (biopsy). You might need an X-ray. For the X-ray, you may need to swallow a substance, such as barium, that makes it easier to see what happens in your esophagus. How is Schatzki's ring treated? A Schatzki's ring is usually treated with a procedure called esophageal dilation. Dilation can open up narrow areas of the esophagus. Before the procedure, you will get medicines through a needle in your vein (IV) in your arm or hand. These medicines reduce pain and will make you feel relaxed and drowsy. Your throat will also be numbed. You may not remember much about the treatment. The doctor will guide a balloon or a plastic tool for widening (dilator) down your throat and into your esophagus. The dilator is used to widen any narrow area. To guide the dilator, the doctor may use a scope.  Or he or she may use a thin wire as a guide.  After the procedure, you will be observed for 1 to 2 hours until the medicines wear off. If your throat was numbed before the test, you should not eat or drink until your throat is no longer numb. When you are fully recovered, you can go home. You will not be able to drive or operate machinery for 12 hours after the test. Your doctor will tell you when you can go back to your usual diet and activities. Do not drink alcohol for 12 to 24 hours after the test.  You may still need to treat some symptoms of GERD. Your doctor may give you information about that. Follow-up care is a key part of your treatment and safety. Be sure to make and go to all appointments, and call your doctor if you are having problems. It's also a good idea to know your test results and keep a list of the medicines you take. Where can you learn more? Go to http://lazarus-delano.info/  Enter K390 in the search box to learn more about \"Learning About Schatzki's Ring. \"  Current as of: August 11, 2019Content Version: 12.4  © 4736-5551 [employer]. Care instructions adapted under license by [employer] (which disclaims liability or warranty for this information). If you have questions about a medical condition or this instruction, always ask your healthcare professional. [address] any warranty or liability for your use of this information. Hiatal Hernia: Care Instructions  Your Care Instructions  A hiatal hernia occurs when part of the stomach bulges into the chest cavity. A hiatal hernia may allow stomach acid and juices to back up into the esophagus (acid reflux). This can cause a feeling of burning, warmth, heat, or pain behind the breastbone. This feeling may often occur after you eat, soon after you lie down, or when you bend forward, and it may come and go. You also may have a sour taste in your mouth.  These symptoms are commonly known as heartburn or reflux. But not all hiatal hernias cause symptoms. Follow-up care is a key part of your treatment and safety. Be sure to make and go to all appointments, and call your doctor if you are having problems. It's also a good idea to know your test results and keep a list of the medicines you take. How can you care for yourself at home? · Take your medicines exactly as prescribed. Call your doctor if you think you are having a problem with your medicine. · Do not take aspirin or other nonsteroidal anti-inflammatory drugs (NSAIDs), such as ibuprofen (Advil, Motrin) or naproxen (Aleve), unless your doctor says it is okay. Ask your doctor what you can take for pain. · Your doctor may recommend over-the-counter medicine. For mild or occasional indigestion, antacids such as Tums, Gaviscon, Maalox, or Mylanta may help. Your doctor also may recommend over-the-counter acid reducers, such as famotidine (Pepcid AC), cimetidine (Tagamet HB), ranitidine (Zantac 75 and Zantac 150), or omeprazole (Prilosec). Read and follow all instructions on the label. If you use these medicines often, talk with your doctor. · Change your eating habits. ? It's best to eat several small meals instead of two or three large meals. ? After you eat, wait 2 to 3 hours before you lie down. Late-night snacks aren't a good idea. ? Chocolate, mint, and alcohol can make heartburn worse. They relax the valve between the esophagus and the stomach. ? Spicy foods, foods that have a lot of acid (like tomatoes and oranges), and coffee can make heartburn symptoms worse in some people. If your symptoms are worse after you eat a certain food, you may want to stop eating that food to see if your symptoms get better. · Do not smoke or chew tobacco.  · If you get heartburn at night, raise the head of your bed 6 to 8 inches by putting the frame on blocks or placing a foam wedge under the head of your mattress.  (Adding extra pillows does not work.)  · Do not wear tight clothing around your middle. · Lose weight if you need to. Losing just 5 to 10 pounds can help. When should you call for help? Call your doctor now or seek immediate medical care if:    · You have new or worse belly pain.     · You are vomiting.    Watch closely for changes in your health, and be sure to contact your doctor if:    · You have new or worse symptoms of indigestion.     · You have trouble or pain swallowing.     · You are losing weight.     · You do not get better as expected. Where can you learn more? Go to http://lazarus-delano.info/  Enter T074 in the search box to learn more about \"Hiatal Hernia: Care Instructions. \"  Current as of: August 11, 2019Content Version: 12.4  © 0516-5943 Healthwise, Incorporated. Care instructions adapted under license by "[employer] (which disclaims liability or warranty for this information). If you have questions about a medical condition or this instruction, always ask your healthcare professional. [place] [address] any warranty or liability for your use of this information. Upper GI Endoscopy: What to Expect at [address] [place] [address]  After you have an endoscopy, you will stay at the hospital or clinic for 1 to 2 hours. This will allow the medicine to wear off. You will be able to go home after your doctor or nurse checks to make sure you are not having any problems. You may have to stay overnight if you had treatment during the test. You may have a sore throat for a day or two after the test.  This care sheet gives you a general idea about what to expect after the test.  How can you care for yourself at home? Activity  · Rest as much as you need to after you go home. · You should be able to go back to your usual activities the day after the test.  Diet  · Follow your doctor's directions for eating after the test.  · Drink plenty of fluids (unless your doctor has told you not to).   Medications  · If you have a sore throat the day after the test, use an over-the-counter spray to numb your throat. Follow-up care is a key part of your treatment and safety. Be sure to make and go to all appointments, and call your doctor if you are having problems. It's also a good idea to know your test results and keep a list of the medicines you take. When should you call for help? Call 911 anytime you think you may need emergency care. For example, call if:    · You passed out (loses consciousness).     · You have trouble breathing.     · You pass maroon or bloody stools.    Call your doctor now or seek immediate medical care if:    · You have pain that does not get better after your take pain medicine.     · You have new or worse belly pain.     · You have blood in your stools.     · You are sick to your stomach and cannot keep fluids down.     · You have a fever.     · You cannot pass stools or gas.    Watch closely for changes in your health, and be sure to contact your doctor if:    · Your throat still hurts after a day or two.     · You do not get better as expected. Where can you learn more? Go to http://lazarus-delano.info/  Enter J454 in the search box to learn more about \"Upper GI Endoscopy: What to Expect at Home. \"  Current as of: August 11, 2019Content Version: 12.4  © 7850-5998 Healthwise, Incorporated. Care instructions adapted under license by [employer] (which disclaims liability or warranty for this information). If you have questions about a medical condition or this instruction, always ask your healthcare professional. [place] [address] any warranty or liability for your use of this information.       DISCHARGE SUMMARY from Nurse    PATIENT INSTRUCTIONS:    After general anesthesia or intravenous sedation, for 24 hours or while taking prescription Narcotics:  · Limit your activities  · Do not drive and operate hazardous machinery  · Do not make important personal or business decisions  · Do  not drink alcoholic beverages  · If you have not urinated within 8 hours after discharge, please contact your surgeon on call. Report the following to your surgeon:  · Excessive pain, swelling, redness or odor of or around the surgical area  · Temperature over 100.5  · Nausea and vomiting lasting longer than 4 hours or if unable to take medications  · Any signs of decreased circulation or nerve impairment to extremity: change in color, persistent  numbness, tingling, coldness or increase pain  · Any questions    What to do at Home:  Recommended activity: Activity as tolerated and no driving for today. *  Please give a list of your current medications to your Primary Care Provider. *  Please update this list whenever your medications are discontinued, doses are      changed, or new medications (including over-the-counter products) are added. *  Please carry medication information at all times in case of emergency situations. These are general instructions for a healthy lifestyle:    No smoking/ No tobacco products/ Avoid exposure to second hand smoke  Surgeon General's Warning:  Quitting smoking now greatly reduces serious risk to your health. Obesity, smoking, and sedentary lifestyle greatly increases your risk for illness    A healthy diet, regular physical exercise & weight monitoring are important for maintaining a healthy lifestyle    You may be retaining fluid if you have a history of heart failure or if you experience any of the following symptoms:  Weight gain of 3 pounds or more overnight or 5 pounds in a week, increased swelling in our hands or feet or shortness of breath while lying flat in bed. Please call your doctor as soon as you notice any of these symptoms; do not wait until your next office visit. The discharge information has been reviewed with the patient. The patient verbalized understanding.   Discharge medications reviewed with the patient and appropriate educational materials and side effects teaching were provided.   ___________________________________________________________________________________________________________________________________

## 2020-03-13 NOTE — ANESTHESIA POSTPROCEDURE EVALUATION
Procedure(s):  UPPER ENDOSCOPY with bx's and dilation. MAC    Anesthesia Post Evaluation      Multimodal analgesia: multimodal analgesia used between 6 hours prior to anesthesia start to PACU discharge  Patient location during evaluation: bedside  Patient participation: complete - patient participated  Level of consciousness: awake  Pain management: adequate  Airway patency: patent  Anesthetic complications: no  Cardiovascular status: stable  Respiratory status: acceptable  Hydration status: acceptable  Post anesthesia nausea and vomiting:  controlled      Vitals Value Taken Time   /91 3/13/2020 11:17 AM   Temp 36.6 °C (97.9 °F) 3/13/2020 11:08 AM   Pulse 83 3/13/2020 11:23 AM   Resp 15 3/13/2020 11:23 AM   SpO2 100 % 3/13/2020 11:23 AM   Vitals shown include unvalidated device data.

## 2020-03-13 NOTE — PROCEDURES
[email or web address]  [phone]        Brief Procedure Note    Jackson Canales  1965  [id number]    Date of Procedure: 3/13/2020    Preoperative diagnosis: -Epigastric pain, GERD, dysphagia    Postoperative diagnosis: Gastritis - biopsied, Hiatal hernia, Schatzki's ring - dilation with [address]    Description of Findings: same    Sedation/Anesthesia: Monitored Anesthesia Care; See Anesthesia Note    Procedure: Procedure(s):  UPPER ENDOSCOPY with bx's and dilation    :  Dr. King Baker MD    Assistant(s): Endoscopy Technician-1: Bere Parnell  Endoscopy RN-1: Allen Middleton RN; Arnoldo Mata  Endoscopy RN-2: Cierra Barton RN    EBL:None    Specimens:   ID Type Source Tests Collected by Time Destination   1 :  Body & antrum bx's Preservative Stomach  Selena Barbosa MD 3/13/2020 1055 Pathology       Findings: See printed and scanned procedure note    Complications: None    Tissue Implant Device: None    Dr. King Baker MD  3/13/2020  11:01 AM    King Baker MD  [facility] Shannan [place] 1949, [address] [facility] [phone]  [email or web address]

## 2020-03-24 ENCOUNTER — OFFICE VISIT (OUTPATIENT)
Dept: FAMILY MEDICINE CLINIC | Age: 55
End: 2020-03-24

## 2020-03-24 VITALS
SYSTOLIC BLOOD PRESSURE: 134 MMHG | TEMPERATURE: 98.7 F | WEIGHT: 158 LBS | DIASTOLIC BLOOD PRESSURE: 68 MMHG | BODY MASS INDEX: 26.98 KG/M2 | RESPIRATION RATE: 20 BRPM | HEIGHT: 64 IN | OXYGEN SATURATION: 98 % | HEART RATE: 70 BPM

## 2020-03-24 DIAGNOSIS — G89.29 CHRONIC LEFT-SIDED LOW BACK PAIN WITH LEFT-SIDED SCIATICA: ICD-10-CM

## 2020-03-24 DIAGNOSIS — R60.0 BILATERAL EDEMA OF LOWER EXTREMITY: Primary | ICD-10-CM

## 2020-03-24 DIAGNOSIS — M54.42 CHRONIC LEFT-SIDED LOW BACK PAIN WITH LEFT-SIDED SCIATICA: ICD-10-CM

## 2020-03-24 RX ORDER — NABUMETONE 500 MG/1
TABLET, FILM COATED ORAL
Qty: 60 TAB | Refills: 0 | Status: SHIPPED | OUTPATIENT
Start: 2020-03-24 | End: 2020-06-08 | Stop reason: SDUPTHER

## 2020-03-24 NOTE — LETTER
Housing Accomodation 3/24/2020 3:58 PM 
 
Ms. Celine Molina [address] [facility] [address] [phone] To Whom It May Concern: 
 
Celine Molina is currently under the care of [address] This patient requires housing with one level secondary to her medical dagnosis. If there are questions or concerns please have the patient contact our office.  
 
 
 
Sincerely, 
 
 
 
 
Rea Dempsey NP

## 2020-03-24 NOTE — PROGRESS NOTES
Chief Complaint   Patient presents with    Swelling     left and right leg swelling pt states \" that she has had swelling off and on since October of last year\"

## 2020-04-22 ENCOUNTER — VIRTUAL VISIT (OUTPATIENT)
Dept: FAMILY MEDICINE CLINIC | Facility: CLINIC | Age: 55
End: 2020-04-22

## 2020-04-22 DIAGNOSIS — J30.89 PERENNIAL ALLERGIC RHINITIS: Primary | ICD-10-CM

## 2020-04-22 RX ORDER — LORATADINE 10 MG/1
10 TABLET ORAL DAILY
Qty: 30 TAB | Refills: 1 | Status: SHIPPED | OUTPATIENT
Start: 2020-04-22 | End: 2020-06-08 | Stop reason: SDUPTHER

## 2020-04-22 NOTE — PROGRESS NOTES
Consent: Librado Singleton, who was seen by synchronous (real-time) audio-video technology, and/or her healthcare decision maker, is aware that this patient-initiated, Telehealth encounter on 4/22/2020 is a billable service, with coverage as determined by her insurance carrier. She is aware that she may receive a bill and has provided verbal consent to proceed: Yes. Assessment & Plan:   Diagnoses and all orders for this visit:    1. Perennial allergic rhinitis  -     loratadine (Claritin) 10 mg tablet; Take 1 Tab by mouth daily. Follow-up and Dispositions    · Return in about 3 months (around 7/22/2020). I spent at least 15 minutes with this established patient, and >50% of the time was spent counseling and/or coordinating care regarding medical evaluation, treatment plan, follow-up care  712  Subjective:   Librado Singleton is a 62 y.o. female who was seen for No chief complaint on file. The patient presents for an Audio-visual teleconference appointment for   Complaints of bilateral eye blurriness-scheduled to have Cataract extraction surgery but surgery was rescheduled secondary to the pandemic. She was given rx for eye drops rxs bu the ophthalmologist.  She is complaining of seasonal allergies. She is complaining of nasal congestion and rhinorrhea. Prior to Admission medications    Medication Sig Start Date End Date Taking? Authorizing Provider   loratadine (Claritin) 10 mg tablet Take 1 Tab by mouth daily. 4/22/20  Yes Josefa Warren NP   nabumetone (RELAFEN) 500 mg tablet TAKE 1 TABLET BY MOUTH TWICE DAILY 3/24/20  Yes Josefa Warren NP   psyllium (METAMUCIL) powd Take  by mouth daily. Yes Provider, Historical   Omeprazole delayed release (PRILOSEC D/R) 20 mg tablet Take 20 mg by mouth daily. Yes Provider, Historical   fluticasone propionate (FLONASE) 50 mcg/actuation nasal spray 2 Sprays by Both Nostrils route daily.  Indications: inflammation of the nose due to an allergy 12/19/19  Yes Darwin Abarca NP   Shower Chair XX jozef As needed for patient safety 9/12/19  Yes Arnaud Quintana MD   Bedside Commode XX Please provide a commode for patient safety 9/12/19  Yes Arnaud Quintana MD   predniSONE (DELTASONE) 20 mg tablet Take 20 mg by mouth daily. Yes Provider, Historical   biotin (VITAMIN B7) 5 mg tablet Take 5 mg by mouth daily. Yes Provider, Historical   ibuprofen 200 mg cap Take 1 Tab by mouth as needed for Pain. Provider, Historical   OTHER Please provide a tub transfer bench 9/6/19   Darwin Abarca NP   hydrOXYzine HCl (ATARAX) 25 mg tablet Take 2 Tabs by mouth nightly. 4/22/20  Provider, Historical   nortriptyline (PAMELOR) 25 mg capsule Take 1 Cap by mouth nightly.  May increase to 2 pills in 3 days 5/30/19   Neelima Asencio MD   Comp Stocking,Knee,Long,Medium misc Wear daily while walking to prevent swelling 5/19/18   Gudelia Weinstein MD     Allergies   Allergen Reactions    Naproxen Shortness of Breath    Shellfish Derived Nausea and Vomiting     SEVERE NAUSEA AND VOMITING    Amoxicillin Rash and Nausea Only       Patient Active Problem List   Diagnosis Code    Microscopic hematuria R31.29    Rectal bleeding K62.5    Chronic pain G89.29    Diffuse cystic mastopathy N60.19    Paranoid schizophrenia ([address] F20.0    Chronic left-sided low back pain with left-sided sciatica M54.42, G89.29    Spells of decreased attentiveness R68.89    Unsteady gait R26.81    Perennial allergic rhinitis J30.89    Multiple sclerosis ([address] G35     Patient Active Problem List    Diagnosis Date Noted    Multiple sclerosis ([address] 07/29/2019    Chronic left-sided low back pain with left-sided sciatica 09/26/2018    Spells of decreased attentiveness 09/26/2018    Unsteady gait 09/26/2018    Perennial allergic rhinitis 09/26/2018    Paranoid schizophrenia ([address] 06/15/2018    Diffuse cystic mastopathy 12/10/2015    Rectal bleeding 11/11/2014    Chronic pain 11/11/2014    Microscopic hematuria      Current Outpatient Medications   Medication Sig Dispense Refill    loratadine (Claritin) 10 mg tablet Take 1 Tab by mouth daily. 30 Tab 1    nabumetone (RELAFEN) 500 mg tablet TAKE 1 TABLET BY MOUTH TWICE DAILY 60 Tab 0    psyllium (METAMUCIL) powd Take  by mouth daily.  Omeprazole delayed release (PRILOSEC D/R) 20 mg tablet Take 20 mg by mouth daily.  fluticasone propionate (FLONASE) 50 mcg/actuation nasal spray 2 Sprays by Both Nostrils route daily. Indications: inflammation of the nose due to an allergy 1 Bottle 11    Shower Chair XX jozef As needed for patient safety 1 Each 0    Bedside Commode XX Please provide a commode for patient safety 1 Each 0    predniSONE (DELTASONE) 20 mg tablet Take 20 mg by mouth daily.  biotin (VITAMIN B7) 5 mg tablet Take 5 mg by mouth daily.  ibuprofen 200 mg cap Take 1 Tab by mouth as needed for Pain.  OTHER Please provide a tub transfer bench 1 Each 0    nortriptyline (PAMELOR) 25 mg capsule Take 1 Cap by mouth nightly.  May increase to 2 pills in 3 days 60 Cap 1    Comp Stocking,Knee,Long,Medium misc Wear daily while walking to prevent swelling 1 Each 0     Allergies   Allergen Reactions    Naproxen Shortness of Breath    Shellfish Derived Nausea and Vomiting     SEVERE NAUSEA AND VOMITING    Amoxicillin Rash and Nausea Only     Past Medical History:   Diagnosis Date    Chest pain 11/2014    neg EKG, non recurrent    Chronic pain     lower back and legs    Depression     Eczema     Fibroids     GERD (gastroesophageal reflux disease)     Headache(784.0)     Hiatal hernia     IBS (irritable bowel syndrome)     Microscopic hematuria     MS (multiple sclerosis) (HCC)     Rectal bleeding     Stool color black     irritable bowel     Past Surgical History:   Procedure Laterality Date    COLONOSCOPY,DIAGNOSTIC      HX BREAST BIOPSY Right 1/21/2015    RIGHT BREAST BIOPSY WITH NEEDLE LOCALIZATION ULTRASOUND performed by Dulce Emery MD at [facility] MAIN OR    HX CHOLECYSTECTOMY      HX GI      HX HYSTERECTOMY      HX TUBAL LIGATION         ROS    Constitutional: No apparent distress noted  General- negative for fever, chills or fatigue  Eyes- negative visual changes  Nose- nasal congestion  CV- denies chest pain, palpitation  Pul: negative cough or SOB  GI: negative nausea, flank pain, diarrhea, constipation  Urinary:- No dysuria or polyuria  MS- negative myalgia, negative joint pain  Neuro- negative headache, dizziness or weakness  Skin- negative for rashes or lesions.       Objective:   Vital Signs: (As obtained by patient/caregiver at home)  Visit Vitals  LMP  (LMP Unknown)        [INSTRUCTIONS:  \"[x]\" Indicates a positive item  \"[]\" Indicates a negative item  -- DELETE ALL ITEMS NOT EXAMINED]    Constitutional: [x] Appears well-developed and well-nourished [x] No apparent distress      [] Abnormal -     Mental status: [x] Alert and awake  [x] Oriented to person/place/time [x] Able to follow commands    [] Abnormal -     Eyes:   EOM    [x]  Normal    [] Abnormal -   Sclera  [x]  Normal    [] Abnormal -          Discharge [x]  None visible   [] Abnormal -     HENT: [x] Normocephalic, atraumatic  [] Abnormal -   [x] Mouth/Throat: Mucous membranes are moist    External Ears [x] Normal  [] Abnormal -    Neck: [x] No visualized mass [] Abnormal -     Pulmonary/Chest: [x] Respiratory effort normal   [x] No visualized signs of difficulty breathing or respiratory distress        [] Abnormal -      Musculoskeletal:   [x] Normal gait with no signs of ataxia         [x] Normal range of motion of neck        [] Abnormal -     Neurological:        [x] No Facial Asymmetry (Cranial nerve 7 motor function) (limited exam due to video visit)          [x] No gaze palsy        [] Abnormal -          Skin:        [x] No significant exanthematous lesions or discoloration noted on facial skin         [] Abnormal -            Psychiatric:       [x] Normal Affect [] Abnormal -        [x] No Hallucinations    Other pertinent observable physical exam findings:-        We discussed the expected course, resolution and complications of the diagnosis(es) in detail. Medication risks, benefits, costs, interactions, and alternatives were discussed as indicated. I advised her to contact the office if her condition worsens, changes or fails to improve as anticipated. She expressed understanding with the diagnosis(es) and plan. Janett Brasher is a 62 y.o. female being evaluated by a video visit encounter for concerns as above. A caregiver was present when appropriate. Due to this being a TeleHealth encounter (During [facility] public [facility] emergency), evaluation of the following organ systems was limited: Vitals/Constitutional/EENT/Resp/CV/GI//MS/Neuro/Skin/Heme-Lymph-Imm. Pursuant to the emergency declaration under the [facility], [facility] waiver authority and the [employer] Act, this Virtual  Visit was conducted, with patient's (and/or legal guardian's) consent, to reduce the patient's risk of exposure to COVID-19 and provide necessary medical care. Services were provided through a video synchronous discussion virtually to substitute for in-person clinic visit. Patient and provider were located at their individual homes.         Dina Deutsch NP

## 2020-04-29 NOTE — PROGRESS NOTES
Chirag Brown is a 62 y.o. female presenting today for Swelling (left and right leg swelling pt states \" that she has had swelling off and on since October of last year\" )      HPI:  Chirag Brown presents to the office today for complaints of bilateral lower extremity edema. She is also complaining of thoracic back pain with radiculopathy to the LLE. She is ambulating with a walker and has a history of MS.      ROS  Constitutional: No apparent distress noted  General- negative for fever, chills or fatigue  Eyes- negative visual changes  CV- complaints of lower extremity edema  Pul: negative cough or SOB  GI: negative nausea, flank pain, diarrhea, constipation  Urinary:- No dysuria or polyuria  MS- + back pain  Neuro- negative headache, dizziness or weakness  Skin- negative for rashes or lesions. Allergies   Allergen Reactions    Naproxen Shortness of Breath    Shellfish Derived Nausea and Vomiting     SEVERE NAUSEA AND VOMITING    Amoxicillin Rash and Nausea Only       Current Outpatient Medications   Medication Sig Dispense Refill    nabumetone (RELAFEN) 500 mg tablet TAKE 1 TABLET BY MOUTH TWICE DAILY 60 Tab 0    psyllium (METAMUCIL) powd Take  by mouth daily.  ibuprofen 200 mg cap Take 1 Tab by mouth as needed for Pain.  Omeprazole delayed release (PRILOSEC D/R) 20 mg tablet Take 20 mg by mouth daily.  fluticasone propionate (FLONASE) 50 mcg/actuation nasal spray 2 Sprays by Both Nostrils route daily. Indications: inflammation of the nose due to an allergy 1 Bottle 11    Shower Chair XX jozef As needed for patient safety 1 Each 0    Bedside Commode XX Please provide a commode for patient safety 1 Each 0    OTHER Please provide a tub transfer bench 1 Each 0    predniSONE (DELTASONE) 20 mg tablet Take 20 mg by mouth daily.  biotin (VITAMIN B7) 5 mg tablet Take 5 mg by mouth daily.  nortriptyline (PAMELOR) 25 mg capsule Take 1 Cap by mouth nightly. May increase to 2 pills in 3 days 60 Cap 1    Comp Stocking,Knee,Long,Medium misc Wear daily while walking to prevent swelling 1 Each 0    loratadine (Claritin) 10 mg tablet Take 1 Tab by mouth daily.  27 Tab 1       Past Medical History:   Diagnosis Date    Chest pain 11/2014    neg EKG, non recurrent    Chronic pain     lower back and legs    Depression     Eczema     Fibroids     GERD (gastroesophageal reflux disease)     Headache(784.0)     Hiatal hernia     IBS (irritable bowel syndrome)     Microscopic hematuria     MS (multiple sclerosis) (HCC)     Rectal bleeding     Stool color black     irritable bowel       Past Surgical History:   Procedure Laterality Date    COLONOSCOPY,DIAGNOSTIC      HX BREAST BIOPSY Right 1/21/2015    RIGHT BREAST BIOPSY WITH NEEDLE LOCALIZATION ULTRASOUND performed by Magno Melendez MD at [facility] MAIN OR    HX CHOLECYSTECTOMY      HX GI      HX HYSTERECTOMY      HX TUBAL LIGATION         Social History     Socioeconomic History    Marital status: SINGLE     Spouse name: Not on file    Number of children: Not on file    Years of education: Not on file    Highest education level: Not on file   Occupational History    Not on file   Social Needs    Financial resource strain: Not on file    Food insecurity     Worry: Not on file     Inability: Not on file    Transportation needs     Medical: Not on file     Non-medical: Not on file   Tobacco Use    Smoking status: Former Smoker     Packs/day: 0.00     Last attempt to quit: 6/28/2016     Years since quitting: 3.8    Smokeless tobacco: Former User     Quit date: 02/2016   Substance and Sexual Activity    Alcohol use: No     Alcohol/week: 10.0 standard drinks     Types: 10 Cans of beer per week    Drug use: No    Sexual activity: Yes     Partners: Male   Lifestyle    Physical activity     Days per week: Not on file     Minutes per session: Not on file    Stress: Not on file   Relationships    Social connections     Talks on phone: Not on file     Gets together: Not on file     Attends Christian service: Not on file     Active member of club or organization: Not on file     Attends meetings of clubs or organizations: Not on file     Relationship status: Not on file    Intimate partner violence     Fear of current or ex partner: Not on file     Emotionally abused: Not on file     Physically abused: Not on file     Forced sexual activity: Not on file   Other Topics Concern    Not on file   Social History Narrative    Not on file       Patient does not have an advanced directive on file    Vitals:    03/24/20 1517   BP: 134/68   Pulse: 70   Resp: 20   Temp: 98.7 °F (37.1 °C)   TempSrc: Oral   SpO2: 98%   Weight: 158 lb (71.7 kg)   Height: 5' 4\" (1.626 m)   PainSc:   8   PainLoc: Generalized       Physical Exam  Cardiovascular:      Rate and Rhythm: Normal rate and regular rhythm. Pulses: Normal pulses. Heart sounds: Normal heart sounds. Pulmonary:      Effort: Pulmonary effort is normal.      Breath sounds: Normal breath sounds. Musculoskeletal:         General: Swelling (trace in lower extremities) present. Neurological:      Mental Status: She is alert.                Admission on 02/28/2020, Discharged on 02/28/2020   Component Date Value Ref Range Status    WBC 02/28/2020 5.3  4.6 - 13.2 K/uL Final    RBC 02/28/2020 4.14* 4.20 - 5.30 M/uL Final    HGB 02/28/2020 12.0  12.0 - 16.0 g/dL Final    HCT 02/28/2020 36.0  35.0 - 45.0 % Final    MCV 02/28/2020 87.0  74.0 - 97.0 FL Final    MCH 02/28/2020 29.0  24.0 - 34.0 PG Final    MCHC 02/28/2020 33.3  31.0 - 37.0 g/dL Final    RDW 02/28/2020 14.4  11.6 - 14.5 % Final    PLATELET 33/99/4191 513  135 - 420 K/uL Final    MPV 02/28/2020 10.0  9.2 - 11.8 FL Final    NEUTROPHILS 02/28/2020 47  40 - 73 % Final    LYMPHOCYTES 02/28/2020 44  21 - 52 % Final    MONOCYTES 02/28/2020 7  3 - 10 % Final    EOSINOPHILS 02/28/2020 2  0 - 5 % Final  BASOPHILS 02/28/2020 0  0 - 2 % Final    ABS. NEUTROPHILS 02/28/2020 2.5  1.8 - 8.0 K/UL Final    ABS. LYMPHOCYTES 02/28/2020 2.3  0.9 - 3.6 K/UL Final    ABS. MONOCYTES 02/28/2020 0.4  0.05 - 1.2 K/UL Final    ABS. EOSINOPHILS 02/28/2020 0.1  0.0 - 0.4 K/UL Final    ABS. BASOPHILS 02/28/2020 0.0  0.0 - 0.1 K/UL Final    DF 02/28/2020 AUTOMATED    Final    Sodium 02/28/2020 143  136 - 145 mmol/L Final    Potassium 02/28/2020 4.3  3.5 - 5.5 mmol/L Final    Chloride 02/28/2020 109  100 - 111 mmol/L Final    CO2 02/28/2020 30  21 - 32 mmol/L Final    Anion gap 02/28/2020 4  3.0 - 18 mmol/L Final    Glucose 02/28/2020 93  74 - 99 mg/dL Final    BUN 02/28/2020 10  7.0 - 18 MG/DL Final    Creatinine 02/28/2020 0.77  0.6 - 1.3 MG/DL Final    BUN/Creatinine ratio 02/28/2020 13  12 - 20   Final    GFR est AA 02/28/2020 >60  >60 ml/min/1.73m2 Final    GFR est non-AA 02/28/2020 >60  >60 ml/min/1.73m2 Final    Comment: (NOTE)  Estimated GFR is calculated using the Modification of Diet in Renal   Disease (MDRD) Study equation, reported for both  Americans   (GFRAA) and non- Americans (GFRNA), and normalized to 1.73m2   body surface area. The physician must decide which value applies to   the patient. The MDRD study equation should only be used in   individuals age 25 or older. It has not been validated for the   following: pregnant women, patients with serious comorbid conditions,   or on certain medications, or persons with extremes of body size,   muscle mass, or nutritional status.  Calcium 02/28/2020 9.1  8.5 - 10.1 MG/DL Final    Bilirubin, total 02/28/2020 0.2  0.2 - 1.0 MG/DL Final    ALT (SGPT) 02/28/2020 24  13 - 56 U/L Final    AST (SGOT) 02/28/2020 15  10 - 38 U/L Final    Alk.  phosphatase 02/28/2020 100  45 - 117 U/L Final    Protein, total 02/28/2020 7.9  6.4 - 8.2 g/dL Final    Albumin 02/28/2020 3.8  3.4 - 5.0 g/dL Final    Globulin 02/28/2020 4.1* 2.0 - 4.0 g/dL Final    A-G Ratio 02/28/2020 0.9  0.8 - 1.7   Final    Color 02/28/2020 YELLOW    Final    Appearance 02/28/2020 CLEAR    Final    Specific gravity 02/28/2020 1.016  1.005 - 1.030   Final    pH (UA) 02/28/2020 7.5  5.0 - 8.0   Final    Protein 02/28/2020 NEGATIVE   NEG mg/dL Final    Glucose 02/28/2020 NEGATIVE   NEG mg/dL Final    Ketone 02/28/2020 NEGATIVE   NEG mg/dL Final    Bilirubin 02/28/2020 NEGATIVE   NEG   Final    Blood 02/28/2020 NEGATIVE   NEG   Final    Urobilinogen 02/28/2020 0.2  0.2 - 1.0 EU/dL Final    Nitrites 02/28/2020 NEGATIVE   NEG   Final    Leukocyte Esterase 02/28/2020 NEGATIVE   NEG   Final    Lipase 02/28/2020 294  73 - 393 U/L Final    NT pro-BNP 02/28/2020 39  0 - 900 PG/ML Final    Comment:           For patients with dyspnea, NT proBNP is highly sensitive for detecting acute congestive heart failure. Also, an NT proBNP <300 pg/mL effectively rules out acute congestive heart failure, with 99% negative predictive value. Our reference ranges are for acute dyspnea. Age              Range (pg/ml)        0-49                0-450        50-75               0-900        >75                 0-1800       For ambulatory office patients, the ranges below apply: For patients with dyspnea, NT proBNP is highly sensitive for detecting acute congestive heart failure. Also, an NT proBNP <300 pg/mL effectively rules out acute congestive heart failure, with 99% negative predictive value. Our reference ranges are for acute dyspnea.  D DIMER 02/28/2020 0.27  <0.46 ug/ml(FEU) Final    Comment: (NOTE)  A D-Dimer result less than 0.5 ug/mL FEU combined with a low clinical   pretest probability of DVT and/or PE has a negative predictive value   of %. The positive predictive value is 50% or less.      Hospital Outpatient Visit on 01/06/2020   Component Date Value Ref Range Status    WBC 01/06/2020 5.0  4.5 - 13.0 K/uL Final    RBC 01/06/2020 4.01* 4.10 - 5.10 M/uL Final    HGB 01/06/2020 12.0  12.0 - 16 g/dL Final    HCT 01/06/2020 35.7* 36 - 48 % Final    MCV 01/06/2020 89.0  78 - 102 FL Final    MCH 01/06/2020 29.9  25.0 - 35.0 PG Final    MCHC 01/06/2020 33.6  31 - 37 g/dL Final    RDW 01/06/2020 13.1  11.5 - 14.5 % Final    PLATELET 48/53/1602 308  140 - 440 K/uL Final    NEUTROPHILS 01/06/2020 49  40 - 70 % Final    MIXED CELLS 01/06/2020 2  0.1 - 17 % Final    LYMPHOCYTES 01/06/2020 49* 14 - 44 % Final    ABS. NEUTROPHILS 01/06/2020 2.4  1.8 - 9.5 K/UL Final    ABS. MIXED CELLS 01/06/2020 0.1  0.0 - 2.3 K/uL Final    ABS. LYMPHOCYTES 01/06/2020 2.5  1.1 - 5.9 K/UL Final    Test performed at [address] [place] [address] or Outpatient Infusion Center Location. Reviewed by Medical Director.  DF 01/06/2020 AUTOMATED    Final    Sodium 01/06/2020 142  136 - 145 mmol/L Final    Potassium 01/06/2020 3.9  3.5 - 5.5 mmol/L Final    Chloride 01/06/2020 109  100 - 111 mmol/L Final    CO2 01/06/2020 31  21 - 32 mmol/L Final    Anion gap 01/06/2020 2* 3.0 - 18 mmol/L Final    Glucose 01/06/2020 88  74 - 99 mg/dL Final    BUN 01/06/2020 12  7.0 - 18 MG/DL Final    Creatinine 01/06/2020 0.74  0.6 - 1.3 MG/DL Final    BUN/Creatinine ratio 01/06/2020 16  12 - 20   Final    GFR est AA 01/06/2020 >60  >60 ml/min/1.73m2 Final    GFR est non-AA 01/06/2020 >60  >60 ml/min/1.73m2 Final    Comment: (NOTE)  Estimated GFR is calculated using the Modification of Diet in Renal   Disease (MDRD) Study equation, reported for both  Americans   (GFRAA) and non- Americans (GFRNA), and normalized to 1.73m2   body surface area. The physician must decide which value applies to   the patient. The MDRD study equation should only be used in   individuals age 25 or older.  It has not been validated for the   following: pregnant women, patients with serious comorbid conditions,   or on certain medications, or persons with extremes of body size,   muscle mass, or nutritional status.  Calcium 01/06/2020 9.1  8.5 - 10.1 MG/DL Final    Bilirubin, total 01/06/2020 0.4  0.2 - 1.0 MG/DL Final    ALT (SGPT) 01/06/2020 23  13 - 56 U/L Final    AST (SGOT) 01/06/2020 16  10 - 38 U/L Final    Alk. phosphatase 01/06/2020 83  45 - 117 U/L Final    Protein, total 01/06/2020 7.6  6.4 - 8.2 g/dL Final    Albumin 01/06/2020 3.7  3.4 - 5.0 g/dL Final    Globulin 01/06/2020 3.9  2.0 - 4.0 g/dL Final    A-G Ratio 01/06/2020 0.9  0.8 - 1.7   Final       .No results found for any visits on 03/24/20. Assessment / Plan:      ICD-10-CM ICD-9-CM    1. Bilateral edema of lower extremity R60.0 782.3    2. Chronic left-sided low back pain with left-sided sciatica M54.42 724.2 nabumetone (RELAFEN) 500 mg tablet    G89.29 724.3      338.29      Elevation  Avoidd salty foods  Compression stocking  Refilled Nabumetone      Follow-up and Dispositions    · Return if symptoms worsen or fail to improve. I asked the patient if she  had any questions and answered her  questions. The patient stated that she understands the treatment plan and agrees with the treatment plan    This document was created with a voice activated dictation system and may contain transcription errors.

## 2020-06-08 ENCOUNTER — HOSPITAL ENCOUNTER (OUTPATIENT)
Dept: MRI IMAGING | Age: 55
Discharge: HOME | End: 2020-06-08
Attending: NURSE PRACTITIONER
Payer: MEDICARE

## 2020-06-08 DIAGNOSIS — G35 MS (MULTIPLE SCLEROSIS) (HCC): ICD-10-CM

## 2020-06-08 DIAGNOSIS — J30.89 PERENNIAL ALLERGIC RHINITIS: ICD-10-CM

## 2020-06-08 DIAGNOSIS — G89.29 CHRONIC LEFT-SIDED LOW BACK PAIN WITH LEFT-SIDED SCIATICA: ICD-10-CM

## 2020-06-08 DIAGNOSIS — M54.42 CHRONIC LEFT-SIDED LOW BACK PAIN WITH LEFT-SIDED SCIATICA: ICD-10-CM

## 2020-06-08 PROCEDURE — 70553 MRI BRAIN STEM W/O & W/DYE: CPT

## 2020-06-08 PROCEDURE — A9575 INJ GADOTERATE MEGLUMI 0.1ML: HCPCS | Performed by: NURSE PRACTITIONER

## 2020-06-08 PROCEDURE — 74011250636 HC RX REV CODE- 250/636: Performed by: NURSE PRACTITIONER

## 2020-06-08 RX ORDER — NABUMETONE 500 MG/1
TABLET, FILM COATED ORAL
Qty: 60 TAB | Refills: 0 | Status: SHIPPED | OUTPATIENT
Start: 2020-06-08 | End: 2020-08-03 | Stop reason: SDUPTHER

## 2020-06-08 RX ORDER — LORATADINE 10 MG/1
10 TABLET ORAL DAILY
Qty: 30 TAB | Refills: 1 | Status: SHIPPED | OUTPATIENT
Start: 2020-06-08 | End: 2020-08-03 | Stop reason: SDUPTHER

## 2020-06-08 RX ORDER — GADOTERATE MEGLUMINE 376.9 MG/ML
15 INJECTION INTRAVENOUS
Status: COMPLETED | OUTPATIENT
Start: 2020-06-08 | End: 2020-06-08

## 2020-06-08 RX ADMIN — GADOTERATE MEGLUMINE 13 ML: 376.9 INJECTION INTRAVENOUS at 13:52

## 2020-06-08 NOTE — TELEPHONE ENCOUNTER
Requested Prescriptions     Pending Prescriptions Disp Refills    nabumetone (RELAFEN) 500 mg tablet 60 Tab 0     Sig: TAKE 1 TABLET BY MOUTH TWICE DAILY    loratadine (Claritin) 10 mg tablet 30 Tab 1     Sig: Take 1 Tab by mouth daily.

## 2020-06-09 ENCOUNTER — HOSPITAL ENCOUNTER (OUTPATIENT)
Dept: MRI IMAGING | Age: 55
Discharge: HOME | End: 2020-06-09
Attending: NURSE PRACTITIONER
Payer: MEDICARE

## 2020-06-09 VITALS — WEIGHT: 148 LBS | BODY MASS INDEX: 25.4 KG/M2

## 2020-06-09 DIAGNOSIS — G35 MS (MULTIPLE SCLEROSIS) (HCC): ICD-10-CM

## 2020-06-09 PROCEDURE — 74011250636 HC RX REV CODE- 250/636: Performed by: NURSE PRACTITIONER

## 2020-06-09 PROCEDURE — 72156 MRI NECK SPINE W/O & W/DYE: CPT

## 2020-06-09 PROCEDURE — A9575 INJ GADOTERATE MEGLUMI 0.1ML: HCPCS | Performed by: NURSE PRACTITIONER

## 2020-06-09 RX ORDER — GADOTERATE MEGLUMINE 376.9 MG/ML
13 INJECTION INTRAVENOUS
Status: COMPLETED | OUTPATIENT
Start: 2020-06-09 | End: 2020-06-09

## 2020-06-09 RX ADMIN — GADOTERATE MEGLUMINE 13 ML: 376.9 INJECTION INTRAVENOUS at 13:15

## 2020-06-18 ENCOUNTER — OFFICE VISIT (OUTPATIENT)
Dept: NEUROLOGY | Age: 55
End: 2020-06-18

## 2020-06-18 ENCOUNTER — HOME HEALTH ADMISSION (OUTPATIENT)
Dept: HOME HEALTH SERVICES | Facility: HOME HEALTH | Age: 55
End: 2020-06-18
Payer: MEDICARE

## 2020-06-18 VITALS
OXYGEN SATURATION: 98 % | SYSTOLIC BLOOD PRESSURE: 130 MMHG | RESPIRATION RATE: 20 BRPM | HEIGHT: 64 IN | WEIGHT: 158 LBS | TEMPERATURE: 97.5 F | DIASTOLIC BLOOD PRESSURE: 62 MMHG | HEART RATE: 102 BPM | BODY MASS INDEX: 26.98 KG/M2

## 2020-06-18 DIAGNOSIS — G35 MS (MULTIPLE SCLEROSIS) (HCC): Primary | ICD-10-CM

## 2020-06-18 DIAGNOSIS — R26.89 IMPAIRED GAIT AND MOBILITY: ICD-10-CM

## 2020-06-18 DIAGNOSIS — R41.3 MEMORY DEFICIT: ICD-10-CM

## 2020-06-18 NOTE — PROGRESS NOTES
Marium Zepeda is a 62 y.o. female in office today for follow-up on MS.    1. Have you been to the ER, urgent care clinic since your last visit? Hospitalized since your last visit? Yes Reason for visit: 2/2020 [facility] ED leg and arm swelling, abd pain 3/2020 [facility] Adm gen sx    2. Have you seen or consulted any other health care providers outside of the [address] [place] [address] since your last visit? Include any pap smears or colon screening.  No

## 2020-06-18 NOTE — PROGRESS NOTES
[facility]  [address] [facility], [address], [place], [address]  [address] Roberto Betts, [address]  Office:  [phone]  Fax: [phone]  Chief Complaint   Patient presents with    Follow-up       HPI: Stef Qiu presents in follow-up for multiple sclerosis. She had her MRIs done. She is reporting stress going on in her life. Not happy where she lives and needs to move into a 1 level. She has the cane for mobility today due to her imbalance. Reports some right lower back pain and her neck feels tight. The neck pain is not terrible. Reports she has been forgetting everything. When she is talking she could forget what she is saying when she is talking to her sister or her friends. She lives on her own. Her male friend cooks for her most of the time. She reports that he feels as though he is not a caregiver and he is tired. She sometimes eats once a day. She sees her friend every day. She reports she does not have a home health aide or therapy anymore. She reports she had a fall recently. She does not use the cane all the time. She is supposed to be getting a medical alert button. She has a . She saw the ophthalmologist and was told that she needs surgery.       Past Medical History:   Diagnosis Date    Chest pain 11/2014    neg EKG, non recurrent    Chronic pain     lower back and legs    Depression     Eczema     Fibroids     GERD (gastroesophageal reflux disease)     Headache(784.0)     Hiatal hernia     IBS (irritable bowel syndrome)     Microscopic hematuria     MS (multiple sclerosis) (HCC)     Rectal bleeding     Stool color black     irritable bowel       Past Surgical History:   Procedure Laterality Date    COLONOSCOPY,DIAGNOSTIC      HX BREAST BIOPSY Right 1/21/2015    RIGHT BREAST BIOPSY WITH NEEDLE LOCALIZATION ULTRASOUND performed by Jace Bloom MD at [address] [place] [address] HX CHOLECYSTECTOMY      HX GI  HX HYSTERECTOMY      HX TUBAL LIGATION         Current Outpatient Medications   Medication Sig Dispense Refill    loratadine (Claritin) 10 mg tablet Take 1 Tab by mouth daily. 30 Tab 1    hydrOXYzine HCL (ATARAX) 25 mg tablet Take 50 mg by mouth nightly. tab 1 tp 2 tabs      nabumetone (RELAFEN) 500 mg tablet TAKE 1 TABLET BY MOUTH TWICE DAILY 60 Tab 0    psyllium (METAMUCIL) powd Take  by mouth daily.  ibuprofen 200 mg cap Take 1 Tab by mouth as needed for Pain.  Omeprazole delayed release (PRILOSEC D/R) 20 mg tablet Take 20 mg by mouth daily.  fluticasone propionate (FLONASE) 50 mcg/actuation nasal spray 2 Sprays by Both Nostrils route daily. Indications: inflammation of the nose due to an allergy 1 Bottle 11    Shower Chair XX jozef As needed for patient safety 1 Each 0    Bedside Commode XX Please provide a commode for patient safety 1 Each 0    OTHER Please provide a tub transfer bench 1 Each 0    predniSONE (DELTASONE) 20 mg tablet Take 20 mg by mouth daily.  biotin (VITAMIN B7) 5 mg tablet Take 5 mg by mouth daily.  nortriptyline (PAMELOR) 25 mg capsule Take 1 Cap by mouth nightly.  May increase to 2 pills in 3 days 60 Cap 1    Comp Stocking,Knee,Long,Medium misc Wear daily while walking to prevent swelling 1 Each 0        Allergies   Allergen Reactions    Naproxen Shortness of Breath    Shellfish Derived Nausea and Vomiting     SEVERE NAUSEA AND VOMITING    Amoxicillin Rash and Nausea Only       Social History     Tobacco Use    Smoking status: Former Smoker     Packs/day: 0.00     Last attempt to quit: 6/28/2016     Years since quitting: 3.9    Smokeless tobacco: Former User     Quit date: 02/2016   Substance Use Topics    Alcohol use: No     Alcohol/week: 10.0 standard drinks     Types: 10 Cans of beer per week    Drug use: No       Family History   Problem Relation Age of Onset    HIV/AIDS Brother     Diabetes Father     Cancer Brother     Hypertension Sister     Diabetes Brother     Hypertension Sister     Hypertension Sister     Hypertension Brother        Review of Systems:  GENERAL: Denies fever   CARDIAC: No CP or SOB  PULMONARY: No cough or SOB  MUSCULOSKELETAL: +back pain, neck tightness. NEURO: SEE HPI    Physical Examination:  Visit Vitals  /62 (BP 1 Location: Left arm, BP Patient Position: Sitting)   Pulse (!) 102   Temp 97.5 °F (36.4 °C) (Oral)   Resp 20   Ht 5' 4\" (1.626 m)   Wt 71.7 kg (158 lb)   LMP  (LMP Unknown)   SpO2 98%   BMI 27.12 kg/m²       Alert, in NAD. Heart is regular. Reduced ROM to rotate neck and flex/extend, stiffness left neck. Oriented x3. Fund of knowledge is adequate. Speech is fluent. Speech clear. EOMs are full, PERRL, +nystagmus bilateral. No facial asymmetry. Mild weakness throughout. Tone is normal. Gait is antalgic and appears unsteady, left knee is turned inward, ambulates with cane. MRI BRAIN W WO CONT 6/08/2020:  Result Information     Status: Final result (Exam End: 6/8/2020 14:16) Provider Status: Reviewed   Study Result     MR Brain Without And With Contrast  HISTORY: Multiple sclerosis. Patient complains of unsteady gait. .  COMPARISON: Brain MR and cervical spine MR 11/21/2019. Technique: Brain scanned with axial and sagittal and T2 T1W scans, axial  diffusion weighted images, and post gadolinium axial T1W (13 ml Dotarem injected  intravenously) scans. This scan was performed in accordance with protocol  prescribed by [facility].     FINDINGS:   Visualized upper cervical spine  -Disc herniation impresses ventral cord C3/C4, not closely evaluated     Sella  -Grossly normal     Brain  -No acute stroke. No intracranial mass or hemorrhage.  CSF spaces within range of  normal for age  -MS lesions  --No definite upper cord disease  --Moderate posterior fossa increased T2 signal disease, brainstem and  cerebellum, stable  --Moderate supratentorial disease, most prevalent juxta ventricular, some  lesions juxtacortical. Extensive callosal marginal lesions. Moderately extensive  black old lesions  -No enhancing lesions  -Single new lesion identified, few millimeter left periatrial (5-23).    Paranasal sinuses, mastoids  -Free of significant mucosal disease     Largest vessels  -Flow voids present     Orbits  -Grossly normal        IMPRESSION  IMPRESSION:  1. Moderately heavy burden of white matter lesions consistent with multiple  sclerosis  -No enhancing lesions  -Near stable; a single small new lesion is identified  -Nothing specifically suggesting PML       MRI CERV SPINE W WO CONT 6/9/2020:  Result Information     Status: Final result (Exam End: 6/9/2020 13:25) Provider Status: Reviewed   Study Result     EXAM: MRI CERV SPINE W WO CONT     STUDY DATE: 6/9/2020 1:25 PM     COMPARISON: MRI cervical spine 11/21/2019. MRI brain 6/8/2020.     CLINICAL INDICATION/HISTORY: MS (multiple sclerosis) ([address] [G35 (ICD-10-CM)]     TECHNIQUE: MR imaging of the cervical spine was performed before and after  uneventful administration of 13 mL of Dotarem intravenously.     FINDINGS:     Exam is mildly motion degraded. Again noted are scattered cervical cord lesions:     -Right dorsal cervical medullary junction lesion, axial image 31, sagittal STIR  image 11  - Left lateral cord lesion at the level of C2, sagittal STIR image 8  -Possible right lateral cord T2 hyperintensity at C4-C5 (better appreciated on  the most recent prior; axial 17)  -Probable right lateral cord lesion at the level of C7. (Sagittal image 12)  - Upper thoracic cord lesions at T4-T5 and T5-T6 are stable.     Patchy brainstem T2 hyperintense lesions are better characterized on the recent  brain MRI from 6/8/2020. The cervical spine is normal in alignment. The  vertebral bodies maintain normal height and marrow signal. The prevertebral and  paravertebral soft tissues are within normal limits.  No abnormal enhancement  within the cervical spine. Degenerative changes by level:     C2-C3: Mild facet arthropathy. No significant spinal canal or foraminal  compromise.     C3-C4: Disc osteophyte complex and facet arthropathy. Moderate spinal canal  stenosis. Mild bilateral foraminal stenosis.     C4-C5: Disc osteophyte complex and facet arthropathy. Moderate spinal canal  stenosis. Moderate right and mild left foraminal stenosis.     C5-C6: Disc osteophyte complex and facet arthropathy. Mild spinal canal and  bilateral foraminal stenosis.     C6-C7: No significant spinal canal or foraminal compromise.     C7-T1: Negative. No significant spinal canal or foraminal compromise.     IMPRESSION  IMPRESSION:      1. Stable scattered T2 hyperintense lesions within the cervical and upper  thoracic cord, in keeping with given clinical history of multiple sclerosis. No  evidence of active demyelination. 2. Degenerative change, most pronounced at C3-C4 and C4-C5 where there is  moderate spinal canal stenosis. Impression/Plan: This is a 66-year-old left-handed female who presents in follow-up for MS. Therapy was changed to [address] [place] [address] in January of this year. She recently underwent follow-up MRI of the brain and cervical spine with and without contrast.  We reviewed the results today. MRI brain reported moderately heavy burden of white matter lesions consistent with multiple sclerosis, and was reported near stable except a single small new lesion is identified and there were no enhancing lesions; comparison to November 2019 study. MRI of the cervical spine reported stable lesions within the cervical and upper thoracic cord and no evidence of active demyelination. There was also degenerative changes noted. The plan will be to continue Ocrevus. She completed her initial doses in January so 6 months from initial infusion would be July 6. She reported tolerating the medication.   We discussed the cognitive impairment that she has been having and that it is frequently present in some degree in multiple sclerosis. She also has been having stress in her life. Provided her with handouts from the [facility] related to managing cognitive problems, taming stress, and emotions and discussed some of the strategies that could be helpful for managing this. We discussed neuropsych evaluation with Dr. Hadley Iyer and she is interested in pursuing this. Also will refer for continued home health for physical therapy, occupational therapy, speech therapy for cognition, and home health aide. We will try physical therapy first for her neck stiffness prior to medications. Follow-up after neuropsych evaluation. Diagnoses and all orders for this visit:    1. MS (multiple sclerosis) ([facility]  -     REFERRAL TO NEUROPSYCHOLOGY    2. Memory deficit  -     REFERRAL TO [address] [place] [address]    3. Impaired gait and mobility  -     REFERRAL TO HOME HEALTH      Total time 40 minutes with 30 minutes spent in counseling. Signed By: Zakia Cristobal NP      This note will not be viewable in [address] PLEASE NOTE:   Portions of this document may have been produced using voice recognition software. Unrecognized errors in transcription may be present.

## 2020-06-22 ENCOUNTER — HOME CARE VISIT (OUTPATIENT)
Dept: SCHEDULING | Facility: HOME HEALTH | Age: 55
End: 2020-06-22
Payer: MEDICARE

## 2020-06-22 ENCOUNTER — HOME CARE VISIT (OUTPATIENT)
Dept: HOME HEALTH SERVICES | Facility: HOME HEALTH | Age: 55
End: 2020-06-22
Payer: MEDICARE

## 2020-06-22 VITALS
DIASTOLIC BLOOD PRESSURE: 70 MMHG | HEART RATE: 82 BPM | SYSTOLIC BLOOD PRESSURE: 114 MMHG | RESPIRATION RATE: 16 BRPM | OXYGEN SATURATION: 97 % | TEMPERATURE: 96.8 F

## 2020-06-22 PROCEDURE — G0151 HHCP-SERV OF PT,EA 15 MIN: HCPCS

## 2020-06-22 PROCEDURE — 400013 HH SOC

## 2020-06-25 ENCOUNTER — HOME CARE VISIT (OUTPATIENT)
Dept: SCHEDULING | Facility: HOME HEALTH | Age: 55
End: 2020-06-25
Payer: MEDICARE

## 2020-06-25 VITALS
TEMPERATURE: 97 F | DIASTOLIC BLOOD PRESSURE: 78 MMHG | HEART RATE: 77 BPM | SYSTOLIC BLOOD PRESSURE: 110 MMHG | RESPIRATION RATE: 18 BRPM | OXYGEN SATURATION: 100 %

## 2020-06-25 PROCEDURE — G0157 HHC PT ASSISTANT EA 15: HCPCS

## 2020-06-25 PROCEDURE — G0153 HHCP-SVS OF S/L PATH,EA 15MN: HCPCS

## 2020-06-25 PROCEDURE — G0152 HHCP-SERV OF OT,EA 15 MIN: HCPCS

## 2020-06-26 ENCOUNTER — HOME CARE VISIT (OUTPATIENT)
Dept: SCHEDULING | Facility: HOME HEALTH | Age: 55
End: 2020-06-26
Payer: MEDICARE

## 2020-06-26 VITALS
SYSTOLIC BLOOD PRESSURE: 123 MMHG | TEMPERATURE: 97.3 F | RESPIRATION RATE: 20 BRPM | HEART RATE: 74 BPM | OXYGEN SATURATION: 99 % | DIASTOLIC BLOOD PRESSURE: 75 MMHG

## 2020-06-26 PROCEDURE — G0153 HHCP-SVS OF S/L PATH,EA 15MN: HCPCS

## 2020-06-27 VITALS
TEMPERATURE: 97.3 F | DIASTOLIC BLOOD PRESSURE: 72 MMHG | HEART RATE: 82 BPM | OXYGEN SATURATION: 98 % | SYSTOLIC BLOOD PRESSURE: 114 MMHG

## 2020-06-27 NOTE — PROGRESS NOTES
Patient received evaluation for cognitive deficits in setting of MS. Patient is at [facility] and is independent with strategies to increase recall for completion of functional ADLS. Patient independently manages calendar and diary to maintain medical information, appointments and money management. No further follow up is indicated at this time.

## 2020-06-29 ENCOUNTER — HOME CARE VISIT (OUTPATIENT)
Dept: SCHEDULING | Facility: HOME HEALTH | Age: 55
End: 2020-06-29
Payer: MEDICARE

## 2020-06-29 PROCEDURE — G0157 HHC PT ASSISTANT EA 15: HCPCS

## 2020-06-30 VITALS
TEMPERATURE: 97.7 F | HEART RATE: 81 BPM | DIASTOLIC BLOOD PRESSURE: 75 MMHG | OXYGEN SATURATION: 99 % | SYSTOLIC BLOOD PRESSURE: 115 MMHG

## 2020-07-01 ENCOUNTER — HOME CARE VISIT (OUTPATIENT)
Dept: SCHEDULING | Facility: HOME HEALTH | Age: 55
End: 2020-07-01
Payer: MEDICARE

## 2020-07-01 PROCEDURE — G0157 HHC PT ASSISTANT EA 15: HCPCS

## 2020-07-03 VITALS
TEMPERATURE: 97.9 F | DIASTOLIC BLOOD PRESSURE: 83 MMHG | SYSTOLIC BLOOD PRESSURE: 114 MMHG | HEART RATE: 78 BPM | OXYGEN SATURATION: 99 %

## 2020-07-06 ENCOUNTER — HOME CARE VISIT (OUTPATIENT)
Dept: SCHEDULING | Facility: HOME HEALTH | Age: 55
End: 2020-07-06
Payer: MEDICARE

## 2020-07-06 VITALS
SYSTOLIC BLOOD PRESSURE: 113 MMHG | OXYGEN SATURATION: 99 % | HEART RATE: 81 BPM | DIASTOLIC BLOOD PRESSURE: 75 MMHG | TEMPERATURE: 97.7 F

## 2020-07-06 PROCEDURE — G0157 HHC PT ASSISTANT EA 15: HCPCS

## 2020-07-08 ENCOUNTER — HOME CARE VISIT (OUTPATIENT)
Dept: SCHEDULING | Facility: HOME HEALTH | Age: 55
End: 2020-07-08
Payer: MEDICARE

## 2020-07-08 VITALS
TEMPERATURE: 97.7 F | HEART RATE: 77 BPM | OXYGEN SATURATION: 99 % | SYSTOLIC BLOOD PRESSURE: 126 MMHG | DIASTOLIC BLOOD PRESSURE: 75 MMHG

## 2020-07-08 PROCEDURE — G0157 HHC PT ASSISTANT EA 15: HCPCS

## 2020-07-08 NOTE — PROGRESS NOTES
Pt reports that she slipped and fell in the tub to sit on the edge of the tub on 7/7. Pt will have post fall assessment on 7/13.

## 2020-07-13 ENCOUNTER — HOME CARE VISIT (OUTPATIENT)
Dept: SCHEDULING | Facility: HOME HEALTH | Age: 55
End: 2020-07-13
Payer: MEDICARE

## 2020-07-13 PROCEDURE — G0151 HHCP-SERV OF PT,EA 15 MIN: HCPCS

## 2020-07-14 VITALS — SYSTOLIC BLOOD PRESSURE: 113 MMHG | HEART RATE: 89 BPM | DIASTOLIC BLOOD PRESSURE: 77 MMHG | OXYGEN SATURATION: 99 %

## 2020-07-15 ENCOUNTER — OFFICE VISIT (OUTPATIENT)
Dept: FAMILY MEDICINE CLINIC | Age: 55
End: 2020-07-15

## 2020-07-15 ENCOUNTER — HOME CARE VISIT (OUTPATIENT)
Dept: SCHEDULING | Facility: HOME HEALTH | Age: 55
End: 2020-07-15
Payer: MEDICARE

## 2020-07-15 VITALS
HEART RATE: 88 BPM | WEIGHT: 162 LBS | BODY MASS INDEX: 26.99 KG/M2 | TEMPERATURE: 98 F | RESPIRATION RATE: 18 BRPM | SYSTOLIC BLOOD PRESSURE: 142 MMHG | DIASTOLIC BLOOD PRESSURE: 85 MMHG | OXYGEN SATURATION: 99 % | HEIGHT: 65 IN

## 2020-07-15 VITALS
DIASTOLIC BLOOD PRESSURE: 72 MMHG | TEMPERATURE: 97.7 F | OXYGEN SATURATION: 98 % | HEART RATE: 82 BPM | SYSTOLIC BLOOD PRESSURE: 108 MMHG

## 2020-07-15 DIAGNOSIS — R23.8 SCALP IRRITATION: ICD-10-CM

## 2020-07-15 DIAGNOSIS — R26.89 BALANCE DISORDER: ICD-10-CM

## 2020-07-15 DIAGNOSIS — L65.9 ALOPECIA: ICD-10-CM

## 2020-07-15 DIAGNOSIS — G35 MULTIPLE SCLEROSIS (HCC): Primary | ICD-10-CM

## 2020-07-15 PROCEDURE — G0157 HHC PT ASSISTANT EA 15: HCPCS

## 2020-07-15 NOTE — LETTER
NOTIFICATION RETURN TO WORK / SCHOOL 
 
7/15/2020 4:01 PM 
 
Ms. Gretel Hayes [address] [place] [id number] To Whom It May Concern: 
 
Gretel Hayes is currently under the care of [address] Patient has chronic medical conditions which requires handicap accessible accomodations. If there are questions or concerns please have the patient contact our office.  
 
 
 
Sincerely, 
 
 
Rose Marie Mendes NP
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
## 2020-07-15 NOTE — PROGRESS NOTES
Visit Vitals  /85   Pulse 88   Temp 98 °F (36.7 °C) (Oral)   Resp 18   Ht 5' 5\" (1.651 m)   Wt 162 lb (73.5 kg)   LMP  (LMP Unknown)   SpO2 99%   BMI 26.96 kg/m²     Jose G Heath presents today for   Chief Complaint   Patient presents with    Head Injury    Form Completion       Is someone accompanying this pt? no    Is the patient using any DME equipment during OV? no    Depression Screening:  3 most recent PHQ Screens 7/15/2020   [facility] Not Done Medical Reason (indicate in comments)   Little interest or pleasure in doing things -   Feeling down, depressed, irritable, or hopeless -   Total Score PHQ 2 -   Trouble falling or staying asleep, or sleeping too much -   Feeling tired or having little energy -   Poor appetite, weight loss, or overeating -   Feeling bad about yourself - or that you are a failure or have let yourself or your family down -   Trouble concentrating on things such as school, work, reading, or watching TV -   Moving or speaking so slowly that other people could have noticed; or the opposite being so fidgety that others notice -   Thoughts of being better off dead, or hurting yourself in some way -   PHQ 9 Score -   How difficult have these problems made it for you to do your work, take care of your home and get along with others -       Learning Assessment:  Learning Assessment 6/15/2018   PRIMARY LEARNER Patient   CO-LEARNER CAREGIVER -   PRIMARY LANGUAGE ENGLISH   LEARNER PREFERENCE PRIMARY DEMONSTRATION   ANSWERED BY patient   RELATIONSHIP SELF       Abuse Screening:  Abuse Screening Questionnaire 8/14/2018   Do you ever feel afraid of your partner? N   Are you in a relationship with someone who physically or mentally threatens you? N   Is it safe for you to go home? Y       Fall Risk  No flowsheet data found. Health Maintenance reviewed and discussed and ordered per Provider.     Health Maintenance Due   Topic Date Due    DTaP/Tdap/Td series (1 - Tdap) 11/26/1984    Shingrix Vaccine Age 50> (1 of 2) 11/26/2013    Medicare Yearly Exam  01/22/2020           Coordination of Care:  1. Have you been to the ER, urgent care clinic since your last visit? Hospitalized since your last visit? no    2. Have you seen or consulted any other health care providers outside of the [address] [place] [address] since your last visit? Include any pap smears or colon screening.  no
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
## 2020-07-16 ENCOUNTER — TELEPHONE (OUTPATIENT)
Dept: FAMILY MEDICINE CLINIC | Facility: CLINIC | Age: 55
End: 2020-07-16

## 2020-07-16 NOTE — TELEPHONE ENCOUNTER
Spoke with patient inform patient that her form will be faxed and the letter she receive is correct.

## 2020-07-16 NOTE — TELEPHONE ENCOUNTER
Please fax form to [phone]. Notification given to patient yesterday states  \"states return to work or school\", but is to document need to move apartments. Can we create a new letter?    Please call [phone]

## 2020-07-17 ENCOUNTER — TELEPHONE (OUTPATIENT)
Dept: NEUROLOGY | Age: 55
End: 2020-07-17

## 2020-07-20 ENCOUNTER — HOSPITAL ENCOUNTER (OUTPATIENT)
Dept: INFUSION THERAPY | Age: 55
Discharge: HOME | End: 2020-07-20
Payer: MEDICARE

## 2020-07-20 VITALS
BODY MASS INDEX: 27.82 KG/M2 | WEIGHT: 167.2 LBS | OXYGEN SATURATION: 100 % | DIASTOLIC BLOOD PRESSURE: 80 MMHG | SYSTOLIC BLOOD PRESSURE: 126 MMHG | RESPIRATION RATE: 16 BRPM | HEART RATE: 80 BPM | TEMPERATURE: 98.4 F

## 2020-07-20 DIAGNOSIS — G35 MULTIPLE SCLEROSIS (HCC): Primary | ICD-10-CM

## 2020-07-20 PROCEDURE — 96375 TX/PRO/DX INJ NEW DRUG ADDON: CPT

## 2020-07-20 PROCEDURE — 96365 THER/PROPH/DIAG IV INF INIT: CPT

## 2020-07-20 PROCEDURE — 96413 CHEMO IV INFUSION 1 HR: CPT

## 2020-07-20 PROCEDURE — 74011250637 HC RX REV CODE- 250/637: Performed by: NURSE PRACTITIONER

## 2020-07-20 PROCEDURE — 96366 THER/PROPH/DIAG IV INF ADDON: CPT

## 2020-07-20 PROCEDURE — 74011250636 HC RX REV CODE- 250/636: Performed by: NURSE PRACTITIONER

## 2020-07-20 PROCEDURE — 96415 CHEMO IV INFUSION ADDL HR: CPT

## 2020-07-20 RX ORDER — ONDANSETRON 2 MG/ML
8 INJECTION INTRAMUSCULAR; INTRAVENOUS AS NEEDED
Status: CANCELLED | OUTPATIENT
Start: 2020-08-17

## 2020-07-20 RX ORDER — ACETAMINOPHEN 325 MG/1
650 TABLET ORAL ONCE
Status: COMPLETED | OUTPATIENT
Start: 2020-07-20 | End: 2020-07-20

## 2020-07-20 RX ORDER — EPINEPHRINE 1 MG/ML
0.3 INJECTION, SOLUTION, CONCENTRATE INTRAVENOUS AS NEEDED
Status: CANCELLED | OUTPATIENT
Start: 2020-08-17

## 2020-07-20 RX ORDER — SODIUM CHLORIDE 9 MG/ML
10 INJECTION INTRAMUSCULAR; INTRAVENOUS; SUBCUTANEOUS AS NEEDED
Status: CANCELLED | OUTPATIENT
Start: 2020-08-17

## 2020-07-20 RX ORDER — ACETAMINOPHEN 325 MG/1
650 TABLET ORAL AS NEEDED
Status: CANCELLED
Start: 2020-08-17

## 2020-07-20 RX ORDER — ALBUTEROL SULFATE 0.83 MG/ML
2.5 SOLUTION RESPIRATORY (INHALATION) AS NEEDED
Status: CANCELLED
Start: 2020-08-17

## 2020-07-20 RX ORDER — DIPHENHYDRAMINE HYDROCHLORIDE 50 MG/ML
50 INJECTION, SOLUTION INTRAMUSCULAR; INTRAVENOUS AS NEEDED
Status: CANCELLED
Start: 2020-08-17

## 2020-07-20 RX ORDER — HEPARIN 100 UNIT/ML
300-500 SYRINGE INTRAVENOUS AS NEEDED
Status: CANCELLED
Start: 2020-08-17

## 2020-07-20 RX ORDER — DIPHENHYDRAMINE HCL 25 MG
50 CAPSULE ORAL
Status: DISCONTINUED | OUTPATIENT
Start: 2020-07-20 | End: 2020-07-20

## 2020-07-20 RX ORDER — HYDROCORTISONE SODIUM SUCCINATE 100 MG/2ML
100 INJECTION, POWDER, FOR SOLUTION INTRAMUSCULAR; INTRAVENOUS AS NEEDED
Status: CANCELLED | OUTPATIENT
Start: 2020-08-17

## 2020-07-20 RX ORDER — SODIUM CHLORIDE 0.9 % (FLUSH) 0.9 %
10 SYRINGE (ML) INJECTION AS NEEDED
Status: CANCELLED
Start: 2020-08-17

## 2020-07-20 RX ORDER — DIPHENHYDRAMINE HYDROCHLORIDE 50 MG/ML
50 INJECTION, SOLUTION INTRAMUSCULAR; INTRAVENOUS ONCE
Status: COMPLETED | OUTPATIENT
Start: 2020-07-20 | End: 2020-07-20

## 2020-07-20 RX ORDER — SODIUM CHLORIDE 0.9 % (FLUSH) 0.9 %
10 SYRINGE (ML) INJECTION AS NEEDED
Status: DISPENSED | OUTPATIENT
Start: 2020-07-20 | End: 2020-07-20

## 2020-07-20 RX ADMIN — OCRELIZUMAB 600 MG: 300 INJECTION INTRAVENOUS at 11:00

## 2020-07-20 RX ADMIN — Medication 10 ML: at 14:44

## 2020-07-20 RX ADMIN — DIPHENHYDRAMINE HYDROCHLORIDE 50 MG: 50 INJECTION, SOLUTION INTRAMUSCULAR; INTRAVENOUS at 10:20

## 2020-07-20 RX ADMIN — ACETAMINOPHEN 650 MG: 325 TABLET ORAL at 10:15

## 2020-07-20 RX ADMIN — METHYLPREDNISOLONE SODIUM SUCCINATE 125 MG: 125 INJECTION, POWDER, FOR SOLUTION INTRAMUSCULAR; INTRAVENOUS at 10:20

## 2020-07-20 NOTE — PROGRESS NOTES
[facility] Progress Note    Date: 2020    Name: Pritesh Rowley    MRN: [id number]         : 1965      Ms. Yesica Soliman arrived in the [facility] today at [phone], in stable condition, here for Ocrevus (ocrelizumab) Infusion (Week 2 in the Induction Phase). She was assessed and education was provided. Ms. Jacobs's vitals were reviewed. Visit Vitals  /80 (BP 1 Location: Right arm, BP Patient Position: At rest;Sitting)   Pulse 80   Temp 98.4 °F (36.9 °C)   Resp 16   Wt 75.8 kg (167 lb 3.2 oz)   SpO2 100%   Breastfeeding No   BMI 27.82 kg/m²         PIV # 22 G, was established in her left AC flush and brisk blood return noted.  ml IV Bag, was initiated, to infuse @ KVO, PRN, throughout treatment today. The following pre-medications were administered per order, and without incident:  Tylenol 650 mg PO, Benadryl 50 mg IV, & Solumedrol 125 mg IV. Ocrevus (Ocrelizumab) 300 mg IV, was administered over approximately 3 hours, using the following rate titration:  40 ml/hr X 30 minutes, 80 ml/hr X 30 minutes, 120 ml/hr X 30 minutes, 160 ml/hr X 30 minutes, 200 ml/hr until completion. After completion of the Ocrevus Infusion, Ms. Yesica Soliman was monitored for 1 hour per order, and without incident. PIV was removed and gauze/bandaid was applied. Ms. Yesica Soliman tolerated well, and had no complaints. Ms. Yesica Soliman was discharged from [place] [address] in stable condition at 1455. She has her next appointment on 2021 at 0900 for [address] [place] [address].          Chirag Upton  2020

## 2020-07-21 ENCOUNTER — HOME CARE VISIT (OUTPATIENT)
Dept: SCHEDULING | Facility: HOME HEALTH | Age: 55
End: 2020-07-21
Payer: MEDICARE

## 2020-07-21 VITALS
SYSTOLIC BLOOD PRESSURE: 122 MMHG | DIASTOLIC BLOOD PRESSURE: 79 MMHG | OXYGEN SATURATION: 98 % | TEMPERATURE: 97.7 F | HEART RATE: 78 BPM

## 2020-07-21 PROCEDURE — G0157 HHC PT ASSISTANT EA 15: HCPCS

## 2020-07-22 NOTE — PROGRESS NOTES
Pt recieved an insurance denial letter for 2 of the 5 visits requested for the extension. Have sent a photo ofthe letter to [employer] for clearification. You may have to DC her next week when I am vacation.

## 2020-07-23 ENCOUNTER — VIRTUAL VISIT (OUTPATIENT)
Dept: FAMILY MEDICINE CLINIC | Facility: CLINIC | Age: 55
End: 2020-07-23

## 2020-07-23 DIAGNOSIS — T07.XXXA LACERATION OF MULTIPLE SITES OF SKIN: ICD-10-CM

## 2020-07-23 DIAGNOSIS — Z13.31 SCREENING FOR DEPRESSION: ICD-10-CM

## 2020-07-23 DIAGNOSIS — G35 MULTIPLE SCLEROSIS (HCC): ICD-10-CM

## 2020-07-23 DIAGNOSIS — Z13.39 SCREENING FOR ALCOHOLISM: ICD-10-CM

## 2020-07-23 DIAGNOSIS — R26.89 BALANCE DISORDER: Primary | ICD-10-CM

## 2020-07-23 NOTE — PROGRESS NOTES
This is the Subsequent Medicare Annual Wellness Exam, performed 12 months or more after the Initial AWV or the last Subsequent AWV    I have reviewed the patient's medical history in detail and updated the computerized patient record. History     Patient Active Problem List   Diagnosis Code    Microscopic hematuria R31.29    Rectal bleeding K62.5    Chronic pain G89.29    Diffuse cystic mastopathy N60.19    Paranoid schizophrenia ([address] F20.0    Chronic left-sided low back pain with left-sided sciatica M54.42, G89.29    Spells of decreased attentiveness R68.89    Unsteady gait R26.81    Perennial allergic rhinitis J30.89    Multiple sclerosis ([address] G35     Past Medical History:   Diagnosis Date    Chest pain 11/2014    neg EKG, non recurrent    Chronic pain     lower back and legs    Depression     Eczema     Fibroids     GERD (gastroesophageal reflux disease)     Headache(784.0)     Hiatal hernia     IBS (irritable bowel syndrome)     Microscopic hematuria     MS (multiple sclerosis) (HCC)     Rectal bleeding     Stool color black     irritable bowel      Past Surgical History:   Procedure Laterality Date    COLONOSCOPY,DIAGNOSTIC      HX BREAST BIOPSY Right 1/21/2015    RIGHT BREAST BIOPSY WITH NEEDLE LOCALIZATION ULTRASOUND performed by Ty Lazo MD at [facility] MAIN OR    HX CHOLECYSTECTOMY      HX GI      HX HYSTERECTOMY      HX TUBAL LIGATION       Current Outpatient Medications   Medication Sig Dispense Refill    Walker [facility] walker 1 Each 0    betamethasone valerate (VALISONE) 0.1 % topical lotion Apply 1 Applicator to affected area two (2) times a day.  triamcinolone acetonide (KENALOG) 0.1 % ointment Apply 1 Applicator to affected area two (2) times a day. chest and upper extremities      hydrOXYzine HCL (ATARAX) 25 mg tablet Take 50 mg by mouth nightly.  tab 1 tp 2 tabs      nabumetone (RELAFEN) 500 mg tablet TAKE 1 TABLET BY MOUTH TWICE DAILY 60 Tab 0  loratadine (Claritin) 10 mg tablet Take 1 Tab by mouth daily. 30 Tab 1    psyllium (METAMUCIL) powd Take  by mouth daily.  ibuprofen 200 mg cap Take 1 Tab by mouth as needed for Pain.  Omeprazole delayed release (PRILOSEC D/R) 20 mg tablet Take 20 mg by mouth daily.  fluticasone propionate (FLONASE) 50 mcg/actuation nasal spray 2 Sprays by Both Nostrils route daily. Indications: inflammation of the nose due to an allergy 1 Bottle 11    Shower Chair XX jozef As needed for patient safety 1 Each 0    Bedside Commode XX Please provide a commode for patient safety 1 Each 0    OTHER Please provide a tub transfer bench 1 Each 0    predniSONE (DELTASONE) 20 mg tablet Take 20 mg by mouth daily.  biotin (VITAMIN B7) 5 mg tablet Take 5 mg by mouth daily.  nortriptyline (PAMELOR) 25 mg capsule Take 1 Cap by mouth nightly.  May increase to 2 pills in 3 days 60 Cap 1    Comp Stocking,Knee,Long,Medium misc Wear daily while walking to prevent swelling 1 Each 0     Allergies   Allergen Reactions    Naproxen Shortness of Breath    Shellfish Derived Nausea and Vomiting     SEVERE NAUSEA AND VOMITING    Amoxicillin Rash and Nausea Only       Family History   Problem Relation Age of Onset    HIV/AIDS Brother     Diabetes Father     Cancer Brother     Hypertension Sister     Diabetes Brother     Hypertension Sister     Hypertension Sister     Hypertension Brother      Social History     Tobacco Use    Smoking status: Former Smoker     Packs/day: 0.00     Last attempt to quit: 2016     Years since quittin.0    Smokeless tobacco: Former User     Quit date: 2016   Substance Use Topics    Alcohol use: No     Alcohol/week: 10.0 standard drinks     Types: 10 Cans of beer per week       Depression Risk Factor Screening:     3 most recent PHQ Screens 7/15/2020   PHQ Not Done Medical Reason (indicate in comments)   Little interest or pleasure in doing things -   Feeling down, depressed, irritable, or hopeless -   Total Score PHQ 2 -   Trouble falling or staying asleep, or sleeping too much -   Feeling tired or having little energy -   Poor appetite, weight loss, or overeating -   Feeling bad about yourself - or that you are a failure or have let yourself or your family down -   Trouble concentrating on things such as school, work, reading, or watching TV -   Moving or speaking so slowly that other people could have noticed; or the opposite being so fidgety that others notice -   Thoughts of being better off dead, or hurting yourself in some way -   PHQ 9 Score -   How difficult have these problems made it for you to do your work, take care of your home and get along with others -       Alcohol Risk Factor Screening:   Do you average 1 drink per night or more than 7 drinks a week:  No    On any one occasion in the past three months have you have had more than 3 drinks containing alcohol:  No      Functional Ability and Level of Safety:   Hearing: Hearing is good. Activities of Daily Living: The home contains: no safety equipment. Patient does total self care     Ambulation: with no difficulty     Fall Risk:  No flowsheet data found. Abuse Screen:  Patient is not abused       Cognitive Screening   Has your family/caregiver stated any concerns about your memory: yes    Cognitive Screening: Normal - Clock Drawing Test    Patient Care Team   Patient Care Team:  Moiéss Banegas NP as PCP - General (Nurse Practitioner)  Moisés Banegas NP as PCP - Lupillo Witt Provider  Dickson Villasenor as Physician Assistant (Physician Assistant)  Cortez Stevenson MD (Obstetrics & Gynecology)  Dickson Arce (Physician Assistant)  Glen Romeo MD (Neurology)  Any Robison NP (Neurology)    Assessment/Plan   Education and counseling provided:  Are appropriate based on today's review and evaluation    Diagnoses and all orders for this visit:    1. Balance disorder    2.  Laceration of multiple sites of skin    3. Multiple sclerosis [facility])        Health Maintenance Due   Topic Date Due    DTaP/Tdap/Td series (1 - Tdap) 11/26/1984    Shingrix Vaccine Age 50> (1 of 2) 11/26/2013    Medicare Yearly Exam  01/22/2020       Melyssa Moreland, who was evaluated through a synchronous (real-time) audio only encounter, and/or her healthcare decision maker, is aware that it is a billable service, with coverage as determined by her insurance carrier. She provided verbal consent to proceed: n/a- consent obtained within past 12 months, and patient identification was verified. It was conducted pursuant to the emergency declaration under the [address] [place] [address], [address] [place] [address] authority and the [employer] Act. A caregiver was present when appropriate. Ability to conduct physical exam was limited. I was in the office. The patient was at home.     Evan Avitia, TONY

## 2020-07-24 ENCOUNTER — HOME CARE VISIT (OUTPATIENT)
Dept: SCHEDULING | Facility: HOME HEALTH | Age: 55
End: 2020-07-24
Payer: MEDICARE

## 2020-07-24 VITALS
HEART RATE: 73 BPM | SYSTOLIC BLOOD PRESSURE: 113 MMHG | TEMPERATURE: 97.3 F | DIASTOLIC BLOOD PRESSURE: 71 MMHG | OXYGEN SATURATION: 98 %

## 2020-07-24 PROCEDURE — G0157 HHC PT ASSISTANT EA 15: HCPCS

## 2020-07-24 PROCEDURE — 400013 HH SOC

## 2020-07-24 NOTE — PROGRESS NOTES
Due to Insurance denial please DC pt at next visit per [facility]. Pt has doctor's appt on 7/27.

## 2020-07-27 ENCOUNTER — OFFICE VISIT (OUTPATIENT)
Dept: NEUROLOGY | Age: 55
End: 2020-07-27

## 2020-07-27 DIAGNOSIS — R41.3 MEMORY LOSS: Primary | ICD-10-CM

## 2020-07-27 DIAGNOSIS — R45.89 DEPRESSED MOOD: ICD-10-CM

## 2020-07-27 DIAGNOSIS — F41.8 ANXIETY ABOUT HEALTH: ICD-10-CM

## 2020-07-27 NOTE — PROGRESS NOTES
Idalmis Pham is a 62 y.o. female who was seen by synchronous (real-time) audio-video technology on 7/23/2020 for Follow-up    Assessment & Plan:   Diagnoses and all orders for this visit:    1. Balance disorder    2. Laceration of multiple sites of skin    3. Multiple sclerosis ([facility] [address]    4. Screening for alcoholism    5. Screening for depression            Subjective: The patient presents for an Audio-visual teleconference appointment for complaints generalized headache tenderness. Patient had hair extensions added to her here which were glued to her scalp. Notes that when the beautician remove the hair it causes and lacerations to her scalp region. She was seen by dermatology and has an upcoming follow-up appointment. Notes that the ulcers and skin lacerations are in various stages of healing. Patient also notes that she has had a friend staying with her over the last 3 weeks. Per the patient and her friend she feels like the patient has a balance disorder. She is receiving physical therapy as well as being managed by neurology for her history of MS. Notes she would notify the insurance company to see what her benefits are for home assistance/nursing assistance. Prior to Admission medications    Medication Sig Start Date End Date Taking? Authorizing Provider   [employer] wheel walker 7/15/20   Debby Aaron, TONY   betamethasone valerate (VALISONE) 0.1 % topical lotion Apply 1 Applicator to affected area two (2) times a day. Provider, Historical   triamcinolone acetonide (KENALOG) 0.1 % ointment Apply 1 Applicator to affected area two (2) times a day. chest and upper extremities    Provider, Historical   hydrOXYzine HCL (ATARAX) 25 mg tablet Take 50 mg by mouth nightly.  tab 1 tp 2 tabs    Provider, Historical   nabumetone (RELAFEN) 500 mg tablet TAKE 1 TABLET BY MOUTH TWICE DAILY 6/8/20   Debby Aaron NP   loratadine (Claritin) 10 mg tablet Take 1 Tab by mouth daily. 6/8/20   Alem Nuñez NP   psyllium (METAMUCIL) powd Take  by mouth daily. Provider, Historical   ibuprofen 200 mg cap Take 1 Tab by mouth as needed for Pain. Provider, Historical   Omeprazole delayed release (PRILOSEC D/R) 20 mg tablet Take 20 mg by mouth daily. Provider, Historical   fluticasone propionate (FLONASE) 50 mcg/actuation nasal spray 2 Sprays by Both Nostrils route daily. Indications: inflammation of the nose due to an allergy 12/19/19   Alem Nuñez NP   Shower Chair XX jozef As needed for patient safety 9/12/19   Stephenie Cuba MD   Bedside Commode XX Please provide a commode for patient safety 9/12/19   Stephenie Cuba MD   OTHER Please provide a tub transfer bench 9/6/19   Alem Nuñez NP   predniSONE (DELTASONE) 20 mg tablet Take 20 mg by mouth daily. Provider, Historical   biotin (VITAMIN B7) 5 mg tablet Take 5 mg by mouth daily. Provider, Historical   nortriptyline (PAMELOR) 25 mg capsule Take 1 Cap by mouth nightly.  May increase to 2 pills in 3 days 5/30/19   Zay Limon MD   Comp Stocking,Knee,Long,Medium misc Wear daily while walking to prevent swelling 5/19/18   Buddy Espinoza MD     Patient Active Problem List   Diagnosis Code    Microscopic hematuria R31.29    Rectal bleeding K62.5    Chronic pain G89.29    Diffuse cystic mastopathy N60.19    Paranoid schizophrenia ([address] F20.0    Chronic left-sided low back pain with left-sided sciatica M54.42, G89.29    Spells of decreased attentiveness R68.89    Unsteady gait R26.81    Perennial allergic rhinitis J30.89    Multiple sclerosis ([address] G35     Patient Active Problem List    Diagnosis Date Noted    Multiple sclerosis ([address] 07/29/2019    Chronic left-sided low back pain with left-sided sciatica 09/26/2018    Spells of decreased attentiveness 09/26/2018    Unsteady gait 09/26/2018    Perennial allergic rhinitis 09/26/2018    Paranoid schizophrenia ([address] 06/15/2018  Diffuse cystic mastopathy 12/10/2015    Rectal bleeding 11/11/2014    Chronic pain 11/11/2014    Microscopic hematuria      Current Outpatient Medications   Medication Sig Dispense Refill    Walker [employer] wheel walker 1 Each 0    betamethasone valerate (VALISONE) 0.1 % topical lotion Apply 1 Applicator to affected area two (2) times a day.  triamcinolone acetonide (KENALOG) 0.1 % ointment Apply 1 Applicator to affected area two (2) times a day. chest and upper extremities      hydrOXYzine HCL (ATARAX) 25 mg tablet Take 50 mg by mouth nightly. tab 1 tp 2 tabs      nabumetone (RELAFEN) 500 mg tablet TAKE 1 TABLET BY MOUTH TWICE DAILY 60 Tab 0    loratadine (Claritin) 10 mg tablet Take 1 Tab by mouth daily. 30 Tab 1    psyllium (METAMUCIL) powd Take  by mouth daily.  ibuprofen 200 mg cap Take 1 Tab by mouth as needed for Pain.  Omeprazole delayed release (PRILOSEC D/R) 20 mg tablet Take 20 mg by mouth daily.  fluticasone propionate (FLONASE) 50 mcg/actuation nasal spray 2 Sprays by Both Nostrils route daily. Indications: inflammation of the nose due to an allergy 1 Bottle 11    Shower Chair XX jozef As needed for patient safety 1 Each 0    Bedside Commode XX Please provide a commode for patient safety 1 Each 0    OTHER Please provide a tub transfer bench 1 Each 0    predniSONE (DELTASONE) 20 mg tablet Take 20 mg by mouth daily.  biotin (VITAMIN B7) 5 mg tablet Take 5 mg by mouth daily.  nortriptyline (PAMELOR) 25 mg capsule Take 1 Cap by mouth nightly.  May increase to 2 pills in 3 days 60 Cap 1    Comp Stocking,Knee,Long,Medium misc Wear daily while walking to prevent swelling 1 Each 0     Allergies   Allergen Reactions    Naproxen Shortness of Breath    Shellfish Derived Nausea and Vomiting     SEVERE NAUSEA AND VOMITING    Amoxicillin Rash and Nausea Only     Past Medical History:   Diagnosis Date    Chest pain 11/2014    neg EKG, non recurrent    Chronic pain lower back and legs    Depression     Eczema     Fibroids     GERD (gastroesophageal reflux disease)     Headache(784.0)     Hiatal hernia     IBS (irritable bowel syndrome)     Microscopic hematuria     MS (multiple sclerosis) (HCC)     Rectal bleeding     Stool color black     irritable bowel       ROS    Constitutional: No apparent distress noted  General- negative for fever, chills or fatigue  Eyes- negative visual changes  CV- denies chest pain, palpitation  Pul: negative cough or SOB  GI: negative nausea, flank pain, diarrhea, constipation  Urinary:- No dysuria or polyuria  MS- negative myalgia, negative joint pain  Neuro- negative headache, dizziness or weakness  Skin- negative for rashes or lesions. Psych- denies any anxiety or depression    Objective:   No flowsheet data found.      [INSTRUCTIONS:  \"[x]\" Indicates a positive item  \"[]\" Indicates a negative item  -- DELETE ALL ITEMS NOT EXAMINED]    Constitutional: [x] Appears well-developed and well-nourished [x] No apparent distress      [] Abnormal -     Mental status: [x] Alert and awake  [x] Oriented to person/place/time [x] Able to follow commands    [] Abnormal -     Eyes:   EOM    [x]  Normal    [] Abnormal -   Sclera  [x]  Normal    [] Abnormal -          Discharge [x]  None visible   [] Abnormal -     HENT: [x] Normocephalic, atraumatic  [] Abnormal -   [x] Mouth/Throat: Mucous membranes are moist    External Ears [x] Normal  [] Abnormal -    Neck: [x] No visualized mass [] Abnormal -     Pulmonary/Chest: [x] Respiratory effort normal   [x] No visualized signs of difficulty breathing or respiratory distress        [] Abnormal -      Musculoskeletal:   [x] Normal gait with no signs of ataxia         [x] Normal range of motion of neck        [] Abnormal -     Neurological:        [x] No Facial Asymmetry (Cranial nerve 7 motor function) (limited exam due to video visit)          [x] No gaze palsy        [] Abnormal -          Skin: [x] No significant exanthematous lesions or discoloration noted on facial skin         [] Abnormal -            Psychiatric:       [x] Normal Affect [] Abnormal -        [x] No Hallucinations    Other pertinent observable physical exam findings:-        We discussed the expected course, resolution and complications of the diagnosis(es) in detail. Medication risks, benefits, costs, interactions, and alternatives were discussed as indicated. I advised her to contact the office if her condition worsens, changes or fails to improve as anticipated. She expressed understanding with the diagnosis(es) and plan. Sabas Slater, who was evaluated through a patient-initiated, synchronous (real-time) audio-video encounter, and/or her healthcare decision maker, is aware that it is a billable service, with coverage as determined by her insurance carrier. She provided verbal consent to proceed: Yes, and patient identification was verified. It was conducted pursuant to the emergency declaration under the [address] [place] [address],  waiver authority and the [employer] General Act. A caregiver was present when appropriate. Ability to conduct physical exam was limited. I was at home. The patient was at home.       Alber Bonds NP

## 2020-07-28 ENCOUNTER — HOME CARE VISIT (OUTPATIENT)
Dept: HOME HEALTH SERVICES | Facility: HOME HEALTH | Age: 55
End: 2020-07-28
Payer: MEDICARE

## 2020-07-28 ENCOUNTER — HOME CARE VISIT (OUTPATIENT)
Dept: SCHEDULING | Facility: HOME HEALTH | Age: 55
End: 2020-07-28
Payer: MEDICARE

## 2020-08-03 DIAGNOSIS — J30.89 PERENNIAL ALLERGIC RHINITIS: ICD-10-CM

## 2020-08-03 DIAGNOSIS — M54.42 CHRONIC LEFT-SIDED LOW BACK PAIN WITH LEFT-SIDED SCIATICA: ICD-10-CM

## 2020-08-03 DIAGNOSIS — G89.29 CHRONIC LEFT-SIDED LOW BACK PAIN WITH LEFT-SIDED SCIATICA: ICD-10-CM

## 2020-08-03 NOTE — TELEPHONE ENCOUNTER
Last seen 07/23/20  Next appt  None    Requested Prescriptions     Pending Prescriptions Disp Refills    nabumetone (RELAFEN) 500 mg tablet 60 Tab 0     Sig: TAKE 1 TABLET BY MOUTH TWICE DAILY    loratadine (Claritin) 10 mg tablet 30 Tab 1     Sig: Take 1 Tab by mouth daily.

## 2020-08-06 RX ORDER — NABUMETONE 500 MG/1
TABLET, FILM COATED ORAL
Qty: 60 TAB | Refills: 0 | Status: SHIPPED | OUTPATIENT
Start: 2020-08-06 | End: 2020-09-02 | Stop reason: SDUPTHER

## 2020-08-06 RX ORDER — LORATADINE 10 MG/1
10 TABLET ORAL DAILY
Qty: 30 TAB | Refills: 1 | Status: SHIPPED | OUTPATIENT
Start: 2020-08-06 | End: 2020-09-02 | Stop reason: SDUPTHER

## 2020-08-07 ENCOUNTER — VIRTUAL VISIT (OUTPATIENT)
Dept: FAMILY MEDICINE CLINIC | Facility: CLINIC | Age: 55
End: 2020-08-07

## 2020-08-07 DIAGNOSIS — S01.01XA LACERATION OF SCALP, INITIAL ENCOUNTER: Primary | ICD-10-CM

## 2020-08-07 DIAGNOSIS — R51.9 HEADACHE DISORDER: ICD-10-CM

## 2020-08-07 NOTE — PROGRESS NOTES
Marina Colunga is a 62 y.o. female who was seen by synchronous (real-time) audio-video technology on 8/7/2020 for Headache        Assessment & Plan:   Diagnoses and all orders for this visit:    1. Laceration of scalp, initial encounter    2. Headache disorder      Scalp lacerations-patient has a scheduled appointment with Dermatology for follow-up  Headache- ongoing and associated with head injury. Subjective:     Patient present today for Urgent care follow-up care. Patient was seen at Urgent Care on July 31, 2020 for head pain. Patient has previously been seen by this provider as well as a dermatologist for head wounds secondary to traumatic  Beauty salon experience. Patient previously noted that her hair was \"ripped off by her beautician\" which caused mutliple open wound to her hair scalp. When evaluated by Urgent Care she was given Ketoconazole shampoo and antibiotics/Augmentin   She is complaining of her head aching and swelling today. She is scheduled to follow-up with dermatology. Prior to Admission medications    Medication Sig Start Date End Date Taking? Authorizing Provider   nabumetone (RELAFEN) 500 mg tablet TAKE 1 TABLET BY MOUTH TWICE DAILY 8/6/20  Yes Angela Frey NP   loratadine (Claritin) 10 mg tablet Take 1 Tab by mouth daily. 8/6/20  Yes Angela Frey NP   Walker [employer] wheel walker 7/15/20  Yes Angela Frey NP   betamethasone valerate (VALISONE) 0.1 % topical lotion Apply 1 Applicator to affected area two (2) times a day. Yes Provider, Historical   hydrOXYzine HCL (ATARAX) 25 mg tablet Take 50 mg by mouth nightly. tab 1 tp 2 tabs   Yes Provider, Historical   psyllium (METAMUCIL) powd Take  by mouth daily. Yes Provider, Historical   ibuprofen 200 mg cap Take 1 Tab by mouth as needed for Pain. Yes Provider, Historical   Omeprazole delayed release (PRILOSEC D/R) 20 mg tablet Take 20 mg by mouth daily.    Yes Provider, Historical fluticasone propionate (FLONASE) 50 mcg/actuation nasal spray 2 Sprays by Both Nostrils route daily. Indications: inflammation of the nose due to an allergy 12/19/19  Yes Aurelio Diaz NP   Bedside Commode XX Please provide a commode for patient safety 9/12/19  Yes Debbie Grimm MD   OTHER Please provide a tub transfer bench 9/6/19  Yes Aurelio Diaz NP   predniSONE (DELTASONE) 20 mg tablet Take 20 mg by mouth daily. Yes Provider, Historical   nortriptyline (PAMELOR) 25 mg capsule Take 1 Cap by mouth nightly. May increase to 2 pills in 3 days 5/30/19  Yes Lei Scherer MD   Comp Stocking,Knee,Long,Medium misc Wear daily while walking to prevent swelling 5/19/18  Yes Kerry Noel MD   triamcinolone acetonide (KENALOG) 0.1 % ointment Apply 1 Applicator to affected area two (2) times a day. chest and upper extremities    Provider, Historical   Shower Chair XX jozef As needed for patient safety 9/12/19   Debbie Grimm MD   biotin (VITAMIN B7) 5 mg tablet Take 5 mg by mouth daily.     Provider, Historical     Patient Active Problem List   Diagnosis Code    Microscopic hematuria R31.29    Rectal bleeding K62.5    Chronic pain G89.29    Diffuse cystic mastopathy N60.19    Paranoid schizophrenia ([address] F20.0    Chronic left-sided low back pain with left-sided sciatica M54.42, G89.29    Spells of decreased attentiveness R68.89    Unsteady gait R26.81    Perennial allergic rhinitis J30.89    Multiple sclerosis ([address] G35     Patient Active Problem List    Diagnosis Date Noted    Multiple sclerosis ([address] 07/29/2019    Chronic left-sided low back pain with left-sided sciatica 09/26/2018    Spells of decreased attentiveness 09/26/2018    Unsteady gait 09/26/2018    Perennial allergic rhinitis 09/26/2018    Paranoid schizophrenia ([address] 06/15/2018    Diffuse cystic mastopathy 12/10/2015    Rectal bleeding 11/11/2014    Chronic pain 11/11/2014    Microscopic hematuria Current Outpatient Medications   Medication Sig Dispense Refill    nabumetone (RELAFEN) 500 mg tablet TAKE 1 TABLET BY MOUTH TWICE DAILY 60 Tab 0    loratadine (Claritin) 10 mg tablet Take 1 Tab by mouth daily. 30 Tab 1    Walker [employer] wheel walker 1 Each 0    betamethasone valerate (VALISONE) 0.1 % topical lotion Apply 1 Applicator to affected area two (2) times a day.  hydrOXYzine HCL (ATARAX) 25 mg tablet Take 50 mg by mouth nightly. tab 1 tp 2 tabs      psyllium (METAMUCIL) powd Take  by mouth daily.  ibuprofen 200 mg cap Take 1 Tab by mouth as needed for Pain.  Omeprazole delayed release (PRILOSEC D/R) 20 mg tablet Take 20 mg by mouth daily.  fluticasone propionate (FLONASE) 50 mcg/actuation nasal spray 2 Sprays by Both Nostrils route daily. Indications: inflammation of the nose due to an allergy 1 Bottle 11    Bedside Commode XX Please provide a commode for patient safety 1 Each 0    OTHER Please provide a tub transfer bench 1 Each 0    predniSONE (DELTASONE) 20 mg tablet Take 20 mg by mouth daily.  nortriptyline (PAMELOR) 25 mg capsule Take 1 Cap by mouth nightly. May increase to 2 pills in 3 days 60 Cap 1    Comp Stocking,Knee,Long,Medium misc Wear daily while walking to prevent swelling 1 Each 0    triamcinolone acetonide (KENALOG) 0.1 % ointment Apply 1 Applicator to affected area two (2) times a day. chest and upper extremities      Shower Chair XX jozef As needed for patient safety 1 Each 0    biotin (VITAMIN B7) 5 mg tablet Take 5 mg by mouth daily.        Allergies   Allergen Reactions    Naproxen Shortness of Breath    Shellfish Derived Nausea and Vomiting     SEVERE NAUSEA AND VOMITING    Amoxicillin Rash and Nausea Only     Past Medical History:   Diagnosis Date    Chest pain 11/2014    neg EKG, non recurrent    Chronic pain     lower back and legs    Depression     Eczema     Fibroids     GERD (gastroesophageal reflux disease)     Headache(784.0)     Hiatal hernia     IBS (irritable bowel syndrome)     Microscopic hematuria     MS (multiple sclerosis) (HCC)     Rectal bleeding     Stool color black     irritable bowel       ROS    onstitutional: No apparent distress noted  General- negative for fever, chills or fatigue  Eyes- negative visual changes  CV- denies chest pain, palpitation  Pul: negative cough or SOB  GI: negative nausea, flank pain, diarrhea, constipation   Urinary:- No dysuria or polyuria  MS- negative myalgia, negative joint pain  Neuro- headache  Skin- scalp wounds in various stages of healing  Psych- denies any anxiety or depression    Objective:   No flowsheet data found.      [INSTRUCTIONS:  \"[x]\" Indicates a positive item  \"[]\" Indicates a negative item  -- DELETE ALL ITEMS NOT EXAMINED]    Constitutional: [x] Appears well-developed and well-nourished [x] No apparent distress      [] Abnormal -     Mental status: [x] Alert and awake  [x] Oriented to person/place/time [x] Able to follow commands    [] Abnormal -     Eyes:   EOM    [x]  Normal    [] Abnormal -   Sclera  [x]  Normal    [] Abnormal -          Discharge [x]  None visible   [] Abnormal -     HENT: [x] Normocephalic, atraumatic  [] Abnormal -   [x] Mouth/Throat: Mucous membranes are moist    External Ears [x] Normal  [] Abnormal -    Neck: [x] No visualized mass [] Abnormal -     Pulmonary/Chest: [x] Respiratory effort normal   [x] No visualized signs of difficulty breathing or respiratory distress        [] Abnormal -      Musculoskeletal:   [x] Normal gait with no signs of ataxia         [x] Normal range of motion of neck        [] Abnormal -     Neurological:        [x] No Facial Asymmetry (Cranial nerve 7 motor function) (limited exam due to video visit)          [x] No gaze palsy        [] Abnormal -          Skin:        [x] No significant exanthematous lesions or discoloration noted on facial skin         [] Abnormal -            Psychiatric: [x] Normal Affect [] Abnormal -        [x] No Hallucinations    Other pertinent observable physical exam findings:-        We discussed the expected course, resolution and complications of the diagnosis(es) in detail. Medication risks, benefits, costs, interactions, and alternatives were discussed as indicated. I advised her to contact the office if her condition worsens, changes or fails to improve as anticipated. She expressed understanding with the diagnosis(es) and plan. Mario Felipe, who was evaluated through a patient-initiated, synchronous (real-time) audio-video encounter, and/or her healthcare decision maker, is aware that it is a billable service, with coverage as determined by her insurance carrier. She provided verbal consent to proceed: Yes, and patient identification was verified. It was conducted pursuant to the emergency declaration under the [address] [place] [address] authority and the [employer] General Act. A caregiver was present when appropriate. Ability to conduct physical exam was limited. I was at home. The patient was at home.       Rishabh Herrera NP

## 2020-08-17 ENCOUNTER — OFFICE VISIT (OUTPATIENT)
Dept: NEUROLOGY | Age: 55
End: 2020-08-17

## 2020-08-17 VITALS
HEART RATE: 69 BPM | SYSTOLIC BLOOD PRESSURE: 106 MMHG | HEIGHT: 65 IN | TEMPERATURE: 96.7 F | BODY MASS INDEX: 28.49 KG/M2 | DIASTOLIC BLOOD PRESSURE: 80 MMHG | WEIGHT: 171 LBS | RESPIRATION RATE: 20 BRPM | OXYGEN SATURATION: 98 %

## 2020-08-17 DIAGNOSIS — R41.3 MEMORY DEFICIT: ICD-10-CM

## 2020-08-17 DIAGNOSIS — G35 MS (MULTIPLE SCLEROSIS) (HCC): Primary | ICD-10-CM

## 2020-08-17 DIAGNOSIS — R26.89 IMPAIRED GAIT AND MOBILITY: ICD-10-CM

## 2020-08-17 DIAGNOSIS — R60.0 EDEMA OF BOTH LEGS: ICD-10-CM
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
## 2020-08-18 ENCOUNTER — OFFICE VISIT (OUTPATIENT)
Dept: NEUROLOGY | Age: 55
End: 2020-08-18

## 2020-08-18 DIAGNOSIS — G35 MS (MULTIPLE SCLEROSIS) (HCC): ICD-10-CM

## 2020-08-18 DIAGNOSIS — R41.3 MEMORY DEFICIT: Primary | ICD-10-CM

## 2020-08-27 ENCOUNTER — OFFICE VISIT (OUTPATIENT)
Dept: NEUROLOGY | Age: 55
End: 2020-08-27

## 2020-08-27 DIAGNOSIS — F33.2 SEVERE EPISODE OF RECURRENT MAJOR DEPRESSIVE DISORDER, WITHOUT PSYCHOTIC FEATURES (HCC): ICD-10-CM

## 2020-08-27 DIAGNOSIS — F41.8 ANXIETY WITH SOMATIC FEATURES: ICD-10-CM

## 2020-08-27 DIAGNOSIS — F09 COGNITIVE DISORDER: Primary | ICD-10-CM

## 2020-08-27 DIAGNOSIS — F43.9 TRAUMA AND STRESSOR-RELATED DISORDER: ICD-10-CM
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
## 2020-08-31 ENCOUNTER — TELEPHONE (OUTPATIENT)
Dept: NEUROLOGY | Age: 55
End: 2020-08-31

## 2020-08-31 NOTE — TELEPHONE ENCOUNTER
There was concern raised about patient being excessively sleepy during neuropsych testing, difficulty keeping awake. Called and spoke with patient. She notes that this has been what is been going on for her. Denies any illicit substance use. We discussed at the last visit making a PCP appointment for evaluation because of her swelling, and she notes that she has the visit on Wednesday. Discussed that anything potentially causing the excessive fatigue can be looked into, and thereafter we can consider repeating a sleep study. She notes that she has had a sleep study that was ordered by Dr. Derick Ramey for hypersomnolence. The results were found in the media section from the study on 3/25/2019 with the impression of \"primary snoring/upper airway resistance, with an AHI of only 0.4, REM AHI of 0, not meeting criteria for a diagnosis of BUD. Recommend discussion of interventions for snoring such as supine sleep avoidance, treatment of any nasopharyngeal sources of obstruction, and weight loss. \"  There has been about a 35 pound weight gain since that time.

## 2020-09-02 ENCOUNTER — HOSPITAL ENCOUNTER (OUTPATIENT)
Dept: LAB | Age: 55
Discharge: HOME | End: 2020-09-02

## 2020-09-02 ENCOUNTER — OFFICE VISIT (OUTPATIENT)
Dept: FAMILY MEDICINE CLINIC | Age: 55
End: 2020-09-02

## 2020-09-02 VITALS
SYSTOLIC BLOOD PRESSURE: 129 MMHG | BODY MASS INDEX: 28.49 KG/M2 | RESPIRATION RATE: 16 BRPM | TEMPERATURE: 97.8 F | DIASTOLIC BLOOD PRESSURE: 73 MMHG | OXYGEN SATURATION: 98 % | HEART RATE: 78 BPM | HEIGHT: 65 IN | WEIGHT: 171 LBS

## 2020-09-02 DIAGNOSIS — R60.0 LOWER EXTREMITY EDEMA: Primary | ICD-10-CM

## 2020-09-02 DIAGNOSIS — J30.89 PERENNIAL ALLERGIC RHINITIS: ICD-10-CM

## 2020-09-02 DIAGNOSIS — G89.29 CHRONIC LEFT-SIDED LOW BACK PAIN WITH LEFT-SIDED SCIATICA: ICD-10-CM

## 2020-09-02 DIAGNOSIS — M54.42 CHRONIC LEFT-SIDED LOW BACK PAIN WITH LEFT-SIDED SCIATICA: ICD-10-CM

## 2020-09-02 LAB — XX-LABCORP SPECIMEN COL,LCBCF: NORMAL

## 2020-09-02 PROCEDURE — 99001 SPECIMEN HANDLING PT-LAB: CPT

## 2020-09-02 RX ORDER — LORATADINE 10 MG/1
10 TABLET ORAL DAILY
Qty: 30 TAB | Refills: 1 | Status: SHIPPED | OUTPATIENT
Start: 2020-09-02 | End: 2020-11-02 | Stop reason: SDUPTHER

## 2020-09-02 RX ORDER — NABUMETONE 500 MG/1
TABLET, FILM COATED ORAL
Qty: 60 TAB | Refills: 0 | Status: SHIPPED | OUTPATIENT
Start: 2020-09-02 | End: 2020-09-28 | Stop reason: SDUPTHER

## 2020-09-02 NOTE — PROGRESS NOTES
Mario Felipe is a 62 y.o. female presenting today for Leg Swelling and Foot Swelling  . HPI:  Mario Felipe presents to the office today for complaints of bilateral lower extremity edema to her legs and feet. Notes that she has had ongoing lower extremity edema but denies any dyspnea or dyspnea with exertion. She is able to climb a flight of stairs without feeling short of breath. She is wearing compression stockings today but admits to moderate sodium intake. She denies any chest pain or palpitation. ROS    Constitutional: No apparent distress noted  General- negative for fever, chills or fatigue  Eyes- negative visual changes  CV- trace edema LE  Pul: negative cough or SOB  GI: negative nausea, flank pain, diarrhea, constipation  Urinary:- No dysuria or polyuria  MS- negative myalgia, negative joint pain  Neuro-  Weakness bilateral lower extremities  Skin- negative for rashes or lesions. Psych- denies any anxiety or depression    Allergies   Allergen Reactions    Naproxen Shortness of Breath    Shellfish Derived Nausea and Vomiting     SEVERE NAUSEA AND VOMITING    Amoxicillin Rash and Nausea Only       Current Outpatient Medications   Medication Sig Dispense Refill    loratadine (Claritin) 10 mg tablet Take 1 Tab by mouth daily. 30 Tab 1    nabumetone (RELAFEN) 500 mg tablet TAKE 1 TABLET BY MOUTH TWICE DAILY 60 Tab 0    Walker [employer] wheel walker 1 Each 0    betamethasone valerate (VALISONE) 0.1 % topical lotion Apply 1 Applicator to affected area two (2) times a day.  triamcinolone acetonide (KENALOG) 0.1 % ointment Apply 1 Applicator to affected area two (2) times a day. chest and upper extremities      hydrOXYzine HCL (ATARAX) 25 mg tablet Take 50 mg by mouth nightly. tab 1 tp 2 tabs      psyllium (METAMUCIL) powd Take  by mouth daily.  ibuprofen 200 mg cap Take 1 Tab by mouth as needed for Pain.       Omeprazole delayed release (PRILOSEC D/R) 20 mg tablet Take 20 mg by mouth daily.  fluticasone propionate (FLONASE) 50 mcg/actuation nasal spray 2 Sprays by Both Nostrils route daily. Indications: inflammation of the nose due to an allergy 1 Bottle 11    Shower Chair XX jozef As needed for patient safety 1 Each 0    Bedside Commode XX Please provide a commode for patient safety 1 Each 0    OTHER Please provide a tub transfer bench 1 Each 0    biotin (VITAMIN B7) 5 mg tablet Take 5 mg by mouth daily.  nortriptyline (PAMELOR) 25 mg capsule Take 1 Cap by mouth nightly.  May increase to 2 pills in 3 days 60 Cap 1    Comp Stocking,Knee,Long,Medium misc Wear daily while walking to prevent swelling 1 Each 0       Past Medical History:   Diagnosis Date    Chest pain 11/2014    neg EKG, non recurrent    Chronic pain     lower back and legs    Depression     Eczema     Fibroids     GERD (gastroesophageal reflux disease)     Headache(784.0)     Hiatal hernia     IBS (irritable bowel syndrome)     Microscopic hematuria     MS (multiple sclerosis) (HCC)     Rectal bleeding     Stool color black     irritable bowel       Past Surgical History:   Procedure Laterality Date    COLONOSCOPY,DIAGNOSTIC      HX BREAST BIOPSY Right 1/21/2015    RIGHT BREAST BIOPSY WITH NEEDLE LOCALIZATION ULTRASOUND performed by Rossy Hurtado MD at [facility] MAIN OR    HX CHOLECYSTECTOMY      HX GI      HX HYSTERECTOMY      HX TUBAL LIGATION         Social History     Socioeconomic History    Marital status: SINGLE     Spouse name: Not on file    Number of children: Not on file    Years of education: Not on file    Highest education level: Not on file   Occupational History    Not on file   Social Needs    Financial resource strain: Not on file    Food insecurity     Worry: Not on file     Inability: Not on file    Transportation needs     Medical: Not on file     Non-medical: Not on file   Tobacco Use    Smoking status: Former Smoker     Packs/day: 0.00 Last attempt to quit: 2016     Years since quittin.1    Smokeless tobacco: Former User     Quit date: 2016   Substance and Sexual Activity    Alcohol use: No     Alcohol/week: 10.0 standard drinks     Types: 10 Cans of beer per week    Drug use: No    Sexual activity: Yes     Partners: Male   Lifestyle    Physical activity     Days per week: Not on file     Minutes per session: Not on file    Stress: Not on file   Relationships    Social connections     Talks on phone: Not on file     Gets together: Not on file     Attends Synagogue service: Not on file     Active member of club or organization: Not on file     Attends meetings of clubs or organizations: Not on file     Relationship status: Not on file    Intimate partner violence     Fear of current or ex partner: Not on file     Emotionally abused: Not on file     Physically abused: Not on file     Forced sexual activity: Not on file   Other Topics Concern    Not on file   Social History Narrative    Not on file       Vitals:    20 1321   BP: 129/73   Pulse: 78   Resp: 16   Temp: 97.8 °F (36.6 °C)   TempSrc: Oral   SpO2: 98%   Weight: 171 lb (77.6 kg)   Height: 5' 5\" (1.651 m)   PainSc:   0 - No pain       Physical Exam  Constitutional:       Appearance: Normal appearance. Cardiovascular:      Rate and Rhythm: Normal rate and regular rhythm. Pulses: Normal pulses. Pulmonary:      Effort: Pulmonary effort is normal.      Breath sounds: Normal breath sounds. Musculoskeletal:      Right lower leg: Edema (trace) present. Left lower leg: Edema (trace) present. Neurological:      Mental Status: She is alert. Mental status is at baseline. Motor: Weakness present. Gait: Gait abnormal (ambulates with a cane). Hospital Outpatient Visit on 2020   Component Date Value Ref Range Status    [facility] SPEC[facility]. 2020 Specimens collected/sent to [facility]. Please direct inquiries to ([phone]). Final   Office Visit on 09/02/2020   Component Date Value Ref Range Status    PROBNP 09/02/2020 41  0 - 287 pg/mL Final    Comment: The following cut-points have been suggested for the  use of proBNP for the diagnostic evaluation of heart  failure (HF) in patients with acute dyspnea:  Modality                     Age           Optimal Cut                             (years)            Point  ------------------------------------------------------  Diagnosis (rule in HF)        <50            450 pg/mL                            50 - 75            900 pg/mL                                >75           1800 pg/mL  Exclusion (rule out HF)  Age independent     300 pg/mL         . Results for orders placed or performed during the hospital encounter of 09/02/20   [facility] SPECIMEN COL   Result Value Ref Range    [facility] SPECIMEN COLL[facility]. Specimens collected/sent to [email or web address]. Please direct inquiries to ([phone]). Results for orders placed or performed in visit on 09/02/20   NT-PRO BNP   Result Value Ref Range    PROBNP 41 0 - 287 pg/mL    Narrative    Performed at:  [address]  [id number]  : Ros Gilmore MD, Phone:  [phone]       Assessment / Plan:      ICD-10-CM ICD-9-CM    1. Lower extremity edema  R60.0 782.3 NT-PRO BNP   2. Perennial allergic rhinitis  J30.89 477.8 loratadine (Claritin) 10 mg tablet   3. Chronic left-sided low back pain with left-sided sciatica  M54.42 724.2 nabumetone (RELAFEN) 500 mg tablet    G89.29 724.3      338.29      Trace lower extremity edema-BNP ordered. No obvious short of breath or use of accessory muscles  Patient refills given    Follow-up and Dispositions    · Return in about 4 months (around 1/2/2021). I asked the patient if she  had any questions and answered her  questions.   The patient stated that she understands the treatment plan and agrees with the treatment plan    This document was created with a voice activated dictation system and may contain transcription errors.

## 2020-09-02 NOTE — PROGRESS NOTES
Visit Vitals  /73   Pulse 78   Temp 97.8 °F (36.6 °C) (Oral)   Resp 16   Ht 5' 5\" (1.651 m)   Wt 171 lb (77.6 kg)   LMP  (LMP Unknown)   SpO2 98%   BMI 28.46 kg/m²     Cary Isaacs presents today for   Chief Complaint   Patient presents with    Leg Swelling    Foot Swelling       Is someone accompanying this pt? no    Is the patient using any DME equipment during OV? no    Depression Screening:  3 most recent PHQ Screens 9/2/2020   PHQ Not Done -   Little interest or pleasure in doing things Not at all   Feeling down, depressed, irritable, or hopeless Not at all   Total Score PHQ 2 0   Trouble falling or staying asleep, or sleeping too much -   Feeling tired or having little energy -   Poor appetite, weight loss, or overeating -   Feeling bad about yourself - or that you are a failure or have let yourself or your family down -   Trouble concentrating on things such as school, work, reading, or watching TV -   Moving or speaking so slowly that other people could have noticed; or the opposite being so fidgety that others notice -   Thoughts of being better off dead, or hurting yourself in some way -   PHQ 9 Score -   How difficult have these problems made it for you to do your work, take care of your home and get along with others -       Learning Assessment:  Learning Assessment 6/15/2018   PRIMARY LEARNER Patient   CO-LEARNER CAREGIVER -   PRIMARY LANGUAGE ENGLISH   LEARNER PREFERENCE PRIMARY DEMONSTRATION   ANSWERED BY patient   RELATIONSHIP SELF       Abuse Screening:  Abuse Screening Questionnaire 8/14/2018   Do you ever feel afraid of your partner? N   Are you in a relationship with someone who physically or mentally threatens you? N   Is it safe for you to go home? Y       Fall Risk  No flowsheet data found. Health Maintenance reviewed and discussed and ordered per Provider.     Health Maintenance Due   Topic Date Due    DTaP/Tdap/Td series (1 - Tdap) 11/26/1984    Shingrix Vaccine Age 50> (1 of 2) 11/26/2013    Flu Vaccine (1) 09/01/2020           Coordination of Care:  1. Have you been to the ER, urgent care clinic since your last visit? Hospitalized since your last visit? no    2. Have you seen or consulted any other health care providers outside of the [address] [place] [address] since your last visit? Include any pap smears or colon screening.  No

## 2020-09-03 LAB — NT-PROBNP SERPL-MCNC: 41 PG/ML (ref 0–287)

## 2020-09-08 ENCOUNTER — VIRTUAL VISIT (OUTPATIENT)
Dept: NEUROLOGY | Age: 55
End: 2020-09-08

## 2020-09-08 DIAGNOSIS — F09 COGNITIVE DISORDER: Primary | ICD-10-CM

## 2020-09-08 DIAGNOSIS — F43.9 TRAUMA AND STRESSOR-RELATED DISORDER: ICD-10-CM

## 2020-09-08 DIAGNOSIS — F41.8 ANXIETY WITH SOMATIC FEATURES: ICD-10-CM

## 2020-09-08 DIAGNOSIS — F33.2 SEVERE EPISODE OF RECURRENT MAJOR DEPRESSIVE DISORDER, WITHOUT PSYCHOTIC FEATURES (HCC): ICD-10-CM

## 2020-09-08 NOTE — PROGRESS NOTES
Interactive Psychotherapy/office feedback        Interactive office feedback session with Ms. Shelby Henriquez. I reviewed the results of the recent Neuropsychological Evaluation  including the observed areas of neurocognitive strengths and weaknesses. Education was provided regarding my diagnostic impressions, and treatment plan/options were discussed. I also answered numerous questions related to the clinical findings, including the various methods to improve cognition and mood. CBT, psychoeducation, and supportive psychotherapy techniques were utilized. Prior to seeing the patient I reviewed the records, including the previously completed report, the records in [facility], and any updated visits from other providers since I saw the patient last.       Diagnoses:         1. Cognitive Disorder NOS  2. Major Depressive Disorder, severe  3. Anxiety with somatic features R/O Somatization and Conversion Disorders   4. Trauma or stressor related disorder        5. Paranoid Traits              The patient will follow up with the referring provider, and reported being very pleased with the services provided. Follow up with [facility] 6 months    [id number] psychotherapy 30 Minutes      This note will not be viewable in [address] This note was created using voice recognition software. Despite editing, there may be syntax errors. Idalmis Pham is a 62 y.o. female who was evaluated by an audio-video encounter for concerns as above. Patient identification was verified prior to start of the visit. Pursuant to the emergency declaration under the [facility]5 waiver authority and the [employer] General Act, this Virtual Visit was conducted, with patient's (and/or legal guardian's) consent, to reduce the patient's risk of exposure to COVID-19 and provide necessary medical care.      Services were provided through a synchronous discussion virtually to substitute for in-person clinic visit. I was at home. The patient was at home.

## 2020-09-28 DIAGNOSIS — M54.42 CHRONIC LEFT-SIDED LOW BACK PAIN WITH LEFT-SIDED SCIATICA: ICD-10-CM

## 2020-09-28 DIAGNOSIS — G89.29 CHRONIC LEFT-SIDED LOW BACK PAIN WITH LEFT-SIDED SCIATICA: ICD-10-CM

## 2020-09-28 NOTE — TELEPHONE ENCOUNTER
Last seen 09/08/20  Next appt  None    Requested Prescriptions     Pending Prescriptions Disp Refills    nabumetone (RELAFEN) 500 mg tablet 60 Tab 0     Sig: TAKE 1 TABLET BY MOUTH TWICE DAILY

## 2020-09-30 RX ORDER — NABUMETONE 500 MG/1
TABLET, FILM COATED ORAL
Qty: 60 TAB | Refills: 1 | Status: SHIPPED | OUTPATIENT
Start: 2020-09-30 | End: 2021-02-03 | Stop reason: SDUPTHER

## 2020-10-30 ENCOUNTER — TELEPHONE (OUTPATIENT)
Dept: FAMILY MEDICINE CLINIC | Age: 55
End: 2020-10-30

## 2020-10-30 NOTE — TELEPHONE ENCOUNTER
Patient call asking to get a mammogram ordered.   Please call her back at [phone] and give her scheduling number [phone] once order is placed

## 2020-11-02 DIAGNOSIS — J30.89 PERENNIAL ALLERGIC RHINITIS: ICD-10-CM

## 2020-11-02 RX ORDER — LORATADINE 10 MG/1
10 TABLET ORAL DAILY
Qty: 30 TAB | Refills: 1 | Status: SHIPPED | OUTPATIENT
Start: 2020-11-02 | End: 2020-12-29 | Stop reason: SDUPTHER

## 2020-11-02 NOTE — TELEPHONE ENCOUNTER
Last seen 09/02/20  Next appt  None    Requested Prescriptions     Pending Prescriptions Disp Refills    loratadine (Claritin) 10 mg tablet 30 Tab 1     Sig: Take 1 Tab by mouth daily.

## 2020-11-03 DIAGNOSIS — Z12.31 ENCOUNTER FOR SCREENING MAMMOGRAM FOR MALIGNANT NEOPLASM OF BREAST: Primary | ICD-10-CM

## 2020-11-03 DIAGNOSIS — Z12.31 ENCOUNTER FOR SCREENING MAMMOGRAM FOR MALIGNANT NEOPLASM OF BREAST: ICD-10-CM

## 2020-12-29 DIAGNOSIS — J30.89 PERENNIAL ALLERGIC RHINITIS: ICD-10-CM

## 2020-12-29 NOTE — TELEPHONE ENCOUNTER
Last seen 09/02/20  Next appt  01/21/21    Requested Prescriptions     Pending Prescriptions Disp Refills    loratadine (Claritin) 10 mg tablet 30 Tab 1     Sig: Take 1 Tab by mouth daily.

## 2020-12-31 RX ORDER — LORATADINE 10 MG/1
10 TABLET ORAL DAILY
Qty: 30 TAB | Refills: 1 | Status: SHIPPED | OUTPATIENT
Start: 2020-12-31 | End: 2021-02-24 | Stop reason: SDUPTHER

## 2021-01-04 DIAGNOSIS — J30.89 PERENNIAL ALLERGIC RHINITIS: ICD-10-CM

## 2021-01-04 RX ORDER — FLUTICASONE PROPIONATE 50 MCG
2 SPRAY, SUSPENSION (ML) NASAL DAILY
Qty: 1 BOTTLE | Refills: 4 | Status: SHIPPED | OUTPATIENT
Start: 2021-01-04 | End: 2021-02-26 | Stop reason: SDUPTHER

## 2021-01-04 NOTE — TELEPHONE ENCOUNTER
Last seen 20  Next appt  21    Requested Prescriptions     Pending Prescriptions Disp Refills    fluticasone propionate (FLONASE) 50 mcg/actuation nasal spray 1 Bottle 11     Si Sprays by Both Nostrils route daily.  Indications: inflammation of the nose due to an allergy

## 2021-01-18 ENCOUNTER — HOSPITAL ENCOUNTER (OUTPATIENT)
Dept: INFUSION THERAPY | Age: 56
Discharge: HOME | End: 2021-01-18
Payer: MEDICARE

## 2021-01-18 VITALS
TEMPERATURE: 97.8 F | RESPIRATION RATE: 18 BRPM | OXYGEN SATURATION: 100 % | HEART RATE: 72 BPM | SYSTOLIC BLOOD PRESSURE: 140 MMHG | DIASTOLIC BLOOD PRESSURE: 70 MMHG

## 2021-01-18 DIAGNOSIS — G35 MULTIPLE SCLEROSIS (HCC): Primary | ICD-10-CM

## 2021-01-18 LAB
ALBUMIN SERPL-MCNC: 3.9 G/DL (ref 3.4–5)
ALBUMIN/GLOB SERPL: 1 {RATIO} (ref 0.8–1.7)
ALP SERPL-CCNC: 131 U/L (ref 45–117)
ALT SERPL-CCNC: 24 U/L (ref 13–56)
ANION GAP SERPL CALC-SCNC: 4 MMOL/L (ref 3–18)
AST SERPL-CCNC: 12 U/L (ref 10–38)
BASO+EOS+MONOS # BLD AUTO: 0.4 K/UL (ref 0–2.3)
BASO+EOS+MONOS NFR BLD AUTO: 7 % (ref 0.1–17)
BILIRUB SERPL-MCNC: 0.6 MG/DL (ref 0.2–1)
BUN SERPL-MCNC: 11 MG/DL (ref 7–18)
BUN/CREAT SERPL: 15 (ref 12–20)
CALCIUM SERPL-MCNC: 9.1 MG/DL (ref 8.5–10.1)
CHLORIDE SERPL-SCNC: 109 MMOL/L (ref 100–111)
CO2 SERPL-SCNC: 30 MMOL/L (ref 21–32)
CREAT SERPL-MCNC: 0.75 MG/DL (ref 0.6–1.3)
DIFFERENTIAL METHOD BLD: ABNORMAL
ERYTHROCYTE [DISTWIDTH] IN BLOOD BY AUTOMATED COUNT: 14.2 % (ref 11.5–14.5)
GLOBULIN SER CALC-MCNC: 4 G/DL (ref 2–4)
GLUCOSE SERPL-MCNC: 91 MG/DL (ref 74–99)
HCT VFR BLD AUTO: 36.4 % (ref 36–48)
HGB BLD-MCNC: 12.3 G/DL (ref 12–16)
LYMPHOCYTES # BLD: 2.5 K/UL (ref 1.1–5.9)
LYMPHOCYTES NFR BLD: 52 % (ref 14–44)
MCH RBC QN AUTO: 28.1 PG (ref 25–35)
MCHC RBC AUTO-ENTMCNC: 33.8 G/DL (ref 31–37)
MCV RBC AUTO: 83.3 FL (ref 78–102)
NEUTS SEG # BLD: 1.9 K/UL (ref 1.8–9.5)
NEUTS SEG NFR BLD: 41 % (ref 40–70)
PLATELET # BLD AUTO: 269 K/UL (ref 140–440)
POTASSIUM SERPL-SCNC: 3.7 MMOL/L (ref 3.5–5.5)
PROT SERPL-MCNC: 7.9 G/DL (ref 6.4–8.2)
RBC # BLD AUTO: 4.37 M/UL (ref 4.1–5.1)
SODIUM SERPL-SCNC: 143 MMOL/L (ref 136–145)
WBC # BLD AUTO: 4.8 K/UL (ref 4.5–13)

## 2021-01-18 PROCEDURE — 96415 CHEMO IV INFUSION ADDL HR: CPT

## 2021-01-18 PROCEDURE — 74011250636 HC RX REV CODE- 250/636: Performed by: NURSE PRACTITIONER

## 2021-01-18 PROCEDURE — 96413 CHEMO IV INFUSION 1 HR: CPT

## 2021-01-18 PROCEDURE — 96375 TX/PRO/DX INJ NEW DRUG ADDON: CPT

## 2021-01-18 PROCEDURE — 85025 COMPLETE CBC W/AUTO DIFF WBC: CPT

## 2021-01-18 PROCEDURE — 80053 COMPREHEN METABOLIC PANEL: CPT

## 2021-01-18 PROCEDURE — 74011250637 HC RX REV CODE- 250/637: Performed by: NURSE PRACTITIONER

## 2021-01-18 PROCEDURE — 96374 THER/PROPH/DIAG INJ IV PUSH: CPT

## 2021-01-18 RX ORDER — DIPHENHYDRAMINE HYDROCHLORIDE 50 MG/ML
50 INJECTION, SOLUTION INTRAMUSCULAR; INTRAVENOUS AS NEEDED
Status: CANCELLED
Start: 2021-02-01

## 2021-01-18 RX ORDER — ACETAMINOPHEN 325 MG/1
650 TABLET ORAL AS NEEDED
Status: CANCELLED
Start: 2021-02-01

## 2021-01-18 RX ORDER — HEPARIN 100 UNIT/ML
300-500 SYRINGE INTRAVENOUS AS NEEDED
Status: CANCELLED
Start: 2021-02-01

## 2021-01-18 RX ORDER — HYDROCORTISONE SODIUM SUCCINATE 100 MG/2ML
100 INJECTION, POWDER, FOR SOLUTION INTRAMUSCULAR; INTRAVENOUS AS NEEDED
Status: CANCELLED | OUTPATIENT
Start: 2021-02-01

## 2021-01-18 RX ORDER — EPINEPHRINE 1 MG/ML
0.3 INJECTION, SOLUTION, CONCENTRATE INTRAVENOUS AS NEEDED
Status: CANCELLED | OUTPATIENT
Start: 2021-02-01

## 2021-01-18 RX ORDER — SODIUM CHLORIDE 0.9 % (FLUSH) 0.9 %
10 SYRINGE (ML) INJECTION AS NEEDED
Status: CANCELLED
Start: 2021-02-01

## 2021-01-18 RX ORDER — DIPHENHYDRAMINE HYDROCHLORIDE 50 MG/ML
50 INJECTION, SOLUTION INTRAMUSCULAR; INTRAVENOUS ONCE
Status: COMPLETED | OUTPATIENT
Start: 2021-01-18 | End: 2021-01-18

## 2021-01-18 RX ORDER — SODIUM CHLORIDE 9 MG/ML
10 INJECTION INTRAMUSCULAR; INTRAVENOUS; SUBCUTANEOUS AS NEEDED
Status: CANCELLED | OUTPATIENT
Start: 2021-02-01

## 2021-01-18 RX ORDER — ACETAMINOPHEN 325 MG/1
650 TABLET ORAL ONCE
Status: COMPLETED | OUTPATIENT
Start: 2021-01-18 | End: 2021-01-18

## 2021-01-18 RX ORDER — ALBUTEROL SULFATE 0.83 MG/ML
2.5 SOLUTION RESPIRATORY (INHALATION) AS NEEDED
Status: CANCELLED
Start: 2021-02-01

## 2021-01-18 RX ORDER — ONDANSETRON 2 MG/ML
8 INJECTION INTRAMUSCULAR; INTRAVENOUS AS NEEDED
Status: CANCELLED | OUTPATIENT
Start: 2021-02-01

## 2021-01-18 RX ADMIN — DIPHENHYDRAMINE HYDROCHLORIDE 50 MG: 50 INJECTION INTRAMUSCULAR; INTRAVENOUS at 09:31

## 2021-01-18 RX ADMIN — METHYLPREDNISOLONE SODIUM SUCCINATE 125 MG: 125 INJECTION, POWDER, FOR SOLUTION INTRAMUSCULAR; INTRAVENOUS at 09:31

## 2021-01-18 RX ADMIN — ACETAMINOPHEN 650 MG: 325 TABLET ORAL at 09:30

## 2021-01-18 RX ADMIN — SODIUM CHLORIDE 600 MG: 9 INJECTION, SOLUTION INTRAVENOUS at 10:10

## 2021-01-18 NOTE — PROGRESS NOTES
[facility] Progress Note    Date: 2021    Name: Koki Cano    MRN: [id number]         : 1965      Ms. Jeffrey Bullard arrived in the [facility] today at 0910, in stable condition, here for Ocrevus (ocrelizumab) Infusion Q 6 months/labs. She was assessed and education was provided. Ms. Jacobs's vitals were reviewed. Visit Vitals  BP (!) 142/78 (BP 1 Location: Left arm, BP Patient Position: Sitting)   Pulse 77   Temp 97.6 °F (36.4 °C)   Resp 18   SpO2 100%   Breastfeeding No         PIV # 22 G, was established in her right AC flush and brisk blood return noted.  ml IV Bag, was initiated, to infuse @ KVO, PRN, throughout treatment today. The following pre-medications were administered per order, and without incident:  Tylenol 650 mg PO, Benadryl 50 mg IVP, & Solumedrol 125 mg IVP. Ocrevus (Ocrelizumab) 600 mg IV, was administered over approximately 3 hours, using the following rate titration:  40 ml/hr X 30 minutes, 80 ml/hr X 30 minutes, 120 ml/hr X 30 minutes, 160 ml/hr X 30 minutes, 200 ml/hr until completion. After completion of the Ocrevus Infusion, Ms. Ermias Agudelo declined observation stating she had to be somewhere by 3pm.     PIV was removed and gauze/bandaid was applied. Ms. Jeffrey Bullard tolerated well, and had no complaints. Ms. Jeffrey Bullard was discharged from [place] [address] in stable condition at 1400. She has her next appointment on 2021 at 0900 for [address] [place] [address].          Modesto Diamond  2021

## 2021-01-21 ENCOUNTER — VIRTUAL VISIT (OUTPATIENT)
Dept: FAMILY MEDICINE CLINIC | Age: 56
End: 2021-01-21
Payer: MEDICARE

## 2021-01-21 DIAGNOSIS — G47.9 SLEEP DISORDER: Primary | ICD-10-CM

## 2021-01-21 DIAGNOSIS — G35 MULTIPLE SCLEROSIS (HCC): ICD-10-CM

## 2021-01-21 DIAGNOSIS — I83.12 VARICOSE VEINS OF BOTH LOWER EXTREMITIES WITH INFLAMMATION: ICD-10-CM

## 2021-01-21 DIAGNOSIS — I83.11 VARICOSE VEINS OF BOTH LOWER EXTREMITIES WITH INFLAMMATION: ICD-10-CM

## 2021-01-21 PROCEDURE — G8432 DEP SCR NOT DOC, RNG: HCPCS | Performed by: NURSE PRACTITIONER

## 2021-01-21 PROCEDURE — G9899 SCRN MAM PERF RSLTS DOC: HCPCS | Performed by: NURSE PRACTITIONER

## 2021-01-21 PROCEDURE — 99212 OFFICE O/P EST SF 10 MIN: CPT | Performed by: NURSE PRACTITIONER

## 2021-01-21 PROCEDURE — 3017F COLORECTAL CA SCREEN DOC REV: CPT | Performed by: NURSE PRACTITIONER

## 2021-01-21 PROCEDURE — G8419 CALC BMI OUT NRM PARAM NOF/U: HCPCS | Performed by: NURSE PRACTITIONER

## 2021-01-21 PROCEDURE — G8428 CUR MEDS NOT DOCUMENT: HCPCS | Performed by: NURSE PRACTITIONER

## 2021-01-21 NOTE — PROGRESS NOTES
Chirag Brown presents today for   Chief Complaint   Patient presents with    Sleep Problem    Leg Pain     right leg pain       Virtual/telephone visit    Depression Screening:  3 most recent PHQ Screens 1/21/2021   PHQ Not Done -   Little interest or pleasure in doing things Not at all   Feeling down, depressed, irritable, or hopeless Not at all   Total Score PHQ 2 0   Trouble falling or staying asleep, or sleeping too much -   Feeling tired or having little energy -   Poor appetite, weight loss, or overeating -   Feeling bad about yourself - or that you are a failure or have let yourself or your family down -   Trouble concentrating on things such as school, work, reading, or watching TV -   Moving or speaking so slowly that other people could have noticed; or the opposite being so fidgety that others notice -   Thoughts of being better off dead, or hurting yourself in some way -   PHQ 9 Score -   How difficult have these problems made it for you to do your work, take care of your home and get along with others -       Learning Assessment:  Learning Assessment 6/15/2018   PRIMARY LEARNER Patient   CO-LEARNER CAREGIVER -   PRIMARY LANGUAGE ENGLISH   LEARNER PREFERENCE PRIMARY DEMONSTRATION   ANSWERED BY patient   RELATIONSHIP SELF       Health Maintenance reviewed and discussed and ordered per Provider. Health Maintenance Due   Topic Date Due    DTaP/Tdap/Td series (1 - Tdap) 11/26/1984    Shingrix Vaccine Age 50> (1 of 2) 11/26/2013    Flu Vaccine (1) 09/01/2020   . Coordination of Care:  1. Have you been to the ER, urgent care clinic since your last visit? Hospitalized since your last visit? no    2. Have you seen or consulted any other health care providers outside of the [address] [place] [address] since your last visit? Include any pap smears or colon screening.  no

## 2021-01-21 NOTE — PROGRESS NOTES
Deborah Milton is a 60 y.o. female who was seen by synchronous (real-time) audio-video technology on 1/21/2021 for Sleep Problem and Leg Pain (right leg pain)    Assessment & Plan:   Diagnoses and all orders for this visit:    1. Sleep disorder    2. Varicose veins of both lower extremities with inflammation  -     REFERRAL TO VASCULAR CENTER    3. Multiple sclerosis (HCC)            Subjective: The patient presents for an Audio-visual teleconference appointment for complaints of sleep apnea and right lower extremity leg pain which she is complaining about lower extremity edema. She is negative for any dyspnea or chest pain. Notes that she has varicose veins is causing her legs to edema. Complaining of eczema-followed by DermatolgyDr/ Davide Shelby. Follow-up next week  Complaining of sleep apnea- followed by Neurology/Dr. Mony Simental    Prior to Admission medications    Medication Sig Start Date End Date Taking? Authorizing Provider   fluticasone propionate (FLONASE) 50 mcg/actuation nasal spray 2 Sprays by Both Nostrils route daily. Indications: inflammation of the nose due to an allergy 1/4/21  Yes Sedrick Lofton NP   loratadine (Claritin) 10 mg tablet Take 1 Tab by mouth daily. 12/31/20  Yes Sedrick Lofton NP   nabumetone (RELAFEN) 500 mg tablet TAKE 1 TABLET BY MOUTH TWICE DAILY 9/30/20  Yes Sedrick Lofton NP   [employer] wheel walker 7/15/20  Yes Sedrick Lofton NP   betamethasone valerate (VALISONE) 0.1 % topical lotion Apply 1 Applicator to affected area two (2) times a day. Yes Provider, Historical   triamcinolone acetonide (KENALOG) 0.1 % ointment Apply 1 Applicator to affected area two (2) times a day. chest and upper extremities   Yes Provider, Historical   hydrOXYzine HCL (ATARAX) 25 mg tablet Take 50 mg by mouth nightly. tab 1 tp 2 tabs   Yes Provider, Historical   ibuprofen 200 mg cap Take 1 Tab by mouth as needed for Pain.    Yes Provider, Historical Shower Chair XX jozef As needed for patient safety 9/12/19  Yes Bo Awad MD   Bedside Commode XX Please provide a commode for patient safety 9/12/19  Yes Bo Awad MD   OTHER Please provide a tub transfer bench 9/6/19  Yes Jocelyn Jones NP   biotin (VITAMIN B7) 5 mg tablet Take 5 mg by mouth daily. Yes Provider, Historical   Comp Stocking,Knee,Long,Medium misc Wear daily while walking to prevent swelling 5/19/18  Yes Nisha Noel MD   psyllium (METAMUCIL) powd Take  by mouth daily. Provider, Historical   Omeprazole delayed release (PRILOSEC D/R) 20 mg tablet Take 20 mg by mouth daily. Provider, Historical   nortriptyline (PAMELOR) 25 mg capsule Take 1 Cap by mouth nightly. May increase to 2 pills in 3 days 5/30/19   Rosanna Hogue MD     Patient Active Problem List   Diagnosis Code    Microscopic hematuria R31.29    Rectal bleeding K62.5    Chronic pain G89.29    Diffuse cystic mastopathy N60.19    Paranoid schizophrenia ([facility] F20.0    Chronic left-sided low back pain with left-sided sciatica M54.42, G89.29    Spells of decreased attentiveness R68.89    Unsteady gait R26.81    Perennial allergic rhinitis J30.89    Multiple sclerosis ([facility] G35     Patient Active Problem List    Diagnosis Date Noted    Multiple sclerosis ([facility] 07/29/2019    Chronic left-sided low back pain with left-sided sciatica 09/26/2018    Spells of decreased attentiveness 09/26/2018    Unsteady gait 09/26/2018    Perennial allergic rhinitis 09/26/2018    Paranoid schizophrenia ([facility] 06/15/2018    Diffuse cystic mastopathy 12/10/2015    Rectal bleeding 11/11/2014    Chronic pain 11/11/2014    Microscopic hematuria      Current Outpatient Medications   Medication Sig Dispense Refill    fluticasone propionate (FLONASE) 50 mcg/actuation nasal spray 2 Sprays by Both Nostrils route daily.  Indications: inflammation of the nose due to an allergy 1 Bottle 4    loratadine (Claritin) 10 mg tablet Take 1 Tab by mouth daily. 30 Tab 1    nabumetone (RELAFEN) 500 mg tablet TAKE 1 TABLET BY MOUTH TWICE DAILY 60 Tab 1    Walker [employer] wheel walker 1 Each 0    betamethasone valerate (VALISONE) 0.1 % topical lotion Apply 1 Applicator to affected area two (2) times a day.  triamcinolone acetonide (KENALOG) 0.1 % ointment Apply 1 Applicator to affected area two (2) times a day. chest and upper extremities      hydrOXYzine HCL (ATARAX) 25 mg tablet Take 50 mg by mouth nightly. tab 1 tp 2 tabs      ibuprofen 200 mg cap Take 1 Tab by mouth as needed for Pain.  Shower Chair XX jozef As needed for patient safety 1 Each 0    Bedside Commode XX Please provide a commode for patient safety 1 Each 0    OTHER Please provide a tub transfer bench 1 Each 0    biotin (VITAMIN B7) 5 mg tablet Take 5 mg by mouth daily.  Comp Stocking,Knee,Long,Medium misc Wear daily while walking to prevent swelling 1 Each 0    psyllium (METAMUCIL) powd Take  by mouth daily.  Omeprazole delayed release (PRILOSEC D/R) 20 mg tablet Take 20 mg by mouth daily.  nortriptyline (PAMELOR) 25 mg capsule Take 1 Cap by mouth nightly.  May increase to 2 pills in 3 days 60 Cap 1     Allergies   Allergen Reactions    Naproxen Shortness of Breath    Shellfish Derived Nausea and Vomiting     SEVERE NAUSEA AND VOMITING    Amoxicillin Rash and Nausea Only     Past Medical History:   Diagnosis Date    Chest pain 11/2014    neg EKG, non recurrent    Chronic pain     lower back and legs    Depression     Eczema     Fibroids     GERD (gastroesophageal reflux disease)     Headache(784.0)     Hiatal hernia     IBS (irritable bowel syndrome)     Microscopic hematuria     MS (multiple sclerosis) (HCC)     Rectal bleeding     Stool color black     irritable bowel       ROS    Constitutional: No apparent distress noted  General- negative for fever, chills or fatigue  Eyes- negative visual changes  CV- denies chest pain, palpitation  Pul: negative cough or SOB  GI: negative nausea, flank pain, diarrhea, constipation  Urinary:- No dysuria or polyuria  MS-right lower extremity leg pain  Neuro- negative headache, dizziness or weakness  Skin- negative for rashes or lesions. Psych- MDD and anxiety    Objective:   No flowsheet data found. [INSTRUCTIONS:  \"[x]\" Indicates a positive item  \"[]\" Indicates a negative item  -- DELETE ALL ITEMS NOT EXAMINED]    Constitutional: [x] Appears well-developed and well-nourished [x] No apparent distress      [] Abnormal -     Mental status: [x] Alert and awake  [x] Oriented to person/place/time [x] Able to follow commands    [] Abnormal -     Eyes:   EOM    [x]  Normal    [] Abnormal -   Sclera  [x]  Normal    [] Abnormal -          Discharge [x]  None visible   [] Abnormal -     HENT: [x] Normocephalic, atraumatic  [] Abnormal -   [x] Mouth/Throat: Mucous membranes are moist    External Ears [x] Normal  [] Abnormal -    Neck: [x] No visualized mass [] Abnormal -     Pulmonary/Chest: [x] Respiratory effort normal   [x] No visualized signs of difficulty breathing or respiratory distress        [] Abnormal -      Musculoskeletal:   [x] Normal gait with no signs of ataxia         [x] Normal range of motion of neck        [] Abnormal -     Neurological:        [x] No Facial Asymmetry (Cranial nerve 7 motor function) (limited exam due to video visit)          [x] No gaze palsy        [] Abnormal -          Skin:        [x] No significant exanthematous lesions or discoloration noted on facial skin         [] Abnormal -            Psychiatric:       [x] Normal Affect [] Abnormal -        [x] No Hallucinations    Other pertinent observable physical exam findings:-        We discussed the expected course, resolution and complications of the diagnosis(es) in detail. Medication risks, benefits, costs, interactions, and alternatives were discussed as indicated.   I advised her to contact the office if her condition worsens, changes or fails to improve as anticipated. She expressed understanding with the diagnosis(es) and plan. Jaja Yost, who was evaluated through a patient-initiated, synchronous (real-time) audio-video encounter, and/or her healthcare decision maker, is aware that it is a billable service, with coverage as determined by her insurance carrier. She provided verbal consent to proceed: Yes, and patient identification was verified. It was conducted pursuant to the emergency declaration under the [address] [place] [address] and the [employer] General Act. A caregiver was present when appropriate. Ability to conduct physical exam was limited. I was at home. The patient was at home.       Mellissa Sams NP

## 2021-02-03 DIAGNOSIS — G89.29 CHRONIC LEFT-SIDED LOW BACK PAIN WITH LEFT-SIDED SCIATICA: ICD-10-CM

## 2021-02-03 DIAGNOSIS — M54.42 CHRONIC LEFT-SIDED LOW BACK PAIN WITH LEFT-SIDED SCIATICA: ICD-10-CM

## 2021-02-03 NOTE — TELEPHONE ENCOUNTER
Requested Prescriptions     Pending Prescriptions Disp Refills    nabumetone (RELAFEN) 500 mg tablet 60 Tab 1     Sig: TAKE 1 TABLET BY MOUTH TWICE DAILY

## 2021-02-05 ENCOUNTER — HOSPITAL ENCOUNTER (OUTPATIENT)
Dept: MAMMOGRAPHY | Age: 56
Discharge: HOME | End: 2021-02-05
Attending: NURSE PRACTITIONER
Payer: MEDICARE

## 2021-02-05 DIAGNOSIS — Z12.31 ENCOUNTER FOR SCREENING MAMMOGRAM FOR MALIGNANT NEOPLASM OF BREAST: ICD-10-CM

## 2021-02-05 PROCEDURE — 77063 BREAST TOMOSYNTHESIS BI: CPT

## 2021-02-08 RX ORDER — NABUMETONE 500 MG/1
TABLET, FILM COATED ORAL
Qty: 60 TAB | Refills: 1 | Status: SHIPPED | OUTPATIENT
Start: 2021-02-08 | End: 2021-03-02 | Stop reason: SDUPTHER

## 2021-02-16 ENCOUNTER — TELEPHONE (OUTPATIENT)
Dept: FAMILY MEDICINE CLINIC | Age: 56
End: 2021-02-16

## 2021-02-16 NOTE — TELEPHONE ENCOUNTER
Patient is asking for referral to Dr Ladd Runner Doctor  She has appointment on the 02/18/21 and also to Dr Sho Clark she also has appointment on 02/25/21 thanks

## 2021-02-17 DIAGNOSIS — R60.0 LOWER EXTREMITY EDEMA: Primary | ICD-10-CM

## 2021-02-24 DIAGNOSIS — J30.89 PERENNIAL ALLERGIC RHINITIS: ICD-10-CM

## 2021-02-25 ENCOUNTER — OFFICE VISIT (OUTPATIENT)
Dept: NEUROLOGY | Age: 56
End: 2021-02-25
Payer: MEDICARE

## 2021-02-25 ENCOUNTER — HOME HEALTH ADMISSION (OUTPATIENT)
Dept: HOME HEALTH SERVICES | Facility: HOME HEALTH | Age: 56
End: 2021-02-25
Payer: MEDICARE

## 2021-02-25 VITALS
TEMPERATURE: 96.7 F | OXYGEN SATURATION: 98 % | DIASTOLIC BLOOD PRESSURE: 80 MMHG | SYSTOLIC BLOOD PRESSURE: 120 MMHG | HEIGHT: 65 IN | HEART RATE: 85 BPM | WEIGHT: 170 LBS | RESPIRATION RATE: 18 BRPM | BODY MASS INDEX: 28.32 KG/M2

## 2021-02-25 DIAGNOSIS — G35 MS (MULTIPLE SCLEROSIS) (HCC): Primary | ICD-10-CM

## 2021-02-25 DIAGNOSIS — R41.3 MEMORY DEFICIT: ICD-10-CM

## 2021-02-25 DIAGNOSIS — M79.604 PAIN OF RIGHT LOWER EXTREMITY: ICD-10-CM

## 2021-02-25 DIAGNOSIS — R26.89 IMPAIRED GAIT AND MOBILITY: ICD-10-CM

## 2021-02-25 DIAGNOSIS — G47.10 HYPERSOMNOLENCE: ICD-10-CM

## 2021-02-25 PROCEDURE — 3017F COLORECTAL CA SCREEN DOC REV: CPT | Performed by: NURSE PRACTITIONER

## 2021-02-25 PROCEDURE — G8432 DEP SCR NOT DOC, RNG: HCPCS | Performed by: NURSE PRACTITIONER

## 2021-02-25 PROCEDURE — 99214 OFFICE O/P EST MOD 30 MIN: CPT | Performed by: NURSE PRACTITIONER

## 2021-02-25 PROCEDURE — G9899 SCRN MAM PERF RSLTS DOC: HCPCS | Performed by: NURSE PRACTITIONER

## 2021-02-25 PROCEDURE — G8419 CALC BMI OUT NRM PARAM NOF/U: HCPCS | Performed by: NURSE PRACTITIONER

## 2021-02-25 PROCEDURE — G8427 DOCREV CUR MEDS BY ELIG CLIN: HCPCS | Performed by: NURSE PRACTITIONER

## 2021-02-25 RX ORDER — BACLOFEN 5 MG/1
5 TABLET ORAL 3 TIMES DAILY
Qty: 90 TAB | Refills: 5 | Status: SHIPPED | OUTPATIENT
Start: 2021-02-25 | End: 2021-08-25 | Stop reason: SDUPTHER

## 2021-02-25 RX ORDER — LORATADINE 10 MG/1
10 TABLET ORAL DAILY
Qty: 30 TAB | Refills: 1 | Status: SHIPPED | OUTPATIENT
Start: 2021-02-25 | End: 2021-04-23 | Stop reason: SDUPTHER

## 2021-02-25 NOTE — PROGRESS NOTES
[facility]  [address], [address], [place], [address]  [address] [place], [address]  Office:  [phone]  Fax: [phone]  Chief Complaint   Patient presents with    Leg Pain     follow up    Multiple Sclerosis       HPI: Chirag Brown presents in follow-up. She feels terrible. She reports aching in the whole body- back, arms, legs. It is more on the right leg and she says her right foot swelled up. States the foot doctor gave her a cortisone shot. Her pain is described as aching. There is also some sharper nagging pains. It occurs going down the legs. She has pain from the right knee down. Her right leg hurts today, feels stiff. The worst of it is the stiffness and achiness, it feels like her toes are tight. She is not having headaches anymore. She had her last Ocrevus infusion on January 18 although she states that she had to miss the ending of it because of transportation. She reports she had 1 fall which was in September or October on the steps. She did not injure herself or lose consciousness. She still feels real imbalance. She believes it has gotten worse. She is currently living in a town home but she is trying to get out and get to a 1 level because of all the steps. She finds it overall her balance and achiness is getting worse. Her back aches from the mid back down. No episodes of unilateral weakness or vision loss. She reports visual changes and got glasses from the eye doctor. She reports cataracts in both eyes and may need surgery but she cannot now. She is not on Pamelor, had to stop due to side effects. She is on nabumetone for arthritis. Endorses still falling asleep all the time. She falls asleep when people talk to her. She has the fatigue every day. She sleeps poorly at night. She cannot stay asleep. Also she gets up to go to the bathroom 2-3 times. She sees the dermatologist for eczema.     Past Medical History:   Diagnosis Date    Chest pain 11/2014    neg EKG, non recurrent    Chronic pain     lower back and legs    Depression     Eczema     Fibroids     GERD (gastroesophageal reflux disease)     Headache(784.0)     Hiatal hernia     IBS (irritable bowel syndrome)     Microscopic hematuria     MS (multiple sclerosis) (HCC)     Rectal bleeding     Stool color black     irritable bowel       Past Surgical History:   Procedure Laterality Date    COLONOSCOPY,DIAGNOSTIC      HX BREAST BIOPSY Right 1/21/2015    RIGHT BREAST BIOPSY WITH NEEDLE LOCALIZATION ULTRASOUND performed by Nasir Thomas MD at [address] HX CHOLECYSTECTOMY      HX GI      HX HYSTERECTOMY      HX TUBAL LIGATION         Current Outpatient Medications   Medication Sig Dispense Refill    baclofen 5 mg tab Take 5 mg by mouth three (3) times daily. 90 Tab 5    nabumetone (RELAFEN) 500 mg tablet TAKE 1 TABLET BY MOUTH TWICE DAILY 60 Tab 1    fluticasone propionate (FLONASE) 50 mcg/actuation nasal spray 2 Sprays by Both Nostrils route daily. Indications: inflammation of the nose due to an allergy 1 Bottle 4    loratadine (Claritin) 10 mg tablet Take 1 Tab by mouth daily. 30 Tab 1    Walker [employer] wheel walker 1 Each 0    betamethasone valerate (VALISONE) 0.1 % topical lotion Apply 1 Applicator to affected area two (2) times a day.  triamcinolone acetonide (KENALOG) 0.1 % ointment Apply 1 Applicator to affected area two (2) times a day. chest and upper extremities      ibuprofen 200 mg cap Take 1 Tab by mouth as needed for Pain.  Omeprazole delayed release (PRILOSEC D/R) 20 mg tablet Take 20 mg by mouth daily.  Shower Chair XX jozef As needed for patient safety 1 Each 0    Bedside Commode XX Please provide a commode for patient safety 1 Each 0    OTHER Please provide a tub transfer bench 1 Each 0    biotin (VITAMIN B7) 5 mg tablet Take 5 mg by mouth daily.       Comp Stocking,Knee,Long,Medium misc Wear daily while walking to prevent swelling 1 Each 0    hydrOXYzine HCL (ATARAX) 25 mg tablet Take 50 mg by mouth nightly. tab 1 tp 2 tabs      psyllium (METAMUCIL) powd Take  by mouth daily. Allergies   Allergen Reactions    Naproxen Shortness of Breath    Shellfish Derived Nausea and Vomiting     SEVERE NAUSEA AND VOMITING    Amoxicillin Rash and Nausea Only       Social History     Tobacco Use    Smoking status: Former Smoker     Packs/day: 0.00     Quit date: 2016     Years since quittin.6    Smokeless tobacco: Former User     Quit date: 2016   Substance Use Topics    Alcohol use: No     Alcohol/week: 10.0 standard drinks     Types: 10 Cans of beer per week    Drug use: No       Family History   Problem Relation Age of Onset    HIV/AIDS Brother     Diabetes Father     Cancer Brother     Hypertension Sister     Diabetes Brother     Hypertension Sister     Hypertension Sister     Hypertension Brother        Review of Systems:  GENERAL: Denies fever. +fatigue. CARDIAC: No CP or SOB  PULMONARY: No cough or SOB  MUSCULOSKELETAL: +back pain, R knee pain. NEURO: SEE HPI    Physical Examination:  Visit Vitals  /80 (BP 1 Location: Right arm, BP Patient Position: Sitting, BP Cuff Size: Adult)   Pulse 85   Temp (!) 96.7 °F (35.9 °C)   Resp 18   Ht 5' 5\" (1.651 m)   Wt 77.1 kg (170 lb)   LMP  (LMP Unknown)   SpO2 98%   BMI 28.29 kg/m²       Alert, in NAD. Heart is regular. Oriented x3. Speech is fluent. Speech clear. EOMs are full, PERRL. +mild nystagmus bilaterally in lateral gaze. No facial asymmetry. Tongue midline with tremulousness. Mild weakness throughout vs poor effort. Tone is normal. DTRs +3. Antalgic gait with cane, left knee turned inward. Impression/Plan: This is a 73-year-old left-handed female who presents in follow-up for MS. She has been on Ocrevus which started 2020 after having had side effects with Tecfidera.   She is tolerating Ocrevus without side effects. She is reporting having a lot of achiness, pain, stiffness. There is also some right knee pain reported and foot swelling. She is going to get checked out with vascular. She has previously been on Pamelor for pain but had to stop due to side effects. We will try baclofen for some of the pain/stiffness. Instructed on use of the medication and potential side effects. Consider ortho evaluation for right knee pain. I would like to initiate a sleep evaluation for the hypersomnolence. Home health physical therapy for her gait and mobility as well as home health aide. Follow up in 6 weeks. We will plan for follow-up MRI testing to be done in June and her next Ocrevus infusion is in July. We can consider repeat neuropsych evaluation in August.    Diagnoses and all orders for this visit:    1. MS ([facility] sclerosis) ([facility]  -     [address] [place] [address]  -     baclofen 5 mg tab; Take 5 mg by mouth three (3) times daily. 2. Memory deficit    3. Hypersomnolence  -     REFERRAL TO NEUROLOGY    4. Pain of right lower extremity    5. Impaired gait and mobility      Total time 35 minutes with 25 minutes spent in counseling. Signed By: Nino Bojorquez NP        PLEASE NOTE:   Portions of this document may have been produced using voice recognition software. Unrecognized errors in transcription may be present.

## 2021-02-25 NOTE — PROGRESS NOTES
Pawan Nazario presents today for   Chief Complaint   Patient presents with    Leg Pain     follow up    Multiple Sclerosis       Is someone accompanying this pt? no    Is the patient using any DME equipment during OV? no    Depression Screening:  3 most recent PHQ Screens 1/21/2021   PHQ Not Done -   Little interest or pleasure in doing things Not at all   Feeling down, depressed, irritable, or hopeless Not at all   Total Score PHQ 2 0   Trouble falling or staying asleep, or sleeping too much -   Feeling tired or having little energy -   Poor appetite, weight loss, or overeating -   Feeling bad about yourself - or that you are a failure or have let yourself or your family down -   Trouble concentrating on things such as school, work, reading, or watching TV -   Moving or speaking so slowly that other people could have noticed; or the opposite being so fidgety that others notice -   Thoughts of being better off dead, or hurting yourself in some way -   PHQ 9 Score -   How difficult have these problems made it for you to do your work, take care of your home and get along with others -       Learning Assessment:  Learning Assessment 6/15/2018   PRIMARY LEARNER Patient   CO-LEARNER CAREGIVER -   PRIMARY LANGUAGE ENGLISH   LEARNER PREFERENCE PRIMARY DEMONSTRATION   ANSWERED BY patient   RELATIONSHIP SELF       Abuse Screening:  Abuse Screening Questionnaire 1/21/2021   Do you ever feel afraid of your partner? N   Are you in a relationship with someone who physically or mentally threatens you? N   Is it safe for you to go home? Y       Fall Risk  Fall Risk Assessment, last 12 mths 2/25/2021   Able to walk? Yes   Fall in past 12 months? 1   Do you feel unsteady? 1   Are you worried about falling 1   Is TUG test greater than 12 seconds? 1   Is the gait abnormal? 1   Number of falls in past 12 months 1   Fall with injury? 0         Coordination of Care:  1.  Have you been to the ER, urgent care clinic since your last visit? Hospitalized since your last visit? no    2. Have you seen or consulted any other health care providers outside of the [address] [place] [address] since your last visit? Include any pap smears or colon screening.  no

## 2021-02-26 ENCOUNTER — HOME CARE VISIT (OUTPATIENT)
Dept: HOME HEALTH SERVICES | Facility: HOME HEALTH | Age: 56
End: 2021-02-26

## 2021-02-26 ENCOUNTER — VIRTUAL VISIT (OUTPATIENT)
Dept: FAMILY MEDICINE CLINIC | Age: 56
End: 2021-02-26
Payer: MEDICARE

## 2021-02-26 ENCOUNTER — HOME CARE VISIT (OUTPATIENT)
Dept: SCHEDULING | Facility: HOME HEALTH | Age: 56
End: 2021-02-26
Payer: MEDICARE

## 2021-02-26 VITALS
SYSTOLIC BLOOD PRESSURE: 120 MMHG | HEART RATE: 82 BPM | OXYGEN SATURATION: 96 % | DIASTOLIC BLOOD PRESSURE: 80 MMHG | RESPIRATION RATE: 17 BRPM | TEMPERATURE: 98.2 F

## 2021-02-26 DIAGNOSIS — J30.89 PERENNIAL ALLERGIC RHINITIS: ICD-10-CM

## 2021-02-26 PROCEDURE — G8432 DEP SCR NOT DOC, RNG: HCPCS | Performed by: NURSE PRACTITIONER

## 2021-02-26 PROCEDURE — G9899 SCRN MAM PERF RSLTS DOC: HCPCS | Performed by: NURSE PRACTITIONER

## 2021-02-26 PROCEDURE — G0151 HHCP-SERV OF PT,EA 15 MIN: HCPCS

## 2021-02-26 PROCEDURE — 400018 HH-NO PAY CLAIM PROCEDURE

## 2021-02-26 PROCEDURE — 3017F COLORECTAL CA SCREEN DOC REV: CPT | Performed by: NURSE PRACTITIONER

## 2021-02-26 PROCEDURE — 99213 OFFICE O/P EST LOW 20 MIN: CPT | Performed by: NURSE PRACTITIONER

## 2021-02-26 PROCEDURE — 400013 HH SOC

## 2021-02-26 PROCEDURE — G8428 CUR MEDS NOT DOCUMENT: HCPCS | Performed by: NURSE PRACTITIONER

## 2021-02-26 RX ORDER — FLUTICASONE PROPIONATE 50 MCG
2 SPRAY, SUSPENSION (ML) NASAL DAILY
Qty: 1 BOTTLE | Refills: 4 | Status: SHIPPED | OUTPATIENT
Start: 2021-02-26 | End: 2022-02-11 | Stop reason: SDUPTHER

## 2021-02-26 NOTE — PROGRESS NOTES
Magdalena Callejas presents today for   Chief Complaint   Patient presents with    Foot Pain     follow-up       Virtual/telephone visit    Depression Screening:  3 most recent PHQ Screens 2/26/2021   PHQ Not Done -   Little interest or pleasure in doing things Nearly every day   Feeling down, depressed, irritable, or hopeless Nearly every day   Total Score PHQ 2 6   Trouble falling or staying asleep, or sleeping too much Nearly every day   Feeling tired or having little energy Nearly every day   Poor appetite, weight loss, or overeating Not at all   Feeling bad about yourself - or that you are a failure or have let yourself or your family down Nearly every day   Trouble concentrating on things such as school, work, reading, or watching TV Nearly every day   Moving or speaking so slowly that other people could have noticed; or the opposite being so fidgety that others notice Nearly every day   Thoughts of being better off dead, or hurting yourself in some way Not at all   PHQ 9 Score 21   How difficult have these problems made it for you to do your work, take care of your home and get along with others Very difficult       Learning Assessment:  Learning Assessment 6/15/2018   PRIMARY LEARNER Patient   CO-LEARNER CAREGIVER -   PRIMARY LANGUAGE ENGLISH   LEARNER PREFERENCE PRIMARY DEMONSTRATION   ANSWERED BY patient   RELATIONSHIP SELF       Health Maintenance reviewed and discussed and ordered per Provider. Health Maintenance Due   Topic Date Due    DTaP/Tdap/Td series (1 - Tdap) 11/26/1984    Shingrix Vaccine Age 50> (1 of 2) 11/26/2013    Flu Vaccine (1) 09/01/2020   . Coordination of Care:  1. Have you been to the ER, urgent care clinic since your last visit? Hospitalized since your last visit? no    2. Have you seen or consulted any other health care providers outside of the [address] [place] [address] since your last visit? Include any pap smears or colon screening.  no

## 2021-02-26 NOTE — PROGRESS NOTES
Galileo Cuello is a 60 y.o. female who was seen by synchronous (real-time) audio-video technology on 2/26/2021 for Foot Pain (follow-up)        Assessment & Plan:   Diagnoses and all orders for this visit:    1. Perennial allergic rhinitis  -     fluticasone propionate (FLONASE) 50 mcg/actuation nasal spray; 2 Sprays by Both Nostrils route daily. Indications: inflammation of the nose due to an allergy            Subjective: The patient presents for an Audio-visual teleconference appointment for right foot pain. Per the patient she had swelling to the right foot and ankle. She was seen by Dr. Evangelina Antonio and given a cortisone injection. She notes the foot remain stiff and swollen. She notes she has nagging pain. She is scheduled to have physical therapy started today at 1:30. Patient is also complaining about chronic allergies. She has been prescribed Flonase daily is requesting medication refills. Prior to Admission medications    Medication Sig Start Date End Date Taking? Authorizing Provider   nabumetone (RELAFEN) 500 mg tablet TAKE 1 TABLET BY MOUTH TWICE DAILY 2/8/21  Yes Ruthann Calabrese NP   fluticasone propionate (FLONASE) 50 mcg/actuation nasal spray 2 Sprays by Both Nostrils route daily. Indications: inflammation of the nose due to an allergy 1/4/21  Yes Ruthann Calabrese NP   [employer] wheel walker 7/15/20  Yes Ruthann Calabrese NP   betamethasone valerate (VALISONE) 0.1 % topical lotion Apply 1 Applicator to affected area two (2) times a day. Yes Provider, Historical   ibuprofen 200 mg cap Take 1 Tab by mouth as needed for Pain. Yes Provider, Historical   Omeprazole delayed release (PRILOSEC D/R) 20 mg tablet Take 20 mg by mouth daily.    Yes Provider, Historical   Shower Chair XX jozef As needed for patient safety 9/12/19  Yes Lucila Avila MD   Bedside Commode XX Please provide a commode for patient safety 9/12/19  Yes Lucila Avila MD   OTHER Please provide a tub transfer bench 9/6/19  Yes Tasia Lazo NP   biotin (VITAMIN B7) 5 mg tablet Take 5 mg by mouth daily. Yes Provider, Historical   Comp Stocking,Knee,Long,Medium misc Wear daily while walking to prevent swelling 5/19/18  Yes Rober Kwon MD   loratadine (Claritin) 10 mg tablet Take 1 Tab by mouth daily. 2/25/21   Tasia Lazo NP   baclofen 5 mg tab Take 5 mg by mouth three (3) times daily. 2/25/21   Bari Epley, NP   triamcinolone acetonide (KENALOG) 0.1 % ointment Apply 1 Applicator to affected area two (2) times a day. chest and upper extremities    Provider, Historical   hydrOXYzine HCL (ATARAX) 25 mg tablet Take 50 mg by mouth nightly. tab 1 tp 2 tabs    Provider, Historical   psyllium (METAMUCIL) powd Take  by mouth daily. Provider, Historical     Patient Active Problem List   Diagnosis Code    Microscopic hematuria R31.29    Rectal bleeding K62.5    Chronic pain G89.29    Diffuse cystic mastopathy N60.19    Paranoid schizophrenia ([address] F20.0    Chronic left-sided low back pain with left-sided sciatica M54.42, G89.29    Spells of decreased attentiveness R68.89    Unsteady gait R26.81    Perennial allergic rhinitis J30.89    Multiple sclerosis ([address] G35     Patient Active Problem List    Diagnosis Date Noted    Multiple sclerosis ([address] 07/29/2019    Chronic left-sided low back pain with left-sided sciatica 09/26/2018    Spells of decreased attentiveness 09/26/2018    Unsteady gait 09/26/2018    Perennial allergic rhinitis 09/26/2018    Paranoid schizophrenia ([address] 06/15/2018    Diffuse cystic mastopathy 12/10/2015    Rectal bleeding 11/11/2014    Chronic pain 11/11/2014    Microscopic hematuria      Current Outpatient Medications   Medication Sig Dispense Refill    fluticasone propionate (FLONASE) 50 mcg/actuation nasal spray 2 Sprays by Both Nostrils route daily.  Indications: inflammation of the nose due to an allergy 1 Bottle 4    nabumetone (RELAFEN) 500 mg tablet TAKE 1 TABLET BY MOUTH TWICE DAILY 60 Tab 1    Walker [employer] wheel walker 1 Each 0    betamethasone valerate (VALISONE) 0.1 % topical lotion Apply 1 Applicator to affected area two (2) times a day.  ibuprofen 200 mg cap Take 1 Tab by mouth as needed for Pain.  Omeprazole delayed release (PRILOSEC D/R) 20 mg tablet Take 20 mg by mouth daily.  Shower Chair XX jozef As needed for patient safety 1 Each 0    Bedside Commode XX Please provide a commode for patient safety 1 Each 0    OTHER Please provide a tub transfer bench 1 Each 0    biotin (VITAMIN B7) 5 mg tablet Take 5 mg by mouth daily.  Comp Stocking,Knee,Long,Medium misc Wear daily while walking to prevent swelling 1 Each 0    loratadine (Claritin) 10 mg tablet Take 1 Tab by mouth daily. 30 Tab 1    baclofen 5 mg tab Take 5 mg by mouth three (3) times daily. 90 Tab 5    triamcinolone acetonide (KENALOG) 0.1 % ointment Apply 1 Applicator to affected area two (2) times a day. chest and upper extremities      hydrOXYzine HCL (ATARAX) 25 mg tablet Take 50 mg by mouth nightly. tab 1 tp 2 tabs      psyllium (METAMUCIL) powd Take  by mouth daily.        Allergies   Allergen Reactions    Naproxen Shortness of Breath    Shellfish Derived Nausea and Vomiting     SEVERE NAUSEA AND VOMITING    Amoxicillin Rash and Nausea Only     Past Medical History:   Diagnosis Date    Chest pain 11/2014    neg EKG, non recurrent    Chronic pain     lower back and legs    Depression     Eczema     Fibroids     GERD (gastroesophageal reflux disease)     Headache(784.0)     Hiatal hernia     IBS (irritable bowel syndrome)     Microscopic hematuria     MS (multiple sclerosis) (HCC)     Rectal bleeding     Stool color black     irritable bowel       ROS  ROS:  History obtained from the patient intake forms which are reviewed with the patient  · General: negative for - chills, fever, weight changes or malaise  · HEENT: no sore throat, nasal congestion, vision problems or ear problems  · Respiratory: no cough, shortness of breath, or wheezing  · Cardiovascular: no chest pain, palpitations, or dyspnea on exertion  · Gastrointestinal: no abdominal pain, N/V, change in bowel habits, or black or bloody stools  · Musculoskeletal: Lower extremity weakness  · Neurological: no numbness, tingling, headache or dizziness  · Endo:  No polyuria or polydipsia. · : no hematuria, dysuria, frequency, hesitancy, or nocturia. · Psychological: negative for - anxiety, depression, sleep disturbances, suicidal or homicidal ideations    Objective:   No flowsheet data found.      [INSTRUCTIONS:  \"[x]\" Indicates a positive item  \"[]\" Indicates a negative item  -- DELETE ALL ITEMS NOT EXAMINED]    Constitutional: [x] Appears well-developed and well-nourished [x] No apparent distress      [] Abnormal -     Mental status: [x] Alert and awake  [x] Oriented to person/place/time [x] Able to follow commands    [] Abnormal -     Eyes:   EOM    [x]  Normal    [] Abnormal -   Sclera  [x]  Normal    [] Abnormal -          Discharge [x]  None visible   [] Abnormal -     HENT: [x] Normocephalic, atraumatic  [] Abnormal -   [x] Mouth/Throat: Mucous membranes are moist    External Ears [x] Normal  [] Abnormal -    Neck: [x] No visualized mass [] Abnormal -     Pulmonary/Chest: [x] Respiratory effort normal   [x] No visualized signs of difficulty breathing or respiratory distress        [] Abnormal -      Musculoskeletal:   [x] Normal gait with no signs of ataxia         [x] Normal range of motion of neck        [] Abnormal -     Neurological:        [x] No Facial Asymmetry (Cranial nerve 7 motor function) (limited exam due to video visit)          [x] No gaze palsy        [] Abnormal -          Skin:        [x] No significant exanthematous lesions or discoloration noted on facial skin         [] Abnormal -            Psychiatric:       [x] Normal Affect [] Abnormal -        [x] No Hallucinations    Other pertinent observable physical exam findings:-        We discussed the expected course, resolution and complications of the diagnosis(es) in detail. Medication risks, benefits, costs, interactions, and alternatives were discussed as indicated. I advised her to contact the office if her condition worsens, changes or fails to improve as anticipated. She expressed understanding with the diagnosis(es) and plan. Pawan Nazario, who was evaluated through a patient-initiated, synchronous (real-time) audio-video encounter, and/or her healthcare decision maker, is aware that it is a billable service, with coverage as determined by her insurance carrier. She provided verbal consent to proceed: Yes, and patient identification was verified. It was conducted pursuant to the emergency declaration under the [address] [place] [address] authority and the [employer] Act. A caregiver was present when appropriate. Ability to conduct physical exam was limited. I was at home. The patient was at home.       David Gooden NP

## 2021-02-28 ENCOUNTER — HOME CARE VISIT (OUTPATIENT)
Dept: HOME HEALTH SERVICES | Facility: HOME HEALTH | Age: 56
End: 2021-02-28
Payer: MEDICARE

## 2021-03-01 ENCOUNTER — HOME CARE VISIT (OUTPATIENT)
Dept: HOME HEALTH SERVICES | Facility: HOME HEALTH | Age: 56
End: 2021-03-01
Payer: MEDICARE

## 2021-03-01 ENCOUNTER — HOME CARE VISIT (OUTPATIENT)
Dept: SCHEDULING | Facility: HOME HEALTH | Age: 56
End: 2021-03-01
Payer: MEDICARE

## 2021-03-01 PROCEDURE — G0157 HHC PT ASSISTANT EA 15: HCPCS

## 2021-03-02 DIAGNOSIS — M54.42 CHRONIC LEFT-SIDED LOW BACK PAIN WITH LEFT-SIDED SCIATICA: ICD-10-CM

## 2021-03-02 DIAGNOSIS — G89.29 CHRONIC LEFT-SIDED LOW BACK PAIN WITH LEFT-SIDED SCIATICA: ICD-10-CM

## 2021-03-02 DIAGNOSIS — J30.89 PERENNIAL ALLERGIC RHINITIS: ICD-10-CM

## 2021-03-04 ENCOUNTER — HOME CARE VISIT (OUTPATIENT)
Dept: SCHEDULING | Facility: HOME HEALTH | Age: 56
End: 2021-03-04
Payer: MEDICARE

## 2021-03-04 PROCEDURE — 400013 HH SOC

## 2021-03-04 PROCEDURE — G0151 HHCP-SERV OF PT,EA 15 MIN: HCPCS

## 2021-03-04 RX ORDER — NABUMETONE 500 MG/1
TABLET, FILM COATED ORAL
Qty: 60 TAB | Refills: 1 | Status: SHIPPED | OUTPATIENT
Start: 2021-03-04 | End: 2021-06-10 | Stop reason: SDUPTHER

## 2021-03-06 VITALS
RESPIRATION RATE: 17 BRPM | OXYGEN SATURATION: 98 % | DIASTOLIC BLOOD PRESSURE: 76 MMHG | HEART RATE: 73 BPM | TEMPERATURE: 97.3 F | SYSTOLIC BLOOD PRESSURE: 120 MMHG

## 2021-03-07 NOTE — PROGRESS NOTES
Thanks  Pao Lucas  ----- Message -----  From: Renetta Love, PT  Sent: 3/6/2021  12:21 PM EST  To: Aminata Vargas PT      Insurance change SOC has been completed, PT frequency: 1w1, 2w4, 1w1.

## 2021-03-09 ENCOUNTER — HOME CARE VISIT (OUTPATIENT)
Dept: HOME HEALTH SERVICES | Facility: HOME HEALTH | Age: 56
End: 2021-03-09
Payer: MEDICARE

## 2021-03-09 VITALS
SYSTOLIC BLOOD PRESSURE: 120 MMHG | RESPIRATION RATE: 18 BRPM | DIASTOLIC BLOOD PRESSURE: 78 MMHG | OXYGEN SATURATION: 98 % | HEART RATE: 80 BPM | TEMPERATURE: 98.1 F

## 2021-03-10 ENCOUNTER — OFFICE VISIT (OUTPATIENT)
Dept: VASCULAR SURGERY | Age: 56
End: 2021-03-10
Payer: MEDICARE

## 2021-03-10 VITALS
BODY MASS INDEX: 28.32 KG/M2 | OXYGEN SATURATION: 97 % | DIASTOLIC BLOOD PRESSURE: 60 MMHG | RESPIRATION RATE: 18 BRPM | HEART RATE: 67 BPM | SYSTOLIC BLOOD PRESSURE: 110 MMHG | HEIGHT: 65 IN | WEIGHT: 170 LBS

## 2021-03-10 DIAGNOSIS — M79.89 SWELLING OF LIMB: Primary | ICD-10-CM

## 2021-03-10 PROCEDURE — 99203 OFFICE O/P NEW LOW 30 MIN: CPT | Performed by: SURGERY

## 2021-03-10 NOTE — PROGRESS NOTES
Jackson Orozcoe    3/10/2021      Chief Complaint   Patient presents with    New Patient    Varicose Veins       History and Physical    HPI   Ms. Tigist Xiong is a 69-year-old female with a history of multiple sclerosis who presents in the office with the complaint of bilateral lower extremity swelling and pain. She states that the pain and swelling occur daily and are worse at the end of the day. She wears compression on a daily basis with some minor relief of her symptoms. The pain and swelling interfere with her ability to complete household tasks. No other complaints today.   Past Medical History:   Diagnosis Date    Chest pain 11/2014    neg EKG, non recurrent    Chronic pain     lower back and legs    Depression     Eczema     Fibroids     GERD (gastroesophageal reflux disease)     Headache(784.0)     Hiatal hernia     IBS (irritable bowel syndrome)     Microscopic hematuria     MS (multiple sclerosis) (HCC)     Rectal bleeding     Stool color black     irritable bowel     Patient Active Problem List   Diagnosis Code    Microscopic hematuria R31.29    Rectal bleeding K62.5    Chronic pain G89.29    Diffuse cystic mastopathy N60.19    Paranoid schizophrenia ([address] F20.0    Chronic left-sided low back pain with left-sided sciatica M54.42, G89.29    Spells of decreased attentiveness R68.89    Unsteady gait R26.81    Perennial allergic rhinitis J30.89    Multiple sclerosis ([address] G35     Past Surgical History:   Procedure Laterality Date    COLONOSCOPY,DIAGNOSTIC      HX BREAST BIOPSY Right 1/21/2015    RIGHT BREAST BIOPSY WITH NEEDLE LOCALIZATION ULTRASOUND performed by Nasir Thomas MD at [facility] MAIN OR    HX CHOLECYSTECTOMY      HX GI      HX HYSTERECTOMY      HX TUBAL LIGATION       Current Outpatient Medications   Medication Sig Dispense Refill    nabumetone (RELAFEN) 500 mg tablet TAKE 1 TABLET BY MOUTH TWICE DAILY 60 Tab 1    biotin 1,000 mcg chew Take 1,000 mcg by mouth daily.  fluticasone propionate (FLONASE) 50 mcg/actuation nasal spray 2 Sprays by Both Nostrils route daily. Indications: inflammation of the nose due to an allergy 1 Bottle 4    linaCLOtide (Linzess) 72 mcg cap capsule Take 72 mcg by mouth Daily (before breakfast).  loratadine (Claritin) 10 mg tablet Take 1 Tab by mouth daily. 30 Tab 1    baclofen 5 mg tab Take 5 mg by mouth three (3) times daily. 90 Tab 5    Walker [employer] wheel walker 1 Each 0    betamethasone valerate (VALISONE) 0.1 % topical lotion Apply 1 Applicator to affected area as needed for Other (Skin irritation). With hair washing      triamcinolone acetonide (KENALOG) 0.1 % ointment Apply 1 Applicator to affected area two (2) times a day. chest and upper extremities,for excema      hydrOXYzine HCL (ATARAX) 25 mg tablet Take 50 mg by mouth nightly. tab 1 tp 2 tabs      psyllium (METAMUCIL) powd Take  by mouth daily.  Omeprazole delayed release (PRILOSEC D/R) 20 mg tablet Take 20 mg by mouth daily.  Shower Chair XX jozef As needed for patient safety 1 Each 0    Bedside Commode XX Please provide a commode for patient safety 1 Each 0    OTHER Please provide a tub transfer bench 1 Each 0    biotin (VITAMIN B7) 5 mg tablet Take 1,000 mcg by mouth daily.       Comp Stocking,Knee,Long,Medium misc Wear daily while walking to prevent swelling 1 Each 0     Allergies   Allergen Reactions    Naproxen Shortness of Breath    Shellfish Derived Nausea and Vomiting     SEVERE NAUSEA AND VOMITING    Amoxicillin Rash and Nausea Only     Social History     Socioeconomic History    Marital status: SINGLE     Spouse name: Not on file    Number of children: Not on file    Years of education: Not on file    Highest education level: Not on file   Occupational History    Not on file   Social Needs    Financial resource strain: Not on file    Food insecurity     Worry: Not on file     Inability: Not on file   [email or web address] needs     Medical: Not on file     Non-medical: Not on file   Tobacco Use    Smoking status: Former Smoker     Packs/day: 0.00     Quit date: 2016     Years since quittin.7    Smokeless tobacco: Former User     Quit date: 2016   Substance and Sexual Activity    Alcohol use: No     Alcohol/week: 10.0 standard drinks     Types: 10 Cans of beer per week    Drug use: No    Sexual activity: Yes     Partners: Male   Lifestyle    Physical activity     Days per week: Not on file     Minutes per session: Not on file    Stress: Not on file   Relationships    Social connections     Talks on phone: Not on file     Gets together: Not on file     Attends [employer] service: Not on file     Active member of club or organization: Not on file     Attends meetings of clubs or organizations: Not on file     Relationship status: Not on file    Intimate partner violence     Fear of current or ex partner: Not on file     Emotionally abused: Not on file     Physically abused: Not on file     Forced sexual activity: Not on file   Other Topics Concern    Not on file   Social History Narrative    Not on file      Family History   Problem Relation Age of Onset    HIV/AIDS Brother     Diabetes Father     Cancer Brother     Hypertension Sister     Diabetes Brother     Hypertension Sister     Hypertension Sister     Hypertension Brother        Review of Systems   Constitutional: Positive for chills and malaise/fatigue. Negative for fever and weight loss. HENT: Positive for tinnitus. Negative for congestion, ear pain, hearing loss, nosebleeds and sore throat. Eyes: Positive for blurred vision. Negative for double vision, pain and discharge. Respiratory: Positive for shortness of breath. Negative for cough and sputum production. Cardiovascular: Positive for palpitations and leg swelling. Negative for chest pain and claudication.    Gastrointestinal: Positive for abdominal pain, constipation, heartburn and nausea. Negative for diarrhea and vomiting. Musculoskeletal: Positive for back pain, falls, joint pain, myalgias and neck pain. Skin: Positive for itching and rash. Neurological: Positive for dizziness, tingling, speech change, weakness and headaches. Negative for tremors and seizures. Psychiatric/Behavioral: Positive for memory loss. The patient is nervous/anxious and has insomnia. Physical Exam:    Visit Vitals  /60 (BP 1 Location: Left upper arm, BP Patient Position: Sitting)   Pulse 67   Resp 18   Ht 5' 5\" (1.651 m)   Wt 170 lb (77.1 kg)   LMP  (LMP Unknown)   SpO2 97%   BMI 28.29 kg/m²      Physical Exam  Constitutional:       Appearance: Normal appearance. HENT:      Head: Normocephalic and atraumatic. Right Ear: External ear normal.      Left Ear: External ear normal.      Nose: Nose normal.      Mouth/Throat:      Mouth: Mucous membranes are moist.      Pharynx: Oropharynx is clear. Eyes:      Extraocular Movements: Extraocular movements intact. Conjunctiva/sclera: Conjunctivae normal.      Pupils: Pupils are equal, round, and reactive to light. Neck:      Musculoskeletal: Normal range of motion and neck supple. Vascular: Carotid bruit present. Cardiovascular:      Rate and Rhythm: Normal rate and regular rhythm. Pulses: Normal pulses. Heart sounds: Normal heart sounds. Pulmonary:      Effort: Pulmonary effort is normal.      Breath sounds: Normal breath sounds. Abdominal:      General: Bowel sounds are normal.      Palpations: Abdomen is soft. Musculoskeletal: Normal range of motion. General: Swelling present. Right lower leg: Edema present. Left lower leg: Edema present. Skin:     Comments: Bilateral lower extremity varicose veins    Neurological:      General: No focal deficit present. Mental Status: She is alert and oriented to person, place, and time.    Psychiatric:         Mood and Affect: Mood normal. Behavior: Behavior normal.          Impression and Plan:    ICD-10-CM ICD-9-CM    1. Swelling of limb  M79.89 729.81 DUPLEX LOWER EXT VENOUS BILAT     Bilateral lower extremity swelling and pain on a daily basis. Little relief from compression stocking wear and leg elevation. Plan for bilateral venous duplex for reflux and follow-up in office to discuss results.          Crissy Jones MD

## 2021-03-11 ENCOUNTER — HOME CARE VISIT (OUTPATIENT)
Dept: SCHEDULING | Facility: HOME HEALTH | Age: 56
End: 2021-03-11
Payer: MEDICARE

## 2021-03-11 PROCEDURE — G0157 HHC PT ASSISTANT EA 15: HCPCS

## 2021-03-16 ENCOUNTER — HOME CARE VISIT (OUTPATIENT)
Dept: SCHEDULING | Facility: HOME HEALTH | Age: 56
End: 2021-03-16
Payer: MEDICARE

## 2021-03-16 VITALS
SYSTOLIC BLOOD PRESSURE: 124 MMHG | HEART RATE: 67 BPM | TEMPERATURE: 97.9 F | DIASTOLIC BLOOD PRESSURE: 80 MMHG | OXYGEN SATURATION: 98 % | RESPIRATION RATE: 17 BRPM

## 2021-03-16 VITALS
DIASTOLIC BLOOD PRESSURE: 78 MMHG | SYSTOLIC BLOOD PRESSURE: 122 MMHG | HEART RATE: 76 BPM | OXYGEN SATURATION: 97 % | TEMPERATURE: 97.8 F | RESPIRATION RATE: 17 BRPM

## 2021-03-16 PROCEDURE — G0151 HHCP-SERV OF PT,EA 15 MIN: HCPCS

## 2021-03-17 ENCOUNTER — HOME CARE VISIT (OUTPATIENT)
Dept: HOME HEALTH SERVICES | Facility: HOME HEALTH | Age: 56
End: 2021-03-17
Payer: MEDICARE

## 2021-03-17 DIAGNOSIS — M79.89 SWELLING OF LIMB: ICD-10-CM

## 2021-03-17 NOTE — PROGRESS NOTES
Dear Dr. Adolph Grady,     This email is to make you aware that your patient, Leidy Kovacs,  1965, had a fall last week. Pt unable to recall the exact day, but states she was coming down her stairs and on the first or second step down fell backwards and hit her head on a linen closet. Pt reported headache that day and resolved the following day. Pt does not appear to have any other injuries and denies going to the ER. Pt is in search of  first floor housing to my knowledge. It has been recommended as pt lives alone and only has intermittant assistance and not safe on stairs. Any questions please call me at [phone]. Thank you.     Sincerely,     Chidi Weber, PT

## 2021-03-19 ENCOUNTER — HOME CARE VISIT (OUTPATIENT)
Dept: HOME HEALTH SERVICES | Facility: HOME HEALTH | Age: 56
End: 2021-03-19
Payer: MEDICARE

## 2021-03-23 ENCOUNTER — HOME CARE VISIT (OUTPATIENT)
Dept: SCHEDULING | Facility: HOME HEALTH | Age: 56
End: 2021-03-23
Payer: MEDICARE

## 2021-03-23 PROCEDURE — G0157 HHC PT ASSISTANT EA 15: HCPCS

## 2021-03-24 NOTE — PROGRESS NOTES
Thank you. Thanh Trent PT    ----- Message -----  From: Bobby Wolff NP  Sent: 3/23/2021  11:33 PM EDT  To: Thanh Tretn PT      Thanks Varun Terry! I have given this patient multiple letters requesting a unit on the first floor. This patient should also follow-up with Neurology. ----- Message -----  From: Panchito Morel PT  Sent: 3/17/2021   8:14 AM EDT  To: Burke Jimenez NP    Dear Dr. Jania Boston,     This email is to make you aware that your patient, Talat Cadena,  1965, had a fall last week. Pt unable to recall the exact day, but states she was coming down her stairs and on the first or second step down fell backwards and hit her head on a linen closet. Pt reported headache that day and resolved the following day. Pt does not appear to have any other injuries and denies going to the ER. Pt is in search of  first floor housing to my knowledge. It has been recommended as pt lives alone and only has intermittant assistance and not safe on stairs. Any questions please call me at [phone]. Thank you.     Sincerely,     Thanh Trent PT

## 2021-03-25 ENCOUNTER — HOME CARE VISIT (OUTPATIENT)
Dept: SCHEDULING | Facility: HOME HEALTH | Age: 56
End: 2021-03-25
Payer: MEDICARE

## 2021-03-25 PROCEDURE — G0157 HHC PT ASSISTANT EA 15: HCPCS

## 2021-03-29 VITALS
SYSTOLIC BLOOD PRESSURE: 122 MMHG | RESPIRATION RATE: 18 BRPM | SYSTOLIC BLOOD PRESSURE: 120 MMHG | OXYGEN SATURATION: 98 % | OXYGEN SATURATION: 98 % | HEART RATE: 70 BPM | DIASTOLIC BLOOD PRESSURE: 80 MMHG | HEART RATE: 70 BPM | TEMPERATURE: 98.2 F | DIASTOLIC BLOOD PRESSURE: 78 MMHG | RESPIRATION RATE: 18 BRPM | TEMPERATURE: 98.2 F

## 2021-03-30 ENCOUNTER — HOME CARE VISIT (OUTPATIENT)
Dept: SCHEDULING | Facility: HOME HEALTH | Age: 56
End: 2021-03-30
Payer: MEDICARE

## 2021-03-30 VITALS
SYSTOLIC BLOOD PRESSURE: 104 MMHG | HEART RATE: 70 BPM | TEMPERATURE: 97.6 F | RESPIRATION RATE: 17 BRPM | DIASTOLIC BLOOD PRESSURE: 70 MMHG | OXYGEN SATURATION: 98 %

## 2021-03-30 PROCEDURE — G0151 HHCP-SERV OF PT,EA 15 MIN: HCPCS

## 2021-04-05 LAB
LEFT GSV AT KNEE DIAM: 0.22 CM
LEFT GSV JUNC DIAM: 0.83 CM
LEFT GSV THIGH DIST DIAM: 0.22 CM
LEFT GSV THIGH MID DIAM: 0.23 CM
LEFT GSV THIGH PROX DIAM: 0.33 CM
RIGHT GSV JUNC DIAM: 0.74 CM
RIGHT GSV THIGH DIST DIAM: 0.29 CM
RIGHT GSV THIGH MID DIAM: 0.31 CM
RIGHT GSV THIGH PROX DIAM: 0.28 CM
RIGHT SSV PROX DIAM: 0.2 CM

## 2021-04-12 ENCOUNTER — OFFICE VISIT (OUTPATIENT)
Dept: VASCULAR SURGERY | Age: 56
End: 2021-04-12
Payer: MEDICARE

## 2021-04-12 VITALS
WEIGHT: 170 LBS | HEART RATE: 74 BPM | RESPIRATION RATE: 20 BRPM | DIASTOLIC BLOOD PRESSURE: 74 MMHG | OXYGEN SATURATION: 99 % | BODY MASS INDEX: 28.32 KG/M2 | SYSTOLIC BLOOD PRESSURE: 128 MMHG | HEIGHT: 65 IN

## 2021-04-12 DIAGNOSIS — M79.89 SWELLING OF LIMB: Primary | ICD-10-CM

## 2021-04-12 PROCEDURE — 99212 OFFICE O/P EST SF 10 MIN: CPT | Performed by: SURGERY

## 2021-04-12 NOTE — PROGRESS NOTES
1. Have you been to an emergency room or urgent care clinic since your last visit? No  Hospitalized since your last visit? If yes, where, when, and reason for visit? NO  2. Have you seen or consulted any other health care providers outside of the [facility] since your last visit including any procedures, health maintenance items. If yes, where, when and reason for visit?  No

## 2021-04-12 NOTE — PROGRESS NOTES
Zainab Dopmikayla    4/12/2021      Chief Complaint   Patient presents with    Leg Pain     follow up Reflux    Swelling       History and Physical    HPI   Ms. Claudeen Roan is a 64-year-old female with a history of multiple sclerosis who presents in the office with the complaint of bilateral lower extremity swelling and pain. She states that the pain and swelling occur daily and are worse at the end of the day. She wears compression on a daily basis with some minor relief of her symptoms. The pain and swelling interfere with her ability to complete household tasks. No other complaints today.   Past Medical History:   Diagnosis Date    Chest pain 11/2014    neg EKG, non recurrent    Chronic pain     lower back and legs    Depression     Eczema     Fibroids     GERD (gastroesophageal reflux disease)     Headache(784.0)     Hiatal hernia     IBS (irritable bowel syndrome)     Microscopic hematuria     MS (multiple sclerosis) (HCC)     Rectal bleeding     Stool color black     irritable bowel     Patient Active Problem List   Diagnosis Code    Microscopic hematuria R31.29    Rectal bleeding K62.5    Chronic pain G89.29    Diffuse cystic mastopathy N60.19    Paranoid schizophrenia ([address] F20.0    Chronic left-sided low back pain with left-sided sciatica M54.42, G89.29    Spells of decreased attentiveness R68.89    Unsteady gait R26.81    Perennial allergic rhinitis J30.89    Multiple sclerosis ([address] G35     Past Surgical History:   Procedure Laterality Date    COLONOSCOPY,DIAGNOSTIC      HX BREAST BIOPSY Right 1/21/2015    RIGHT BREAST BIOPSY WITH NEEDLE LOCALIZATION ULTRASOUND performed by Alina Salazar MD at [facility] MAIN OR    HX CHOLECYSTECTOMY      HX GI      HX HYSTERECTOMY      HX TUBAL LIGATION       Current Outpatient Medications   Medication Sig Dispense Refill    alclometasone (ACLOVATE) 0.05 % ointment Apply 0.05 % to affected area two (2) times daily as needed for Skin Irritation.  nabumetone (RELAFEN) 500 mg tablet TAKE 1 TABLET BY MOUTH TWICE DAILY 60 Tab 1    biotin 1,000 mcg chew Take 1,000 mcg by mouth daily.  fluticasone propionate (FLONASE) 50 mcg/actuation nasal spray 2 Sprays by Both Nostrils route daily. Indications: inflammation of the nose due to an allergy 1 Bottle 4    linaCLOtide (Linzess) 72 mcg cap capsule Take 72 mcg by mouth Daily (before breakfast).  loratadine (Claritin) 10 mg tablet Take 1 Tab by mouth daily. 30 Tab 1    baclofen 5 mg tab Take 5 mg by mouth three (3) times daily. 90 Tab 5    Walker [employer] wheel walker 1 Each 0    betamethasone valerate (VALISONE) 0.1 % topical lotion Apply 1 Applicator to affected area as needed for Other (Skin irritation). With hair washing      triamcinolone acetonide (KENALOG) 0.1 % ointment Apply 1 Applicator to affected area two (2) times a day. chest and upper extremities,for excema      hydrOXYzine HCL (ATARAX) 25 mg tablet Take 50 mg by mouth nightly. tab 1 tp 2 tabs      psyllium (METAMUCIL) powd Take  by mouth daily.  Omeprazole delayed release (PRILOSEC D/R) 20 mg tablet Take 20 mg by mouth daily.  Shower Chair XX jozef As needed for patient safety 1 Each 0    OTHER Please provide a tub transfer bench 1 Each 0    Comp Stocking,Knee,Long,Medium misc Wear daily while walking to prevent swelling 1 Each 0    lactobacillus rhamnosus  (PROBIOTIC DIGESTIVE CARE PO) Take 125 mg by mouth daily.  BENEFIBER, GUAR GUM, PO Take 3 g by mouth daily.  Bedside Commode XX Please provide a commode for patient safety 1 Each 0    biotin (VITAMIN B7) 5 mg tablet Take 1,000 mcg by mouth daily.        Allergies   Allergen Reactions    Naproxen Shortness of Breath    Shellfish Derived Nausea and Vomiting     SEVERE NAUSEA AND VOMITING    Amoxicillin Rash and Nausea Only     Social History     Socioeconomic History    Marital status: SINGLE     Spouse name: Not on file    Number of children: Not on file    Years of education: Not on file    Highest education level: Not on file   Occupational History    Not on file   Social Needs    Financial resource strain: Not on file    Food insecurity     Worry: Not on file     Inability: Not on file    Transportation needs     Medical: Not on file     Non-medical: Not on file   Tobacco Use    Smoking status: Former Smoker     Packs/day: 0.00     Quit date: 2016     Years since quittin.7    Smokeless tobacco: Former User     Quit date: 2016   Substance and Sexual Activity    Alcohol use: No     Alcohol/week: 10.0 standard drinks     Types: 10 Cans of beer per week    Drug use: No    Sexual activity: Yes     Partners: Male   Lifestyle    Physical activity     Days per week: Not on file     Minutes per session: Not on file    Stress: Not on file   Relationships    Social connections     Talks on phone: Not on file     Gets together: Not on file     Attends Taoist service: Not on file     Active member of club or organization: Not on file     Attends meetings of clubs or organizations: Not on file     Relationship status: Not on file    Intimate partner violence     Fear of current or ex partner: Not on file     Emotionally abused: Not on file     Physically abused: Not on file     Forced sexual activity: Not on file   Other Topics Concern    Not on file   Social History Narrative    Not on file      Family History   Problem Relation Age of Onset    HIV/AIDS Brother     Diabetes Father     Cancer Brother     Hypertension Sister     Diabetes Brother     Hypertension Sister     Hypertension Sister     Hypertension Brother        ROS   NO change in ROS    Physical Exam:    Visit Vitals  /74 (BP 1 Location: Right arm, BP Patient Position: Sitting)   Pulse 74   Resp 20   Ht 5' 5\" (1.651 m)   Wt 170 lb (77.1 kg)   LMP  (LMP Unknown)   SpO2 99%   BMI 28.29 kg/m²      Physical Exam   Constitutional: Appearance: Normal appearance. HENT:      Head: Normocephalic and atraumatic. Right Ear: External ear normal.      Left Ear: External ear normal.      Nose: Nose normal.      Mouth/Throat:      Mouth: Mucous membranes are moist.      Pharynx: Oropharynx is clear. Eyes:      Extraocular Movements: Extraocular movements intact. Conjunctiva/sclera: Conjunctivae normal.      Pupils: Pupils are equal, round, and reactive to light. Neck:      Musculoskeletal: Normal range of motion and neck supple. Vascular: Carotid bruit present. Cardiovascular:      Rate and Rhythm: Normal rate and regular rhythm. Pulses: Normal pulses. Heart sounds: Normal heart sounds. Pulmonary:      Effort: Pulmonary effort is normal.      Breath sounds: Normal breath sounds. Abdominal:      General: Bowel sounds are normal.      Palpations: Abdomen is soft. Musculoskeletal: Normal range of motion. General: Swelling present. Right lower leg: Edema present. Left lower leg: Edema present. Skin:     Comments: Bilateral lower extremity varicose veins    Neurological:      General: No focal deficit present. Mental Status: She is alert and oriented to person, place, and time. Psychiatric:         Mood and Affect: Mood normal.         Behavior: Behavior normal.        PVL:  · No evidence of acute deep vein thrombosis in the right common femoral, superficial femoral, popliteal, posterior tibial, and peroneal veins. The veins were imaged in the transverse and longitudinal planes. The vessels showed normal color filling and compressibility. Doppler interrogation showed phasic and spontaneous flow. · No evidence of acute deep vein thrombosis in the left common femoral, superficial femoral, popliteal, posterior tibial, and peroneal veins. The veins were imaged in the transverse and longitudinal planes. The vessels showed normal color filling and compressibility.  Doppler interrogation showed phasic and spontaneous flow. Impression and Plan:    ICD-10-CM ICD-9-CM    1. Swelling of limb  M79.89 729.81      Bilateral lower extremity swelling on a daily basis with pain. The patient also notes varicose veins however these are not painful to her. She has a history of multiple sclerosis and walks with a cane. PVL demonstrates no superficial or deep vein reflux in either lower extremity. Recommend conservative treatment with daily compression stocking wear, leg elevation intermittently throughout the day, walking for exercise and keeping an ideal weight. She will follow-up with us if new symptoms arise.       Urmila Metz MD

## 2021-04-23 DIAGNOSIS — J30.89 PERENNIAL ALLERGIC RHINITIS: ICD-10-CM

## 2021-04-23 NOTE — TELEPHONE ENCOUNTER
Requested Prescriptions     Pending Prescriptions Disp Refills    loratadine (Claritin) 10 mg tablet 30 Tab 1     Sig: Take 1 Tab by mouth daily.

## 2021-04-27 ENCOUNTER — OFFICE VISIT (OUTPATIENT)
Dept: NEUROLOGY | Age: 56
End: 2021-04-27
Payer: MEDICARE

## 2021-04-27 VITALS
SYSTOLIC BLOOD PRESSURE: 108 MMHG | RESPIRATION RATE: 16 BRPM | OXYGEN SATURATION: 99 % | HEART RATE: 81 BPM | BODY MASS INDEX: 28.56 KG/M2 | DIASTOLIC BLOOD PRESSURE: 64 MMHG | WEIGHT: 171.6 LBS

## 2021-04-27 DIAGNOSIS — G47.10 HYPERSOMNOLENCE: ICD-10-CM

## 2021-04-27 DIAGNOSIS — G35 MS (MULTIPLE SCLEROSIS) (HCC): Primary | ICD-10-CM

## 2021-04-27 DIAGNOSIS — R26.89 IMPAIRED GAIT AND MOBILITY: ICD-10-CM

## 2021-04-27 PROCEDURE — G8432 DEP SCR NOT DOC, RNG: HCPCS | Performed by: NURSE PRACTITIONER

## 2021-04-27 PROCEDURE — G9899 SCRN MAM PERF RSLTS DOC: HCPCS | Performed by: NURSE PRACTITIONER

## 2021-04-27 PROCEDURE — 99214 OFFICE O/P EST MOD 30 MIN: CPT | Performed by: NURSE PRACTITIONER

## 2021-04-27 PROCEDURE — G8419 CALC BMI OUT NRM PARAM NOF/U: HCPCS | Performed by: NURSE PRACTITIONER

## 2021-04-27 PROCEDURE — 3017F COLORECTAL CA SCREEN DOC REV: CPT | Performed by: NURSE PRACTITIONER

## 2021-04-27 PROCEDURE — G8427 DOCREV CUR MEDS BY ELIG CLIN: HCPCS | Performed by: NURSE PRACTITIONER

## 2021-04-27 RX ORDER — LORATADINE 10 MG/1
10 TABLET ORAL DAILY
Qty: 30 TAB | Refills: 1 | Status: SHIPPED | OUTPATIENT
Start: 2021-04-27 | End: 2021-06-10 | Stop reason: SDUPTHER

## 2021-04-27 NOTE — Clinical Note
Can you please check into home health. Patient had to call the insurance but it wasn't successful. Also sleep eval- I saw your note in the referral so I gave patient the number to call Dr. Herminia Swann office.

## 2021-04-27 NOTE — PROGRESS NOTES
[facility]  [address], [address], [place], [address]  [address] [place], [address]  Office:  [phone]  Fax: [phone]  Chief Complaint   Patient presents with    Follow-up    Multiple Sclerosis       HPI: Aliya Cortez presents in follow-up for multiple sclerosis. She was last seen here on 2/25/2021. At that time she reported having a lot of achiness, pain, stiffness. She also has some right knee pain and foot swelling. She was going to get checked out with vascular. Previously she was on Pamelor for pain but had to stop it due to side effects. Baclofen was added for some of the pain/stiffness. Recommended considering ortho evaluation for right knee pain. A sleep evaluation was initiated for the hypersomnolence. She was referred to home health for physical therapy for gait and mobility as well as home health aide. She presents today in follow-up. She reports she fell backwards and caught herself and fell on her buttocks and hit her head on the linen closet. Denies loss of consciousness. She had a headache after which resolved. She wasn't able to get an apartment on the first level. She reports some swelling and rash in her hands and face, says she is going to be seeing the dermatologist soon. She sees him for her eczema. She started the baclofen. She thinks it has helped somewhat. She saw vascular for bilateral lower extremity edema, and she had a LE PVLs which were unremarkable, and conservative measures were recommended. She reports she is in pain all the time, and still falling asleep easily. She did not see the sleep specialist yet. The referral notes said that she has to call Dr. Josefa Porras office to schedule and she was called and a voicemail was left. She recently had cortisone shots in her feet. She had home health PT. She is not interested in outpatient PT. She is satisfied on the current dose of baclofen. Past Medical History:   Diagnosis Date    Chest pain 11/2014    neg EKG, non recurrent    Chronic pain     lower back and legs    Depression     Eczema     Fibroids     GERD (gastroesophageal reflux disease)     Headache(784.0)     Hiatal hernia     IBS (irritable bowel syndrome)     Microscopic hematuria     MS (multiple sclerosis) (HCC)     Rectal bleeding     Stool color black     irritable bowel       Past Surgical History:   Procedure Laterality Date    COLONOSCOPY,DIAGNOSTIC      HX BREAST BIOPSY Right 1/21/2015    RIGHT BREAST BIOPSY WITH NEEDLE LOCALIZATION ULTRASOUND performed by Amanuel Kwon MD at [address] [place] [address] HX CHOLECYSTECTOMY      HX GI      HX HYSTERECTOMY      HX TUBAL LIGATION         Current Outpatient Medications   Medication Sig Dispense Refill    lactobacillus rhamnosus  (PROBIOTIC DIGESTIVE CARE PO) Take 125 mg by mouth daily.  BENEFIBER, GUAR GUM, PO Take 3 g by mouth daily.  alclometasone (ACLOVATE) 0.05 % ointment Apply 0.05 % to affected area two (2) times daily as needed for Skin Irritation.  nabumetone (RELAFEN) 500 mg tablet TAKE 1 TABLET BY MOUTH TWICE DAILY 60 Tab 1    fluticasone propionate (FLONASE) 50 mcg/actuation nasal spray 2 Sprays by Both Nostrils route daily. Indications: inflammation of the nose due to an allergy 1 Bottle 4    linaCLOtide (Linzess) 72 mcg cap capsule Take 72 mcg by mouth Daily (before breakfast).  loratadine (Claritin) 10 mg tablet Take 1 Tab by mouth daily. 30 Tab 1    baclofen 5 mg tab Take 5 mg by mouth three (3) times daily. 90 Tab 5    Walker [employer] wheel walker 1 Each 0    betamethasone valerate (VALISONE) 0.1 % topical lotion Apply 1 Applicator to affected area as needed for Other (Skin irritation). With hair washing      triamcinolone acetonide (KENALOG) 0.1 % ointment Apply 1 Applicator to affected area two (2) times a day.  chest and upper extremities,for excema      hydrOXYzine HCL (ATARAX) 25 mg tablet Take 50 mg by mouth nightly. tab 1 tp 2 tabs      psyllium (METAMUCIL) powd Take  by mouth daily.  Omeprazole delayed release (PRILOSEC D/R) 20 mg tablet Take 20 mg by mouth daily.  Shower Chair XX jozef As needed for patient safety 1 Each 0    Bedside Commode XX Please provide a commode for patient safety 1 Each 0    Comp Stocking,Knee,Long,Medium misc Wear daily while walking to prevent swelling 1 Each 0    biotin 1,000 mcg chew Take 1,000 mcg by mouth daily.  OTHER Please provide a tub transfer bench 1 Each 0    biotin (VITAMIN B7) 5 mg tablet Take 1,000 mcg by mouth daily. Allergies   Allergen Reactions    Naproxen Shortness of Breath    Shellfish Derived Nausea and Vomiting     SEVERE NAUSEA AND VOMITING    Amoxicillin Rash and Nausea Only       Social History     Tobacco Use    Smoking status: Former Smoker     Packs/day: 0.00     Quit date: 2016     Years since quittin.8    Smokeless tobacco: Former User     Quit date: 2016   Substance Use Topics    Alcohol use: No     Alcohol/week: 10.0 standard drinks     Types: 10 Cans of beer per week    Drug use: No       Family History   Problem Relation Age of Onset    HIV/AIDS Brother     Diabetes Father     Cancer Brother     Hypertension Sister     Diabetes Brother     Hypertension Sister     Hypertension Sister     Hypertension Brother        Review of Systems:  GENERAL: Denies fever. +fatigue. CARDIAC: No CP or SOB  PULMONARY: No cough or SOB  MUSCULOSKELETAL: No new joint pain. +back pain, lower extremity pain. NEURO: SEE HPI    Physical Examination:  Visit Vitals  /64   Pulse 81   Resp 16   Wt 77.8 kg (171 lb 9.6 oz)   LMP  (LMP Unknown)   SpO2 99%   BMI 28.56 kg/m²       Alert, in NAD. Heart is regular. Oriented x3. Speech is fluent. Speech clear. EOMs are full, PERRL. +mild nystagmus bilaterally in lateral gaze. No facial asymmetry. Tongue midline with tremulousness.  Mild weakness BUE throughout vs poor effort and weak bilateral hand  versus poor effort. Tone is normal. DTRs +3. Antalgic gait with cane, left knee turned inward. She shows me the back of her right hand stating she has a rash there but no significant rash noted. Impression/Plan: This is a 12-year-old left-handed female who presents in follow-up for multiple sclerosis. She has been on Ocrevus which started in January 2020 after having had side effects with Tecfidera. She is tolerating Ocrevus without side effects. She endorses some improvement in pain with a low-dose of baclofen. Continue the current regimen. She endorses having refills. She declines outpatient physical therapy at this time. I provided her the number with the sleep specialist office for her to call to set up an evaluation due to the hypersomnolence. Follow-up MRIs in June and follow-up here after. Her next Ocrevus infusion is in July. We will plan to consider repeat neuropsych evaluation in August.    Diagnoses and all orders for this visit:    1. MS (multiple sclerosis) ([facility]  -     MRI BRAIN W WO CONT; Future  -     MRI CERV SPINE W WO CONT; Future    2. Impaired gait and mobility    3. Hypersomnolence      Total time 29 minutes with 15 minutes spent in counseling. Signed By: Petros Smith NP        PLEASE NOTE:   Portions of this document may have been produced using voice recognition software. Unrecognized errors in transcription may be present.

## 2021-04-27 NOTE — PATIENT INSTRUCTIONS
Patient instruction:  -Please call Dr. Lilia Marie office to schedule sleep evaluation: [phone]   -MRIs in early June  -Follow up after MRIs. -Next Ocrevus infusion is scheduled for July 19 at 9 AM and [facility] infusion center.

## 2021-04-27 NOTE — PROGRESS NOTES
Zainab Curtis is a 60 y.o. female who is in the office today as a follow up. 1. Have you been to the ER, urgent care clinic since your last visit? Hospitalized since your last visit? No    2. Have you seen or consulted any other health care providers outside of the [address] [place] [address] since your last visit? Include any pap smears or colon screening.  No

## 2021-05-12 ENCOUNTER — TRANSCRIBE ORDER (OUTPATIENT)
Dept: SCHEDULING | Age: 56
End: 2021-05-12

## 2021-05-13 ENCOUNTER — TRANSCRIBE ORDER (OUTPATIENT)
Dept: SCHEDULING | Age: 56
End: 2021-05-13

## 2021-05-13 DIAGNOSIS — K21.9 ACID REFLUX: Primary | ICD-10-CM

## 2021-05-13 DIAGNOSIS — R13.10 DYSPHAGIA: ICD-10-CM

## 2021-05-13 DIAGNOSIS — R14.0 BLOATING SYMPTOM: ICD-10-CM

## 2021-05-13 DIAGNOSIS — E66.3 OVERWEIGHT: ICD-10-CM

## 2021-05-13 DIAGNOSIS — K59.09 CHRONIC CONSTIPATION: ICD-10-CM

## 2021-06-04 ENCOUNTER — HOSPITAL ENCOUNTER (OUTPATIENT)
Age: 56
Discharge: HOME | End: 2021-06-04
Attending: NURSE PRACTITIONER
Payer: MEDICARE

## 2021-06-04 ENCOUNTER — HOSPITAL ENCOUNTER (OUTPATIENT)
Dept: CT IMAGING | Age: 56
Discharge: HOME | End: 2021-06-04
Attending: INTERNAL MEDICINE
Payer: MEDICARE

## 2021-06-04 DIAGNOSIS — R14.0 BLOATING SYMPTOM: ICD-10-CM

## 2021-06-04 DIAGNOSIS — K59.09 CHRONIC CONSTIPATION: ICD-10-CM

## 2021-06-04 DIAGNOSIS — K21.9 ACID REFLUX: ICD-10-CM

## 2021-06-04 PROCEDURE — 74011000636 HC RX REV CODE- 636: Performed by: INTERNAL MEDICINE

## 2021-06-04 PROCEDURE — 74177 CT ABD & PELVIS W/CONTRAST: CPT

## 2021-06-04 PROCEDURE — 74011636320 HC RX REV CODE- 636/320: Performed by: NURSE PRACTITIONER

## 2021-06-04 PROCEDURE — 70553 MRI BRAIN STEM W/O & W/DYE: CPT

## 2021-06-04 PROCEDURE — 72156 MRI NECK SPINE W/O & W/DYE: CPT

## 2021-06-04 PROCEDURE — A9575 INJ GADOTERATE MEGLUMI 0.1ML: HCPCS | Performed by: NURSE PRACTITIONER

## 2021-06-04 RX ADMIN — IOPAMIDOL 100 ML: 612 INJECTION, SOLUTION INTRAVENOUS at 13:33

## 2021-06-04 RX ADMIN — GADOTERATE MEGLUMINE 15 ML: 376.9 INJECTION INTRAVENOUS at 13:00

## 2021-06-08 DIAGNOSIS — G35 MS (MULTIPLE SCLEROSIS) (HCC): ICD-10-CM

## 2021-06-09 NOTE — PROGRESS NOTES
Called pt let her know what the above message stated. No other questions. Pt did stated she passed out last week. She stated she was feeling ok.  I did schedule a follow up

## 2021-06-10 ENCOUNTER — OFFICE VISIT (OUTPATIENT)
Dept: FAMILY MEDICINE CLINIC | Age: 56
End: 2021-06-10
Payer: MEDICARE

## 2021-06-10 VITALS
BODY MASS INDEX: 28.66 KG/M2 | WEIGHT: 172 LBS | SYSTOLIC BLOOD PRESSURE: 132 MMHG | TEMPERATURE: 96.6 F | DIASTOLIC BLOOD PRESSURE: 79 MMHG | HEIGHT: 65 IN | HEART RATE: 75 BPM | RESPIRATION RATE: 16 BRPM | OXYGEN SATURATION: 96 %

## 2021-06-10 DIAGNOSIS — G89.29 CHRONIC LEFT-SIDED LOW BACK PAIN WITH LEFT-SIDED SCIATICA: ICD-10-CM

## 2021-06-10 DIAGNOSIS — J30.89 PERENNIAL ALLERGIC RHINITIS: ICD-10-CM

## 2021-06-10 DIAGNOSIS — R21 RASH IN ADULT: Primary | ICD-10-CM

## 2021-06-10 DIAGNOSIS — M54.42 CHRONIC LEFT-SIDED LOW BACK PAIN WITH LEFT-SIDED SCIATICA: ICD-10-CM

## 2021-06-10 PROCEDURE — 3017F COLORECTAL CA SCREEN DOC REV: CPT | Performed by: NURSE PRACTITIONER

## 2021-06-10 PROCEDURE — G9899 SCRN MAM PERF RSLTS DOC: HCPCS | Performed by: NURSE PRACTITIONER

## 2021-06-10 PROCEDURE — G8432 DEP SCR NOT DOC, RNG: HCPCS | Performed by: NURSE PRACTITIONER

## 2021-06-10 PROCEDURE — 99213 OFFICE O/P EST LOW 20 MIN: CPT | Performed by: NURSE PRACTITIONER

## 2021-06-10 PROCEDURE — G8427 DOCREV CUR MEDS BY ELIG CLIN: HCPCS | Performed by: NURSE PRACTITIONER

## 2021-06-10 PROCEDURE — G8419 CALC BMI OUT NRM PARAM NOF/U: HCPCS | Performed by: NURSE PRACTITIONER

## 2021-06-10 RX ORDER — TRIAMCINOLONE ACETONIDE 1 MG/G
OINTMENT TOPICAL 2 TIMES DAILY
Qty: 1 TUBE | Refills: 1 | Status: SHIPPED | OUTPATIENT
Start: 2021-06-10

## 2021-06-10 RX ORDER — NABUMETONE 500 MG/1
TABLET, FILM COATED ORAL
Qty: 60 TABLET | Refills: 2 | Status: SHIPPED | OUTPATIENT
Start: 2021-06-10 | End: 2021-07-19 | Stop reason: SDUPTHER

## 2021-06-10 RX ORDER — LORATADINE 10 MG/1
10 TABLET ORAL DAILY
Qty: 30 TABLET | Refills: 1 | Status: SHIPPED | OUTPATIENT
Start: 2021-06-10 | End: 2021-07-06 | Stop reason: SDUPTHER

## 2021-06-17 ENCOUNTER — TELEPHONE (OUTPATIENT)
Dept: NEUROLOGY | Age: 56
End: 2021-06-17

## 2021-06-22 NOTE — PROGRESS NOTES
Gus Covarrubias is a 60 y.o. female presenting today for Leg Swelling (right leg swelling), Abdominal Pain, and Generalized Body Aches  . Chief Complaint   Patient presents with    Leg Swelling     right leg swelling    Abdominal Pain    Generalized Body Aches       HPI:  Gus Covarrubias presents to the office today for routine follow-up care. Patient complaining of chronic symptoms. Complaining of abdominal pain. Recent CT of the abdomen ordered by another provider. No acute findings but moderate stool throughout the colon consistent with a history of constipation. Patient also complaining about general body aches-history of MS and prescribed Relafen daily. Patient also complaining right lower extremity swelling. Negative for dyspnea or wheezing  ROS  ROS:  History obtained from the patient intake forms which are reviewed with the patient  · General: negative for - chills, fever, weight changes or malaise  · HEENT: no sore throat, nasal congestion, vision problems or ear problems  · Respiratory: no cough, shortness of breath, or wheezing  · Cardiovascular: no chest pain, palpitations, or dyspnea on exertion  · Gastrointestinal: no abdominal pain, N/V, change in bowel habits, or black or bloody stools  · Musculoskeletal: Weakness  · Neurological: Lower extremity weakness  · Endo:  No polyuria or polydipsia. · : no hematuria, dysuria, frequency, hesitancy, or nocturia.     · Psychological: negative for - anxiety, depression, sleep disturbances, suicidal or homicidal ideations    Allergies   Allergen Reactions    Naproxen Shortness of Breath    Shellfish Derived Nausea and Vomiting     SEVERE NAUSEA AND VOMITING    Amoxicillin Rash and Nausea Only       PHQ Screening   3 most recent PHQ Screens 6/10/2021   PHQ Not Done -   Little interest or pleasure in doing things Not at all   Feeling down, depressed, irritable, or hopeless Not at all   Total Score PHQ 2 0   Trouble falling or staying asleep, or sleeping too much -   Feeling tired or having little energy -   Poor appetite, weight loss, or overeating -   Feeling bad about yourself - or that you are a failure or have let yourself or your family down -   Trouble concentrating on things such as school, work, reading, or watching TV -   Moving or speaking so slowly that other people could have noticed; or the opposite being so fidgety that others notice -   Thoughts of being better off dead, or hurting yourself in some way -   PHQ 9 Score -   How difficult have these problems made it for you to do your work, take care of your home and get along with others -       History  Past Medical History:   Diagnosis Date    Chest pain 2014    neg EKG, non recurrent    Chronic pain     lower back and legs    Depression     Eczema     Fibroids     GERD (gastroesophageal reflux disease)     Headache(784.0)     Hiatal hernia     IBS (irritable bowel syndrome)     Microscopic hematuria     MS (multiple sclerosis) ([address]     Rectal bleeding     Stool color black     irritable bowel       Past Surgical History:   Procedure Laterality Date    COLONOSCOPY,DIAGNOSTIC      HX BREAST BIOPSY Right 2015    RIGHT BREAST BIOPSY WITH NEEDLE LOCALIZATION ULTRASOUND performed by Anika Myles MD at [facility] MAIN OR    HX CHOLECYSTECTOMY      HX GI      HX HYSTERECTOMY      HX TUBAL LIGATION         Social History     Socioeconomic History    Marital status: SINGLE     Spouse name: Not on file    Number of children: Not on file    Years of education: Not on file    Highest education level: Not on file   Occupational History    Not on file   Tobacco Use    Smoking status: Former Smoker     Packs/day: 0.00     Quit date: 2016     Years since quittin.9    Smokeless tobacco: Former User     Quit date: 2016   Substance and Sexual Activity    Alcohol use: No     Alcohol/week: 10.0 standard drinks     Types: 10 Cans of beer per week    Drug use: No    Sexual activity: Yes     Partners: Male   Other Topics Concern    Not on file   Social History Narrative    Not on file     Social Determinants of Health     Financial Resource Strain:     Difficulty of Paying Living Expenses:    Food Insecurity:     Worried About Running Out of Food in the Last Year:     [address] in the Last Year:    Transportation Needs:     Lack of Transportation (Medical):  Lack of Transportation (Non-Medical):    Physical Activity:     Days of Exercise per Week:     Minutes of Exercise per Session:    Stress:     Feeling of Stress :    Social Connections:     Frequency of Communication with Friends and Family:     Frequency of Social Gatherings with Friends and Family:     Attends [employer] Services:     Active Member of Clubs or Organizations:     Attends Club or Organization Meetings:     Marital Status:    Intimate Partner Violence:     Fear of Current or Ex-Partner:     Emotionally Abused:     Physically Abused:     Sexually Abused:        Current Outpatient Medications   Medication Sig Dispense Refill    loratadine (Claritin) 10 mg tablet Take 1 Tablet by mouth daily. 30 Tablet 1    nabumetone (RELAFEN) 500 mg tablet TAKE 1 TABLET BY MOUTH TWICE DAILY 60 Tablet 2    triamcinolone acetonide (KENALOG) 0.1 % ointment Apply  to affected area two (2) times a day. chest and upper extremities,for excema 1 Tube 1    alclometasone (ACLOVATE) 0.05 % ointment Apply 0.05 % to affected area two (2) times daily as needed for Skin Irritation.  fluticasone propionate (FLONASE) 50 mcg/actuation nasal spray 2 Sprays by Both Nostrils route daily. Indications: inflammation of the nose due to an allergy 1 Bottle 4    baclofen 5 mg tab Take 5 mg by mouth three (3) times daily.  90 Tab 5    Walker [employer] [facility] walker 1 Each 0    betamethasone valerate (VALISONE) 0.1 % topical lotion Apply 1 Applicator to affected area as needed for Other (Skin irritation). With hair washing      Omeprazole delayed release (PRILOSEC D/R) 20 mg tablet Take 20 mg by mouth daily.  Shower Chair XX jozef As needed for patient safety 1 Each 0    Bedside Commode XX Please provide a commode for patient safety 1 Each 0    OTHER Please provide a tub transfer bench 1 Each 0    biotin (VITAMIN B7) 5 mg tablet Take 1,000 mcg by mouth daily.  Comp Stocking,Knee,Long,Medium misc Wear daily while walking to prevent swelling 1 Each 0    lactobacillus rhamnosus  (PROBIOTIC DIGESTIVE CARE PO) Take 125 mg by mouth daily. (Patient not taking: Reported on 6/10/2021)      BENEFIBER, GUAR GUM, PO Take 3 g by mouth daily. (Patient not taking: Reported on 6/10/2021)      biotin 1,000 mcg chew Take 1,000 mcg by mouth daily. (Patient not taking: Reported on 6/10/2021)      linaCLOtide (Linzess) 72 mcg cap capsule Take 72 mcg by mouth Daily (before breakfast). (Patient not taking: Reported on 6/10/2021)      hydrOXYzine HCL (ATARAX) 25 mg tablet Take 50 mg by mouth nightly. tab 1 tp 2 tabs (Patient not taking: Reported on 6/10/2021)      psyllium (METAMUCIL) powd Take  by mouth daily. (Patient not taking: Reported on 6/10/2021)           Vitals:    06/10/21 1100   BP: 132/79   Pulse: 75   Resp: 16   Temp: (!) 96.6 °F (35.9 °C)   TempSrc: Oral   SpO2: 96%   Weight: 172 lb (78 kg)   Height: 5' 5\" (1.651 m)   PainSc:   0 - No pain       Physical Exam  Vitals and nursing note reviewed. Cardiovascular:      Rate and Rhythm: Normal rate and regular rhythm. Pulses: Normal pulses. Heart sounds: Normal heart sounds. Pulmonary:      Effort: Pulmonary effort is normal.      Breath sounds: Normal breath sounds. Abdominal:      Palpations: Abdomen is soft. Musculoskeletal:      Cervical back: Neck supple. Skin:     General: Skin is warm. Neurological:      Mental Status: She is oriented to person, place, and time. Mental status is at baseline.          Orders Only on 03/17/2021   Component Date Value Ref Range Status    Right GSV Thigh Dist Diam 04/05/2021 0.29  cm Final    Right GSV Thigh Mid Diam 04/05/2021 0.31  cm Final    Right GSV Thigh Prox Diam 04/05/2021 0.28  cm Final    Right GSV Junc Diam 04/05/2021 0.74  cm Final    Left GSV Thigh Dist Diam 04/05/2021 0.22  cm Final    Left GSV Thigh Mid Diam 04/05/2021 0.23  cm Final    Left GSV Thigh Prox Diam 04/05/2021 0.33  cm Final    Left GSV [place]unc Diam 04/05/2021 0.83  cm Final    Left GSV at Knee Diam 04/05/2021 0.22  cm Final    Right SSV Prox Diam 04/05/2021 0.20  cm Final       No results found for any visits on 06/10/21. Patient Care Team:  Patient Care Team:  Le Pope NP as PCP - General (Nurse Practitioner)  Le Pope NP as PCP - [place], [place] as Physician Assistant (Physician Assistant)  Danay Allan MD (Obstetrics & Gynecology)  Nataliia Shoemaker [place] (Physician Assistant)  Girish Chong NP (Neurology)      Assessment / Plan:      ICD-10-CM ICD-9-CM    1. Rash in adult  R21 782.1 triamcinolone acetonide (KENALOG) 0.1 % ointment   2. Chronic left-sided low back pain with left-sided sciatica  M54.42 724.2 nabumetone (RELAFEN) 500 mg tablet    G89.29 724.3      338.29    3. Perennial allergic rhinitis  J30.89 477.8 loratadine (Claritin) 10 mg tablet     Continue current treatment plan  Abdominal pain managed by GI  Patient MS is managed by neurology    Follow-up and Dispositions    · Return in about 4 months (around 10/10/2021). I asked the patient if she  had any questions and answered her  questions. The patient stated that she understands the treatment plan and agrees with the treatment plan    This document was created with a voice activated dictation system and may contain transcription errors.

## 2021-06-24 ENCOUNTER — OFFICE VISIT (OUTPATIENT)
Dept: NEUROLOGY | Age: 56
End: 2021-06-24
Payer: MEDICARE

## 2021-06-24 VITALS
RESPIRATION RATE: 16 BRPM | HEART RATE: 85 BPM | SYSTOLIC BLOOD PRESSURE: 120 MMHG | WEIGHT: 175 LBS | OXYGEN SATURATION: 98 % | BODY MASS INDEX: 29.12 KG/M2 | DIASTOLIC BLOOD PRESSURE: 82 MMHG

## 2021-06-24 DIAGNOSIS — G35 MS (MULTIPLE SCLEROSIS) (HCC): ICD-10-CM

## 2021-06-24 DIAGNOSIS — R55 SYNCOPE, UNSPECIFIED SYNCOPE TYPE: ICD-10-CM

## 2021-06-24 PROCEDURE — G8427 DOCREV CUR MEDS BY ELIG CLIN: HCPCS | Performed by: NURSE PRACTITIONER

## 2021-06-24 PROCEDURE — G8432 DEP SCR NOT DOC, RNG: HCPCS | Performed by: NURSE PRACTITIONER

## 2021-06-24 PROCEDURE — 99215 OFFICE O/P EST HI 40 MIN: CPT | Performed by: NURSE PRACTITIONER

## 2021-06-24 PROCEDURE — G9899 SCRN MAM PERF RSLTS DOC: HCPCS | Performed by: NURSE PRACTITIONER

## 2021-06-24 PROCEDURE — 3017F COLORECTAL CA SCREEN DOC REV: CPT | Performed by: NURSE PRACTITIONER

## 2021-06-24 PROCEDURE — G8419 CALC BMI OUT NRM PARAM NOF/U: HCPCS | Performed by: NURSE PRACTITIONER

## 2021-06-24 RX ORDER — POLYETHYLENE GLYCOL 3350 17 G/17G
17 POWDER, FOR SOLUTION ORAL DAILY
COMMUNITY

## 2021-06-24 RX ORDER — DULOXETIN HYDROCHLORIDE 30 MG/1
30 CAPSULE, DELAYED RELEASE ORAL DAILY
Qty: 30 CAPSULE | Refills: 5 | Status: SHIPPED | OUTPATIENT
Start: 2021-06-24 | End: 2021-08-25

## 2021-06-24 RX ORDER — OLOPATADINE HYDROCHLORIDE 2 MG/ML
1 SOLUTION/ DROPS OPHTHALMIC DAILY
COMMUNITY
End: 2022-06-01

## 2021-06-24 RX ORDER — LOTEPREDNOL ETABONATE 5 MG/G
1 GEL OPHTHALMIC 3 TIMES DAILY
COMMUNITY

## 2021-06-24 NOTE — PROGRESS NOTES
Tammy Cid is a 60 y.o. female who is in the office as a follow up. 1. Have you been to the ER, urgent care clinic since your last visit? Hospitalized since your last visit? No    2. Have you seen or consulted any other health care providers outside of the [address] [place] [address] since your last visit? Include any pap smears or colon screening.  No

## 2021-06-24 NOTE — PROGRESS NOTES
[facility]  [address] [facility], [address], [place], [address]  [address] Roberto [address], [address]  Office:  [phone]  Fax: [phone]  Chief Complaint   Patient presents with    Follow-up       HPI: Mario Feilpe presents in follow-up for multiple sclerosis. She was last seen here on 4/27/2021. At the prior visit she reported a lot of achiness, pain, and stiffness. She also has some pain to the right knee and foot swelling and was planning on seeing vascular. Previously she was on Pamelor for pain but had to stop it due to side effects. Baclofen was added for the pain/stiffness. She reported that she thinks it helped somewhat. She was satisfied on the current dose. A sleep evaluation had been initiated for hypersomnolence. She recently had cortisone shots in her feet. She had home health physical therapy. She was not interested in outpatient PT. An MRI of the brain and cervical spine were ordered for monitoring of MS. She was encouraged to call the number of the sleep specialist office to set up an evaluation due to the hypersomnolence. She presents today in follow-up. It was noted that when she received the MRI results she reported that she passed out the week before. She was feeling okay. A follow-up visit was scheduled. She reports she had her MRIs and she had a CT of the abdomen and pelvis both with contrast, both on the same day. The day after that she passed out. She states she started to feel diaphoretic and the next thing she knows she passed out. She started to feel bad and her stomach was hurting and her breathing was off and dizziness which was described as a head spinning sensation. She felt scared and did not know what was going on. She says she was out for 2 hours because she has a camera in the house so she was able to see how long. She had been changing her clothes when it occurred.   In terms of any chest pain or palpitations she does not know, wonders if maybe she had reflux. Endorses shortness of breath. She started feeling better when she woke up. She felt better that night. It happened at 6 PM and she got up and ate. She endorses she had shortness of breath. Endorses left side felt weak but reports her left side is always weak. No new unilateral weakness. Denies vision changes. She endorses she had a headache that day but does not currently have a headache. Endorses she had not been drinking much fluids. She is neurologically at baseline. She endorses she has the same imbalance. She has back pain, neck pain, and numbness. Her back pain is to the mid and lower back on the left. It feels tight, sometimes numb. Reports numbness and tingling in the legs and feet. It is all the time. She reports they get stiff and it feels like she cannot walk. They feel so tight. It is on both sides, more on the right. She reports it is terrible. Reports the foot swells up on that one. The tingling is painful. She has the right leg pain and stiffness when walking as well as the numbness and tingling. She is taking the Relafen. Reports she had going to see her podiatrist again on July 1. She is taking the baclofen for pain which seems to help. She is taking 5 mg 3 times daily. She reports she was supposed to have someone come to the house a few hours each day to help but they never showed up. She reports constipation and is no longer on Linzess but is taking MiraLAX now and may start something else soon. She did not see a sleep specialist yet. She had both Covid shots.     Past Medical History:   Diagnosis Date    Chest pain 11/2014    neg EKG, non recurrent    Chronic pain     lower back and legs    Depression     Eczema     Fibroids     GERD (gastroesophageal reflux disease)     Headache(784.0)     Hiatal hernia     IBS (irritable bowel syndrome)     Microscopic hematuria     [facility]     Rectal bleeding     Stool color black     irritable bowel       Past Surgical History:   Procedure Laterality Date    COLONOSCOPY,DIAGNOSTIC      HX BREAST BIOPSY Right 1/21/2015    RIGHT BREAST BIOPSY WITH NEEDLE LOCALIZATION ULTRASOUND performed by Reynaldo Le MD at [address] [place] [address] HX CHOLECYSTECTOMY      HX GI      HX HYSTERECTOMY      HX TUBAL LIGATION         Current Outpatient Medications   Medication Sig Dispense Refill    polyethylene glycol (Miralax) 17 gram/dose powder Take 17 g by mouth daily.  DULoxetine (CYMBALTA) 30 mg capsule Take 1 Capsule by mouth daily. 30 Capsule 5    olopatadine (Pataday) 0.2 % drop ophthalmic solution Administer 1 Drop to both eyes daily.  loteprednol etabonate 0.5 % drpg Apply  to eye.  loratadine (Claritin) 10 mg tablet Take 1 Tablet by mouth daily. 30 Tablet 1    nabumetone (RELAFEN) 500 mg tablet TAKE 1 TABLET BY MOUTH TWICE DAILY 60 Tablet 2    triamcinolone acetonide (KENALOG) 0.1 % ointment Apply  to affected area two (2) times a day. chest and upper extremities,for excema 1 Tube 1    lactobacillus rhamnosus  (PROBIOTIC DIGESTIVE CARE PO) Take 125 mg by mouth daily.  BENEFIBER, GUAR GUM, PO Take 3 g by mouth daily.  alclometasone (ACLOVATE) 0.05 % ointment Apply 0.05 % to affected area two (2) times daily as needed for Skin Irritation.  biotin 1,000 mcg chew Take 1,000 mcg by mouth daily.  fluticasone propionate (FLONASE) 50 mcg/actuation nasal spray 2 Sprays by Both Nostrils route daily. Indications: inflammation of the nose due to an allergy 1 Bottle 4    baclofen 5 mg tab Take 5 mg by mouth three (3) times daily. 90 Tab 5    Walker [employer] wheel walker 1 Each 0    betamethasone valerate (VALISONE) 0.1 % topical lotion Apply 1 Applicator to affected area as needed for Other (Skin irritation). With hair washing      hydrOXYzine HCL (ATARAX) 25 mg tablet Take 50 mg by mouth nightly.  tab 1 tp 2 tabs  psyllium (METAMUCIL) powd Take  by mouth daily.  Omeprazole delayed release (PRILOSEC D/R) 20 mg tablet Take 20 mg by mouth daily.  Shower Chair XX jozef As needed for patient safety 1 Each 0    Bedside Commode XX Please provide a commode for patient safety 1 Each 0    OTHER Please provide a tub transfer bench 1 Each 0    biotin (VITAMIN B7) 5 mg tablet Take 1,000 mcg by mouth daily.  Comp Stocking,Knee,Long,Medium misc Wear daily while walking to prevent swelling 1 Each 0        Allergies   Allergen Reactions    Naproxen Shortness of Breath    Shellfish Derived Nausea and Vomiting     SEVERE NAUSEA AND VOMITING    Amoxicillin Rash and Nausea Only       Social History     Tobacco Use    Smoking status: Former Smoker     Packs/day: 0.00     Quit date: 2016     Years since quittin.9    Smokeless tobacco: Former User     Quit date: 2016   Substance Use Topics    Alcohol use: No     Alcohol/week: 10.0 standard drinks     Types: 10 Cans of beer per week    Drug use: No       Family History   Problem Relation Age of Onset    HIV/AIDS Brother     Diabetes Father     Cancer Brother     Hypertension Sister     Diabetes Brother     Hypertension Sister     Hypertension Sister     Hypertension Brother        Review of Systems:  GENERAL: Denies fever. +fatigue. CARDIAC: No CP or SOB  PULMONARY: No cough or SOB  MUSCULOSKELETAL: No new joint pain. +back pain, lower extremity pain. NEURO: SEE HPI    Physical Examination:  Visit Vitals  /82   Pulse 85   Resp 16   Wt 79.4 kg (175 lb)   LMP  (LMP Unknown)   SpO2 98%   BMI 29.12 kg/m²       Alert, in NAD. Heart is regular. Oriented x3. Speech is fluent. Speech clear. EOMs are full, PERRL, VFFTC. +mild nystagmus bilaterally in lateral gaze. No facial asymmetry. Tongue is midline with tremulousness. Mild weakness throughout vs poor effort. Tone is normal. No drift of the bilateral upper extremities.  Fine finger movements symmetrical. FNF intact bilaterally. DTRs +3. Antalgic gait with cane, left knee turned inward. When laid down for straight leg raise she had left back pain with raising right leg to 20 degrees. On testing of the left leg she had  less left-sided back pain when lying raised to 30 to 40 degrees. No radiation down the legs. Orthostatic VS were checked by the nurse and as follows:  Lying 150/90 pulse 62  Sitting 130/80 pulse 62  Standing 130/80 pulse 72    MRI BRAIN W WO CONT 6/4/2021:  Study Result    Narrative & Impression   EXAM: MRI BRAIN W WO CONT     CLINICAL INDICATION/HISTORY: Follow-up multiple sclerosis     TECHNIQUE: Multisequence multiplanar MR imaging acquired through the brain.      Contrast used: 15 cc Gadavist     COMPARISON: June 2020, 13 months prior     FINDINGS:     Parenchyma:      T2 assessment: Moderate to marked but stable burden of periventricular deep  white matter changes     Sparing of the basal ganglia     Involvement in the lower brainstem bilaterally in cerebellar peduncles there is  also extension into the cerebellar hemispheres and large left medullary lesion     These all appear stable     FLAIR T2 assessment: Moderate to marked burden stable no new lesions     T1 assessment:     Moderate amount of blackhole development is identified similar appearance to the  previous exam with the exception now there may be some small early left thalamic  blackhole involvement see image 124 series 10 not appreciated on the prior exam     PML assessment: No evidence     Atrophy assessment: Mild to moderate generalized atrophy but stable compared to  the 2020 exam also stable going back to 2019, 24 month interval     No acute infarction. No acute hemorrhage. No mass lesion.  No pathologic  enhancement.     CSF spaces: Reflect atrophy mild to moderate     IAC regions: Unremarkable     Parasellar region: Unremarkable     Vasculature: Appropriate flow voids within the major skull base vasculature.     Cervicomedullary junction: Patent     Orbits: Unremarkable     Paranasal sinuses: Clear            IMPRESSION     There is a stable moderate to marked deep white matter changes consistent with  MS     Stable mild to moderate generalized atrophy pattern     Evidence for a new blackhole development in the left mid thalamic body     No evidence of PML          MRI CERV SPINE W WO CONT 6/4/2021:  Result Information    Status: Final result (Exam End: 6/4/2021 12:41) Provider Status: Open   Study Result    Narrative & Impression   MR CERVICAL SPINE WITH AND WITHOUT CONTRAST     HISTORY: Follow-up multiple sclerosis     COMPARISON: MRI cervical spine 6/9/2020     TECHNIQUE: Multisequence multiplanar imaging through the cervical spine.     FINDINGS:     Alignment: Intact lordosis  Vertebral body height: Normal  Marrow signal: Unremarkable  Disc spaces: Multilevel disc space height loss     Cervicomedullary Junction: Patent  Cervical cord: No cord expansion or atrophy. No enhancing lesion. Previously  identified cervical cord signal abnormality much less conspicuous or resolved. Subtle high T2 lesion left lateral cord at C2 level (STIR image 8). No definite  new cervical cord lesion. Similar upper thoracic cord lesions T4 and 5 levels. Multiple patchy lesions in the brainstem and posterior fossa, similar to  comparison an better characterized on the same day MRI. Further discussed below  where relevant.     On axial imaging, findings at each level are as follows: There is overall  thinning in the ligamentum flavum throughout the cervical spine.     C2/C3: No significant disc pathology. Mild facet hypertrophy. Patent canal and  foramina.     C3/C4: Disc height loss and mild disc osteophyte complex gently flattening  ventral cord. Moderate central canal stenosis. Mild foraminal stenosis     C4/C5: Disc height loss and mild disc osteophyte complex gently flattening  ventral cord.  Moderate central canal stenosis. Moderate right and mild left  foraminal stenosis.     C5/C6: Disc height loss. Mild disc osteophyte complex effacing ventral CSF  space. Mild central canal stenosis. Mild foraminal stenosis     C6/C7: Mild disc bulge. Patent canal and foramina.     C7/T1: No significant disc pathology. Patent canal and foramina.     Other structures: Remainder of the visualized upper thoracic levels demonstrate  minimal disc bulges but patent canal and foramina     IMPRESSION     1. No new demyelinating cord lesion or enhancing lesion. The previously  identified cervical cord lesions are much less conspicuous or resolved. -Similar patchy lesions in the brainstem and posterior fossa better evaluated on  the same day brain MRI. Similar partially imaged upper thoracic cord lesions.     2. Similar distribution multilevel degenerative changes most pronounced at C3-4  and C4-5 with moderate central canal stenosis. Impression/Plan: This is a 27-year-old left-handed female who presents in follow-up for multiple sclerosis. She has been on Ocrevus which started in January 2020 after having side effects with Tecfidera. We discussed results of her MRI of the brain and cervical spine which was stable without new demyelinating lesions. She reports she had a syncopal spell a few weeks ago the day after she had her MRIs with contrast and a CT abdomen and pelvis with contrast.  Endorses she was not drinking a lot of fluids. She is back to neurologic baseline. Likely syncope related to dehydration, contrast.  She was encouraged to increase fluid intake as she admits to not drinking much fluids. She had mild orthostatic hypotension on vitals. Will obtain a bilateral carotid ultrasound. We will start duloxetine 30 mg daily for pain. Discussed potential side effects. Continue baclofen at the same dose. We considered an MRI of the lumbar spine but we will hold off for right now as she did not tolerate lying flat very well. Will consider ortho referral for back pain. We will continue Ocrevus for DMT. Her next infusion is in July. We will plan to consider repeat neuropsych evaluation in August.  Follow up in 6 weeks. Diagnoses and all orders for this visit:    1. MS (multiple sclerosis) ([facility]    2. Syncope, unspecified syncope type  -     DUPLEX CAROTID BILATERAL; Future    Other orders  -     DULoxetine (CYMBALTA) 30 mg capsule; Take 1 Capsule by mouth daily. Total time 45 minutes with 25 minutes spent in counseling. Signed By: Candice Villarreal NP        PLEASE NOTE:   Portions of this document may have been produced using voice recognition software. Unrecognized errors in transcription may be present.

## 2021-06-24 NOTE — PATIENT INSTRUCTIONS
Duloxetine (By mouth)   Duloxetine (doo-LOX-[id number]-teen)  Treats depression, anxiety, diabetic peripheral neuropathy, fibromyalgia, and chronic muscle or bone pain. This medicine is an SSNRI. Brand Name(s): Cymbalta, DermacinRx Jeana Mejia   There may be other brand names for this medicine. When This Medicine Should Not Be Used: This medicine is not right for everyone. Do not use it if you had an allergic reaction to duloxetine. How to Use This Medicine:   Capsule, Delayed Release Capsule  · Take your medicine as directed. Your dose may need to be changed several times to find what works best for you. · Delayed-release capsule: Swallow the capsule whole. Do not crush, chew, break, or open it. · This medicine should come with a Medication Guide. Ask your pharmacist for a copy if you do not have one. · Missed dose: Take a dose as soon as you remember. If it is almost time for your next dose, wait until then and take a regular dose. Do not take extra medicine to make up for a missed dose. · Store the medicine in a closed container at room temperature, away from heat, moisture, and direct light. Drugs and Foods to Avoid:   Ask your doctor or pharmacist before using any other medicine, including over-the-counter medicines, vitamins, and herbal products. · Do not take duloxetine if you have used an MAO inhibitor (MAOI) within the past 14 days. Do not start taking an MAO inhibitor within 5 days of stopping duloxetine. · Some medicines can affect how duloxetine works.  Tell your doctor if you are using any of the following:  ¨ Buspirone, cimetidine, ciprofloxacin, enoxacin, fentanyl, lithium, Brandi's wort, theophylline, tramadol, tryptophan, or warfarin  ¨ Amphetamines  ¨ Blood pressure medicine  ¨ Diuretic (water pill)  ¨ Medicine for heart rhythm problems (including flecainide, propafenone, quinidine)  ¨ Medicine to treat migraine headaches (including triptans)  ¨ NSAID pain or arthritis medicine (including aspirin, celecoxib, diclofenac, ibuprofen, naproxen)  ¨ Other medicine to treat depression or mood disorders (including amitriptyline, desipramine, fluoxetine, imipramine, nortriptyline, paroxetine)  ¨ Phenothiazine medicine (including thioridazine)  · Tell your doctor if you use anything else that makes you sleepy. Some examples are allergy medicine, narcotic pain medicine, and alcohol. · Do not drink alcohol while you are using this medicine. Warnings While Using This Medicine:   · Tell your doctor if you are pregnant or breastfeeding, or if you have kidney disease, liver disease, diabetes, digestion problems, glaucoma, heart disease, high or low blood pressure, or problems with urination. Tell your doctor if you smoke or you have a history of seizures, or drug or alcohol addiction. · This medicine may cause the following problems:   ¨ Serious liver problems  ¨ Serotonin syndrome (more likely when used with certain other medicines)  ¨ Increased risk of bleeding problems  ¨ Serious skin reactions  ¨ Low sodium levels in the blood  · This medicine can increase thoughts of suicide. Tell your doctor right away if you start to feel depressed and have thoughts about hurting yourself. · This medicine can cause changes in your blood pressure. This may make you dizzy or drowsy. Do not drive or do anything that could be dangerous until you know how this medicine affects you. Stand up slowly to avoid falls. · Do not stop using this medicine suddenly. Your doctor will need to slowly decrease your dose before you stop it completely. · Your doctor will check your progress and the effects of this medicine at regular visits. Keep all appointments. · Keep all medicine out of the reach of children. Never share your medicine with anyone.   Possible Side Effects While Using This Medicine:   Call your doctor right away if you notice any of these side effects:  · Allergic reaction: Itching or hives, swelling in your face or hands, swelling or tingling in your mouth or throat, chest tightness, trouble breathing  · Anxiety, restlessness, fever, fast heartbeat, sweating, muscle spasms, diarrhea, seeing or hearing things that are not there  · Blistering, peeling, red skin rash  · Confusion, weakness, muscle twitching  · Dark urine or pale stools, nausea, vomiting, loss of appetite, stomach pain, yellow skin or eyes  · Decrease in how much or how often you urinate  · Eye pain, vision changes, seeing halos around lights  · Feeling more energetic than usual  · Lightheadedness, dizziness, or fainting  · Unusual moods or behaviors, worsening depression, thoughts about hurting yourself, trouble sleeping  · Unusual bleeding or bruising  If you notice these less serious side effects, talk with your doctor:   · Decrease in appetite or weight  · Dry mouth, constipation, mild nausea  · Unusual drowsiness, sleepiness, or tiredness  If you notice other side effects that you think are caused by this medicine, tell your doctor. Call your doctor for medical advice about side effects. You may report side effects to FDA at 1-282-FDA-4093  © 2017 [employer]  Information is for End User's use only and may not be sold, redistributed or otherwise used for commercial purposes. The above information is an  only. It is not intended as medical advice for individual conditions or treatments. Talk to your doctor, nurse or pharmacist before following any medical regimen to see if it is safe and effective for you.

## 2021-06-25 DIAGNOSIS — R55 SYNCOPE, UNSPECIFIED SYNCOPE TYPE: ICD-10-CM

## 2021-06-28 ENCOUNTER — TELEPHONE (OUTPATIENT)
Dept: NEUROLOGY | Age: 56
End: 2021-06-28

## 2021-06-28 NOTE — TELEPHONE ENCOUNTER
Patient called wanting to inform NP James Caruso she does not wish to take DULOXETINE. Attempted to get patient to come in for a f/u to discuss medication but patient wishes to wait for august apt.

## 2021-06-30 NOTE — TELEPHONE ENCOUNTER
Called pt let her know what the above message stated. The pt stated she is living by herself right now and do not want to be alone while taking the medication.

## 2021-07-06 DIAGNOSIS — J30.89 PERENNIAL ALLERGIC RHINITIS: ICD-10-CM

## 2021-07-06 NOTE — TELEPHONE ENCOUNTER
Requested Prescriptions     Pending Prescriptions Disp Refills    loratadine (Claritin) 10 mg tablet 30 Tablet 1     Sig: Take 1 Tablet by mouth daily.

## 2021-07-07 RX ORDER — LORATADINE 10 MG/1
10 TABLET ORAL DAILY
Qty: 30 TABLET | Refills: 1 | Status: SHIPPED | OUTPATIENT
Start: 2021-07-07 | End: 2022-06-16 | Stop reason: SDUPTHER

## 2021-07-16 RX ORDER — DIPHENHYDRAMINE HYDROCHLORIDE 50 MG/ML
50 INJECTION, SOLUTION INTRAMUSCULAR; INTRAVENOUS AS NEEDED
Status: CANCELLED
Start: 2021-07-19

## 2021-07-16 RX ORDER — DIPHENHYDRAMINE HYDROCHLORIDE 50 MG/ML
25 INJECTION, SOLUTION INTRAMUSCULAR; INTRAVENOUS AS NEEDED
Status: CANCELLED
Start: 2021-07-19

## 2021-07-16 RX ORDER — ALBUTEROL SULFATE 0.83 MG/ML
2.5 SOLUTION RESPIRATORY (INHALATION) AS NEEDED
Status: CANCELLED
Start: 2021-07-19

## 2021-07-16 RX ORDER — HYDROCORTISONE SODIUM SUCCINATE 100 MG/2ML
100 INJECTION, POWDER, FOR SOLUTION INTRAMUSCULAR; INTRAVENOUS AS NEEDED
Status: CANCELLED | OUTPATIENT
Start: 2021-07-19

## 2021-07-16 RX ORDER — SODIUM CHLORIDE 0.9 % (FLUSH) 0.9 %
10 SYRINGE (ML) INJECTION AS NEEDED
Status: CANCELLED | OUTPATIENT
Start: 2021-07-19

## 2021-07-16 RX ORDER — SODIUM CHLORIDE 9 MG/ML
10 INJECTION INTRAMUSCULAR; INTRAVENOUS; SUBCUTANEOUS AS NEEDED
Status: CANCELLED | OUTPATIENT
Start: 2021-07-19

## 2021-07-16 RX ORDER — ONDANSETRON 2 MG/ML
8 INJECTION INTRAMUSCULAR; INTRAVENOUS AS NEEDED
Status: CANCELLED | OUTPATIENT
Start: 2021-07-19

## 2021-07-16 RX ORDER — HEPARIN 100 UNIT/ML
300-500 SYRINGE INTRAVENOUS AS NEEDED
Status: CANCELLED
Start: 2021-07-19

## 2021-07-16 RX ORDER — EPINEPHRINE 1 MG/ML
0.3 INJECTION, SOLUTION, CONCENTRATE INTRAVENOUS AS NEEDED
Status: CANCELLED | OUTPATIENT
Start: 2021-07-19

## 2021-07-16 RX ORDER — ACETAMINOPHEN 325 MG/1
650 TABLET ORAL AS NEEDED
Status: CANCELLED
Start: 2021-07-19

## 2021-07-19 ENCOUNTER — HOSPITAL ENCOUNTER (OUTPATIENT)
Dept: INFUSION THERAPY | Age: 56
Discharge: HOME | End: 2021-07-19
Payer: MEDICARE

## 2021-07-19 VITALS
OXYGEN SATURATION: 95 % | TEMPERATURE: 97.2 F | SYSTOLIC BLOOD PRESSURE: 131 MMHG | RESPIRATION RATE: 16 BRPM | HEART RATE: 66 BPM | DIASTOLIC BLOOD PRESSURE: 77 MMHG

## 2021-07-19 DIAGNOSIS — G35 MULTIPLE SCLEROSIS (HCC): Primary | ICD-10-CM

## 2021-07-19 DIAGNOSIS — G89.29 CHRONIC LEFT-SIDED LOW BACK PAIN WITH LEFT-SIDED SCIATICA: ICD-10-CM

## 2021-07-19 DIAGNOSIS — M54.42 CHRONIC LEFT-SIDED LOW BACK PAIN WITH LEFT-SIDED SCIATICA: ICD-10-CM

## 2021-07-19 LAB
BASOPHILS # BLD: 0 K/UL (ref 0–0.1)
BASOPHILS NFR BLD: 1 % (ref 0–2)
DIFFERENTIAL METHOD BLD: ABNORMAL
EOSINOPHIL # BLD: 0.1 K/UL (ref 0–0.4)
EOSINOPHIL NFR BLD: 1 % (ref 0–5)
ERYTHROCYTE [DISTWIDTH] IN BLOOD BY AUTOMATED COUNT: 14.6 % (ref 11.6–14.5)
HCT VFR BLD AUTO: 35.3 % (ref 35–45)
HGB BLD-MCNC: 11.4 G/DL (ref 12–16)
LYMPHOCYTES # BLD: 2.1 K/UL (ref 0.9–3.6)
LYMPHOCYTES NFR BLD: 42 % (ref 21–52)
MCH RBC QN AUTO: 27.9 PG (ref 24–34)
MCHC RBC AUTO-ENTMCNC: 32.3 G/DL (ref 31–37)
MCV RBC AUTO: 86.3 FL (ref 74–97)
MONOCYTES # BLD: 0.4 K/UL (ref 0.05–1.2)
MONOCYTES NFR BLD: 9 % (ref 3–10)
NEUTS SEG # BLD: 2.4 K/UL (ref 1.8–8)
NEUTS SEG NFR BLD: 48 % (ref 40–73)
PLATELET # BLD AUTO: 273 K/UL (ref 135–420)
PMV BLD AUTO: 11.3 FL (ref 9.2–11.8)
RBC # BLD AUTO: 4.09 M/UL (ref 4.2–5.3)
WBC # BLD AUTO: 5.1 K/UL (ref 4.6–13.2)

## 2021-07-19 PROCEDURE — 74011250637 HC RX REV CODE- 250/637: Performed by: NURSE PRACTITIONER

## 2021-07-19 PROCEDURE — 85025 COMPLETE CBC W/AUTO DIFF WBC: CPT

## 2021-07-19 PROCEDURE — 96365 THER/PROPH/DIAG IV INF INIT: CPT

## 2021-07-19 PROCEDURE — 99211 OFF/OP EST MAY X REQ PHY/QHP: CPT

## 2021-07-19 PROCEDURE — 96366 THER/PROPH/DIAG IV INF ADDON: CPT

## 2021-07-19 PROCEDURE — 74011250636 HC RX REV CODE- 250/636: Performed by: NURSE PRACTITIONER

## 2021-07-19 RX ORDER — DIPHENHYDRAMINE HYDROCHLORIDE 50 MG/ML
50 INJECTION, SOLUTION INTRAMUSCULAR; INTRAVENOUS AS NEEDED
Status: CANCELLED
Start: 2022-01-17

## 2021-07-19 RX ORDER — DIPHENHYDRAMINE HYDROCHLORIDE 50 MG/ML
25 INJECTION, SOLUTION INTRAMUSCULAR; INTRAVENOUS AS NEEDED
Status: CANCELLED
Start: 2022-01-17

## 2021-07-19 RX ORDER — SODIUM CHLORIDE 0.9 % (FLUSH) 0.9 %
10 SYRINGE (ML) INJECTION AS NEEDED
Status: CANCELLED | OUTPATIENT
Start: 2022-01-17

## 2021-07-19 RX ORDER — DIPHENHYDRAMINE HYDROCHLORIDE 50 MG/ML
50 INJECTION, SOLUTION INTRAMUSCULAR; INTRAVENOUS ONCE
Status: CANCELLED | OUTPATIENT
Start: 2022-01-17 | End: 2022-01-17

## 2021-07-19 RX ORDER — HEPARIN 100 UNIT/ML
300-500 SYRINGE INTRAVENOUS AS NEEDED
Status: CANCELLED
Start: 2022-01-17

## 2021-07-19 RX ORDER — DIPHENHYDRAMINE HYDROCHLORIDE 50 MG/ML
50 INJECTION, SOLUTION INTRAMUSCULAR; INTRAVENOUS ONCE
Status: COMPLETED | OUTPATIENT
Start: 2021-07-19 | End: 2021-07-19

## 2021-07-19 RX ORDER — ALBUTEROL SULFATE 0.83 MG/ML
2.5 SOLUTION RESPIRATORY (INHALATION) AS NEEDED
Status: CANCELLED
Start: 2022-01-17

## 2021-07-19 RX ORDER — HYDROCORTISONE SODIUM SUCCINATE 100 MG/2ML
100 INJECTION, POWDER, FOR SOLUTION INTRAMUSCULAR; INTRAVENOUS AS NEEDED
Status: CANCELLED | OUTPATIENT
Start: 2022-01-17

## 2021-07-19 RX ORDER — SODIUM CHLORIDE 9 MG/ML
10 INJECTION INTRAMUSCULAR; INTRAVENOUS; SUBCUTANEOUS AS NEEDED
Status: CANCELLED | OUTPATIENT
Start: 2022-01-17

## 2021-07-19 RX ORDER — ACETAMINOPHEN 325 MG/1
650 TABLET ORAL AS NEEDED
Status: CANCELLED
Start: 2022-01-17

## 2021-07-19 RX ORDER — ONDANSETRON 2 MG/ML
8 INJECTION INTRAMUSCULAR; INTRAVENOUS AS NEEDED
Status: CANCELLED | OUTPATIENT
Start: 2022-01-17

## 2021-07-19 RX ORDER — ACETAMINOPHEN 325 MG/1
650 TABLET ORAL ONCE
Status: CANCELLED | OUTPATIENT
Start: 2022-01-17 | End: 2022-01-17

## 2021-07-19 RX ORDER — SODIUM CHLORIDE 9 MG/ML
25 INJECTION, SOLUTION INTRAVENOUS CONTINUOUS
Status: DISPENSED | OUTPATIENT
Start: 2021-07-19 | End: 2021-07-19

## 2021-07-19 RX ORDER — SODIUM CHLORIDE 9 MG/ML
25 INJECTION, SOLUTION INTRAVENOUS CONTINUOUS
Status: CANCELLED | OUTPATIENT
Start: 2022-01-17

## 2021-07-19 RX ORDER — ACETAMINOPHEN 325 MG/1
650 TABLET ORAL ONCE
Status: COMPLETED | OUTPATIENT
Start: 2021-07-19 | End: 2021-07-19

## 2021-07-19 RX ORDER — EPINEPHRINE 1 MG/ML
0.3 INJECTION, SOLUTION, CONCENTRATE INTRAVENOUS AS NEEDED
Status: CANCELLED | OUTPATIENT
Start: 2022-01-17

## 2021-07-19 RX ADMIN — METHYLPREDNISOLONE SODIUM SUCCINATE 125 MG: 125 INJECTION, POWDER, FOR SOLUTION INTRAMUSCULAR; INTRAVENOUS at 10:20

## 2021-07-19 RX ADMIN — ACETAMINOPHEN 650 MG: 325 TABLET ORAL at 10:20

## 2021-07-19 RX ADMIN — SODIUM CHLORIDE 25 ML/HR: 900 INJECTION, SOLUTION INTRAVENOUS at 10:12

## 2021-07-19 RX ADMIN — OCRELIZUMAB 600 MG: 300 INJECTION INTRAVENOUS at 10:53

## 2021-07-19 RX ADMIN — DIPHENHYDRAMINE HYDROCHLORIDE 50 MG: 50 INJECTION INTRAMUSCULAR; INTRAVENOUS at 10:23

## 2021-07-19 NOTE — PROGRESS NOTES
[facility] Progress Note    Date: 2021    Name: Mariana Cabrera    MRN: [id number]         : 1965      Ms. Jasson De Jesus arrived in the [facility] today at 0900, in stable condition, here for Ocrevus (ocrelizumab) Infusion Q 6 months/labs. Pt is currently on Amoxicillin for a pulled tooth, she has 4 more days left. Called office and spoke to Mariana Arguello, N.P. Advised of above and no s/s of infection. Order received to proceed with Ocrevus. She was assessed and education was provided. Ms. Jacobs's vitals were reviewed. Visit Vitals  /74 (BP 1 Location: Left upper arm, BP Patient Position: Sitting)   Pulse 78   Temp 97.7 °F (36.5 °C)   Resp 16   SpO2 97%   Breastfeeding No         PIV # 22 G, was established in her right AC x 2 attempts, one by writer and then by Sydney Jones, brisk blood return and obtained CBC at same time as IV start. Sent to main lab for processing.  ml IV Bag, was initiated, to infuse @ KVO, PRN, throughout treatment today. The following pre-medications were administered per order, and without incident:  Tylenol 650 mg PO, Benadryl 50 mg IVP, & Solumedrol 125 mg IVP. Ocrevus (Ocrelizumab) 600 mg IV, was administered over approximately 3 hours, using the following rate titration:  100 ml/hr X 15 minutes, 200 ml/hr X 15 minutes, 250 ml/hr X 30 minutes, 300  until completion. After completion of the Ocrevus Infusion, Ms. Rayna Domínguez declined observation. PIV was removed and gauze/bandaid was applied. Ms. Jasson De Jesus tolerated well, and had no complaints. Ms. Jasson De Jesus was discharged from [place] [address] in stable condition at 1335. She has her next appointment on 2021 at 0900 for [address] [place] [address].          Jairo Allison RN  2021

## 2021-07-19 NOTE — TELEPHONE ENCOUNTER
Requested Prescriptions     Pending Prescriptions Disp Refills    nabumetone (RELAFEN) 500 mg tablet 60 Tablet 2     Sig: TAKE 1 TABLET BY MOUTH TWICE DAILY

## 2021-07-26 RX ORDER — NABUMETONE 500 MG/1
TABLET, FILM COATED ORAL
Qty: 60 TABLET | Refills: 2 | Status: SHIPPED | OUTPATIENT
Start: 2021-07-26 | End: 2021-12-16 | Stop reason: SDUPTHER

## 2021-08-25 ENCOUNTER — TELEPHONE (OUTPATIENT)
Dept: NEUROLOGY | Age: 56
End: 2021-08-25

## 2021-08-25 ENCOUNTER — VIRTUAL VISIT (OUTPATIENT)
Dept: NEUROLOGY | Age: 56
End: 2021-08-25
Payer: MEDICARE

## 2021-08-25 DIAGNOSIS — G35 MS (MULTIPLE SCLEROSIS) (HCC): ICD-10-CM

## 2021-08-25 PROCEDURE — G8427 DOCREV CUR MEDS BY ELIG CLIN: HCPCS | Performed by: NURSE PRACTITIONER

## 2021-08-25 PROCEDURE — G8432 DEP SCR NOT DOC, RNG: HCPCS | Performed by: NURSE PRACTITIONER

## 2021-08-25 PROCEDURE — 99214 OFFICE O/P EST MOD 30 MIN: CPT | Performed by: NURSE PRACTITIONER

## 2021-08-25 PROCEDURE — G9899 SCRN MAM PERF RSLTS DOC: HCPCS | Performed by: NURSE PRACTITIONER

## 2021-08-25 PROCEDURE — 3017F COLORECTAL CA SCREEN DOC REV: CPT | Performed by: NURSE PRACTITIONER

## 2021-08-25 RX ORDER — BACLOFEN 5 MG/1
5 TABLET ORAL 3 TIMES DAILY
Qty: 90 TABLET | Refills: 6 | Status: SHIPPED | OUTPATIENT
Start: 2021-08-25 | End: 2022-02-17 | Stop reason: SDUPTHER

## 2021-08-25 NOTE — PROGRESS NOTES
Sadie Mercado is a 60 y.o. female who was seen by synchronous (real-time) audio-video technology on 8/25/2021 for Follow-up        Assessment & Plan:   Diagnoses and all orders for this visit:    1. MS (multiple sclerosis) (HCC)  -     baclofen 5 mg tab; Take 5 mg by mouth three (3) times daily. This is a 25-year-old left-handed female who presents in follow-up for multiple sclerosis. She has been on Ocrevus which started in January 2020 after having side effects with Tecfidera. At the last visit, duloxetine 30 mg daily was to be started for pain. She did not start this because she was scared of potential side effects. We discussed the use of this but she would like to hold off. We will continue baclofen 5 mg 3 times daily. She had previously been referred for sleep specialist evaluation for hypersomnia and she did not have this yet but is open to sleep evaluation so we will look into this referral.  The excessive sleepiness was also a concern when she underwent neuropsych evaluation 1 year ago. We will hold off on serial neuropsych evaluation at this time while we are undergoing this work-up and we will consider this thereafter. Will ask staff to provide her with central scheduling's number to schedule the carotid ultrasound. Follow up in 3 months. I spent at least 34 minutes on this visit with this established patient. Subjective:     Sadie Mercado presents in follow-up for multiple sclerosis. She was last seen here on 6/24/2021. She recently had follow-up MRIs of the brain and cervical spine which were stable without new demyelinating lesions. She reported a syncopal spell the day after having had the MRIs with contrast and CT abdomen pelvis with contrast and reported was not drinking a lot of fluids around that time. She had mild orthostatic hypotension on vitals. A carotid ultrasound was to be obtained.   Duloxetine 30 mg daily for pain was started but she called after the visit and did not want to start this due to concern of potential side effects. She was continued on baclofen. Ortho referral or MRI of the lumbar spine was considered for her back pain. She continues on Ocrevus for DMT. She presents today in follow-up via virtual video visit. Reports her face is swelling up and she doesn't know where it's coming from, her feet are swelling, stomach problems but going to see the gastroenterologist on Sept. 21st.  This is not new. Her back still hurts. Feet still tingle. Gets tingling off and on affecting the feet and legs. She did not want to try Cymbalta due to potential side effects, didn't remember which ones in particular but was scared. She would like to continue baclofen. She reports it helps. She gets sleepy with it. Still having the sleepiness, falls asleep in the day time. She has not seen the sleep specialist yet. She goes to see the podiatrist.  She did not have the carotid ultrasound done. She uses the cane for ambulation at times. Denies further syncopal spells. Denies recent falls but had 1 near fall. Prior to Admission medications    Medication Sig Start Date End Date Taking? Authorizing Provider   baclofen 5 mg tab Take 5 mg by mouth three (3) times daily. 8/25/21  Yes Sharon Peña NP   nabumetone (RELAFEN) 500 mg tablet TAKE 1 TABLET BY MOUTH TWICE DAILY 7/26/21  Yes Sury Jones NP   loratadine (Claritin) 10 mg tablet Take 1 Tablet by mouth daily. 7/7/21  Yes Sury Jones NP   polyethylene glycol (Miralax) 17 gram/dose powder Take 17 g by mouth daily. Yes Provider, Historical   olopatadine (Pataday) 0.2 % drop ophthalmic solution Administer 1 Drop to both eyes daily. Yes Provider, Historical   loteprednol etabonate 0.5 % drpg Apply  to eye. Yes Provider, Historical   triamcinolone acetonide (KENALOG) 0.1 % ointment Apply  to affected area two (2) times a day.  chest and upper extremities,for excema 6/10/21  Yes Conception TONY Lyn   lactobacillus rhamnosus  (PROBIOTIC DIGESTIVE CARE PO) Take 125 mg by mouth daily. 3/23/21  Yes Provider, Historical   BENEFIBER, GUAR GUM, PO Take 3 g by mouth daily. 3/23/21  Yes Provider, Historical   alclometasone (ACLOVATE) 0.05 % ointment Apply 0.05 % to affected area two (2) times daily as needed for Skin Irritation. 3/16/21  Yes Provider, Historical   biotin 1,000 mcg chew Take 1,000 mcg by mouth daily. Yes Provider, Historical   fluticasone propionate (FLONASE) 50 mcg/actuation nasal spray 2 Sprays by Both Nostrils route daily. Indications: inflammation of the nose due to an allergy 2/26/21  Yes Conception TONY Lyn   [employer] wheel walker 7/15/20  Yes Conception TONY Lyn   betamethasone valerate (VALISONE) 0.1 % topical lotion Apply 1 Applicator to affected area as needed for Other (Skin irritation). With hair washing   Yes Provider, Historical   hydrOXYzine HCL (ATARAX) 25 mg tablet Take 50 mg by mouth nightly. tab 1 tp 2 tabs   Yes Provider, Historical   psyllium (METAMUCIL) powd Take  by mouth daily. Yes Provider, Historical   Omeprazole delayed release (PRILOSEC D/R) 20 mg tablet Take 20 mg by mouth daily. Yes Provider, Historical   Shower Chair XX jozef As needed for patient safety 9/12/19  Yes Christiano Connor MD   Bedside Commode XX Please provide a commode for patient safety 9/12/19  Yes Christiano Connor MD   OTHER Please provide a tub transfer bench 9/6/19  Yes Conception TONY Lyn   biotin (VITAMIN B7) 5 mg tablet Take 1,000 mcg by mouth daily.    Yes Provider, Historical   Comp Stocking,Knee,Long,Medium misc Wear daily while walking to prevent swelling 5/19/18  Yes Adele Diaz MD     Patient Active Problem List   Diagnosis Code    Microscopic hematuria R31.29    Rectal bleeding K62.5    Chronic pain G89.29    Diffuse cystic mastopathy N60.19    Paranoid schizophrenia ([facility] [address] F20.0    Chronic left-sided low back pain with left-sided sciatica M54.42, G89.29    Spells of decreased attentiveness R68.89    Unsteady gait R26.81    Perennial allergic rhinitis J30.89    Multiple sclerosis (HCC) G35     Current Outpatient Medications   Medication Sig Dispense Refill    baclofen 5 mg tab Take 5 mg by mouth three (3) times daily. 90 Tablet 6    nabumetone (RELAFEN) 500 mg tablet TAKE 1 TABLET BY MOUTH TWICE DAILY 60 Tablet 2    loratadine (Claritin) 10 mg tablet Take 1 Tablet by mouth daily. 30 Tablet 1    polyethylene glycol (Miralax) 17 gram/dose powder Take 17 g by mouth daily.  olopatadine (Pataday) 0.2 % drop ophthalmic solution Administer 1 Drop to both eyes daily.  loteprednol etabonate 0.5 % drpg Apply  to eye.  triamcinolone acetonide (KENALOG) 0.1 % ointment Apply  to affected area two (2) times a day. chest and upper extremities,for excema 1 Tube 1    lactobacillus rhamnosus  (PROBIOTIC DIGESTIVE CARE PO) Take 125 mg by mouth daily.  BENEFIBER, GUAR GUM, PO Take 3 g by mouth daily.  alclometasone (ACLOVATE) 0.05 % ointment Apply 0.05 % to affected area two (2) times daily as needed for Skin Irritation.  biotin 1,000 mcg chew Take 1,000 mcg by mouth daily.  fluticasone propionate (FLONASE) 50 mcg/actuation nasal spray 2 Sprays by Both Nostrils route daily. Indications: inflammation of the nose due to an allergy 1 Bottle 4    Walker [facility] wheel walker 1 Each 0    betamethasone valerate (VALISONE) 0.1 % topical lotion Apply 1 Applicator to affected area as needed for Other (Skin irritation). With hair washing      hydrOXYzine HCL (ATARAX) 25 mg tablet Take 50 mg by mouth nightly. tab 1 tp 2 tabs      psyllium (METAMUCIL) powd Take  by mouth daily.  Omeprazole delayed release (PRILOSEC D/R) 20 mg tablet Take 20 mg by mouth daily.       Shower Chair XX jozef As needed for patient safety 1 Each 0    Bedside Commode XX Please provide a commode for patient safety 1 Each 0    OTHER Please provide a tub transfer bench 1 Each 0    biotin (VITAMIN B7) 5 mg tablet Take 1,000 mcg by mouth daily.  Comp Stocking,Knee,Long,Medium misc Wear daily while walking to prevent swelling 1 Each 0     Allergies   Allergen Reactions    Naproxen Shortness of Breath    Shellfish Derived Nausea and Vomiting     SEVERE NAUSEA AND VOMITING    Amoxicillin Rash and Nausea Only     Past Medical History:   Diagnosis Date    Chest pain 2014    neg EKG, non recurrent    Chronic pain     lower back and legs    Depression     Eczema     Fibroids     GERD (gastroesophageal reflux disease)     Headache(784.0)     Hiatal hernia     IBS (irritable bowel syndrome)     Microscopic hematuria     MS (multiple sclerosis) (HCC)     Rectal bleeding     Stool color black     irritable bowel     Past Surgical History:   Procedure Laterality Date    COLONOSCOPY,DIAGNOSTIC      HX BREAST BIOPSY Right 2015    RIGHT BREAST BIOPSY WITH NEEDLE LOCALIZATION ULTRASOUND performed by Luli Sapp MD at [facility] MAIN OR    HX CHOLECYSTECTOMY      HX GI      HX HYSTERECTOMY      HX TUBAL LIGATION       Family History   Problem Relation Age of Onset    HIV/AIDS Brother     Diabetes Father     Cancer Brother     Hypertension Sister     Diabetes Brother     Hypertension Sister     Hypertension Sister     Hypertension Brother      Social History     Tobacco Use    Smoking status: Former Smoker     Packs/day: 0.00     Quit date: 2016     Years since quittin.1    Smokeless tobacco: Former User     Quit date: 2016   Substance Use Topics    Alcohol use: No     Alcohol/week: 10.0 standard drinks     Types: 10 Cans of beer per week       ROS  GENERAL: Denies fever. +fatigue  CARDIAC: No CP or SOB  PULMONARY: No cough or SOB  MUSCULOSKELETAL: No new joint pain. +back pain   NEURO: SEE HPI    Objective:   No flowsheet data found.        Constitutional: [x] Appears well-developed and well-nourished [x] No apparent distress      [] Abnormal -     Mental status: [x] Alert and awake  [x] Oriented to person/place/time [x] Able to follow commands    [] Abnormal -     Eyes:   EOM    [x]  Normal    [] Abnormal -   Sclera  [x]  Normal    [] Abnormal -          Discharge [x]  None visible   [] Abnormal -     HENT: [x] Normocephalic, atraumatic  [] Abnormal -   [x] Mouth/Throat: Mucous membranes are moist    External Ears [] Normal  [] Abnormal -    Neck: [x] No visualized mass [] Abnormal -     Pulmonary/Chest: [x] Respiratory effort normal   [x] No visualized signs of difficulty breathing or respiratory distress        [] Abnormal -      Musculoskeletal:   [] Normal gait with no signs of ataxia         [x] Normal range of motion of neck        [x] Abnormal - Gait evaluation deferred, difficulty getting video evaluation. Neurological:        [x] No Facial Asymmetry (Cranial nerve 7 motor function) (limited exam due to video visit). Speech fluent. Speech clear. Tongue midline. Moves all extremities. Fine finger movements symmetrical. Raises and holds each leg off the sofa. Difficulty getting the camera right to test gait. [x] No gaze palsy        [] Abnormal -          Skin:        [x] No significant exanthematous lesions or discoloration noted on facial skin         [] Abnormal -            Psychiatric:       [x] Normal Affect [] Abnormal -        [x] No Hallucinations    Other pertinent observable physical exam findings:-        We discussed the expected course, resolution and complications of the diagnosis(es) in detail. Medication risks, benefits, costs, interactions, and alternatives were discussed as indicated. I advised her to contact the office if her condition worsens, changes or fails to improve as anticipated. She expressed understanding with the diagnosis(es) and plan.        Alona Corona, was evaluated through a synchronous (real-time) audio-video encounter. The patient (or guardian if applicable) is aware that this is a billable service. Verbal consent to proceed has been obtained within the past 12 months. The visit was conducted pursuant to the emergency declaration under the [address] [place] [address] authority and the [employer] General Act. Patient identification was verified, and a caregiver was present when appropriate. The patient was located in a state where the provider was credentialed to provide care.       Hazel Lieberman NP

## 2021-08-25 NOTE — Clinical Note
Can you please resend the referral to Dr. Atul Hurtado office for the sleep evaluation. Also can you please call the patient and gave her the number for central scheduling so she can schedule the carotid ultrasound. Thank you.

## 2021-08-25 NOTE — PROGRESS NOTES
Caty Nolasco is a 60 y.o. female who will be using a cell phone for today VV    1. Have you been to the ER, urgent care clinic since your last visit? Hospitalized since your last visit? No    2. Have you seen or consulted any other health care providers outside of the [address] [place] [address] since your last visit? Include any pap smears or colon screening.  No     [phone]

## 2021-09-09 ENCOUNTER — HOSPITAL ENCOUNTER (OUTPATIENT)
Dept: VASCULAR SURGERY | Age: 56
Discharge: HOME | End: 2021-09-09
Attending: NURSE PRACTITIONER
Payer: MEDICARE

## 2021-09-09 PROCEDURE — 93880 EXTRACRANIAL BILAT STUDY: CPT

## 2021-09-12 LAB
LEFT CCA DIST DIAS: 28.3 CM/S
LEFT CCA DIST SYS: 84 CM/S
LEFT CCA MID DIAS: 27.05 CM/S
LEFT CCA MID SYS: 81.4 CM/S
LEFT CCA PROX DIAS: 25.8 CM/S
LEFT CCA PROX SYS: 85.3 CM/S
LEFT ECA DIAS: 8.2 CM/S
LEFT ECA SYS: 58.1 CM/S
LEFT ICA DIST DIAS: 32.1 CM/S
LEFT ICA DIST SYS: 69.5 CM/S
LEFT ICA MID DIAS: 35.5 CM/S
LEFT ICA MID SYS: 81.9 CM/S
LEFT ICA PROX DIAS: 16.5 CM/S
LEFT ICA PROX SYS: 62.9 CM/S
LEFT ICA/CCA SYS: 0.97
LEFT SUBCLAVIAN DIAS: 0 CM/S
LEFT SUBCLAVIAN SYS: 68.6 CM/S
LEFT VERTEBRAL DIAS: 10.39 CM/S
LEFT VERTEBRAL SYS: 47.9 CM/S
RIGHT CCA DIST DIAS: 23.2 CM/S
RIGHT CCA DIST SYS: 76.2 CM/S
RIGHT CCA MID DIAS: 24.46 CM/S
RIGHT CCA MID SYS: 87.87 CM/S
RIGHT CCA PROX DIAS: 18 CM/S
RIGHT CCA PROX SYS: 67.2 CM/S
RIGHT ECA DIAS: 14.11 CM/S
RIGHT ECA SYS: 80.1 CM/S
RIGHT ICA DIST DIAS: 29.9 CM/S
RIGHT ICA DIST SYS: 83.2 CM/S
RIGHT ICA MID DIAS: 24.8 CM/S
RIGHT ICA MID SYS: 69.2 CM/S
RIGHT ICA PROX DIAS: 34.7 CM/S
RIGHT ICA PROX SYS: 97.8 CM/S
RIGHT ICA/CCA SYS: 1.3
RIGHT SUBCLAVIAN DIAS: 0 CM/S
RIGHT SUBCLAVIAN SYS: 104.3 CM/S
RIGHT VERTEBRAL DIAS: 19.72 CM/S
RIGHT VERTEBRAL SYS: 54.6 CM/S

## 2021-09-23 ENCOUNTER — TELEPHONE (OUTPATIENT)
Dept: NEUROLOGY | Age: 56
End: 2021-09-23

## 2021-09-29 ENCOUNTER — TELEPHONE (OUTPATIENT)
Dept: NEUROLOGY | Age: 56
End: 2021-09-29

## 2021-10-22 ENCOUNTER — TRANSCRIBE ORDER (OUTPATIENT)
Dept: SCHEDULING | Age: 56
End: 2021-10-22

## 2021-10-22 DIAGNOSIS — E66.3 OVERWEIGHT: ICD-10-CM

## 2021-10-22 DIAGNOSIS — K59.09 CHRONIC CONSTIPATION: ICD-10-CM

## 2021-10-22 DIAGNOSIS — K21.9 ACID REFLUX: Primary | ICD-10-CM

## 2021-10-22 DIAGNOSIS — R05.9 COUGH: ICD-10-CM

## 2021-10-22 DIAGNOSIS — R14.0 BLOATING: ICD-10-CM

## 2021-10-22 DIAGNOSIS — R63.5 WEIGHT GAIN: ICD-10-CM

## 2021-10-28 ENCOUNTER — TRANSCRIBE ORDER (OUTPATIENT)
Dept: SCHEDULING | Age: 56
End: 2021-10-28

## 2021-10-28 DIAGNOSIS — Z12.31 ENCOUNTER FOR SCREENING MAMMOGRAM FOR MALIGNANT NEOPLASM OF BREAST: Primary | ICD-10-CM

## 2021-11-05 ENCOUNTER — TRANSCRIBE ORDER (OUTPATIENT)
Dept: SCHEDULING | Age: 56
End: 2021-11-05

## 2021-11-05 DIAGNOSIS — R14.0 BLOATING SYMPTOM: ICD-10-CM

## 2021-11-05 DIAGNOSIS — K21.9 ACID REFLUX: Primary | ICD-10-CM

## 2021-11-05 DIAGNOSIS — K59.09 CHRONIC CONSTIPATION: ICD-10-CM

## 2021-11-05 DIAGNOSIS — R05.1 ACUTE COUGH: ICD-10-CM

## 2021-11-05 DIAGNOSIS — R63.5 WEIGHT GAIN: ICD-10-CM

## 2021-11-12 ENCOUNTER — TRANSCRIBE ORDER (OUTPATIENT)
Dept: SCHEDULING | Age: 56
End: 2021-11-12

## 2021-11-12 DIAGNOSIS — R63.5 WEIGHT GAIN: Primary | ICD-10-CM

## 2021-12-16 ENCOUNTER — OFFICE VISIT (OUTPATIENT)
Dept: FAMILY MEDICINE CLINIC | Age: 56
End: 2021-12-16
Payer: MEDICARE

## 2021-12-16 VITALS
SYSTOLIC BLOOD PRESSURE: 130 MMHG | DIASTOLIC BLOOD PRESSURE: 77 MMHG | BODY MASS INDEX: 28.49 KG/M2 | TEMPERATURE: 98.4 F | HEART RATE: 76 BPM | WEIGHT: 171 LBS | HEIGHT: 65 IN | RESPIRATION RATE: 16 BRPM | OXYGEN SATURATION: 99 %

## 2021-12-16 DIAGNOSIS — M54.50 CHRONIC MIDLINE LOW BACK PAIN WITHOUT SCIATICA: ICD-10-CM

## 2021-12-16 DIAGNOSIS — G89.29 CHRONIC MIDLINE LOW BACK PAIN WITHOUT SCIATICA: ICD-10-CM

## 2021-12-16 DIAGNOSIS — M54.42 CHRONIC LEFT-SIDED LOW BACK PAIN WITH LEFT-SIDED SCIATICA: ICD-10-CM

## 2021-12-16 DIAGNOSIS — R26.89 BALANCE DISORDER: ICD-10-CM

## 2021-12-16 DIAGNOSIS — Z00.00 MEDICARE ANNUAL WELLNESS VISIT, SUBSEQUENT: ICD-10-CM

## 2021-12-16 DIAGNOSIS — G89.29 CHRONIC LEFT-SIDED LOW BACK PAIN WITH LEFT-SIDED SCIATICA: ICD-10-CM

## 2021-12-16 DIAGNOSIS — Z00.00 MEDICARE ANNUAL WELLNESS VISIT, SUBSEQUENT: Primary | ICD-10-CM

## 2021-12-16 DIAGNOSIS — G35 MULTIPLE SCLEROSIS (HCC): ICD-10-CM

## 2021-12-16 PROCEDURE — 3017F COLORECTAL CA SCREEN DOC REV: CPT | Performed by: NURSE PRACTITIONER

## 2021-12-16 PROCEDURE — G8427 DOCREV CUR MEDS BY ELIG CLIN: HCPCS | Performed by: NURSE PRACTITIONER

## 2021-12-16 PROCEDURE — G0439 PPPS, SUBSEQ VISIT: HCPCS | Performed by: NURSE PRACTITIONER

## 2021-12-16 PROCEDURE — G9899 SCRN MAM PERF RSLTS DOC: HCPCS | Performed by: NURSE PRACTITIONER

## 2021-12-16 PROCEDURE — 99214 OFFICE O/P EST MOD 30 MIN: CPT | Performed by: NURSE PRACTITIONER

## 2021-12-16 PROCEDURE — G8432 DEP SCR NOT DOC, RNG: HCPCS | Performed by: NURSE PRACTITIONER

## 2021-12-16 PROCEDURE — G8419 CALC BMI OUT NRM PARAM NOF/U: HCPCS | Performed by: NURSE PRACTITIONER

## 2021-12-16 RX ORDER — NABUMETONE 500 MG/1
TABLET, FILM COATED ORAL
Qty: 60 TABLET | Refills: 2 | Status: SHIPPED | OUTPATIENT
Start: 2021-12-16 | End: 2022-02-11 | Stop reason: SDUPTHER

## 2021-12-16 RX ORDER — HYDROXYZINE 25 MG/1
50 TABLET, FILM COATED ORAL
Status: CANCELLED | OUTPATIENT
Start: 2021-12-16

## 2021-12-16 NOTE — PATIENT INSTRUCTIONS

## 2021-12-16 NOTE — PROGRESS NOTES
Gerald Blackman is a 60 y.o. female presenting today for Annual Wellness Visit, Headache, and Back Pain  . Chief Complaint   Patient presents with    Annual Wellness Visit    Headache    Back Pain       HPI:  Gerald Blackman presents to the office today for routine follow-up care. Patient complaining of chronic symptoms. She is complaining of chronic back pain and is prescribed Relafen daily. She notes the pain is intermittent and has intermittent tingling sensation in her legs and feet. She also has chronic lower extremity edema. She does carry a medical diagnosis for MS and ambulates via walker. She is managed by neurology for her MS. At her last visit was August, 2021. She has been on oral converged which she started in January 2020 and she is also prescribed Cymbalta 30 mg for pain. Patient did not start Cymbalta secondary to potential side effects. At her last office visit she was given a prescription for baclofen. Patient does have chronic abdominal pain and her last visit with her gastroenterologist was on September 21, 2021. She is also complaining of chronic headaches. This is not a new problem. Currently her anti-inflammatory medicine helps control the symptoms. She is negative for any visual disturbances, nausea or vomiting. She denies any photosensitivity. Review of Systems   Constitutional: Negative for malaise/fatigue. Respiratory: Negative for cough and shortness of breath. Cardiovascular: Positive for leg swelling. Negative for chest pain and palpitations. Gastrointestinal: Negative for abdominal pain, nausea and vomiting. Genitourinary: Negative for dysuria. Musculoskeletal: Positive for back pain and myalgias. Neurological: Positive for weakness and headaches. Negative for dizziness.      ROS:  History obtained from the patient intake forms which are reviewed with the patient  · General: negative for - chills, fever, weight changes or malaise  · HEENT: no sore throat, nasal congestion, vision problems or ear problems  · Respiratory: no cough, shortness of breath, or wheezing  · Cardiovascular: no chest pain, palpitations, or dyspnea on exertion  · Gastrointestinal: no abdominal pain, N/V, change in bowel habits, or black or bloody stools  · Musculoskeletal: Weakness  · Neurological: Lower extremity weakness  · Endo:  No polyuria or polydipsia. · : no hematuria, dysuria, frequency, hesitancy, or nocturia.     · Psychological: negative for - anxiety, depression, sleep disturbances, suicidal or homicidal ideations    Allergies   Allergen Reactions    Naproxen Shortness of Breath    Shellfish Derived Nausea and Vomiting     SEVERE NAUSEA AND VOMITING    Amoxicillin Rash and Nausea Only       PHQ Screening   3 most recent PHQ Screens 12/16/2021   PHQ Not Done -   Little interest or pleasure in doing things Not at all   Feeling down, depressed, irritable, or hopeless Not at all   Total Score PHQ 2 0   Trouble falling or staying asleep, or sleeping too much -   Feeling tired or having little energy -   Poor appetite, weight loss, or overeating -   Feeling bad about yourself - or that you are a failure or have let yourself or your family down -   Trouble concentrating on things such as school, work, reading, or watching TV -   Moving or speaking so slowly that other people could have noticed; or the opposite being so fidgety that others notice -   Thoughts of being better off dead, or hurting yourself in some way -   PHQ 9 Score -   How difficult have these problems made it for you to do your work, take care of your home and get along with others -       History  Past Medical History:   Diagnosis Date    Chest pain 11/2014    neg EKG, non recurrent    Chronic pain     lower back and legs    Depression     Eczema     Fibroids     GERD (gastroesophageal reflux disease)     Headache(784.0)     Hiatal hernia     IBS (irritable bowel syndrome)  Microscopic hematuria     MS (multiple sclerosis) (HCC)     Rectal bleeding     Stool color black     irritable bowel       Past Surgical History:   Procedure Laterality Date    COLONOSCOPY,DIAGNOSTIC      HX BREAST BIOPSY Right 2015    RIGHT BREAST BIOPSY WITH NEEDLE LOCALIZATION ULTRASOUND performed by Sheryl Burgess MD at [facility] MAIN OR    HX CHOLECYSTECTOMY      HX GI      HX HYSTERECTOMY      HX TUBAL LIGATION         Social History     Socioeconomic History    Marital status: SINGLE     Spouse name: Not on file    Number of children: Not on file    Years of education: Not on file    Highest education level: Not on file   Occupational History    Not on file   Tobacco Use    Smoking status: Former Smoker     Packs/day: 0.00     Quit date: 2016     Years since quittin.5    Smokeless tobacco: Former User     Quit date: 2016   Substance and Sexual Activity    Alcohol use: No     Alcohol/week: 10.0 standard drinks     Types: 10 Cans of beer per week    Drug use: No    Sexual activity: Yes     Partners: Male   Other Topics Concern    Not on file   Social History Narrative    Not on file     Social Determinants of Health     Financial Resource Strain:     Difficulty of Paying Living Expenses: Not on file   Food Insecurity:     Worried About Running Out of Food in the Last Year: Not on file    Emil of Food in the Last Year: Not on file   Transportation Needs:     Lack of Transportation (Medical): Not on file    Lack of Transportation (Non-Medical):  Not on file   Physical Activity:     Days of Exercise per Week: Not on file    Minutes of Exercise per Session: Not on file   Stress:     Feeling of Stress : Not on file   Social Connections:     Frequency of Communication with Friends and Family: Not on file    Frequency of Social Gatherings with Friends and Family: Not on file    Attends Scientology Services: Not on file    Active Member of Clubs or Organizations: Not on file   [address] or Organization Meetings: Not on file    Marital Status: Not on file   Intimate Partner Violence:     Fear of Current or Ex-Partner: Not on file    Emotionally Abused: Not on file    Physically Abused: Not on file    Sexually Abused: Not on file   Housing Stability:     Unable to Pay for Housing in the Last Year: Not on file    Number of [place] in the Last Year: Not on file    Unstable Housing in the Last Year: Not on file       Current Outpatient Medications   Medication Sig Dispense Refill    nabumetone (RELAFEN) 500 mg tablet TAKE 1 TABLET BY MOUTH TWICE DAILY 60 Tablet 2    baclofen 5 mg tab Take 5 mg by mouth three (3) times daily. 90 Tablet 6    loratadine (Claritin) 10 mg tablet Take 1 Tablet by mouth daily. 30 Tablet 1    polyethylene glycol (Miralax) 17 gram/dose powder Take 17 g by mouth daily.  olopatadine (Pataday) 0.2 % drop ophthalmic solution Administer 1 Drop to both eyes daily.  loteprednol etabonate 0.5 % drpg Apply  to eye.  triamcinolone acetonide (KENALOG) 0.1 % ointment Apply  to affected area two (2) times a day. chest and upper extremities,for excema 1 Tube 1    alclometasone (ACLOVATE) 0.05 % ointment Apply 0.05 % to affected area two (2) times daily as needed for Skin Irritation.  fluticasone propionate (FLONASE) 50 mcg/actuation nasal spray 2 Sprays by Both Nostrils route daily. Indications: inflammation of the nose due to an allergy 1 Bottle 4    Walker [employer] wheel walker 1 Each 0    betamethasone valerate (VALISONE) 0.1 % topical lotion Apply 1 Applicator to affected area as needed for Other (Skin irritation). With hair washing      hydrOXYzine HCL (ATARAX) 25 mg tablet Take 50 mg by mouth nightly. tab 1 tp 2 tabs      Omeprazole delayed release (PRILOSEC D/R) 20 mg tablet Take 20 mg by mouth daily.       Shower Chair XX jozef As needed for patient safety 1 Each 0    Bedside Commode XX Please provide a commode for patient safety 1 Each 0    OTHER Please provide a tub transfer bench 1 Each 0    biotin (VITAMIN B7) 5 mg tablet Take 1,000 mcg by mouth daily.  Comp Stocking,Knee,Long,Medium misc Wear daily while walking to prevent swelling 1 Each 0    lactobacillus rhamnosus  (PROBIOTIC DIGESTIVE CARE PO) Take 125 mg by mouth daily. (Patient not taking: Reported on 12/16/2021)      BENEFIBER, GUAR GUM, PO Take 3 g by mouth daily. (Patient not taking: Reported on 12/16/2021)      biotin 1,000 mcg chew Take 1,000 mcg by mouth daily. (Patient not taking: Reported on 12/16/2021)      psyllium (METAMUCIL) powd Take  by mouth daily. (Patient not taking: Reported on 12/16/2021)           Vitals:    12/16/21 1113   BP: 130/77   Pulse: 76   Resp: 16   Temp: 98.4 °F (36.9 °C)   TempSrc: Oral   SpO2: 99%   Weight: 171 lb (77.6 kg)   Height: 5' 5\" (1.651 m)   PainSc:   8       Physical Exam  Vitals and nursing note reviewed. Cardiovascular:      Rate and Rhythm: Normal rate and regular rhythm. Pulses: Normal pulses. Heart sounds: Normal heart sounds. Pulmonary:      Effort: Pulmonary effort is normal.      Breath sounds: Normal breath sounds. Abdominal:      Palpations: Abdomen is soft. Musculoskeletal:      Cervical back: Neck supple. Skin:     General: Skin is warm. Neurological:      Mental Status: She is oriented to person, place, and time. Mental status is at baseline. No visits with results within 3 Month(s) from this visit.    Latest known visit with results is:   Hospital Outpatient Visit on 07/19/2021   Component Date Value Ref Range Status    WBC 07/19/2021 5.1  4.6 - 13.2 K/uL Final    RBC 07/19/2021 4.09* 4.20 - 5.30 M/uL Final    HGB 07/19/2021 11.4* 12.0 - 16.0 g/dL Final    HCT 07/19/2021 35.3  35.0 - 45.0 % Final    MCV 07/19/2021 86.3  74.0 - 97.0 FL Final    MCH 07/19/2021 27.9  24.0 - 34.0 PG Final    MCHC 07/19/2021 32.3  31.0 - 37.0 g/dL Final    RDW 07/19/2021 14.6* 11.6 - 14.5 % Final    PLATELET 42/31/5034 233  135 - 420 K/uL Final    MPV 07/19/2021 11.3  9.2 - 11.8 FL Final    NEUTROPHILS 07/19/2021 48  40 - 73 % Final    LYMPHOCYTES 07/19/2021 42  21 - 52 % Final    MONOCYTES 07/19/2021 9  3 - 10 % Final    EOSINOPHILS 07/19/2021 1  0 - 5 % Final    BASOPHILS 07/19/2021 1  0 - 2 % Final    ABS. NEUTROPHILS 07/19/2021 2.4  1.8 - 8.0 K/UL Final    ABS. LYMPHOCYTES 07/19/2021 2.1  0.9 - 3.6 K/UL Final    ABS. MONOCYTES 07/19/2021 0.4  0.05 - 1.2 K/UL Final    ABS. EOSINOPHILS 07/19/2021 0.1  0.0 - 0.4 K/UL Final    ABS. BASOPHILS 07/19/2021 0.0  0.0 - 0.1 K/UL Final    DF 07/19/2021 AUTOMATED    Final       No results found for any visits on 12/16/21. Patient Care Team:  Patient Care Team:  Tushar Loya NP as PCP - General (Nurse Practitioner)  Tushar Loya NP as PCP - [facility]  Dickson Wen as Physician Assistant (Physician Assistant)  Brian Yousif MD (Obstetrics & Gynecology)  Dickson Newell (Physician Assistant)  Leisa Kim MD (Physical Medicine and Rehabilitation)  Chad Romero NP (Neurology)      Assessment / Plan:      ICD-10-CM ICD-9-CM    1. Medicare annual wellness visit, subsequent  Z00.00 V70.0 LIPID PANEL      METABOLIC PANEL, COMPREHENSIVE      CBC WITH AUTOMATED DIFF   2. Chronic left-sided low back pain with left-sided sciatica  M54.42 724.2 nabumetone (RELAFEN) 500 mg tablet    G89.29 724.3 REFERRAL TO ORTHOPEDICS     338.29    3. Multiple sclerosis ([facility] [address]  G35 340    4. Balance disorder  R26.89 781.99    5. Chronic midline low back pain without sciatica  M54.50 724.2 REFERRAL TO ORTHOPEDICS    G89.29 338.29      Continue current treatment plan  Abdominal pain managed by GI  Patient MS is managed by neurology        I asked the patient if she  had any questions and answered her  questions.   The patient stated that she understands the treatment plan and agrees with the treatment plan    This document was created with a voice activated dictation system and may contain transcription errors.

## 2021-12-16 NOTE — PROGRESS NOTES
This is the Subsequent Medicare Annual Wellness Exam, performed 12 months or more after the Initial AWV or the last Subsequent AWV    I have reviewed the patient's medical history in detail and updated the computerized patient record. Assessment/Plan   Education and counseling provided:  Are appropriate based on today's review and evaluation    1. Medicare annual wellness visit, subsequent       Depression Risk Factor Screening     3 most recent PHQ Screens 12/16/2021   PHQ Not Done -   Little interest or pleasure in doing things Not at all   Feeling down, depressed, irritable, or hopeless Not at all   Total Score PHQ 2 0   Trouble falling or staying asleep, or sleeping too much -   Feeling tired or having little energy -   Poor appetite, weight loss, or overeating -   Feeling bad about yourself - or that you are a failure or have let yourself or your family down -   Trouble concentrating on things such as school, work, reading, or watching TV -   Moving or speaking so slowly that other people could have noticed; or the opposite being so fidgety that others notice -   Thoughts of being better off dead, or hurting yourself in some way -   PHQ 9 Score -   How difficult have these problems made it for you to do your work, take care of your home and get along with others -       Alcohol Risk Screen    Do you average more than 1 drink per night or more than 7 drinks a week:  No    On any one occasion in the past three months have you have had more than 3 drinks containing alcohol:  No        Functional Ability and Level of Safety    Hearing: Hearing is good. Activities of Daily Living: The home contains: no safety equipment. Patient does total self care      Ambulation: with no difficulty     Fall Risk:  Fall Risk Assessment, last 12 mths 3/10/2021   Able to walk? Yes   Fall in past 12 months? 1   Do you feel unsteady? 1   Are you worried about falling 1   Is TUG test greater than 12 seconds?  0   Is the gait abnormal? 0   Number of falls in past 12 months 2   Fall with injury?  -      Abuse Screen:  Patient is not abused       Cognitive Screening    Has your family/caregiver stated any concerns about your memory: no     Cognitive Screening: Normal - Clock Drawing Test    Health Maintenance Due     Health Maintenance Due   Topic Date Due    DTaP/Tdap/Td series (1 - Tdap) Never done    Shingrix Vaccine Age 50> (1 of 2) Never done    COVID-19 Vaccine (3 - Booster for [employer] series) 11/17/2021       Patient Care Team   Patient Care Team:  Janai Aleman NP as PCP - General (Nurse Practitioner)  Jaina Aleman NP as PCP - [facility]  Dickson Hernandez as Physician Assistant (Physician Assistant)  Willy Acharya MD (Obstetrics & Gynecology)  Dickson Basurto (Physician Assistant)  Joy Plaza NP (Neurology)    History     Patient Active Problem List   Diagnosis Code    Microscopic hematuria R31.29    Rectal bleeding K62.5    Chronic pain G89.29    Diffuse cystic mastopathy N60.19    Paranoid schizophrenia ([address] F20.0    Chronic left-sided low back pain with left-sided sciatica M54.42, G89.29    Spells of decreased attentiveness R68.89    Unsteady gait R26.81    Perennial allergic rhinitis J30.89    Multiple sclerosis ([address] G35     Past Medical History:   Diagnosis Date    Chest pain 11/2014    neg EKG, non recurrent    Chronic pain     lower back and legs    Depression     Eczema     Fibroids     GERD (gastroesophageal reflux disease)     Headache(784.0)     Hiatal hernia     IBS (irritable bowel syndrome)     Microscopic hematuria     MS (multiple sclerosis) ([address]     Rectal bleeding     Stool color black     irritable bowel      Past Surgical History:   Procedure Laterality Date    COLONOSCOPY,DIAGNOSTIC      HX BREAST BIOPSY Right 1/21/2015    RIGHT BREAST BIOPSY WITH NEEDLE LOCALIZATION ULTRASOUND performed by Vinh Braun MD at [facility] MA[facility] OR    HX CHOLECYSTECTOMY      HX GI      HX HYSTERECTOMY      HX TUBAL LIGATION       Current Outpatient Medications   Medication Sig Dispense Refill    baclofen 5 mg tab Take 5 mg by mouth three (3) times daily. 90 Tablet 6    nabumetone (RELAFEN) 500 mg tablet TAKE 1 TABLET BY MOUTH TWICE DAILY 60 Tablet 2    loratadine (Claritin) 10 mg tablet Take 1 Tablet by mouth daily. 30 Tablet 1    polyethylene glycol (Miralax) 17 gram/dose powder Take 17 g by mouth daily.  olopatadine (Pataday) 0.2 % drop ophthalmic solution Administer 1 Drop to both eyes daily.  loteprednol etabonate 0.5 % drpg Apply  to eye.  triamcinolone acetonide (KENALOG) 0.1 % ointment Apply  to affected area two (2) times a day. chest and upper extremities,for excema 1 Tube 1    lactobacillus rhamnosus  (PROBIOTIC DIGESTIVE CARE PO) Take 125 mg by mouth daily.  BENEFIBER, GUAR GUM, PO Take 3 g by mouth daily.  alclometasone (ACLOVATE) 0.05 % ointment Apply 0.05 % to affected area two (2) times daily as needed for Skin Irritation.  biotin 1,000 mcg chew Take 1,000 mcg by mouth daily.  fluticasone propionate (FLONASE) 50 mcg/actuation nasal spray 2 Sprays by Both Nostrils route daily. Indications: inflammation of the nose due to an allergy 1 Bottle 4    Walker [employer] wheel walker 1 Each 0    betamethasone valerate (VALISONE) 0.1 % topical lotion Apply 1 Applicator to affected area as needed for Other (Skin irritation). With hair washing      hydrOXYzine HCL (ATARAX) 25 mg tablet Take 50 mg by mouth nightly. tab 1 tp 2 tabs      psyllium (METAMUCIL) powd Take  by mouth daily.  Omeprazole delayed release (PRILOSEC D/R) 20 mg tablet Take 20 mg by mouth daily.       Shower Chair XX jozef As needed for patient safety 1 Each 0    Bedside Commode XX Please provide a commode for patient safety 1 Each 0    OTHER Please provide a tub transfer bench 1 Each 0    biotin (VITAMIN B7) 5 mg tablet Take 1,000 mcg by mouth daily.       Comp Stocking,Knee,Long,Medium misc Wear daily while walking to prevent swelling 1 Each 0     Allergies   Allergen Reactions    Naproxen Shortness of Breath    Shellfish Derived Nausea and Vomiting     SEVERE NAUSEA AND VOMITING    Amoxicillin Rash and Nausea Only       Family History   Problem Relation Age of Onset    HIV/AIDS Brother     Diabetes Father     Cancer Brother     Hypertension Sister     Diabetes Brother     Hypertension Sister     Hypertension Sister     Hypertension Brother      Social History     Tobacco Use    Smoking status: Former Smoker     Packs/day: 0.00     Quit date: 2016     Years since quittin.4    Smokeless tobacco: Former User     Quit date: 2016   Substance Use Topics    Alcohol use: No     Alcohol/week: 10.0 standard drinks     Types: 10 Cans of beer per week         Belem Cornelius

## 2021-12-16 NOTE — PROGRESS NOTES
Valentino Guzman presents today for   Chief Complaint   Patient presents with    Annual Wellness Visit    Headache    Back Pain       Is someone accompanying this pt? no    Is the patient using any DME equipment during OV? cane    Depression Screening:  3 most recent PHQ Screens 12/16/2021   PHQ Not Done -   Little interest or pleasure in doing things Not at all   Feeling down, depressed, irritable, or hopeless Not at all   Total Score PHQ 2 0   Trouble falling or staying asleep, or sleeping too much -   Feeling tired or having little energy -   Poor appetite, weight loss, or overeating -   Feeling bad about yourself - or that you are a failure or have let yourself or your family down -   Trouble concentrating on things such as school, work, reading, or watching TV -   Moving or speaking so slowly that other people could have noticed; or the opposite being so fidgety that others notice -   Thoughts of being better off dead, or hurting yourself in some way -   PHQ 9 Score -   How difficult have these problems made it for you to do your work, take care of your home and get along with others -       Learning Assessment:  Learning Assessment 6/15/2018   PRIMARY LEARNER Patient   CO-LEARNER CAREGIVER -   PRIMARY LANGUAGE ENGLISH   LEARNER PREFERENCE PRIMARY DEMONSTRATION   ANSWERED BY patient   RELATIONSHIP SELF       Health Maintenance reviewed and discussed and ordered per Provider. Health Maintenance Due   Topic Date Due    DTaP/Tdap/Td series (1 - Tdap) Never done    Shingrix Vaccine Age 50> (1 of 2) Never done    COVID-19 Vaccine (3 - Booster for [employer] series) 11/17/2021   . Coordination of Care:  1. Have you been to the ER, urgent care clinic since your last visit? Hospitalized since your last visit? no    2. Have you seen or consulted any other health care providers outside of the [address] [place] [address] since your last visit? Include any pap smears or colon screening. no

## 2021-12-17 ENCOUNTER — TRANSCRIBE ORDER (OUTPATIENT)
Dept: SCHEDULING | Age: 56
End: 2021-12-17

## 2021-12-17 DIAGNOSIS — K59.09 CHRONIC CONSTIPATION: ICD-10-CM

## 2021-12-17 DIAGNOSIS — E66.3 OVER WEIGHT: ICD-10-CM

## 2021-12-17 DIAGNOSIS — R63.5 WEIGHT GAIN: ICD-10-CM

## 2021-12-17 DIAGNOSIS — K21.9 ACID REFLUX: Primary | ICD-10-CM

## 2021-12-17 DIAGNOSIS — R05.1 ACUTE COUGH: ICD-10-CM

## 2021-12-17 DIAGNOSIS — R14.0 BLOATING SYMPTOM: ICD-10-CM

## 2021-12-20 ENCOUNTER — TRANSCRIBE ORDER (OUTPATIENT)
Dept: SCHEDULING | Age: 56
End: 2021-12-20

## 2021-12-20 DIAGNOSIS — R14.0 BLOATING: ICD-10-CM

## 2021-12-20 DIAGNOSIS — R63.5 WEIGHT GAIN: ICD-10-CM

## 2021-12-20 DIAGNOSIS — K21.9 ACID REFLUX: Primary | ICD-10-CM

## 2021-12-20 DIAGNOSIS — K59.09 CHRONIC CONSTIPATION: ICD-10-CM

## 2021-12-20 DIAGNOSIS — E66.3 OVER WEIGHT: ICD-10-CM

## 2022-01-06 ENCOUNTER — TRANSCRIBE ORDER (OUTPATIENT)
Dept: SCHEDULING | Age: 57
End: 2022-01-06

## 2022-01-06 DIAGNOSIS — R63.5 WEIGHT GAIN: ICD-10-CM

## 2022-01-06 DIAGNOSIS — K21.9 ACID REFLUX: ICD-10-CM

## 2022-01-06 DIAGNOSIS — R14.0 BLOATING SYMPTOM: ICD-10-CM

## 2022-01-06 DIAGNOSIS — E66.3 OVER WEIGHT: ICD-10-CM

## 2022-01-06 DIAGNOSIS — R05.9 COUGH: ICD-10-CM

## 2022-01-06 DIAGNOSIS — R13.10 DYSPHAGIA: Primary | ICD-10-CM

## 2022-01-06 DIAGNOSIS — K59.09 CHRONIC CONSTIPATION: ICD-10-CM

## 2022-01-17 ENCOUNTER — HOSPITAL ENCOUNTER (OUTPATIENT)
Dept: INFUSION THERAPY | Age: 57
Discharge: HOME | End: 2022-01-17
Payer: MEDICARE

## 2022-01-17 VITALS
SYSTOLIC BLOOD PRESSURE: 113 MMHG | HEART RATE: 84 BPM | RESPIRATION RATE: 16 BRPM | TEMPERATURE: 98.2 F | DIASTOLIC BLOOD PRESSURE: 71 MMHG

## 2022-01-17 DIAGNOSIS — G35 MULTIPLE SCLEROSIS (HCC): Primary | ICD-10-CM

## 2022-01-17 LAB
ALBUMIN SERPL-MCNC: 3.8 G/DL (ref 3.4–5)
ALBUMIN/GLOB SERPL: 1.1 {RATIO} (ref 0.8–1.7)
ALP SERPL-CCNC: 120 U/L (ref 45–117)
ALT SERPL-CCNC: 23 U/L (ref 13–56)
ANION GAP SERPL CALC-SCNC: 6 MMOL/L (ref 3–18)
AST SERPL-CCNC: 15 U/L (ref 10–38)
BASOPHILS # BLD: 0 K/UL (ref 0–0.1)
BASOPHILS NFR BLD: 1 % (ref 0–2)
BILIRUB SERPL-MCNC: 0.3 MG/DL (ref 0.2–1)
BUN SERPL-MCNC: 12 MG/DL (ref 7–18)
BUN/CREAT SERPL: 16 (ref 12–20)
CALCIUM SERPL-MCNC: 9.1 MG/DL (ref 8.5–10.1)
CHLORIDE SERPL-SCNC: 109 MMOL/L (ref 100–111)
CO2 SERPL-SCNC: 26 MMOL/L (ref 21–32)
CREAT SERPL-MCNC: 0.75 MG/DL (ref 0.6–1.3)
DIFFERENTIAL METHOD BLD: ABNORMAL
EOSINOPHIL # BLD: 0.1 K/UL (ref 0–0.4)
EOSINOPHIL NFR BLD: 2 % (ref 0–5)
ERYTHROCYTE [DISTWIDTH] IN BLOOD BY AUTOMATED COUNT: 14.6 % (ref 11.6–14.5)
GLOBULIN SER CALC-MCNC: 3.5 G/DL (ref 2–4)
GLUCOSE SERPL-MCNC: 102 MG/DL (ref 74–99)
HCT VFR BLD AUTO: 35.5 % (ref 35–45)
HGB BLD-MCNC: 11.6 G/DL (ref 12–16)
IMM GRANULOCYTES # BLD AUTO: 0 K/UL (ref 0–0.04)
IMM GRANULOCYTES NFR BLD AUTO: 0 % (ref 0–0.5)
LYMPHOCYTES # BLD: 2.8 K/UL (ref 0.9–3.6)
LYMPHOCYTES NFR BLD: 50 % (ref 21–52)
MCH RBC QN AUTO: 28 PG (ref 24–34)
MCHC RBC AUTO-ENTMCNC: 32.7 G/DL (ref 31–37)
MCV RBC AUTO: 85.5 FL (ref 78–100)
MONOCYTES # BLD: 0.5 K/UL (ref 0.05–1.2)
MONOCYTES NFR BLD: 9 % (ref 3–10)
NEUTS SEG # BLD: 2.1 K/UL (ref 1.8–8)
NEUTS SEG NFR BLD: 38 % (ref 40–73)
NRBC # BLD: 0 K/UL (ref 0–0.01)
NRBC BLD-RTO: 0 PER 100 WBC
PLATELET # BLD AUTO: 280 K/UL (ref 135–420)
PMV BLD AUTO: 10.7 FL (ref 9.2–11.8)
POTASSIUM SERPL-SCNC: 4 MMOL/L (ref 3.5–5.5)
PROT SERPL-MCNC: 7.3 G/DL (ref 6.4–8.2)
RBC # BLD AUTO: 4.15 M/UL (ref 4.2–5.3)
SODIUM SERPL-SCNC: 141 MMOL/L (ref 136–145)
WBC # BLD AUTO: 5.5 K/UL (ref 4.6–13.2)

## 2022-01-17 PROCEDURE — 96375 TX/PRO/DX INJ NEW DRUG ADDON: CPT

## 2022-01-17 PROCEDURE — 80053 COMPREHEN METABOLIC PANEL: CPT

## 2022-01-17 PROCEDURE — 85025 COMPLETE CBC W/AUTO DIFF WBC: CPT

## 2022-01-17 PROCEDURE — 96365 THER/PROPH/DIAG IV INF INIT: CPT

## 2022-01-17 PROCEDURE — 74011000250 HC RX REV CODE- 250: Performed by: NURSE PRACTITIONER

## 2022-01-17 PROCEDURE — 74011250637 HC RX REV CODE- 250/637: Performed by: NURSE PRACTITIONER

## 2022-01-17 PROCEDURE — 74011250636 HC RX REV CODE- 250/636: Performed by: NURSE PRACTITIONER

## 2022-01-17 PROCEDURE — 96366 THER/PROPH/DIAG IV INF ADDON: CPT

## 2022-01-17 RX ORDER — ACETAMINOPHEN 325 MG/1
650 TABLET ORAL ONCE
Status: CANCELLED | OUTPATIENT
Start: 2022-07-18 | End: 2022-07-18

## 2022-01-17 RX ORDER — SODIUM CHLORIDE 9 MG/ML
25 INJECTION, SOLUTION INTRAVENOUS CONTINUOUS
Status: CANCELLED | OUTPATIENT
Start: 2022-07-18

## 2022-01-17 RX ORDER — SODIUM CHLORIDE 0.9 % (FLUSH) 0.9 %
10 SYRINGE (ML) INJECTION AS NEEDED
Status: CANCELLED | OUTPATIENT
Start: 2022-07-18

## 2022-01-17 RX ORDER — HYDROCORTISONE SODIUM SUCCINATE 100 MG/2ML
100 INJECTION, POWDER, FOR SOLUTION INTRAMUSCULAR; INTRAVENOUS AS NEEDED
Status: CANCELLED | OUTPATIENT
Start: 2022-07-18

## 2022-01-17 RX ORDER — ACETAMINOPHEN 325 MG/1
650 TABLET ORAL AS NEEDED
Status: CANCELLED
Start: 2022-07-18

## 2022-01-17 RX ORDER — DIPHENHYDRAMINE HYDROCHLORIDE 50 MG/ML
50 INJECTION, SOLUTION INTRAMUSCULAR; INTRAVENOUS AS NEEDED
Status: CANCELLED
Start: 2022-07-18

## 2022-01-17 RX ORDER — DIPHENHYDRAMINE HYDROCHLORIDE 50 MG/ML
25 INJECTION, SOLUTION INTRAMUSCULAR; INTRAVENOUS AS NEEDED
Status: CANCELLED
Start: 2022-07-18

## 2022-01-17 RX ORDER — SODIUM CHLORIDE 0.9 % (FLUSH) 0.9 %
10 SYRINGE (ML) INJECTION AS NEEDED
Status: DISPENSED | OUTPATIENT
Start: 2022-01-17 | End: 2022-01-17

## 2022-01-17 RX ORDER — SODIUM CHLORIDE 0.9 % (FLUSH) 0.9 %
10-40 SYRINGE (ML) INJECTION AS NEEDED
Status: DISCONTINUED | OUTPATIENT
Start: 2022-01-17 | End: 2022-01-21 | Stop reason: HOSPADM

## 2022-01-17 RX ORDER — ALBUTEROL SULFATE 0.83 MG/ML
2.5 SOLUTION RESPIRATORY (INHALATION) AS NEEDED
Status: CANCELLED
Start: 2022-07-18

## 2022-01-17 RX ORDER — HEPARIN 100 UNIT/ML
300-500 SYRINGE INTRAVENOUS AS NEEDED
Status: CANCELLED
Start: 2022-07-18

## 2022-01-17 RX ORDER — SODIUM CHLORIDE 9 MG/ML
10 INJECTION INTRAMUSCULAR; INTRAVENOUS; SUBCUTANEOUS AS NEEDED
Status: CANCELLED | OUTPATIENT
Start: 2022-07-18

## 2022-01-17 RX ORDER — SODIUM CHLORIDE 9 MG/ML
25 INJECTION, SOLUTION INTRAVENOUS CONTINUOUS
Status: DISPENSED | OUTPATIENT
Start: 2022-01-17 | End: 2022-01-17

## 2022-01-17 RX ORDER — DIPHENHYDRAMINE HYDROCHLORIDE 50 MG/ML
50 INJECTION, SOLUTION INTRAMUSCULAR; INTRAVENOUS ONCE
Status: CANCELLED | OUTPATIENT
Start: 2022-07-18 | End: 2022-07-18

## 2022-01-17 RX ORDER — EPINEPHRINE 1 MG/ML
0.3 INJECTION, SOLUTION, CONCENTRATE INTRAVENOUS AS NEEDED
Status: CANCELLED | OUTPATIENT
Start: 2022-07-18

## 2022-01-17 RX ORDER — ONDANSETRON 2 MG/ML
8 INJECTION INTRAMUSCULAR; INTRAVENOUS AS NEEDED
Status: CANCELLED | OUTPATIENT
Start: 2022-07-18

## 2022-01-17 RX ORDER — DIPHENHYDRAMINE HYDROCHLORIDE 50 MG/ML
50 INJECTION, SOLUTION INTRAMUSCULAR; INTRAVENOUS ONCE
Status: COMPLETED | OUTPATIENT
Start: 2022-01-17 | End: 2022-01-17

## 2022-01-17 RX ORDER — ACETAMINOPHEN 325 MG/1
650 TABLET ORAL ONCE
Status: COMPLETED | OUTPATIENT
Start: 2022-01-17 | End: 2022-01-17

## 2022-01-17 RX ADMIN — DIPHENHYDRAMINE HYDROCHLORIDE 50 MG: 50 INJECTION, SOLUTION INTRAMUSCULAR; INTRAVENOUS at 09:47

## 2022-01-17 RX ADMIN — METHYLPREDNISOLONE SODIUM SUCCINATE 125 MG: 125 INJECTION, POWDER, FOR SOLUTION INTRAMUSCULAR; INTRAVENOUS at 09:42

## 2022-01-17 RX ADMIN — ACETAMINOPHEN 650 MG: 325 TABLET ORAL at 09:40

## 2022-01-17 RX ADMIN — OCRELIZUMAB 600 MG: 300 INJECTION INTRAVENOUS at 10:18

## 2022-01-17 RX ADMIN — SODIUM CHLORIDE, PRESERVATIVE FREE 10 ML: 5 INJECTION INTRAVENOUS at 09:25

## 2022-01-17 RX ADMIN — SODIUM CHLORIDE 25 ML/HR: 9 INJECTION, SOLUTION INTRAVENOUS at 09:25

## 2022-01-17 NOTE — PROGRESS NOTES
[facility] Progress Note    Date: 2022    Name: Walter Kim    MRN: [id number]         : 1965      Ms. Concepcion Albert arrived in the [facility] today at 0900, in stable condition, here for Ocrevus (ocrelizumab) Infusion Q 6 months/labs. She was assessed and education was provided. Ms. Jacobs's vitals were reviewed. Visit Vitals  /81 (BP 1 Location: Right upper arm, BP Patient Position: Sitting)   Pulse 75   Temp 98.1 °F (36.7 °C)   Resp 16   Breastfeeding No       PIV # 24 G, was established in her right AC x 1 attempts, brisk blood return and obtained CBC & CMP at same time as IV start. Sent to main lab for processing.  ml IV Bag, was initiated, to infuse @ KVO, PRN, throughout treatment today. The following pre-medications were given:Tylenol 650 mg PO, Benadryl 50 mg IVP, & Solumedrol 125 mg IVP. Ocrevus (Ocrelizumab) 600 mg IV, was administered using the following rate titration:  100 ml/hr X 15 minutes, 200 ml/hr X 15 minutes, 250 ml/hr X 30 minutes, 300  until completion. After completion of the Ocrevus Infusion, Ms. Hakan Stewart declined observation. Discharge instructions discussed with Ms Concepcion Albert and she verbalized understanding. PIV was removed and gauze/bandaid was applied. Ms. Concepcion Albert tolerated well, and had no complaints. Ms. Concepcion Albert was discharged from [place] [address] in stable condition at [phone]. She has her next appointment on 2022 at 0900 for [address] [place] [address].        Julio César Benton RN  2022

## 2022-01-24 ENCOUNTER — TRANSCRIBE ORDER (OUTPATIENT)
Dept: SCHEDULING | Age: 57
End: 2022-01-24

## 2022-01-24 DIAGNOSIS — Z12.31 VISIT FOR SCREENING MAMMOGRAM: Primary | ICD-10-CM

## 2022-01-25 ENCOUNTER — OFFICE VISIT (OUTPATIENT)
Dept: ORTHOPEDIC SURGERY | Age: 57
End: 2022-01-25
Payer: MEDICARE

## 2022-01-25 VITALS
OXYGEN SATURATION: 100 % | BODY MASS INDEX: 28.49 KG/M2 | TEMPERATURE: 98 F | HEIGHT: 65 IN | WEIGHT: 171 LBS | HEART RATE: 66 BPM

## 2022-01-25 DIAGNOSIS — G89.29 OTHER CHRONIC PAIN: Primary | ICD-10-CM

## 2022-01-25 PROCEDURE — G8427 DOCREV CUR MEDS BY ELIG CLIN: HCPCS | Performed by: NURSE PRACTITIONER

## 2022-01-25 PROCEDURE — G9899 SCRN MAM PERF RSLTS DOC: HCPCS | Performed by: NURSE PRACTITIONER

## 2022-01-25 PROCEDURE — G8432 DEP SCR NOT DOC, RNG: HCPCS | Performed by: NURSE PRACTITIONER

## 2022-01-25 PROCEDURE — 3017F COLORECTAL CA SCREEN DOC REV: CPT | Performed by: NURSE PRACTITIONER

## 2022-01-25 PROCEDURE — 99212 OFFICE O/P EST SF 10 MIN: CPT | Performed by: NURSE PRACTITIONER

## 2022-01-25 PROCEDURE — G8419 CALC BMI OUT NRM PARAM NOF/U: HCPCS | Performed by: NURSE PRACTITIONER

## 2022-01-25 RX ORDER — DESONIDE 0.5 MG/G
CREAM TOPICAL
COMMUNITY
Start: 2021-11-17

## 2022-01-25 RX ORDER — DUPILUMAB 300 MG/2ML
INJECTION, SOLUTION SUBCUTANEOUS EVERY 2 WEEKS
COMMUNITY
Start: 2021-12-30

## 2022-01-25 RX ORDER — KETOCONAZOLE 20 MG/ML
SHAMPOO TOPICAL
COMMUNITY
Start: 2021-11-02

## 2022-01-25 RX ORDER — HYOSCYAMINE SULFATE 0.12 MG/1
0.12 TABLET SUBLINGUAL
COMMUNITY
End: 2022-06-01

## 2022-01-25 NOTE — PROGRESS NOTES
Chief complaint   Chief Complaint   Patient presents with    Back Pain    Leg Pain       History of Present Illness:  Bella Oleary is a  60 y.o.  female who comes in today after last being seen by Dr. Bryn Hunt on April 24, 2019. At that time she was referred back to her neurologist and is diagnosed with multiple sclerosis. She comes in today stating she has body aches everywhere from her neck, back, arms, legs, stomach, feet and this has been going on for many years. She states she has tried Tylenol which is not really helped. Her neurologist has her on baclofen 5 mg 3 times a day which she states is helpful. They also want to put her on Cymbalta for her widespread pain complaints but the patient states she did not want to take it because she read it might cause her hair to fall out. She is wanting to know what kind of pain medication we could give her for her pain. Physical Exam: Patient is a 53-year-old female who is alert and oriented. She had a little bit of a flat affect. She has a full weightbearing antalgic gait utilizing single-point cane. She has 4 out of 5 strength bilateral upper and lower extremities. Negative straight leg raise. Negative Zeynep's. Assessment and Plan: This is a patient who has widespread pain complaints. She is going to go to her PCP and asked for a referral to a pain management practice. We will see her back as needed. Medications:  Current Outpatient Medications   Medication Sig Dispense Refill    Dupixent Pen 300 mg/2 mL pnij       ketoconazole (NIZORAL) 2 % shampoo       baclofen 5 mg tab Take 5 mg by mouth three (3) times daily. 90 Tablet 6    loratadine (Claritin) 10 mg tablet Take 1 Tablet by mouth daily. 30 Tablet 1    polyethylene glycol (Miralax) 17 gram/dose powder Take 17 g by mouth daily.  loteprednol etabonate 0.5 % drpg Apply  to eye.       triamcinolone acetonide (KENALOG) 0.1 % ointment Apply  to affected area two (2) times a day. chest and upper extremities,for excema 1 Tube 1    lactobacillus rhamnosus  (PROBIOTIC DIGESTIVE CARE PO) Take 125 mg by mouth daily.  fluticasone propionate (FLONASE) 50 mcg/actuation nasal spray 2 Sprays by Both Nostrils route daily. Indications: inflammation of the nose due to an allergy 1 Bottle 4    Walker [employer] wheel walker 1 Each 0    betamethasone valerate (VALISONE) 0.1 % topical lotion Apply 1 Applicator to affected area as needed for Other (Skin irritation). With hair washing      psyllium (METAMUCIL) powd Take  by mouth daily.  Omeprazole delayed release (PRILOSEC D/R) 20 mg tablet Take 20 mg by mouth daily.  Shower Chair XX jozef As needed for patient safety 1 Each 0    Bedside Commode XX Please provide a commode for patient safety 1 Each 0    OTHER Please provide a tub transfer bench 1 Each 0    biotin (VITAMIN B7) 5 mg tablet Take 1,000 mcg by mouth daily.  Comp Stocking,Knee,Long,Medium misc Wear daily while walking to prevent swelling 1 Each 0    desonide (TRIDESILON) 0.05 % cream  (Patient not taking: Reported on 1/25/2022)      hyoscyamine SL (LEVSIN/SL) 0.125 mg SL tablet 0.125 mg by SubLINGual route every four (4) hours as needed. (Patient not taking: Reported on 1/25/2022)      nabumetone (RELAFEN) 500 mg tablet TAKE 1 TABLET BY MOUTH TWICE DAILY (Patient not taking: Reported on 1/25/2022) 60 Tablet 2    olopatadine (Pataday) 0.2 % drop ophthalmic solution Administer 1 Drop to both eyes daily. (Patient not taking: Reported on 1/25/2022)      BENEFIBER, GUAR GUM, PO Take 3 g by mouth daily. (Patient not taking: Reported on 12/16/2021)      alclometasone (ACLOVATE) 0.05 % ointment Apply 0.05 % to affected area two (2) times daily as needed for Skin Irritation. (Patient not taking: Reported on 1/25/2022)      biotin 1,000 mcg chew Take 1,000 mcg by mouth daily.  (Patient not taking: Reported on 12/16/2021)      hydrOXYzine HCL (ATARAX) 25 mg tablet Take 50 mg by mouth nightly. tab 1 tp 2 tabs (Patient not taking: Reported on 2022)             Review of systems:    Past Medical History:   Diagnosis Date    Chest pain 2014    neg EKG, non recurrent    Chronic pain     lower back and legs    Depression     Eczema     Fibroids     GERD (gastroesophageal reflux disease)     Headache(784.0)     Hiatal hernia     IBS (irritable bowel syndrome)     Microscopic hematuria     MS (multiple sclerosis) (HCC)     Rectal bleeding     Stool color black     irritable bowel     Past Surgical History:   Procedure Laterality Date    COLONOSCOPY,DIAGNOSTIC      HX BREAST BIOPSY Right 2015    RIGHT BREAST BIOPSY WITH NEEDLE LOCALIZATION ULTRASOUND performed by Krzysztof Walden MD at [facility] MAIN OR    HX CHOLECYSTECTOMY      HX GI      HX HYSTERECTOMY      HX TUBAL LIGATION       Social History     Socioeconomic History    Marital status: SINGLE     Spouse name: Not on file    Number of children: Not on file    Years of education: Not on file    Highest education level: Not on file   Occupational History    Not on file   Tobacco Use    Smoking status: Former Smoker     Packs/day: 0.00     Quit date: 2016     Years since quittin.5    Smokeless tobacco: Former User     Quit date: 2016   Substance and Sexual Activity    Alcohol use: No     Alcohol/week: 10.0 standard drinks     Types: 10 Cans of beer per week    Drug use: No    Sexual activity: Yes     Partners: Male   Other Topics Concern    Not on file   Social History Narrative    Not on file     Social Determinants of Health     Financial Resource Strain:     Difficulty of Paying Living Expenses: Not on file   Food Insecurity:     Worried About Running Out of Food in the Last Year: Not on file    Emil of Food in the Last Year: Not on file   Transportation Needs:     Lack of Transportation (Medical): Not on file    Lack of Transportation (Non-Medical):  Not on file Physical Activity:     Days of Exercise per Week: Not on file    Minutes of Exercise per Session: Not on file   Stress:     Feeling of Stress : Not on file   Social Connections:     Frequency of Communication with Friends and Family: Not on file    Frequency of Social Gatherings with Friends and Family: Not on file    Attends Yazidism Services: Not on file    Active Member of Clubs or Organizations: Not on file    Attends Club or Organization Meetings: Not on file    Marital Status: Not on file   Intimate Partner Violence:     Fear of Current or Ex-Partner: Not on file    Emotionally Abused: Not on file    Physically Abused: Not on file    Sexually Abused: Not on file   Housing Stability:     Unable to Pay for Housing in the Last Year: Not on file    Number of [place] in the Last Year: Not on file    Unstable Housing in the Last Year: Not on file     Family History   Problem Relation Age of Onset    HIV/AIDS Brother     Diabetes Father     Cancer Brother     Hypertension Sister     Diabetes Brother     Hypertension Sister     Hypertension Sister     Hypertension Brother        Physical Exam:  Visit Vitals  Pulse 66   Temp 98 °F (36.7 °C) (Temporal)   Ht 5' 5\" (1.651 m)   Wt 171 lb (77.6 kg)   LMP  (LMP Unknown)   SpO2 100%   BMI 28.46 kg/m²     Pain Scale: 8/10       has been . reviewed and is appropriate          Diagnoses and all orders for this visit:    1. Other chronic pain            Follow-up and Dispositions    · Return if symptoms worsen or fail to improve.              We have informed Walter Kim to notify us for immediate appointment if she has any worsening neurogical symptoms or if an emergency situation presents, then call 911

## 2022-01-25 NOTE — PROGRESS NOTES
Annia Martinez presents today for   Chief Complaint   Patient presents with    Back Pain    Leg Pain       Is someone accompanying this pt? no    Is the patient using any DME equipment during OV? yes    Depression Screening:  3 most recent PHQ Screens 12/16/2021   PHQ Not Done -   Little interest or pleasure in doing things Not at all   Feeling down, depressed, irritable, or hopeless Not at all   Total Score PHQ 2 0   Trouble falling or staying asleep, or sleeping too much -   Feeling tired or having little energy -   Poor appetite, weight loss, or overeating -   Feeling bad about yourself - or that you are a failure or have let yourself or your family down -   Trouble concentrating on things such as school, work, reading, or watching TV -   Moving or speaking so slowly that other people could have noticed; or the opposite being so fidgety that others notice -   Thoughts of being better off dead, or hurting yourself in some way -   PHQ 9 Score -   How difficult have these problems made it for you to do your work, take care of your home and get along with others -       Learning Assessment:  Learning Assessment 6/15/2018   PRIMARY LEARNER Patient   CO-LEARNER CAREGIVER -   PRIMARY LANGUAGE ENGLISH   LEARNER PREFERENCE PRIMARY DEMONSTRATION   ANSWERED BY patient   RELATIONSHIP SELF       Abuse Screening:  Abuse Screening Questionnaire 2/26/2021   Do you ever feel afraid of your partner? N   Are you in a relationship with someone who physically or mentally threatens you? N   Is it safe for you to go home? Y       Fall Risk  Fall Risk Assessment, last 12 mths 3/10/2021   Able to walk? Yes   Fall in past 12 months? 1   Do you feel unsteady? 1   Are you worried about falling 1   Is TUG test greater than 12 seconds? 0   Is the gait abnormal? 0   Number of falls in past 12 months 2   Fall with injury? -       OPIOID RISK TOOL  No flowsheet data found. Coordination of Care:  1.  Have you been to the ER, urgent care clinic since your last visit? no  Hospitalized since your last visit? no    2. Have you seen or consulted any other health care providers outside of the [address] [place] [address] since your last visit? no Include any pap smears or colon screening.  no

## 2022-02-03 ENCOUNTER — TELEPHONE (OUTPATIENT)
Dept: NEUROLOGY | Age: 57
End: 2022-02-03

## 2022-02-03 NOTE — TELEPHONE ENCOUNTER
Attempted to contact patient regarding blood work, no answer, left message to return call to office.

## 2022-02-08 ENCOUNTER — HOSPITAL ENCOUNTER (OUTPATIENT)
Dept: MAMMOGRAPHY | Age: 57
Discharge: HOME | End: 2022-02-08
Attending: NURSE PRACTITIONER
Payer: MEDICARE

## 2022-02-08 DIAGNOSIS — Z12.31 VISIT FOR SCREENING MAMMOGRAM: ICD-10-CM

## 2022-02-08 PROCEDURE — 77063 BREAST TOMOSYNTHESIS BI: CPT

## 2022-02-11 ENCOUNTER — VIRTUAL VISIT (OUTPATIENT)
Dept: FAMILY MEDICINE CLINIC | Age: 57
End: 2022-02-11
Payer: MEDICARE

## 2022-02-11 DIAGNOSIS — M25.561 ACUTE PAIN OF RIGHT KNEE: ICD-10-CM
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
## 2022-02-11 DIAGNOSIS — G89.29 CHRONIC LEFT-SIDED LOW BACK PAIN WITH LEFT-SIDED SCIATICA: ICD-10-CM

## 2022-02-11 DIAGNOSIS — M79.601 PAIN OF RIGHT UPPER EXTREMITY: ICD-10-CM

## 2022-02-11 DIAGNOSIS — M54.42 CHRONIC LEFT-SIDED LOW BACK PAIN WITH LEFT-SIDED SCIATICA: ICD-10-CM

## 2022-02-11 DIAGNOSIS — J30.89 PERENNIAL ALLERGIC RHINITIS: ICD-10-CM

## 2022-02-11 PROCEDURE — 99212 OFFICE O/P EST SF 10 MIN: CPT | Performed by: NURSE PRACTITIONER

## 2022-02-11 RX ORDER — NABUMETONE 500 MG/1
TABLET, FILM COATED ORAL
Qty: 60 TABLET | Refills: 2 | Status: SHIPPED | OUTPATIENT
Start: 2022-02-11 | End: 2022-06-16 | Stop reason: SDUPTHER

## 2022-02-11 RX ORDER — FLUTICASONE PROPIONATE 50 MCG
2 SPRAY, SUSPENSION (ML) NASAL DAILY
Qty: 1 EACH | Refills: 1 | Status: SHIPPED | OUTPATIENT
Start: 2022-02-11

## 2022-02-11 NOTE — PROGRESS NOTES
Cathy Rodrigez is a 60 y.o. female who was seen by synchronous (real-time) audio-video technology on 2/11/2022 for Fall (leg, arm, back) and Depression      Assessment & Plan:   Diagnoses and all orders for this visit:    1. Fall, subsequent encounter  -     [address] [place] [address]    2. Chronic left-sided low back pain with left-sided sciatica  -     nabumetone (RELAFEN) 500 mg tablet; TAKE 1 TABLET BY MOUTH TWICE DAILY    3. Perennial allergic rhinitis  -     fluticasone propionate (FLONASE) 50 mcg/actuation nasal spray; 2 Sprays by Both Nostrils route daily. Indications: inflammation of the nose due to an allergy    4. Pain of right upper extremity  -     REFERRAL TO HOME HEALTH    5. Acute pain of right knee  -     REFERRAL TO HOME HEALTH      Acute right upper and lower extremity discomfort-patient currently takes Relafen. If no improvement in symptoms in 7 to 10 days will order images      Subjective: The patient presents for an Audio-visual teleconference appointment for fall. Patient fell on Saturday, February 5, 2022 about 3 AM in the morning. Per patient she got up to use the bathroom and \"almost passed out\". She notes that her fiancé was in her home that night and he got her up the bathroom. Patient does not recall the incident. She denies hitting her head but notes she did hit her mouth. She is also complaining about right upper and lower extremity pain. She has a small skin abrasion to the right forearm and a 1-1/2 to 2 cm skin abrasion to the right patella. He was seen by dermatology who placed Band-Aids to the right knee. She is complaining of right arm pain and soreness. Patient denies hitting her head. She is complaining of intermittent periods of dizziness but is alert and oriented. She has mild nausea but is negative for vomiting. She is negative for headache. She has a history of chronic blurred vision.     Prior to Admission medications    Medication Sig Start Date End Date Taking? Authorizing Provider   nabumetone (RELAFEN) 500 mg tablet TAKE 1 TABLET BY MOUTH TWICE DAILY 2/11/22  Yes Howard Jon NP   fluticasone propionate (FLONASE) 50 mcg/actuation nasal spray 2 Sprays by Both Nostrils route daily. Indications: inflammation of the nose due to an allergy 2/11/22  Yes Howard Jon NP   Dupixent Pen 300 mg/2 mL pnij  12/30/21  Yes Provider, Historical   ketoconazole (NIZORAL) 2 % shampoo  11/2/21  Yes Provider, Historical   loratadine (Claritin) 10 mg tablet Take 1 Tablet by mouth daily. 7/7/21  Yes Howard Jon NP   polyethylene glycol (Miralax) 17 gram/dose powder Take 17 g by mouth daily. Yes Provider, Historical   loteprednol etabonate 0.5 % drpg Apply  to eye. Yes Provider, Historical   triamcinolone acetonide (KENALOG) 0.1 % ointment Apply  to affected area two (2) times a day. chest and upper extremities,for excema 6/10/21  Yes Howard Jon NP   lactobacillus rhamnosus  (PROBIOTIC DIGESTIVE CARE PO) Take 125 mg by mouth daily. 3/23/21  Yes Provider, Historical   [employer] wheel walker 7/15/20  Yes Howard Jon NP   betamethasone valerate (VALISONE) 0.1 % topical lotion Apply 1 Applicator to affected area as needed for Other (Skin irritation). With hair washing   Yes Provider, Historical   psyllium (METAMUCIL) powd Take  by mouth daily. Yes Provider, Historical   Omeprazole delayed release (PRILOSEC D/R) 20 mg tablet Take 20 mg by mouth daily. Yes Provider, Historical   Shower Chair XX jozef As needed for patient safety 9/12/19  Yes Kelton Curling, MD   Bedside Commode XX Please provide a commode for patient safety 9/12/19  Yes Kelton Curling, MD   OTHER Please provide a tub transfer bench 9/6/19  Yes Howard Jon NP   biotin (VITAMIN B7) 5 mg tablet Take 1,000 mcg by mouth daily.    Yes Provider, Historical   Comp Stocking,Knee,Long,Medium misc Wear daily while walking to prevent swelling 5/19/18  Yes Marlys Peña MD   baclofen 5 mg tab Take 5 mg by mouth three (3) times daily. 2/17/22   Candido Freire NP   desonide (TRIDESILON) 0.05 % cream  11/17/21   Provider, Historical   hyoscyamine SL (LEVSIN/SL) 0.125 mg SL tablet 0.125 mg by SubLINGual route every four (4) hours as needed. Patient not taking: Reported on 1/25/2022    Provider, Historical   olopatadine (Pataday) 0.2 % drop ophthalmic solution Administer 1 Drop to both eyes daily. Patient not taking: Reported on 1/25/2022    Provider, Historical   BENEFIBER, GUAR GUM, PO Take 3 g by mouth daily. Patient not taking: Reported on 12/16/2021 3/23/21   Provider, Historical   alclometasone (ACLOVATE) 0.05 % ointment Apply 0.05 % to affected area two (2) times daily as needed for Skin Irritation. Patient not taking: Reported on 1/25/2022 3/16/21   Provider, Historical   biotin 1,000 mcg chew Take 1,000 mcg by mouth daily. Patient not taking: Reported on 12/16/2021    Provider, Historical   hydrOXYzine HCL (ATARAX) 25 mg tablet Take 50 mg by mouth nightly. tab 1 tp 2 tabs  Patient not taking: Reported on 1/25/2022    Provider, Historical         Review of Systems   Constitutional: Negative for malaise/fatigue. Respiratory: Negative for cough and shortness of breath. Cardiovascular: Negative for chest pain, palpitations and leg swelling. Gastrointestinal: Negative for abdominal pain, nausea and vomiting. Genitourinary: Negative for dysuria. Musculoskeletal: Negative for back pain and myalgias. Right arm and right knee pain   Neurological: Negative for dizziness and headaches. Objective:   No flowsheet data found.      [INSTRUCTIONS:  \"[x]\" Indicates a positive item  \"[]\" Indicates a negative item  -- DELETE ALL ITEMS NOT EXAMINED]    Constitutional: [x] Appears well-developed and well-nourished [x] No apparent distress      [] Abnormal -     Mental status: [x] Alert and awake  [x] Oriented to person/place/time [x] Able to follow commands    [] Abnormal -     Eyes:   EOM    [x]  Normal    [] Abnormal -   Sclera  [x]  Normal    [] Abnormal -          Discharge [x]  None visible   [] Abnormal -     HENT: [x] Normocephalic, atraumatic  [] Abnormal -   [x] Mouth/Throat: Mucous membranes are moist    External Ears [x] Normal  [] Abnormal -    Neck: [x] No visualized mass [] Abnormal -     Pulmonary/Chest: [x] Respiratory effort normal   [x] No visualized signs of difficulty breathing or respiratory distress        [] Abnormal -      Musculoskeletal:   [x] Normal gait with no signs of ataxia         [x] Normal range of motion of neck        [] Abnormal -     Neurological:        [x] No Facial Asymmetry (Cranial nerve 7 motor function) (limited exam due to video visit)          [x] No gaze palsy        [] Abnormal -          Skin:        [x] No significant exanthematous lesions or discoloration noted on facial skin         [] Abnormal -            Psychiatric:       [x] Normal Affect [] Abnormal -        [x] No Hallucinations    Other pertinent observable physical exam findings:-        We discussed the expected course, resolution and complications of the diagnosis(es) in detail. Medication risks, benefits, costs, interactions, and alternatives were discussed as indicated. I advised her to contact the office if her condition worsens, changes or fails to improve as anticipated. She expressed understanding with the diagnosis(es) and plan. Gordon Holt, was evaluated through a synchronous (real-time) audio-video encounter. The patient (or guardian if applicable) is aware that this is a billable service, which includes applicable co-pays. Verbal consent to proceed has been obtained.  The visit was conducted pursuant to the emergency declaration under the [address] waiver authority and the [employer] Appropriations Act. Patient identification was verified, and a caregiver was present when appropriate. The patient was located at home in a state where the provider was licensed to provide care.       Karsten Maravilla NP

## 2022-02-11 NOTE — PROGRESS NOTES
Law Florjaylene presents today for   Chief Complaint   Patient presents with    Fall     leg, arm, back    Depression       Virtual/telephone visit    Depression Screening:  3 most recent PHQ Screens 2/11/2022   PHQ Not Done -   Little interest or pleasure in doing things Nearly every day   Feeling down, depressed, irritable, or hopeless Several days   Total Score PHQ 2 4   Trouble falling or staying asleep, or sleeping too much More than half the days   Feeling tired or having little energy Nearly every day   Poor appetite, weight loss, or overeating Several days   Feeling bad about yourself - or that you are a failure or have let yourself or your family down Several days   Trouble concentrating on things such as school, work, reading, or watching TV Nearly every day   Moving or speaking so slowly that other people could have noticed; or the opposite being so fidgety that others notice Not at all   Thoughts of being better off dead, or hurting yourself in some way Not at all   PHQ 9 Score 14   How difficult have these problems made it for you to do your work, take care of your home and get along with others Very difficult       Learning Assessment:  Learning Assessment 6/15/2018   PRIMARY LEARNER Patient   CO-LEARNER CAREGIVER -   PRIMARY LANGUAGE ENGLISH   LEARNER PREFERENCE PRIMARY DEMONSTRATION   ANSWERED BY patient   RELATIONSHIP SELF       Health Maintenance reviewed and discussed and ordered per Provider. Health Maintenance Due   Topic Date Due    DTaP/Tdap/Td series (1 - Tdap) Never done    Shingrix Vaccine Age 50> (1 of 2) Never done    COVID-19 Vaccine (3 - Booster for [employer] series) 10/17/2021   . Coordination of Care:  1. Have you been to the ER, urgent care clinic since your last visit? Hospitalized since your last visit? no    2. Have you seen or consulted any other health care providers outside of the [address] [place] [address] since your last visit?  Include any pap smears or colon screening.  no

## 2022-02-15 ENCOUNTER — HOSPITAL ENCOUNTER (OUTPATIENT)
Dept: GENERAL RADIOLOGY | Age: 57
Discharge: HOME | End: 2022-02-15
Attending: PHYSICIAN ASSISTANT
Payer: MEDICARE

## 2022-02-15 DIAGNOSIS — K59.09 CHRONIC CONSTIPATION: ICD-10-CM

## 2022-02-15 DIAGNOSIS — R63.5 WEIGHT GAIN: ICD-10-CM

## 2022-02-15 DIAGNOSIS — R05.9 COUGH: ICD-10-CM

## 2022-02-15 DIAGNOSIS — R14.0 BLOATING SYMPTOM: ICD-10-CM

## 2022-02-15 DIAGNOSIS — R13.10 DYSPHAGIA: ICD-10-CM

## 2022-02-15 DIAGNOSIS — E66.3 OVER WEIGHT: ICD-10-CM

## 2022-02-15 DIAGNOSIS — R13.10 DYSPHAGIA, UNSPECIFIED TYPE: ICD-10-CM

## 2022-02-15 DIAGNOSIS — K21.9 ACID REFLUX: ICD-10-CM

## 2022-02-15 PROCEDURE — 92611 MOTION FLUOROSCOPY/SWALLOW: CPT

## 2022-02-15 PROCEDURE — 74230 X-RAY XM SWLNG FUNCJ C+: CPT

## 2022-02-15 PROCEDURE — 74011000250 HC RX REV CODE- 250: Performed by: PHYSICIAN ASSISTANT

## 2022-02-15 RX ADMIN — BARIUM SULFATE 60 G: 960 POWDER, FOR SUSPENSION ORAL at 13:00

## 2022-02-15 RX ADMIN — BARIUM SULFATE 700 MG: 700 TABLET ORAL at 13:00

## 2022-02-15 RX ADMIN — BARIUM SULFATE 45 ML: 400 PASTE ORAL at 13:00

## 2022-02-15 NOTE — PROGRESS NOTES
[address] [facility]  SPEECH LANGUAGE PATHOLOGY OUTPATIENT MODIFIED BARIUM SWALLOW STUDY    Patient: Bella Oleary (41 y.o. female)  Date: 2/15/2022  Primary Diagnosis: Dysphagia [R13.10]  Acid reflux [K21.9]  Bloating symptom [R14.0]  Chronic constipation [K59.09]  Cough [R05.9]  Over weight [E66.3]  Weight gain [R63.5]       Precautions: aspiration       Assessment:  Based on the objective data described below, the patient presents with oropharyngeal swallow fxn essentially WFL. Pt tolerated reg solid, puree, thin liquids +/- straw, and 13 mm Ba pill with thin liquid wash without aspiration/penetration events. Bolus manipulation, tongue base retraction, laryngeal elevation/excursion, and pharyngeal motility/sensation were functional throughout evaluation. Positive oropharyngeal clearance across all trials observed. Recommend regular solid diet with thin liquids, aspiration precautions, oral care TID, and meds as tolerated. No further skilled SLP services are indicated at this time. Please re-consult if needed. Recommendations:   Regular diet with thin liquids  Aspiration precautions  HOB >45 during po intake, remain >30 for 30-45 minutes after po   Small bites/sips; alternate liquid/solid with slow feeding rate   Oral care TID  Meds per pt preference  Follow up with GI      SUBJECTIVE:   Patient stated [facility] do you think I feel like everything is sticking? .     OBJECTIVE:     Past Medical History:   Diagnosis Date    Chest pain 11/2014    neg EKG, non recurrent    Chronic pain     lower back and legs    Depression     Eczema     Fibroids     GERD (gastroesophageal reflux disease)     Headache(784.0)     Hiatal hernia     IBS (irritable bowel syndrome)     Microscopic hematuria     MS (multiple sclerosis) (HCC)     Rectal bleeding     Stool color black     irritable bowel     Past Surgical History:   Procedure Laterality Date    COLONOSCOPY,DIAGNOSTIC      HX BREAST BIOPSY Right 1/21/2015    RIGHT BREAST BIOPSY WITH NEEDLE LOCALIZATION ULTRASOUND performed by Andrew Paz MD at [facility] MAIN OR    HX CHOLECYSTECTOMY      HX GI      HX HYSTERECTOMY      HX TUBAL LIGATION       Prior Level of Swallowing Function/Home Situation: Pt with c/o globus sensation during PO  Diet prior to admission: regular solid with thin  Current Diet:  Regular solid with thin   Radiology:  Film Views: Lateral;Fluoro  Patient Position: 90 in fluoro chair    Trial 1:   Consistency Presented: Thin liquid; Solid;Pill/Tablet;Puree   How Presented: Self-fed/presented;Cup/sip;Spoon;Straw;Successive swallows       Bolus Acceptance: No impairment   Bolus Formation/Control: No impairment:     Propulsion: No impairment   Oral Residue: None   Initiation of Swallow: No impairment   Timing: No impairment   Penetration: None   Aspiration/Timing: No evidence of aspiration   Pharyngeal Clearance: No residue       Effective Modifications: None   Cues for Modifications: None       Decreased Tongue Base Retraction?: No  Laryngeal Elevation: WFL (within functional limits)  Aspiration/Penetration Score: 1 (No penetration or aspiration-Contrast does not enter the airway)  Pharyngeal Symmetry: Not assessed  Pharyngeal-Esophageal Segment: No impairment  Pharyngeal Dysfunction: None    Oral Phase Severity: No impairment  Pharyngeal Phase Severity: N/A    8-point Penetration-Aspiration Scale: Score 1    PAIN:  Pt reports 0/10 pain or discomfort prior to MBS. Pt reports 0/10 pain or discomfort post MBS. COMMUNICATION/EDUCATION:   [x]  Education provided post diagnostic testing including oropharyngeal anatomy/physiology, MBS results, diet recommendations and        compensatory strategies/positioning. [x]  Video feedback utilized. []  Handout regarding diet recommendations and thickener instructions provided. [x]  Patient/family have participated as able in goal setting and plan of care.   []  Patient/family agree to work toward stated goals and plan of care. []  Patient understands intent and goals of therapy, but is neutral about his/her participation. []  Patient is unable to participate in goal setting and plan of care.     Thank you for this referral.    Mary Raphael M.S. CCC-SLP/L  Speech-Language Pathologist

## 2022-02-17 ENCOUNTER — OFFICE VISIT (OUTPATIENT)
Dept: NEUROLOGY | Age: 57
End: 2022-02-17
Payer: MEDICARE

## 2022-02-17 VITALS
OXYGEN SATURATION: 97 % | RESPIRATION RATE: 16 BRPM | WEIGHT: 178.6 LBS | SYSTOLIC BLOOD PRESSURE: 100 MMHG | BODY MASS INDEX: 29.72 KG/M2 | DIASTOLIC BLOOD PRESSURE: 70 MMHG | HEART RATE: 85 BPM

## 2022-02-17 DIAGNOSIS — G89.4 CHRONIC PAIN SYNDROME: ICD-10-CM

## 2022-02-17 DIAGNOSIS — G47.10 HYPERSOMNOLENCE: ICD-10-CM

## 2022-02-17 DIAGNOSIS — R26.89 IMPAIRED GAIT AND MOBILITY: ICD-10-CM

## 2022-02-17 DIAGNOSIS — G35 MS (MULTIPLE SCLEROSIS) (HCC): ICD-10-CM

## 2022-02-17 DIAGNOSIS — R55 SYNCOPE, UNSPECIFIED SYNCOPE TYPE: Primary | ICD-10-CM

## 2022-02-17 PROCEDURE — G8419 CALC BMI OUT NRM PARAM NOF/U: HCPCS | Performed by: NURSE PRACTITIONER

## 2022-02-17 PROCEDURE — G8432 DEP SCR NOT DOC, RNG: HCPCS | Performed by: NURSE PRACTITIONER

## 2022-02-17 PROCEDURE — G9899 SCRN MAM PERF RSLTS DOC: HCPCS | Performed by: NURSE PRACTITIONER

## 2022-02-17 PROCEDURE — 3017F COLORECTAL CA SCREEN DOC REV: CPT | Performed by: NURSE PRACTITIONER

## 2022-02-17 PROCEDURE — 99215 OFFICE O/P EST HI 40 MIN: CPT | Performed by: NURSE PRACTITIONER

## 2022-02-17 PROCEDURE — G8427 DOCREV CUR MEDS BY ELIG CLIN: HCPCS | Performed by: NURSE PRACTITIONER

## 2022-02-17 RX ORDER — BACLOFEN 5 MG/1
5 TABLET ORAL 3 TIMES DAILY
Qty: 90 TABLET | Refills: 6 | Status: SHIPPED | OUTPATIENT
Start: 2022-02-17 | End: 2022-03-28 | Stop reason: SDUPTHER

## 2022-02-17 NOTE — PROGRESS NOTES
Clementina Koch is a 60 y.o. female who is in the office today as follow up. 1. Have you been to the ER, urgent care clinic since your last visit? Hospitalized since your last visit? No    2. Have you seen or consulted any other health care providers outside of the [address] [place] [address] since your last visit? Include any pap smears or colon screening.  No

## 2022-02-17 NOTE — PROGRESS NOTES
[facility]  [address], [address], [place], [address]  [address], [address]  Office:  [phone]  Fax: [phone]  Chief Complaint   Patient presents with    Follow-up    Multiple Sclerosis       HPI: Kayla Stephen presents in follow-up for multiple sclerosis. She was last seen on 8/25/2021 in virtual video visit. She continues on a regimen of Ocrevus. She had duloxetine 30 mg daily started at a prior visit which was for pain. It was noted that she did not start it because she was concerned about potential side effects. She would like to hold off on medication continued on baclofen 5 mg 3 times daily. She did not see the sleep specialist yet. Excessive sleepiness has been a concern and also when she underwent neuropsych evaluation 1 year ago this was a concern. We held off on serial neuropsych evaluation at this time while we were undergoing this work-up and this was going to be considered thereafter. She continues to have back pain and feet tingling. She uses a cane for ambulation. She denied any further syncopal spells or recent falls but had one near fall. She had a CUS in Sept. 2021 which was unremarkable. Her last MRI of the brain and cervical spine was in June 2021. MRI brain reported stable moderate to marked deep white matter changes consistent with MS. Stable mild to moderate generalized atrophy pattern. New black hole development in the left mid thalamic body. MRI cervical spine reported no new demyelinating cord lesion or enhancing lesion. The previously identified cervical cord lesions much less conspicuous or resolved. Similar distribution multilevel degenerative changes with moderate central canal stenosis. She presents today in follow-up. Reports feeling terrible. Reports pain.   Reports passing out spell in the bathroom about 1.5 weeks ago-  Was putting something on her eyes from the eye doctor at 3 AM.  Next thing she knew her friend was pulling her out of the bathroom. She had no warning that it was going to occur. She hurt her right knee and right arm. She has an abrasion to right knee with bandage over it, and small abrasion to right arm. Did not have a BM at that time. No other syncopal spells recently. She has been seen here in the past for prior syncopal spells. Reports some tightness in chest sometimes but then it goes away. Bit tongue at that time. No urinary incontinence. Endorses palpitations. Denies SOB. Reports runny nose and cough. Had both covid shots and then booster in December. Went to see an ENT for her allergies. Reports lower extremities swelling. She points out her right hand and wonders if this is a rash, has seen dermatologist.  [place] Sleeps downstairs because she has a hard time getting up the steps and her legs bother her. She sees the podiatrist.  She did not see the sleep specialist yet. Whole body aches, legs hurt a whole lot, believes part of that is the swelling. Her PCP placed a home health referral for a home aide. Her last Ocrevus infusion was January 17. She has her next Ocrevus infusion scheduled in July. She is tolerating the Ocrevus. She saw ortho NP Derian 1/25/2022 for back pain. Patient reports the recommendation was pain management. Denies illness around the time of the syncopal spell that she knows of.       Past Medical History:   Diagnosis Date    Chest pain 11/2014    neg EKG, non recurrent    Chronic pain     lower back and legs    Depression     Eczema     Fibroids     GERD (gastroesophageal reflux disease)     Headache(784.0)     Hiatal hernia     IBS (irritable bowel syndrome)     Microscopic hematuria     MS (multiple sclerosis) (HCC)     Rectal bleeding     Stool color black     irritable bowel       Past Surgical History:   Procedure Laterality Date    COLONOSCOPY,DIAGNOSTIC      HX BREAST BIOPSY Right 1/21/2015    RIGHT BREAST BIOPSY WITH NEEDLE LOCALIZATION ULTRASOUND performed by Scooby Ulloa MD at [facility] HX CHOLECYSTECTOMY      HX GI      HX HYSTERECTOMY      HX TUBAL LIGATION         Current Outpatient Medications   Medication Sig Dispense Refill    baclofen 5 mg tab Take 5 mg by mouth three (3) times daily. 90 Tablet 6    nabumetone (RELAFEN) 500 mg tablet TAKE 1 TABLET BY MOUTH TWICE DAILY 60 Tablet 2    fluticasone propionate (FLONASE) 50 mcg/actuation nasal spray 2 Sprays by Both Nostrils route daily. Indications: inflammation of the nose due to an allergy 1 Each 1    Dupixent Pen 300 mg/2 mL pnij       ketoconazole (NIZORAL) 2 % shampoo       loratadine (Claritin) 10 mg tablet Take 1 Tablet by mouth daily. 30 Tablet 1    polyethylene glycol (Miralax) 17 gram/dose powder Take 17 g by mouth daily.  loteprednol etabonate 0.5 % drpg Apply  to eye.  triamcinolone acetonide (KENALOG) 0.1 % ointment Apply  to affected area two (2) times a day. chest and upper extremities,for excema 1 Tube 1    lactobacillus rhamnosus  (PROBIOTIC DIGESTIVE CARE PO) Take 125 mg by mouth daily.  Walker [employer] wheel walker 1 Each 0    betamethasone valerate (VALISONE) 0.1 % topical lotion Apply 1 Applicator to affected area as needed for Other (Skin irritation). With hair washing      psyllium (METAMUCIL) powd Take  by mouth daily.  Omeprazole delayed release (PRILOSEC D/R) 20 mg tablet Take 20 mg by mouth daily.  Shower Chair XX jozef As needed for patient safety 1 Each 0    Bedside Commode XX Please provide a commode for patient safety 1 Each 0    OTHER Please provide a tub transfer bench 1 Each 0    biotin (VITAMIN B7) 5 mg tablet Take 1,000 mcg by mouth daily.       Comp Stocking,Knee,Long,Medium misc Wear daily while walking to prevent swelling 1 Each 0    desonide (TRIDESILON) 0.05 % cream  (Patient not taking: Reported on 1/25/2022)      hyoscyamine SL (LEVSIN/SL) 0.125 mg SL tablet 0.125 mg by SubLINGual route every four (4) hours as needed. (Patient not taking: Reported on 2022)      olopatadine (Pataday) 0.2 % drop ophthalmic solution Administer 1 Drop to both eyes daily. (Patient not taking: Reported on 2022)      BENEFIBER, GUAR GUM, PO Take 3 g by mouth daily. (Patient not taking: Reported on 2021)      alclometasone (ACLOVATE) 0.05 % ointment Apply 0.05 % to affected area two (2) times daily as needed for Skin Irritation. (Patient not taking: Reported on 2022)      biotin 1,000 mcg chew Take 1,000 mcg by mouth daily. (Patient not taking: Reported on 2021)      hydrOXYzine HCL (ATARAX) 25 mg tablet Take 50 mg by mouth nightly. tab 1 tp 2 tabs (Patient not taking: Reported on 2022)          Allergies   Allergen Reactions    Naproxen Shortness of Breath    Shellfish Derived Nausea and Vomiting     SEVERE NAUSEA AND VOMITING    Amoxicillin Rash and Nausea Only       Social History     Tobacco Use    Smoking status: Former Smoker     Packs/day: 0.00     Quit date: 2016     Years since quittin.6    Smokeless tobacco: Former User     Quit date: 2016   Substance Use Topics    Alcohol use: No     Alcohol/week: 10.0 standard drinks     Types: 10 Cans of beer per week    Drug use: No       Family History   Problem Relation Age of Onset    HIV/AIDS Brother     Diabetes Father     Cancer Brother     Hypertension Sister     Diabetes Brother     Hypertension Sister     Hypertension Sister     Hypertension Brother        Review of Systems:  GENERAL: Denies fever. +fatigue. CARDIAC: No CP or SOB  PULMONARY: No cough or SOB  MUSCULOSKELETAL: +back pain, lower extremity pain. +BLE swelling. +right knee abrasion s/p fall. NEURO: SEE HPI    Physical Examination:  Visit Vitals  /70   Pulse 85   Resp 16   Wt 81 kg (178 lb 9.6 oz)   LMP  (LMP Unknown)   SpO2 97%   BMI 29.72 kg/m²       Alert, in NAD. Heart is regular. Oriented x3. Speech is fluent. Speech clear. EOMs are full, PERRL, VFFTC. +mild nystagmus bilaterally in lateral gaze. No facial asymmetry. Tongue is midline with tremulousness. Mild weakness throughout vs poor effort. Tone is normal. No drift of the bilateral upper extremities. Fine finger movements symmetrical. FNF intact bilaterally. DTRs +3. Antalgic gait with cane, left knee turned inward.        Impression/Plan: This is a 53-year-old left-handed female who presents in follow-up for multiple sclerosis. She is on a regimen of Ocrevus for DMT which was started in January 2020 after having side effects with Tecfidera. She also has complaints of pain involving the back and lower extremities. She had not wanted to start duloxetine due to concern over potential side effects. She is taking baclofen 5 mg 3 times daily. Will refer for pain management specialist as she is interested in this for her pain complaints. She is not interested in an adjustment of the medication at this time. She reports syncopal spell which was recent. She has been seen here for this in the past which was more frequently occurring in 2019. She had a normal EEG at that time. We will obtain another EEG. She had been referred to a sleep specialist for evaluation of hypersomnia and she did not have the evaluation yet so will place new referral.  This was also a concern when she had neuropsych evaluation over 1 year ago. Will obtain cardiology evaluation due to syncope. Reports that her PCP will refer her for therapy. Follow up after MRIs in June, sooner if needed. Diagnoses and all orders for this visit:    1. Syncope, unspecified syncope type  -     REFERRAL TO CARDIOLOGY  -     EEG AWAKE AND ASLEEP; Future    2. MS (multiple sclerosis) ([facility] [place] [facility]  -     MRI BRAIN W WO CONT; Future  -     MRI CERV SPINE W WO CONT; Future  -     baclofen 5 mg tab; Take 5 mg by mouth three (3) times daily.     3. Impaired gait and mobility    4. Chronic pain syndrome  -     REFERRAL TO PAIN MANAGEMENT    5. Hypersomnolence  -     REFERRAL TO NEUROLOGY      Total time 45 minutes with 25 minutes spent in counseling. Signed By: Oly Menard NP        PLEASE NOTE:   Portions of this document may have been produced using voice recognition software. Unrecognized errors in transcription may be present.

## 2022-02-18 DIAGNOSIS — R55 SYNCOPE, UNSPECIFIED SYNCOPE TYPE: ICD-10-CM

## 2022-03-18 ENCOUNTER — VIRTUAL VISIT (OUTPATIENT)
Dept: FAMILY MEDICINE CLINIC | Age: 57
End: 2022-03-18
Payer: MEDICARE

## 2022-03-18 DIAGNOSIS — R10.9 ABDOMINAL PAIN, UNSPECIFIED ABDOMINAL LOCATION: ICD-10-CM

## 2022-03-18 DIAGNOSIS — G89.29 CHRONIC LEFT-SIDED LOW BACK PAIN WITH LEFT-SIDED SCIATICA: Primary | ICD-10-CM

## 2022-03-18 DIAGNOSIS — M54.42 CHRONIC LEFT-SIDED LOW BACK PAIN WITH LEFT-SIDED SCIATICA: Primary | ICD-10-CM

## 2022-03-18 DIAGNOSIS — G89.4 CHRONIC PAIN SYNDROME: ICD-10-CM

## 2022-03-18 PROBLEM — R68.89 SPELLS OF DECREASED ATTENTIVENESS: Status: ACTIVE | Noted: 2018-09-26

## 2022-03-18 PROCEDURE — 99212 OFFICE O/P EST SF 10 MIN: CPT | Performed by: NURSE PRACTITIONER

## 2022-03-18 RX ORDER — NABUMETONE 500 MG/1
TABLET, FILM COATED ORAL
Qty: 60 TABLET | Refills: 2 | Status: CANCELLED | OUTPATIENT
Start: 2022-03-18

## 2022-03-18 RX ORDER — DULOXETIN HYDROCHLORIDE 30 MG/1
30 CAPSULE, DELAYED RELEASE ORAL DAILY
Qty: 90 CAPSULE | Refills: 0 | Status: SHIPPED | OUTPATIENT
Start: 2022-03-18 | End: 2022-04-13 | Stop reason: SDUPTHER

## 2022-03-18 NOTE — PROGRESS NOTES
Steven Parisi presents today for   Chief Complaint   Patient presents with    Leg Pain     right leg pain    Mass     right underarm lump    Breast Problem     left breast       Virtual/telephone visit    Depression Screening:  3 most recent PHQ Screens 3/18/2022   PHQ Not Done -   Little interest or pleasure in doing things Not at all   Feeling down, depressed, irritable, or hopeless Not at all   Total Score PHQ 2 0   Trouble falling or staying asleep, or sleeping too much -   Feeling tired or having little energy -   Poor appetite, weight loss, or overeating -   Feeling bad about yourself - or that you are a failure or have let yourself or your family down -   Trouble concentrating on things such as school, work, reading, or watching TV -   Moving or speaking so slowly that other people could have noticed; or the opposite being so fidgety that others notice -   Thoughts of being better off dead, or hurting yourself in some way -   PHQ 9 Score -   How difficult have these problems made it for you to do your work, take care of your home and get along with others -       Learning Assessment:  Learning Assessment 6/15/2018   PRIMARY LEARNER Patient   CO-LEARNER CAREGIVER -   PRIMARY LANGUAGE ENGLISH   LEARNER PREFERENCE PRIMARY DEMONSTRATION   ANSWERED BY patient   RELATIONSHIP SELF       Health Maintenance reviewed and discussed and ordered per Provider. Health Maintenance Due   Topic Date Due    DTaP/Tdap/Td series (1 - Tdap) Never done    Shingrix Vaccine Age 50> (1 of 2) Never done   . Coordination of Care:  1. Have you been to the ER, urgent care clinic since your last visit? Hospitalized since your last visit? no    2. Have you seen or consulted any other health care providers outside of the [address] [place] [address] since your last visit? Include any pap smears or colon screening.  no

## 2022-03-18 NOTE — PROGRESS NOTES
Mitchell Boo is a 60 y.o. female who was seen by synchronous (real-time) audio-video technology on 3/18/2022 for Leg Pain (right leg pain) and Mass (right underarm lump)        Assessment & Plan:   Diagnoses and all orders for this visit:    1. Chronic left-sided low back pain with left-sided sciatica  -     REFERRAL TO PAIN MANAGEMENT  -     DULoxetine (CYMBALTA) 30 mg capsule; Take 1 Capsule by mouth daily. 2. Chronic pain syndrome    3. Abdominal pain, unspecified abdominal location            Subjective: The patient presents for an Audio-visual teleconference appointment for complaints of left lower extremity pain. Patient is complaining of chronic neck, back, arms and neck pain. She was seen by orthopedic services on 1/25/2022. Per the note orthopedic services offered Cymbalta for her widespread pain complaints with the patient declined prescription. She notes that she did not wanted to take out her hair because she read it might cause her to have hair loss. Patient is requesting referral to pain management. She is also willing to try Cymbalta. She is also complaining of intermittent abdominal pain. She is scheduled to see gastroenterology. Patient advised that provider will no longer prescribe Relafen as the medication might be related to her pain. She does also complain about her abscess to her right axilla. Unable to visualize it on the video. Patient was advised that she needs to follow-up with urgent care today for evaluation patient verbalized understanding  Prior to Admission medications    Medication Sig Start Date End Date Taking? Authorizing Provider   DULoxetine (CYMBALTA) 30 mg capsule Take 1 Capsule by mouth daily. 3/18/22  Yes Tushar Loya NP   baclofen 5 mg tab Take 5 mg by mouth three (3) times daily.  2/17/22  Yes Chad Romero NP   nabumetone (RELAFEN) 500 mg tablet TAKE 1 TABLET BY MOUTH TWICE DAILY 2/11/22  Yes Tushar Loya NP fluticasone propionate (FLONASE) 50 mcg/actuation nasal spray 2 Sprays by Both Nostrils route daily. Indications: inflammation of the nose due to an allergy 2/11/22  Yes Nathan Saucedo NP   Dupixent Pen 300 mg/2 mL pnij  12/30/21  Yes Provider, Historical   ketoconazole (NIZORAL) 2 % shampoo  11/2/21  Yes Provider, Historical   loratadine (Claritin) 10 mg tablet Take 1 Tablet by mouth daily. 7/7/21  Yes Nathan Saucedo NP   polyethylene glycol (Miralax) 17 gram/dose powder Take 17 g by mouth daily. Yes Provider, Historical   loteprednol etabonate 0.5 % drpg Apply  to eye. Yes Provider, Historical   triamcinolone acetonide (KENALOG) 0.1 % ointment Apply  to affected area two (2) times a day. chest and upper extremities,for excema 6/10/21  Yes Nathan Saucedo NP   lactobacillus rhamnosus  (PROBIOTIC DIGESTIVE CARE PO) Take 125 mg by mouth daily. 3/23/21  Yes Provider, Historical   [employer] wheel walker 7/15/20  Yes Nathan Saucedo NP   betamethasone valerate (VALISONE) 0.1 % topical lotion Apply 1 Applicator to affected area as needed for Other (Skin irritation). With hair washing   Yes Provider, Historical   psyllium (METAMUCIL) powd Take  by mouth daily. Yes Provider, Historical   Omeprazole delayed release (PRILOSEC D/R) 20 mg tablet Take 20 mg by mouth daily. Yes Provider, Historical   Shower Chair XX jozef As needed for patient safety 9/12/19  Yes Maverick Mccallum MD   Bedside Commode XX Please provide a commode for patient safety 9/12/19  Yes Maverick Mccallum MD   OTHER Please provide a tub transfer bench 9/6/19  Yes Nathan Saucedo NP   biotin (VITAMIN B7) 5 mg tablet Take 1,000 mcg by mouth daily.    Yes Provider, Historical   Comp Stocking,Knee,Long,Medium misc Wear daily while walking to prevent swelling 5/19/18  Yes Robert Paulino MD   desonide (TRIDESILON) 0.05 % cream  11/17/21   Provider, Historical   hyoscyamine SL (LEVSIN/SL) 0.125 mg SL tablet 0.125 mg by SubLINGual route every four (4) hours as needed. Patient not taking: Reported on 1/25/2022    Provider, Historical   olopatadine (Pataday) 0.2 % drop ophthalmic solution Administer 1 Drop to both eyes daily. Patient not taking: Reported on 1/25/2022    Provider, Historical   BENEFIBER, GUAR GUM, PO Take 3 g by mouth daily. Patient not taking: Reported on 12/16/2021 3/23/21   Provider, Historical   alclometasone (ACLOVATE) 0.05 % ointment Apply 0.05 % to affected area two (2) times daily as needed for Skin Irritation. Patient not taking: Reported on 1/25/2022 3/16/21   Provider, Historical   biotin 1,000 mcg chew Take 1,000 mcg by mouth daily. Patient not taking: Reported on 12/16/2021    Provider, Historical   hydrOXYzine HCL (ATARAX) 25 mg tablet Take 50 mg by mouth nightly. tab 1 tp 2 tabs  Patient not taking: Reported on 1/25/2022    Provider, Historical     Patient Active Problem List   Diagnosis Code    Microscopic hematuria R31.29    Rectal bleeding K62.5    Chronic pain G89.29    Diffuse cystic mastopathy N60.19    Paranoid schizophrenia ([facility] [address] F20.0    Chronic left-sided low back pain with left-sided sciatica M54.42, G89.29    Spells of decreased attentiveness R68.89    Unsteady gait R26.81    Perennial allergic rhinitis J30.89    Multiple sclerosis ([facility] [address] G35     Patient Active Problem List    Diagnosis Date Noted    Multiple sclerosis ([facility] [address] 07/29/2019    Chronic left-sided low back pain with left-sided sciatica 09/26/2018    Spells of decreased attentiveness 09/26/2018    Unsteady gait 09/26/2018    Perennial allergic rhinitis 09/26/2018    Paranoid schizophrenia ([facility] [address] 06/15/2018    Diffuse cystic mastopathy 12/10/2015    Rectal bleeding 11/11/2014    Chronic pain 11/11/2014    Microscopic hematuria      Current Outpatient Medications   Medication Sig Dispense Refill    DULoxetine (CYMBALTA) 30 mg capsule Take 1 Capsule by mouth daily.  90 Capsule 0    baclofen 5 mg tab Take 5 mg by mouth three (3) times daily. 90 Tablet 6    nabumetone (RELAFEN) 500 mg tablet TAKE 1 TABLET BY MOUTH TWICE DAILY 60 Tablet 2    fluticasone propionate (FLONASE) 50 mcg/actuation nasal spray 2 Sprays by Both Nostrils route daily. Indications: inflammation of the nose due to an allergy 1 Each 1    Dupixent Pen 300 mg/2 mL pnij       ketoconazole (NIZORAL) 2 % shampoo       loratadine (Claritin) 10 mg tablet Take 1 Tablet by mouth daily. 30 Tablet 1    polyethylene glycol (Miralax) 17 gram/dose powder Take 17 g by mouth daily.  loteprednol etabonate 0.5 % drpg Apply  to eye.  triamcinolone acetonide (KENALOG) 0.1 % ointment Apply  to affected area two (2) times a day. chest and upper extremities,for excema 1 Tube 1    lactobacillus rhamnosus  (PROBIOTIC DIGESTIVE CARE PO) Take 125 mg by mouth daily.  Walker [employer] wheel walker 1 Each 0    betamethasone valerate (VALISONE) 0.1 % topical lotion Apply 1 Applicator to affected area as needed for Other (Skin irritation). With hair washing      psyllium (METAMUCIL) powd Take  by mouth daily.  Omeprazole delayed release (PRILOSEC D/R) 20 mg tablet Take 20 mg by mouth daily.  Shower Chair XX jozef As needed for patient safety 1 Each 0    Bedside Commode XX Please provide a commode for patient safety 1 Each 0    OTHER Please provide a tub transfer bench 1 Each 0    biotin (VITAMIN B7) 5 mg tablet Take 1,000 mcg by mouth daily.  Comp Stocking,Knee,Long,Medium misc Wear daily while walking to prevent swelling 1 Each 0    desonide (TRIDESILON) 0.05 % cream  (Patient not taking: Reported on 1/25/2022)      hyoscyamine SL (LEVSIN/SL) 0.125 mg SL tablet 0.125 mg by SubLINGual route every four (4) hours as needed. (Patient not taking: Reported on 1/25/2022)      olopatadine (Pataday) 0.2 % drop ophthalmic solution Administer 1 Drop to both eyes daily.  (Patient not taking: Reported on 1/25/2022)      BENEFIBER, GUAR GUM, PO Take 3 g by mouth daily. (Patient not taking: Reported on 12/16/2021)      alclometasone (ACLOVATE) 0.05 % ointment Apply 0.05 % to affected area two (2) times daily as needed for Skin Irritation. (Patient not taking: Reported on 1/25/2022)      biotin 1,000 mcg chew Take 1,000 mcg by mouth daily. (Patient not taking: Reported on 12/16/2021)      hydrOXYzine HCL (ATARAX) 25 mg tablet Take 50 mg by mouth nightly. tab 1 tp 2 tabs (Patient not taking: Reported on 1/25/2022)       Allergies   Allergen Reactions    Naproxen Shortness of Breath    Shellfish Derived Nausea and Vomiting     SEVERE NAUSEA AND VOMITING    Amoxicillin Rash and Nausea Only     Past Medical History:   Diagnosis Date    Chest pain 11/2014    neg EKG, non recurrent    Chronic pain     lower back and legs    Depression     Eczema     Fibroids     GERD (gastroesophageal reflux disease)     Headache(784.0)     Hiatal hernia     IBS (irritable bowel syndrome)     Microscopic hematuria     MS (multiple sclerosis) (HCC)     Rectal bleeding     Stool color black     irritable bowel       Review of Systems   Constitutional: Negative for malaise/fatigue. Respiratory: Negative for cough and shortness of breath. Cardiovascular: Negative for chest pain, palpitations and leg swelling. Gastrointestinal: Positive for abdominal pain (intermittent abdominal pain). Negative for nausea and vomiting. Genitourinary: Negative for dysuria. Musculoskeletal: Negative for myalgias. Leg pain         Objective:   No flowsheet data found.      [INSTRUCTIONS:  \"[x]\" Indicates a positive item  \"[]\" Indicates a negative item  -- DELETE ALL ITEMS NOT EXAMINED]    Constitutional: [x] Appears well-developed and well-nourished [x] No apparent distress      [] Abnormal -     Mental status: [x] Alert and awake  [x] Oriented to person/place/time [x] Able to follow commands    [] Abnormal -     Eyes:   EOM    [x]  Normal    [] Abnormal -   Sclera  [x]  Normal    [] Abnormal -          Discharge [x]  None visible   [] Abnormal -     HENT: [x] Normocephalic, atraumatic  [] Abnormal -   [x] Mouth/Throat: Mucous membranes are moist    External Ears [x] Normal  [] Abnormal -    Neck: [x] No visualized mass [] Abnormal -     Pulmonary/Chest: [x] Respiratory effort normal   [x] No visualized signs of difficulty breathing or respiratory distress        [] Abnormal -      Musculoskeletal:   [x] Normal gait with no signs of ataxia         [x] Normal range of motion of neck        [] Abnormal -     Neurological:        [x] No Facial Asymmetry (Cranial nerve 7 motor function) (limited exam due to video visit)          [] No gaze palsy        [] Abnormal -  Hx of MS      Skin:        [x] No significant exanthematous lesions or discoloration noted on facial skin         [] Abnormal -            Psychiatric:       [x] Normal Affect [] Abnormal -        [x] No Hallucinations    Other pertinent observable physical exam findings:-        We discussed the expected course, resolution and complications of the diagnosis(es) in detail. Medication risks, benefits, costs, interactions, and alternatives were discussed as indicated. I advised her to contact the office if her condition worsens, changes or fails to improve as anticipated. She expressed understanding with the diagnosis(es) and plan. Jem Alejandres, was evaluated through a synchronous (real-time) audio-video encounter. The patient (or guardian if applicable) is aware that this is a billable service, which includes applicable co-pays. Verbal consent to proceed has been obtained. The visit was conducted pursuant to the emergency declaration under the [address] [place] [address] authority and the [employer] General Act. Patient identification was verified, and a caregiver was present when appropriate.  The patient was located at home in a state where the provider was licensed to provide care.       Toby Mcgregor NP

## 2022-03-19 PROBLEM — M54.42 CHRONIC LEFT-SIDED LOW BACK PAIN WITH LEFT-SIDED SCIATICA: Status: ACTIVE | Noted: 2018-09-26

## 2022-03-19 PROBLEM — R26.81 UNSTEADY GAIT: Status: ACTIVE | Noted: 2018-09-26

## 2022-03-19 PROBLEM — F20.0 PARANOID SCHIZOPHRENIA (HCC): Status: ACTIVE | Noted: 2018-06-15

## 2022-03-19 PROBLEM — G89.29 CHRONIC LEFT-SIDED LOW BACK PAIN WITH LEFT-SIDED SCIATICA: Status: ACTIVE | Noted: 2018-09-26

## 2022-03-19 PROBLEM — J30.89 PERENNIAL ALLERGIC RHINITIS: Status: ACTIVE | Noted: 2018-09-26

## 2022-03-20 PROBLEM — G35 MULTIPLE SCLEROSIS (HCC): Status: ACTIVE | Noted: 2019-07-29

## 2022-03-23 ENCOUNTER — TELEPHONE (OUTPATIENT)
Dept: FAMILY MEDICINE CLINIC | Age: 57
End: 2022-03-23

## 2022-03-23 NOTE — TELEPHONE ENCOUNTER
----- Message from Tita Tirado sent at 3/21/2022  3:02 PM EDT -----  Subject: Message to Provider    QUESTIONS  Information for Provider? please call patient with the name of an   orthopedic, she has a fracture in her leg and will be a wheelchair for   awhile , please call patient   ---------------------------------------------------------------------------  --------------  [address]  What is the best way for the office to contact you? OK to leave message on   voicemail  Preferred Call Back Phone Number? [phone]  ---------------------------------------------------------------------------  --------------  SCRIPT ANSWERS  Relationship to Patient?  Self

## 2022-03-24 ENCOUNTER — TELEPHONE (OUTPATIENT)
Dept: FAMILY MEDICINE CLINIC | Age: 57
End: 2022-03-24

## 2022-03-25 ENCOUNTER — NURSE TRIAGE (OUTPATIENT)
Dept: OTHER | Facility: CLINIC | Age: 57
End: 2022-03-25

## 2022-03-25 ENCOUNTER — TELEPHONE (OUTPATIENT)
Dept: FAMILY MEDICINE CLINIC | Age: 57
End: 2022-03-25

## 2022-03-25 DIAGNOSIS — M79.604 PAIN OF RIGHT LOWER EXTREMITY DUE TO INJURY: Primary | ICD-10-CM

## 2022-03-25 NOTE — TELEPHONE ENCOUNTER
Patient called  Asking for a referral for an ortho doctor she is having swelling in her right leg and numbness please advise

## 2022-03-25 NOTE — TELEPHONE ENCOUNTER
Received call from [facility] with Red Flag Complaint. Subjective: Caller states \"swelling in feet and legs feel stiff/painful, wants a referral to an orthopedic. History of gout. Was seen at [facility] and got Tilda Patches on 3/23/22. Gotten worse in the last couple days. \"     Current Symptoms: swelling in right side of leg and foot, chronic. Onset: several weeks ago; gradual, worsening    Associated Symptoms: reduced activity    Pain Severity: 8/10; numbing and stiff; constant    Temperature: Denies      What has been tried: low salt intake. Elevation, helps slightly. LMP: NA Pregnant: NA    Recommended disposition: See in Office Today. Care advice provided, patient verbalizes understanding; denies any other questions or concerns; instructed to call back for any new or worsening symptoms. Patient/Caller agrees with recommended disposition; writer provided warm transfer to [facility] for appointment scheduling    Attention Provider: Thank you for allowing me to participate in the care of your patient. The patient was connected to triage in response to information provided to the [facility]. Please do not respond through this encounter as the response is not directed to a shared pool.     Reason for Disposition   Patient wants to be seen    Protocols used: LEG SWELLING AND EDEMA-ADULT-[place]

## 2022-03-28 DIAGNOSIS — G35 MS (MULTIPLE SCLEROSIS) (HCC): ICD-10-CM

## 2022-03-28 NOTE — TELEPHONE ENCOUNTER
Requested Prescriptions     Pending Prescriptions Disp Refills    baclofen 5 mg tab 90 Tablet 6     Sig: Take 5 mg by mouth three (3) times daily.

## 2022-03-30 RX ORDER — BACLOFEN 5 MG/1
5 TABLET ORAL 3 TIMES DAILY
Qty: 90 TABLET | Refills: 6 | Status: SHIPPED | OUTPATIENT
Start: 2022-03-30 | End: 2022-10-31 | Stop reason: SDUPTHER

## 2022-03-31 ENCOUNTER — TRANSCRIBE ORDER (OUTPATIENT)
Dept: SCHEDULING | Age: 57
End: 2022-03-31

## 2022-03-31 DIAGNOSIS — E04.1 NONTOXIC SINGLE THYROID NODULE: Primary | ICD-10-CM

## 2022-04-14 ENCOUNTER — OFFICE VISIT (OUTPATIENT)
Dept: ORTHOPEDIC SURGERY | Age: 57
End: 2022-04-14
Payer: MEDICARE

## 2022-04-14 VITALS — HEIGHT: 65 IN | OXYGEN SATURATION: 99 % | HEART RATE: 71 BPM | TEMPERATURE: 97.5 F | BODY MASS INDEX: 29.72 KG/M2

## 2022-04-14 DIAGNOSIS — S80.01XA CONTUSION OF RIGHT KNEE, INITIAL ENCOUNTER: ICD-10-CM

## 2022-04-14 DIAGNOSIS — G89.29 CHRONIC PAIN OF RIGHT KNEE: ICD-10-CM

## 2022-04-14 DIAGNOSIS — M54.50 LUMBAR PAIN: ICD-10-CM

## 2022-04-14 DIAGNOSIS — M25.561 CHRONIC PAIN OF RIGHT KNEE: ICD-10-CM

## 2022-04-14 DIAGNOSIS — M17.11 PRIMARY OSTEOARTHRITIS OF RIGHT KNEE: Primary | ICD-10-CM

## 2022-04-14 DIAGNOSIS — S80.211A ABRASION, RIGHT KNEE, INITIAL ENCOUNTER: ICD-10-CM

## 2022-04-14 PROCEDURE — 3017F COLORECTAL CA SCREEN DOC REV: CPT | Performed by: SPECIALIST

## 2022-04-14 PROCEDURE — 99203 OFFICE O/P NEW LOW 30 MIN: CPT | Performed by: SPECIALIST

## 2022-04-14 PROCEDURE — G8419 CALC BMI OUT NRM PARAM NOF/U: HCPCS | Performed by: SPECIALIST

## 2022-04-14 PROCEDURE — G8432 DEP SCR NOT DOC, RNG: HCPCS | Performed by: SPECIALIST

## 2022-04-14 PROCEDURE — G8427 DOCREV CUR MEDS BY ELIG CLIN: HCPCS | Performed by: SPECIALIST

## 2022-04-14 PROCEDURE — G9899 SCRN MAM PERF RSLTS DOC: HCPCS | Performed by: SPECIALIST

## 2022-04-14 RX ORDER — DICLOFENAC SODIUM 10 MG/G
4 GEL TOPICAL 4 TIMES DAILY
Qty: 5 EACH | Refills: 5 | Status: SHIPPED | OUTPATIENT
Start: 2022-04-14 | End: 2022-06-01

## 2022-04-14 NOTE — PROGRESS NOTES
Patient: Andrew Aponte                MRN: [id number]       SSN: [id number]  YOB: 1965        AGE: 60 y.o. SEX: female    PCP: Clover Arrieta NP  04/14/22    CC: RIGHT LEG, KNEE, BACK PAIN, RIGHT KNEE INJURY    HISTORY:  Andrew Aponte is a 60 y.o. female who is seen for right knee and leg pain. She fell and hit her right knee about a month ago. She was seen at [facility] on 3/21/22 where right knee x-rays revealed no acute findings. She was provided a knee immobilizer. She feels increased knee pain with standing and walking. She notes some lower leg swelling and back pain as well. She was previously seen for total body pain. She notes total body pain -arms, legs, back. She notes numbness and tingling in her legs and popping in both knees. She notes significant back pain. She has been limping recently which she attributes to stiffness over her entire body. She notes swelling in her feet and calves. She has a fullness in her right popliteal space. She was previously in physical therapy. She has been experiencing knee pain for the past few years. She reports no h/o injury. She notes pain with standing, walking, and stair climbing. She reports startup pain and difficulty with everyday knee activities. Pain Assessment  4/14/2022   Location of Pain Leg; Foot   Location Modifiers Right   Severity of Pain 7   Quality of Pain Aching; Sharp   Quality of Pain Comment -   Duration of Pain Persistent   Frequency of Pain Constant   Aggravating Factors Walking;Standing; Other (Comment)   Aggravating Factors Comment Sitting   Limiting Behavior No   Relieving Factors Elevation   Relieving Factors Comment -   Result of Injury Yes   Work-Related Injury No   Type of Injury Fall     Occupation, etc:  Ms. Francoise Lee  receives social security disability benefits for depression, IBS, and chronic pain since 2010.  She does not like too much noise and states that she previously lived in loud places. She was previously a  for an  with the [employer] but has not worked since 2006. She has a male friend who comes to see her often. She has a son, a grand son, and 2 grand daughters who he does not see much since her son is busy. She is a diet controlled diabetic. Current weight is 178 pounds. She is 5'3\" tall. Lab Results   Component Value Date/Time    Hemoglobin A1c 5.9 03/31/2010 02:24 PM    Hemoglobin A1c (POC) 5.5 11/11/2016 01:00 PM     Weight Metrics 4/14/2022 2/17/2022 1/25/2022 12/16/2021 6/24/2021 6/10/2021 4/27/2021   Weight - 178 lb 9.6 oz 171 lb 171 lb 175 lb 172 lb 171 lb 9.6 oz   BMI 29.72 kg/m2 29.72 kg/m2 28.46 kg/m2 28.46 kg/m2 29.12 kg/m2 28.62 kg/m2 28.56 kg/m2       Patient Active Problem List   Diagnosis Code    Microscopic hematuria R31.29    Rectal bleeding K62.5    Chronic pain G89.29    Diffuse cystic mastopathy N60.19    Paranoid schizophrenia ([facility] [address] F20.0    Chronic left-sided low back pain with left-sided sciatica M54.42, G89.29    Spells of decreased attentiveness R68.89    Unsteady gait R26.81    Perennial allergic rhinitis J30.89    Multiple sclerosis (HCC) G35     REVIEW OF SYSTEMS: All Below are Negative except: See HPI   Constitutional: negative for fever, chills, and weight loss. Cardiovascular: negative for chest pain, claudication, leg swelling, SOB, PABLO   Gastrointestinal: Negative for pain, N/V/C/D, Blood in stool or urine, dysuria,  hematuria, incontinence, pelvic pain. Musculoskeletal: See HPI   Neurological: Negative for dizziness and weakness. Negative for headaches, Visual changes, confusion, seizures   Phychiatric/Behavioral: Negative for depression, memory loss, substance  abuse. Extremities: Negative for hair changes, rash, or skin lesion changes.    Hematologic: Negative for bleeding problems, bruising, pallor or swollen lymph  nodes   Peripheral Vascular: No calf pain, no circulation deficits. Social History     Socioeconomic History    Marital status: SINGLE     Spouse name: Not on file    Number of children: Not on file    Years of education: Not on file    Highest education level: Not on file   Occupational History    Not on file   Tobacco Use    Smoking status: Former Smoker     Packs/day: 0.00     Quit date: 2016     Years since quittin.7    Smokeless tobacco: Former User     Quit date: 2016   Substance and Sexual Activity    Alcohol use: No     Alcohol/week: 10.0 standard drinks     Types: 10 Cans of beer per week    Drug use: No    Sexual activity: Yes     Partners: Male   Other Topics Concern    Not on file   Social History Narrative    Not on file     Social Determinants of Health     Financial Resource Strain:     Difficulty of Paying Living Expenses: Not on file   Food Insecurity:     Worried About Running Out of Food in the Last Year: Not on file    Emil of Food in the Last Year: Not on file   Transportation Needs:     Lack of Transportation (Medical): Not on file    Lack of Transportation (Non-Medical):  Not on file   Physical Activity:     Days of Exercise per Week: Not on file    Minutes of Exercise per Session: Not on file   Stress:     Feeling of Stress : Not on file   Social Connections:     Frequency of Communication with Friends and Family: Not on file    Frequency of Social Gatherings with Friends and Family: Not on file    Attends Anabaptism Services: Not on file    Active Member of Clubs or Organizations: Not on file    Attends Club or Organization Meetings: Not on file    Marital Status: Not on file   Intimate Partner Violence:     Fear of Current or Ex-Partner: Not on file    Emotionally Abused: Not on file    Physically Abused: Not on file    Sexually Abused: Not on file   Housing Stability:     Unable to Pay for Housing in the Last Year: Not on file    Number of [place] in the Last Year: Not on file    Unstable Housing in the Last Year: Not on file      Allergies   Allergen Reactions    Naproxen Shortness of Breath    Shellfish Derived Nausea and Vomiting     SEVERE NAUSEA AND VOMITING    Amoxicillin Rash and Nausea Only      Current Outpatient Medications   Medication Sig    diclofenac (VOLTAREN) 1 % gel Apply 4 g to affected area four (4) times daily.  baclofen 5 mg tab Take 5 mg by mouth three (3) times daily.  DULoxetine (CYMBALTA) 30 mg capsule Take 1 Capsule by mouth daily.  nabumetone (RELAFEN) 500 mg tablet TAKE 1 TABLET BY MOUTH TWICE DAILY    fluticasone propionate (FLONASE) 50 mcg/actuation nasal spray 2 Sprays by Both Nostrils route daily. Indications: inflammation of the nose due to an allergy    desonide (TRIDESILON) 0.05 % cream  (Patient not taking: Reported on 1/25/2022)    Dupixent Pen 300 mg/2 mL pnij     hyoscyamine SL (LEVSIN/SL) 0.125 mg SL tablet 0.125 mg by SubLINGual route every four (4) hours as needed. (Patient not taking: Reported on 1/25/2022)    ketoconazole (NIZORAL) 2 % shampoo     loratadine (Claritin) 10 mg tablet Take 1 Tablet by mouth daily.  polyethylene glycol (Miralax) 17 gram/dose powder Take 17 g by mouth daily.  olopatadine (Pataday) 0.2 % drop ophthalmic solution Administer 1 Drop to both eyes daily. (Patient not taking: Reported on 1/25/2022)    loteprednol etabonate 0.5 % drpg Apply  to eye.  triamcinolone acetonide (KENALOG) 0.1 % ointment Apply  to affected area two (2) times a day. chest and upper extremities,for excema    lactobacillus rhamnosus  (PROBIOTIC DIGESTIVE CARE PO) Take 125 mg by mouth daily.  BENEFIBER, GUAR GUM, PO Take 3 g by mouth daily. (Patient not taking: Reported on 12/16/2021)    alclometasone (ACLOVATE) 0.05 % ointment Apply 0.05 % to affected area two (2) times daily as needed for Skin Irritation.  (Patient not taking: Reported on 1/25/2022)    biotin 1,000 mcg chew Take 1,000 mcg by mouth daily. (Patient not taking: Reported on 12/16/2021)    Walker misc Front wheel walker    betamethasone valerate (VALISONE) 0.1 % topical lotion Apply 1 Applicator to affected area as needed for Other (Skin irritation). With hair washing    hydrOXYzine HCL (ATARAX) 25 mg tablet Take 50 mg by mouth nightly. tab 1 tp 2 tabs (Patient not taking: Reported on 1/25/2022)    psyllium (METAMUCIL) powd Take  by mouth daily.  Omeprazole delayed release (PRILOSEC D/R) 20 mg tablet Take 20 mg by mouth daily.  Shower Chair XX jozef As needed for patient safety    Bedside Commode XX Please provide a commode for patient safety    OTHER Please provide a tub transfer bench    biotin (VITAMIN B7) 5 mg tablet Take 1,000 mcg by mouth daily.  Comp Stocking,Knee,Long,Medium misc Wear daily while walking to prevent swelling     No current facility-administered medications for this visit. PHYSICAL EXAMINATION:  Visit Vitals  Pulse 71   Temp 97.5 °F (36.4 °C) (Temporal)   Ht 5' 5\" (1.651 m)   LMP  (LMP Unknown)   SpO2 99%   BMI 29.72 kg/m²      ORTHO EXAMINATION:  Examination Right knee Left knee   Skin Intact, healing abrasion Intact   Range of motion 90-5 120-0   Effusion - -   Medial joint line tenderness + -   Lateral joint line tenderness - -   Popliteal tenderness - -   Osteophytes palpable + +   Kwakus - -   Patella crepitus + at 30 degrees + at 30 degrees   Anterior drawer - -   Lateral laxity - -   Medial laxity - -   Varus deformity - -   Valgus deformity - -   Pretibial edema +1 +1   Calf tenderness - -     DUPLEXLOWER EXT VENOUS 5/25/18  INTERPRETATION/FINDINGS  Duplex images were obtained using 2-D gray scale, color flow, and spectral Doppler analysis. Right leg :  1. Deep veins visualized include the common femoral, femoral, popliteal, posterior tibial and peroneal veins. 2. No evidence of deep venous thrombosis detected in the veins visualized.   3. Superficial veins visualized include the great saphenous vein. 4. No evidence of superficial thrombosis detected. 5. Normal multiphasic flow in the posterior tibial artery. Left leg :  1. Deep veins visualized include the common femoral, femoral, popliteal, posterior tibial and peroneal veins. 2. No evidence of deep venous thrombosis detected in the veins visualized. 3. Superficial veins visualized include the great saphenous vein. 4. No evidence of superficial thrombosis detected. 5. Normal multiphasic flow in the posterior tibial artery. RADIOGRAPHS:   XR RIGHT [facility]NEE [facility]  Impression: soft tissue swelling. Trace effusion. No fracture. Knee joint is intact.  -I have independently reviewed these images during this office visit. -Dr. Reba Gutierrez:  Three views with bilateral knees on AP view - No fractures, no effusion, moderate joint space narrowing, no osteophytes present. Kellgren Shawn grade 2. XR RIGHT KNEE 1/31/19 SAMANTHA  IMPRESSION:  Three views - No fractures, no effusion, mild medial joint space narrowing, + osteophytes present. IKDC Grade B. Mild condylar squaring     IMPRESSION:      ICD-10-CM ICD-9-CM    1. Primary osteoarthritis of right knee  M17.11 715.16 diclofenac (VOLTAREN) 1 % gel   2. Chronic pain of right knee  M25.561 719.46 diclofenac (VOLTAREN) 1 % gel    G89.29 338.29    3. Contusion of right knee, initial encounter  S80. 01XA 924.11    4. Abrasion, right knee, initial encounter  S80.211A 916.0    5. Lumbar pain  M54.50 724.2 REFERRAL TO SPINE SURGERY     PLAN:  Patient was provided with home knee exercises. Voltaren Gel Rx provided. There is no need for surgery at this time. She will follow up at the spine center and as needed.     Scribed by Eduarda Cha MD [address]) as dictated by Eduarda Cha MD

## 2022-04-14 NOTE — PATIENT INSTRUCTIONS
Knee: Exercises  Introduction  Here are some examples of exercises for you to try. The exercises may be suggested for a condition or for rehabilitation. Start each exercise slowly. Ease off the exercises if you start to have pain. You will be told when to start these exercises and which ones will work best for you. How to do the exercises  Quad sets    1. Sit with your leg straight and supported on the floor or a firm bed. (If you feel discomfort in the front or back of your knee, place a small towel roll under your knee.)  2. Tighten the muscles on top of your thigh by pressing the back of your knee flat down to the floor. (If you feel discomfort under your kneecap, place a small towel roll under your knee.)  3. Hold for about 6 seconds, then rest for up to 10 seconds. 4. Do 8 to 12 repetitions several times a day. Straight-leg raises to the front    1. Lie on your back with your good knee bent so that your foot rests flat on the floor. Your injured leg should be straight. Make sure that your low back has a normal curve. You should be able to slip your flat hand in between the floor and the small of your back, with your palm touching the floor and your back touching the back of your hand. 2. Tighten the thigh muscles in the injured leg by pressing the back of your knee flat down to the floor. Hold your knee straight. 3. Keeping the thigh muscles tight, lift your injured leg up so that your heel is about 12 inches off the floor. Hold for about 6 seconds and then lower slowly. 4. Do 8 to 12 repetitions, 3 times a day. Straight-leg raises to the outside    1. Lie on your side, with your injured leg on top. 2. Tighten the front thigh muscles of your injured leg to keep your knee straight. 3. Keep your hip and your leg straight in line with the rest of your body, and keep your knee pointing forward. Do not drop your hip back.   4. Lift your injured leg straight up toward the ceiling, about 12 inches off the floor. Hold for about 6 seconds, then slowly lower your leg. 5. Do 8 to 12 repetitions. Straight-leg raises to the back    1. Lie on your stomach, and lift your leg straight up behind you (toward the ceiling). 2. Lift your toes about 6 inches off the floor, hold for about 6 seconds, then lower slowly. 3. Do 8 to 12 repetitions. Straight-leg raises to the inside    1. Lie on the side of your body with the injured leg. 2. You can either prop your other (good) leg up on a chair, or you can bend your good knee and put that foot in front of your injured knee. Do not drop your hip back. 3. Tighten the muscles on the front of your thigh to straighten your injured knee. 4. Keep your kneecap pointing forward, and lift your whole leg up toward the ceiling about 6 inches. Hold for about 6 seconds, then lower slowly. 5. Do 8 to 12 repetitions. Heel dig bridging    1. Lie on your back with both knees bent and your ankles bent so that only your heels are digging into the floor. Your knees should be bent about 90 degrees. 2. Then push your heels into the floor, squeeze your buttocks, and lift your hips off the floor until your shoulders, hips, and knees are all in a straight line. 3. Hold for about 6 seconds as you continue to breathe normally, and then slowly lower your hips back down to the floor and rest for up to 10 seconds. 4. Do 8 to 12 repetitions. Hamstring curls    1. Lie on your stomach with your knees straight. If your kneecap is uncomfortable, roll up a washcloth and put it under your leg just above your kneecap. 2. Lift the foot of your injured leg by bending the knee so that you bring the foot up toward your buttock. If this motion hurts, try it without bending your knee quite as far. This may help you avoid any painful motion. 3. Slowly lower your leg back to the floor. 4. Do 8 to 12 repetitions.   5. With permission from your doctor or physical therapist, you may also want to add a cuff weight to your ankle (not more than 5 pounds). With weight, you do not have to lift your leg more than 12 inches to get a hamstring workout. Shallow standing knee bends    Do this exercise only if you have very little pain; if you have no clicking, locking, or giving way if you have an injured knee; and if it does not hurt while you are doing 8 to 12 repetitions. 1. Stand with your hands lightly resting on a counter or chair in front of you. Put your feet shoulder-width apart. 2. Slowly bend your knees so that you squat down like you are going to sit in a chair. Make sure your knees do not go in front of your toes. 3. Lower yourself about 6 inches. Your heels should remain on the floor at all times. 4. Rise slowly to a standing position. Heel raises    1. Stand with your feet a few inches apart, with your hands lightly resting on a counter or chair in front of you. 2. Slowly raise your heels off the floor while keeping your knees straight. 3. Hold for about 6 seconds, then slowly lower your heels to the floor. 4. Do 8 to 12 repetitions several times during the day. Follow-up care is a key part of your treatment and safety. Be sure to make and go to all appointments, and call your doctor if you are having problems. It's also a good idea to know your test results and keep a list of the medicines you take. Where can you learn more? Go to http://lazarus-delano.info/  Enter K007 in the search box to learn more about \"Knee: Exercises. \"  Current as of: July 1, 2021               Content Version: 13.2  © 1909-2666 [employer]. Care instructions adapted under license by [employer] [employer] (which disclaims liability or warranty for this information). If you have questions about a medical condition or this instruction, always ask your healthcare professional. [address] any warranty or liability for your use of this information.

## 2022-04-19 ENCOUNTER — HOSPITAL ENCOUNTER (OUTPATIENT)
Dept: ULTRASOUND IMAGING | Age: 57
Discharge: HOME | End: 2022-04-19
Attending: PHYSICIAN ASSISTANT
Payer: MEDICARE

## 2022-04-19 DIAGNOSIS — E04.1 NONTOXIC SINGLE THYROID NODULE: ICD-10-CM

## 2022-04-19 PROCEDURE — 76536 US EXAM OF HEAD AND NECK: CPT

## 2022-06-01 ENCOUNTER — ANESTHESIA EVENT (OUTPATIENT)
Dept: ENDOSCOPY | Age: 57
End: 2022-06-01
Payer: MEDICARE

## 2022-06-02 ENCOUNTER — HOSPITAL ENCOUNTER (OUTPATIENT)
Age: 57
Setting detail: OUTPATIENT SURGERY
Discharge: HOME | End: 2022-06-02
Attending: INTERNAL MEDICINE | Admitting: INTERNAL MEDICINE
Payer: MEDICARE

## 2022-06-02 ENCOUNTER — ANESTHESIA (OUTPATIENT)
Dept: ENDOSCOPY | Age: 57
End: 2022-06-02
Payer: MEDICARE

## 2022-06-02 VITALS
BODY MASS INDEX: 28.49 KG/M2 | SYSTOLIC BLOOD PRESSURE: 127 MMHG | WEIGHT: 171 LBS | HEIGHT: 65 IN | HEART RATE: 64 BPM | RESPIRATION RATE: 18 BRPM | TEMPERATURE: 97.4 F | DIASTOLIC BLOOD PRESSURE: 71 MMHG | OXYGEN SATURATION: 100 %

## 2022-06-02 LAB
ATRIAL RATE: 70 BPM
CALCULATED P AXIS, ECG09: 55 DEGREES
CALCULATED R AXIS, ECG10: 30 DEGREES
CALCULATED T AXIS, ECG11: 38 DEGREES
DIAGNOSIS, 93000: NORMAL
P-R INTERVAL, ECG05: 178 MS
Q-T INTERVAL, ECG07: 402 MS
QRS DURATION, ECG06: 94 MS
QTC CALCULATION (BEZET), ECG08: 434 MS
VENTRICULAR RATE, ECG03: 70 BPM

## 2022-06-02 PROCEDURE — 93005 ELECTROCARDIOGRAM TRACING: CPT

## 2022-06-02 PROCEDURE — 00731 ANES UPR GI NDSC PX NOS: CPT | Performed by: NURSE ANESTHETIST, CERTIFIED REGISTERED

## 2022-06-02 PROCEDURE — 74011250636 HC RX REV CODE- 250/636: Performed by: NURSE ANESTHETIST, CERTIFIED REGISTERED

## 2022-06-02 PROCEDURE — 77030019988 HC FCPS ENDOSC DISP BSC -B: Performed by: INTERNAL MEDICINE

## 2022-06-02 PROCEDURE — 76060000031 HC ANESTHESIA FIRST 0.5 HR: Performed by: INTERNAL MEDICINE

## 2022-06-02 PROCEDURE — 74011000258 HC RX REV CODE- 258: Performed by: INTERNAL MEDICINE

## 2022-06-02 PROCEDURE — 76040000019: Performed by: INTERNAL MEDICINE

## 2022-06-02 PROCEDURE — 77030008565 HC TBNG SUC IRR ERBE -B: Performed by: INTERNAL MEDICINE

## 2022-06-02 PROCEDURE — 74011000250 HC RX REV CODE- 250: Performed by: NURSE ANESTHETIST, CERTIFIED REGISTERED

## 2022-06-02 PROCEDURE — 00731 ANES UPR GI NDSC PX NOS: CPT | Performed by: ANESTHESIOLOGY

## 2022-06-02 PROCEDURE — 2709999900 HC NON-CHARGEABLE SUPPLY: Performed by: INTERNAL MEDICINE

## 2022-06-02 PROCEDURE — 74011250636 HC RX REV CODE- 250/636: Performed by: INTERNAL MEDICINE

## 2022-06-02 PROCEDURE — 88305 TISSUE EXAM BY PATHOLOGIST: CPT

## 2022-06-02 RX ORDER — FAMOTIDINE 20 MG/1
20 TABLET, FILM COATED ORAL ONCE
Status: DISCONTINUED | OUTPATIENT
Start: 2022-06-02 | End: 2022-06-02 | Stop reason: HOSPADM

## 2022-06-02 RX ORDER — SODIUM CHLORIDE 0.9 % (FLUSH) 0.9 %
5-40 SYRINGE (ML) INJECTION AS NEEDED
Status: CANCELLED | OUTPATIENT
Start: 2022-06-02

## 2022-06-02 RX ORDER — LIDOCAINE HYDROCHLORIDE 10 MG/ML
0.1 INJECTION, SOLUTION EPIDURAL; INFILTRATION; INTRACAUDAL; PERINEURAL AS NEEDED
Status: DISCONTINUED | OUTPATIENT
Start: 2022-06-02 | End: 2022-06-02 | Stop reason: HOSPADM

## 2022-06-02 RX ORDER — LIDOCAINE HYDROCHLORIDE 20 MG/ML
INJECTION, SOLUTION EPIDURAL; INFILTRATION; INTRACAUDAL; PERINEURAL AS NEEDED
Status: DISCONTINUED | OUTPATIENT
Start: 2022-06-02 | End: 2022-06-02 | Stop reason: HOSPADM

## 2022-06-02 RX ORDER — PROPOFOL 10 MG/ML
INJECTION, EMULSION INTRAVENOUS AS NEEDED
Status: DISCONTINUED | OUTPATIENT
Start: 2022-06-02 | End: 2022-06-02 | Stop reason: HOSPADM

## 2022-06-02 RX ORDER — SODIUM CHLORIDE 0.9 % (FLUSH) 0.9 %
5-40 SYRINGE (ML) INJECTION AS NEEDED
Status: DISCONTINUED | OUTPATIENT
Start: 2022-06-02 | End: 2022-06-02 | Stop reason: HOSPADM

## 2022-06-02 RX ORDER — SODIUM CHLORIDE, SODIUM LACTATE, POTASSIUM CHLORIDE, CALCIUM CHLORIDE 600; 310; 30; 20 MG/100ML; MG/100ML; MG/100ML; MG/100ML
25 INJECTION, SOLUTION INTRAVENOUS CONTINUOUS
Status: DISCONTINUED | OUTPATIENT
Start: 2022-06-02 | End: 2022-06-02 | Stop reason: HOSPADM

## 2022-06-02 RX ORDER — ONDANSETRON 2 MG/ML
4 INJECTION INTRAMUSCULAR; INTRAVENOUS ONCE
Status: CANCELLED | OUTPATIENT
Start: 2022-06-02 | End: 2022-06-02

## 2022-06-02 RX ORDER — SODIUM CHLORIDE 0.9 % (FLUSH) 0.9 %
5-40 SYRINGE (ML) INJECTION EVERY 8 HOURS
Status: CANCELLED | OUTPATIENT
Start: 2022-06-02

## 2022-06-02 RX ORDER — SODIUM CHLORIDE 0.9 % (FLUSH) 0.9 %
5-40 SYRINGE (ML) INJECTION EVERY 8 HOURS
Status: DISCONTINUED | OUTPATIENT
Start: 2022-06-02 | End: 2022-06-02 | Stop reason: HOSPADM

## 2022-06-02 RX ADMIN — SODIUM CHLORIDE, POTASSIUM CHLORIDE, SODIUM LACTATE AND CALCIUM CHLORIDE 25 ML/HR: 600; 310; 30; 20 INJECTION, SOLUTION INTRAVENOUS at 11:43

## 2022-06-02 RX ADMIN — FAMOTIDINE 20 MG: 10 INJECTION INTRAVENOUS at 11:38

## 2022-06-02 RX ADMIN — PROPOFOL 100 MG: 10 INJECTION, EMULSION INTRAVENOUS at 13:01

## 2022-06-02 RX ADMIN — LIDOCAINE HYDROCHLORIDE 100 MG: 20 INJECTION, SOLUTION EPIDURAL; INFILTRATION; INTRACAUDAL; PERINEURAL at 13:01

## 2022-06-02 RX ADMIN — PROPOFOL 50 MG: 10 INJECTION, EMULSION INTRAVENOUS at 13:03

## 2022-06-02 NOTE — ANESTHESIA POSTPROCEDURE EVALUATION
Procedure(s):  UPPER ENDOSCOPY/biopsy/dilation. MAC    Anesthesia Post Evaluation      Multimodal analgesia: multimodal analgesia used between 6 hours prior to anesthesia start to PACU discharge  Patient location during evaluation: bedside  Patient participation: complete - patient participated  Level of consciousness: awake  Pain score: 0  Pain management: adequate  Airway patency: patent  Anesthetic complications: no  Cardiovascular status: stable  Respiratory status: acceptable  Hydration status: acceptable  Post anesthesia nausea and vomiting:  controlled  Final Post Anesthesia Temperature Assessment:  Normothermia (36.0-37.5 degrees C)      INITIAL Post-op Vital signs:   Vitals Value Taken Time   /84 06/02/22 1331   Temp 36.6 °C (97.8 °F) 06/02/22 1314   Pulse 70 06/02/22 1335   Resp 14 06/02/22 1335   SpO2 100 % 06/02/22 1336   Vitals shown include unvalidated device data.

## 2022-06-02 NOTE — PROCEDURES
[email or web address]  [phone]        Brief Procedure Note    Casey Rad  1965  [id number]    Date of Procedure: 6/2/2022    Preoperative diagnosis: GERD, bloating, dysphagia    Postoperative diagnosis: non obstructive dysphagia dilated with 52fr arteaga, gastric biopsies for H pylori    Description of Findings: same    Sedation/Anesthesia: Monitored Anesthesia Care; See Anesthesia Note    Procedure: Procedure(s):  UPPER ENDOSCOPY/biopsy/dilation    :  Dr. Ariana Wilder MD    Assistant(s): Endoscopy Technician-1: Tanner William  Endoscopy RN-1: Rosa Tolbert RN  Float Staff: Diane Ware RN    EBL:None    Specimens:   ID Type Source Tests Collected by Time Destination   1 : gastric biopsy Preservative Gastric  Lonnie Giles MD 6/2/2022 1303 Pathology       Findings: See printed and scanned procedure note    Complications: None    Tissue Implant Device: None    Dr. Ariana Wilder MD  6/2/2022  1:10 PM    Ariana Wilder MD  [facility] [address] [phone]  [email or web address]

## 2022-06-02 NOTE — H&P
[id number]  [phone]      History and Physical    Patient: Ariel Rodrigez MRN: [id number]  SSN: [id number]    YOB: 1965  Age: 60 y.o. Sex: female      Subjective:      Ariel Rodrigez is a 60 y.o. female who presents with reflux, bloating and dysphagia for solids. History of Schatzki's Ring dilated in . Past Medical History:   Diagnosis Date    Chest pain 2014    neg EKG, non recurrent    Chronic pain     lower back and legs    Depression     Eczema     Fibroids     GERD (gastroesophageal reflux disease)     H/O seasonal allergies     Headache(784.0)     Hiatal hernia     IBS (irritable bowel syndrome)     Microscopic hematuria     MS (multiple sclerosis) (HCC)     Rectal bleeding     Stool color black     irritable bowel     Past Surgical History:   Procedure Laterality Date    COLONOSCOPY,DIAGNOSTIC      HX BREAST BIOPSY Right 2015    RIGHT BREAST BIOPSY WITH NEEDLE LOCALIZATION ULTRASOUND performed by Sidney Delgado MD at [facility] MAIN OR    HX CHOLECYSTECTOMY      HX GI      HX HYSTERECTOMY      HX TUBAL LIGATION        Family History   Problem Relation Age of Onset    HIV/AIDS Brother     Diabetes Father     Cancer Brother     Hypertension Sister     Diabetes Brother     Hypertension Sister     Hypertension Sister     Hypertension Brother      Social History     Tobacco Use    Smoking status: Former Smoker     Packs/day: 0.00     Quit date: 2016     Years since quittin.9    Smokeless tobacco: Former User     Quit date: 2016   Substance Use Topics    Alcohol use: No     Alcohol/week: 10.0 standard drinks     Types: 10 Cans of beer per week      Prior to Admission medications    Medication Sig Start Date End Date Taking? Authorizing Provider   baclofen 5 mg tab Take 5 mg by mouth three (3) times daily.  3/30/22  Yes Leanna Story NP   nabumetone (RELAFEN) 500 mg tablet TAKE 1 TABLET BY MOUTH TWICE DAILY 2/11/22  Yes Hilton Ahumada, NP   fluticasone propionate (FLONASE) 50 mcg/actuation nasal spray 2 Sprays by Both Nostrils route daily. Indications: inflammation of the nose due to an allergy 2/11/22  Yes Hilton Ahumada, NP   ketoconazole (NIZORAL) 2 % shampoo  11/2/21  Yes Provider, Historical   loratadine (Claritin) 10 mg tablet Take 1 Tablet by mouth daily. 7/7/21  Yes Hilton Ahumada, NP   polyethylene glycol (Miralax) 17 gram/dose powder Take 17 g by mouth daily. Yes Provider, Historical   triamcinolone acetonide (KENALOG) 0.1 % ointment Apply  to affected area two (2) times a day. chest and upper extremities,for excema 6/10/21  Yes Hilton Ahumada, NP   betamethasone valerate (VALISONE) 0.1 % topical lotion Apply 1 Applicator to affected area as needed for Other (Skin irritation). With hair washing   Yes Provider, Historical   Omeprazole delayed release (PRILOSEC D/R) 20 mg tablet Take 20 mg by mouth daily. Yes Provider, Historical   biotin (VITAMIN B7) 5 mg tablet Take 1,000 mcg by mouth daily. Yes Provider, Historical   desonide (TRIDESILON) 0.05 % cream  11/17/21   Provider, Historical   Dupixent Pen 300 mg/2 mL pnij Once every 2 weeks. Indications: a type of allergy that causes red and itchy skin called atopic dermatitis 12/30/21   Provider, Historical   loteprednol etabonate 0.5 % drpg Apply  to eye. Provider, Historical   alclometasone (ACLOVATE) 0.05 % ointment Apply 0.05 % to affected area two (2) times daily as needed for Skin Irritation. Patient not taking: Reported on 1/25/2022 3/16/21   Provider, Historical   biotin 1,000 mcg chew Take 1,000 mcg by mouth daily.   Patient not taking: Reported on 12/16/2021    Provider, Historical   [employer] wheel walker 7/15/20   Hilton Ahumada, NP   Shower Chair XX jozef As needed for patient safety 9/12/19   Benny Hernandez MD   Bedside Commode XX Please provide a commode for patient safety 9/12/19   Rd Bush Alf Cooper MD   OTHER Please provide a tub transfer bench 9/6/19   Chaitanya Maier NP   Comp Stocking,Knee,Long,Medium misc Wear daily while walking to prevent swelling 5/19/18   Shagufta Astorga MD        Allergies   Allergen Reactions    Naproxen Shortness of Breath    Shellfish Derived Nausea and Vomiting     SEVERE NAUSEA AND VOMITING    Amoxicillin Rash and Nausea Only       Review of Systems:  A comprehensive review of systems was negative except for that written in the History of Present Illness. Objective:     Vitals:    06/01/22 1306   Weight: 90.7 kg (200 lb)   Height: 5' 5\" (1.651 m)        Physical Exam:  GENERAL: alert, cooperative, no distress, appears stated age  LUNG: clear to auscultation bilaterally  HEART: regular rate and rhythm, S1, S2 normal, no murmur, click, rub or gallop  ABDOMEN: soft, non-tender. Bowel sounds normal. No masses,  no organomegaly  NEUROLOGIC: alert & oriented x 3    Assessment:     1. GERD  2. Bloating  3. Dysphagia    Plan:     1. EGD    Signed By: Tyrone Martínez MD     June 2, 2022      Tyrone Martínez MD  [facility] [address] - [phone]  [email or web address]

## 2022-06-02 NOTE — DISCHARGE INSTRUCTIONS
Patient Education        Upper GI Endoscopy: What to Expect at [address] had an upper GI endoscopy. Your doctor used a thin, lighted tube that bends to look at the inside of your esophagus, your stomach, and the first part of the small intestine, called the duodenum. After you have an endoscopy, you will stay at the hospital or clinic for 1 to 2 hours. This will allow the medicine to wear off. You will be able to go home after your doctor or nurse checks to make sure that you're not having any problems. You may have to stay overnight if you had treatment during the test. You may have a sore throat for a day or two after the test.  This care sheet gives you a general idea about what to expect after the test.  How can you care for yourself at home? Activity   · Rest as much as you need to after you go home. · You should be able to go back to your usual activities the day after the test.  Diet   · Follow your doctor's directions for eating after the test.  · Drink plenty of fluids (unless your doctor has told you not to). Medications   · If you have a sore throat the day after the test, use an over-the-counter spray to numb your throat. Follow-up care is a key part of your treatment and safety. Be sure to make and go to all appointments, and call your doctor if you are having problems. It's also a good idea to know your test results and keep a list of the medicines you take. When should you call for help? Call 911 anytime you think you may need emergency care. For example, call if:    · You passed out (lost consciousness).     · You have trouble breathing.     · You pass maroon or bloody stools.    Call your doctor now or seek immediate medical care if:    · You have pain that does not get better after your take pain medicine.     · You have new or worse belly pain.     · You have blood in your stools.     · You are sick to your stomach and cannot keep fluids down.     · You have a fever.     · You cannot pass stools or gas. Watch closely for changes in your health, and be sure to contact your doctor if:    · Your throat still hurts after a day or two.     · You do not get better as expected. Where can you learn more? Go to http://www.Mozy.com/  Enter J454 in the search box to learn more about \"Upper GI Endoscopy: What to Expect at Home. \"  Current as of: September 8, 2021               Content Version: 13.2  © 2006-2022 [employer]. Care instructions adapted under license by "[employer] (which disclaims liability or warranty for this information). If you have questions about a medical condition or this instruction, always ask your healthcare professional. [address] any warranty or liability for your use of this information. Patient Education        Esophageal Dilation: What to Expect at [address] had an esophageal dilation. This procedure can open up narrow areas of the esophagus. After the procedure, you will stay at the hospital or surgery center for 1 to 2 hours. This will allow the medicine to wear off. You will be able to go home after your doctor or nurse checks to make sure that you're not having any problems. This care sheet gives you a general idea about how long it will take for you to recover. But each person recovers at a different pace. Follow the steps below to get better as quickly as possible. How can you care for yourself at home? Activity    · Rest as much as you need to after you go home.     · You should be able to go back to your usual activities the day after the procedure. Diet    · Follow your doctor's directions for eating after the procedure.     · Drink plenty of fluids (unless your doctor has told you not to). Medicines    · Your doctor will tell you if and when you can restart your medicines. He or she will also give you instructions about taking any new medicines.   · If you take aspirin or some other blood thinner, ask your doctor if and when to start taking it again. Make sure that you understand exactly what your doctor wants you to do.     · If you have a sore throat the day after the procedure, use an over-the-counter spray to numb your throat. Sucking on throat lozenges and gargling with warm salt water may also help relieve your symptoms. Follow-up care is a key part of your treatment and safety. Be sure to make and go to all appointments, and call your doctor if you are having problems. It's also a good idea to know your test results and keep a list of the medicines you take. When should you call for help? Call 911 anytime you think you may need emergency care. For example, call if:    · You passed out (lost consciousness).     · You have trouble breathing.     · Your stools are maroon or very bloody   Call your doctor now or seek immediate medical care if:    · You have new or worse belly pain.     · You have a fever.     · You have new or more blood in your stools.     · You are sick to your stomach and cannot drink fluids.     · You cannot pass stools or gas.     · You have pain that does not get better after you take pain medicine. Watch closely for changes in your health, and be sure to contact your doctor if:    · Your throat still hurts after a day or two.     · You do not get better as expected. Where can you learn more? Go to http://www.gray.com/  Enter J014 in the search box to learn more about \"Esophageal Dilation: What to Expect at Home. \"  Current as of: September 8, 2021               Content Version: 13.2  © 5901-6706 [employer]. Care instructions adapted under license by [employer] (which disclaims liability or warranty for this information).  If you have questions about a medical condition or this instruction, always ask your healthcare professional. [place] any warranty or liability for your use of this information. Patient Education        Esophageal Dilation: What to Expect at [address] had an esophageal dilation. This procedure can open up narrow areas of the esophagus. After the procedure, you will stay at the hospital or surgery center for 1 to 2 hours. This will allow the medicine to wear off. You will be able to go home after your doctor or nurse checks to make sure that you're not having any problems. This care sheet gives you a general idea about how long it will take for you to recover. But each person recovers at a different pace. Follow the steps below to get better as quickly as possible. How can you care for yourself at home? Activity    · Rest as much as you need to after you go home.     · You should be able to go back to your usual activities the day after the procedure. Diet    · Follow your doctor's directions for eating after the procedure.     · Drink plenty of fluids (unless your doctor has told you not to). Medicines    · Your doctor will tell you if and when you can restart your medicines. He or she will also give you instructions about taking any new medicines.     · If you take aspirin or some other blood thinner, ask your doctor if and when to start taking it again. Make sure that you understand exactly what your doctor wants you to do.     · If you have a sore throat the day after the procedure, use an over-the-counter spray to numb your throat. Sucking on throat lozenges and gargling with warm salt water may also help relieve your symptoms. Follow-up care is a key part of your treatment and safety. Be sure to make and go to all appointments, and call your doctor if you are having problems. It's also a good idea to know your test results and keep a list of the medicines you take. When should you call for help? Call 911 anytime you think you may need emergency care.  For example, call if:    · You passed out (lost consciousness).     · You have trouble breathing.     · Your stools are maroon or very bloody   Call your doctor now or seek immediate medical care if:    · You have new or worse belly pain.     · You have a fever.     · You have new or more blood in your stools.     · You are sick to your stomach and cannot drink fluids.     · You cannot pass stools or gas.     · You have pain that does not get better after you take pain medicine. Watch closely for changes in your health, and be sure to contact your doctor if:    · Your throat still hurts after a day or two.     · You do not get better as expected. Where can you learn more? Go to http://www.gray.com/  Enter J014 in the search box to learn more about \"Esophageal Dilation: What to Expect at Home. \"  Current as of: September 8, 2021               Content Version: 13.2  © 2006-2022 [employer]. Care instructions adapted under license by [employer] (which disclaims liability or warranty for this information). If you have questions about a medical condition or this instruction, always ask your healthcare professional. [address] any warranty or liability for your use of this information. Patient Education        Gastroesophageal Reflux Disease (GERD): Care Instructions  Overview     Gastroesophageal reflux disease (GERD) is the backward flow of stomach acid into the esophagus. The esophagus is the tube that leads from your throat to your stomach. A one-way valve prevents the stomach acid from backing up into this tube. But when you have GERD, this valve does not close tightly enough. This can also cause pain and swelling in your esophagus. (This is called esophagitis.)  If you have mild GERD symptoms including heartburn, you may be able to control the problem with antacids or over-the-counter medicine. You can also make lifestyle changes to help reduce your symptoms.  These include changing your diet and eating habits, such as not eating late at night and losing weight. Follow-up care is a key part of your treatment and safety. Be sure to make and go to all appointments, and call your doctor if you are having problems. It's also a good idea to know your test results and keep a list of the medicines you take. How can you care for yourself at home? · Take your medicines exactly as prescribed. Call your doctor if you think you are having a problem with your medicine. · Your doctor may recommend over-the-counter medicine. For mild or occasional indigestion, antacids, such as Tums, Gaviscon, Mylanta, or Maalox, may help. Your doctor also may recommend over-the-counter acid reducers, such as famotidine (Pepcid AC), cimetidine (Tagamet HB), or omeprazole (Prilosec). Read and follow all instructions on the label. If you use these medicines often, talk with your doctor. · Change your eating habits. ? It's best to eat several small meals instead of two or three large meals. ? After you eat, wait 2 to 3 hours before you lie down. ? Avoid foods that make your symptoms worse. These may include chocolate, mint, alcohol, pepper, spicy foods, high-fat foods, or drinks with caffeine in them, such as tea, coffee, agnes, or energy drinks. If your symptoms are worse after you eat a certain food, you may want to stop eating it to see if your symptoms get better. · Do not smoke or chew tobacco. Smoking can make GERD worse. If you need help quitting, talk to your doctor about stop-smoking programs and medicines. These can increase your chances of quitting for good. · If you have GERD symptoms at night, raise the head of your bed 6 to 8 inches by putting the frame on blocks or placing a foam wedge under the head of your mattress. (Adding extra pillows does not work.)  · Do not wear tight clothing around your middle. · Lose weight if you need to. Losing just 5 to 10 pounds can help.   When should you call for help? Call your doctor now or seek immediate medical care if:    · You have new or different belly pain.     · Your stools are black and tarlike or have streaks of blood. Watch closely for changes in your health, and be sure to contact your doctor if:    · Your symptoms have not improved after 2 days.     · Food seems to catch in your throat or chest.   Where can you learn more? Go to http://www.gray.com/  Enter C706 in the search box to learn more about \"Gastroesophageal Reflux Disease (GERD): Care Instructions. \"  Current as of: September 8, 2021               Content Version: 13.2  © 1460-1303 [employer]. Care instructions adapted under license by [employer] (which disclaims liability or warranty for this information). If you have questions about a medical condition or this instruction, always ask your healthcare professional. [address] any warranty or liability for your use of this information. DISCHARGE SUMMARY from Nurse    PATIENT INSTRUCTIONS:    After general anesthesia or intravenous sedation, for 24 hours or while taking prescription Narcotics:  · Limit your activities  · Do not drive and operate hazardous machinery  · Do not make important personal or business decisions  · Do  not drink alcoholic beverages  · If you have not urinated within 8 hours after discharge, please contact your surgeon on call.     Report the following to your surgeon:  · Excessive pain, swelling, redness or odor of or around the surgical area  · Temperature over 100.5  · Nausea and vomiting lasting longer than 4 hours or if unable to take medications  · Any signs of decreased circulation or nerve impairment to extremity: change in color, persistent  numbness, tingling, coldness or increase pain  · Any questions        These are general instructions for a healthy lifestyle:    No smoking/ No tobacco products/ Avoid exposure to second hand smoke  Surgeon General's Warning:  Quitting smoking now greatly reduces serious risk to your health. Obesity, smoking, and sedentary lifestyle greatly increases your risk for illness    A healthy diet, regular physical exercise & weight monitoring are important for maintaining a healthy lifestyle    You may be retaining fluid if you have a history of heart failure or if you experience any of the following symptoms:  Weight gain of 3 pounds or more overnight or 5 pounds in a week, increased swelling in our hands or feet or shortness of breath while lying flat in bed. Please call your doctor as soon as you notice any of these symptoms; do not wait until your next office visit. The discharge information has been reviewed with the patient. The patient verbalized understanding. Discharge medications reviewed with the patient and appropriate educational materials and side effects teaching were provided.   ___________________________________________________________________________________________________________________________________

## 2022-06-03 ENCOUNTER — TRANSCRIBE ORDER (OUTPATIENT)
Dept: SCHEDULING | Age: 57
End: 2022-06-03

## 2022-06-03 DIAGNOSIS — R13.10 DYSPHAGIA: ICD-10-CM

## 2022-06-03 DIAGNOSIS — R14.0 BLOATING SYMPTOM: ICD-10-CM

## 2022-06-03 DIAGNOSIS — K21.9 ACID REFLUX: Primary | ICD-10-CM

## 2022-06-04 DIAGNOSIS — G35 MS (MULTIPLE SCLEROSIS) (HCC): ICD-10-CM

## 2022-06-07 ENCOUNTER — HOSPITAL ENCOUNTER (OUTPATIENT)
Dept: MRI IMAGING | Age: 57
Discharge: HOME | End: 2022-06-07
Attending: NURSE PRACTITIONER
Payer: MEDICARE

## 2022-06-07 ENCOUNTER — HOSPITAL ENCOUNTER (OUTPATIENT)
Dept: NEUROLOGY | Age: 57
Discharge: HOME | End: 2022-06-07
Attending: NURSE PRACTITIONER
Payer: MEDICARE

## 2022-06-07 LAB — CREAT UR-MCNC: 0.7 MG/DL (ref 0.6–1.3)

## 2022-06-07 PROCEDURE — 95819 EEG AWAKE AND ASLEEP: CPT

## 2022-06-07 PROCEDURE — 74011250636 HC RX REV CODE- 250/636: Performed by: NURSE PRACTITIONER

## 2022-06-07 PROCEDURE — 70553 MRI BRAIN STEM W/O & W/DYE: CPT

## 2022-06-07 PROCEDURE — 82565 ASSAY OF CREATININE: CPT

## 2022-06-07 PROCEDURE — 72156 MRI NECK SPINE W/O & W/DYE: CPT

## 2022-06-07 PROCEDURE — A9577 INJ MULTIHANCE: HCPCS | Performed by: NURSE PRACTITIONER

## 2022-06-07 RX ADMIN — GADOBENATE DIMEGLUMINE 15 ML: 529 INJECTION, SOLUTION INTRAVENOUS at 15:09

## 2022-06-10 NOTE — PROCEDURES
[address] [place] [address]   EEG    Name:  Rayo Westbrook  MR#:   [id number]  :  1965  ACCOUNT #:  [de-identified]  DATE OF SERVICE:  2022    OUTPATIENT RECORDING    REFERRING PROVIDER:  EEG was ordered by Kirti Jacobs NP.    EEG NUMBER:  MV EEG     REASON FOR EEG:  For evaluation of passing out spells. MEDICATIONS:  Patient's medications at the time of recording include baclofen and Ocrevus. EEG TECHNIQUE USED:  Standard 10-20 system with 16-channel recording and an EKG. Activation procedure used was photic stimulation. Total duration of the recording was 29 minutes. This is an awake and asleep EEG recording. EEG REPORT:  The background consists of posterior-dominant alpha rhythms at a frequency of 10 Hz and they attenuate to eye opening. No significant asymmetry. There are no obvious spikes or sharp wave discharges. No focal slowing. Photic stimulation and sleep did not induce any abnormal discharges. IMPRESSION:  This is basically a normal EEG recording. CLINICAL CORRELATION:  Normal EEG, does not rule out the possibility of seizures or epilepsy.       MD KOJO [email or web address]  D:  06/10/2022 11:05  T:  06/10/2022 15:33  JOB #:  [id number]

## 2022-06-16 ENCOUNTER — OFFICE VISIT (OUTPATIENT)
Dept: FAMILY MEDICINE CLINIC | Age: 57
End: 2022-06-16
Payer: MEDICARE

## 2022-06-16 VITALS
TEMPERATURE: 98.5 F | DIASTOLIC BLOOD PRESSURE: 73 MMHG | RESPIRATION RATE: 16 BRPM | HEART RATE: 71 BPM | HEIGHT: 65 IN | SYSTOLIC BLOOD PRESSURE: 124 MMHG | WEIGHT: 171 LBS | BODY MASS INDEX: 28.49 KG/M2 | OXYGEN SATURATION: 98 %

## 2022-06-16 DIAGNOSIS — Z00.00 ENCOUNTER FOR MEDICAL EXAMINATION TO ESTABLISH CARE: ICD-10-CM

## 2022-06-16 DIAGNOSIS — J30.89 PERENNIAL ALLERGIC RHINITIS: ICD-10-CM

## 2022-06-16 DIAGNOSIS — R35.0 URINARY FREQUENCY: ICD-10-CM

## 2022-06-16 DIAGNOSIS — Z00.00 MEDICARE ANNUAL WELLNESS VISIT, SUBSEQUENT: Primary | ICD-10-CM

## 2022-06-16 DIAGNOSIS — G35 MS (MULTIPLE SCLEROSIS) (HCC): ICD-10-CM

## 2022-06-16 DIAGNOSIS — G89.29 CHRONIC LEFT-SIDED LOW BACK PAIN WITH LEFT-SIDED SCIATICA: ICD-10-CM

## 2022-06-16 DIAGNOSIS — M54.42 CHRONIC LEFT-SIDED LOW BACK PAIN WITH LEFT-SIDED SCIATICA: ICD-10-CM

## 2022-06-16 DIAGNOSIS — G35 MULTIPLE SCLEROSIS (HCC): ICD-10-CM

## 2022-06-16 DIAGNOSIS — R21 RASH IN ADULT: ICD-10-CM

## 2022-06-16 PROCEDURE — G8419 CALC BMI OUT NRM PARAM NOF/U: HCPCS | Performed by: STUDENT IN AN ORGANIZED HEALTH CARE EDUCATION/TRAINING PROGRAM

## 2022-06-16 PROCEDURE — G0439 PPPS, SUBSEQ VISIT: HCPCS | Performed by: STUDENT IN AN ORGANIZED HEALTH CARE EDUCATION/TRAINING PROGRAM

## 2022-06-16 PROCEDURE — G9899 SCRN MAM PERF RSLTS DOC: HCPCS | Performed by: STUDENT IN AN ORGANIZED HEALTH CARE EDUCATION/TRAINING PROGRAM

## 2022-06-16 PROCEDURE — 99213 OFFICE O/P EST LOW 20 MIN: CPT | Performed by: STUDENT IN AN ORGANIZED HEALTH CARE EDUCATION/TRAINING PROGRAM

## 2022-06-16 PROCEDURE — G8427 DOCREV CUR MEDS BY ELIG CLIN: HCPCS | Performed by: STUDENT IN AN ORGANIZED HEALTH CARE EDUCATION/TRAINING PROGRAM

## 2022-06-16 PROCEDURE — 3017F COLORECTAL CA SCREEN DOC REV: CPT | Performed by: STUDENT IN AN ORGANIZED HEALTH CARE EDUCATION/TRAINING PROGRAM

## 2022-06-16 PROCEDURE — G8432 DEP SCR NOT DOC, RNG: HCPCS | Performed by: STUDENT IN AN ORGANIZED HEALTH CARE EDUCATION/TRAINING PROGRAM

## 2022-06-16 RX ORDER — NABUMETONE 500 MG/1
TABLET, FILM COATED ORAL
Qty: 60 TABLET | Refills: 2 | Status: SHIPPED | OUTPATIENT
Start: 2022-06-16

## 2022-06-16 RX ORDER — LORATADINE 10 MG/1
10 TABLET ORAL DAILY
Qty: 30 TABLET | Refills: 1 | Status: SHIPPED | OUTPATIENT
Start: 2022-06-16

## 2022-06-16 NOTE — PROGRESS NOTES
This is the Subsequent Medicare Annual Wellness Exam, performed 12 months or more after the Initial AWV or the last Subsequent AWV    I have reviewed the patient's medical history in detail and updated the computerized patient record. Assessment/Plan   Education and counseling provided:  Are appropriate based on today's review and evaluation    1. Medicare annual wellness visit, subsequent         Depression Risk Factor Screening     3 most recent PHQ Screens 3/18/2022   PHQ Not Done -   Little interest or pleasure in doing things Not at all   Feeling down, depressed, irritable, or hopeless Not at all   Total Score PHQ 2 0   Trouble falling or staying asleep, or sleeping too much -   Feeling tired or having little energy -   Poor appetite, weight loss, or overeating -   Feeling bad about yourself - or that you are a failure or have let yourself or your family down -   Trouble concentrating on things such as school, work, reading, or watching TV -   Moving or speaking so slowly that other people could have noticed; or the opposite being so fidgety that others notice -   Thoughts of being better off dead, or hurting yourself in some way -   PHQ 9 Score -   How difficult have these problems made it for you to do your work, take care of your home and get along with others -       Alcohol & Drug Abuse Risk Screen    Do you average more than 1 drink per night or more than 7 drinks a week:  No    On any one occasion in the past three months have you have had more than 3 drinks containing alcohol:  No          Functional Ability and Level of Safety    Hearing: Hearing is good. Activities of Daily Living: The home contains: Cane ,grab bars   Patient does total self care      Ambulation: with difficulty, uses a cane     Fall Risk:  Fall Risk Assessment, last 12 mths 2/11/2022   Able to walk? Yes   Fall in past 12 months? 1   Do you feel unsteady?  0   Are you worried about falling 0   Is TUG test greater than 12 seconds? 0   Is the gait abnormal? 0   Number of falls in past 12 months 1   Fall with injury?  1      Abuse Screen:  Patient is not abused       Cognitive Screening    Has your family/caregiver stated any concerns about your memory: yes - Seeing neuro      Cognitive Screening: Abnormal - Clock Drawing Test    Health Maintenance Due     Health Maintenance Due   Topic Date Due    DTaP/Tdap/Td series (1 - Tdap) Never done    Shingrix Vaccine Age 49> (1 of 2) Never done       Patient Care Team   Patient Care Team:  Renny Chapman NP as PCP - General (Gastroenterology)  [place], [place] as Physician Assistant (Physician Assistant)  Dejah Dougherty MD (Obstetrics & Gynecology)  Ermias Askew [place] (Physician Assistant)  Elijah Crews MD (Physical Medicine and Rehabilitation Physician)  Sebastian Rodriguez NP (Neurology)    History     Patient Active Problem List   Diagnosis Code    Microscopic hematuria R31.29    Rectal bleeding K62.5    Chronic pain G89.29    Diffuse cystic mastopathy N60.19    Paranoid schizophrenia ([address] F20.0    Chronic left-sided low back pain with left-sided sciatica M54.42, G89.29    Spells of decreased attentiveness R68.89    Unsteady gait R26.81    Perennial allergic rhinitis J30.89    Multiple sclerosis ([address] G35     Past Medical History:   Diagnosis Date    Chest pain 11/2014    neg EKG, non recurrent    Chronic pain     lower back and legs    Depression     Eczema     Fibroids     GERD (gastroesophageal reflux disease)     H/O seasonal allergies     Headache(784.0)     Hiatal hernia     IBS (irritable bowel syndrome)     Microscopic hematuria     MS (multiple sclerosis) ([address]     Rectal bleeding     Stool color black     irritable bowel      Past Surgical History:   Procedure Laterality Date    COLONOSCOPY,DIAGNOSTIC      HX BREAST BIOPSY Right 1/21/2015    RIGHT BREAST BIOPSY WITH NEEDLE LOCALIZATION ULTRASOUND performed by Dee Davis MD at [address] [place] [address]  HX CHOLECYSTECTOMY      HX GI      HX HYSTERECTOMY      HX TUBAL LIGATION       Current Outpatient Medications   Medication Sig Dispense Refill    baclofen 5 mg tab Take 5 mg by mouth three (3) times daily. 90 Tablet 6    nabumetone (RELAFEN) 500 mg tablet TAKE 1 TABLET BY MOUTH TWICE DAILY 60 Tablet 2    fluticasone propionate (FLONASE) 50 mcg/actuation nasal spray 2 Sprays by Both Nostrils route daily. Indications: inflammation of the nose due to an allergy 1 Each 1    desonide (TRIDESILON) 0.05 % cream  (Patient not taking: Reported on 1/25/2022)      Dupixent Pen 300 mg/2 mL pnij Once every 2 weeks. Indications: a type of allergy that causes red and itchy skin called atopic dermatitis      ketoconazole (NIZORAL) 2 % shampoo       loratadine (Claritin) 10 mg tablet Take 1 Tablet by mouth daily. 30 Tablet 1    polyethylene glycol (Miralax) 17 gram/dose powder Take 17 g by mouth daily.  loteprednol etabonate 0.5 % drpg Apply  to eye.  triamcinolone acetonide (KENALOG) 0.1 % ointment Apply  to affected area two (2) times a day. chest and upper extremities,for excema 1 Tube 1    alclometasone (ACLOVATE) 0.05 % ointment Apply 0.05 % to affected area two (2) times daily as needed for Skin Irritation. (Patient not taking: Reported on 1/25/2022)      biotin 1,000 mcg chew Take 1,000 mcg by mouth daily. (Patient not taking: Reported on 12/16/2021)      [employer] wheel walker 1 Each 0    betamethasone valerate (VALISONE) 0.1 % topical lotion Apply 1 Applicator to affected area as needed for Other (Skin irritation). With hair washing      Omeprazole delayed release (PRILOSEC D/R) 20 mg tablet Take 20 mg by mouth daily.  Shower Chair XX jozef As needed for patient safety 1 Each 0    Bedside Commode XX Please provide a commode for patient safety 1 Each 0    OTHER Please provide a tub transfer bench 1 Each 0    biotin (VITAMIN B7) 5 mg tablet Take 1,000 mcg by mouth daily.       Comp Stocking,Knee,Long,Medium misc Wear daily while walking to prevent swelling 1 Each 0     Allergies   Allergen Reactions    Naproxen Shortness of Breath    Shellfish Derived Nausea and Vomiting     SEVERE NAUSEA AND VOMITING    Amoxicillin Rash and Nausea Only       Family History   Problem Relation Age of Onset    HIV/AIDS Brother     Diabetes Father     Cancer Brother     Hypertension Sister     Diabetes Brother     Hypertension Sister     Hypertension Sister     Hypertension Brother      Social History     Tobacco Use    Smoking status: Former Smoker     Packs/day: 0.00     Quit date: 2016     Years since quittin.9    Smokeless tobacco: Former User     Quit date: 2016   Substance Use Topics    Alcohol use: No     Alcohol/week: 10.0 standard drinks     Types: 10 Cans of beer per week         Riley

## 2022-06-16 NOTE — PATIENT INSTRUCTIONS

## 2022-06-16 NOTE — PROGRESS NOTES
Caty Nolasco is a 60 y.o. female presenting today for Leg Pain, Back Pain, Establish Care, and Bladder Infection  . Chief Complaint   Patient presents with    Leg Pain    Back Pain    Establish Care    Bladder Infection       HPI:  Caty Nolasco presents to the office today to establish care. Patient has a past medical history of multiple sclerosis, arthritis, chronic constipation with IBS. Patient has multiple chronic complaints-generalized body aches, abdominal pain. Patient has history of osteoarthritis-she is taking NSAIDs. Patient was advised to try Cymbalta in the past by neurology-however patient refused due to concern of side effects. Patient has been complaining of dysphagia. She had an EGD recently which showed normal mucosa in the follow-up because stomach and duodenum. She was found to have nonobstructive dysphagia and underwent dilation. She has a gastric emptying study scheduled. Health care maintenance:  Mammogram in 2/22 was BI-RADS 1  Colonoscopy in 2015-repeat in 10 years recommended. Review of Systems   Constitutional: Negative for chills, diaphoresis, fever, malaise/fatigue and weight loss. HENT: Negative for congestion, ear discharge, ear pain, hearing loss, nosebleeds, sinus pain, sore throat and tinnitus. Eyes: Negative for blurred vision, double vision and photophobia. Respiratory: Negative for cough, sputum production, shortness of breath, wheezing and stridor. Cardiovascular: Positive for leg swelling. Negative for chest pain, palpitations, orthopnea and claudication. Gastrointestinal: Positive for abdominal pain, constipation and nausea. Negative for diarrhea, heartburn and vomiting. Genitourinary: Positive for dysuria and frequency. Negative for flank pain, hematuria and urgency. Musculoskeletal: Positive for back pain, joint pain, myalgias and neck pain. Skin: Positive for itching and rash.    Neurological: Positive for sensory change, weakness and headaches. Negative for tingling, tremors, speech change, focal weakness and seizures. Psychiatric/Behavioral: Negative for depression. The patient is not nervous/anxious. All other systems reviewed and are negative.       Allergies   Allergen Reactions    Naproxen Shortness of Breath    Shellfish Derived Nausea and Vomiting     SEVERE NAUSEA AND VOMITING    Amoxicillin Rash and Nausea Only       PHQ Screening   3 most recent PHQ Screens 3/18/2022   PHQ Not Done -   Little interest or pleasure in doing things Not at all   Feeling down, depressed, irritable, or hopeless Not at all   Total Score PHQ 2 0   Trouble falling or staying asleep, or sleeping too much -   Feeling tired or having little energy -   Poor appetite, weight loss, or overeating -   Feeling bad about yourself - or that you are a failure or have let yourself or your family down -   Trouble concentrating on things such as school, work, reading, or watching TV -   Moving or speaking so slowly that other people could have noticed; or the opposite being so fidgety that others notice -   Thoughts of being better off dead, or hurting yourself in some way -   PHQ 9 Score -   How difficult have these problems made it for you to do your work, take care of your home and get along with others -       History  Past Medical History:   Diagnosis Date    Chest pain 11/2014    neg EKG, non recurrent    Chronic pain     lower back and legs    Depression     Eczema     Fibroids     GERD (gastroesophageal reflux disease)     H/O seasonal allergies     Headache(784.0)     Hiatal hernia     IBS (irritable bowel syndrome)     Microscopic hematuria     [facility]) [facility]     Rectal bleeding     Stool color black     irritable bowel       Past Surgical History:   Procedure Laterality Date    COLONOSCOPY,DIAGNOSTIC      HX BREAST BIOPSY Right 1/21/2015    RIGHT BREAST BIOPSY WITH NEEDLE LOCALIZATION ULTRASOUND performed by Sania Lucia MD at [facility] MAIN OR    HX CHOLECYSTECTOMY      HX GI      HX HYSTERECTOMY      HX TUBAL LIGATION         Social History     Socioeconomic History    Marital status: SINGLE     Spouse name: Not on file    Number of children: Not on file    Years of education: Not on file    Highest education level: Not on file   Occupational History    Not on file   Tobacco Use    Smoking status: Former Smoker     Packs/day: 0.00     Quit date: 2016     Years since quittin.9    Smokeless tobacco: Former User     Quit date: 2016   Vaping Use    Vaping Use: Never used   Substance and Sexual Activity    Alcohol use: No     Alcohol/week: 10.0 standard drinks     Types: 10 Cans of beer per week    Drug use: No    Sexual activity: Yes     Partners: Male   Other Topics Concern    Not on file   Social History Narrative    Not on file     Social Determinants of Health     Financial Resource Strain:     Difficulty of Paying Living Expenses: Not on file   Food Insecurity:     Worried About [address] [employer] in the Last Year: Not on file    Emil of Food in the Last Year: Not on file   Transportation Needs:     Lack of Transportation (Medical): Not on file    Lack of Transportation (Non-Medical):  Not on file   Physical Activity:     Days of Exercise per Week: Not on file    Minutes of Exercise per Session: Not on file   Stress:     Feeling of Stress : Not on file   Social Connections:     Frequency of Communication with Friends and Family: Not on file    Frequency of Social Gatherings with Friends and Family: Not on file    Attends Hindu Services: Not on file    Active Member of Clubs or Organizations: Not on file    Attends Club or Organization Meetings: Not on file    Marital Status: Not on file   Intimate Partner Violence:     Fear of Current or Ex-Partner: Not on file    Emotionally Abused: Not on file    Physically Abused: Not on file   Carli Gudino Sexually Abused: Not on file   Housing Stability:     Unable to Pay for Housing in the Last Year: Not on file    Number of [place] in the Last Year: Not on file    Unstable Housing in the Last Year: Not on file       Current Outpatient Medications   Medication Sig Dispense Refill    loratadine (Claritin) 10 mg tablet Take 1 Tablet by mouth daily. 30 Tablet 1    nabumetone (RELAFEN) 500 mg tablet TAKE 1 TABLET BY MOUTH TWICE DAILY 60 Tablet 2    baclofen 5 mg tab Take 5 mg by mouth three (3) times daily. 90 Tablet 6    fluticasone propionate (FLONASE) 50 mcg/actuation nasal spray 2 Sprays by Both Nostrils route daily. Indications: inflammation of the nose due to an allergy 1 Each 1    desonide (TRIDESILON) 0.05 % cream  (Patient not taking: Reported on 1/25/2022)      Dupixent Pen 300 mg/2 mL pnij Once every 2 weeks. Indications: a type of allergy that causes red and itchy skin called atopic dermatitis      ketoconazole (NIZORAL) 2 % shampoo       polyethylene glycol (Miralax) 17 gram/dose powder Take 17 g by mouth daily.  loteprednol etabonate 0.5 % drpg Apply  to eye.  triamcinolone acetonide (KENALOG) 0.1 % ointment Apply  to affected area two (2) times a day. chest and upper extremities,for excema 1 Tube 1    alclometasone (ACLOVATE) 0.05 % ointment Apply 0.05 % to affected area two (2) times daily as needed for Skin Irritation. (Patient not taking: Reported on 1/25/2022)      biotin 1,000 mcg chew Take 1,000 mcg by mouth daily. (Patient not taking: Reported on 12/16/2021)      [employer] wheel walker 1 Each 0    betamethasone valerate (VALISONE) 0.1 % topical lotion Apply 1 Applicator to affected area as needed for Other (Skin irritation). With hair washing      Omeprazole delayed release (PRILOSEC D/R) 20 mg tablet Take 20 mg by mouth daily.       Shower Chair XX jozef As needed for patient safety 1 Each 0    Bedside Commode XX Please provide a commode for patient safety 1 Each 0  OTHER Please provide a tub transfer bench 1 Each 0    biotin (VITAMIN B7) 5 mg tablet Take 1,000 mcg by mouth daily.  Comp Stocking,Knee,Long,Medium misc Wear daily while walking to prevent swelling 1 Each 0         Vitals:    06/16/22 1051   BP: 124/73   Pulse: 71   Resp: 16   Temp: 98.5 °F (36.9 °C)   TempSrc: Temporal   SpO2: 98%   Weight: 171 lb (77.6 kg)   Height: 5' 5\" (1.651 m)   PainSc:   7       Physical Exam  Vitals and nursing note reviewed. Constitutional:       General: She is not in acute distress. Appearance: Normal appearance. She is not ill-appearing, toxic-appearing or diaphoretic. HENT:      Head: Normocephalic and atraumatic. Cardiovascular:      Rate and Rhythm: Normal rate and regular rhythm. Pulses: Normal pulses. Heart sounds: No murmur heard. Pulmonary:      Effort: Pulmonary effort is normal. No respiratory distress. Breath sounds: Normal breath sounds. Abdominal:      General: Bowel sounds are normal. There is no distension. Palpations: Abdomen is soft. Tenderness: There is abdominal tenderness. There is no guarding. Comments: Generalized abd tenderness   Musculoskeletal:      Cervical back: Normal range of motion. Right lower leg: No edema. Left lower leg: No edema. Comments: No swelling seen      Skin:     General: Skin is warm and dry. Coloration: Skin is not jaundiced or pale. Findings: Rash present. Comments: Rash at chest   Neurological:      General: No focal deficit present. Mental Status: She is alert and oriented to person, place, and time. Mental status is at baseline. Cranial Nerves: No cranial nerve deficit. Motor: Weakness present. Gait: Gait abnormal.      Comments: Walking with a cane   Psychiatric:         Behavior: Behavior normal.         Thought Content:  Thought content normal.         Judgment: Judgment normal.      Comments: Low mood         Hospital Outpatient Visit on 06/07/2022   Component Date Value Ref Range Status    Creatinine, POC 06/07/2022 0.7  0.6 - 1.3 MG/DL Final    GFRAA, POC 06/07/2022 >60  >60 ml/min/1.73m2 Final    GFRNA, POC 06/07/2022 >60  >60 ml/min/1.73m2 Final    Estimated GFR is calculated using the IDMS-traceable Modification of Diet in Renal Disease (MDRD) Study equation, reported for both  Americans (GFRAA) and non- Americans (GFRNA), and normalized to 1.73m2 body surface area. The physician must decide which value applies to the patient. Admission on 06/02/2022, Discharged on 06/02/2022   Component Date Value Ref Range Status    Ventricular Rate 06/02/2022 70  BPM Final    Atrial Rate 06/02/2022 70  BPM Final    P-R Interval 06/02/2022 178  ms Final    QRS Duration 06/02/2022 94  ms Final    Q-T Interval 06/02/2022 402  ms Final    QTC Calculation (Bezet) 06/02/2022 434  ms Final    Calculated P Axis 06/02/2022 55  degrees Final    Calculated R Axis 06/02/2022 30  degrees Final    Calculated T Axis 06/02/2022 38  degrees Final    Diagnosis 06/02/2022    Final                    Value:Normal sinus rhythm  Normal ECG  When compared with ECG of 19-MAY-2018 09:14,  T wave amplitude has decreased in Anterior leads  Confirmed by Miguel Jonas ([id number]) on 6/2/2022 1:07:56 PM         No results found for any visits on 06/16/22. Patient Care Team:  Patient Care Team:  Walter Zhao NP as PCP - General (Gastroenterology)  [place], [place] as Physician Assistant (Physician Assistant)  Edi Orellana MD (Obstetrics & Gynecology)  [place], [place] (Physician Assistant)  Velma Jane MD (Physical Medicine and Rehabilitation Physician)  Malik Ruiz NP (Neurology)      Assessment / Plan:      ICD-10-CM ICD-9-CM    1. Medicare annual wellness visit, subsequent  Z00.00 V70.0    2. MS (multiple sclerosis) ([facility]  G35 340    3. Perennial allergic rhinitis  J30.89 477.8 loratadine (Claritin) 10 mg tablet   4.  Chronic left-sided low back pain with left-sided sciatica  M54.42 724.2 nabumetone (RELAFEN) 500 mg tablet    G89.29 724.3      338.29    5. Urinary frequency  R35.0 788.41 URINALYSIS W/ RFLX MICROSCOPIC   6. Rash in adult  R21 782.1    7. Multiple sclerosis ([address]  G35 340    8. Encounter for medical examination to establish care  Z00.00 V70.9      Multiple sclerosis-patient is following with neurology  Dermatitis-patient is following with dermatology  Dysphagia-following with GI. Gastric emptying study scheduled. Generalized musculoskeletal pain: Discussed with patient about starting on Cymbalta. Patient is not interested at this time. She wants to continue taking NSAIDs. I discussed in detail the side effects of prolonged NSAID use. Increased urinary frequency: UA ordered. Patient has difficulty with ADLs such as bathing and grooming herself, grocery shopping etc - requesting a home health aide. Follow-up and Dispositions    · Return in about 4 months (around 10/16/2022) for Routine F/u, 30 mins. I asked the patient if she  had any questions and answered her  questions. The patient stated that she understands the treatment plan and agrees with the treatment plan    This document was created with a voice activated dictation system and may contain transcription errors.

## 2022-06-17 ENCOUNTER — HOSPITAL ENCOUNTER (OUTPATIENT)
Dept: LAB | Age: 57
Discharge: HOME | End: 2022-06-17

## 2022-06-17 LAB — XX-LABCORP SPECIMEN COL,LCBCF: NORMAL

## 2022-06-17 PROCEDURE — 99001 SPECIMEN HANDLING PT-LAB: CPT

## 2022-06-18 LAB
APPEARANCE UR: ABNORMAL
BILIRUB UR QL STRIP: NEGATIVE
COLOR UR: YELLOW
GLUCOSE UR QL STRIP: NEGATIVE
HGB UR QL STRIP: NEGATIVE
KETONES UR QL STRIP: NEGATIVE
LEUKOCYTE ESTERASE UR QL STRIP: NEGATIVE
MICRO URNS: ABNORMAL
NITRITE UR QL STRIP: NEGATIVE
PH UR STRIP: 7.5 [PH] (ref 5–7.5)
PROT UR QL STRIP: NEGATIVE
SP GR UR STRIP: 1.01 (ref 1–1.03)
UROBILINOGEN UR STRIP-MCNC: 0.2 MG/DL (ref 0.2–1)

## 2022-06-22 ENCOUNTER — TELEPHONE (OUTPATIENT)
Dept: FAMILY MEDICINE CLINIC | Age: 57
End: 2022-06-22

## 2022-06-27 ENCOUNTER — TELEPHONE (OUTPATIENT)
Dept: FAMILY MEDICINE CLINIC | Age: 57
End: 2022-06-27

## 2022-06-27 NOTE — TELEPHONE ENCOUNTER
Patient request results of urinalysis done last week. She thinks she may have UTI or yeast infection. Please advise.

## 2022-07-01 ENCOUNTER — HOME HEALTH ADMISSION (OUTPATIENT)
Dept: HOME HEALTH SERVICES | Facility: HOME HEALTH | Age: 57
End: 2022-07-01
Payer: MEDICARE

## 2022-07-01 ENCOUNTER — VIRTUAL VISIT (OUTPATIENT)
Dept: NEUROLOGY | Age: 57
End: 2022-07-01
Payer: MEDICARE

## 2022-07-01 DIAGNOSIS — R26.89 IMPAIRED GAIT AND MOBILITY: ICD-10-CM

## 2022-07-01 DIAGNOSIS — G35 MS (MULTIPLE SCLEROSIS) (HCC): ICD-10-CM
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
## 2022-07-01 DIAGNOSIS — Z78.9 IMPAIRED MOBILITY AND ADLS: ICD-10-CM

## 2022-07-01 DIAGNOSIS — R00.2 PALPITATIONS: ICD-10-CM

## 2022-07-01 DIAGNOSIS — Z74.09 IMPAIRED MOBILITY AND ADLS: ICD-10-CM

## 2022-07-01 DIAGNOSIS — R55 SYNCOPE, UNSPECIFIED SYNCOPE TYPE: Primary | ICD-10-CM

## 2022-07-01 PROCEDURE — 99214 OFFICE O/P EST MOD 30 MIN: CPT | Performed by: NURSE PRACTITIONER

## 2022-07-01 PROCEDURE — G8427 DOCREV CUR MEDS BY ELIG CLIN: HCPCS | Performed by: NURSE PRACTITIONER

## 2022-07-01 PROCEDURE — 3017F COLORECTAL CA SCREEN DOC REV: CPT | Performed by: NURSE PRACTITIONER

## 2022-07-01 PROCEDURE — G8432 DEP SCR NOT DOC, RNG: HCPCS | Performed by: NURSE PRACTITIONER

## 2022-07-01 PROCEDURE — G9899 SCRN MAM PERF RSLTS DOC: HCPCS | Performed by: NURSE PRACTITIONER

## 2022-07-01 NOTE — Clinical Note
Can you please call pt and give her the # for [facility] if that is where the pain mgt referral was sent, if they are taking her insuranace and new pts-- she did not have any of the referrals yet

## 2022-07-01 NOTE — PROGRESS NOTES
Galileo Cuello is a 60 y.o. female who will be using a cell phone for today's [place]. 1. Have you been to the ER, urgent care clinic since your last visit? Hospitalized since your last visit? No    2. Have you seen or consulted any other health care providers outside of the [address] [place] [address] since your last visit? Include any pap smears or colon screening.  No      [phone]

## 2022-07-01 NOTE — PROGRESS NOTES
Mercedes Chase is a 60 y.o. female who was seen by synchronous (real-time) audio-video technology on 7/1/2022 for Follow-up and Multiple Sclerosis        Assessment & Plan:   Diagnoses and all orders for this visit:    1. Syncope, unspecified syncope type  -     REFERRAL TO CARDIOLOGY    2. Palpitations  -     REFERRAL TO CARDIOLOGY    3. Fall, subsequent encounter  -     [facility]    4. MS (multiple sclerosis) ([facility] [address]  -     [address] [facility] HYDROXY; Future    5. Impaired gait and mobility  -     REFERRAL TO HOME HEALTH    6. Impaired mobility and ADLs  -     REFERRAL TO HOME HEALTH      This is a 49-year-old left-handed female who presents in follow-up for multiple sclerosis. She is on a regimen of Ocrevus for DMT which was started in January 2020 after having side effects with Tecfidera. She also has complaints of pain involving the back and lower extremities. She had not wanted to start duloxetine due to concern over potential side effects. She continues on baclofen. Pain management referral is pending- we will look into the referral.  Sleep specialist referral for evaluation of hypersomnia is pending- provided phone number for the office for her to call. She has had history of syncopal spells. Cardiology referral is pending- provided phone number. We discussed results of MRI brain and cervical spine. MRI brain reported stable moderate to marked burden MS type changes. No new demyelinating focus or evidence of PML. MRI cervical spine with no convincing demyelination plaques and no areas of enhancement involving the cervical cord itself. Will continue Ocrevus infusions. Home health physical therapy for impaired mobility; home health aide. Vitamin D level with next lab draw. Follow up in 3 months. I spent at least 33 minutes on this visit with this established patient.     Subjective:     Mercedes Chase presents in follow-up for multiple sclerosis. She was last seen here on 2/17/2022. At that time she reported a recent syncopal episode. She did not see the sleep specialist yet. Reported that her whole body aches. Her legs hurt a lot, she reports swelling of the legs. She had a referral for a home health aide. She had her last Ocrevus infusion in January and was scheduled next for July. Tolerates Ocrevus. She sees orthopedics for back pain and her recommendation was pain management. She did not want to start duloxetine due to concern of potential side effects. She takes baclofen 5 mg 3 times daily. She was referred to pain management as she was interested in this for her pain complaints. She was not interested in adjustment of medications at that time. An EEG was ordered. New referral for sleep specialist placed. She was referred for cardiology evaluation. Reported that her PCP was going to refer her for therapy. MRIs were to be obtained for monitoring MS. She presents today in follow-up. Reports doing the same. Reports not falling as much but almost falls. Reports she finds herself sleeping all the time. She did not see the sleep specialist yet. Denies further syncopal spells. She had the EEG which was normal.  She did not see cardiology. She does not remember seeing pain management or being called. Still has pain all over- back, legs, everything hurts. She does not think she drinks enough fluids. She is concerned about getting up too much to go to the bathroom. Feels hot flashes, menopause. Takes baclofen every day. Uses the cane for ambulation. Reports she falls a lot. Reports she gets dizzy, does not know if she blacks out but lays back with eyes closed and wakes back up. Prior to Admission medications    Medication Sig Start Date End Date Taking? Authorizing Provider   loratadine (Claritin) 10 mg tablet Take 1 Tablet by mouth daily.  6/16/22  Yes Anjel Myrick MD   nabumetone (RELAFEN) 500 mg tablet TAKE 1 TABLET BY MOUTH TWICE DAILY 6/16/22  Yes Sharon Myrick MD   baclofen 5 mg tab Take 5 mg by mouth three (3) times daily. 3/30/22  Yes Robert Alvarado NP   fluticasone propionate (FLONASE) 50 mcg/actuation nasal spray 2 Sprays by Both Nostrils route daily. Indications: inflammation of the nose due to an allergy 2/11/22  Yes Obdulia Hunter NP   Dupixent Pen 300 mg/2 mL pnij Once every 2 weeks. Indications: a type of allergy that causes red and itchy skin called atopic dermatitis 12/30/21  Yes Provider, Historical   ketoconazole (NIZORAL) 2 % shampoo  11/2/21  Yes Provider, Historical   polyethylene glycol (Miralax) 17 gram/dose powder Take 17 g by mouth daily. Yes Provider, Historical   loteprednol etabonate 0.5 % drpg Apply  to eye. Yes Provider, Historical   triamcinolone acetonide (KENALOG) 0.1 % ointment Apply  to affected area two (2) times a day. chest and upper extremities,for excema 6/10/21  Yes Obdulia Hunter NP   [facility] wheel walker 7/15/20  Yes Obdulia Hunter NP   betamethasone valerate (VALISONE) 0.1 % topical lotion Apply 1 Applicator to affected area as needed for Other (Skin irritation). With hair washing   Yes Provider, Historical   Omeprazole delayed release (PRILOSEC D/R) 20 mg tablet Take 20 mg by mouth daily. Yes Provider, Historical   Shower Chair XX jozef As needed for patient safety 9/12/19  Yes Romie Cloud MD   Bedside Commode XX Please provide a commode for patient safety 9/12/19  Yes Romie Cloud MD   OTHER Please provide a tub transfer bench 9/6/19  Yes Obdulia Hunter NP   biotin (VITAMIN B7) 5 mg tablet Take 1,000 mcg by mouth daily.    Yes Provider, Historical   Comp Stocking,Knee,Long,Medium misc Wear daily while walking to prevent swelling 5/19/18  Yes Navarro Dhaliwal MD   desonide (TRIDESILON) 0.05 % cream  11/17/21   Provider, Historical   alclometasone (ACLOVATE) 0.05 % ointment Apply 0.05 % to affected area two (2) times daily as needed for Skin Irritation. Patient not taking: Reported on 1/25/2022 3/16/21   Provider, Historical   biotin 1,000 mcg chew Take 1,000 mcg by mouth daily. Patient not taking: Reported on 12/16/2021    Provider, Historical     Patient Active Problem List   Diagnosis Code    Microscopic hematuria R31.29    Rectal bleeding K62.5    Chronic pain G89.29    Diffuse cystic mastopathy N60.19    Paranoid schizophrenia ([facility] [address] F20.0    Chronic left-sided low back pain with left-sided sciatica M54.42, G89.29    Spells of decreased attentiveness R68.89    Unsteady gait R26.81    Perennial allergic rhinitis J30.89    Multiple sclerosis (HCC) G35     Current Outpatient Medications   Medication Sig Dispense Refill    loratadine (Claritin) 10 mg tablet Take 1 Tablet by mouth daily. 30 Tablet 1    nabumetone (RELAFEN) 500 mg tablet TAKE 1 TABLET BY MOUTH TWICE DAILY 60 Tablet 2    baclofen 5 mg tab Take 5 mg by mouth three (3) times daily. 90 Tablet 6    fluticasone propionate (FLONASE) 50 mcg/actuation nasal spray 2 Sprays by Both Nostrils route daily. Indications: inflammation of the nose due to an allergy 1 Each 1    Dupixent Pen 300 mg/2 mL pnij Once every 2 weeks. Indications: a type of allergy that causes red and itchy skin called atopic dermatitis      ketoconazole (NIZORAL) 2 % shampoo       polyethylene glycol (Miralax) 17 gram/dose powder Take 17 g by mouth daily.  loteprednol etabonate 0.5 % drpg Apply  to eye.  triamcinolone acetonide (KENALOG) 0.1 % ointment Apply  to affected area two (2) times a day. chest and upper extremities,for excema 1 Tube 1    Walker [employer] wheel walker 1 Each 0    betamethasone valerate (VALISONE) 0.1 % topical lotion Apply 1 Applicator to affected area as needed for Other (Skin irritation). With hair washing      Omeprazole delayed release (PRILOSEC D/R) 20 mg tablet Take 20 mg by mouth daily.       Shower Chair XX jozef As needed for patient safety 1 Each 0    Bedside Commode XX Please provide a commode for patient safety 1 Each 0    OTHER Please provide a tub transfer bench 1 Each 0    biotin (VITAMIN B7) 5 mg tablet Take 1,000 mcg by mouth daily.  Comp Stocking,Knee,Long,Medium misc Wear daily while walking to prevent swelling 1 Each 0    desonide (TRIDESILON) 0.05 % cream  (Patient not taking: Reported on 1/25/2022)      alclometasone (ACLOVATE) 0.05 % ointment Apply 0.05 % to affected area two (2) times daily as needed for Skin Irritation. (Patient not taking: Reported on 1/25/2022)      biotin 1,000 mcg chew Take 1,000 mcg by mouth daily.  (Patient not taking: Reported on 12/16/2021)       Allergies   Allergen Reactions    Naproxen Shortness of Breath    Shellfish Derived Nausea and Vomiting     SEVERE NAUSEA AND VOMITING    Amoxicillin Rash and Nausea Only     Past Medical History:   Diagnosis Date    Chest pain 11/2014    neg EKG, non recurrent    Chronic pain     lower back and legs    Depression     Eczema     Fibroids     GERD (gastroesophageal reflux disease)     H/O seasonal allergies     Headache(784.0)     Hiatal hernia     IBS (irritable bowel syndrome)     Microscopic hematuria     MS (multiple sclerosis) (HCC)     Rectal bleeding     Stool color black     irritable bowel     Past Surgical History:   Procedure Laterality Date    COLONOSCOPY,DIAGNOSTIC      HX BREAST BIOPSY Right 1/21/2015    RIGHT BREAST BIOPSY WITH NEEDLE LOCALIZATION ULTRASOUND performed by Nathaniel Simmons MD at [facility] MAIN OR    HX CHOLECYSTECTOMY      HX GI      HX HYSTERECTOMY      HX TUBAL LIGATION       Family History   Problem Relation Age of Onset    HIV/AIDS Brother     Diabetes Father     Cancer Brother     Hypertension Sister     Diabetes Brother     Hypertension Sister     Hypertension Sister     Hypertension Brother      Social History     Tobacco Use    Smoking status: Former Smoker Packs/day: 0.00     Quit date: 2016     Years since quittin.0    Smokeless tobacco: Former User     Quit date: 2016   Substance Use Topics    Alcohol use: No     Alcohol/week: 10.0 standard drinks     Types: 10 Cans of beer per week       ROS  GENERAL: Denies fever. +fatigue. CARDIAC: No CP or SOB  PULMONARY: No cough or SOB  MUSCULOSKELETAL: +back pain, lower extremities pain. NEURO: SEE HPI    Objective:   No flowsheet data found. Constitutional: [x] Appears well-developed and well-nourished [x] No apparent distress      [] Abnormal -     Mental status: [x] Alert and awake  [x] Oriented to person/place/time [x] Able to follow commands    [] Abnormal -     Eyes:   EOM    [x]  Normal    [] Abnormal -   Sclera  [x]  Normal    [] Abnormal -          Discharge [x]  None visible   [] Abnormal -     HENT: [x] Normocephalic, atraumatic  [] Abnormal -   [] Mouth/Throat: Mucous membranes are moist    External Ears [] Normal  [] Abnormal -    Neck: [x] No visualized mass [] Abnormal -     Pulmonary/Chest: [x] Respiratory effort normal   [x] No visualized signs of difficulty breathing or respiratory distress        [] Abnormal -      Musculoskeletal:   [] Normal gait with no signs of ataxia         [] Normal range of motion of neck        [] Abnormal -     Neurological:        [x] No Facial Asymmetry (Cranial nerve 7 motor function) (limited exam due to video visit). Speech is fluent. Speech clear. Tongue is midline. Moving bilateral upper extremities. [x] No gaze palsy        [x] Abnormal -  Fine finger movements a little slow but symmetrical.  Gait exam deferred.          Skin:        [x] No significant exanthematous lesions or discoloration noted on facial skin         [] Abnormal -            Psychiatric:       [x] Normal Affect [] Abnormal -        [x] No Hallucinations    Other pertinent observable physical exam findings:-        We discussed the expected course, resolution and complications of the diagnosis(es) in detail. Medication risks, benefits, costs, interactions, and alternatives were discussed as indicated. I advised her to contact the office if her condition worsens, changes or fails to improve as anticipated. She expressed understanding with the diagnosis(es) and plan. Prasannarebekah Harrington, was evaluated through a synchronous (real-time) audio-video encounter. The patient (or guardian if applicable) is aware that this is a billable service, which includes applicable co-pays. This Virtual Visit was conducted with patient's (and/or legal guardian's) consent. The visit was conducted pursuant to the emergency declaration under the [address] [place] [address], [address] [place] [address] authority and the [email or web address] and [employer] General Act. Patient identification was verified, and a caregiver was present when appropriate.   The patient was located at: Home: [address] [id number]  The provider was located at: Home:         Yamini Dawn NP

## 2022-07-02 DIAGNOSIS — G35 MS (MULTIPLE SCLEROSIS) (HCC): ICD-10-CM

## 2022-07-07 ENCOUNTER — HOME CARE VISIT (OUTPATIENT)
Dept: HOME HEALTH SERVICES | Facility: HOME HEALTH | Age: 57
End: 2022-07-07
Payer: MEDICARE

## 2022-07-09 ENCOUNTER — HOME CARE VISIT (OUTPATIENT)
Dept: HOME HEALTH SERVICES | Facility: HOME HEALTH | Age: 57
End: 2022-07-09
Payer: MEDICARE

## 2022-07-11 ENCOUNTER — HOME CARE VISIT (OUTPATIENT)
Dept: HOME HEALTH SERVICES | Facility: HOME HEALTH | Age: 57
End: 2022-07-11
Payer: MEDICARE

## 2022-07-13 ENCOUNTER — HOME CARE VISIT (OUTPATIENT)
Dept: SCHEDULING | Facility: HOME HEALTH | Age: 57
End: 2022-07-13
Payer: MEDICARE

## 2022-07-13 ENCOUNTER — HOME CARE VISIT (OUTPATIENT)
Dept: HOME HEALTH SERVICES | Facility: HOME HEALTH | Age: 57
End: 2022-07-13
Payer: MEDICARE

## 2022-07-13 PROCEDURE — 400013 HH SOC

## 2022-07-13 PROCEDURE — G0151 HHCP-SERV OF PT,EA 15 MIN: HCPCS

## 2022-07-13 NOTE — Clinical Note
(gastroesophageal reflux disease) H/O seasonal allergies Headache Hiatal hernia IBS (irritable bowel syndrome) MS (multiple sclerosis) referred to HHPT due for impaired mobility. Patient is now back to two level private residence with threshold to enter and exit apartment. Patient bedroom and main bathroom located on 2nd floor. No caregiver available during this visit. Patient has a PLOF of independent with ADLS and a friend comes 2-3x/day to assist with meal preparation. During this visit, patient presents with decreased bilateral LEs strength, impaired balance and decreased coordination ,  poor motor control,  poor functional activity tolerance and decreased safety in functional mobility skills as evidenced by functional tests. FTSTS: 31 seconds , Tinetti and gait test: , TU using SPC, 2-minute walk test: unable. Patient requiring indep in bed mobility,  touching  assistance in safe transfers to and from bed, chair and toilet using SPC with 25% verbal cues for proper hand placements and techniques. Patient has decreased  static standing balance at [address] and needs to hold on to [facility] for support to prevent LOB. Patient is limited with ambulation to 50 ft with 2 turns x 2 using SPC requiring supervision/touching. Impairments in gait include forward lean, downward gaze,  unequal step length, decreased yodit,  wide SHILO, ataxic gait. Needed supervision/touching to ascend and descend steps with railing, non-reciprocal.  Skilled PT services are warranted to educate patient on fall prevention, energy conservation techniques and HEP, bilateral LEs strengthening, balance and coordination,  motor control, activity tolerance training. Therapeutic treatment will also focus safety on functional mobility skills and address gait impairments to decrease risk for falls, decrease risk of rehospitalization  and increase independence in performing ADLs. Please call #[phone] with any questions. Thank you! Kevin Buenrostro

## 2022-07-13 NOTE — HOME HEALTH
Skilled services/Home bound verification:     Skilled Reason for admission/summary of clinical condition: As per recent MD f/u visit, patient is 41-year-old left-handed female who presents in follow-up for multiple sclerosis. She is on a regimen of Ocrevus for DMT which was started in January 2020 after having side effects with Tecfidera. She also has complaints of pain involving the back and lower extremities. She had not wanted to start duloxetine due to concern over potential side effects. She continues baclofen. Pain management referral is pending- we will look into the referral. Sleep specialist referral for evaluation of hypersomnia is pending- provided phone number for the office for her to call. She has had history of syncopal spells. Cardiology referral is pending- provided phone number. We discussed results of MRI brain and cervical spine. MRI brain reported stable moderate to marked burden MS type changes. No new demyelinating focus or evidence of PML. MRI cervical spine with no convincing demyelination plaques and no areas of enhancement involving the cervical cord itself. Will continue Ocrevus infusions. Home health physical therapy for impaired mobility; home health aide. Vitamin D level with next lab draw. This patient is homebound for the following reasons Requires considerable and taxing effort to leave the home , Requires the assistance of 1 or more persons to leave the home  and only leaves the home for medical reasons or Confucianism services and are infrequent and of short duration for other reasons . Caregiver: friend. Caregiver will be able to provide short term care 2-3xday and assistance with meals preparation and transportation to and from MD appointments. Medications reconciled and all medications are available in the home this visit.      The following education was provided regarding medications, medication interactions, and look alike medications (specify): No significant adverse reactions noted. Medications  are effective currently. High risk medication teaching regarding anticoagulants, hyperglycemic agents or opioid narcotics performed (specify) NA    Home health supplies by type and quantity ordered/delivered this visit include NA    PATIENT EDUCATION PROVIDED THIS VISIT TO INCLUDE: FALL PREVENTION TECHNIQUES: monitor medication that may alter mental status,  keeping walking pathways clean and clear of clutter and throw rugs, keeping night lights on for ability to see and easily, good visibility for safe transitioning thresholds and proper use and good compliance of using AD. PAIN MANAGEMENT TECHNIQUES: take pain medication as prescribed by MD, application of cold pack for soreness and achy  type of pain /hot  packs for achy, stiffness type of pain on painful area x 20 mins, positioning and relaxation. ENERGY CONSERVATION TECHNIQUES:  rest, slow down, pacing, and complete tasks in manageable steps. PATIENT LEVEL OF UNDERSTANDING OF EDUCATION PROVIDED: Patient verbalized understanding    PATIENT RESPONSE TO PROCEDURE PERFORMED: Patient tends to reach on to furniture to navigate home safely. Pt/Caregiver instructed on plan of care and are agreeable to plan of care at this time. Physician Jerry Ferrell NP  notified of patient admission to home health and plan of care including anticipated frequency of 1w1, 2w3 and treatments/interventions/modalities of education on pain management techniques, graded therapeutic exercises, balance training, bed mobility training, transfers training, HEP ed, education on energy conservation techniques, gait/steps management training and d/c planning. Discharge planning discussed with patient and caregiver. Discharge planning as follows: Discharge to self, family and under the supervision of MD once patient has met all goals or reached maximum potential  Pt/Caregiver did verbalize understanding of discharge planning.      Next MD appointment 10/2/22 with Genia Valladares MD.  Patient/caregiver encouraged/instructed to keep appointment as lack of follow through with physician appointment could result in discontinuation of home care services for non-compliance. -  A written copy of the [facility] of Rights was given and verbally reviewed on this visit. The patient or representative was given the opportunity to ask pertinent questions regarding the bill of rights. Jose Carlos Wells of rights information was received by patient. Reviewed with patient. Admission handbook left in the home. Emergency Evacuation Information   Reviewed emergency plan with patient/caregiver. -  Subjective:   Pain: Patient c/o intermittent pain on back with highest pain level of 9/10 and least pain level of 5/10. Patient reported she last took Tylenol this morning   -  Objective  Balance:  Unsupported sitting: Normal  Static Standing balance: Fair  Dynamic standing balance: NA d/t safety reasons  Tinetti balance and gait test: 14/28 indicative of high fall risks. TUG test: 33 seconds using SPC indicative of high fall risks. ROM: WFL on bilateral LEs. BLE MMT:  R hip flex 4-/5   R hip Abd 4-/5   R hip add 4-/5   R knee flex 4-/5  R knee ext. 4-/5    R ankle DF 4-/5  L hip flex 4-/5  L hip Abd 4-/5  L hip add 4-/5  L knee flex 4-/5  L knee ext. 4-/5  L ankle DF 4-/5    FTSTS:  31 seconds  -  Assessment:    Patient is a 59 y/o female with PMHx of Chest pain, Chronic pain lower back and legs Depression, GERD (gastroesophageal reflux disease) H/O seasonal allergies Headache Hiatal hernia IBS (irritable bowel syndrome) MS (multiple sclerosis) referred to [facility] due for impaired mobility. Patient is now back to two level private residence with threshold to enter and exit apartment. Patient bedroom and main bathroom located on 2nd floor. No caregiver available during this visit.  Patient has a PLOF of independent with ADLS and a friend comes 2-3x/day to assist with meal preparation. During this visit, patient presents with decreased bilateral LEs strength, impaired balance and decreased coordination ,  poor motor control,  poor functional activity tolerance and decreased safety in functional mobility skills as evidenced by functional tests. FTSTS: 31 seconds , Tinetti and gait test: , TU using SPC, 2-minute walk test: unable. Patient requiring indep in bed mobility,  touching  assistance in safe transfers to and from bed, chair and toilet using SPC with 25% verbal cues for proper hand placements and techniques. Patient has decreased  static standing balance at [address] and needs to hold on to [facility] for support to prevent LOB. Patient is limited with ambulation to 50 ft with 2 turns x 2 using SPC requiring supervision/touching. Impairments in gait include forward lean, downward gaze,  unequal step length, decreased yodit,  wide SHILO, ataxic gait. Needed supervision/touching to ascend and descend steps with railing, non-reciprocal.  Skilled PT services are warranted to educate patient on fall prevention, energy conservation techniques and HEP, bilateral LEs strengthening, balance and coordination,  motor control, activity tolerance training. Therapeutic treatment will also focus safety on functional mobility skills and address gait impairments to decrease risk for falls, decrease risk of rehospitalization  and increase independence in performing ADLs. -  Plan: Patient is seen for initial PT eval today with POC established. Discussed to patient POC and visit frequency of 1w1, 2w3 Patient verbalized agreement. Patient to receive education on pain management techniques, graded therapeutic exercises, balance and training, bed mobility training, transfers training, HEP ed, education on energy conservation techniques, fall prevention techniques ed, gait/steps management training and d/c planning.

## 2022-07-14 ENCOUNTER — TELEPHONE (OUTPATIENT)
Dept: NEUROLOGY | Age: 57
End: 2022-07-14

## 2022-07-14 ENCOUNTER — HOME CARE VISIT (OUTPATIENT)
Dept: HOME HEALTH SERVICES | Facility: HOME HEALTH | Age: 57
End: 2022-07-14
Payer: MEDICARE

## 2022-07-14 NOTE — TELEPHONE ENCOUNTER
Ghada from [facility] in [facility] [place] called and said they still have not received the order from Michael and the pt. operation is supposed to be on Monday, and they need it urgent. Please advise.

## 2022-07-15 ENCOUNTER — HOME CARE VISIT (OUTPATIENT)
Dept: HOME HEALTH SERVICES | Facility: HOME HEALTH | Age: 57
End: 2022-07-15
Payer: MEDICARE

## 2022-07-18 ENCOUNTER — HOME CARE VISIT (OUTPATIENT)
Dept: SCHEDULING | Facility: HOME HEALTH | Age: 57
End: 2022-07-18
Payer: MEDICARE

## 2022-07-18 ENCOUNTER — HOSPITAL ENCOUNTER (OUTPATIENT)
Dept: INFUSION THERAPY | Age: 57
Discharge: HOME | End: 2022-07-18
Payer: MEDICARE

## 2022-07-18 ENCOUNTER — TELEPHONE (OUTPATIENT)
Dept: NEUROLOGY | Age: 57
End: 2022-07-18

## 2022-07-18 VITALS
TEMPERATURE: 97.7 F | HEART RATE: 86 BPM | RESPIRATION RATE: 16 BRPM | SYSTOLIC BLOOD PRESSURE: 121 MMHG | OXYGEN SATURATION: 100 % | DIASTOLIC BLOOD PRESSURE: 84 MMHG

## 2022-07-18 VITALS
DIASTOLIC BLOOD PRESSURE: 62 MMHG | RESPIRATION RATE: 18 BRPM | HEART RATE: 54 BPM | OXYGEN SATURATION: 99 % | TEMPERATURE: 97.3 F | SYSTOLIC BLOOD PRESSURE: 130 MMHG

## 2022-07-18 DIAGNOSIS — G35 MULTIPLE SCLEROSIS (HCC): Primary | ICD-10-CM

## 2022-07-18 LAB
ALBUMIN SERPL-MCNC: 3.9 G/DL (ref 3.4–5)
ALBUMIN/GLOB SERPL: 1.3 {RATIO} (ref 0.8–1.7)
ALP SERPL-CCNC: 116 U/L (ref 45–117)
ALT SERPL-CCNC: 23 U/L (ref 13–56)
ANION GAP SERPL CALC-SCNC: 6 MMOL/L (ref 3–18)
AST SERPL-CCNC: 15 U/L (ref 10–38)
BASOPHILS # BLD: 0 K/UL (ref 0–0.1)
BASOPHILS NFR BLD: 1 % (ref 0–2)
BILIRUB SERPL-MCNC: 0.3 MG/DL (ref 0.2–1)
BUN SERPL-MCNC: 14 MG/DL (ref 7–18)
BUN/CREAT SERPL: 20 (ref 12–20)
CALCIUM SERPL-MCNC: 9.2 MG/DL (ref 8.5–10.1)
CHLORIDE SERPL-SCNC: 107 MMOL/L (ref 100–111)
CO2 SERPL-SCNC: 28 MMOL/L (ref 21–32)
CREAT SERPL-MCNC: 0.69 MG/DL (ref 0.6–1.3)
DIFFERENTIAL METHOD BLD: ABNORMAL
EOSINOPHIL # BLD: 0.1 K/UL (ref 0–0.4)
EOSINOPHIL NFR BLD: 3 % (ref 0–5)
ERYTHROCYTE [DISTWIDTH] IN BLOOD BY AUTOMATED COUNT: 14.7 % (ref 11.6–14.5)
GLOBULIN SER CALC-MCNC: 3.1 G/DL (ref 2–4)
GLUCOSE SERPL-MCNC: 79 MG/DL (ref 74–99)
HCT VFR BLD AUTO: 38.6 % (ref 35–45)
HGB BLD-MCNC: 12.5 G/DL (ref 12–16)
IMM GRANULOCYTES # BLD AUTO: 0 K/UL (ref 0–0.04)
IMM GRANULOCYTES NFR BLD AUTO: 0 % (ref 0–0.5)
LYMPHOCYTES # BLD: 2.3 K/UL (ref 0.9–3.6)
LYMPHOCYTES NFR BLD: 49 % (ref 21–52)
MCH RBC QN AUTO: 27.8 PG (ref 24–34)
MCHC RBC AUTO-ENTMCNC: 32.4 G/DL (ref 31–37)
MCV RBC AUTO: 85.8 FL (ref 78–100)
MONOCYTES # BLD: 0.5 K/UL (ref 0.05–1.2)
MONOCYTES NFR BLD: 10 % (ref 3–10)
NEUTS SEG # BLD: 1.8 K/UL (ref 1.8–8)
NEUTS SEG NFR BLD: 37 % (ref 40–73)
NRBC # BLD: 0 K/UL (ref 0–0.01)
NRBC BLD-RTO: 0 PER 100 WBC
PLATELET # BLD AUTO: 299 K/UL (ref 135–420)
PMV BLD AUTO: 11.3 FL (ref 9.2–11.8)
POTASSIUM SERPL-SCNC: 3.9 MMOL/L (ref 3.5–5.5)
PROT SERPL-MCNC: 7 G/DL (ref 6.4–8.2)
RBC # BLD AUTO: 4.5 M/UL (ref 4.2–5.3)
SODIUM SERPL-SCNC: 141 MMOL/L (ref 136–145)
WBC # BLD AUTO: 4.7 K/UL (ref 4.6–13.2)

## 2022-07-18 PROCEDURE — 96365 THER/PROPH/DIAG IV INF INIT: CPT

## 2022-07-18 PROCEDURE — 80053 COMPREHEN METABOLIC PANEL: CPT

## 2022-07-18 PROCEDURE — 96366 THER/PROPH/DIAG IV INF ADDON: CPT

## 2022-07-18 PROCEDURE — 74011000250 HC RX REV CODE- 250: Performed by: NURSE PRACTITIONER

## 2022-07-18 PROCEDURE — 74011250637 HC RX REV CODE- 250/637: Performed by: NURSE PRACTITIONER

## 2022-07-18 PROCEDURE — 74011250636 HC RX REV CODE- 250/636: Performed by: NURSE PRACTITIONER

## 2022-07-18 PROCEDURE — 96375 TX/PRO/DX INJ NEW DRUG ADDON: CPT

## 2022-07-18 PROCEDURE — 85025 COMPLETE CBC W/AUTO DIFF WBC: CPT

## 2022-07-18 RX ORDER — SODIUM CHLORIDE 9 MG/ML
5-250 INJECTION, SOLUTION INTRAVENOUS AS NEEDED
OUTPATIENT
Start: 2023-01-16

## 2022-07-18 RX ORDER — SODIUM CHLORIDE 0.9 % (FLUSH) 0.9 %
5-40 SYRINGE (ML) INJECTION AS NEEDED
Status: DISPENSED | OUTPATIENT
Start: 2022-07-18 | End: 2022-07-18

## 2022-07-18 RX ORDER — SODIUM CHLORIDE 9 MG/ML
5-250 INJECTION, SOLUTION INTRAVENOUS AS NEEDED
Status: CANCELLED | OUTPATIENT
Start: 2022-07-18

## 2022-07-18 RX ORDER — HEPARIN 100 UNIT/ML
500 SYRINGE INTRAVENOUS AS NEEDED
Start: 2023-01-16

## 2022-07-18 RX ORDER — ONDANSETRON 2 MG/ML
8 INJECTION INTRAMUSCULAR; INTRAVENOUS AS NEEDED
OUTPATIENT
Start: 2023-01-16

## 2022-07-18 RX ORDER — HYDROCORTISONE SODIUM SUCCINATE 100 MG/2ML
100 INJECTION, POWDER, FOR SOLUTION INTRAMUSCULAR; INTRAVENOUS AS NEEDED
OUTPATIENT
Start: 2023-01-16

## 2022-07-18 RX ORDER — EPINEPHRINE 1 MG/ML
0.3 INJECTION, SOLUTION, CONCENTRATE INTRAVENOUS AS NEEDED
OUTPATIENT
Start: 2023-01-16

## 2022-07-18 RX ORDER — HYDROCORTISONE SODIUM SUCCINATE 100 MG/2ML
100 INJECTION, POWDER, FOR SOLUTION INTRAMUSCULAR; INTRAVENOUS AS NEEDED
Status: CANCELLED | OUTPATIENT
Start: 2022-07-18

## 2022-07-18 RX ORDER — DIPHENHYDRAMINE HYDROCHLORIDE 50 MG/ML
50 INJECTION, SOLUTION INTRAMUSCULAR; INTRAVENOUS AS NEEDED
Start: 2023-01-16

## 2022-07-18 RX ORDER — ACETAMINOPHEN 325 MG/1
650 TABLET ORAL AS NEEDED
Start: 2023-01-16

## 2022-07-18 RX ORDER — ALBUTEROL SULFATE 0.83 MG/ML
2.5 SOLUTION RESPIRATORY (INHALATION) AS NEEDED
Status: CANCELLED
Start: 2022-07-18

## 2022-07-18 RX ORDER — EPINEPHRINE 1 MG/ML
0.3 INJECTION, SOLUTION, CONCENTRATE INTRAVENOUS AS NEEDED
Status: CANCELLED | OUTPATIENT
Start: 2022-07-18

## 2022-07-18 RX ORDER — DIPHENHYDRAMINE HYDROCHLORIDE 50 MG/ML
50 INJECTION, SOLUTION INTRAMUSCULAR; INTRAVENOUS ONCE
OUTPATIENT
Start: 2023-01-16 | End: 2023-01-16

## 2022-07-18 RX ORDER — ALBUTEROL SULFATE 0.83 MG/ML
2.5 SOLUTION RESPIRATORY (INHALATION) AS NEEDED
Start: 2023-01-16

## 2022-07-18 RX ORDER — ACETAMINOPHEN 325 MG/1
650 TABLET ORAL ONCE
OUTPATIENT
Start: 2023-01-16 | End: 2023-01-16

## 2022-07-18 RX ORDER — ACETAMINOPHEN 325 MG/1
650 TABLET ORAL ONCE
Status: COMPLETED | OUTPATIENT
Start: 2022-07-18 | End: 2022-07-18

## 2022-07-18 RX ORDER — SODIUM CHLORIDE 9 MG/ML
5-40 INJECTION INTRAMUSCULAR; INTRAVENOUS; SUBCUTANEOUS AS NEEDED
Status: CANCELLED | OUTPATIENT
Start: 2022-07-18

## 2022-07-18 RX ORDER — DIPHENHYDRAMINE HYDROCHLORIDE 50 MG/ML
50 INJECTION, SOLUTION INTRAMUSCULAR; INTRAVENOUS ONCE
Status: COMPLETED | OUTPATIENT
Start: 2022-07-18 | End: 2022-07-18

## 2022-07-18 RX ORDER — ONDANSETRON 2 MG/ML
8 INJECTION INTRAMUSCULAR; INTRAVENOUS AS NEEDED
Status: CANCELLED | OUTPATIENT
Start: 2022-07-18

## 2022-07-18 RX ORDER — SODIUM CHLORIDE 9 MG/ML
5-40 INJECTION INTRAMUSCULAR; INTRAVENOUS; SUBCUTANEOUS AS NEEDED
OUTPATIENT
Start: 2023-01-16

## 2022-07-18 RX ORDER — DIPHENHYDRAMINE HYDROCHLORIDE 50 MG/ML
50 INJECTION, SOLUTION INTRAMUSCULAR; INTRAVENOUS AS NEEDED
Status: CANCELLED
Start: 2022-07-18

## 2022-07-18 RX ORDER — SODIUM CHLORIDE 9 MG/ML
5-250 INJECTION, SOLUTION INTRAVENOUS AS NEEDED
Status: DISPENSED | OUTPATIENT
Start: 2022-07-18 | End: 2022-07-18

## 2022-07-18 RX ORDER — SODIUM CHLORIDE 0.9 % (FLUSH) 0.9 %
5-40 SYRINGE (ML) INJECTION AS NEEDED
OUTPATIENT
Start: 2023-01-16

## 2022-07-18 RX ORDER — DIPHENHYDRAMINE HYDROCHLORIDE 50 MG/ML
25 INJECTION, SOLUTION INTRAMUSCULAR; INTRAVENOUS AS NEEDED
Status: CANCELLED
Start: 2022-07-18

## 2022-07-18 RX ORDER — DIPHENHYDRAMINE HYDROCHLORIDE 50 MG/ML
25 INJECTION, SOLUTION INTRAMUSCULAR; INTRAVENOUS AS NEEDED
Start: 2023-01-16

## 2022-07-18 RX ORDER — ACETAMINOPHEN 325 MG/1
650 TABLET ORAL AS NEEDED
Status: CANCELLED
Start: 2022-07-18

## 2022-07-18 RX ORDER — HEPARIN 100 UNIT/ML
500 SYRINGE INTRAVENOUS AS NEEDED
Status: CANCELLED
Start: 2022-07-18

## 2022-07-18 RX ADMIN — DIPHENHYDRAMINE HYDROCHLORIDE 50 MG: 50 INJECTION INTRAMUSCULAR; INTRAVENOUS at 10:30

## 2022-07-18 RX ADMIN — ACETAMINOPHEN 650 MG: 325 TABLET ORAL at 10:30

## 2022-07-18 RX ADMIN — SODIUM CHLORIDE 25 ML/HR: 9 INJECTION, SOLUTION INTRAVENOUS at 10:25

## 2022-07-18 RX ADMIN — SODIUM CHLORIDE, PRESERVATIVE FREE 10 ML: 5 INJECTION INTRAVENOUS at 10:00

## 2022-07-18 RX ADMIN — OCRELIZUMAB 600 MG: 300 INJECTION INTRAVENOUS at 11:15

## 2022-07-18 RX ADMIN — METHYLPREDNISOLONE SODIUM SUCCINATE 125 MG: 125 INJECTION, POWDER, FOR SOLUTION INTRAMUSCULAR; INTRAVENOUS at 10:35

## 2022-07-18 NOTE — PROGRESS NOTES
[facility] Progress Note    Date: 2022    Name: Pawan Nazario    MRN: [id number]         : 1965    Ms. Laurence Steiner arrived in the [facility] today at 0915, in stable condition, here for her OCREVUS INFUSION (EVERY 6 MONTHS). She was assessed and education was provided. Ms. Jacobs's vitals were reviewed. Visit Vitals  /80 (BP 1 Location: Left upper arm, BP Patient Position: Sitting)   Pulse 73   Temp 97.1 °F (36.2 °C)   Resp 16   SpO2 100%   Breastfeeding No        Ms. Laurence Steiner stated that she has been tolerating the Ocrevus infusions well and without any side effects. Also, she denied any possibility of pregnancy, any symptoms of active infection and denied being on any current antibiotic therapy. PIV # 25 G was established in her posterior left hand at 1000 without incident, and brisk blood return was obtained. Blood for the ordered labs (CBC & CMP) was drawn, and was sent out to the [facility] hospital lab by  for processing.  ml IV BAG was initiated to infuse @ [facility] throughout treatment today. The following pre-medications were administered per order, approximately 30 minutes prior to starting the Ocrevus: TYLENOL 650 mg PO, BENADRYL 50 mg IV, & SOLUMEDROL 125 mg IV. Ocrelizumab (OCREVUS) 600 mg IV, was administered over approximately 2 hours, per order and without incident, using the following rate titration:    100 ml/hr X 15 minutes,   200 ml/hr X 15 minutes,   250 ml/hr X 30 minutes,   300 ml/hr until completion. After completion of the OCREVUS infusion, her PIV was flushed well per policy with NS, and then was removed and gauze/coban was applied. Ms. Laurence Steiner tolerated treatment well today, and voiced no complaints. Ms. Laurence Steiner was discharged from [place] [address] in stable condition at 1400. She is to return in 6 months, on Monday, 23 at 0900, for her next appointment for her next OCREVUS infusion.      Santana WICK Reilly Delgado RN  July 18, 2022  9:43 AM

## 2022-07-19 ENCOUNTER — HOME CARE VISIT (OUTPATIENT)
Dept: SCHEDULING | Facility: HOME HEALTH | Age: 57
End: 2022-07-19
Payer: MEDICARE

## 2022-07-19 VITALS
TEMPERATURE: 96.9 F | SYSTOLIC BLOOD PRESSURE: 120 MMHG | HEART RATE: 72 BPM | OXYGEN SATURATION: 96 % | RESPIRATION RATE: 16 BRPM | DIASTOLIC BLOOD PRESSURE: 80 MMHG

## 2022-07-19 PROCEDURE — G0157 HHC PT ASSISTANT EA 15: HCPCS

## 2022-07-19 NOTE — Clinical Note
Thank you ! I appreciate your time. ----- Message -----  From: Yina Fowler NP  Sent: 8/9/2022  11:10 PM EDT  To: Anitra Clifton PTA      Thank you for reaching out! I placed a social work referral. I hope this helps - I had not done one of these referrals in a while so hopefully this is available. Thanks again. Boy Sams  ----- Message -----  From: Jorge Aragon PTA  Sent: 7/19/2022   7:27 PM EDT  To: Vianney Ca NP    This patient would benefit from a visit from the MSW for education on any assistance that may be available to the patient i.e. aide for light housework, transportation etc. Due to her Dx of MS, she is having great difficulty with household tasks and she cannot drive or shop for herself. If you agree, please send an order for MSW.      Thank you for this referral !       Radha Solares

## 2022-07-19 NOTE — Clinical Note
This patient would benefit from a visit from the MSW for education on any assistance that may be available to the patient i.e. aide for light housework, transportation etc. Due to her Dx of MS, she is having great difficulty with household tasks and she cannot drive or shop for herself. If you agree, please send an order for MSW.      Thank you for this referral !       Robert Kevin

## 2022-07-19 NOTE — HOME HEALTH
Patient's Subjective: The patient reports that she is tired. She     Falls since last session: No  Pain/Discomfort ? Yes- see pain page  New Meds: No   .  Caregiver involvement/assistance needed for: The patient's caregiver assists with transportation. She could benefit from assistance with chores due to instability and weakness. She is requesting information about help from medicate. Requesting a referral from MD for MSW to visit and discuss any assist available to her. .  Home health supplies by type and quantity ordered/delivered this visit include:  none  . Objective:  See interventions. Patient response to treatment:    Due to high pain levels, Poor+ tolerance to amb and exercises today. Patient level of understanding of education provided:  -The patient has been instructed in the following medicine education:   If there are any medicines/supplements (by MD order or OTC) added or DC 'd let [facility] staff know and have bottles/packages available. It is necessary to keep an accurate record of meds to avoid any medication errors. She reports understanding.   - Educated in the need to maintain adequate water intake and reasoning. She reported understanding.  - Educated in limiting her activity to small intervals of cleaning/chores/rest to avoid exacerbations of MS. She reported understanding.   - Educated in the need to be consistent and compliant with HEP. Do the exercises in small reps, but frequently i.e. 3x/day. If it helps, set an alarm to remind her.   - Re educated in HEP in sitting with fair return demonstration.   -Elevate the legs over the heart. Demonstration of what true elevation for edema control looks like she reports understanding. Assess of progress towards goals:   No new falls to date. Fair return demonstration of exercises.     Continued need for the following skills: Home Health PT is medically necessary to address the following:  pain, decreased ROM, decreased strength, increased edema, impaired bed mobility, decreased Ind with functional transfers, impaired gait, impaired stair negotiation and impaired stability to decrease sx, improve functional Ind, quality of life, reduce the fall risk. Plan for next visit: progress exercises and cont with gait training.     The following discharge was discussed with the patient/caregiver :   Estimated PT DC date 8/1  [facility] needed yes

## 2022-07-21 ENCOUNTER — HOME CARE VISIT (OUTPATIENT)
Dept: HOME HEALTH SERVICES | Facility: HOME HEALTH | Age: 57
End: 2022-07-21
Payer: MEDICARE

## 2022-07-21 ENCOUNTER — HOME CARE VISIT (OUTPATIENT)
Dept: SCHEDULING | Facility: HOME HEALTH | Age: 57
End: 2022-07-21
Payer: MEDICARE

## 2022-07-21 VITALS
RESPIRATION RATE: 16 BRPM | OXYGEN SATURATION: 99 % | TEMPERATURE: 97.8 F | HEART RATE: 95 BPM | SYSTOLIC BLOOD PRESSURE: 110 MMHG | DIASTOLIC BLOOD PRESSURE: 80 MMHG

## 2022-07-21 PROCEDURE — G0157 HHC PT ASSISTANT EA 15: HCPCS

## 2022-07-21 NOTE — HOME HEALTH
Patient's Subjective: The patient recently had her infusion. She reports the infusions help her balance. Falls since last session: No  Pain/Discomfort ? Yes- see pain page  New Meds: No  Upcoming MD appointments: Next Infusion in 6 months  . Caregiver involvement/assistance needed for: The patient's caregiver assists with transportation. She would benefit from assist for light house keeping  due to stability issues/fall risk, weakness. Home health supplies by type and quantity ordered/delivered this visit include:  None  . Objective:  See interventions. Patient response to treatment:   Pt with the need of several seated rest breaks for minimal activity. Patient level of understanding of education provided:  -  Educated on how to break up her exercises through the day to enable her to be able to do it. Recommended between TV shows and on commercials. - She is able to teach back safety with amb and falls. - Re education for standing exercises with return demonstration.  -Recommended she join a support group for MS for emotional needs as well as community resources that may be shared. She is looking into getting a group that meets online. Assess of progress towards goals:   Pt able to return demonstrate standing exercises today, but until now has been inconsistent with her HEP. She reports she will try to be more consistent. Continued need for the following skills: Home Health PT is medically necessary to address the following:  pain,  decreased strength,impaired bed mobility, decreased Ind with functional transfers, impaired gait, impaired stair negotiation and impaired stability to decrease sx, improve functional Ind, quality of life, reduce the fall risk. Plan for next visit: Assess Ind and compliance with HEP. Stairs assess.     The following discharge was discussed with the patient/caregiver :     Estimated PT DC date  8/1  [facility] Yes

## 2022-07-25 ENCOUNTER — HOME CARE VISIT (OUTPATIENT)
Dept: HOME HEALTH SERVICES | Facility: HOME HEALTH | Age: 57
End: 2022-07-25
Payer: MEDICARE

## 2022-07-27 ENCOUNTER — HOME CARE VISIT (OUTPATIENT)
Dept: HOME HEALTH SERVICES | Facility: HOME HEALTH | Age: 57
End: 2022-07-27
Payer: MEDICARE

## 2022-07-28 ENCOUNTER — HOME CARE VISIT (OUTPATIENT)
Dept: HOME HEALTH SERVICES | Facility: HOME HEALTH | Age: 57
End: 2022-07-28
Payer: MEDICARE

## 2022-07-28 ENCOUNTER — TELEPHONE (OUTPATIENT)
Dept: NEUROLOGY | Age: 57
End: 2022-07-28

## 2022-07-29 ENCOUNTER — HOME CARE VISIT (OUTPATIENT)
Dept: SCHEDULING | Facility: HOME HEALTH | Age: 57
End: 2022-07-29
Payer: MEDICARE

## 2022-07-29 VITALS
DIASTOLIC BLOOD PRESSURE: 70 MMHG | SYSTOLIC BLOOD PRESSURE: 110 MMHG | RESPIRATION RATE: 16 BRPM | OXYGEN SATURATION: 97 % | TEMPERATURE: 96.4 F | HEART RATE: 69 BPM

## 2022-07-29 PROCEDURE — G0157 HHC PT ASSISTANT EA 15: HCPCS

## 2022-07-29 NOTE — HOME HEALTH
Patient's Subjective: She reports feeling a little better since last session, but has pinching type sx all over her body. Falls since last session: No  Pain/Discomfort ? Yes- see pain page   New Meds: No  .  Caregiver involvement/assistance needed for: The patient's caregiver assists with some chores and meals. .  Home health supplies by type and quantity ordered/delivered this visit include:  none  . Objective:  See interventions. Patient response to treatment:    Fair tolerance to her exercises and amb the steps. She reports her muscles felt tight after the stairs. Patient level of understanding of education provided  -  -  -    Assessment and Summary of Care:  Patient's current functional status before discharge is as follows  Strength/ROM: NT will need assessed at [place]  Bed Mobility: MOD I  Transfers: all transfers in the home with MOD I. She does not own a vehicle. Gait She amb with step to pattern on some days and slow reciprocal stepping others, depending on how she is feeling. Today she amb slowly with step to pattern with cane and travelling the furniture or wall as she comes upon them. She amb ~ 50 feet x 2, 25 feet x2, 2 turns, SBA. Even though she is home alone, she is still unsafe due to instability, SBA. When in the kitchen she will lean on the counter and spin slowly and then lean on the next object. She does not amb away from objects, so she has added stability. Stairs: 1 flight of steps in the home with 1 rail and SPC. She amb up  slowly, but with reciprocal stepping. Amb down with the cane and rail with step to pattern initially, then she began a reciprocal stepping but losing control as she amb down. Instruction to slow down to avoid falling down the stairs. At the top of the stairs to maintain stability she used her body to lean on the wall at the top of the steps. Special Tests:   TInetti Balance Assessment 7/29/22 15/28 high fall risk continues.   At evaluation: 14/28 high fall risk. Recommendations: Cont with HEP to tolerance. Contact insurance and find a Psychologist for counseling. Highly recommend she locate a local MS support group as they are a knowledgeable of any possible community resources and for peer support. She reports she will consider it. She prefers virtual.    Continued need for the following skills: Home Health PT is medically necessary to address the following:  pain,  decreased strength,impaired bed mobility, decreased Ind with functional transfers, impaired gait, impaired stair negotiation and impaired stability to decrease sx, improve functional Ind, quality of life, reduce the fall risk. Plan for next visit: Reassess and DC from [facility] PT.      The following discharge was discussed with the patient/caregiver :   Estimated PT DC date  8/1  [facility] needed Yes

## 2022-08-01 ENCOUNTER — HOME CARE VISIT (OUTPATIENT)
Dept: HOME HEALTH SERVICES | Facility: HOME HEALTH | Age: 57
End: 2022-08-01
Payer: MEDICARE

## 2022-08-02 ENCOUNTER — HOSPITAL ENCOUNTER (OUTPATIENT)
Dept: NUCLEAR MEDICINE | Age: 57
Discharge: HOME | End: 2022-08-02
Attending: INTERNAL MEDICINE
Payer: MEDICARE

## 2022-08-02 DIAGNOSIS — R13.10 DYSPHAGIA: ICD-10-CM

## 2022-08-02 DIAGNOSIS — R14.0 BLOATING SYMPTOM: ICD-10-CM

## 2022-08-02 DIAGNOSIS — K21.9 ACID REFLUX: ICD-10-CM

## 2022-08-02 PROCEDURE — 78264 GASTRIC EMPTYING IMG STUDY: CPT

## 2022-08-02 RX ORDER — TECHNETIUM TC 99M SULFUR COLLOID 2 MG
1.1 KIT MISCELLANEOUS
Status: COMPLETED | OUTPATIENT
Start: 2022-08-02 | End: 2022-08-02

## 2022-08-02 RX ADMIN — TECHNETIUM TC 99M SULFUR COLLOID 1.1 MILLICURIE: KIT at 11:00

## 2022-08-05 ENCOUNTER — HOME CARE VISIT (OUTPATIENT)
Dept: SCHEDULING | Facility: HOME HEALTH | Age: 57
End: 2022-08-05
Payer: MEDICARE

## 2022-08-05 PROCEDURE — G0151 HHCP-SERV OF PT,EA 15 MIN: HCPCS

## 2022-08-05 NOTE — Clinical Note
Patient was seen for [address] [address]  discharge visit. Pt had education on pain management techniques, graded therapeutic exercises, balance training, bed mobility training, transfers training, HEP ed, gait/steps management training and d/c planning services. Patient has met goals and is ready for DC. Patient able to teach back HEP and medications. No issues noted at discharge. Patient is aware to follow up with PCP and other MD's. Please call #[phone] with any questions. Thank you! Aileen Pisano

## 2022-08-08 VITALS
OXYGEN SATURATION: 98 % | DIASTOLIC BLOOD PRESSURE: 68 MMHG | SYSTOLIC BLOOD PRESSURE: 106 MMHG | HEART RATE: 77 BPM | RESPIRATION RATE: 18 BRPM | TEMPERATURE: 96.9 F

## 2022-08-09 DIAGNOSIS — G35 MS (MULTIPLE SCLEROSIS) (HCC): Primary | ICD-10-CM

## 2022-08-09 NOTE — HOME HEALTH
Patient d/c from PT d/t reached goals and reached max rehab potentials at this time. During this visit, patient presents with the following clinical findings:    SUBJECTIVE: Patient denies fall since evaluation. CAREGIVER ASSISTANCE: No cg present during this visit    MEDICATIONS RECONCILED AND UPDATED: no changes in medications at this time    MMT: presents with :  R hip flex 4/5   R hip Abd 4/5   R hip add 4/5   R hip ext. 4/5  R knee flex 4/5  R knee ext. 4/5    R ankle DF 4/5  L hip flex 4/5  L hip Abd 4/5  L hip ext. 44/5    L hip add 4/5  L knee flex 4/5  L knee ext. 4/5  L ankle DF 4/5    FTSTS: 16 seconds  compared to R hip flex 4-/5   R hip Abd 4-/5   R hip add 4-/5   R knee flex 4-/5  R knee ext. 4-/5   R ankle DF 4-/5  L hip flex 4-/5  L hip Abd 4-/5  L hip add 4-/5  L knee flex 4-/5  L knee ext. 4-/5  L ankle DF 4-/5   FTSTS: 31 seconds during initial evaluation. BALANCE: Patient is moderate risk of falls evidenced by  19/28 on Tinetti balance and gait test and completed TUG test in 19 seconds using SPC compared to 14/28  and 33 seconds using SPC respectively during initial evaluation. BED MOBILITY:  indep in bed mobility without cues    TRANSFERS:  indep in transfers to and from bed, chair, toilet, and car using SPC without cues from touching assistance in safe transfers to and from bed, chair and toilet using SPC with 25% verbal cues for proper hand placements and techniques during initial evaluation. GAIT: Patient is now able to ambulate 150ft with 2 turns indep  on even and uneven surfaces using SPC with  decreased yodit, wide SHILO compared to 50 ft with 2 turns x 2 using SPC requiring supervision/touching. Impairments in gait include forward lean, downward gaze, unequal step length, decreased yodit, wide SHILO, ataxic gait during initial evaluation.     STAIRS: negotiates full flight of steps  with railing indep, non-reciprocal pattern and needed extra time to complete task compared to supervision/touching to ascend and descend steps with railing, non-reciprocal during initial evaluation. ASSESSMENT: Patient has met the goals established at the start of care. Patient d/c to self, family and under supervision of MD. Pt instructed to continue to perform HEP and recommend using AD for safe transfers and ambulation to prevent falls and for energy conservation. Pt verbalized understanding. PATIENT EDUCATION PROVIDED THIS VISIT TO INCLUDE:   FALL PREVENTION TECHNIQUES: monitor medication that may alter mental status,  keeping walking pathways clean and clear of clutter and throw rugs, keeping night lights on for ability to see and easily, good visibility for safe transitioning thresholds and proper use and good compliance of using AD. ENERGY CONSERVATION TECHNIQUES:  rest, slow down, pacing, and complete tasks in manageable steps. PATIENT LEVEL OF UNDERSTANDING OF EDUCATION PROVIDED: Patient verbalized understanding and able to teach back information provided. PATIENT RESPONSE TO PROCEDURE PERFORMED: Patient is indep in functional mobility using SPC but will continue to be a fall risk due to impaired balance and environmental issues such as bedroom and main bathroom located upstairs and absence of cg. Patient/caregiver instructed to contact MD if medical issue arises. Pt/cg verbalized understanding. Care coordination done with PTA regarding POC, progress made and d/c plans.

## 2022-09-09 ENCOUNTER — OFFICE VISIT (OUTPATIENT)
Dept: CARDIOLOGY CLINIC | Age: 57
End: 2022-09-09
Payer: MEDICARE

## 2022-09-09 VITALS
OXYGEN SATURATION: 99 % | DIASTOLIC BLOOD PRESSURE: 70 MMHG | SYSTOLIC BLOOD PRESSURE: 110 MMHG | TEMPERATURE: 98.1 F | BODY MASS INDEX: 28.46 KG/M2 | WEIGHT: 171 LBS | HEART RATE: 78 BPM

## 2022-09-09 DIAGNOSIS — Z91.89 QUIT USING TOBACCO IN REMOTE PAST: ICD-10-CM

## 2022-09-09 DIAGNOSIS — K21.9 GASTROESOPHAGEAL REFLUX DISEASE, UNSPECIFIED WHETHER ESOPHAGITIS PRESENT: ICD-10-CM

## 2022-09-09 DIAGNOSIS — G35 MULTIPLE SCLEROSIS (HCC): ICD-10-CM

## 2022-09-09 DIAGNOSIS — R60.9 EDEMA, UNSPECIFIED TYPE: ICD-10-CM

## 2022-09-09 DIAGNOSIS — R07.9 CHEST PAIN, UNSPECIFIED TYPE: Primary | ICD-10-CM

## 2022-09-09 DIAGNOSIS — R06.02 SOB (SHORTNESS OF BREATH): ICD-10-CM

## 2022-09-09 DIAGNOSIS — R55 SYNCOPE, UNSPECIFIED SYNCOPE TYPE: ICD-10-CM

## 2022-09-09 PROCEDURE — G8419 CALC BMI OUT NRM PARAM NOF/U: HCPCS | Performed by: INTERNAL MEDICINE

## 2022-09-09 PROCEDURE — G8510 SCR DEP NEG, NO PLAN REQD: HCPCS | Performed by: INTERNAL MEDICINE

## 2022-09-09 PROCEDURE — G9899 SCRN MAM PERF RSLTS DOC: HCPCS | Performed by: INTERNAL MEDICINE

## 2022-09-09 PROCEDURE — 99204 OFFICE O/P NEW MOD 45 MIN: CPT | Performed by: INTERNAL MEDICINE

## 2022-09-09 PROCEDURE — G8427 DOCREV CUR MEDS BY ELIG CLIN: HCPCS | Performed by: INTERNAL MEDICINE

## 2022-09-09 PROCEDURE — 3017F COLORECTAL CA SCREEN DOC REV: CPT | Performed by: INTERNAL MEDICINE

## 2022-09-09 RX ORDER — DOXYCYCLINE HYCLATE 50 MG/1
CAPSULE ORAL
COMMUNITY
Start: 2022-09-08

## 2022-09-09 NOTE — PROGRESS NOTES
Identified pt with two pt identifiers(name and ). Reviewed record in preparation for visit and have obtained necessary documentation. Oziel Guadalupe presents today for   Chief Complaint   Patient presents with    New Patient       Pt c/o SOB, CHEST PAIN/ PRESSURE. Oziel Guadalupe preferred language for health care discussion is english/other. Personal Protective Equipment:   Personal Protective Equipment was used including: mask-surgical and hands-gloves. Patient was placed on no precaution(s). Patient was masked. Precautions:   Patient currently on None  Patient currently roomed with door closed. Is someone accompanying this pt? no    Is the patient using any DME equipment during [address]?  Yes, cane    Depression Screening:  3 most recent PHQ Screens 2022   PHQ Not Done -   Little interest or pleasure in doing things Not at all   Feeling down, depressed, irritable, or hopeless Not at all   Total Score PHQ 2 0   Trouble falling or staying asleep, or sleeping too much -   Feeling tired or having little energy -   Poor appetite, weight loss, or overeating -   Feeling bad about yourself - or that you are a failure or have let yourself or your family down -   Trouble concentrating on things such as school, work, reading, or watching TV -   Moving or speaking so slowly that other people could have noticed; or the opposite being so fidgety that others notice -   Thoughts of being better off dead, or hurting yourself in some way -   PHQ 9 Score -   How difficult have these problems made it for you to do your work, take care of your home and get along with others -       Learning Assessment:  Learning Assessment 6/15/2018   PRIMARY LEARNER Patient   CO-LEARNER CAREGIVER -   PRIMARY LANGUAGE ENGLISH   LEARNER PREFERENCE PRIMARY DEMONSTRATION   ANSWERED BY patient   RELATIONSHIP SELF       Abuse Screening:  Abuse Screening Questionnaire 2022   Do you ever feel afraid of your partner? N   Are you in a relationship with someone who physically or mentally threatens you? N   Is it safe for you to go home? Y       Fall Risk  Fall Risk Assessment, last 12 mths 2/11/2022   Able to walk? Yes   Fall in past 12 months? 1   Do you feel unsteady? 0   Are you worried about falling 0   Is TUG test greater than 12 seconds? 0   Is the gait abnormal? 0   Number of falls in past 12 months 1   Fall with injury? 1       Pt currently taking Anticoagulant therapy? no  Pt currently taking Antiplatelet therapy? no    Coordination of Care:  1. Have you been to the ER, urgent care clinic since your last visit? Hospitalized since your last visit? 6/2/22 GI procedure at [facility]. 2. Have you seen or consulted any other health care providers outside of the [address] [place] [address] since your last visit? Include any pap smears or colon screening. no      Please see Red banners under Allergies and Med Rec to remove outside inquires. All correct information has been verified with patient and added to chart.      Medication's patient's would liked removed has been marked not taking to be removed per Verbal order and read back per Mira Iyer MD

## 2022-09-09 NOTE — PATIENT INSTRUCTIONS
Testing   Echo- at Dr. Erika Asencio office    [employer]-will be calling you to confirm your address to send your Heart Monitor. Please allow 7-10 business days for monitor to arrive at your home.   Customer Service for [employer]:  [phone]    **call office 3-5 days after testing is completed for results**

## 2022-09-16 ENCOUNTER — TELEPHONE (OUTPATIENT)
Dept: NEUROLOGY | Age: 57
End: 2022-09-16

## 2022-09-16 DIAGNOSIS — G35 MS (MULTIPLE SCLEROSIS) (HCC): Primary | ICD-10-CM

## 2022-09-16 DIAGNOSIS — R41.3 MEMORY DEFICIT: ICD-10-CM

## 2022-09-16 DIAGNOSIS — R41.9 COGNITIVE COMPLAINTS: ICD-10-CM

## 2022-09-24 PROBLEM — Z91.89 QUIT USING TOBACCO IN REMOTE PAST: Status: ACTIVE | Noted: 2022-09-24

## 2022-09-24 PROBLEM — K21.9 GERD (GASTROESOPHAGEAL REFLUX DISEASE): Status: ACTIVE | Noted: 2022-09-24

## 2022-09-24 PROBLEM — R55 SYNCOPE: Status: ACTIVE | Noted: 2022-09-24

## 2022-09-24 NOTE — PROGRESS NOTES
Subjective:      Portia Ferrer is in the office today for cardiac evaluation. She is a 49-year-old woman that has history of multiple sclerosis diagnosed in either 2019 or 2020. The patient is in the office for cardiac evaluation primarily related to her history of syncopal episodes. The patient believes she has had 3 or 4 syncopal episodes. She reports no presyncopal warning. She had 1 episode while lying down on an air mattress and believes she was \"down\" for an hour. Her last episode of syncope occurred this year. She  did sustain a mild injury to her knees. She is unclear as to whether or not she gains consciousness quickly. She reports occasional palpitations which she says usually accompanying her dizziness. She has had no chest pain. She had no PND or orthopnea. She had mild swelling in her ankles. Patient's cardiac risk factors are remote smoking/ tobacco exposure, \"borderline \"diabetes mellitus. Patient Active Problem List    Diagnosis Date Noted    Quit using tobacco in remote past 09/24/2022    Syncope 09/24/2022    GERD (gastroesophageal reflux disease) 09/24/2022    [facility] sclerosis ([facility] 07/29/2019    Chronic left-sided low back pain with left-sided sciatica 09/26/2018    Spells of decreased attentiveness 09/26/2018    Unsteady gait 09/26/2018    Perennial allergic rhinitis 09/26/2018    Paranoid schizophrenia ([facility] 06/15/2018    Diffuse cystic mastopathy 12/10/2015    Rectal bleeding 11/11/2014    Chronic pain 11/11/2014    Microscopic hematuria      Current Outpatient Medications   Medication Sig Dispense Refill    doxycycline (VIBRAMYCIN) 50 mg capsule       ocrelizumab (OCREVUS IV) by IntraVENous route every 6 months.      loratadine (Claritin) 10 mg tablet Take 1 Tablet by mouth daily. 30 Tablet 1    nabumetone (RELAFEN) 500 mg tablet TAKE 1 TABLET BY MOUTH TWICE DAILY 60 Tablet 2    baclofen 5 mg tab Take 5 mg by mouth three (3) times daily.  90 Tablet 6    fluticasone propionate (FLONASE) 50 mcg/actuation nasal spray 2 Sprays by Both Nostrils route daily. Indications: inflammation of the nose due to an allergy 1 Each 1    desonide (TRIDESILON) 0.05 % cream       Dupixent Pen 300 mg/2 mL pnij Once every 2 weeks. Indications: a type of allergy that causes red and itchy skin called atopic dermatitis      ketoconazole (NIZORAL) 2 % shampoo       polyethylene glycol (MIRALAX) 17 gram/dose powder Take 17 g by mouth daily. loteprednol etabonate 0.5 % drpg Apply 1 Drop to eye three (3) times daily. on both eyes      triamcinolone acetonide (KENALOG) 0.1 % ointment Apply  to affected area two (2) times a day. chest and upper extremities,for excema 1 Tube 1    alclometasone (ACLOVATE) 0.05 % ointment Apply 0.05 % to affected area two (2) times daily as needed for Skin Irritation. topical      Walker [employer] wheel walker 1 Each 0    betamethasone valerate (VALISONE) 0.1 % topical lotion Apply 1 Applicator to affected area as needed for PRN Reason (Other) (Skin irritation). topical With hair washing      Omeprazole delayed release (PRILOSEC D/R) 20 mg tablet Take 20 mg by mouth daily. Shower Chair XX jozef As needed for patient safety 1 Each 0    Bedside Commode XX Please provide a commode for patient safety 1 Each 0    OTHER Please provide a tub transfer bench 1 Each 0    biotin (VITAMIN B7) 5 mg tablet Take 1,000 mcg by mouth daily. Comp Stocking,Knee,Long,Medium misc Wear daily while walking to prevent swelling 1 Each 0    biotin 1,000 mcg chew Take 1,000 mcg by mouth daily.  (Patient not taking: No sig reported)       Allergies   Allergen Reactions    Naproxen Shortness of Breath    Shellfish Derived Nausea and Vomiting     SEVERE NAUSEA AND VOMITING    Amoxicillin Rash and Nausea Only     Past Medical History:   Diagnosis Date    Chest pain 11/2014    neg EKG, non recurrent    Chronic pain     lower back and legs    Depression     Eczema     Fibroids     GERD (gastroesophageal reflux disease)     H/O seasonal allergies     Headache(784.0)     Hiatal hernia     IBS (irritable bowel syndrome)     Microscopic hematuria     MS (multiple sclerosis) (HCC)     Rectal bleeding     Stool color black     irritable bowel     Past Surgical History:   Procedure Laterality Date    COLONOSCOPY,DIAGNOSTIC      HX BREAST BIOPSY Right 2015    RIGHT BREAST BIOPSY WITH NEEDLE LOCALIZATION ULTRASOUND performed by Anna Mandujano MD at [facility] MAIN OR    HX CHOLECYSTECTOMY      HX GI      HX HYSTERECTOMY      HX TUBAL LIGATION       Family History   Problem Relation Age of Onset    HIV/AIDS Brother     Diabetes Father     Cancer Brother     Hypertension Sister     Diabetes Brother     Hypertension Sister     Hypertension Sister     Hypertension Brother      Social History     Tobacco Use   Smoking Status Former    Packs/day: 0.00    Types: Cigarettes    Quit date: 2016    Years since quittin.2   Smokeless Tobacco Former    Quit date: 2016          Review of Systems, additional:  Constitutional: negative  Eyes: negative  Respiratory: positive for dyspnea on exertion  Cardiovascular: positive for dyspnea, palpitations, syncope, fatigue, lower extremity edema  Gastrointestinal: negative  Musculoskeletal:positive for myalgias, arthralgias, muscle weakness, and bone pain  Neurological: negative  Behvioral/Psych: negative  Endocrine: negative  ENT: negative    Objective:   Visit Vitals  /70   Pulse 78   Temp 98.1 °F (36.7 °C)   Wt 77.6 kg (171 lb)   LMP  (LMP Unknown)   SpO2 99%   BMI 28.46 kg/m²     General:  alert, cooperative, no distress   Chest Wall: inspection normal - no chest wall deformities or tenderness, respiratory effort normal   Lung: clear to auscultation bilaterally   Heart:  normal rate and regular rhythm, S1 and S2 normal, no murmurs noted, no gallops noted, no JVD   Abdomen: soft, non-tender.  Bowel sounds normal. No masses,  no organomegaly   Extremities: extremities normal, atraumatic, no cyanosis or edema Skin: no rashes   Neuro: alert, oriented, normal speech, no focal findings or movement disorder noted     [id number]. Sinus rhythm. Normal.    Assessment/Plan:       ICD-10-CM ICD-9-CM    1. Syncope, unspecified syncope type, will order 48-hour Holter monitor and echocardiogram.  Return in 4 to 5 weeks. G35 340 ECHO ADULT COMPLETE      CARDIAC HOLTER MONITOR      2. SOB (shortness of breath)  R06.02 786.05 ECHO ADULT COMPLETE      CARDIAC HOLTER MONITOR      3.  Edema, unspecified type  R60.9 782.3 ECHO ADULT COMPLETE      CARDIAC HOLTER MONITOR                4 Quit using tobacco in remote past  Z91.89 V15.89                 5. Gastroesophageal reflux disease, unspecified whether esophagitis present  K21.9 530.81

## 2022-10-20 ENCOUNTER — OFFICE VISIT (OUTPATIENT)
Dept: FAMILY MEDICINE CLINIC | Age: 57
End: 2022-10-20
Payer: MEDICARE

## 2022-10-20 VITALS
RESPIRATION RATE: 17 BRPM | SYSTOLIC BLOOD PRESSURE: 107 MMHG | HEART RATE: 67 BPM | HEIGHT: 65 IN | DIASTOLIC BLOOD PRESSURE: 77 MMHG | BODY MASS INDEX: 29.16 KG/M2 | WEIGHT: 175 LBS | OXYGEN SATURATION: 96 %

## 2022-10-20 DIAGNOSIS — K59.09 CHRONIC CONSTIPATION: ICD-10-CM

## 2022-10-20 DIAGNOSIS — G35 MULTIPLE SCLEROSIS (HCC): ICD-10-CM

## 2022-10-20 DIAGNOSIS — Z91.89 QUIT USING TOBACCO IN REMOTE PAST: ICD-10-CM

## 2022-10-20 DIAGNOSIS — R06.02 SHORTNESS OF BREATH: ICD-10-CM

## 2022-10-20 DIAGNOSIS — R26.81 UNSTEADY GAIT WHEN WALKING: Primary | ICD-10-CM

## 2022-10-20 PROCEDURE — 99214 OFFICE O/P EST MOD 30 MIN: CPT | Performed by: STUDENT IN AN ORGANIZED HEALTH CARE EDUCATION/TRAINING PROGRAM

## 2022-10-20 PROCEDURE — G8427 DOCREV CUR MEDS BY ELIG CLIN: HCPCS | Performed by: STUDENT IN AN ORGANIZED HEALTH CARE EDUCATION/TRAINING PROGRAM

## 2022-10-20 PROCEDURE — 3017F COLORECTAL CA SCREEN DOC REV: CPT | Performed by: STUDENT IN AN ORGANIZED HEALTH CARE EDUCATION/TRAINING PROGRAM

## 2022-10-20 PROCEDURE — G8419 CALC BMI OUT NRM PARAM NOF/U: HCPCS | Performed by: STUDENT IN AN ORGANIZED HEALTH CARE EDUCATION/TRAINING PROGRAM

## 2022-10-20 PROCEDURE — G9899 SCRN MAM PERF RSLTS DOC: HCPCS | Performed by: STUDENT IN AN ORGANIZED HEALTH CARE EDUCATION/TRAINING PROGRAM

## 2022-10-20 PROCEDURE — G8511 SCR DEP POS, NO PLAN DOC RNG: HCPCS | Performed by: STUDENT IN AN ORGANIZED HEALTH CARE EDUCATION/TRAINING PROGRAM

## 2022-10-20 RX ORDER — ALBUTEROL SULFATE 90 UG/1
1 AEROSOL, METERED RESPIRATORY (INHALATION)
Qty: 18 G | Refills: 2 | Status: SHIPPED | OUTPATIENT
Start: 2022-10-20

## 2022-10-20 NOTE — PROGRESS NOTES
Valentino Guzman is a 60 y.o. female presenting today for Follow Up Chronic Condition (Pt complains of pain in abdomen and bloating. Pt balance is unsteady and almost fell before appt. Become SOB when talking. Bilateral leg swelling, had injection 2 months prior with no relief. FU with Cardio on 11/30. )  . Chief Complaint   Patient presents with    Follow Up Chronic Condition     Pt complains of pain in abdomen and bloating. Pt balance is unsteady and almost fell before appt. Become SOB when talking. Bilateral leg swelling, had injection 2 months prior with no relief. FU with Cardio on 11/30. HPI:  Valentino Guzman presents to the office today for follow up. Patient has a past medical history of multiple sclerosis, arthritis, chronic constipation with IBS. Patient has multiple chronic complaints-generalized body aches, abdominal pain. Patient has history of osteoarthritis-she is taking NSAIDs. Patient was advised to try Cymbalta in the past by neurology-however patient refused due to concern of side effects. Chronic constipation with IBS: She has previously tried Linzess, admits but failed due to diarrhea. Did not like lactulose. Failed Dulcolax, Fleet enemas, Metamucil due to lack of improvement. She is following with GI. She is now taking Miralax with mild improvement. She always feels bloated. Last BM was yesterday. Multiple sclerosis: Patient is following with neurology. She is undergoing Ocrevus infusions. Recently has been reporting gait instability, palpitations. States she felt unbalanced and nearly had a fall last week. She was referred to cardiology and home health for PT. She has been reporting episodes of dizziness/syncope along with LE swelling. She saw cardiology in 9/22. Echo was ordered along with a cardiac Holter monitor. Patient has been complaining of dysphagia.   She had an EGD recently which showed normal mucosa in the stomach and duodenum. She was found to have nonobstructive dysphagia and underwent dilation. Modified barium swallow showed normal oropharyngeal function. Ex-smoker: She used to smoke 0.5 ppd for 25 years. She reports dyspnea on exertion and occassional wheezing. She intermittently feels SOB. Concerned she may have COPD. Health care maintenance:  Mammogram in 2/22 was BI-RADS 1  Colonoscopy in 2015-repeat in 10 years recommended. Review of Systems   Constitutional:  Negative for chills, diaphoresis, fever, malaise/fatigue and weight loss. HENT:  Negative for congestion, ear discharge, ear pain, hearing loss, nosebleeds, sinus pain, sore throat and tinnitus. Eyes:  Negative for blurred vision, double vision and photophobia. Respiratory:  Negative for cough, sputum production, shortness of breath, wheezing and stridor. Cardiovascular:  Positive for leg swelling. Negative for chest pain, palpitations, orthopnea and claudication. Gastrointestinal:  Positive for abdominal pain, constipation and nausea. Negative for diarrhea, heartburn and vomiting. Genitourinary:  Positive for dysuria and frequency. Negative for flank pain, hematuria and urgency. Musculoskeletal:  Positive for back pain, joint pain, myalgias and neck pain. Skin:  Positive for itching and rash. Neurological:  Positive for sensory change, weakness and headaches. Negative for tingling, tremors, speech change, focal weakness and seizures. Psychiatric/Behavioral:  Negative for depression. The patient is not nervous/anxious. All other systems reviewed and are negative.     Allergies   Allergen Reactions    Naproxen Shortness of Breath    Shellfish Derived Nausea and Vomiting     SEVERE NAUSEA AND VOMITING    Amoxicillin Rash and Nausea Only       PHQ Screening   3 most recent PHQ Screens 10/20/2022   PHQ Not Done -   Little interest or pleasure in doing things Nearly every day   Feeling down, depressed, irritable, or hopeless Nearly every day   Total Score PHQ 2 6   Trouble falling or staying asleep, or sleeping too much Several days   Feeling tired or having little energy Several days   Poor appetite, weight loss, or overeating Several days   Feeling bad about yourself - or that you are a failure or have let yourself or your family down Several days   Trouble concentrating on things such as school, work, reading, or watching TV Several days   Moving or speaking so slowly that other people could have noticed; or the opposite being so fidgety that others notice Not at all   Thoughts of being better off dead, or hurting yourself in some way Not at all   PHQ 9 Score 11   How difficult have these problems made it for you to do your work, take care of your home and get along with others Somewhat difficult       History  Past Medical History:   Diagnosis Date    Chest pain 2014    neg EKG, non recurrent    Chronic pain     lower back and legs    Depression     Eczema     Fibroids     GERD (gastroesophageal reflux disease)     H/O seasonal allergies     Headache(784.0)     Hiatal hernia     IBS (irritable bowel syndrome)     Microscopic hematuria     MS (multiple sclerosis) ([facility]     Rectal bleeding     Stool color black     irritable bowel       Past Surgical History:   Procedure Laterality Date    COLONOSCOPY,DIAGNOSTIC      HX BREAST BIOPSY Right 2015    RIGHT BREAST BIOPSY WITH NEEDLE LOCALIZATION ULTRASOUND performed by Garrison Sexton MD at [facility] MAIN OR    HX CHOLECYSTECTOMY      HX GI      HX HYSTERECTOMY      HX TUBAL LIGATION         Social History     Socioeconomic History    Marital status: SINGLE     Spouse name: Not on file    Number of children: Not on file    Years of education: Not on file    Highest education level: Not on file   Occupational History    Not on file   Tobacco Use    Smoking status: Former     Packs/day: 0.00     Types: Cigarettes     Quit date: 2016     Years since quittin.3    Smokeless tobacco: Former Quit date: 02/2016   Vaping Use    Vaping Use: Never used   Substance and Sexual Activity    Alcohol use: No     Alcohol/week: 10.0 standard drinks     Types: 10 Cans of beer per week    Drug use: No    Sexual activity: Yes     Partners: Male   Other Topics Concern    Not on file   Social History Narrative    Not on file     Social Determinants of Health     Financial Resource Strain: Not on file   Food Insecurity: Not on file   Transportation Needs: Not on file   Physical Activity: Not on file   Stress: Not on file   Social Connections: Not on file   Intimate Partner Violence: Not on file   Housing Stability: Not on file       Current Outpatient Medications   Medication Sig Dispense Refill    albuterol (PROVENTIL HFA, VENTOLIN HFA, PROAIR HFA) 90 mcg/actuation inhaler Take 1 Puff by inhalation every four (4) hours as needed for Wheezing or Shortness of Breath. 18 g 2    ocrelizumab (OCREVUS IV) by IntraVENous route every 6 months.      loratadine (Claritin) 10 mg tablet Take 1 Tablet by mouth daily. 30 Tablet 1    nabumetone (RELAFEN) 500 mg tablet TAKE 1 TABLET BY MOUTH TWICE DAILY 60 Tablet 2    baclofen 5 mg tab Take 5 mg by mouth three (3) times daily. 90 Tablet 6    fluticasone propionate (FLONASE) 50 mcg/actuation nasal spray 2 Sprays by Both Nostrils route daily. Indications: inflammation of the nose due to an allergy 1 Each 1    desonide (TRIDESILON) 0.05 % cream       Dupixent Pen 300 mg/2 mL pnij Once every 2 weeks. Indications: a type of allergy that causes red and itchy skin called atopic dermatitis      ketoconazole (NIZORAL) 2 % shampoo       polyethylene glycol (MIRALAX) 17 gram/dose powder Take 17 g by mouth daily. loteprednol etabonate 0.5 % drpg Apply 1 Drop to eye three (3) times daily. on both eyes      triamcinolone acetonide (KENALOG) 0.1 % ointment Apply  to affected area two (2) times a day.  chest and upper extremities,for excema 1 Tube 1    Walker [employer] wheel walker 1 Each 0 betamethasone valerate (VALISONE) 0.1 % topical lotion Apply 1 Applicator to affected area as needed for PRN Reason (Other) (Skin irritation). topical With hair washing      Omeprazole delayed release (PRILOSEC D/R) 20 mg tablet Take 20 mg by mouth daily. Shower Chair XX jozef As needed for patient safety 1 Each 0    OTHER Please provide a tub transfer bench 1 Each 0    biotin (VITAMIN B7) 5 mg tablet Take 1,000 mcg by mouth daily. Comp Stocking,Knee,Long,Medium misc Wear daily while walking to prevent swelling 1 Each 0    doxycycline (VIBRAMYCIN) 50 mg capsule  (Patient not taking: Reported on 10/20/2022)      alclometasone (ACLOVATE) 0.05 % ointment Apply 0.05 % to affected area two (2) times daily as needed for Skin Irritation. topical (Patient not taking: Reported on 10/20/2022)      biotin 1,000 mcg chew Take 1,000 mcg by mouth daily. (Patient not taking: Reported on 10/20/2022)      Bedside Commode XX Please provide a commode for patient safety (Patient not taking: Reported on 10/20/2022) 1 Each 0         Vitals:    10/20/22 1138   BP: 107/77   Pulse: 67   Resp: 17   SpO2: 96%   Weight: 175 lb (79.4 kg)   Height: 5' 5\" (1.651 m)   PainSc:   0 - No pain       Physical Exam  Vitals and nursing note reviewed. Constitutional:       General: She is not in acute distress. Appearance: Normal appearance. She is not ill-appearing, toxic-appearing or diaphoretic. HENT:      Head: Normocephalic and atraumatic. Cardiovascular:      Rate and Rhythm: Normal rate and regular rhythm. Pulses: Normal pulses. Heart sounds: No murmur heard. Pulmonary:      Effort: Pulmonary effort is normal. No respiratory distress. Breath sounds: Normal breath sounds. No wheezing. Abdominal:      General: Bowel sounds are normal. There is no distension. Palpations: Abdomen is soft. Tenderness: There is abdominal tenderness. There is no guarding.       Comments: Generalized abd tenderness Musculoskeletal:      Cervical back: Normal range of motion. Right lower leg: No edema. Left lower leg: No edema. Comments: No swelling seen      Skin:     General: Skin is warm and dry. Coloration: Skin is not jaundiced or pale. Findings: Rash present. Comments: Rash at chest   Neurological:      General: No focal deficit present. Mental Status: She is alert and oriented to person, place, and time. Mental status is at baseline. Cranial Nerves: No cranial nerve deficit. Motor: Weakness present. Gait: Gait abnormal.      Comments: Walking with a cane   Psychiatric:         Behavior: Behavior normal.         Thought Content: Thought content normal.         Judgment: Judgment normal.      Comments: Low mood       No visits with results within 3 Month(s) from this visit. Latest known visit with results is:   Hospital Outpatient Visit on 07/18/2022   Component Date Value Ref Range Status    WBC 07/18/2022 4.7  4.6 - 13.2 K/uL Final    RBC 07/18/2022 4.50  4. 20 - 5.30 M/uL Final    HGB 07/18/2022 12.5  12.0 - 16.0 g/dL Final    HCT 07/18/2022 38.6  35.0 - 45.0 % Final    MCV 07/18/2022 85.8  78.0 - 100.0 FL Final    MCH 07/18/2022 27.8  24.0 - 34.0 PG Final    MCHC 07/18/2022 32.4  31.0 - 37.0 g/dL Final    RDW 07/18/2022 14.7 (A)  11.6 - 14.5 % Final    PLATELET 79/58/9795 744  135 - 420 K/uL Final    MPV 07/18/2022 11.3  9.2 - 11.8 FL Final    NRBC 07/18/2022 0.0  0  WBC Final    ABSOLUTE NRBC 07/18/2022 0.00  0.00 - 0.01 K/uL Final    NEUTROPHILS 07/18/2022 37 (A)  40 - 73 % Final    LYMPHOCYTES 07/18/2022 49  21 - 52 % Final    MONOCYTES 07/18/2022 10  3 - 10 % Final    EOSINOPHILS 07/18/2022 3  0 - 5 % Final    BASOPHILS 07/18/2022 1  0 - 2 % Final    IMMATURE GRANULOCYTES 07/18/2022 0  0.0 - 0.5 % Final    ABS. NEUTROPHILS 07/18/2022 1.8  1.8 - 8.0 K/UL Final    ABS. LYMPHOCYTES 07/18/2022 2.3  0.9 - 3.6 K/UL Final    ABS.  MONOCYTES 07/18/2022 0.5  0.05 - 1.2 K/UL Final    ABS. EOSINOPHILS 07/18/2022 0.1  0.0 - 0.4 K/UL Final    ABS. BASOPHILS 07/18/2022 0.0  0.0 - 0.1 K/UL Final    ABS. IMM. GRANS. 07/18/2022 0.0  0.00 - 0.04 K/UL Final    DF 07/18/2022 AUTOMATED    Final    Sodium 07/18/2022 141  136 - 145 mmol/L Final    Potassium 07/18/2022 3.9  3.5 - 5.5 mmol/L Final    Chloride 07/18/2022 107  100 - 111 mmol/L Final    CO2 07/18/2022 28  21 - 32 mmol/L Final    Anion gap 07/18/2022 6  3.0 - 18 mmol/L Final    Glucose 07/18/2022 79  74 - 99 mg/dL Final    BUN 07/18/2022 14  7.0 - 18 MG/DL Final    Creatinine 07/18/2022 0.69  0.6 - 1.3 MG/DL Final    BUN/Creatinine ratio 07/18/2022 20  12 - 20   Final    GFR est AA 07/18/2022 >60  >60 ml/min/1.73m2 Final    GFR est non-AA 07/18/2022 >60  >60 ml/min/1.73m2 Final    Comment: (NOTE)  Estimated GFR is calculated using the Modification of Diet in Renal   Disease (MDRD) Study equation, reported for both  Americans   (GFRAA) and non- Americans (GFRNA), and normalized to 1.73m2   body surface area. The physician must decide which value applies to   the patient. The MDRD study equation should only be used in   individuals age 25 or older. It has not been validated for the   following: pregnant women, patients with serious comorbid conditions,   or on certain medications, or persons with extremes of body size,   muscle mass, or nutritional status. Calcium 07/18/2022 9.2  8.5 - 10.1 MG/DL Final    Bilirubin, total 07/18/2022 0.3  0.2 - 1.0 MG/DL Final    ALT (SGPT) 07/18/2022 23  13 - 56 U/L Final    AST (SGOT) 07/18/2022 15  10 - 38 U/L Final    Alk. phosphatase 07/18/2022 116  45 - 117 U/L Final    Protein, total 07/18/2022 7.0  6.4 - 8.2 g/dL Final    Albumin 07/18/2022 3.9  3.4 - 5.0 g/dL Final    Globulin 07/18/2022 3.1  2.0 - 4.0 g/dL Final    A-G Ratio 07/18/2022 1.3  0.8 - 1.7   Final       No results found for any visits on 10/20/22.     Patient Care Team:  Patient Care Team:  Elizabeth Cardenas MD as PCP - General (Internal Medicine Physician)  Geni Zamudio MD as PCP - [facility] Empaneled Provider  Angel Salomon, [id number] as Physician Assistant (Physician Assistant)  Hebr Montoya MD (Obstetrics & Gynecology)  Lorna Gabriel, [address] (Physician Assistant)  Celia Gibbons MD (Physical Medicine and Rehabilitation Physician)  Meredith Lorenzo NP (Neurology)      Assessment / Plan:      ICD-10-CM ICD-9-CM    1. Unsteady gait when walking  R26.81 781.2 VITAMIN D, 25 HYDROXY      VITAMIN B12 & FOLATE      METABOLIC PANEL, COMPREHENSIVE      METABOLIC PANEL, COMPREHENSIVE      VITAMIN B12 & FOLATE      VITAMIN D, 25 HYDROXY      2. Multiple sclerosis (HCC)  G35 340       3. Shortness of breath  R06.02 786.05 REFERRAL TO PULMONARY DISEASE      albuterol (PROVENTIL HFA, VENTOLIN HFA, PROAIR HFA) 90 mcg/actuation inhaler      4. Chronic constipation  K59.09 564.00       5. Quit using tobacco in remote past  Z91.89 V15.89         Multiple sclerosis-patient is following with neurology. Reports episodes of gait instability. Likely due to the MS. Will check vitamin B12/folate and vitamin D levels. Dermatitis-patient is following with dermatology  Dysphagia-following with GI. She continues to have chronic constipation which was not relieved despite trying multiple different agents. Has a follow-up appointment scheduled with GI this coming week  Generalized musculoskeletal pain: She wants to continue taking NSAIDs. I discussed in detail the side effects of prolonged NSAID use. Shortness of breath: Will refer to pulmonology. Albuterol inhaler to use as needed prescribed. Episodes of dizziness/syncope: Echo is pending cardiac Holter monitor has been ordered and is pending. She has a follow-up appointment with cardiology in November. Patient has difficulty with ADLs such as bathing and grooming herself, grocery shopping etc - requesting a home health aide.            I asked the patient if she  had any questions and answered her  questions. The patient stated that she understands the treatment plan and agrees with the treatment plan    This document was created with a voice activated dictation system and may contain transcription errors.

## 2022-10-21 LAB
25(OH)D3+25(OH)D2 SERPL-MCNC: 14.3 NG/ML (ref 30–100)
ALBUMIN SERPL-MCNC: 4.5 G/DL (ref 3.8–4.9)
ALBUMIN/GLOB SERPL: 2.4 {RATIO} (ref 1.2–2.2)
ALP SERPL-CCNC: 109 IU/L (ref 44–121)
ALT SERPL-CCNC: 13 IU/L (ref 0–32)
AST SERPL-CCNC: 14 IU/L (ref 0–40)
BILIRUB SERPL-MCNC: <0.2 MG/DL (ref 0–1.2)
BUN SERPL-MCNC: 12 MG/DL (ref 6–24)
BUN/CREAT SERPL: 16 (ref 9–23)
CALCIUM SERPL-MCNC: 9.3 MG/DL (ref 8.7–10.2)
CHLORIDE SERPL-SCNC: 105 MMOL/L (ref 96–106)
CO2 SERPL-SCNC: 22 MMOL/L (ref 20–29)
CREAT SERPL-MCNC: 0.74 MG/DL (ref 0.57–1)
EGFR: 94 ML/MIN/1.73
FOLATE SERPL-MCNC: 10.1 NG/ML
GLOBULIN SER CALC-MCNC: 1.9 G/DL (ref 1.5–4.5)
GLUCOSE SERPL-MCNC: 90 MG/DL (ref 70–99)
POTASSIUM SERPL-SCNC: 4.1 MMOL/L (ref 3.5–5.2)
PROT SERPL-MCNC: 6.4 G/DL (ref 6–8.5)
SODIUM SERPL-SCNC: 143 MMOL/L (ref 134–144)
VIT B12 SERPL-MCNC: 813 PG/ML (ref 232–1245)

## 2022-10-27 ENCOUNTER — TELEPHONE (OUTPATIENT)
Dept: FAMILY MEDICINE CLINIC | Age: 57
End: 2022-10-27

## 2022-10-27 DIAGNOSIS — E55.9 VITAMIN D DEFICIENCY: Primary | ICD-10-CM

## 2022-10-27 RX ORDER — ERGOCALCIFEROL 1.25 MG/1
50000 CAPSULE ORAL
Qty: 8 CAPSULE | Refills: 0 | Status: SHIPPED | OUTPATIENT
Start: 2022-10-27

## 2022-10-27 NOTE — TELEPHONE ENCOUNTER
Please inform patient that her vitamin D level was low. I have sent over a prescription that she has to take once a week.

## 2022-10-28 ENCOUNTER — TELEPHONE (OUTPATIENT)
Dept: NEUROLOGY | Age: 57
End: 2022-10-28

## 2022-10-28 DIAGNOSIS — G35 MS (MULTIPLE SCLEROSIS) (HCC): ICD-10-CM

## 2022-10-28 NOTE — TELEPHONE ENCOUNTER
pt returned call. notified pt of low vitamin D level and advised to take medication sent once a week. pt stated that she understood and did not have any further concerns.

## 2022-10-31 RX ORDER — BACLOFEN 5 MG/1
5 TABLET ORAL 3 TIMES DAILY
Qty: 90 TABLET | Refills: 6 | Status: SHIPPED | OUTPATIENT
Start: 2022-10-31 | End: 2022-11-16 | Stop reason: SDUPTHER

## 2022-11-14 ENCOUNTER — TELEPHONE (OUTPATIENT)
Dept: FAMILY MEDICINE CLINIC | Age: 57
End: 2022-11-14

## 2022-11-14 NOTE — TELEPHONE ENCOUNTER
Patient states she took the Vitamin D every day, not realizing they were once daily. She is out of medication. Please advise.

## 2022-11-15 NOTE — TELEPHONE ENCOUNTER
Please inform patient that I will provide no further refills till we check a repeat level in a few weeks.

## 2022-11-16 ENCOUNTER — TELEPHONE (OUTPATIENT)
Dept: FAMILY MEDICINE CLINIC | Age: 57
End: 2022-11-16

## 2022-11-16 ENCOUNTER — OFFICE VISIT (OUTPATIENT)
Dept: NEUROLOGY | Age: 57
End: 2022-11-16

## 2022-11-16 VITALS
BODY MASS INDEX: 29.71 KG/M2 | SYSTOLIC BLOOD PRESSURE: 120 MMHG | HEIGHT: 64 IN | WEIGHT: 174 LBS | DIASTOLIC BLOOD PRESSURE: 64 MMHG | OXYGEN SATURATION: 98 % | HEART RATE: 69 BPM | RESPIRATION RATE: 18 BRPM

## 2022-11-16 DIAGNOSIS — E55.9 VITAMIN D DEFICIENCY: Primary | ICD-10-CM

## 2022-11-16 DIAGNOSIS — E55.9 VITAMIN D DEFICIENCY: ICD-10-CM
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
## 2022-11-16 DIAGNOSIS — R26.89 IMPAIRED GAIT AND MOBILITY: ICD-10-CM

## 2022-11-16 DIAGNOSIS — Z74.09 IMPAIRED MOBILITY AND ADLS: ICD-10-CM

## 2022-11-16 DIAGNOSIS — G35 MS (MULTIPLE SCLEROSIS) (HCC): Primary | ICD-10-CM

## 2022-11-16 DIAGNOSIS — Z78.9 IMPAIRED MOBILITY AND ADLS: ICD-10-CM

## 2022-11-16 PROCEDURE — G9899 SCRN MAM PERF RSLTS DOC: HCPCS | Performed by: NURSE PRACTITIONER

## 2022-11-16 PROCEDURE — 99213 OFFICE O/P EST LOW 20 MIN: CPT | Performed by: NURSE PRACTITIONER

## 2022-11-16 PROCEDURE — G8432 DEP SCR NOT DOC, RNG: HCPCS | Performed by: NURSE PRACTITIONER

## 2022-11-16 PROCEDURE — G8419 CALC BMI OUT NRM PARAM NOF/U: HCPCS | Performed by: NURSE PRACTITIONER

## 2022-11-16 PROCEDURE — G8427 DOCREV CUR MEDS BY ELIG CLIN: HCPCS | Performed by: NURSE PRACTITIONER

## 2022-11-16 PROCEDURE — 3017F COLORECTAL CA SCREEN DOC REV: CPT | Performed by: NURSE PRACTITIONER

## 2022-11-16 RX ORDER — BACLOFEN 5 MG/1
5 TABLET ORAL 3 TIMES DAILY
Qty: 90 TABLET | Refills: 6 | Status: SHIPPED | OUTPATIENT
Start: 2022-11-16

## 2022-11-16 NOTE — PROGRESS NOTES
[address] [address] Leanna Stevenson. [facility], [address]  Office:  [phone]  Fax: [phone]  Chief Complaint   Patient presents with    Follow-up       HPI: Mireille Bush presents in follow-up for multiple sclerosis. She was last seen here on 7/1/2022 in virtual video visit. Continued on Ocrevus for DMT. She is on baclofen. She has pain of the back and lower extremities. She did not want to start duloxetine due to concern over potential side effects. Pain management referral was pending. Sleep specialist referral for hypersomnia was pending. Provided phone number for the office for her to call. She has history of syncopal spells. She was encouraged to see the cardiologist due to syncope. MRI brain and cervical spinal cord in June 2022 showed stable MS. She was referred to home health PT for impaired mobility and home health aide. Vitamin D level to be obtained with next lab draw. She saw cardiologist Dr. Celestino Chapman in September and had a Holter monitor and echocardiogram ordered. Last vitamin D level was low, 14.3 on 10/30/2022. Vitamin B12 level was normal.    She presents today in follow-up. Reports doing ok. Same thing. Saw the podiatrist.  Seeing them again on the 29th. Saw the sleep specialist.  Kvng Dies if she needs to do a sleep study. She was called about an opening on December 12th for an in-lab study. She does not want to stay overnight. Follows up with cardiology on the 30th. Next Ocrevus infusion is on January 16th. Continues on baclofen. Tolerating. Endorses it helps. No new neurological changes. Reports eyes getting blurry, wonders if caused by Dupixent. Goes every 2 weeks for the shot. Just a little bit of eye pain on the right but just blurry all the time. Gets dry eyes and itching of the eyes. Reports also having allergies. Endorses diplopia right eye. It gets blurry. No vision loss.   It has been before the summer time. Sees eye doctor December 29th. Did not hear from pain management. Does not get flu shots. Did not hear from home health. No recent falls all the way down but catches herself. Would like an aide. Needs help cleaning up in the shower. Reports getting more imbalanced. Accidentally took the vitamin D weekly dosing daily instead of weekly. Passing out spells: every day, 'was almost falling asleep before you came in'. Past Medical History:   Diagnosis Date    Chest pain 11/2014    neg EKG, non recurrent    Chronic pain     lower back and legs    Depression     Eczema     Fibroids     GERD (gastroesophageal reflux disease)     H/O seasonal allergies     Headache(784.0)     Hiatal hernia     IBS (irritable bowel syndrome)     Microscopic hematuria     MS (multiple sclerosis) (HCC)     Rectal bleeding     Stool color black     irritable bowel       Past Surgical History:   Procedure Laterality Date    COLONOSCOPY,DIAGNOSTIC      HX BREAST BIOPSY Right 1/21/2015    RIGHT BREAST BIOPSY WITH NEEDLE LOCALIZATION ULTRASOUND performed by Jose Briceno MD at [address]    HX CHOLECYSTECTOMY      HX GI      HX HYSTERECTOMY      HX TUBAL LIGATION         Current Outpatient Medications   Medication Sig Dispense Refill    baclofen 5 mg tab Take 5 mg by mouth three (3) times daily. 90 Tablet 6    ergocalciferol (ERGOCALCIFEROL) 1,250 mcg (50,000 unit) capsule Take 1 Capsule by mouth every seven (7) days. 8 Capsule 0    albuterol (PROVENTIL HFA, VENTOLIN HFA, PROAIR HFA) 90 mcg/actuation inhaler Take 1 Puff by inhalation every four (4) hours as needed for Wheezing or Shortness of Breath. 18 g 2    ocrelizumab (OCREVUS IV) by IntraVENous route every 6 months.      loratadine (Claritin) 10 mg tablet Take 1 Tablet by mouth daily.  30 Tablet 1    nabumetone (RELAFEN) 500 mg tablet TAKE 1 TABLET BY MOUTH TWICE DAILY 60 Tablet 2    fluticasone propionate (FLONASE) 50 mcg/actuation nasal spray 2 Sprays by Both Nostrils route daily. Indications: inflammation of the nose due to an allergy 1 Each 1    desonide (TRIDESILON) 0.05 % cream       Dupixent Pen 300 mg/2 mL pnij Once every 2 weeks. Indications: a type of allergy that causes red and itchy skin called atopic dermatitis      ketoconazole (NIZORAL) 2 % shampoo       polyethylene glycol (MIRALAX) 17 gram/dose powder Take 17 g by mouth daily. loteprednol etabonate 0.5 % drpg Apply 1 Drop to eye three (3) times daily. on both eyes      triamcinolone acetonide (KENALOG) 0.1 % ointment Apply  to affected area two (2) times a day. chest and upper extremities,for excema 1 Tube 1    biotin 1,000 mcg chew Take 1,000 mcg by mouth daily. Walker Graceful Tables [employer] wheel walker 1 Each 0    betamethasone valerate (VALISONE) 0.1 % topical lotion Apply 1 Applicator to affected area as needed for PRN Reason (Other) (Skin irritation). topical With hair washing      Omeprazole delayed release (PRILOSEC D/R) 20 mg tablet Take 20 mg by mouth daily. Shower Chair XX jozef As needed for patient safety 1 Each 0    Bedside Commode XX Please provide a commode for patient safety 1 Each 0    OTHER Please provide a tub transfer bench 1 Each 0    biotin (VITAMIN B7) 5 mg tablet Take 1,000 mcg by mouth daily. Comp Stocking,Knee,Long,Medium misc Wear daily while walking to prevent swelling 1 Each 0    doxycycline (VIBRAMYCIN) 50 mg capsule  (Patient not taking: No sig reported)      alclometasone (ACLOVATE) 0.05 % ointment Apply 0.05 % to affected area two (2) times daily as needed for Skin Irritation.  topical (Patient not taking: No sig reported)          Allergies   Allergen Reactions    Naproxen Shortness of Breath    Shellfish Derived Nausea and Vomiting     SEVERE NAUSEA AND VOMITING    Amoxicillin Rash and Nausea Only       Social History     Tobacco Use    Smoking status: Former     Packs/day: 0.00     Types: Cigarettes     Quit date: 2016     Years since quittin.4 Smokeless tobacco: Former     Quit date: 02/2016   Vaping Use    Vaping Use: Never used   Substance Use Topics    Alcohol use: No     Alcohol/week: 10.0 standard drinks     Types: 10 Cans of beer per week    Drug use: No       Family History   Problem Relation Age of Onset    HIV/AIDS Brother     Diabetes Father     Cancer Brother     Hypertension Sister     Diabetes Brother     Hypertension Sister     Hypertension Sister     Hypertension Brother        Review of Systems:  GENERAL: Denies fever. +fatigue  CARDIAC: No CP or SOB  PULMONARY: No cough or SOB  MUSCULOSKELETAL: No new joint pain. +back pain, lower extremities pain. NEURO: SEE HPI    Physical Examination:  Visit Vitals  /64   Pulse 69   Resp 18   Ht 5' 4\" (1.626 m)   Wt 78.9 kg (174 lb)   LMP  (LMP Unknown)   SpO2 98%   BMI 29.87 kg/m²       Alert, in NAD. Heart is regular. Oriented x3. Speech is fluent. Speech clear. EOMs are full, PERRL, VFFTC. +mild nystagmus bilaterally in lateral gaze. No facial asymmetry. Tongue is midline with tremulousness. Mild weakness throughout vs poor effort. Tone is normal. No drift of the bilateral upper extremities. Fine finger movements symmetrical. FNF intact bilaterally. DTRs +3. Antalgic gait with cane, left knee turned inward. Impression/Plan: This is a 61-year-old left-handed female who presents in follow-up for multiple sclerosis. She is on a regimen of Ocrevus for DMT which was started in January 2020 after having side effects with Tecfidera. She also has complaints of pain involving the back and lower extremities. She had not wanted to start duloxetine due to concern over potential side effects. She continues on baclofen. Continue Ocrevus for DMT. Advised to follow up with the sleep specialist for recommendation of sleep study. She will follow up with cardiology. She had been referred due to syncopal spells. Will place new referral for home health PT, OT, and aide.   Advised to check with  on aide if difficulty obtaining through home health. She has a  through her insurance. Advised on recommendation for flu shot but she does not want to obtain this. Advised to recheck vitamin D level with PCP in a few weeks. She has an upcoming eye exam.  Follow up in 3 months. Diagnoses and all orders for this visit:    1. MS (multiple sclerosis) ([address]  -     baclofen 5 mg tab; Take 5 mg by mouth three (3) times daily. 2. Fall, subsequent encounter  -     [address] [facility]    3. Impaired gait and mobility  -     REFERRAL TO HOME HEALTH    4. Impaired mobility and ADLs  -     REFERRAL TO HOME HEALTH      Signed By: Lyly Yadav NP        PLEASE NOTE:   Portions of this document may have been produced using voice recognition software. Unrecognized errors in transcription may be present.

## 2022-11-16 NOTE — PATIENT INSTRUCTIONS
Patient instructions:  -call to have repeat vitamin D level checked in a few weeks with PCP  -continue Ocrevus   -baclofen 5 mg three times daily  -home health referral for PT and OT.  If home aide cannot be obtained, please check with your  or .   -call sleep specialist office regarding setting up sleep study  -testing with cardiology  -upcoming eye exam  -follow up in 3 months

## 2022-11-17 ENCOUNTER — HOME HEALTH ADMISSION (OUTPATIENT)
Dept: HOME HEALTH SERVICES | Facility: HOME HEALTH | Age: 57
End: 2022-11-17
Payer: MEDICARE

## 2022-11-18 ENCOUNTER — HOME CARE VISIT (OUTPATIENT)
Dept: SCHEDULING | Facility: HOME HEALTH | Age: 57
End: 2022-11-18
Payer: MEDICARE

## 2022-11-18 ENCOUNTER — HOME CARE VISIT (OUTPATIENT)
Dept: HOME HEALTH SERVICES | Facility: HOME HEALTH | Age: 57
End: 2022-11-18
Payer: MEDICARE

## 2022-11-18 VITALS
TEMPERATURE: 97.8 F | HEART RATE: 78 BPM | SYSTOLIC BLOOD PRESSURE: 120 MMHG | RESPIRATION RATE: 18 BRPM | DIASTOLIC BLOOD PRESSURE: 70 MMHG | OXYGEN SATURATION: 97 %

## 2022-11-18 PROCEDURE — G0151 HHCP-SERV OF PT,EA 15 MIN: HCPCS

## 2022-11-18 NOTE — HOME HEALTH
Skilled services/Home bound verification:     Skilled Reason for admission/summary of clinical condition: She was last seen here on 7/1/2022 in virtual video visit. Continued on Ocrevus for DMT. She is on baclofen. She has pain of the back and lower extremities. She did not want to start duloxetine due to concern over potential side effects. Pain management referral was pending. Sleep specialist referral for hypersomnia was pending. Provided phone number for the office for her to call. She has history of syncopal  spells. She was encouraged to see the cardiologist due to syncope. MRI brain and cervical spinal cord in June 2022 showed stable MS. She was referred to home health PT for impaired mobility and home health aide. Vitamin D level to be obtained with next lab draw. .  This patient is homebound for the following reasons Requires considerable and taxing effort to leave the home . Caregiver: other. Caregiver assists with IADL's. Medications reconciled and all medications are available in the home this visit. High risk medication teaching regarding anticoagulants, hyperglycemic agents or opiod narcotics performed (specify) none     González Saas NP notified of any discrepancies/look a like medications/medication interactions no severe interaction noted . Home health supplies by type and quantity ordered/delivered this visit include: none     Patient education provided this visit to include: HEP, fall prevention, dc planning .       Patient level of understanding of education provided: Patient was able to partially teach back HEP     Sharps Education Provided: n/a  Patient response to procedure performed:  Patient needed verbal cues for HEP     Home exercise program/Homework provided: patient educated with HEP including seated hip flex, knee extension, hip abduction, hip adduction, ankle PF and DF and ball squeeze x 20 reps x 3 sets daily to improve MMT on BLE to facilitate with improved bed mobility, transfers and gait with AD. patient also educated with deep breathing exercises to be done daily x 10 reps x 3 sets to prevent SOB during activity. Patient educated with fall prevention technique by decluttering space, proper use of AD and footwear    Pt/Caregiver instructed on plan of care and are agreeable to plan of care at this time. Physician González Houston NP notified of patient admission to home health and plan of care including anticipated frequency of 1w1 2w4 for PT     Discharge planning discussed with patient and caregiver. Discharge planning as follows: dc to family with MD supervision when all goals are met . Pt/Caregiver did verbalize understanding of discharge planning. Next MD appointment TBD. Patient/caregiver encouraged/instructed to keep appointment as lack of follow through with physician appointment could result in discontinuation of home care services for non-compliance.     PMHx:  Chronic pain        lower back and legs    Depression      Eczema      Fibroids      GERD (gastroesophageal reflux disease)      H/O seasonal allergies      Headache(784.0)      Hiatal hernia   S: pain on RLE 7/10, impaired ability for transfers and gait   O:Patients Goals= Be able to go back to PLOF  Wound/Incision: location, description, drainage: none, has grade 1 pitting edema on RLE   PLOF: Lives in a 2 level house with >10 steps to go to 2nd floor bedroom, prior to referral, she was having increasing difficulty performing ADL's and IADL's, has been using SPC and RW for mobility and her significant other Eston Poplin is available occasionally to assist due to not living with patient   STRENGTH R hip flexor, extensor, hip abductors adductors 3-/5, L hip flexor, extensor, hip abductors adductors 3+/5, R knee flexor and extensor 3-/5, R knee flexor and extensor 3+/5  BALANCE Tinetti 10/28 high fall risk due to reduced strength on BLE, gait deviation and decreased efficiency of balance reactions. Patient demonstrated poor use of hip and ankle strategy during standing balance activity. Patient requires support from AD at all times for safety and stability. GAIT Patient was able to ambulate with use of SPC with Mod A x1 with noted antalgic gait on RLE, shuffling gait, decreased step height and stride length on RLE. patient was educated with use of RW versus SPC to provide more support and stability   BED MOBILITY Patient needed Min A x1 with bed mobility   TRANSFERS patient needed Min A x1 with bed, chair and toilet transfers using SPC   A: PT evaluation completed POC established, Med rec done, HEP established  P:Home Safety eval/recommendations: Home health physical therapy initial evaluation performed. Patient demonstrates decreased strength, balance, and endurance which increases patient's overall fall risk and burden of care. Patient would benefit from home health physical therapy to improve balance, strength, and endurance which would decrease fall risk and allow patient to return to prior level of function once all functional goals or full rehab potential is met. patient educated with HEP including seated hip flex, knee extension, hip abduction, hip adduction, ankle PF and DF and ball squeeze x 20 reps x 3 sets daily to improve MMT on BLE to facilitate with improved bed mobility, transfers and gait with AD. patient also educated with deep breathing exercises to be done daily x 10 reps x 3 sets to prevent SOB during activity.  Patient educated with fall prevention technique by decluttering space, proper use of AD and footwear

## 2022-11-18 NOTE — Clinical Note
patient will be seen 1w1 2w4 for PT to improve overall functional mobility by graded therapeutic exercises, therapeutic activities, gait training, balance training and patient/caregiver education for safety at home.   Thank you for your referral

## 2022-11-21 ENCOUNTER — HOME CARE VISIT (OUTPATIENT)
Dept: HOME HEALTH SERVICES | Facility: HOME HEALTH | Age: 57
End: 2022-11-21
Payer: MEDICARE

## 2022-11-21 ENCOUNTER — TELEPHONE (OUTPATIENT)
Dept: FAMILY MEDICINE CLINIC | Age: 57
End: 2022-11-21

## 2022-11-21 ENCOUNTER — HOME CARE VISIT (OUTPATIENT)
Dept: SCHEDULING | Facility: HOME HEALTH | Age: 57
End: 2022-11-21
Payer: MEDICARE

## 2022-11-21 DIAGNOSIS — K21.01 GASTROESOPHAGEAL REFLUX DISEASE WITH ESOPHAGITIS AND HEMORRHAGE: Primary | ICD-10-CM

## 2022-11-21 NOTE — TELEPHONE ENCOUNTER
----- Message from Radha Segal sent at 11/18/2022  9:46 AM EST -----  Subject: Medication Problem    Medication: nabumetone (RELAFEN) 500 mg tablet  Dosage: TAKE 1 TABLET BY MOUTH TWICE DAILY  Ordering Provider: Wesley Rodriguez    Question/Problem: pt. is requesting an alternative medication pt. had to   quit taking this medication due to causing her stomach to have a burning   sensation please, call pt. to advise       Pharmacy: [address] [place], [place] [id number] [address] [place] [address]    ---------------------------------------------------------------------------  --------------  Rene ROBERTSON  [phone]; OK to leave message on voicemail  ---------------------------------------------------------------------------  --------------    SCRIPT ANSWERS  Relationship to Patient: Self

## 2022-11-21 NOTE — Clinical Note
Thanks for update  Ron Corral  ----- Message -----  From: Anabel Davies PTA  Sent: 11/28/2022   5:07 AM EST  To: Juancho Saunders, PT      Pt reported via phone that she has MD appts and will be unavailable for PT today. Will plan to attempt PT again on 11/23/22.

## 2022-11-21 NOTE — Clinical Note
Pt reported via phone that she has MD appts and will be unavailable for PT today. Will plan to attempt PT again on 11/23/22.

## 2022-11-22 RX ORDER — PANTOPRAZOLE SODIUM 40 MG/1
40 TABLET, DELAYED RELEASE ORAL 2 TIMES DAILY
Qty: 60 TABLET | Refills: 2 | Status: SHIPPED | OUTPATIENT
Start: 2022-11-22

## 2022-11-22 NOTE — TELEPHONE ENCOUNTER
Patient has been taking chronic NSAIDs due to arthritis pain. She was recently prescribed doxycycline by her dermatologist.    After taking the medication, she reported she had severe heartburn and noticed bloody stool which has now resolved. She has now stopped taking the doxycycline and the NSAIDs. Patient may have esophagitis. Counseled to stop the NSAIDs. Will prescribe Protonix. .  Advised to go to ED if she has continued bleeding, lightheadedness, dizziness. She has an appointment with me next week we will evaluate further then.

## 2022-11-23 ENCOUNTER — HOME CARE VISIT (OUTPATIENT)
Dept: SCHEDULING | Facility: HOME HEALTH | Age: 57
End: 2022-11-23
Payer: MEDICARE

## 2022-11-23 NOTE — Clinical Note
Ok thanks Fred for update. Lillie Montes  ----- Message -----  From: Esmer Pierre, PTA  Sent: 11/28/2022   5:07 AM EST  To: Kristy Barnard PTA, Afshin Perry, PT, *      Unable to contact pt via phone and left message to schedule PT appt with pt for 11/23/22. Pt left message next day reporting that she has MD appts and will call when she is available for PT again. Will plan to attempt PT again next week.

## 2022-11-23 NOTE — Clinical Note
Unable to contact pt via phone and left message to schedule PT appt with pt for 11/23/22. Pt left message next day reporting that she has MD appts and will call when she is available for PT again. Will plan to attempt PT again next week.

## 2022-11-25 ENCOUNTER — TELEPHONE (OUTPATIENT)
Dept: FAMILY MEDICINE CLINIC | Age: 57
End: 2022-11-25

## 2022-11-25 DIAGNOSIS — K20.90 ESOPHAGITIS: Primary | ICD-10-CM

## 2022-11-25 NOTE — TELEPHONE ENCOUNTER
Patient called stating she was given PANTOPRAZOLE but experienced a burning sensation in her stomach when taking it. Patient also was given a medication from her dermatologist that caused rectal bleeding. She has ssince stopped taking this medication and has follow up appointment with derm on 11/2. Patient requesting a phone call from provider. Please advise.

## 2022-11-29 ENCOUNTER — HOME CARE VISIT (OUTPATIENT)
Dept: SCHEDULING | Facility: HOME HEALTH | Age: 57
End: 2022-11-29
Payer: MEDICARE

## 2022-11-29 ENCOUNTER — TELEPHONE (OUTPATIENT)
Dept: CARDIOLOGY CLINIC | Age: 57
End: 2022-11-29

## 2022-11-29 PROCEDURE — G0157 HHC PT ASSISTANT EA 15: HCPCS

## 2022-11-29 RX ORDER — SUCRALFATE 1 G/10ML
1 SUSPENSION ORAL 4 TIMES DAILY
Qty: 400 ML | Refills: 0 | Status: SHIPPED | OUTPATIENT
Start: 2022-11-29 | End: 2022-12-09

## 2022-11-30 NOTE — HOME HEALTH
SUBJECTIVE: Pt reports she is very tired from MD appt this AM.  CAREGIVER INVOLVEMENT/ASSISTANCE NEEDED FOR: pts boyfriend assist pt with needs prn but not present during PT session. Pt uses paid transportation for MD appts. HOME HEALTH SUPPLIES BY TYPE AND QUANTITY ORDERED/DELIVERED THIS VISIT INCLUDE: n/a  OBJECTIVE:  See interventions. 1.Patient level of understanding of education provided: Pt demonstrates a good understanding of HEP in sitting. Issued pt a copy of HEP. 2.Patient response to treatment:  Pt requires frequent rest breaks throughout PT session secondary to c/o fatigue. ASSESSMENT OF PROGRESS TOWARD GOALS: Pt amb in home with SC with rigid posture and reaching out for furniture/walls for safety but refuses to use walker in home reporting she does not like to use walker despite much encouragement. CONTINUED NEED FOR THE FOLLOWING SKILLS: Pt requires continued PT to improve LE strength/gait abilty. PLAN FOR NEXT VISIT: Will plan to continue working towards improving LE strength/gait ability. THE FOLLOWING DISCHARGE PLANNING WAS DISCUSSED WITH THE PATIENT/CAREGIVER: Pt is scheduled to continue PT 1 more visit this week then 2w2. Reviewed POC and PT goals with pt.

## 2022-12-01 ENCOUNTER — TELEPHONE (OUTPATIENT)
Dept: CARDIOLOGY CLINIC | Age: 57
End: 2022-12-01

## 2022-12-01 VITALS
OXYGEN SATURATION: 98 % | HEART RATE: 86 BPM | SYSTOLIC BLOOD PRESSURE: 120 MMHG | DIASTOLIC BLOOD PRESSURE: 72 MMHG | RESPIRATION RATE: 17 BRPM | TEMPERATURE: 98.2 F

## 2022-12-02 ENCOUNTER — HOME CARE VISIT (OUTPATIENT)
Dept: SCHEDULING | Facility: HOME HEALTH | Age: 57
End: 2022-12-02
Payer: MEDICARE

## 2022-12-02 VITALS
RESPIRATION RATE: 18 BRPM | HEART RATE: 66 BPM | SYSTOLIC BLOOD PRESSURE: 148 MMHG | OXYGEN SATURATION: 100 % | TEMPERATURE: 96.8 F | DIASTOLIC BLOOD PRESSURE: 80 MMHG

## 2022-12-02 LAB
25(OH)D3+25(OH)D2 SERPL-MCNC: 54.5 NG/ML (ref 30–100)
ALBUMIN SERPL-MCNC: 4.3 G/DL (ref 3.8–4.9)
ALBUMIN/GLOB SERPL: 1.8 {RATIO} (ref 1.2–2.2)
ALP SERPL-CCNC: 112 IU/L (ref 44–121)
ALT SERPL-CCNC: 10 IU/L (ref 0–32)
AST SERPL-CCNC: 13 IU/L (ref 0–40)
BASOPHILS # BLD AUTO: 0 X10E3/UL (ref 0–0.2)
BASOPHILS NFR BLD AUTO: 1 %
BILIRUB SERPL-MCNC: 0.3 MG/DL (ref 0–1.2)
BUN SERPL-MCNC: 7 MG/DL (ref 6–24)
BUN/CREAT SERPL: 10 (ref 9–23)
CALCIUM SERPL-MCNC: 8.9 MG/DL (ref 8.7–10.2)
CHLORIDE SERPL-SCNC: 105 MMOL/L (ref 96–106)
CHOLEST SERPL-MCNC: 185 MG/DL (ref 100–199)
CO2 SERPL-SCNC: 24 MMOL/L (ref 20–29)
CREAT SERPL-MCNC: 0.72 MG/DL (ref 0.57–1)
EGFR: 96 ML/MIN/1.73
EOSINOPHIL # BLD AUTO: 0.1 X10E3/UL (ref 0–0.4)
EOSINOPHIL NFR BLD AUTO: 2 %
ERYTHROCYTE [DISTWIDTH] IN BLOOD BY AUTOMATED COUNT: 14.3 % (ref 11.7–15.4)
GLOBULIN SER CALC-MCNC: 2.4 G/DL (ref 1.5–4.5)
GLUCOSE SERPL-MCNC: 86 MG/DL (ref 70–99)
HCT VFR BLD AUTO: 36.1 % (ref 34–46.6)
HDLC SERPL-MCNC: 62 MG/DL
HGB BLD-MCNC: 11.9 G/DL (ref 11.1–15.9)
IMM GRANULOCYTES # BLD AUTO: 0 X10E3/UL (ref 0–0.1)
IMM GRANULOCYTES NFR BLD AUTO: 0 %
LDLC SERPL CALC-MCNC: 110 MG/DL (ref 0–99)
LYMPHOCYTES # BLD AUTO: 2.4 X10E3/UL (ref 0.7–3.1)
LYMPHOCYTES NFR BLD AUTO: 42 %
MCH RBC QN AUTO: 28.2 PG (ref 26.6–33)
MCHC RBC AUTO-ENTMCNC: 33 G/DL (ref 31.5–35.7)
MCV RBC AUTO: 86 FL (ref 79–97)
MONOCYTES # BLD AUTO: 0.5 X10E3/UL (ref 0.1–0.9)
MONOCYTES NFR BLD AUTO: 10 %
NEUTROPHILS # BLD AUTO: 2.6 X10E3/UL (ref 1.4–7)
NEUTROPHILS NFR BLD AUTO: 45 %
PLATELET # BLD AUTO: 283 X10E3/UL (ref 150–450)
POTASSIUM SERPL-SCNC: 3.6 MMOL/L (ref 3.5–5.2)
PROT SERPL-MCNC: 6.7 G/DL (ref 6–8.5)
RBC # BLD AUTO: 4.22 X10E6/UL (ref 3.77–5.28)
SODIUM SERPL-SCNC: 143 MMOL/L (ref 134–144)
TRIGL SERPL-MCNC: 71 MG/DL (ref 0–149)
VLDLC SERPL CALC-MCNC: 13 MG/DL (ref 5–40)
WBC # BLD AUTO: 5.6 X10E3/UL (ref 3.4–10.8)

## 2022-12-02 PROCEDURE — G0152 HHCP-SERV OF OT,EA 15 MIN: HCPCS

## 2022-12-02 PROCEDURE — G0157 HHC PT ASSISTANT EA 15: HCPCS

## 2022-12-02 NOTE — Clinical Note
Mrs. Ace Sierra presents with good rehab potential to increase her strength, endurance and balance for increased independence with ADLs/IADLs and functional mobility. Pt with increased weakness impacting her ability to complete her daily ADL routine safely and increased assistance for her daily IADLs. Pt agreeable to continued home health occupational therapy services to increase her safety and independence within her home environment.      PLAN: D/C 1w1, 2w3 with plans to discharge when goals are met or maximal potential achieved

## 2022-12-02 NOTE — HOME HEALTH
Clinical Condition per EPIC: She was last seen here on 7/1/2022 in virtual video visit. Continued on Ocrevus for DMT. She is on baclofen. She has pain of the back and lower extremities. She did not want to start duloxetine due to concern over potential side effects. Pain management referral was pending. Sleep specialist referral for hypersomnia was pending. Provided phone number for the office for her to call. She has history of syncopal spells. She was encouraged to see the cardiologist due to syncope. MRI brain and cervical spinal cord in June 2022 showed stable MS.     Follow up: Reports doing ok. Same thing. Saw the podiatrist, gave podiatry which helps. Seeing them again on the 29th. Saw the sleep specialist.  Aviva Samayoa if she needs to do a sleep study. She was called about an opening on December 12th for an in-lab study. She does not want to stay overnight. Follows up with cardiology on the 30th. Next Ocrevus infusion is on Janu ary 16th. Continues on baclofen. Tolerating. Endorses it helps. No new neurological changes. Reports eyes getting blurry, wonders if caused by Dupixent. Goes every 2 weeks for the shot. Just a little bit of eye pain on the right but just blurry all the time. Gets dry eyes and itching of the eyes. Reports also having allergies. Endorses diplopia right eye. It gets blurry. No vision loss. It has been before the summer time. Sees eye doctor December 29th. Did not hear from pain management. Does not do flu shots. Did not hear from home health. No recent falls all the way down but catches herself. Would like an aide. Needs help cleaning up in the shower. Reports getting more imbalanced. Accidentally took the vitamin D weekly dosing daily instead of weekly. Passing out spells: every day, 'was almost falling asleep before you came i n'.      PMHx: Chronic pain lower back and legs  Depression  Eczema  Fibroids  GERD (gastroesophageal reflux disease)  H/O seasonal allergies  Headache(784.0)  Hiatal hernia    PLOF: Pt lives in a 2 level house with ~ 10 steps to go to 2nd floor bedroom. Prior to referral, she was having increasing difficulty performing ADL's and IADL's. She has been using single point cane and rolling walker for mobility. Her significant other Chinedu Never is available occasionally to assist due to not living with patient. Caregiver: Significant other Soraya Park) assists with IADL's. Medications: Pt medications reconciled, see medication list for details. Pt educated continue as directed per MD.    SUBJECTIVE: Pt reports pain/stiffness in her RLE. DME ORDERED/RECOMMENEDED: N/A    OBJECTIVE:  BATHING: Pt has tub shower unit with pt grab bar being broken ~1 year. Pt has shower chair. TOILETING: Pt supervision for toileting with regular toilet  UB DRESSING: Pt indepent for upper body dressing to jodi/doff shirts and bras  LB DRESSING: Pt SBA for lower body dressing to jodi/doff socks, shoes and pants. Pt educated on figure 4 dressing techniques and elastic shoe laces  GROOMING: Pt SBA for grooming including oral care, hair care and washing hands/face with B and weakness  FEEDING: Pt SBA  for self feeding with pt expressing anxiety causing her to drop utensils at times. Pt educated on built up handle options. Modified Meghan RPE 6/10 after performing ambulation, transfers, and I/ADL assessment     OT instructed/demonstrated pt the following with good understanding:     IADL:  Pt able to to complete light meal prep. Pt has signifcant other and son assisting with IADLs such as shopping and transportation  AMBULATION: Pt supervision for amublating short house hold distances using single point cane  EOB/BED TRANSFER: Pt supervision for bed mobility  COUCH: Pt supervision for sit to stand transfers using single point cane  TOILET: Pt declining to attempt due to only using upstairs bathroom and not half bath on 2nd floor.  Pt declining acending stairs to 2nd floor.   TUB SHOWER:Pt declinded trialing due to full bath on 2nd floor. Pt may benfiit from tub transfer bench due to BLE weaknee    PATIENT RESPONSE TO TREATMENT: Pt responded well to home health occupational therapy assessment with pt presenting with BUE weakness and fatigue    PATIENT EDUCATION PROVIDED THIS VISIT: BUE fine motor HEP, OT role, energy conservation, fall prevention/safety training ADL/IADL tasks, continue diet and medications as instructed per MD, consult MD or urgent care for medical assistance as opposed to ER unless situation emergent. PATIENT LEVEL OF UNDERSTANDING OF EDUCATION PROVIDED: Pt able to teach back role of OT with good understanding. Pt able to teach back adaptive grooming and dressing techniques with FRANCESCO bird in her BUE. Pt able to teach back her BUE fine motor HEP with theraputty. Silvano Puga presents with good rehab potenital to increase her strength, endurance and balance for increased independence with ADLs/IADLs and functional mobility. Pt with increased weakness impacting her ability to complete her daily ADL routine safely and increased assistnace for her daily IADLs. Pt agreeable to continued home health occupational therapy services to increase her safety and independence within her home environment. HOME EXERCISE PROGRAM: Instructed pt on use of  soft resistive putty for increased proprioceptive input and for in-hand manipulation training. Pt performed , rolling putty in ball, rolling into a rope and then performing pincer point pinch with B hands. Instructed pt to complete 3 repetitions daily.     CONTINUED NEED FOR THE FOLLOWING SKILLS: HH OT is medically necessary to address pain, decreased functional strength, decreased independence and safety with functional transfers, decreased independence and safety performing ADL/IADL tasks, decreased activity and standing tolerance, decreased functional endurance, and impaired balance in order to improve functional independence, obtain set goals, reduce risk of falls, reduce pain, improve quality of life, and return to PLOF. ASSESSMENT: Pt presents with BUE weakness noted by MMT strength test of 3+/5 and would benifit from BUE strengthening to progress her BUE strength to tolerate completion of ADLs and functional transfers. Pt presents with BUE fine motor weakness impacting her dexterity to complete ADLs such as clothing fasteners, hair care and opening containers. She would benifit from BUE fine motor strengthening to increase her strength for increased independence with ADLs. Pt present with increased faitgue with light house hold activities and would benfit from energy conservation techniques to increase her tolerance to complete her ADLs while reducing overfatigue contraindicated for MS. Pt presents with decreased bathroom safety and would benfit from toilet and tub transfer training to increase her safey and reduce risk of falls when completing her ADLs. Skilled Care Provided: Pt completed full occupational therapy assessment to include balance, coordination, strengthening, ADL education/training, functional mobility and home safety.     PLAN: D/C 1w1, 2w3 with plans to discharge when goals are met or maximal potential achieved

## 2022-12-02 NOTE — Clinical Note
Pt lives in [address] [place] [address] with friends and son visiting often and signifcant other. Pt presents with BUE weakness noted by MMT strength test of 3+/5 and would benifit from BUE strengthening to progress her BUE strength to tolerate completion of ADLs and functional transfers. Pt presents with BUE fine motor weakness impacting her dexterity to complete ADLs such as clothing fasteners, hair care and opening containers. She would benifit from BUE fine motor strengthening to increase her strength for increased independence with ADLs. Pt present with increased faitgue with light house hold activities and would benfit from energy conservation techniques to increase her tolerance to complete her ADLs while reducing overfatigue contraindicated for MS. Pt presents with decreased bathroom safety and would benfit from toilet and tub transfer training to increase her safey and reduce risk of falls when completing her ADLs. Please reach out with any questions/concerns.

## 2022-12-05 ENCOUNTER — TELEPHONE (OUTPATIENT)
Dept: FAMILY MEDICINE CLINIC | Age: 57
End: 2022-12-05

## 2022-12-05 NOTE — TELEPHONE ENCOUNTER
Patient called requesting a required letter from her provider sent to her insurance regarding home health assistant. Patient states she is not sure exactly what Is needed but states the provider should be aware. Please advise.

## 2022-12-05 NOTE — TELEPHONE ENCOUNTER
Contacted pt at home #. Two patient Identifiers confirmed. Advised pt that she can put the monitors back in their boxes and drop off at [employer]. PT verbally understanding.

## 2022-12-06 ENCOUNTER — HOME CARE VISIT (OUTPATIENT)
Dept: SCHEDULING | Facility: HOME HEALTH | Age: 57
End: 2022-12-06
Payer: MEDICARE

## 2022-12-06 VITALS
TEMPERATURE: 99.2 F | DIASTOLIC BLOOD PRESSURE: 80 MMHG | TEMPERATURE: 98.2 F | RESPIRATION RATE: 17 BRPM | OXYGEN SATURATION: 98 % | HEART RATE: 80 BPM | DIASTOLIC BLOOD PRESSURE: 80 MMHG | HEART RATE: 82 BPM | RESPIRATION RATE: 17 BRPM | OXYGEN SATURATION: 99 % | SYSTOLIC BLOOD PRESSURE: 126 MMHG | SYSTOLIC BLOOD PRESSURE: 128 MMHG

## 2022-12-06 PROCEDURE — G0157 HHC PT ASSISTANT EA 15: HCPCS

## 2022-12-06 PROCEDURE — G0158 HHC OT ASSISTANT EA 15: HCPCS

## 2022-12-06 NOTE — HOME HEALTH
SUBJECTIVE: Pt reports she is doing okay today but gets tired quickly. CAREGIVER INVOLVEMENT/ASSISTANCE NEEDED FOR: pts friend assist pt with all needs prn but not present during PT session. HOME HEALTH SUPPLIES BY TYPE AND QUANTITY ORDERED/DELIVERED THIS VISIT INCLUDE:  n/a  OBJECTIVE:  See interventions. 1.Patient level of understanding of education provided: Pt demonstrates a fair understanding of HEP in sitting requiring cuing for proper technique/posture. Pt also requires cuing to pace herself with activities to conserve energy. 2.Patient response to treatment:  Pt requires frequent rest breaks throughout PT session secondary to c/o fatigue per pt. ASSESSMENT OF PROGRESS TOWARD GOALS: Outdoor amb deferred today secondary to inclement weather. Pt amb up/down 14 steps to access upstairs bedroom with SC, one hand rail and S for safety. Pt has a rigid LE posture during amb and decreased wt shifting. CONTINUED NEED FOR THE FOLLOWING SKILLS: Pt requires continued PT to improve LE strength and transfer/gait ability. PLAN FOR NEXT VISIT: Will plan to attempt outdoor amb next visit with weather permitting. THE FOLLOWING DISCHARGE PLANNING WAS DISCUSSED WITH THE PATIENT/CAREGIVER:Pt is scheduled to continue PT 2w2.

## 2022-12-07 VITALS
TEMPERATURE: 98 F | SYSTOLIC BLOOD PRESSURE: 132 MMHG | OXYGEN SATURATION: 98 % | RESPIRATION RATE: 17 BRPM | DIASTOLIC BLOOD PRESSURE: 88 MMHG | HEART RATE: 87 BPM

## 2022-12-07 NOTE — HOME HEALTH
SUBJECTIVE: Patient report pain in she has generalized pain in over her who body and expressed an 8/10 on the pain numeric scale. Katie Infante CAREGIVER INVOLVEMENT/ASSISTANCE NEEDED FOR: The pt friend Lala Ramirez) helps with all I/ADLS due to her inabilty to do so. Katie Infante HOME HEALTH SUPPLIES BY TYPE AND QUANTITY ORDERED/DELIVERED THIS VISIT INCLUDE: NONE   . OBJECTIVE:  See interventions. .  Patient education provided this visit: ASHTON ROLE, Energy conservation techniques,and Purse-lip breathing techniques. .    Patient level of understanding of education provided: Patient in agreeance to education provided an was able to perform education provided with no verbal prompting required. .  RESPONSE TO TREATMENT: Pt reported an 4/10 on numeric scale after performing AROM exercises with use of energy conservation techiques. Pt required two periods of rest while performing rehab tasks. .   ASSESSMENT OF PROGRESS TOWARD GOALS: Pt presents decreased activity tolerance while demostrating AROM exercises while seated in recliner chair. Min verbal cuing provided for proper trunk alignment for proper breathing when displaying AROM execises with use of purse-lip breathing techniques. It is advised that client continues to adhere to HEP addressing BUE exercies for continued strength and endurance. Patient RPE score on modfied johnny scale was 6/10 on this date. Patient's pain is chronic and is being addressed by pain meds and follow up visits with MD. Even though pain level today is a 8/10, it did not affect ability to perform therapy tasks. (Please see pain tab for more details.)   .   CONTINUED NEED FOR THE FOLLOWING SKILLS: HH OT is medically necessary to address pain, decreased functional strength, decreased independence and safety with functional transfers, decreased independence and safety performing ADL/IADL tasks, decreased activity and standing tolerance, decreased functional endurance, and impaired balance in order to improve functional independence, obtain set goals, reduce risk of falls, reduce pain, improve quality of life, and return to PLOF. Jacqui Clifford PLAN FOR NEXT VISIT: Machelle Heredia will address functional transfers and BUE gross exercises  .   THE FOLLOWING DISCHARGE PLANNING WAS DISCUSSED WITH THE PATIENT/CAREGIVER: Tenative d/c 1w2, 2w2

## 2022-12-08 ENCOUNTER — HOME CARE VISIT (OUTPATIENT)
Dept: HOME HEALTH SERVICES | Facility: HOME HEALTH | Age: 57
End: 2022-12-08
Payer: MEDICARE

## 2022-12-08 ENCOUNTER — HOME CARE VISIT (OUTPATIENT)
Dept: SCHEDULING | Facility: HOME HEALTH | Age: 57
End: 2022-12-08
Payer: MEDICARE

## 2022-12-08 PROCEDURE — G0157 HHC PT ASSISTANT EA 15: HCPCS

## 2022-12-09 DIAGNOSIS — J30.89 PERENNIAL ALLERGIC RHINITIS: ICD-10-CM

## 2022-12-09 RX ORDER — FLUTICASONE PROPIONATE 50 MCG
2 SPRAY, SUSPENSION (ML) NASAL DAILY
Qty: 1 EACH | Refills: 1 | Status: SHIPPED | OUTPATIENT
Start: 2022-12-09

## 2022-12-09 NOTE — HOME HEALTH
SUBJECTIVE: Pt reports she has been very tired today but will do what she can do. CAREGIVER INVOLVEMENT/ASSISTANCE NEEDED FOR: pts friend assist pt with needs prn but not present during PT session. Pt uses medical transportation for appts. HOME HEALTH SUPPLIES BY TYPE AND QUANTITY ORDERED/DELIVERED THIS VISIT INCLUDE:   OBJECTIVE:  See interventions. 1.Patient level of understanding of education provided: Pt demonstrates a good understanding of HEP in sitting and reports a poor to fair compliance. 2.Patient response to treatment:  Pt requires frequent rest breaks throughout PT session secondary to c/o fatigue. Pt has difficulty focusing on tasks secondary to reporting she has alot to manage with medical care and household issues. ASSESSMENT OF PROGRESS TOWARD GOALS: Pt c/o fatigue after amb up/down steps and unable to amb outdoors today. Pt demonstrates a good understanding of seated HEP but requires cuing to follow HEP in standing. Pt had much difficulty amb up steps with increased WB and pulling on hand rail with SBA. Pt requires cuing to decrease pace and follow step to gait pattern along with using SC for added support. CONTINUED NEED FOR THE FOLLOWING SKILLS: Pt requires continued PT to improve LE strength and gait ability to reduce fall risk. PLAN FOR NEXT VISIT: Will plan to attempt outdoor amb next visit with weather permitting. THE FOLLOWING DISCHARGE PLANNING WAS DISCUSSED WITH THE PATIENT/CAREGIVER:Pt is scheduled to continue PT 1 more visit this week then 2w1. Will plan for reasessment next week for possible extension.

## 2022-12-09 NOTE — TELEPHONE ENCOUNTER
Requested Prescriptions     Pending Prescriptions Disp Refills    fluticasone propionate (FLONASE) 50 mcg/actuation nasal spray 1 Each 1     Si Sprays by Both Nostrils route daily.  Indications: inflammation of the nose due to an allergy

## 2022-12-12 ENCOUNTER — HOME CARE VISIT (OUTPATIENT)
Dept: HOME HEALTH SERVICES | Facility: HOME HEALTH | Age: 57
End: 2022-12-12
Payer: MEDICARE

## 2022-12-12 VITALS
TEMPERATURE: 98.2 F | DIASTOLIC BLOOD PRESSURE: 86 MMHG | HEART RATE: 88 BPM | RESPIRATION RATE: 17 BRPM | OXYGEN SATURATION: 98 % | SYSTOLIC BLOOD PRESSURE: 130 MMHG

## 2022-12-12 NOTE — HOME HEALTH
SUBJECTIVE: Pt reports she is having alot of trouble with med management. CAREGIVER INVOLVEMENT/ASSISTANCE NEEDED FOR: Pts friend assist pt with needs prn but not present during PT session. Pt used Medicaid transportation for all appts. HOME HEALTH SUPPLIES BY TYPE AND QUANTITY ORDERED/DELIVERED THIS VISIT INCLUDE: n/a  OBJECTIVE:  See interventions. 1.Patient level of understanding of education provided: Pt demonstrates a good understanding of seated HEP but requires cuing for proper technique/posture with standing exercises. Pt also requires cuing for safety with stair amb. 2.Patient response to treatment: Pt requires frequent rest breaks secondary to c/o fatigue. Pt was able to perform stair amb again today with safety cues   ASSESSMENT OF PROGRESS TOWARD GOALS: Reviewed meds and dosages with pt today and pt verbalizes a fair understanding. Pt requires extra time/effort with stair amb and requires cuing to perform step to gait pattern vs step over step for safety. Outdoor amb deferred today secondary to c/o fatigue but pt reports she will walk outdoors next visit with 2 person assist from LPTA and PT. Pt has displayed increased balance as evidence of Tinetti score increasing from 10/28 to 13/28 today. CONTINUED NEED FOR THE FOLLOWING SKILLS: Pt requires continued PT to improve LE strength and transfer/gait ability. PLAN FOR NEXT VISIT: PT reassessment next visit. THE FOLLOWING DISCHARGE PLANNING WAS DISCUSSED WITH THE PATIENT/CAREGIVER:Pt is scheduled for 2w1 next week. Requesting extension for PT and PT will reassess pt next visit.

## 2022-12-13 ENCOUNTER — HOME CARE VISIT (OUTPATIENT)
Dept: HOME HEALTH SERVICES | Facility: HOME HEALTH | Age: 57
End: 2022-12-13
Payer: MEDICARE

## 2022-12-13 ENCOUNTER — HOME CARE VISIT (OUTPATIENT)
Dept: SCHEDULING | Facility: HOME HEALTH | Age: 57
End: 2022-12-13
Payer: MEDICARE

## 2022-12-13 VITALS
TEMPERATURE: 98.9 F | DIASTOLIC BLOOD PRESSURE: 72 MMHG | SYSTOLIC BLOOD PRESSURE: 138 MMHG | RESPIRATION RATE: 17 BRPM | OXYGEN SATURATION: 98 % | HEART RATE: 74 BPM

## 2022-12-13 PROCEDURE — G0151 HHCP-SERV OF PT,EA 15 MIN: HCPCS

## 2022-12-13 NOTE — Clinical Note
Pt is not answering phone or calling me back to scheud treatments. ----- Message -----  From: Rojelio Mercado PT  Sent: 12/13/2022   3:48 PM EST  To: Joy Lopez PTA, Louvenia Gave, DYLON Maddox ,     PT is DCing on PETÄJÄVESI of this week. Can you give the [facility] at your next session if you have one this week, or do you need me to give on Thurs. Please let me know. OT is in 1 week later than PT.   Thanks   Nya Rizo

## 2022-12-13 NOTE — Clinical Note
Ask Andres Christina how she gets a hold of her. Mily Baxter  ----- Message -----  From: Catalina Cao, OT  Sent: 12/13/2022   4:59 PM EST  To: Bharti Way PT  Subject: RE:                                                Pt is not answering phone or calling me back to scheudle treatments. ----- Message -----  From: Hugo Hamman, PT  Sent: 12/13/2022   3:48 PM EST  To: Nancy Martinez PTA, Rossy Collins, OT      Tan     PT is DCing on PETÄJÄVESI of this week. Can you give the [facility] at your next session if you have one this week, or do you need me to give on Thurs. Please let me know. OT is in 1 week later than PT.   Thanks   Mily Baxter

## 2022-12-13 NOTE — HOME HEALTH
PT REASSESSMENT/SUPERVISORY VISIT-  Colin Osorio  LPTA present for reassessment   SUBJECTIVE:   CAREGIVER ASSISTANCE NEEDED FOR: friend assists with meals, transportation (or she has transportation through insurance) , mobility, medications retrieval from strore, and showers  MEDICATIONS REVIEWED AND UPDATED: Medications reconciled and all medications are available in the home this visit. The following education was provided regarding medications, medication interactions, and look a like medications: Continue medication as prescribed by MD. Medications are effective at this time. WOUNDS: none noted or reported. ROM: BUE and LE ROM WNL. BED MOBILITY:indep per pt report  TRANSFERS:  sup .   see goals section for details  GAIT TRAINING: see goals section for details . Pt has declined with LPTA to ambulated outdoors on uneven surfaces to practice getting to and from transportation for appts. Pt did report that she went out today and used cane to get to transport. Pt states it is still taxing for her but she has been able to to do this for her appts. Which pt has had at least 3 since PT has started. STAIRS: see goals section for details. Pt did refuse to do stairs today with this PT as pt stated \" I don't go up the stairs with my shoes on and I am not taking them off. \"    BALANCE: Tinetti score 16/28 which still indicates high risk for falls. PATIENT/CAREGIVER EDUCATION PROVIDED THIS VISIT:  educated to perform HEP daily for consistency and to help maintain and prevent atrophy and weakness, useof device at all times, cont assist with stairs and showers  HEP consisting of:  seated, standing exercise per handouts given  Patient level of understanding of education provided: PT and LPTA readdressed importance of cont HEP daily. Pt reports \"I do them somtimes\". As well as cont to walk daily to maintain strength and moblity. Pt verbalized understanding.    Patient response to treatment:  Pt c/o generalized pain in body, but mostly feet today but no increase noted, but uncomfortable with walking. As well as wearing ill fitting shoes to MD appt due to some edema in mani feet. Pt advised to remove shoes and elevate feet. ASSESSMENT AND PROGRESS TOWARD GOALS:  Pt at this time agrees that she has met max potential with PT and agreeable to DC on Thursday of this week. Pt cont with decrease and flucuating strength and endurance due to MS, and also has impaired balance needing pt to use cane and a device at all times. Pt has however become indep with bed mob per pt report. ( Pt refused going up the steps today see above). Pt is supervised to get off chair, couch, and toilet per LPTA notes and PT observation today with increased time and effort due to generalized weakness and some pain noted in feet and \"all over. \"  Pt is ambulating primarily in the home with the cane and limited distances in the community to and from transporation vehicle and also in and out of MD appts. Pt still needing supervision and safer more enegry efficient way to climb steps via step to gait pattern vs recipriocal gait. PLAN: Pt will DC from Home PT next visit.     DISCHARGE PLANNING DISCUSSED: Discharge to self and family under MD supervision once all goals have been met or patient has reached maximum potential.

## 2022-12-13 NOTE — Clinical Note
Valdo Wellington is DCing on PETÄJÄVESI of this week. Can you give the [facility] at your next session if you have one this week, or do you need me to give on Thurs. Please let me know. OT is in 1 week later than PT.   Thanks   Marva Aguilar

## 2022-12-14 ENCOUNTER — HOME CARE VISIT (OUTPATIENT)
Dept: HOME HEALTH SERVICES | Facility: HOME HEALTH | Age: 57
End: 2022-12-14
Payer: MEDICARE

## 2022-12-15 ENCOUNTER — HOME CARE VISIT (OUTPATIENT)
Dept: SCHEDULING | Facility: HOME HEALTH | Age: 57
End: 2022-12-15
Payer: MEDICARE

## 2022-12-15 VITALS
RESPIRATION RATE: 17 BRPM | HEART RATE: 67 BPM | SYSTOLIC BLOOD PRESSURE: 126 MMHG | OXYGEN SATURATION: 99 % | TEMPERATURE: 98.3 F | DIASTOLIC BLOOD PRESSURE: 72 MMHG

## 2022-12-15 PROCEDURE — G0151 HHCP-SERV OF PT,EA 15 MIN: HCPCS

## 2022-12-16 ENCOUNTER — HOME CARE VISIT (OUTPATIENT)
Dept: HOME HEALTH SERVICES | Facility: HOME HEALTH | Age: 57
End: 2022-12-16
Payer: MEDICARE

## 2022-12-19 ENCOUNTER — HOME CARE VISIT (OUTPATIENT)
Dept: HOME HEALTH SERVICES | Facility: HOME HEALTH | Age: 57
End: 2022-12-19
Payer: MEDICARE

## 2022-12-20 ENCOUNTER — TELEPHONE (OUTPATIENT)
Dept: FAMILY MEDICINE CLINIC | Age: 57
End: 2022-12-20

## 2022-12-20 ENCOUNTER — HOME CARE VISIT (OUTPATIENT)
Dept: SCHEDULING | Facility: HOME HEALTH | Age: 57
End: 2022-12-20
Payer: MEDICARE

## 2022-12-20 VITALS
OXYGEN SATURATION: 97 % | SYSTOLIC BLOOD PRESSURE: 117 MMHG | HEART RATE: 63 BPM | DIASTOLIC BLOOD PRESSURE: 70 MMHG | TEMPERATURE: 98.3 F | RESPIRATION RATE: 16 BRPM

## 2022-12-20 DIAGNOSIS — K59.09 CHRONIC CONSTIPATION: Primary | ICD-10-CM

## 2022-12-20 DIAGNOSIS — K58.2 IRRITABLE BOWEL SYNDROME WITH BOTH CONSTIPATION AND DIARRHEA: ICD-10-CM

## 2022-12-20 PROCEDURE — G0152 HHCP-SERV OF OT,EA 15 MIN: HCPCS

## 2022-12-20 NOTE — HOME HEALTH
SUBJECTIVE: Pt expressing pain generalized. Pt expressing fatigue this morning. CAREGIVER INVOLVEMENT/ASSISTANCE NEEDED FOR: Pt has family/friends assist with ADLs/IADLs and functional mobility as needed    [address] ORDERED/DELIVERED THIS VISIT INCLUDE: N/A    OBJECTIVE: See interventions    PATIENT RESPONSE TO TREATMENT: Pt responded fairly with home health OT services with frequnet deniles of treatment services and increased time/cues for motivation    PATIENT LEVEL OF UNDERSTANDING OF EDUCATION PROVIDED: Pt educated to continue with her BUE fine motor and gross motor HEP as tolerated. Pt educated to continue to implament energy conservation techniques within her daily routine. OCCUPATIONAL THERAPY DISCHARGE:  Pt has been seen by home health occupational therapy services due MS exasperation. She has missed 2 OT visits and is requesting to be discharged from home health occupational therapy services. Progress towards her goals has been limited due to only being seen for 1 OT treatment session. ADLs: Unable to address ADLs due to limited sessions. Pt however educated on adaptive equipment/technique options. Functional mobility: Unable to assess/progress functional mobility including toilet and shower due to pt declining and limited treatment sessions. Pt noted to acend/decend full flight of steps with SBA/supervision with increased time for completion. Pt reports she has shower chair and is trying to get grab bar placement. Pt educated on optional tub bench options. BUE Function: Pt has been provided with her BUE HEP for gross motor and fine motor. Limited sessions to assess consistency and caregiver of her BUE HEP. Pt has handout to reference for both her fine motor and gross motor BUE HEP. Pt BUE gross strength noted to be 4-/5 at discharge.   Energy Conservation: Pt has been educated on energy conservation techniques to reduce her fatigue and increase tolerance for completion of her daily routine. Pt has progressed Modified Meghan scale rating to 5/10. Pt requesting discharge from home health occupational therapy services and OT to oblige. Pt medications have been reconciled. Pt to discharge home with caregivers support.

## 2022-12-20 NOTE — Clinical Note
OCCUPATIONAL THERAPY DISCHARGE:  Pt has been seen by home health occupational therapy services due MS exasperation. She has missed 2 OT visits and is requesting to be discharged from home health occupational therapy services. Progress towards her goals has been limited due to only being seen for 1 OT treatment session. ADLs: Unable to address ADLs due to limited sessions. Pt however educated on adaptive equipment/technique options. Functional mobility: Unable to assess/progress functional mobility including toilet and shower due to pt declining and limited treatment sessions. Pt noted to acend/decend full flight of steps with SBA/supervision with increased time for completion. Pt reports she has shower chair and is trying to get grab bar placement. Pt educated on optional tub bench options. BUE Function: Pt has been provided with her BUE HEP for gross motor and fine motor. Limited sessions to assess consistency and caregiver of her BUE HEP. Pt has handout to reference for both her fine motor and gross motor BUE HEP. Pt BUE gross strength noted to be 4-/5 at discharge. Energy Conservation: Pt has been educated on energy conservation techniques to reduce her fatigue and increase tolerance for completion of her daily routine. Pt has progressed Modified Meghan scale rating to 5/10. Pt requesting discharge from home health occupational therapy services and OT to oblige. Pt medications have been reconciled. Pt to discharge home with caregivers support.

## 2022-12-20 NOTE — TELEPHONE ENCOUNTER
Patient notified of [address] she can contact to inquire about an aide to assist her in her home.   She was given the information for the following:  Patient's [address]   [phone]  [facility] [employer]  [phone]

## 2022-12-20 NOTE — TELEPHONE ENCOUNTER
Patient states she recently started a treatment for her vitamin D and had recent labs drawn. Please advise results.

## 2022-12-21 NOTE — TELEPHONE ENCOUNTER
Please inform patient that her vitamin D level is now normal at 54.5. She no longer needs to take the vitamin D supplements.

## 2022-12-22 DIAGNOSIS — R60.9 EDEMA, UNSPECIFIED TYPE: ICD-10-CM

## 2022-12-22 DIAGNOSIS — R07.9 CHEST PAIN, UNSPECIFIED TYPE: ICD-10-CM

## 2022-12-22 DIAGNOSIS — R06.02 SOB (SHORTNESS OF BREATH): ICD-10-CM

## 2022-12-23 NOTE — TELEPHONE ENCOUNTER
Spoke to the patient over the phone. She has a history of IBS with diarrhea alternating with constipation. She has been following with [facility] and has tried multiple medications with no improvement. Today, she is reporting abdominal bloating with intermittent constipation and diarrhea. She is requesting to see another GI.

## 2023-01-16 ENCOUNTER — HOSPITAL ENCOUNTER (OUTPATIENT)
Dept: INFUSION THERAPY | Age: 58
Discharge: HOME | End: 2023-01-16
Payer: MEDICARE

## 2023-01-16 VITALS
OXYGEN SATURATION: 100 % | DIASTOLIC BLOOD PRESSURE: 78 MMHG | SYSTOLIC BLOOD PRESSURE: 118 MMHG | HEART RATE: 76 BPM | RESPIRATION RATE: 16 BRPM | TEMPERATURE: 97 F

## 2023-01-16 DIAGNOSIS — G35 MULTIPLE SCLEROSIS (HCC): Primary | ICD-10-CM

## 2023-01-16 LAB
ALBUMIN SERPL-MCNC: 4.1 G/DL (ref 3.4–5)
ALBUMIN/GLOB SERPL: 1.1 (ref 0.8–1.7)
ALP SERPL-CCNC: 104 U/L (ref 45–117)
ALT SERPL-CCNC: 21 U/L (ref 13–56)
ANION GAP SERPL CALC-SCNC: 4 MMOL/L (ref 3–18)
AST SERPL-CCNC: 23 U/L (ref 10–38)
BASO+EOS+MONOS # BLD AUTO: 0.6 K/UL (ref 0–2.3)
BASO+EOS+MONOS NFR BLD AUTO: 12 % (ref 0.1–17)
BILIRUB SERPL-MCNC: 0.8 MG/DL (ref 0.2–1)
BUN SERPL-MCNC: 9 MG/DL (ref 7–18)
BUN/CREAT SERPL: 11 (ref 12–20)
CALCIUM SERPL-MCNC: 9.3 MG/DL (ref 8.5–10.1)
CHLORIDE SERPL-SCNC: 107 MMOL/L (ref 100–111)
CO2 SERPL-SCNC: 29 MMOL/L (ref 21–32)
CREAT SERPL-MCNC: 0.8 MG/DL (ref 0.6–1.3)
DIFFERENTIAL METHOD BLD: NORMAL
ERYTHROCYTE [DISTWIDTH] IN BLOOD BY AUTOMATED COUNT: 13.9 % (ref 11.5–14.5)
GLOBULIN SER CALC-MCNC: 3.8 G/DL (ref 2–4)
GLUCOSE SERPL-MCNC: 104 MG/DL (ref 74–99)
HCT VFR BLD AUTO: 42.4 % (ref 36–48)
HGB BLD-MCNC: 13.6 G/DL (ref 12–16)
LYMPHOCYTES # BLD: 2 K/UL (ref 1.1–5.9)
LYMPHOCYTES NFR BLD: 42 % (ref 14–44)
MCH RBC QN AUTO: 27.6 PG (ref 25–35)
MCHC RBC AUTO-ENTMCNC: 32.1 G/DL (ref 31–37)
MCV RBC AUTO: 86.2 FL (ref 78–102)
NEUTS SEG # BLD: 2.2 K/UL (ref 1.8–9.5)
NEUTS SEG NFR BLD: 47 % (ref 40–70)
PLATELET # BLD AUTO: 306 K/UL (ref 140–440)
POTASSIUM SERPL-SCNC: 4.1 MMOL/L (ref 3.5–5.5)
PROT SERPL-MCNC: 7.9 G/DL (ref 6.4–8.2)
RBC # BLD AUTO: 4.92 M/UL (ref 4.1–5.1)
SODIUM SERPL-SCNC: 140 MMOL/L (ref 136–145)
WBC # BLD AUTO: 4.8 K/UL (ref 4.5–13)

## 2023-01-16 PROCEDURE — 74011000250 HC RX REV CODE- 250: Performed by: NURSE PRACTITIONER

## 2023-01-16 PROCEDURE — 96375 TX/PRO/DX INJ NEW DRUG ADDON: CPT

## 2023-01-16 PROCEDURE — 80053 COMPREHEN METABOLIC PANEL: CPT

## 2023-01-16 PROCEDURE — 96366 THER/PROPH/DIAG IV INF ADDON: CPT

## 2023-01-16 PROCEDURE — 74011250636 HC RX REV CODE- 250/636: Performed by: NURSE PRACTITIONER

## 2023-01-16 PROCEDURE — 74011250637 HC RX REV CODE- 250/637: Performed by: NURSE PRACTITIONER

## 2023-01-16 PROCEDURE — 96365 THER/PROPH/DIAG IV INF INIT: CPT

## 2023-01-16 PROCEDURE — 85025 COMPLETE CBC W/AUTO DIFF WBC: CPT

## 2023-01-16 RX ORDER — HEPARIN 100 UNIT/ML
500 SYRINGE INTRAVENOUS AS NEEDED
Start: 2023-07-17

## 2023-01-16 RX ORDER — ACETAMINOPHEN 325 MG/1
650 TABLET ORAL ONCE
OUTPATIENT
Start: 2023-07-17 | End: 2023-07-17

## 2023-01-16 RX ORDER — ACETAMINOPHEN 325 MG/1
650 TABLET ORAL ONCE
Status: COMPLETED | OUTPATIENT
Start: 2023-01-16 | End: 2023-01-16

## 2023-01-16 RX ORDER — EPINEPHRINE 1 MG/ML
0.3 INJECTION, SOLUTION, CONCENTRATE INTRAVENOUS AS NEEDED
OUTPATIENT
Start: 2023-07-17

## 2023-01-16 RX ORDER — DIPHENHYDRAMINE HYDROCHLORIDE 50 MG/ML
50 INJECTION, SOLUTION INTRAMUSCULAR; INTRAVENOUS ONCE
Status: COMPLETED | OUTPATIENT
Start: 2023-01-16 | End: 2023-01-16

## 2023-01-16 RX ORDER — HYDROCORTISONE SODIUM SUCCINATE 100 MG/2ML
100 INJECTION, POWDER, FOR SOLUTION INTRAMUSCULAR; INTRAVENOUS AS NEEDED
OUTPATIENT
Start: 2023-07-17

## 2023-01-16 RX ORDER — DIPHENHYDRAMINE HYDROCHLORIDE 50 MG/ML
25 INJECTION, SOLUTION INTRAMUSCULAR; INTRAVENOUS AS NEEDED
Start: 2023-07-17

## 2023-01-16 RX ORDER — ONDANSETRON 2 MG/ML
8 INJECTION INTRAMUSCULAR; INTRAVENOUS AS NEEDED
OUTPATIENT
Start: 2023-07-17

## 2023-01-16 RX ORDER — SODIUM CHLORIDE 0.9 % (FLUSH) 0.9 %
5-40 SYRINGE (ML) INJECTION AS NEEDED
OUTPATIENT
Start: 2023-07-17

## 2023-01-16 RX ORDER — ACETAMINOPHEN 325 MG/1
650 TABLET ORAL AS NEEDED
Start: 2023-07-17

## 2023-01-16 RX ORDER — SODIUM CHLORIDE 9 MG/ML
5-250 INJECTION, SOLUTION INTRAVENOUS AS NEEDED
OUTPATIENT
Start: 2023-07-17

## 2023-01-16 RX ORDER — ALBUTEROL SULFATE 0.83 MG/ML
2.5 SOLUTION RESPIRATORY (INHALATION) AS NEEDED
Start: 2023-07-17

## 2023-01-16 RX ORDER — SODIUM CHLORIDE 9 MG/ML
5-40 INJECTION INTRAVENOUS AS NEEDED
OUTPATIENT
Start: 2023-07-17

## 2023-01-16 RX ORDER — SODIUM CHLORIDE 9 MG/ML
5-250 INJECTION, SOLUTION INTRAVENOUS AS NEEDED
Status: DISPENSED | OUTPATIENT
Start: 2023-01-16 | End: 2023-01-16

## 2023-01-16 RX ORDER — SODIUM CHLORIDE 0.9 % (FLUSH) 0.9 %
5-40 SYRINGE (ML) INJECTION AS NEEDED
Status: DISPENSED | OUTPATIENT
Start: 2023-01-16 | End: 2023-01-16

## 2023-01-16 RX ORDER — DIPHENHYDRAMINE HYDROCHLORIDE 50 MG/ML
50 INJECTION, SOLUTION INTRAMUSCULAR; INTRAVENOUS ONCE
OUTPATIENT
Start: 2023-07-17 | End: 2023-07-17

## 2023-01-16 RX ORDER — DIPHENHYDRAMINE HYDROCHLORIDE 50 MG/ML
50 INJECTION, SOLUTION INTRAMUSCULAR; INTRAVENOUS AS NEEDED
Start: 2023-07-17

## 2023-01-16 RX ADMIN — ACETAMINOPHEN 650 MG: 325 TABLET, FILM COATED ORAL at 09:48

## 2023-01-16 RX ADMIN — SODIUM CHLORIDE 30 ML/HR: 9 INJECTION, SOLUTION INTRAVENOUS at 09:40

## 2023-01-16 RX ADMIN — OCRELIZUMAB 600 MG: 300 INJECTION INTRAVENOUS at 10:30

## 2023-01-16 RX ADMIN — SODIUM CHLORIDE, PRESERVATIVE FREE 10 ML: 5 INJECTION INTRAVENOUS at 11:10

## 2023-01-16 RX ADMIN — DIPHENHYDRAMINE HYDROCHLORIDE 50 MG: 50 INJECTION INTRAMUSCULAR; INTRAVENOUS at 09:52

## 2023-01-16 RX ADMIN — METHYLPREDNISOLONE SODIUM SUCCINATE 125 MG: 125 INJECTION, POWDER, FOR SOLUTION INTRAMUSCULAR; INTRAVENOUS at 09:55

## 2023-01-16 RX ADMIN — SODIUM CHLORIDE, PRESERVATIVE FREE 10 ML: 5 INJECTION INTRAVENOUS at 11:15

## 2023-01-16 RX ADMIN — SODIUM CHLORIDE, PRESERVATIVE FREE 10 ML: 5 INJECTION INTRAVENOUS at 09:30

## 2023-01-16 NOTE — PROGRESS NOTES
[facility] Progress Note    Date: 2023    Name: Cris Slater    MRN: [id number]         : 1965    Ms. Sp Padilla arrived in the [facility] today at 0900, in stable condition, here for her OCREVUS INFUSION (EVERY 6 MONTHS). She was assessed and education was provided. Ms. Jacobs's vitals were reviewed. Visit Vitals  /82 (BP 1 Location: Right upper arm, BP Patient Position: Sitting)   Pulse 67   Temp 97.8 °F (36.6 °C)   Resp 16   SpO2 100%   Breastfeeding No        Ms. Sp Padilla stated that she has been tolerating the Ocrevus infusions well and without any side effects. Also, she denied any possibility of pregnancy, any symptoms of active infection and denied being on any current antibiotic therapy. PIV # 25 G was established in her posterior left hand at 0930 without incident, and brisk blood return was obtained. Blood for the ordered labs (CBC & CMP) was drawn. The CBC was processed on site, and the CMP was sent out to the [facility] hospital lab by  for processing. The CBC results from today were as follows:    Recent Results (from the past 12 hour(s))   CBC WITH 3 PART DIFF    Collection Time: 23  9:30 AM   Result Value Ref Range    WBC 4.8 4.5 - 13.0 K/uL    RBC 4.92 4.10 - 5.10 M/uL    HGB 13.6 12.0 - 16 g/dL    HCT 42.4 36 - 48 %    MCV 86.2 78 - 102 FL    MCH 27.6 25.0 - 35.0 PG    MCHC 32.1 31 - 37 g/dL    RDW 13.9 11.5 - 14.5 %    PLATELET 701 974 - 797 K/uL    NEUTROPHILS 47 40 - 70 %    Mixed cells 12 0.1 - 17 %    LYMPHOCYTES 42 14 - 44 %    ABS. NEUTROPHILS 2.2 1.8 - 9.5 K/UL    ABS. MIXED CELLS 0.6 0.0 - 2.3 K/uL    ABS. LYMPHOCYTES 2.0 1.1 - 5.9 K/UL    DF AUTOMATED            ml IV BAG was initiated to infuse @ [facility] throughout treatment today. The following pre-medications were administered per order, approximately 30 minutes prior to starting the Ocrevus: TYLENOL 650 mg PO, BENADRYL 50 mg IV, & SOLUMEDROL 125 mg IV.        Ocrelizumab (OCREVUS) 600 mg IV, was administered over approximately 2 hours, per order and without incident, using the following rate titration:    100 ml/hr X 15 minutes,   200 ml/hr X 15 minutes,   250 ml/hr X 30 minutes,   300 ml/hr until completion. Please note that at 1110, the above stated PIV in her LEFT posterior hand was noted to be very slightly red & slightly puffy, just above the PIV insertion site, and Ms. David Shetty stated that it was just a little painful. So, the PIV was flushed with 10 ml NS, and then was removed and gauze/coban was applied. PIV # 25 G was established in her posterior RIGHT hand at 1115 without incident, and brisk blood return was obtained. The NS primary bag & OCREVUS infusion were immediately resumed without incident. After completion of the OCREVUS infusion, her PIV was flushed well per policy with NS, and then was removed and gauze/coban was applied. Ms. David Shetty tolerated treatment well today, and voiced no complaints. Ms. David Shetty was discharged from [place] [address] in stable condition at 1335. She is to return in 6 months, on Monday, 7-17-23 at 0900, for her next appointment for her next OCREVUS infusion.      Keke Harley RN  January 16, 2023  9:43 AM

## 2023-01-19 ENCOUNTER — DOCUMENTATION ONLY (OUTPATIENT)
Dept: PULMONOLOGY | Age: 58
End: 2023-01-19

## 2023-01-19 NOTE — PROGRESS NOTES
Called pt re new patient ref, pt states that she no longer needs to be seen by pulmonary. Ref will be closed.

## 2023-01-26 ENCOUNTER — TRANSCRIBE ORDER (OUTPATIENT)
Dept: SCHEDULING | Age: 58
End: 2023-01-26

## 2023-01-26 DIAGNOSIS — Z12.31 VISIT FOR SCREENING MAMMOGRAM: Primary | ICD-10-CM

## 2023-01-30 ENCOUNTER — TELEPHONE (OUTPATIENT)
Dept: NEUROLOGY | Age: 58
End: 2023-01-30

## 2023-02-01 DIAGNOSIS — Z12.31 VISIT FOR SCREENING MAMMOGRAM: Primary | ICD-10-CM

## 2023-02-05 DIAGNOSIS — Z12.31 VISIT FOR SCREENING MAMMOGRAM: Primary | ICD-10-CM

## 2023-02-17 ENCOUNTER — TELEPHONE (OUTPATIENT)
Facility: CLINIC | Age: 58
End: 2023-02-17

## 2023-02-17 DIAGNOSIS — K21.01 GASTRO-ESOPHAGEAL REFLUX DISEASE WITH ESOPHAGITIS, WITH BLEEDING: Primary | ICD-10-CM

## 2023-02-17 RX ORDER — PANTOPRAZOLE SODIUM 40 MG/1
40 TABLET, DELAYED RELEASE ORAL
Qty: 60 TABLET | Refills: 2 | Status: SHIPPED | OUTPATIENT
Start: 2023-02-17

## 2023-02-17 NOTE — TELEPHONE ENCOUNTER
----- Message from Kimberli Pavel sent at 2/17/2023  9:10 AM EST -----  Subject: Refill Request    QUESTIONS  Name of Medication? Other - pantoprazole  Patient-reported dosage and instructions? 40mg tab twice daily  How many days do you have left? 2  Preferred Pharmacy? [facility] phone number (if available)? [phone]  Additional Information for Provider? Pt only has 5 pills left - wants to   make sure this gets refilled; pt has appt on 2/23; please call pt back to   advise  ---------------------------------------------------------------------------  --------------  CALL BACK INFO  What is the best way for the office to contact you? OK to leave message on   voicemail  Preferred Call Back Phone Number? [phone]  ---------------------------------------------------------------------------  --------------  SCRIPT ANSWERS  Relationship to Patient?  Self

## 2023-02-20 ENCOUNTER — HOSPITAL ENCOUNTER (OUTPATIENT)
Facility: HOSPITAL | Age: 58
Discharge: HOME | End: 2023-02-23

## 2023-02-20 DIAGNOSIS — Z12.31 VISIT FOR SCREENING MAMMOGRAM: ICD-10-CM

## 2023-02-23 ENCOUNTER — OFFICE VISIT (OUTPATIENT)
Facility: CLINIC | Age: 58
End: 2023-02-23
Payer: COMMERCIAL

## 2023-02-23 VITALS
RESPIRATION RATE: 14 BRPM | SYSTOLIC BLOOD PRESSURE: 144 MMHG | DIASTOLIC BLOOD PRESSURE: 82 MMHG | HEIGHT: 64 IN | WEIGHT: 167 LBS | OXYGEN SATURATION: 98 % | BODY MASS INDEX: 28.51 KG/M2

## 2023-02-23 DIAGNOSIS — R73.9 ELEVATED BLOOD SUGAR: ICD-10-CM

## 2023-02-23 DIAGNOSIS — K58.1 IRRITABLE BOWEL SYNDROME WITH CONSTIPATION: ICD-10-CM

## 2023-02-23 DIAGNOSIS — Z91.81 AT HIGH RISK FOR FALLS: ICD-10-CM

## 2023-02-23 DIAGNOSIS — R26.81 UNSTEADY GAIT: ICD-10-CM

## 2023-02-23 DIAGNOSIS — R73.03 PREDIABETES: ICD-10-CM

## 2023-02-23 DIAGNOSIS — K21.9 GASTROESOPHAGEAL REFLUX DISEASE, UNSPECIFIED WHETHER ESOPHAGITIS PRESENT: Primary | ICD-10-CM

## 2023-02-23 DIAGNOSIS — R55 SYNCOPE, UNSPECIFIED SYNCOPE TYPE: ICD-10-CM

## 2023-02-23 DIAGNOSIS — R26.81 UNSTEADINESS ON FEET: ICD-10-CM

## 2023-02-23 DIAGNOSIS — G35 MULTIPLE SCLEROSIS (HCC): ICD-10-CM

## 2023-02-23 DIAGNOSIS — B37.31 CANDIDA VAGINITIS: ICD-10-CM

## 2023-02-23 LAB — HBA1C MFR BLD: 6.2 %

## 2023-02-23 PROCEDURE — 83036 HEMOGLOBIN GLYCOSYLATED A1C: CPT | Performed by: STUDENT IN AN ORGANIZED HEALTH CARE EDUCATION/TRAINING PROGRAM

## 2023-02-23 PROCEDURE — 99214 OFFICE O/P EST MOD 30 MIN: CPT | Performed by: STUDENT IN AN ORGANIZED HEALTH CARE EDUCATION/TRAINING PROGRAM

## 2023-02-23 SDOH — ECONOMIC STABILITY: HOUSING INSECURITY
IN THE LAST 12 MONTHS, WAS THERE A TIME WHEN YOU DID NOT HAVE A STEADY PLACE TO SLEEP OR SLEPT IN A SHELTER (INCLUDING NOW)?: NO

## 2023-02-23 SDOH — ECONOMIC STABILITY: INCOME INSECURITY: HOW HARD IS IT FOR YOU TO PAY FOR THE VERY BASICS LIKE FOOD, HOUSING, MEDICAL CARE, AND HEATING?: VERY HARD

## 2023-02-23 SDOH — ECONOMIC STABILITY: FOOD INSECURITY: WITHIN THE PAST 12 MONTHS, YOU WORRIED THAT YOUR FOOD WOULD RUN OUT BEFORE YOU GOT MONEY TO BUY MORE.: OFTEN TRUE

## 2023-02-23 SDOH — ECONOMIC STABILITY: FOOD INSECURITY: WITHIN THE PAST 12 MONTHS, THE FOOD YOU BOUGHT JUST DIDN'T LAST AND YOU DIDN'T HAVE MONEY TO GET MORE.: OFTEN TRUE

## 2023-02-23 ASSESSMENT — ENCOUNTER SYMPTOMS
VOMITING: 0
DIARRHEA: 0
BACK PAIN: 0
WHEEZING: 0
CONSTIPATION: 1
BLOOD IN STOOL: 0
ABDOMINAL PAIN: 1
NAUSEA: 1
COUGH: 0
RHINORRHEA: 0
CHEST TIGHTNESS: 0
SHORTNESS OF BREATH: 1

## 2023-02-23 NOTE — PROGRESS NOTES
Elizabeth Seay is a 59 y.o. female presenting today for Follow-up Chronic Condition (VitaminD level, medicare advised to place referral for orthopedic shoes, GI doctor needs referral sent to [facility], lips are becoming very dry and the skin is peeling off, liver enzymes, left sided abdominal burning & discomfort. Bilateral feet swelling & right leg pain. Will have optometry appt on 3/23 for itchiness, redness. Is requesting PT to come to home again, states that she is needing help with bathing. Constipation, miralax is not working, has been drinking pomeram juice. Vaginal pain & discharge)  . Chief Complaint   Patient presents with    Follow-up Chronic Condition     VitaminD level, medicare advised to place referral for orthopedic shoes, GI doctor needs referral sent to [facility], lips are becoming very dry and the skin is peeling off, liver enzymes, left sided abdominal burning & discomfort. Bilateral feet swelling & right leg pain. Will have optometry appt on 3/23 for itchiness, redness. Is requesting PT to come to home again, states that she is needing help with bathing. Constipation, miralax is not working, has been drinking pomeram juice. Vaginal pain & discharge       HPI:  Elizabeth Seay presents to the office today for follow up. Patient has a past medical history of multiple sclerosis, arthritis, chronic constipation with IBS. Patient has multiple chronic complaints-generalized body aches, abdominal pain. Patient has history of osteoarthritis-she is taking NSAIDs. Last visit, she was started on Protonix and advised to cut down on NSAID use. Patient was advised to try Cymbalta in the past by neurology-however patient refused due to concern of side effects. Chronic constipation with IBS: She has previously tried Linzess, admits but failed due to diarrhea. Did not like lactulose. Failed Dulcolax, Fleet enemas, Metamucil due to lack of improvement.   She is following with GI. She is now on MiraLAX but feels it does not help. She wants to see another GI physician for second opinion. Multiple sclerosis: Patient is following with neurology. She is undergoing Ocrevus infusions. Recently has been reporting gait instability, palpitations. States she felt unbalanced and has had near falls. She desires to reestablish with PT/OT. She is also following with podiatry. She has been reporting episodes of dizziness/syncope along with LE swelling. She saw cardiology in 9/22. Echo was ordered along with a cardiac Holter monitor. Echo showed LVEF 60-65% with normal diastolic function, mild MR. Results of Holter monitor are pending. She has a follow-up appointment scheduled with cardiology. Patient has been complaining of dysphagia. She had an EGD recently which showed normal mucosa in the stomach and duodenum. She was found to have nonobstructive dysphagia and underwent dilation. Modified barium swallow showed normal oropharyngeal function. Ex-smoker: She used to smoke 0.5 ppd for 25 years. She reports dyspnea on exertion and occassional wheezing. She intermittently feels SOB. This is improved as compared to prior visit. Vitamin D deficiency: Initially screened with vitamin D level of 14.2. She was provided with high-dose vitamin D supplements. Repeat vitamin D level was 54. Alopecia: Patient is suffering from hair loss. She is following with dermatology for further evaluation. Today, patient is complaining of vaginal discharge and itching itching since the past few days. Discharge is whitish/creamy. She has scheduled an appointment with OB/GYN. Health care maintenance:  Mammogram in 2/22 was BI-RADS 1  Colonoscopy in 2015-repeat in 10 years recommended. Review of Systems   Constitutional:  Negative for activity change, appetite change, chills, diaphoresis, fatigue, fever and unexpected weight change.    HENT:  Negative for congestion, nosebleeds, postnasal drip and rhinorrhea. Respiratory:  Positive for shortness of breath. Negative for cough, chest tightness and wheezing. Cardiovascular:  Negative for chest pain and leg swelling. Gastrointestinal:  Positive for abdominal pain, constipation and nausea. Negative for blood in stool, diarrhea and vomiting. Endocrine: Negative for polydipsia and polyphagia. Genitourinary:  Positive for vaginal discharge. Negative for decreased urine volume, dysuria, frequency and pelvic pain. Musculoskeletal:  Positive for arthralgias, gait problem and myalgias. Negative for back pain and neck pain. Neurological:  Positive for syncope, weakness and numbness. Negative for dizziness, tremors, seizures, speech difficulty and headaches. Psychiatric/Behavioral:  Negative for agitation and confusion. The patient is not nervous/anxious. All other systems reviewed and are negative. Allergies   Allergen Reactions    Naproxen Shortness Of Breath    Shellfish Allergy Nausea And Vomiting     SEVERE NAUSEA AND VOMITING  Other reaction(s): gi distress  SEVERE NAUSEA AND VOMITING    Amoxicillin Nausea Only and Rash     Other reaction(s): unknown       PHQ Screening   No flowsheet data found.     History  Past Medical History:   Diagnosis Date    Chest pain 11/2014    neg EKG, non recurrent    Chronic pain     lower back and legs    Depression     Eczema     Fibroids     GERD (gastroesophageal reflux disease)     H/O seasonal allergies     Headache(784.0)     Hiatal hernia     IBS (irritable bowel syndrome)     Microscopic hematuria     MS (multiple sclerosis) (HCC)     Rectal bleeding     Stool color black     irritable bowel       Past Surgical History:   Procedure Laterality Date    BREAST BIOPSY Right 1/21/2015    RIGHT BREAST BIOPSY WITH NEEDLE LOCALIZATION ULTRASOUND performed by Jcarlos Hui MD at [address] [place] [address] ([address] [place] [address] TUBAL LIGATION         Social History     Socioeconomic History    Marital status: Single     Spouse name: Not on file    Number of children: Not on file    Years of education: Not on file    Highest education level: Not on file   Occupational History    Not on file   Tobacco Use    Smoking status: Former     Packs/day: 0.00     Types: Cigarettes     Quit date: 2016     Years since quittin.6    Smokeless tobacco: Former     Quit date: 2016   Vaping Use    Vaping Use: Never used   Substance and Sexual Activity    Alcohol use: No     Alcohol/week: 10.0 standard drinks    Drug use: No    Sexual activity: Not on file   Other Topics Concern    Not on file   Social History Narrative    Not on file     Social Determinants of Health     Financial Resource Strain: High Risk    Difficulty of Paying Living Expenses: Very hard   Food Insecurity: Food Insecurity Present    Worried About [employer] [employer] in the Last Year: Often true    Ran Out of Food in the Last Year: Often true   Transportation Needs: Unknown    Lack of Transportation (Medical): Not on file    Lack of Transportation (Non-Medical): No   Physical Activity: Not on file   Stress: Not on file   Social Connections: Not on file   Intimate Partner Violence: Not on file   Housing Stability: Unknown    Unable to Pay for Housing in the Last Year: Not on file    Number of Places Lived in the Last Year: Not on file    Unstable Housing in the Last Year: No       Current Outpatient Medications   Medication Sig Dispense Refill    fluconazole (DIFLUCAN) 150 MG tablet Take 1 tablet by mouth once for 1 dose 1 tablet 0    Misc.  Devices (WALKER) MISC Front wheel walker      albuterol sulfate HFA (PROVENTIL;VENTOLIN;PROAIR) 108 (90 Base) MCG/ACT inhaler Inhale 1 puff into the lungs every 4 hours as needed      alclomethasone (ACLOVATE) 0.05 % ointment Apply 0.05 % topically 2 times daily as needed      Baclofen (LIORESAL) 5 MG tablet Take 5 mg by mouth 3 times daily      betamethasone valerate (VALISONE) 0.1 % lotion Apply 1 applicator topically as needed      Biotin 1000 MCG CHEW Take 1,000 mcg by mouth daily      desonide (DESOWEN) 0.05 % cream ceived the following from [facility]: Outside name: desonide (TRIDESILON) 0.05 % cream      doxycycline (VIBRAMYCIN) 50 MG capsule ceived the following from [facility]: Outside name: doxycycline (VIBRAMYCIN) 50 mg capsule      dupilumab (DUPIXENT) 300 MG/2ML SOPN injection Once every 2 weeks. Indications: a type of allergy that causes red and itchy skin called atopic dermatitis      fluticasone (FLONASE) 50 MCG/ACT nasal spray 2 sprays by Nasal route daily      ketoconazole (NIZORAL) 2 % shampoo ceived the following from [facility]: Outside name: ketoconazole (NIZORAL) 2 % shampoo      loratadine (CLARITIN) 10 MG tablet Take 10 mg by mouth daily      loteprednol (LOTEMAX) 0.5 % ophthalmic gel Apply 1 drop to eye 3 times daily      nabumetone (RELAFEN) 500 MG tablet TAKE 1 TABLET BY MOUTH TWICE DAILY      polyethylene glycol (GLYCOLAX) 17 GM/SCOOP powder Take 17 g by mouth daily      triamcinolone (KENALOG) 0.1 % ointment Apply topically 2 times daily      pantoprazole (PROTONIX) 40 MG tablet Take 1 tablet by mouth 2 times daily (before meals) 60 tablet 2    Biotin 5 MG TABS Take 1,000 mcg by mouth daily (Patient not taking: Reported on 2/23/2023)      pantoprazole (PROTONIX) 40 MG tablet Take 40 mg by mouth 2 times daily (Patient not taking: Reported on 2/23/2023)       No current facility-administered medications for this visit. BP (!) 144/82 (Site: Right Upper Arm, Position: Sitting, Cuff Size: Large Adult) Comment: takes meds daily  Resp 14   Ht 5' 4\" (1.626 m)   Wt 167 lb (75.8 kg)   SpO2 98%   BMI 28.67 kg/m²      Physical Exam  Vitals and nursing note reviewed. Constitutional:       General: She is not in acute distress. Appearance: Normal appearance.  She is not ill-appearing, toxic-appearing or diaphoretic. HENT:      Head: Atraumatic. Comments: alopecia  Eyes:      General: No scleral icterus. Extraocular Movements: Extraocular movements intact. Conjunctiva/sclera: Conjunctivae normal.      Pupils: Pupils are equal, round, and reactive to light. Cardiovascular:      Rate and Rhythm: Normal rate and regular rhythm. Pulses: Normal pulses. Heart sounds: Normal heart sounds. No murmur heard. Pulmonary:      Effort: Pulmonary effort is normal. No respiratory distress. Breath sounds: Normal breath sounds. Musculoskeletal:      Cervical back: Normal range of motion and neck supple. Right lower leg: Edema present. Left lower leg: No edema. Skin:     General: Skin is warm and dry. Neurological:      General: No focal deficit present. Mental Status: She is alert and oriented to person, place, and time. Mental status is at baseline. Cranial Nerves: No cranial nerve deficit. Motor: No weakness. Gait: Gait abnormal.      Comments: Using a cane    Psychiatric:         Behavior: Behavior normal.         Thought Content:  Thought content normal.         Judgment: Judgment normal.        Office Visit on 02/23/2023   Component Date Value Ref Range Status    Hemoglobin A1C, POC 02/23/2023 6.2  % Final   Orders Only on 12/01/2022   Component Date Value Ref Range Status    WBC 12/01/2022 5.6  3.4 - 10.8 x10E3/uL Final    RBC 12/01/2022 4.22  3.77 - 5.28 x10E6/uL Final    Hemoglobin 12/01/2022 11.9  11.1 - 15.9 g/dL Final    Hematocrit 12/01/2022 36.1  34.0 - 46.6 % Final    MCV 12/01/2022 86  79 - 97 fL Final    MCH 12/01/2022 28.2  26.6 - 33.0 pg Final    MCHC 12/01/2022 33.0  31.5 - 35.7 g/dL Final    RDW 12/01/2022 14.3  11.7 - 15.4 % Final    Platelets 01/21/9309 283  150 - 450 x10E3/uL Final    Neutrophils % 12/01/2022 45  Not Estab. % Final    Lymphocytes % 12/01/2022 42  Not Estab. % Final    Monocytes % 12/01/2022 10  Not Estab. % Final    Eosinophils % 12/01/2022 2  Not Estab. % Final    Basophils % 12/01/2022 1  Not Estab. % Final    Neutrophils Absolute 12/01/2022 2.6  1.4 - 7.0 x10E3/uL Final    Lymphocytes Absolute 12/01/2022 2.4  0.7 - 3.1 x10E3/uL Final    Monocytes Absolute 12/01/2022 0.5  0.1 - 0.9 x10E3/uL Final    Eosinophils Absolute 12/01/2022 0.1  0.0 - 0.4 x10E3/uL Final    Basophils Absolute 12/01/2022 0.0  0.0 - 0.2 x10E3/uL Final    Immature Granulocytes 12/01/2022 0  Not Estab. % Final    Granulocyte Absolute Count 12/01/2022 0.0  0.0 - 0.1 x10E3/uL Final    Glucose 12/01/2022 86  70 - 99 mg/dL Final    BUN 12/01/2022 7  6 - 24 mg/dL Final    Creatinine 12/01/2022 0.72  0.57 - 1.00 mg/dL Final    Est, Glomerular Filtration Rate 12/01/2022 96  >59 mL/min/1.73 Final    Bun/Cre Ratio 12/01/2022 10  9 - 23 NA Final    Sodium 12/01/2022 143  134 - 144 mmol/L Final    Potassium 12/01/2022 3.6  3.5 - 5.2 mmol/L Final    Chloride 12/01/2022 105  96 - 106 mmol/L Final    CO2 12/01/2022 24  20 - 29 mmol/L Final    Calcium 12/01/2022 8.9  8.7 - 10.2 mg/dL Final    Total Protein 12/01/2022 6.7  6.0 - 8.5 g/dL Final    Albumin 12/01/2022 4.3  3.8 - 4.9 g/dL Final    Globulin, Total 12/01/2022 2.4  1.5 - 4.5 g/dL Final    Albumin/Globulin Ratio 12/01/2022 1.8  1.2 - 2.2 NA Final    Total Bilirubin 12/01/2022 0.3  0.0 - 1.2 mg/dL Final    Alkaline Phosphatase 12/01/2022 112  44 - 121 IU/L Final    AST 12/01/2022 13  0 - 40 IU/L Final    ALT 12/01/2022 10  0 - 32 IU/L Final    Cholesterol, Total 12/01/2022 185  100 - 199 mg/dL Final    Triglycerides 12/01/2022 71  0 - 149 mg/dL Final    HDL 12/01/2022 62  >39 mg/dL Final    VLDL 12/01/2022 13  5 - 40 mg/dL Final    LDL Calculated 12/01/2022 110 (A)  0 - 99 mg/dL Final    Vit D, 25-Hydroxy 12/01/2022 54.5  30.0 - 100.0 ng/mL Final    Comment: Vitamin D deficiency has been defined by the [place] of  Medicine and an Endocrine Society practice guideline as a  level of serum 25-OH vitamin D less than 20 ng/mL (1,2). The Endocrine Society went on to further define vitamin D  insufficiency as a level between 21 and 29 ng/mL (2). 1. IOM ([place] of [employer]). 2010. Dietary reference     intakes for calcium and D. [facility]: The     [employer]. 2. Mor MF, Asa NC, Marty WILLIAMSON, et al.     Evaluation, treatment, and prevention of vitamin D     deficiency: an Endocrine Society clinical practice     guideline. JCEM. 2011 Jul; 96(2):1911-30.      Ancillary Procedure on 11/30/2022   Component Date Value Ref Range Status    IVSd 11/30/2022 0.9  0.6 - 0.9 cm Final    LVIDd 11/30/2022 4.5  3.9 - 5.3 cm Final    LVIDs 11/30/2022 3.0  cm Final    LVOT Diameter 11/30/2022 1.9  cm Final    LVPWd 11/30/2022 0.8  0.6 - 0.9 cm Final    EF BP 11/30/2022 62  55 - 100 % Final    LV Ejection Fraction A2C 11/30/2022 57  % Final    LV Ejection Fraction A4C 11/30/2022 68  % Final    LV EDV A2C 11/30/2022 79  mL Final    LV EDV A4C 11/30/2022 111  mL Final    LV EDV BP 11/30/2022 96  56 - 104 mL Final    LV ESV A2C 11/30/2022 34  mL Final    LV ESV A4C 11/30/2022 36  mL Final    LV ESV BP 11/30/2022 36  19 - 49 mL Final    LVOT Peak Gradient 11/30/2022 5  mmHg Final    LVOT Mean Gradient 11/30/2022 3  mmHg Final    LVOT SV 11/30/2022 72.8  ml Final    LVOT VTI 11/30/2022 25.7  cm Final    LA Diameter 11/30/2022 3.7  cm Final    LA Volume A/L 11/30/2022 56  mL Final    LA Volume 2C 11/30/2022 45  22 - 52 mL Final    LA Volume 4C 11/30/2022 53 (A)  22 - 52 mL Final    MV \"A\" Wave Duration 11/30/2022 150.3  msec Final    MV A Velocity 11/30/2022 0.91  m/s Final    MV E Wave Deceleration Time 11/30/2022 177.6  ms Final    MV E Velocity 11/30/2022 0.96  m/s Final    LV E' Lateral Velocity 11/30/2022 12  cm/s Final    LV E' Septal Velocity 11/30/2022 12  cm/s Final    TAPSE 11/30/2022 2.7  1.7 cm Final    TR Peak Gradient 11/30/2022 18  mmHg Final    TR Max Velocity 11/30/2022 2.14  m/s Final    Aortic Arch 11/30/2022 2.7  cm Final    Ascending Aorta 11/30/2022 2.7  cm Final    Aortic Root 11/30/2022 2.6  cm Final    Fractional Shortening 2D 11/30/2022 33  28 - 44 % Final    LV ESV Index BP 11/30/2022 20  mL/m2 Final    LV EDV Index BP 11/30/2022 52  mL/m2 Final    LV ESV Index A4C 11/30/2022 20  mL/m2 Final    LV EDV Index A4C 11/30/2022 60  mL/m2 Final    LV ESV Index A2C 11/30/2022 18  mL/m2 Final    LV EDV Index A2C 11/30/2022 43  mL/m2 Final    LVIDd Index 11/30/2022 2.45  cm/m2 Final    LVIDs Index 11/30/2022 1.63  cm/m2 Final    LV RWT Ratio 11/30/2022 0.36   Final    LV Mass 2D 11/30/2022 123.1  67 - 162 g Final    LV Mass 2D Index 11/30/2022 66.9  43 - 95 g/m2 Final    MV E/A 11/30/2022 1.05   Final    E/E' Ratio (Averaged) 11/30/2022 8.00   Final    E/E' Lateral 11/30/2022 8.00   Final    E/E' Septal 11/30/2022 8.00   Final    LA Volume Index A/L 11/30/2022 30  16 - 34 mL/m2 Final    LVOT Stroke Volume Index 11/30/2022 39.6  mL/m2 Final    LVOT Area 11/30/2022 2.8  cm2 Final    LA Volume Index 2C 11/30/2022 24  16 - 34 mL/m2 Final    LA Volume Index 4C 11/30/2022 29  16 - 34 mL/m2 Final    LA Size Index 11/30/2022 2.01  cm/m2 Final    LA/AO Root Ratio 11/30/2022 1.42   Final    Ao Root Index 11/30/2022 1.41  cm/m2 Final    Ascending Aorta Index 11/30/2022 1.47  cm/m2 Final    PASP 11/30/2022 21  mmHg Final    Left Ventricular Ejection Fraction 11/30/2022 63   Final-Edited    LVEF MODALITY 11/30/2022 ECHO   Final-Edited       Results for orders placed or performed during the hospital encounter of 02/20/23   KARLEE MARIETTA DIGITAL SCREEN BILATERAL    Narrative    BILATERAL SCREENING DIGITAL MAMMOGRAM WITH CAD WITH DIGITAL BREAST   TOMOSYNTHESIS  IMAGING/COMBINATION 2-D 3-D EXAM    CLINICAL HISTORY/INDICATION:  asymptomatic Encounter for screening   mammogram for  malignant neoplasm of breast    COMPARISON: mammogram 22 - 18    TECHNIQUE: 2-D and tomosynthesis 3-D digital mammograms were performed   with CC  and MLO views obtained of both breasts. CAD analysis was performed with   the  computer-aided detection system. Any areas marked correlated with the   mammogram.    Breast composition B: There are scattered areas of fibroglandular density. FINDINGS:     There are no suspicious masses, regions of distortion, nor suspicious  microcalcifications. Impression    No suspicious finding for malignancy. BIRADS CATEGORY: BI-RADS one-negative mammogram  Management Recommendation: Annual screening mammogram.         Results for orders placed or performed in visit on 02/23/23   AMB POC HEMOGLOBIN A1C   Result Value Ref Range    Hemoglobin A1C, POC 6.2 %       Patient Care Team:  Patient Care Team:  Manolo Lechuga MD as PCP - Ashlee Thompson MD as PCP - Empaneled Provider  Martha Astudillo as Physician Assistant      Assessment / Plan:     Diagnosis Orders   1. Gastroesophageal reflux disease, unspecified whether esophagitis present  External Referral To Gastroenterology      2. Elevated blood sugar        3. Prediabetes  AMB POC HEMOGLOBIN A1C      4. Multiple sclerosis ([facility] [address]  [address]      5. Unsteadiness on feet  [facility], [facility]      6. Syncope, unspecified syncope type        7. Unsteady gait  [facility], [facility]      8. At high risk for falls  [address]      9. Irritable bowel syndrome with constipation  External Referral To Gastroenterology      10. Candida vaginitis  fluconazole (DIFLUCAN) 150 MG tablet        Labs reviewed. Multiple sclerosis-patient is following with neurology. Reports episodes of gait instability, near falls. Patient is requesting PT at home. Candida vaginitis: Fluconazole prescribed. Patient has an appointment with OB/GYN coming up.     Alopecia/dermatitis-patient is following with dermatology    Dysphagia-following with GI. She continues to have chronic constipation, has tried and failed on multiple medications. Wants to see another GI physician. Referred. Prediabetes: Patient reports history of prediabetes. HbA1c today is 6.2%. Counseled on cutting down on sugary snacks/drinks. Generalized musculoskeletal pain: She wants to continue taking NSAIDs. I discussed in detail the side effects of prolonged NSAID use. Continue Protonix. Episodes of dizziness/syncope: Echo reviewed. Results of cardiac monitor are pending. She has a follow-up appointment scheduled with cardiology. Elevated BP: Recheck at next visit     Patient has difficulty with ADLs such as bathing and grooming herself, grocery shopping etc - requesting a home health aide. Return in about 4 months (around 6/23/2023). I asked the patient if she  had any questions and answered her  questions. The patient stated that she understands the treatment plan and agrees with the treatment plan    This document was created with a voice activated dictation system and may contain transcription errors.

## 2023-02-24 ENCOUNTER — OFFICE VISIT (OUTPATIENT)
Age: 58
End: 2023-02-24
Payer: COMMERCIAL

## 2023-02-24 VITALS
HEIGHT: 65 IN | BODY MASS INDEX: 27.79 KG/M2 | WEIGHT: 166.8 LBS | SYSTOLIC BLOOD PRESSURE: 130 MMHG | RESPIRATION RATE: 20 BRPM | HEART RATE: 74 BPM | DIASTOLIC BLOOD PRESSURE: 86 MMHG | OXYGEN SATURATION: 98 %

## 2023-02-24 DIAGNOSIS — H53.8 BLURRY VISION, RIGHT EYE: ICD-10-CM

## 2023-02-24 DIAGNOSIS — G35 MULTIPLE SCLEROSIS (HCC): Primary | ICD-10-CM

## 2023-02-24 DIAGNOSIS — H51.9 EYE MOVEMENT ABNORMALITY: ICD-10-CM

## 2023-02-24 DIAGNOSIS — H57.11 EYE PAIN, RIGHT: ICD-10-CM

## 2023-02-24 PROCEDURE — 99213 OFFICE O/P EST LOW 20 MIN: CPT | Performed by: NURSE PRACTITIONER

## 2023-02-24 RX ORDER — FLUCONAZOLE 150 MG/1
150 TABLET ORAL ONCE
Qty: 1 TABLET | Refills: 0 | Status: SHIPPED | OUTPATIENT
Start: 2023-02-24 | End: 2023-02-24

## 2023-02-24 NOTE — PROGRESS NOTES
[address] [place]. [facility] [place], [address]  Office:  [phone]  Fax: [phone]  Chief Complaint   Patient presents with    Follow-up       HPI: Brinda Baker presents in follow-up for multiple sclerosis. She was last seen here on 11/16/2022. Continue Ocrevus. She was referred for home health PT, OT, and aide. Last MRI of the brain and cervical spine was in June 2022. We have a clinic note from Dr. Maya Lwa from 1/26/2023 which was in follow-up for sleep study. The portable diagnostic sleep study was abnormal, with evidence of JOHNNIE. The AHI was 8. The minimum oxygen saturation was 90.1. She was referred for a potential consultation for an oral appliance for JOHNNIE. She presents today in follow-up. Reports her whole body is aching. Mentions she is going to see the podiatrist.  Reports eye problems. Saw the eye doctor last in December, around the 28th. They gave her Refresh eye drops. Can't see well out of right, it's blurry. Left eye is ok. It started last year. Denies double vision. Eye burning. Also hurts sometimes but goes away, comes back a different day. Endorses pain with eye movements, just the right eye. She is interested in more home PT (home health therapies reordered by PCP). Whole body feels weak, not every day. Certain days. When out walking to a store. When home, not as bad. Reports sleeping all the time. Has sleep apnea diagnosis, she would rather have a dental appliance. Reports she always falls asleep. She is taking the baclofen 5 mg TID. Lats Ocrevus infusion was in January. The next is scheduled for July. Reports no recent falls lately, not this year. She had falls last year in June and July. Reports her legs are hurting so bad. Reports feet swelling. She is trying to move from her town house, hoping to move to a single floor. Follows with dermatology. Did not get flu shot, did not want it.   Vitamin D level normal (54.5) in December. Her eye doctor is Dr. Aicha Arndt at [facility], she will go on March 10th. Past Medical History:   Diagnosis Date    Chest pain 11/2014    neg EKG, non recurrent    Chronic pain     lower back and legs    Depression     Eczema     Fibroids     GERD (gastroesophageal reflux disease)     H/O seasonal allergies     Headache(784.0)     Hiatal hernia     IBS (irritable bowel syndrome)     Microscopic hematuria     MS (multiple sclerosis) (HCC)     Rectal bleeding     Stool color black     irritable bowel       Past Surgical History:   Procedure Laterality Date    BREAST BIOPSY Right 1/21/2015    RIGHT BREAST BIOPSY WITH NEEDLE LOCALIZATION ULTRASOUND performed by Leo Castillo MD at [address] [place] [address] (CERVIX STATUS UNKNOWN)      TUBAL LIGATION         Current Outpatient Medications   Medication Sig Dispense Refill    Misc. Devices (WALKER) MISC Front wheel walker      albuterol sulfate HFA (PROVENTIL;VENTOLIN;PROAIR) 108 (90 Base) MCG/ACT inhaler Inhale 1 puff into the lungs every 4 hours as needed      alclomethasone (ACLOVATE) 0.05 % ointment Apply 0.05 % topically 2 times daily as needed      Baclofen (LIORESAL) 5 MG tablet Take 5 mg by mouth 3 times daily      betamethasone valerate (VALISONE) 0.1 % lotion Apply 1 applicator topically as needed      Biotin 1000 MCG CHEW Take 1,000 mcg by mouth daily      desonide (DESOWEN) 0.05 % cream ceived the following from [facility]: Outside name: desonide (TRIDESILON) 0.05 % cream      doxycycline (VIBRAMYCIN) 50 MG capsule ceived the following from [facility]: Outside name: doxycycline (VIBRAMYCIN) 50 mg capsule      dupilumab (DUPIXENT) 300 MG/2ML SOPN injection Once every 2 weeks.  Indications: a type of allergy that causes red and itchy skin called atopic dermatitis      fluticasone (FLONASE) 50 MCG/ACT nasal spray 2 sprays by Nasal route daily      ketoconazole (NIZORAL) 2 % shampoo ceived the following from [facility]: Outside name: ketoconazole (NIZORAL) 2 % shampoo      loratadine (CLARITIN) 10 MG tablet Take 10 mg by mouth daily      loteprednol (LOTEMAX) 0.5 % ophthalmic gel Apply 1 drop to eye 3 times daily      polyethylene glycol (GLYCOLAX) 17 GM/SCOOP powder Take 17 g by mouth daily      triamcinolone (KENALOG) 0.1 % ointment Apply topically 2 times daily      pantoprazole (PROTONIX) 40 MG tablet Take 1 tablet by mouth 2 times daily (before meals) 60 tablet 2     No current facility-administered medications for this visit. Allergies   Allergen Reactions    Naproxen Shortness Of Breath    Shellfish Allergy Nausea And Vomiting     SEVERE NAUSEA AND VOMITING  Other reaction(s): gi distress  SEVERE NAUSEA AND VOMITING    Amoxicillin Nausea Only and Rash     Other reaction(s): unknown       Social History     Tobacco Use    Smoking status: Former     Packs/day: 0.00     Types: Cigarettes     Quit date: 2016     Years since quittin.6    Smokeless tobacco: Former     Quit date: 2016   Vaping Use    Vaping Use: Never used   Substance Use Topics    Alcohol use: No     Alcohol/week: 10.0 standard drinks    Drug use: No       Family History   Problem Relation Age of Onset    Diabetes Brother     Hypertension Sister     Cancer Brother     Diabetes Father     HIV/AIDS Brother     Hypertension Brother     Hypertension Sister     Hypertension Sister        Review of Systems:  GENERAL: Denies fever. +fatigue  CARDIAC: No CP or SOB  PULMONARY: No cough or SOB  MUSCULOSKELETAL: No new joint pain. +back pain, lower extremities pain. NEURO: SEE HPI    Physical Examination:  /86 (Site: Left Upper Arm, Position: Sitting, Cuff Size: Medium Adult)   Pulse 74   Resp 20   Ht 5' 5\" (1.651 m)   Wt 166 lb 12.8 oz (75.7 kg)   SpO2 98%   BMI 27.76 kg/m²     Alert, in NAD. Heart is regular. Oriented x3.  Speech is fluent. Speech clear. EOMs are full, PERRL, VFFTC. +nystagmus bilaterally in laterally gaze and upgaze. Right eye with mild lateral deviation. No facial asymmetry. Tongue is midline. Strength and tone are normal. No drift of the bilateral upper extremities. Fine finger movements symmetrical. FNF intact bilaterally. DTRs 3+. Antalgic gait with cane, left knee turned inward. Impression/Plan: This is a 64-year-old left-handed female who presents in follow-up for multiple sclerosis. She is on a regimen of Ocrevus for DMT which was started in January 2020 after having side effects with Tecfidera. She also has complaints of pain involving the back and lower extremities. She does not want to start duloxetine due to concern over potential side effects. She continues on baclofen. Continue Ocrevus for DMT. Obtain MRI brain and orbits stat due to visual complaint and eye pain. She will follow with her eye doctor. Will request to obtain notes. She will follow up with her sleep specialist for new diagnosis of JOHNNIE- advised to see dentist early for the dental appliance for sleep apnea. She has a referral for home health PT, OT, and aide. Follow up in 3 months. Mayelin Lyons was seen today for follow-up. Diagnoses and all orders for this visit:    Multiple sclerosis ([facility] [address]  -     MRI BRAIN [address]; Future  -     MRI ORBITS FACE NECK W WO CONTRAST; Future    Blurry vision, right eye  -     MRI BRAIN W WO CONTRAST; Future  -     MRI ORBITS FACE NECK W WO CONTRAST; Future    Eye pain, right  -     MRI BRAIN W WO CONTRAST; Future  -     MRI ORBITS FACE NECK W WO CONTRAST; Future    Eye movement abnormality  -     MRI BRAIN W WO CONTRAST; Future  -     MRI ORBITS FACE NECK W WO CONTRAST; Future      Signed By: TAWANA Hearn NP        PLEASE NOTE:   Portions of this document may have been produced using voice recognition software. Unrecognized errors in transcription may be present.

## 2023-02-26 ENCOUNTER — HOME HEALTH ADMISSION (OUTPATIENT)
Age: 58
End: 2023-02-26
Payer: MEDICARE

## 2023-02-27 ENCOUNTER — TELEPHONE (OUTPATIENT)
Age: 58
End: 2023-02-27

## 2023-02-27 RX ORDER — BACLOFEN 5 MG/1
5 TABLET ORAL 3 TIMES DAILY
Qty: 90 TABLET | Refills: 6 | Status: SHIPPED | OUTPATIENT
Start: 2023-02-27

## 2023-02-28 ENCOUNTER — HOME CARE VISIT (OUTPATIENT)
Age: 58
End: 2023-02-28
Payer: MEDICARE

## 2023-02-28 VITALS
SYSTOLIC BLOOD PRESSURE: 118 MMHG | HEART RATE: 82 BPM | DIASTOLIC BLOOD PRESSURE: 78 MMHG | RESPIRATION RATE: 16 BRPM | OXYGEN SATURATION: 96 % | TEMPERATURE: 97.7 F

## 2023-02-28 PROCEDURE — G0151 HHCP-SERV OF PT,EA 15 MIN: HCPCS

## 2023-02-28 ASSESSMENT — ENCOUNTER SYMPTOMS
DYSPNEA ACTIVITY LEVEL: AFTER AMBULATING MORE THAN 20 FT
PAIN LOCATION - PAIN QUALITY: ACHING

## 2023-02-28 NOTE — HOME HEALTH
PT INITIAL EVALUATION    Past Medical Hx:   Chest pain 11/2014      neg EKG, non recurrent    Chronic pain        lower back and legs    Depression      Eczema      Fibroids      GERD (gastroesophageal reflux disease)      H/O seasonal allergies      Headache(784.0)      Hiatal hernia      IBS (irritable bowel syndrome)      Microscopic hematuria      MS (multiple sclerosis) (HCC)      Recta l bleeding      Stool color black        irritable bowel   Recent H/o current illness:  59 year old female presents with MD referral for HHPT s/p MD visit due to MS  Medication Management: Pt. manages own medications  Social hx and home eval:  Pt lives in [address] [facility] with friends close by. Caregiver Involvement: friend (son's aunt)  PLOF:  Pt PLOF is ambulation w SPC, pts base level of function needed assistance  BALANCE:     Seated unsupported balance is good   Standing static balance is fair  Standing dynamic balance is fair-  Tinetti 9 /28    Patient is at risk for falls due to MS and impaired balance  BLE Strength:  Left Hip flexion 3-/5 , hip abduction 3-/5, hip adduction 3-/5, Knee flexion 3/5  knee extension 3/5, ankle dorsiflexion 3-/5  Right Hip flexion 3-/5 , hip abduction 3-/5, hip adduction 3-/5, Knee flexion 3/5  knee extension 3/5, ankle dorsiflexion 3-/5  BLE ROM:  Right hip/knee/ankle: WFL  Left hip/knee/ankle: WFL  Bed mobility:  independent   Transfers:  min A with sit<->stand for bed to chair, with SPC AD. min cues and instruction needed for safety and sequencing  GAIT:  Patient ambulated  20 ft  with SPC  on level  surfaces min A. Pt demonstrates with decreased hip and knee flexion on BLE in pre and mid swing phase of gait as well as decreased stride-length and bethel. Patient is unable to safely ambulate without assistance at this time.     Pt required min  cues for  safety and sequencing  Stairs: 12 with min A and hand rail/AD  Patient education provided this visit: Safety with functional mobility and instruction with HEP. Patient level of understanding of education provided: good ,able to return demonstrate mobility instruction and HEP with min assistance  Patient response to treatment:  good with no c/o increased pain  Instructed patient with regards to signs and symptoms of infection. WOUND: NA  Assessment: Referral for HHPT following recent MD due to [address]. HHPT is medically necessary to address the following clinical findings: decreased BLE strength and ROM, impaired gait, decreased I and safety with transfers and gait, decreased endurance, decreased balance and decreased safety in order to improve functional mobility and decrease fall risk. Pt is a fall risk as indicated by Tinetti score of 9/28. Patient will be seen for HHPT 2w3, 1w1 for therapeutic exercises to increase BLE strength and ROM,  training for gait, stairs and transfers to increase I and safety with daily functional mobility and balance and endurance activities to decrease fall risk, decrease impairment and increase functional mobility and independence in the home. Pt. requires skilled PT intervention to instruct Pt. with gait training with LRAD, ROM and strengthening, stair training, transfer training, balance training, manual therapy, neuromuscular re-education, patient care-giver education, HEP, endurance training, body mechanics. Instructed patient with HEP for BLE strengthening and left HEP handout for the patient. Patient was able to return demonstrate the exercises given.   HEP includes:   Pt. received therapeutic exercises which included:  ankle pumps, LAQ x 10 x 2,  arm circles, biceps curls, pray and open, overhead reaching, triceps extensions, x 10 x 2-3 times/day    Skilled services/Home bound verification:     Skilled Reason for admission/summary of clinical condition:  MS .  This patient is homebound for the following reasons Requires considerable and taxing effort to leave the home , Requires the assistance of 1 or more persons to leave the home  and Only leaves the home for medical reasons or [facility] services and are infrequent and of short duration for other reasons . Caregiver: friend. Caregiver assists with ADL. Medications reconciled and all medications are available in the home this visit. The following education was provided regarding medications: NA  Medications  are effective at this time. High risk medication teaching regarding anticoagulants, hyperglycemic agents or opiod narcotics performed (specify) Milton LANDIS MD notified of any discrepancies/look a like medications/medication interactions NA. Home health supplies by type and quantity ordered/delivered this visit include: NA    Patient education provided this visit to include: safety with functional mobility. Patient level of understanding of education provided: good with repeat verbalization    Sharps Education Provided: NA  Patient response to procedure performed:  NA    Pt/Caregiver instructed on plan of care and are agreeable to plan of care at this time. Physician Milton Cortes MD notified of patient admission to home health and plan of care including anticipated frequency of 2w3, 1w1 and treatments/interventions/modalities of therex, gait, functional mobility, balance, safety education. Discharge planning discussed with patient and caregiver. Discharge planning as follows: once goals are met, pt will be discharged to self under MD supervision. Pt/Caregiver did verbalize understanding of discharge planning. Next MD appointment TBD (date) with Milton Cortes MD.  Patient/caregiver encouraged/instructed to keep appointment as lack of follow through with physician appointment could result in discontinuation of home care services for non-compliance.

## 2023-02-28 NOTE — Clinical Note
Dear Dr. Shyam Pedraza:    I wanted to thank you for your referral of your patient Sandeep Villalpando. We have admitted this patient for home health Physical Therapy with the following frequency: 2w3, 1w1    She is complaining of 8/10 continously generalized pain with a low of 7/10 pain. She scored 26/27 on  the PHQ-9 indicating severe depression.     Sincerely,    JIAN Lu   Physical Therapist

## 2023-03-01 ENCOUNTER — HOME CARE VISIT (OUTPATIENT)
Age: 58
End: 2023-03-01
Payer: MEDICARE

## 2023-03-02 ENCOUNTER — HOME CARE VISIT (OUTPATIENT)
Age: 58
End: 2023-03-02
Payer: MEDICARE

## 2023-03-06 ENCOUNTER — HOME CARE VISIT (OUTPATIENT)
Age: 58
End: 2023-03-06
Payer: MEDICARE

## 2023-03-07 ENCOUNTER — OFFICE VISIT (OUTPATIENT)
Facility: CLINIC | Age: 58
End: 2023-03-07
Payer: MEDICARE

## 2023-03-07 ENCOUNTER — HOME CARE VISIT (OUTPATIENT)
Age: 58
End: 2023-03-07
Payer: MEDICARE

## 2023-03-07 VITALS
DIASTOLIC BLOOD PRESSURE: 72 MMHG | WEIGHT: 166 LBS | HEIGHT: 65 IN | SYSTOLIC BLOOD PRESSURE: 128 MMHG | BODY MASS INDEX: 27.66 KG/M2 | RESPIRATION RATE: 16 BRPM | OXYGEN SATURATION: 95 % | HEART RATE: 78 BPM

## 2023-03-07 DIAGNOSIS — R03.0 ELEVATED BP WITHOUT DIAGNOSIS OF HYPERTENSION: ICD-10-CM

## 2023-03-07 DIAGNOSIS — K58.1 IRRITABLE BOWEL SYNDROME WITH CONSTIPATION: ICD-10-CM

## 2023-03-07 DIAGNOSIS — L65.9 ALOPECIA: Primary | ICD-10-CM

## 2023-03-07 PROCEDURE — 99212 OFFICE O/P EST SF 10 MIN: CPT | Performed by: STUDENT IN AN ORGANIZED HEALTH CARE EDUCATION/TRAINING PROGRAM

## 2023-03-07 ASSESSMENT — ANXIETY QUESTIONNAIRES
3. WORRYING TOO MUCH ABOUT DIFFERENT THINGS: 0
5. BEING SO RESTLESS THAT IT IS HARD TO SIT STILL: 0
6. BECOMING EASILY ANNOYED OR IRRITABLE: 0
GAD7 TOTAL SCORE: 0
7. FEELING AFRAID AS IF SOMETHING AWFUL MIGHT HAPPEN: 0
4. TROUBLE RELAXING: 0
2. NOT BEING ABLE TO STOP OR CONTROL WORRYING: 0
1. FEELING NERVOUS, ANXIOUS, OR ON EDGE: 0
IF YOU CHECKED OFF ANY PROBLEMS ON THIS QUESTIONNAIRE, HOW DIFFICULT HAVE THESE PROBLEMS MADE IT FOR YOU TO DO YOUR WORK, TAKE CARE OF THINGS AT HOME, OR GET ALONG WITH OTHER PEOPLE: NOT DIFFICULT AT ALL

## 2023-03-07 ASSESSMENT — PATIENT HEALTH QUESTIONNAIRE - PHQ9
10. IF YOU CHECKED OFF ANY PROBLEMS, HOW DIFFICULT HAVE THESE PROBLEMS MADE IT FOR YOU TO DO YOUR WORK, TAKE CARE OF THINGS AT HOME, OR GET ALONG WITH OTHER PEOPLE: 0
SUM OF ALL RESPONSES TO PHQ QUESTIONS 1-9: 0
SUM OF ALL RESPONSES TO PHQ QUESTIONS 1-9: 0
7. TROUBLE CONCENTRATING ON THINGS, SUCH AS READING THE NEWSPAPER OR WATCHING TELEVISION: 0
2. FEELING DOWN, DEPRESSED OR HOPELESS: 0
1. LITTLE INTEREST OR PLEASURE IN DOING THINGS: 0
SUM OF ALL RESPONSES TO PHQ9 QUESTIONS 1 & 2: 0
6. FEELING BAD ABOUT YOURSELF - OR THAT YOU ARE A FAILURE OR HAVE LET YOURSELF OR YOUR FAMILY DOWN: 0
8. MOVING OR SPEAKING SO SLOWLY THAT OTHER PEOPLE COULD HAVE NOTICED. OR THE OPPOSITE, BEING SO FIGETY OR RESTLESS THAT YOU HAVE BEEN MOVING AROUND A LOT MORE THAN USUAL: 0
9. THOUGHTS THAT YOU WOULD BE BETTER OFF DEAD, OR OF HURTING YOURSELF: 0
3. TROUBLE FALLING OR STAYING ASLEEP: 0
SUM OF ALL RESPONSES TO PHQ QUESTIONS 1-9: 0
4. FEELING TIRED OR HAVING LITTLE ENERGY: 0
SUM OF ALL RESPONSES TO PHQ QUESTIONS 1-9: 0
5. POOR APPETITE OR OVEREATING: 0

## 2023-03-07 ASSESSMENT — ENCOUNTER SYMPTOMS
SHORTNESS OF BREATH: 1
CHEST TIGHTNESS: 0
BLOOD IN STOOL: 0
COUGH: 0
VOMITING: 0
WHEEZING: 0
ABDOMINAL PAIN: 1
BACK PAIN: 0
DIARRHEA: 0
CONSTIPATION: 1
RHINORRHEA: 0
NAUSEA: 1

## 2023-03-07 NOTE — CASE COMMUNICATION
Per e-mail communication with rehab team/supervisor, pt to be placed on hold until home bug infestation is treated d/t staff safety concerns.

## 2023-03-07 NOTE — PROGRESS NOTES
Chandrakant Guevara is a 59 y.o. female presenting today for Abdominal Pain (Pt presents for abdominal pain and request for new dermatology )  . Chief Complaint   Patient presents with    Abdominal Pain     Pt presents for abdominal pain and request for new dermatology        HPI:  Chandrakant Guevara presents to the office today for referral request    Patient has a past medical history of multiple sclerosis, arthritis, chronic constipation with IBS. Patient has multiple chronic complaints-generalized body aches, abdominal pain. Patient has history of osteoarthritis-she is taking NSAIDs. Last visit, she was started on Protonix and advised to cut down on NSAID use. Patient was advised to try Cymbalta in the past by neurology-however patient refused due to concern of side effects. Chronic constipation with IBS: She has previously tried Linzess, admits but failed due to diarrhea. Did not like lactulose. Failed Dulcolax, Fleet enemas, Metamucil due to lack of improvement. She is following with GI. She is now on MiraLAX but feels it does not help. She wants to see another GI physician for second opinion. She has been referred but has not been unable to schedule an appointment. Multiple sclerosis: Patient is following with neurology. She is undergoing Ocrevus infusions. Recently has been reporting gait instability, palpitations. States she felt unbalanced and has had near falls. She was referred to PT/OT. Currently PT/OT is on hold due to a bug infestation in her home. She is also following with podiatry. She saw neurology on 2/24/2023-MRI of brain and orbits was or bruit due to complaints of vision change and eye pain. She has been reporting episodes of dizziness/syncope along with LE swelling. She saw cardiology in 9/22. Echo was ordered along with a cardiac Holter monitor. Echo showed LVEF 60-65% with normal diastolic function, mild MR.  Results of Holter monitor are pending. She has a follow-up appointment scheduled with cardiology. Patient has been complaining of dysphagia. She had an EGD recently which showed normal mucosa in the stomach and duodenum. She was found to have nonobstructive dysphagia and underwent dilation. Modified barium swallow showed normal oropharyngeal function. Ex-smoker: She used to smoke 0.5 ppd for 25 years. She reports dyspnea on exertion and occassional wheezing. She intermittently feels SOB. This is improved as compared to prior visit. Vitamin D deficiency: Initially screened with vitamin D level of 14.2. She was provided with high-dose vitamin D supplements. Repeat vitamin D level was 54. Alopecia: Patient is suffering from hair loss. She is following with dermatology for further evaluation. She is requesting referral to an alternate dermatologist.    PT/OT was ordered but is on hold due to home bug infestation. Patient reports bug bites on her legs that are healing. Health care maintenance:  Mammogram in 2/22 was BI-RADS 1  Colonoscopy in 2015-repeat in 10 years recommended. Review of Systems   Constitutional:  Negative for activity change, appetite change, chills, diaphoresis, fatigue, fever and unexpected weight change. HENT:  Negative for congestion, nosebleeds, postnasal drip and rhinorrhea. Respiratory:  Positive for shortness of breath. Negative for cough, chest tightness and wheezing. Cardiovascular:  Negative for chest pain and leg swelling. Gastrointestinal:  Positive for abdominal pain, constipation and nausea. Negative for blood in stool, diarrhea and vomiting. Endocrine: Negative for polydipsia and polyphagia. Genitourinary:  Positive for vaginal discharge. Negative for decreased urine volume, dysuria, frequency and pelvic pain. Musculoskeletal:  Positive for arthralgias, gait problem and myalgias. Negative for back pain and neck pain.    Neurological:  Positive for syncope, weakness and numbness. Negative for dizziness, tremors, seizures, speech difficulty and headaches. Psychiatric/Behavioral:  Negative for agitation and confusion. The patient is not nervous/anxious. All other systems reviewed and are negative. Allergies   Allergen Reactions    Naproxen Shortness Of Breath    Shellfish Allergy Nausea And Vomiting     SEVERE NAUSEA AND VOMITING  Other reaction(s): gi distress  SEVERE NAUSEA AND VOMITING    Amoxicillin Nausea Only and Rash     Other reaction(s): unknown       PHQ Screening   No flowsheet data found.     History  Past Medical History:   Diagnosis Date    Chest pain 2014    neg EKG, non recurrent    Chronic pain     lower back and legs    Depression     Eczema     Fibroids     GERD (gastroesophageal reflux disease)     H/O seasonal allergies     Headache(784.0)     Hiatal hernia     IBS (irritable bowel syndrome)     Microscopic hematuria     MS (multiple sclerosis) (HCC)     Rectal bleeding     Stool color black     irritable bowel       Past Surgical History:   Procedure Laterality Date    BREAST BIOPSY Right 2015    RIGHT BREAST BIOPSY WITH NEEDLE LOCALIZATION ULTRASOUND performed by Dianne Farah MD at [address] [place] [address] (CERVIX STATUS UNKNOWN)      TUBAL LIGATION         Social History     Socioeconomic History    Marital status: Single     Spouse name: Not on file    Number of children: Not on file    Years of education: Not on file    Highest education level: Not on file   Occupational History    Not on file   Tobacco Use    Smoking status: Former     Packs/day: 0.00     Types: Cigarettes     Quit date: 2016     Years since quittin.6    Smokeless tobacco: Former     Quit date: 2016   Vaping Use    Vaping Use: Never used   Substance and Sexual Activity    Alcohol use: No     Alcohol/week: 10.0 standard drinks    Drug use: No    Sexual activity: Not on file   Other Topics Concern    Not on file   Social History Narrative    Not on file     Social Determinants of Health     Financial Resource Strain: High Risk    Difficulty of Paying Living Expenses: Very hard   Food Insecurity: Food Insecurity Present    Worried About [address] in the Last Year: Often true    Ran Out of Food in the Last Year: Often true   Transportation Needs: Unknown    Lack of Transportation (Medical): Not on file    Lack of Transportation (Non-Medical): No   Physical Activity: Not on file   Stress: Not on file   Social Connections: Not on file   Intimate Partner Violence: Not on file   Housing Stability: Unknown    Unable to Pay for Housing in the Last Year: Not on file    Number of Places Lived in the Last Year: Not on file    Unstable Housing in the Last Year: No       Current Outpatient Medications   Medication Sig Dispense Refill    Baclofen (LIORESAL) 5 MG tablet Take 1 tablet by mouth 3 times daily 90 tablet 6    Misc. Devices (WALKER) MISC Front wheel walker      albuterol sulfate HFA (PROVENTIL;VENTOLIN;PROAIR) 108 (90 Base) MCG/ACT inhaler Inhale 1 puff into the lungs every 4 hours as needed      alclomethasone (ACLOVATE) 0.05 % ointment Apply 0.05 % topically 2 times daily as needed      betamethasone valerate (VALISONE) 0.1 % lotion Apply 1 applicator topically as needed      Biotin 1000 MCG CHEW Take 1,000 mcg by mouth daily      desonide (DESOWEN) 0.05 % cream ceived the following from [facility]: Outside name: desonide (TRIDESILON) 0.05 % cream      doxycycline (VIBRAMYCIN) 50 MG capsule ceived the following from [facility]: Outside name: doxycycline (VIBRAMYCIN) 50 mg capsule      dupilumab (DUPIXENT) 300 MG/2ML SOPN injection Once every 2 weeks.  Indications: a type of allergy that causes red and itchy skin called atopic dermatitis      fluticasone (FLONASE) 50 MCG/ACT nasal spray 2 sprays by Nasal route daily      ketoconazole (NIZORAL) 2 % shampoo ceived the following from [place] [address] [facility]: Outside name: ketoconazole (NIZORAL) 2 % shampoo      loratadine (CLARITIN) 10 MG tablet Take 10 mg by mouth daily      loteprednol (LOTEMAX) 0.5 % ophthalmic gel Apply 1 drop to eye 3 times daily      polyethylene glycol (GLYCOLAX) 17 GM/SCOOP powder Take 17 g by mouth daily      triamcinolone (KENALOG) 0.1 % ointment Apply topically 2 times daily      pantoprazole (PROTONIX) 40 MG tablet Take 1 tablet by mouth 2 times daily (before meals) 60 tablet 2     No current facility-administered medications for this visit. /72   Pulse 78   Resp 16   Ht 5' 5\" (1.651 m)   Wt 166 lb (75.3 kg)   SpO2 95%   BMI 27.62 kg/m²      Physical Exam  Vitals and nursing note reviewed. Constitutional:       General: She is not in acute distress. Appearance: Normal appearance. She is not ill-appearing, toxic-appearing or diaphoretic. HENT:      Head: Atraumatic. Comments: alopecia  Eyes:      General: No scleral icterus. Extraocular Movements: Extraocular movements intact. Conjunctiva/sclera: Conjunctivae normal.      Pupils: Pupils are equal, round, and reactive to light. Cardiovascular:      Rate and Rhythm: Normal rate and regular rhythm. Pulses: Normal pulses. Heart sounds: Normal heart sounds. No murmur heard. Pulmonary:      Effort: Pulmonary effort is normal. No respiratory distress. Breath sounds: Normal breath sounds. Musculoskeletal:      Cervical back: Normal range of motion and neck supple. Right lower leg: Edema present. Left lower leg: No edema. Skin:     General: Skin is warm and dry. Neurological:      General: No focal deficit present. Mental Status: She is alert and oriented to person, place, and time. Mental status is at baseline. Cranial Nerves: No cranial nerve deficit. Motor: No weakness.       Gait: Gait abnormal.      Comments: Using a cane    Psychiatric:         Behavior: Behavior normal.         Thought Content: Thought content normal.         Judgment: Judgment normal.        Office Visit on 02/23/2023   Component Date Value Ref Range Status    Hemoglobin A1C, POC 02/23/2023 6.2  % Final       No results found for any visits on 03/07/23. Patient Care Team:  Patient Care Team:  Dajuan Noguera MD as PCP - Belkis Mcguire MD as PCP - Empaneled Provider  Martha Kilpatrick as Physician Assistant      Assessment / Plan:     Diagnosis Orders   1. Alopecia  External Referral To Dermatology      2. Irritable bowel syndrome with constipation        3. Elevated BP without diagnosis of hypertension                Alopecia/dermatitis-Referral placed to dermatology. Dysphagia-following with GI. She continues to have chronic constipation, has tried and failed on multiple medications. Wants to see another GI physician. Referred. Advised to discontinue NSAIDs. Elevated BP:  Improved       Return in about 4 months (around 7/7/2023). I asked the patient if she  had any questions and answered her  questions. The patient stated that she understands the treatment plan and agrees with the treatment plan    This document was created with a voice activated dictation system and may contain transcription errors.

## 2023-03-09 ENCOUNTER — HOME CARE VISIT (OUTPATIENT)
Age: 58
End: 2023-03-09
Payer: MEDICARE

## 2023-03-13 ENCOUNTER — HOME CARE VISIT (OUTPATIENT)
Age: 58
End: 2023-03-13
Payer: MEDICARE

## 2023-03-14 ENCOUNTER — HOME CARE VISIT (OUTPATIENT)
Age: 58
End: 2023-03-14
Payer: MEDICARE

## 2023-03-15 ENCOUNTER — TELEPHONE (OUTPATIENT)
Facility: CLINIC | Age: 58
End: 2023-03-15

## 2023-03-15 ENCOUNTER — HOME CARE VISIT (OUTPATIENT)
Age: 58
End: 2023-03-15
Payer: MEDICARE

## 2023-03-15 NOTE — HOME HEALTH
Patient received [facility]/PT initial evaluation only. Home environment was unsafe as it was infested with bugs and patient states she had multiple bug bites. Therapy supervisor made multiple attempts to get the situation resolved including contacting [employer], but was not successful.

## 2023-03-16 ENCOUNTER — HOME CARE VISIT (OUTPATIENT)
Age: 58
End: 2023-03-16
Payer: MEDICARE

## 2023-04-07 NOTE — LETTER
[facility] 3:56 PM 
 
Ms. Sonam Ryan [address] [id number] To Whom It May Concern: 
 
Sonam Ryan is currently under the care of [address]. Please provide housing with one level secondary to patient medical diagnosis. If there are questions or concerns please have the patient contact our office.  
 
 
 
Sincerely, 
 
 
 
 
Ángel Huang NP 
 
                                
 
 PAST MEDICAL HISTORY:  Moderate asthma Hypertension

## 2023-04-24 NOTE — ANESTHESIA PREPROCEDURE EVALUATION
Highland Ridge Relevant Problems   NEUROLOGY   (+) Paranoid schizophrenia (HCC)       Anesthetic History   No history of anesthetic complications            Review of Systems / Medical History  Patient summary reviewed and pertinent labs reviewed    Pulmonary  Within defined limits                 Neuro/Psych         Psychiatric history (Depression)     Cardiovascular                  Exercise tolerance: >4 METS     GI/Hepatic/Renal     GERD: poorly controlled           Endo/Other  Within defined limits           Other Findings              Physical Exam    Airway  Mallampati: II  TM Distance: 4 - 6 cm  Neck ROM: normal range of motion   Mouth opening: Normal     Cardiovascular  Regular rate and rhythm,  S1 and S2 normal,  no murmur, click, rub, or gallop             Dental  No notable dental hx       Pulmonary  Breath sounds clear to auscultation               Abdominal  GI exam deferred       Other Findings            Anesthetic Plan    ASA: 2  Anesthesia type: MAC          Induction: Intravenous  Anesthetic plan and risks discussed with: Patient

## 2023-04-29 NOTE — TELEPHONE ENCOUNTER
Denial [facility] and [facility] in [facility] at [facility]
Denial rec'd via mail and placed in folder at [facility]
Duplicate
Duplicate rec'd
Noted to chart.
Placed in provider's folder for review.
Placed in provider's folder for review. Medication has been denied.
Prior auth form for [employer] rec'd and placed in folder at [facility]
Prior auth information rec'd and placed in folder at [facility]
Shipment confirmation rec'd and placed in folder at [facility]
[employer] approval rec'd and placed in folder at [facility]
Never

## 2023-05-12 NOTE — PROGRESS NOTES
Bebe Gibbons presents today for   Chief Complaint   Patient presents with    Leg Swelling     right leg swelling    Abdominal Pain    Generalized Body Aches       Is someone accompanying this pt? no    Is the patient using any DME equipment during OV? no    Depression Screening:  3 most recent PHQ Screens 6/10/2021   PHQ Not Done -   Little interest or pleasure in doing things Not at all   Feeling down, depressed, irritable, or hopeless Not at all   Total Score PHQ 2 0   Trouble falling or staying asleep, or sleeping too much -   Feeling tired or having little energy -   Poor appetite, weight loss, or overeating -   Feeling bad about yourself - or that you are a failure or have let yourself or your family down -   Trouble concentrating on things such as school, work, reading, or watching TV -   Moving or speaking so slowly that other people could have noticed; or the opposite being so fidgety that others notice -   Thoughts of being better off dead, or hurting yourself in some way -   PHQ 9 Score -   How difficult have these problems made it for you to do your work, take care of your home and get along with others -       Learning Assessment:  Learning Assessment 6/15/2018   PRIMARY LEARNER Patient   CO-LEARNER CAREGIVER -   PRIMARY LANGUAGE ENGLISH   LEARNER PREFERENCE PRIMARY DEMONSTRATION   ANSWERED BY patient   RELATIONSHIP SELF       Health Maintenance reviewed and discussed and ordered per Provider. Health Maintenance Due   Topic Date Due    DTaP/Tdap/Td series (1 - Tdap) Never done    Shingrix Vaccine Age 50> (1 of 2) Never done   . Coordination of Care:  1. Have you been to the ER, urgent care clinic since your last visit? Hospitalized since your last visit? no    2. Have you seen or consulted any other health care providers outside of the [address] [place] [address] since your last visit? Include any pap smears or colon screening.  no Statement Selected

## 2023-05-28 NOTE — PROGRESS NOTES
Patient : Linda Estrada Age: 5 year old Sex: female   MRN: [id number] Encounter Date: 5/28/2023  E09/09   History     Chief Complaint   Patient presents with   • Abdominal Pain       HPI  Linda Estrada is a 5 year old presenting to the emergency department accompanied by dad for evaluation of middle abdominal pain that began yesterday. Per dad, pt was outside most of the day yesterday and experienced some pain around 1100. She has a decrease in appetite since initial onset till now. Dad has tried tylenol, but has not been able to tolerate as she vomited yesterday. Pt does not take any medications. Dad denies pt to have diarrhea, urinary symptoms, coughs, sore throat, fevers,         No Known Allergies    No current facility-administered medications for this encounter.     Current Outpatient Medications   Medication Sig   • ondansetron (Zofran ODT) 4 MG disintegrating tablet Place 0.5 tablets onto the tongue every 8 hours as needed for Nausea.   • acetaminophen (TYLENOL CHILDRENS) 160 MG/5ML suspension Take 6.2 mLs by mouth every 4 hours as needed for Fever.   • ibuprofen (CHILDRENS IBUPROFEN 100) 100 MG/5ML suspension Take 6.6 mLs by mouth every 8 hours as needed for Fever.   • carbamide peroxide (DEBROX) 6.5 % otic solution Place 5 drops into right ear 2 times daily.       History reviewed. No pertinent past medical history.    History reviewed. No pertinent surgical history.    History reviewed. No pertinent family history.    Social History     Tobacco Use   • Smoking status: Never   • Smokeless tobacco: Never   Substance Use Topics   • Alcohol use: No   • Drug use: No       Review of Systems     Review of Systems   Constitutional: Positive for appetite change (decreased). Negative for fever.   HENT: Negative for congestion and ear pain.    Eyes: Negative for discharge.   Respiratory: Negative for cough and shortness of breath.    Cardiovascular: Negative for chest pain.   Gastrointestinal: Positive for  [facility] Progress Note    Date: 2020    Name: Gretel Hayes    MRN: [id number]         : 1965      Ms. Pa Jenkins arrived in the [facility] today at 96 86 26, in stable condition, here for Ocrevus (ocrelizumab) Infusion (Week 0 in the Induction Phase). She was assessed and education was provided. Ms. Jacobs's vitals were reviewed. Visit Vitals  /89 (BP 1 Location: Right arm, BP Patient Position: Sitting)   Pulse 77   Temp 98.7 °F (37.1 °C)   Resp 16   Ht 5' 4\" (1.626 m)   Wt 67.4 kg (148 lb 8 oz)   SpO2 100%   Breastfeeding No   BMI 25.49 kg/m²         PIV # 25 G, was established in her right AC at 0940, and blood for ordered labs (CBC & CMP) was drawn per order. (The CBC was processed in house, and the remaining labs were sent out to be processed.)        Lab results were obtained and reviewed, and were as follows:    Recent Results (from the past 12 hour(s))   CBC WITH 3 PART DIFF    Collection Time: 20  9:40 AM   Result Value Ref Range    WBC 5.0 4.5 - 13.0 K/uL    RBC 4.01 (L) 4.10 - 5.10 M/uL    HGB 12.0 12.0 - 16 g/dL    HCT 35.7 (L) 36 - 48 %    MCV 89.0 78 - 102 FL    MCH 29.9 25.0 - 35.0 PG    MCHC 33.6 31 - 37 g/dL    RDW 13.1 11.5 - 14.5 %    PLATELET 086 998 - 934 K/uL    NEUTROPHILS 49 40 - 70 %    MIXED CELLS 2 0.1 - 17 %    LYMPHOCYTES 49 (H) 14 - 44 %    ABS. NEUTROPHILS 2.4 1.8 - 9.5 K/UL    ABS. MIXED CELLS 0.1 0.0 - 2.3 K/uL    ABS.  LYMPHOCYTES 2.5 1.1 - 5.9 K/UL    DF AUTOMATED     METABOLIC PANEL, COMPREHENSIVE    Collection Time: 20  9:40 AM   Result Value Ref Range    Sodium 142 136 - 145 mmol/L    Potassium 3.9 3.5 - 5.5 mmol/L    Chloride 109 100 - 111 mmol/L    CO2 31 21 - 32 mmol/L    Anion gap 2 (L) 3.0 - 18 mmol/L    Glucose 88 74 - 99 mg/dL    BUN 12 7.0 - 18 MG/DL    Creatinine 0.74 0.6 - 1.3 MG/DL    BUN/Creatinine ratio 16 12 - 20      GFR est AA >60 >60 ml/min/1.73m2    GFR est non-AA >60 >60 ml/min/1.73m2    Calcium 9.1 8.5 - 10.1 MG/DL    Bilirubin, total 0.4 0.2 - 1.0 MG/DL    ALT (SGPT) 23 13 - 56 U/L    AST (SGOT) 16 10 - 38 U/L    Alk. phosphatase 83 45 - 117 U/L    Protein, total 7.6 6.4 - 8.2 g/dL    Albumin 3.7 3.4 - 5.0 g/dL    Globulin 3.9 2.0 - 4.0 g/dL    A-G Ratio 0.9 0.8 - 1.7              ml IV Bag, was initiated, to infuse @ KVO, PRN, throughout treatment today. The following pre-medications were administered per order, and without incident:  Tylenol 650 mg PO, Benadryl 50 mg IV, & Solumedrol 125 mg IV. Ocrevus (Ocrelizumab) 300 mg IV, was administered over approximately 3 hours, using the following rate titration:  30 ml/hr X 30 minutes, 60 ml/hr X 30 minutes, 90 ml/hr X 30 minutes, 120 ml/hr X 30 minutes, 150 ml/hr X 30 minutes, 180 ml/hr until completion. After completion of the Ocrevus Infusion, Ms. [employer] was monitored for 1 hour, per order, and without incident. After completion of the observation period, the PIV was removed and gauze/bandaid was applied. Ms. [employer] tolerated well, and had no complaints. Ms. [employer] was discharged from [place] [address] in stable condition at 1530. Gaetano Stern She is to return in 2 weeks, on Monday, 1-20-20, at 0800, for her next appointment, for Week 2 in the Ocrevus Induction Phase.      Aidee Nickerson RN  January 6, 2020  9:36 AM abdominal pain (middle) and vomiting. Negative for diarrhea and nausea.   Genitourinary: Negative for dysuria.   Musculoskeletal: Negative for back pain.   Skin: Negative for rash.   Neurological: Negative for headaches.   Hematological: Does not bruise/bleed easily.   Psychiatric/Behavioral: Negative for agitation.       Physical Exam     ED Triage Vitals [05/28/23 1638]   ED Triage Vitals Group      Temp 98.5 °F (36.9 °C)      Heart Rate 105      Resp 24      BP (!) 103/52      SpO2 96 %      EtCO2 mmHg       Height       Weight (!) 41 lb 10.7 oz (18.9 kg)      Weight Scale Used       BMI (Calculated)       IBW/kg (Calculated)        Physical Exam  Vitals and nursing note reviewed.   Constitutional:       General: She is active.      Appearance: She is well-developed.   HENT:      Right Ear: Tympanic membrane normal.      Left Ear: Tympanic membrane normal.      Mouth/Throat:      Mouth: Mucous membranes are moist.      Pharynx: Oropharynx is clear.   Eyes:      Pupils: Pupils are equal, round, and reactive to light.   Cardiovascular:      Rate and Rhythm: Normal rate and regular rhythm.      Heart sounds: S1 normal and S2 normal.   Pulmonary:      Effort: Pulmonary effort is normal.      Breath sounds: Normal breath sounds.   Abdominal:      Palpations: Abdomen is soft.      Tenderness: There is no abdominal tenderness. There is no guarding or rebound.   Musculoskeletal:         General: Normal range of motion.      Cervical back: Normal range of motion and neck supple.   Neurological:      Mental Status: She is alert.           Procedures     Procedures    Lab Results     Results for orders placed or performed during the hospital encounter of 05/28/23   Urinalysis With Microscopy Exam W/O C/S   Result Value Ref Range    COLOR, URINALYSIS Yellow     APPEARANCE, URINALYSIS Clear     GLUCOSE, URINALYSIS Negative Negative mg/dL    BILIRUBIN, URINALYSIS Negative Negative    KETONES, URINALYSIS >80 (AA) Negative mg/dL     SPECIFIC GRAVITY, URINALYSIS >1.030 (H) 1.005 - 1.030    OCCULT BLOOD, URINALYSIS Negative Negative    PH, URINALYSIS 5.5 5.0 - 7.0    PROTEIN, URINALYSIS 30 (A) Negative mg/dL    UROBILINOGEN, URINALYSIS 2.0 (A) 0.2, 1.0 mg/dL    NITRITE, URINALYSIS Negative Negative    LEUKOCYTE ESTERASE, URINALYSIS Moderate (A) Negative    SQUAMOUS EPITHELIAL, URINALYSIS None Seen None Seen, 1 to 5 /hpf    ERYTHROCYTES, URINALYSIS 3 to 5 (A) None Seen, 1 to 2 /hpf    LEUKOCYTES, URINALYSIS 1 to 5 None Seen, 1 to 5 /hpf    BACTERIA, URINALYSIS None Seen None Seen /hpf    HYALINE CASTS, URINALYSIS None Seen None Seen, 1 to 5 /lpf    MUCUS Present        EKG         Radiology Results     Imaging Results    None         ED Medications     Medications   ondansetron (ZOFRAN ODT) disintegrating tablet 4 mg (4 mg Oral Given 5/28/23 1656)         ED Course     Vitals:    05/28/23 1638   BP: (!) 103/52   BP Location: RUE - Right upper extremity   Patient Position: Sitting/[place]   Pulse: 105   Resp: 24   Temp: 98.5 °F (36.9 °C)   SpO2: 96%   Weight: (!) 18.9 kg (41 lb 10.7 oz)       ED Course as of 05/28/23 1959   Sun May 28, 2023   1654 Patient is a 5-year-old female who comes in with some upper abdominal discomfort and 1 episode of vomiting last night.  Child has not had any diarrhea.  She appears well hydrated.  On exam the patient has minimal epigastric tenderness and no tenderness to deep palpation over the right lower quadrant.  After discussion with the father we agreed on giving the child a dose of antiemetics and seeing how she feels.  Differential diagnosis includes viral illness, obstruction, appendicitis, cholecystitis among others.  At this point given the fact the patient has an unimpressive abdominal exam I doubt a surgical condition of the abdomen.  I will reassess after antiemetics. [PH]   1725 Patient was reassessed and is feeling much better.  She is eagerly drinking some juice.  Explained to the father that  flung she keeps the juice down that we can follow her up as an outpatient.  If the patient's pain is worsening or if she spikes a fever they should come back to the emergency department for an appendicitis evaluation, but since she has no right lower tenderness at this point in time I feel that we can manage her symptoms and treat her expectantly.  She should follow with her primary care doctor in 2-3 days.  She worsens in any way or fails to improve she should return to the emergency department. [PH]   1740 Patient's urinalysis does show some ketones probably due to lack of eating today.  Patient has adequately hydrating herself with oral fluids here.  She will be discharged home. [PH]      ED Course User Index  [PH] Lorenzo Lam MD       No cardiac monitor or imaging reviewed        Consults                ED Consults done in the ED course    MDM                            MDM done in ED Course        Does the Patient have sepsis: NO     Critical Care       No Critical Care        Disposition       Clinical Impression and Diagnosis  5:53 PM       ED Diagnoses     Diagnosis Comment Associated Orders       Final diagnoses    Nausea and vomiting, unspecified vomiting type -- --    Epigastric pain -- --          Follow Up:  [facility] Lopez [facility]  [address] [facility] [address]  [phone]    In 2 days      [facility]  [address] [place] [address]  [phone]    tomorrow if symptoms worsen          Summary of your Discharge Medications      Take these Medications      Details   ondansetron 4 MG disintegrating tablet  Commonly known as: ZOFRAN ODT   Place 1 tablet onto the tongue 3 times daily as needed for Nausea.            Pt is discharged to home/self care in stable condition.              Discharge 5/28/2023  5:49 PM  Linda Estrada discharge to home/self care.                I have reviewed the information recorded by the scribe for accuracy and agree with its  contents.    ____________________________________________________________________    Abel Zambrano acting as a scribe for Dr. Lorenzo Lam  Dictation # [id number]  Scribe: Lorenzo Gutiérrez MD  05/28/23 1959

## 2023-06-05 ENCOUNTER — TELEMEDICINE (OUTPATIENT)
Age: 58
End: 2023-06-05
Payer: MEDICARE

## 2023-06-05 DIAGNOSIS — G35 MULTIPLE SCLEROSIS (HCC): Primary | ICD-10-CM

## 2023-06-05 PROCEDURE — 99213 OFFICE O/P EST LOW 20 MIN: CPT | Performed by: NURSE PRACTITIONER

## 2023-06-05 RX ORDER — BACLOFEN 5 MG/1
5 TABLET ORAL 3 TIMES DAILY
Qty: 90 TABLET | Refills: 6 | Status: SHIPPED | OUTPATIENT
Start: 2023-06-05

## 2023-06-05 NOTE — PROGRESS NOTES
Virtual visit [phone]
tablet; Refill: 6    This is a 61-year-old left-handed female who presents in follow-up for multiple sclerosis. She is on a regimen of Ocrevus for DMT which was started in January 2020 after having side effects with Tecfidera. She also has complaints of pain involving the back and lower extremities. She does not want to start duloxetine due to concern over potential side effects. She continues on baclofen. Continue Ocrevus for DMT. Can proceed with next infusion in July if she is free from infection. She will follow up with her eye doctor in a few weeks. Obtain MRI brain and orbits ordered last visit due to visual complaint and eye pain is pending. We will make sure she has the central scheduling number so she can schedule these. Also she is 1 year from last MRI. Home health PT is on hold due to bug infestation- this needs to be resolved then she can do home health PT- she needs to contact the landlord. Advised to call home health PT when ready but let us know if she needs a new order. Follow up with sleep specialist for new diagnosis of JOHNNIE- had been advised to see dentist early for the dental appliance for sleep apnea. Follow up in 3 months. Return in about 3 months (around 9/5/2023). [employer], was evaluated through a synchronous (real-time) audio-video encounter. The patient (or guardian if applicable) is aware that this is a billable service, which includes applicable co-pays. This Virtual Visit was conducted with patient's (and/or legal guardian's) consent. Patient identification was verified, and a caregiver was present when appropriate. The patient was located at Home: [address] [place] [id number]  Provider was located at Home ([place] [address]): [address], [place]  NP on 6/11/2023 at 9:50 PM    An electronic signature was used to authenticate this note.

## 2023-06-20 ENCOUNTER — OFFICE VISIT (OUTPATIENT)
Facility: CLINIC | Age: 58
End: 2023-06-20
Payer: MEDICARE

## 2023-06-20 VITALS
DIASTOLIC BLOOD PRESSURE: 78 MMHG | HEART RATE: 73 BPM | WEIGHT: 161 LBS | OXYGEN SATURATION: 98 % | RESPIRATION RATE: 16 BRPM | BODY MASS INDEX: 26.82 KG/M2 | TEMPERATURE: 96 F | HEIGHT: 65 IN | SYSTOLIC BLOOD PRESSURE: 135 MMHG

## 2023-06-20 DIAGNOSIS — L65.9 ALOPECIA: ICD-10-CM

## 2023-06-20 DIAGNOSIS — M15.9 PRIMARY OSTEOARTHRITIS INVOLVING MULTIPLE JOINTS: ICD-10-CM

## 2023-06-20 DIAGNOSIS — G89.4 CHRONIC PAIN SYNDROME: ICD-10-CM

## 2023-06-20 DIAGNOSIS — G35 MULTIPLE SCLEROSIS (HCC): ICD-10-CM

## 2023-06-20 DIAGNOSIS — K58.1 IRRITABLE BOWEL SYNDROME WITH CONSTIPATION: ICD-10-CM

## 2023-06-20 DIAGNOSIS — Z91.81 AT HIGH RISK FOR FALLS: ICD-10-CM

## 2023-06-20 DIAGNOSIS — E55.9 VITAMIN D DEFICIENCY, UNSPECIFIED: ICD-10-CM

## 2023-06-20 DIAGNOSIS — Z00.00 MEDICARE ANNUAL WELLNESS VISIT, SUBSEQUENT: Primary | ICD-10-CM

## 2023-06-20 DIAGNOSIS — R26.81 UNSTEADY GAIT: ICD-10-CM

## 2023-06-20 DIAGNOSIS — R73.03 PREDIABETES: ICD-10-CM

## 2023-06-20 PROCEDURE — 99214 OFFICE O/P EST MOD 30 MIN: CPT | Performed by: STUDENT IN AN ORGANIZED HEALTH CARE EDUCATION/TRAINING PROGRAM

## 2023-06-20 PROCEDURE — G0439 PPPS, SUBSEQ VISIT: HCPCS | Performed by: STUDENT IN AN ORGANIZED HEALTH CARE EDUCATION/TRAINING PROGRAM

## 2023-06-20 RX ORDER — DULOXETIN HYDROCHLORIDE 30 MG/1
30 CAPSULE, DELAYED RELEASE ORAL DAILY
Qty: 30 CAPSULE | Refills: 3 | Status: SHIPPED | OUTPATIENT
Start: 2023-06-20

## 2023-06-20 RX ORDER — BETAMETHASONE VALERATE 0.1 %
1 LOTION (ML) TOPICAL PRN
Status: CANCELLED | OUTPATIENT
Start: 2023-06-20

## 2023-06-20 RX ORDER — ALCLOMETASONE DIPROPIONATE 0.5 MG/G
0.05 OINTMENT TOPICAL 2 TIMES DAILY PRN
Status: CANCELLED | OUTPATIENT
Start: 2023-06-20

## 2023-06-20 RX ORDER — KETOCONAZOLE 20 MG/ML
SHAMPOO TOPICAL
Status: CANCELLED | OUTPATIENT
Start: 2023-06-20

## 2023-06-20 SDOH — ECONOMIC STABILITY: INCOME INSECURITY: HOW HARD IS IT FOR YOU TO PAY FOR THE VERY BASICS LIKE FOOD, HOUSING, MEDICAL CARE, AND HEATING?: NOT HARD AT ALL

## 2023-06-20 SDOH — ECONOMIC STABILITY: FOOD INSECURITY: WITHIN THE PAST 12 MONTHS, THE FOOD YOU BOUGHT JUST DIDN'T LAST AND YOU DIDN'T HAVE MONEY TO GET MORE.: NEVER TRUE

## 2023-06-20 SDOH — ECONOMIC STABILITY: FOOD INSECURITY: WITHIN THE PAST 12 MONTHS, YOU WORRIED THAT YOUR FOOD WOULD RUN OUT BEFORE YOU GOT MONEY TO BUY MORE.: NEVER TRUE

## 2023-06-20 ASSESSMENT — PATIENT HEALTH QUESTIONNAIRE - PHQ9
SUM OF ALL RESPONSES TO PHQ QUESTIONS 1-9: 2
1. LITTLE INTEREST OR PLEASURE IN DOING THINGS: 1
SUM OF ALL RESPONSES TO PHQ9 QUESTIONS 1 & 2: 2
2. FEELING DOWN, DEPRESSED OR HOPELESS: 1

## 2023-06-20 ASSESSMENT — LIFESTYLE VARIABLES
HOW MANY STANDARD DRINKS CONTAINING ALCOHOL DO YOU HAVE ON A TYPICAL DAY: PATIENT DOES NOT DRINK
HOW OFTEN DO YOU HAVE A DRINK CONTAINING ALCOHOL: NEVER

## 2023-06-20 ASSESSMENT — ENCOUNTER SYMPTOMS
SHORTNESS OF BREATH: 1
NAUSEA: 1
WHEEZING: 0
DIARRHEA: 0
RHINORRHEA: 0
BACK PAIN: 0
COUGH: 0
CONSTIPATION: 1
CHEST TIGHTNESS: 0
BLOOD IN STOOL: 0
VOMITING: 0
ABDOMINAL PAIN: 1

## 2023-06-20 NOTE — PATIENT INSTRUCTIONS
Preventing Falls: Care Instructions    Talk to your doctor about the medicines you take. Ask if any of them increase the risk of falls and whether they can be changed or stopped. Try to exercise regularly. It can help improve your strength and balance. This can help lower your risk of falling. Practice fall safety and prevention. Wear low-heeled shoes that fit well and give your feet good support. Talk to your doctor if you have foot problems that make this hard. Carry a cellphone or wear a medical alert device that you can use to call for help. Use stepladders instead of chairs to reach high objects. Don't climb if you're at risk for falls. Ask for help, if needed. Wear the correct eyeglasses, if you need them. Make your home safer. Remove rugs, cords, clutter, and furniture from walkways. Keep your house well lit. Use night-lights in hallways and bathrooms. Install and use sturdy handrails on stairways. Wear nonskid footwear, even inside. Don't walk barefoot or in socks without shoes. Be safe outside. Use handrails, curb cuts, and ramps whenever possible. Keep your hands free by using a shoulder bag or backpack. Try to walk in well-lit areas. Watch out for uneven ground, changes in pavement, and debris. Be careful in the winter. Walk on the grass or gravel when sidewalks are slippery. Use de-icer on steps and walkways. Add non-slip devices to shoes. Put grab bars and nonskid mats in your shower or tub and near the toilet. Try to use a shower chair or bath bench when bathing. Get into a tub or shower by putting in your weaker leg first. Get out with your strong side first. Have a phone or medical alert device in the bathroom with you. Where can you learn more? Go to http://www.jacobsen.com/ and enter G117 to learn more about \"Preventing Falls: Care Instructions. \"  Current as of: November 9, 2022               Content Version: 13.7  © 7726-9671 Healthwise

## 2023-06-20 NOTE — PROGRESS NOTES
Medicare Annual Wellness Visit    Yvonne Zepeda is here for Medicare AWV    Assessment & Plan   {There are no diagnoses linked to this encounter. (Refresh or delete this SmartLink)}  Recommendations for Preventive Services Due: see orders and patient instructions/AVS.  Recommended screening schedule for the next 5-10 years is provided to the patient in written form: see Patient Instructions/AVS.     No follow-ups on file. Subjective   {OPTIONAL - WILL AUTO-DELETE IF NOT YZLF:[id number]}    Patient's complete Health Risk Assessment and screening values have been reviewed and are found in Flowsheets. The following problems were reviewed today and where indicated follow up appointments were made and/or referrals ordered. Positive Risk Factor Screenings with Interventions:    Fall Risk:  Do you feel unsteady or are you worried about falling? : (!) yes (long as she has support from walking cane)  2 or more falls in past year?: no  Fall with injury in past year?: no     Interventions:    ***              Weight and Activity:  Physical Activity: Inactive    Days of Exercise per Week: 0 days    Minutes of Exercise per Session: 0 min     On average, how many days per week do you engage in moderate to strenuous exercise (like a brisk walk)?: 0 days  Have you lost any weight without trying in the past 3 months?: (!) Yes  Body mass index is 26.79 kg/m². Vision Screen:  Do you have difficulty driving, watching TV, or doing any of your daily activities because of your eyesight?: (!) Yes  Have you had an eye exam within the past year?: Yes  No results found.   Safety:  Do you have any tripping hazards - loose or unsecured carpets or rugs?: (!) Yes  Do you have any tripping hazards - clutter in doorways, halls, or stairs?: (!) Yes  Do you have either shower bars, grab bars, non-slip mats or non-slip surfaces in your shower or bathtub?: (!) No  Do all of your stairways have a railing or banister?: (!)

## 2023-06-20 NOTE — PROGRESS NOTES
Medicare Annual Wellness Visit    Cornelius Blankenship is here for Medicare AWV    Assessment & Plan   Medicare annual wellness visit, subsequent      Recommendations for Preventive Services Due: see orders and patient instructions/AVS.  Recommended screening schedule for the next 5-10 years is provided to the patient in written form: see Patient Instructions/AVS.    Patient provided information for ACP documents. Patient looking into alternate accommodation. Return in about 4 months (around 10/20/2023). Subjective   The following acute and/or chronic problems were also addressed today:  Patient complaining of generalized body pain-see office note    Patient's complete Health Risk Assessment and screening values have been reviewed and are found in Flowsheets. The following problems were reviewed today and where indicated follow up appointments were made and/or referrals ordered. Positive Risk Factor Screenings with Interventions:    Fall Risk:  Do you feel unsteady or are you worried about falling? : (!) yes (long as she has support from walking cane)  2 or more falls in past year?: no  Fall with injury in past year?: no     Interventions:    PT/OT was ordered but is on hold due to home bug infestation. Weight and Activity:  Physical Activity: Inactive    Days of Exercise per Week: 0 days    Minutes of Exercise per Session: 0 min     On average, how many days per week do you engage in moderate to strenuous exercise (like a brisk walk)?: 0 days  Have you lost any weight without trying in the past 3 months?: (!) Yes  Body mass index is 26.79 kg/m². Inactivity Interventions:  Patient has difficulty walking due to MS and generalized body pain. Unintentional Weight Loss Interventions:  Patient advised to follow up in the office for further evaluation and treatment  Continue to monitor.         Vision Screen:  Do you have difficulty driving, watching TV, or doing any of your daily

## 2023-06-20 NOTE — PROGRESS NOTES
Tony Wei is a 59 y.o. female presenting today for Medicare AW  . Chief Complaint   Patient presents with    Medicare AWV       HPI:  Tony Wei presents to the office today for follow up. Patient has a past medical history of multiple sclerosis, arthritis, chronic constipation with IBS. Patient has multiple chronic complaints-generalized body aches, abdominal pain. Patient has history of osteoarthritis-she is taking NSAIDs. Last visit, she was started on Protonix and advised to cut down on NSAID use. Patient was advised to try Cymbalta in the past by neurology-however patient refused due to concern of side effects. Chronic constipation with IBS: She has previously tried Linzess, admits but failed due to diarrhea. Did not like lactulose. Failed Dulcolax, Fleet enemas, Metamucil due to lack of improvement. She is following with GI. Multiple sclerosis: Patient is following with neurology. She is undergoing Ocrevus infusions. Recently has been reporting gait instability, palpitations. States she felt unbalanced and has had near falls. She was referred to PT/OT. Currently PT/OT is on hold due to a bug infestation in her home. She is also following with podiatry. She saw neurology on 2/24/2023-MRI of brain and orbits was ordered due to complaints of vision change and eye pain. This is still pending. She has been reporting episodes of dizziness/syncope along with LE swelling. She saw cardiology in 9/22. Echo was ordered along with a cardiac Holter monitor. Echo showed LVEF 60-65% with normal diastolic function, mild MR. Patient has been complaining of dysphagia. She had an EGD recently which showed normal mucosa in the stomach and duodenum. She was found to have nonobstructive dysphagia and underwent dilation. Modified barium swallow showed normal oropharyngeal function. Ex-smoker: She used to smoke 0.5 ppd for 25 years.  She

## 2023-07-03 ENCOUNTER — HOSPITAL ENCOUNTER (OUTPATIENT)
Facility: HOSPITAL | Age: 58
Discharge: HOME | End: 2023-07-06
Payer: MEDICARE

## 2023-07-03 DIAGNOSIS — H53.8 BLURRY VISION, RIGHT EYE: ICD-10-CM

## 2023-07-03 DIAGNOSIS — H57.11 EYE PAIN, RIGHT: ICD-10-CM

## 2023-07-03 DIAGNOSIS — G35 MULTIPLE SCLEROSIS (HCC): ICD-10-CM

## 2023-07-03 DIAGNOSIS — H51.9 EYE MOVEMENT ABNORMALITY: ICD-10-CM

## 2023-07-03 LAB — CREAT UR-MCNC: 0.8 MG/DL (ref 0.6–1.3)

## 2023-07-03 PROCEDURE — 70543 MRI ORBT/FAC/NCK W/O &W/DYE: CPT

## 2023-07-03 PROCEDURE — 6360000004 HC RX CONTRAST MEDICATION: Performed by: NURSE PRACTITIONER

## 2023-07-03 PROCEDURE — 70553 MRI BRAIN STEM W/O & W/DYE: CPT

## 2023-07-03 PROCEDURE — 82565 ASSAY OF CREATININE: CPT

## 2023-07-03 PROCEDURE — A9577 INJ MULTIHANCE: HCPCS | Performed by: NURSE PRACTITIONER

## 2023-07-03 RX ADMIN — GADOBENATE DIMEGLUMINE 15 ML: 529 INJECTION, SOLUTION INTRAVENOUS at 12:08

## 2023-07-07 ENCOUNTER — TELEPHONE (OUTPATIENT)
Age: 58
End: 2023-07-07

## 2023-07-07 NOTE — TELEPHONE ENCOUNTER
Called patient to go over results of MRI of the brain. Left voicemail message to call back the office.

## 2023-07-10 RX ORDER — ONDANSETRON 2 MG/ML
8 INJECTION INTRAMUSCULAR; INTRAVENOUS
Status: CANCELLED | OUTPATIENT
Start: 2023-07-17

## 2023-07-10 RX ORDER — SODIUM CHLORIDE 9 MG/ML
INJECTION, SOLUTION INTRAVENOUS CONTINUOUS
Status: CANCELLED | OUTPATIENT
Start: 2023-07-17

## 2023-07-10 RX ORDER — SODIUM CHLORIDE 9 MG/ML
5-250 INJECTION, SOLUTION INTRAVENOUS PRN
Status: CANCELLED | OUTPATIENT
Start: 2023-07-17

## 2023-07-10 RX ORDER — EPINEPHRINE 1 MG/ML
0.3 INJECTION, SOLUTION, CONCENTRATE INTRAVENOUS PRN
Status: CANCELLED | OUTPATIENT
Start: 2023-07-17

## 2023-07-10 RX ORDER — ACETAMINOPHEN 325 MG/1
650 TABLET ORAL
Status: CANCELLED | OUTPATIENT
Start: 2023-07-17

## 2023-07-10 RX ORDER — HEPARIN 100 UNIT/ML
500 SYRINGE INTRAVENOUS PRN
Status: CANCELLED | OUTPATIENT
Start: 2023-07-17

## 2023-07-10 RX ORDER — DIPHENHYDRAMINE HYDROCHLORIDE 50 MG/ML
50 INJECTION INTRAMUSCULAR; INTRAVENOUS
Status: CANCELLED | OUTPATIENT
Start: 2023-07-17

## 2023-07-10 RX ORDER — ALBUTEROL SULFATE 90 UG/1
4 AEROSOL, METERED RESPIRATORY (INHALATION) PRN
Status: CANCELLED | OUTPATIENT
Start: 2023-07-17

## 2023-07-11 ENCOUNTER — TELEPHONE (OUTPATIENT)
Age: 58
End: 2023-07-11

## 2023-07-17 ENCOUNTER — HOSPITAL ENCOUNTER (OUTPATIENT)
Facility: HOSPITAL | Age: 58
Setting detail: INFUSION SERIES
End: 2023-07-17
Payer: MEDICARE

## 2023-07-17 ENCOUNTER — APPOINTMENT (OUTPATIENT)
Dept: INFUSION THERAPY | Age: 58
End: 2023-07-17

## 2023-07-17 VITALS
OXYGEN SATURATION: 98 % | TEMPERATURE: 97.2 F | HEART RATE: 83 BPM | WEIGHT: 158.6 LBS | HEIGHT: 65 IN | SYSTOLIC BLOOD PRESSURE: 104 MMHG | BODY MASS INDEX: 26.42 KG/M2 | DIASTOLIC BLOOD PRESSURE: 65 MMHG | RESPIRATION RATE: 18 BRPM

## 2023-07-17 DIAGNOSIS — G35 MULTIPLE SCLEROSIS (HCC): Primary | ICD-10-CM

## 2023-07-17 LAB
ALBUMIN SERPL-MCNC: 3.9 G/DL (ref 3.4–5)
ALBUMIN/GLOB SERPL: 1.1 (ref 0.8–1.7)
ALP SERPL-CCNC: 118 U/L (ref 45–117)
ALT SERPL-CCNC: 20 U/L (ref 13–56)
ANION GAP SERPL CALC-SCNC: 6 MMOL/L (ref 3–18)
AST SERPL-CCNC: 17 U/L (ref 10–38)
BASO+EOS+MONOS # BLD AUTO: 0.5 K/UL (ref 0–2.3)
BASO+EOS+MONOS NFR BLD AUTO: 9 % (ref 0.1–17)
BILIRUB SERPL-MCNC: 0.8 MG/DL (ref 0.2–1)
BUN SERPL-MCNC: 12 MG/DL (ref 7–18)
BUN/CREAT SERPL: 14 (ref 12–20)
CALCIUM SERPL-MCNC: 8.7 MG/DL (ref 8.5–10.1)
CHLORIDE SERPL-SCNC: 105 MMOL/L (ref 100–111)
CO2 SERPL-SCNC: 26 MMOL/L (ref 21–32)
CREAT SERPL-MCNC: 0.88 MG/DL (ref 0.6–1.3)
DIFFERENTIAL METHOD BLD: ABNORMAL
ERYTHROCYTE [DISTWIDTH] IN BLOOD BY AUTOMATED COUNT: 14.2 % (ref 11.5–14.5)
GLOBULIN SER CALC-MCNC: 3.4 G/DL (ref 2–4)
GLUCOSE SERPL-MCNC: 94 MG/DL (ref 74–99)
HCT VFR BLD AUTO: 40.7 % (ref 36–48)
HGB BLD-MCNC: 13.1 G/DL (ref 12–16)
LYMPHOCYTES # BLD: 2.5 K/UL (ref 1.1–5.9)
LYMPHOCYTES NFR BLD: 46 % (ref 14–44)
MCH RBC QN AUTO: 27.9 PG (ref 25–35)
MCHC RBC AUTO-ENTMCNC: 32.2 G/DL (ref 31–37)
MCV RBC AUTO: 86.6 FL (ref 78–102)
NEUTS SEG # BLD: 2.4 K/UL (ref 1.8–9.5)
NEUTS SEG NFR BLD: 46 % (ref 40–70)
PLATELET # BLD AUTO: 253 K/UL (ref 140–440)
POTASSIUM SERPL-SCNC: 4.2 MMOL/L (ref 3.5–5.5)
PROT SERPL-MCNC: 7.3 G/DL (ref 6.4–8.2)
RBC # BLD AUTO: 4.7 M/UL (ref 4.1–5.1)
SODIUM SERPL-SCNC: 137 MMOL/L (ref 136–145)
WBC # BLD AUTO: 5.4 K/UL (ref 4.5–13)

## 2023-07-17 PROCEDURE — 80053 COMPREHEN METABOLIC PANEL: CPT

## 2023-07-17 PROCEDURE — 6360000002 HC RX W HCPCS: Performed by: NURSE PRACTITIONER

## 2023-07-17 PROCEDURE — 2580000003 HC RX 258: Performed by: NURSE PRACTITIONER

## 2023-07-17 PROCEDURE — 96365 THER/PROPH/DIAG IV INF INIT: CPT

## 2023-07-17 PROCEDURE — 6370000000 HC RX 637 (ALT 250 FOR IP): Performed by: NURSE PRACTITIONER

## 2023-07-17 PROCEDURE — 85025 COMPLETE CBC W/AUTO DIFF WBC: CPT

## 2023-07-17 PROCEDURE — 96366 THER/PROPH/DIAG IV INF ADDON: CPT

## 2023-07-17 PROCEDURE — 96375 TX/PRO/DX INJ NEW DRUG ADDON: CPT

## 2023-07-17 RX ORDER — ONDANSETRON 2 MG/ML
8 INJECTION INTRAMUSCULAR; INTRAVENOUS
OUTPATIENT
Start: 2024-01-15

## 2023-07-17 RX ORDER — ACETAMINOPHEN 325 MG/1
650 TABLET ORAL ONCE
Status: COMPLETED | OUTPATIENT
Start: 2023-07-17 | End: 2023-07-17

## 2023-07-17 RX ORDER — SODIUM CHLORIDE 9 MG/ML
5-250 INJECTION, SOLUTION INTRAVENOUS PRN
OUTPATIENT
Start: 2024-01-15

## 2023-07-17 RX ORDER — HEPARIN 100 UNIT/ML
500 SYRINGE INTRAVENOUS PRN
OUTPATIENT
Start: 2024-01-15

## 2023-07-17 RX ORDER — SODIUM CHLORIDE 0.9 % (FLUSH) 0.9 %
5-40 SYRINGE (ML) INJECTION PRN
OUTPATIENT
Start: 2024-01-15

## 2023-07-17 RX ORDER — DIPHENHYDRAMINE HYDROCHLORIDE 50 MG/ML
25 INJECTION INTRAMUSCULAR; INTRAVENOUS ONCE
Status: DISCONTINUED | OUTPATIENT
Start: 2023-07-17 | End: 2023-07-17

## 2023-07-17 RX ORDER — DIPHENHYDRAMINE HYDROCHLORIDE 50 MG/ML
50 INJECTION INTRAMUSCULAR; INTRAVENOUS
OUTPATIENT
Start: 2024-01-15

## 2023-07-17 RX ORDER — SODIUM CHLORIDE 9 MG/ML
INJECTION, SOLUTION INTRAVENOUS CONTINUOUS
OUTPATIENT
Start: 2024-01-15

## 2023-07-17 RX ORDER — DIPHENHYDRAMINE HYDROCHLORIDE 50 MG/ML
50 INJECTION INTRAMUSCULAR; INTRAVENOUS ONCE
Status: COMPLETED | OUTPATIENT
Start: 2023-07-17 | End: 2023-07-17

## 2023-07-17 RX ORDER — SODIUM CHLORIDE 0.9 % (FLUSH) 0.9 %
5-40 SYRINGE (ML) INJECTION PRN
Status: ACTIVE | OUTPATIENT
Start: 2023-07-17 | End: 2023-07-18

## 2023-07-17 RX ORDER — ACETAMINOPHEN 325 MG/1
650 TABLET ORAL ONCE
OUTPATIENT
Start: 2024-01-15 | End: 2024-01-15

## 2023-07-17 RX ORDER — DIPHENHYDRAMINE HYDROCHLORIDE 50 MG/ML
25 INJECTION INTRAMUSCULAR; INTRAVENOUS ONCE
OUTPATIENT
Start: 2024-01-15 | End: 2024-01-15

## 2023-07-17 RX ORDER — ACETAMINOPHEN 325 MG/1
650 TABLET ORAL
OUTPATIENT
Start: 2024-01-15

## 2023-07-17 RX ORDER — ALBUTEROL SULFATE 90 UG/1
4 AEROSOL, METERED RESPIRATORY (INHALATION) PRN
OUTPATIENT
Start: 2024-01-15

## 2023-07-17 RX ORDER — EPINEPHRINE 1 MG/ML
0.3 INJECTION, SOLUTION, CONCENTRATE INTRAVENOUS PRN
OUTPATIENT
Start: 2024-01-15

## 2023-07-17 RX ORDER — SODIUM CHLORIDE 9 MG/ML
5-250 INJECTION, SOLUTION INTRAVENOUS PRN
Status: ACTIVE | OUTPATIENT
Start: 2023-07-17 | End: 2023-07-18

## 2023-07-17 RX ADMIN — SODIUM CHLORIDE, PRESERVATIVE FREE 10 ML: 5 INJECTION INTRAVENOUS at 09:00

## 2023-07-17 RX ADMIN — METHYLPREDNISOLONE SODIUM SUCCINATE 125 MG: 125 INJECTION, POWDER, FOR SOLUTION INTRAMUSCULAR; INTRAVENOUS at 09:47

## 2023-07-17 RX ADMIN — OCRELIZUMAB 600 MG: 300 INJECTION INTRAVENOUS at 11:50

## 2023-07-17 RX ADMIN — DIPHENHYDRAMINE HYDROCHLORIDE 50 MG: 50 INJECTION, SOLUTION INTRAMUSCULAR; INTRAVENOUS at 10:07

## 2023-07-17 RX ADMIN — SODIUM CHLORIDE, PRESERVATIVE FREE 10 ML: 5 INJECTION INTRAVENOUS at 10:07

## 2023-07-17 RX ADMIN — SODIUM CHLORIDE 25 ML/HR: 9 INJECTION, SOLUTION INTRAVENOUS at 09:00

## 2023-07-17 RX ADMIN — OCRELIZUMAB 600 MG: 300 INJECTION INTRAVENOUS at 10:29

## 2023-07-17 RX ADMIN — ACETAMINOPHEN 650 MG: 325 TABLET, FILM COATED ORAL at 09:44

## 2023-07-17 ASSESSMENT — PAIN DESCRIPTION - LOCATION: LOCATION: GENERALIZED

## 2023-07-17 ASSESSMENT — PAIN SCALES - GENERAL: PAINLEVEL_OUTOF10: 7

## 2023-07-17 NOTE — PROGRESS NOTES
117 U/L    Total Protein 7.3 6.4 - 8.2 g/dL    Albumin 3.9 3.4 - 5.0 g/dL    Globulin 3.4 2.0 - 4.0 g/dL    Albumin/Globulin Ratio 1.1 0.8 - 1.7       Pre-medications (Tylenol 650mg PO, Benadryl 50mg IV, Solu-medrol 125mg IV) were administered as ordered. OCREVUS 600MG/500ML initiated at 100mL/hr for 30 minutes. VS checked and remained stable. Titrated as follows:    200mL for 15 minutes. VS checked and remained stable. 250mL for 30 minutes. VS checked and remained stable. 300mL for 30 minutes. VS checked and remained stable. Infusion continued at 300mL/hour until completion per order. VS remained stable and pt denied complaints of itching, lip/tongue/facial swelling, SOB, CP or other complaints. Ms. Rach Kc tolerated the infusion, and had no complaints. PIV flushed with NS from flush bag and removed. No bleeding or hematoma noted at site. Gauze and coban applied. Patient armband removed and shredded. Ms. Rach Kc was discharged from [address] [place] [address] in stable condition at 1325. She is to return on 1/15/24 at 1325 for her next [address] appointment.     Jewels Davis  July 17, 2023  1:31 PM

## 2023-07-27 DIAGNOSIS — K21.01 GASTRO-ESOPHAGEAL REFLUX DISEASE WITH ESOPHAGITIS, WITH BLEEDING: ICD-10-CM

## 2023-07-27 DIAGNOSIS — G35 MULTIPLE SCLEROSIS (HCC): ICD-10-CM

## 2023-07-27 NOTE — TELEPHONE ENCOUNTER
Requested Prescriptions     Pending Prescriptions Disp Refills    pantoprazole (PROTONIX) 40 MG tablet 60 tablet 2     Sig: Take 1 tablet by mouth 2 times daily (before meals)

## 2023-07-28 RX ORDER — PANTOPRAZOLE SODIUM 40 MG/1
TABLET, DELAYED RELEASE ORAL
Qty: 60 TABLET | Refills: 0 | OUTPATIENT
Start: 2023-07-28

## 2023-07-28 RX ORDER — PANTOPRAZOLE SODIUM 40 MG/1
40 TABLET, DELAYED RELEASE ORAL
Qty: 60 TABLET | Refills: 2 | Status: SHIPPED | OUTPATIENT
Start: 2023-07-28

## 2023-08-04 DIAGNOSIS — G35 MULTIPLE SCLEROSIS (HCC): ICD-10-CM

## 2023-08-05 RX ORDER — BACLOFEN 5 MG/1
5 TABLET ORAL 3 TIMES DAILY
Qty: 90 TABLET | Refills: 6 | Status: SHIPPED | OUTPATIENT
Start: 2023-08-05

## 2023-11-01 ENCOUNTER — OFFICE VISIT (OUTPATIENT)
Age: 58
End: 2023-11-01
Payer: MEDICARE

## 2023-11-01 VITALS
WEIGHT: 159 LBS | SYSTOLIC BLOOD PRESSURE: 122 MMHG | OXYGEN SATURATION: 96 % | HEART RATE: 96 BPM | DIASTOLIC BLOOD PRESSURE: 70 MMHG | BODY MASS INDEX: 26.46 KG/M2

## 2023-11-01 DIAGNOSIS — R41.9 COGNITIVE COMPLAINTS: ICD-10-CM

## 2023-11-01 DIAGNOSIS — H53.8 BLURRY VISION, RIGHT EYE: ICD-10-CM

## 2023-11-01 DIAGNOSIS — G35 MULTIPLE SCLEROSIS (HCC): ICD-10-CM

## 2023-11-01 DIAGNOSIS — Z78.9 IMPAIRED MOBILITY AND ADLS: ICD-10-CM

## 2023-11-01 DIAGNOSIS — R35.0 URINARY FREQUENCY: ICD-10-CM

## 2023-11-01 DIAGNOSIS — Z74.09 IMPAIRED MOBILITY AND ADLS: ICD-10-CM

## 2023-11-01 DIAGNOSIS — R26.81 GAIT INSTABILITY: ICD-10-CM

## 2023-11-01 PROCEDURE — 99214 OFFICE O/P EST MOD 30 MIN: CPT | Performed by: NURSE PRACTITIONER

## 2023-11-01 RX ORDER — GABAPENTIN 100 MG/1
CAPSULE ORAL
Qty: 90 CAPSULE | Refills: 5 | Status: SHIPPED | OUTPATIENT
Start: 2023-11-01 | End: 2024-05-01

## 2023-11-01 RX ORDER — BACLOFEN 5 MG/1
5 TABLET ORAL 3 TIMES DAILY
Qty: 90 TABLET | Refills: 6 | Status: SHIPPED | OUTPATIENT
Start: 2023-11-01

## 2023-11-01 NOTE — PATIENT INSTRUCTIONS
Patient instructions:  -MRIs  -start gabapentin for pain.  Take lowest effective dose.  -continue baclofen for spasms  -Ocrevus infusions  -urology referral to [facility]  -home health referral

## 2023-11-01 NOTE — PROGRESS NOTES
DTRs 1+. Gait is unsteady, takes effort to get up, antalgic, limps off right leg, uses cane, unsteady turn around. Impression/Plan: This is a 49-year-old left-handed female who presents in follow-up for multiple sclerosis. She is on a regimen of Ocrevus for DMT which was started in January 2020 after having side effects with Tecfidera. She also has complaints of pain involving the back and lower extremities. She does not want to start duloxetine due to concern over potential side effects. Will start gabapentin for pain. Advised on administration and potential side effects. She continues on baclofen. Continue Ocrevus for DMT. Obtain MRI orbits, c-spine, and t-spine due to visual change and monitoring MS. She will follow up with her eye doctor. She would like to restart home health which was on hold. Home health referral (PT, OT, ST, medical social worker, and home care aide). Urology referral due to urinary frequency. Follow up in 1-2 months. Alisson López was seen today for follow-up. Diagnoses and all orders for this visit:    Multiple sclerosis ([address])  -     MRI [address] [facility]; Future  -     MRI THORACIC SPINE W WO CONTRAST; Future  -     MRI ORBITS FACE NECK W WO CONTRAST; Future  -     Baclofen (LIORESAL) 5 MG tablet; Take 1 tablet by mouth 3 times daily  -     [address] [facility]  -     gabapentin (NEURONTIN) 100 MG capsule; Take 1 cap by mouth nightly for 3 day then can increase to 1 cap twice a day then in 3 days can increase to 1 cap 3 times a day. Blurry vision, right eye  -     MRI ORBITS FACE NECK W WO CONTRAST;  Future    Impaired mobility and ADLs  -     [facility]    Cognitive complaints  -     [facility]    Urinary frequency  -     [facility]    Gait instability  -     [address] [facility]      Total time 39

## 2023-11-03 ENCOUNTER — TELEPHONE (OUTPATIENT)
Age: 58
End: 2023-11-03

## 2023-11-14 ENCOUNTER — OFFICE VISIT (OUTPATIENT)
Facility: CLINIC | Age: 58
End: 2023-11-14
Payer: MEDICARE

## 2023-11-14 VITALS
OXYGEN SATURATION: 98 % | SYSTOLIC BLOOD PRESSURE: 134 MMHG | WEIGHT: 160 LBS | HEART RATE: 68 BPM | DIASTOLIC BLOOD PRESSURE: 89 MMHG | HEIGHT: 65 IN | RESPIRATION RATE: 16 BRPM | TEMPERATURE: 97.7 F | BODY MASS INDEX: 26.66 KG/M2

## 2023-11-14 DIAGNOSIS — F33.1 MODERATE EPISODE OF RECURRENT MAJOR DEPRESSIVE DISORDER (HCC): ICD-10-CM

## 2023-11-14 DIAGNOSIS — K58.2 MIXED IRRITABLE BOWEL SYNDROME: ICD-10-CM

## 2023-11-14 DIAGNOSIS — R73.03 PREDIABETES: ICD-10-CM

## 2023-11-14 DIAGNOSIS — R30.0 DYSURIA: ICD-10-CM

## 2023-11-14 DIAGNOSIS — G35 MULTIPLE SCLEROSIS (HCC): ICD-10-CM

## 2023-11-14 DIAGNOSIS — E55.9 VITAMIN D DEFICIENCY, UNSPECIFIED: ICD-10-CM

## 2023-11-14 DIAGNOSIS — N30.01 ACUTE CYSTITIS WITH HEMATURIA: Primary | ICD-10-CM

## 2023-11-14 DIAGNOSIS — G89.4 CHRONIC PAIN SYNDROME: ICD-10-CM

## 2023-11-14 DIAGNOSIS — R26.81 UNSTEADY GAIT: ICD-10-CM

## 2023-11-14 DIAGNOSIS — K21.9 GASTROESOPHAGEAL REFLUX DISEASE, UNSPECIFIED WHETHER ESOPHAGITIS PRESENT: ICD-10-CM

## 2023-11-14 LAB
BILIRUBIN, URINE, POC: NEGATIVE
BLOOD URINE, POC: NORMAL
GLUCOSE URINE, POC: NEGATIVE
KETONES, URINE, POC: NEGATIVE
LEUKOCYTE ESTERASE, URINE, POC: NORMAL
NITRITE, URINE, POC: NEGATIVE
PH, URINE, POC: 6 (ref 4.6–8)
PROTEIN,URINE, POC: NEGATIVE
SPECIFIC GRAVITY, URINE, POC: 1.03 (ref 1–1.03)
URINALYSIS CLARITY, POC: CLEAR
URINALYSIS COLOR, POC: YELLOW
UROBILINOGEN, POC: NORMAL

## 2023-11-14 PROCEDURE — 81003 URINALYSIS AUTO W/O SCOPE: CPT | Performed by: STUDENT IN AN ORGANIZED HEALTH CARE EDUCATION/TRAINING PROGRAM

## 2023-11-14 PROCEDURE — 99214 OFFICE O/P EST MOD 30 MIN: CPT | Performed by: STUDENT IN AN ORGANIZED HEALTH CARE EDUCATION/TRAINING PROGRAM

## 2023-11-14 RX ORDER — NITROFURANTOIN 25; 75 MG/1; MG/1
100 CAPSULE ORAL 2 TIMES DAILY
Qty: 10 CAPSULE | Refills: 0 | Status: SHIPPED | OUTPATIENT
Start: 2023-11-14 | End: 2023-11-19

## 2023-11-14 SDOH — ECONOMIC STABILITY: INCOME INSECURITY: HOW HARD IS IT FOR YOU TO PAY FOR THE VERY BASICS LIKE FOOD, HOUSING, MEDICAL CARE, AND HEATING?: NOT HARD AT ALL

## 2023-11-14 SDOH — ECONOMIC STABILITY: FOOD INSECURITY: WITHIN THE PAST 12 MONTHS, THE FOOD YOU BOUGHT JUST DIDN'T LAST AND YOU DIDN'T HAVE MONEY TO GET MORE.: NEVER TRUE

## 2023-11-14 SDOH — ECONOMIC STABILITY: FOOD INSECURITY: WITHIN THE PAST 12 MONTHS, YOU WORRIED THAT YOUR FOOD WOULD RUN OUT BEFORE YOU GOT MONEY TO BUY MORE.: NEVER TRUE

## 2023-11-14 ASSESSMENT — ANXIETY QUESTIONNAIRES
IF YOU CHECKED OFF ANY PROBLEMS ON THIS QUESTIONNAIRE, HOW DIFFICULT HAVE THESE PROBLEMS MADE IT FOR YOU TO DO YOUR WORK, TAKE CARE OF THINGS AT HOME, OR GET ALONG WITH OTHER PEOPLE: VERY DIFFICULT
3. WORRYING TOO MUCH ABOUT DIFFERENT THINGS: 3
1. FEELING NERVOUS, ANXIOUS, OR ON EDGE: 3
GAD7 TOTAL SCORE: 18
5. BEING SO RESTLESS THAT IT IS HARD TO SIT STILL: 3
6. BECOMING EASILY ANNOYED OR IRRITABLE: 0
4. TROUBLE RELAXING: 3
2. NOT BEING ABLE TO STOP OR CONTROL WORRYING: 3
7. FEELING AFRAID AS IF SOMETHING AWFUL MIGHT HAPPEN: 3

## 2023-11-14 ASSESSMENT — PATIENT HEALTH QUESTIONNAIRE - PHQ9
SUM OF ALL RESPONSES TO PHQ QUESTIONS 1-9: 17
7. TROUBLE CONCENTRATING ON THINGS, SUCH AS READING THE NEWSPAPER OR WATCHING TELEVISION: 3
8. MOVING OR SPEAKING SO SLOWLY THAT OTHER PEOPLE COULD HAVE NOTICED. OR THE OPPOSITE, BEING SO FIGETY OR RESTLESS THAT YOU HAVE BEEN MOVING AROUND A LOT MORE THAN USUAL: 1
1. LITTLE INTEREST OR PLEASURE IN DOING THINGS: 1
4. FEELING TIRED OR HAVING LITTLE ENERGY: 3
SUM OF ALL RESPONSES TO PHQ9 QUESTIONS 1 & 2: 4
2. FEELING DOWN, DEPRESSED OR HOPELESS: 3
SUM OF ALL RESPONSES TO PHQ QUESTIONS 1-9: 17
9. THOUGHTS THAT YOU WOULD BE BETTER OFF DEAD, OR OF HURTING YOURSELF: 0
SUM OF ALL RESPONSES TO PHQ QUESTIONS 1-9: 17
5. POOR APPETITE OR OVEREATING: 0
SUM OF ALL RESPONSES TO PHQ QUESTIONS 1-9: 17
3. TROUBLE FALLING OR STAYING ASLEEP: 3
10. IF YOU CHECKED OFF ANY PROBLEMS, HOW DIFFICULT HAVE THESE PROBLEMS MADE IT FOR YOU TO DO YOUR WORK, TAKE CARE OF THINGS AT HOME, OR GET ALONG WITH OTHER PEOPLE: 2
6. FEELING BAD ABOUT YOURSELF - OR THAT YOU ARE A FAILURE OR HAVE LET YOURSELF OR YOUR FAMILY DOWN: 3

## 2023-11-14 ASSESSMENT — ENCOUNTER SYMPTOMS
CHEST TIGHTNESS: 0
COUGH: 0
VOMITING: 0
SHORTNESS OF BREATH: 1
BACK PAIN: 0
RHINORRHEA: 0
BLOOD IN STOOL: 0
DIARRHEA: 0
WHEEZING: 0
ABDOMINAL PAIN: 1
NAUSEA: 1
CONSTIPATION: 1

## 2023-11-14 NOTE — PROGRESS NOTES
Not on file   Social History Narrative    Not on file     Social Determinants of Health     Financial Resource Strain: Low Risk  (11/14/2023)    Overall Financial Resource Strain (CARDIA)     Difficulty of Paying Living Expenses: Not hard at all   Food Insecurity: No Food Insecurity (11/14/2023)    Hunger Vital Sign     Worried About Running Out of Food in the Last Year: Never true     Ran Out of Food in the Last Year: Never true   Transportation Needs: Unknown (11/14/2023)    PRAPARE - Transportation     Lack of Transportation (Medical): Not on file     Lack of Transportation (Non-Medical): No   Physical Activity: Inactive (6/20/2023)    Exercise Vital Sign     Days of Exercise per Week: 0 days     Minutes of Exercise per Session: 0 min   Stress: Not on file   Social Connections: Unknown (3/15/2023)    OASIS : Social Isolation     Frequency of experiencing loneliness or isolation: Patient unable to respond   Intimate Partner Violence: Not on file   Housing Stability: Unknown (11/14/2023)    Housing Stability Vital Sign     Unable to Pay for Housing in the Last Year: Not on file     Number of [address] in the Last Year: Not on file     Unstable Housing in the Last Year: No       Current Outpatient Medications   Medication Sig Dispense Refill    nitrofurantoin, macrocrystal-monohydrate, (MACROBID) 100 MG capsule Take 1 capsule by mouth 2 times daily for 5 days 10 capsule 0    Baclofen (LIORESAL) 5 MG tablet Take 1 tablet by mouth 3 times daily 90 tablet 6    pantoprazole (PROTONIX) 40 MG tablet Take 1 tablet by mouth 2 times daily (before meals) 60 tablet 2    Misc.  Devices (WALKER) MISC Front wheel walker      fluticasone (FLONASE) 50 MCG/ACT nasal spray 2 sprays by Nasal route daily      ketoconazole (NIZORAL) 2 % shampoo ceived the following from [facility]: Outside name: ketoconazole (NIZORAL) 2 % shampoo      loratadine (CLARITIN) 10 MG tablet Take 1 tablet by mouth daily

## 2023-12-14 ENCOUNTER — TELEPHONE (OUTPATIENT)
Age: 58
End: 2023-12-14

## 2023-12-14 NOTE — TELEPHONE ENCOUNTER
Insurance will not cover any of the referrals placed for MRIs. Please order 3 new MRIs with different diagnosis.   Call  for coding assistance.

## 2024-01-05 ENCOUNTER — OFFICE VISIT (OUTPATIENT)
Age: 59
End: 2024-01-05
Payer: MEDICARE

## 2024-01-05 VITALS
BODY MASS INDEX: 27.66 KG/M2 | HEART RATE: 88 BPM | DIASTOLIC BLOOD PRESSURE: 74 MMHG | HEIGHT: 65 IN | RESPIRATION RATE: 18 BRPM | SYSTOLIC BLOOD PRESSURE: 118 MMHG | WEIGHT: 166 LBS | OXYGEN SATURATION: 98 %

## 2024-01-05 DIAGNOSIS — H53.9 VISION CHANGES: ICD-10-CM

## 2024-01-05 DIAGNOSIS — G35 MULTIPLE SCLEROSIS (HCC): ICD-10-CM

## 2024-01-05 DIAGNOSIS — Z78.9 IMPAIRED MOBILITY AND ADLS: ICD-10-CM

## 2024-01-05 DIAGNOSIS — R41.9 COGNITIVE COMPLAINTS: ICD-10-CM

## 2024-01-05 DIAGNOSIS — R26.81 GAIT INSTABILITY: ICD-10-CM

## 2024-01-05 DIAGNOSIS — Z74.09 IMPAIRED MOBILITY AND ADLS: ICD-10-CM

## 2024-01-05 DIAGNOSIS — R35.0 URINARY FREQUENCY: ICD-10-CM

## 2024-01-05 PROCEDURE — 99213 OFFICE O/P EST LOW 20 MIN: CPT | Performed by: NURSE PRACTITIONER

## 2024-01-05 RX ORDER — GABAPENTIN 100 MG/1
CAPSULE ORAL
Qty: 90 CAPSULE | Refills: 5 | Status: SHIPPED | OUTPATIENT
Start: 2024-01-05 | End: 2024-07-05

## 2024-01-05 NOTE — PATIENT INSTRUCTIONS
Patient instructions:  -can try gabapentin for pain    -please call urology regarding referral:  [facility]  [address] [place] [address], [address]  [place]  [address]  [phone]    -Ocrevus infusion on 1/15 if no infection    -home health referral (we will try to send to a company that takes your insurance)

## 2024-01-05 NOTE — PROGRESS NOTES
[facility]  [address], [place] [address]  Office:  [phone]  Fax: [phone]  Chief Complaint   Patient presents with    Follow-up       HPI:  Moiz Trevino presents in follow-up for multiple sclerosis.  She was last seen here on 11/1/2023.    She presents today in follow-up.  Reports doing \"terrible\", fell in [employer] in the bathroom and hit back.  Did not want to touch the flusher so she was trying to push it with the cane and moved in such a way that she fell.  Did not have home health with [facility] due to insurance.  MRIs not done because new diagnosis needed.  Has an upcoming Ocrevus infusion on the 15th.  Went to the eye doctor and told cataracts are not too bad yet.  Saw him on Wednesday, Dr. Tucker.  Vision change from MS.  Did not try gabapentin for pain.  Will call PCP about her back and bed bug bites on arms and legs acquired from the motel she was at for new year's.  Continues baclofen.  Does not want CPAP for sleep apnea.  +fatigue.  Did not hear from urology.  Had a UTI in November.  Will get new glasses.    Past Medical History:   Diagnosis Date    Bed bug bite     Chest pain 11/2014    neg EKG, non recurrent    Chronic pain     lower back and legs    Depression     Eczema     Fibroids     GERD (gastroesophageal reflux disease)     H/O seasonal allergies     Headache(784.0)     Hiatal hernia     IBS (irritable bowel syndrome)     Infestation by bed bug     Microscopic hematuria     MS (multiple sclerosis) (HCC)     MS (multiple sclerosis) (HCC)     Rectal bleeding     Stool color black     irritable bowel       Past Surgical History:   Procedure Laterality Date    BREAST BIOPSY Right 1/21/2015    RIGHT BREAST BIOPSY WITH NEEDLE LOCALIZATION ULTRASOUND performed by Zenon Hua MD at [facility] MAIN OR    CHOLECYSTECTOMY      CHOLECYSTECTOMY      GI      HYSTERECTOMY (CERVIX STATUS UNKNOWN)      TUBAL LIGATION         Current Outpatient

## 2024-01-12 ENCOUNTER — HOSPITAL ENCOUNTER (OUTPATIENT)
Facility: HOSPITAL | Age: 59
Setting detail: SPECIMEN
Discharge: HOME | End: 2024-01-15

## 2024-01-12 ENCOUNTER — OFFICE VISIT (OUTPATIENT)
Facility: CLINIC | Age: 59
End: 2024-01-12
Payer: MEDICARE

## 2024-01-12 VITALS
WEIGHT: 169 LBS | RESPIRATION RATE: 15 BRPM | BODY MASS INDEX: 28.16 KG/M2 | HEART RATE: 71 BPM | DIASTOLIC BLOOD PRESSURE: 79 MMHG | HEIGHT: 65 IN | TEMPERATURE: 98.2 F | SYSTOLIC BLOOD PRESSURE: 131 MMHG | OXYGEN SATURATION: 98 %
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
## 2024-01-12 DIAGNOSIS — N89.8 VAGINAL DISCHARGE: Primary | ICD-10-CM

## 2024-01-12 DIAGNOSIS — M79.89 LEG SWELLING: ICD-10-CM

## 2024-01-12 DIAGNOSIS — G89.29 CHRONIC MIDLINE LOW BACK PAIN WITHOUT SCIATICA: ICD-10-CM

## 2024-01-12 DIAGNOSIS — M54.50 CHRONIC MIDLINE LOW BACK PAIN WITHOUT SCIATICA: ICD-10-CM

## 2024-01-12 LAB — LABCORP SPECIMEN COLLECTION: NORMAL

## 2024-01-12 PROCEDURE — 99214 OFFICE O/P EST MOD 30 MIN: CPT | Performed by: STUDENT IN AN ORGANIZED HEALTH CARE EDUCATION/TRAINING PROGRAM

## 2024-01-12 RX ORDER — FUROSEMIDE 20 MG/1
20 TABLET ORAL DAILY PRN
Qty: 90 TABLET | Refills: 0 | Status: SHIPPED | OUTPATIENT
Start: 2024-01-12

## 2024-01-12 SDOH — ECONOMIC STABILITY: INCOME INSECURITY: HOW HARD IS IT FOR YOU TO PAY FOR THE VERY BASICS LIKE FOOD, HOUSING, MEDICAL CARE, AND HEATING?: NOT HARD AT ALL

## 2024-01-12 SDOH — ECONOMIC STABILITY: FOOD INSECURITY: WITHIN THE PAST 12 MONTHS, THE FOOD YOU BOUGHT JUST DIDN'T LAST AND YOU DIDN'T HAVE MONEY TO GET MORE.: NEVER TRUE

## 2024-01-12 ASSESSMENT — PATIENT HEALTH QUESTIONNAIRE - PHQ9
8. MOVING OR SPEAKING SO SLOWLY THAT OTHER PEOPLE COULD HAVE NOTICED. OR THE OPPOSITE, BEING SO FIGETY OR RESTLESS THAT YOU HAVE BEEN MOVING AROUND A LOT MORE THAN USUAL: 0
4. FEELING TIRED OR HAVING LITTLE ENERGY: 0
2. FEELING DOWN, DEPRESSED OR HOPELESS: 0
10. IF YOU CHECKED OFF ANY PROBLEMS, HOW DIFFICULT HAVE THESE PROBLEMS MADE IT FOR YOU TO DO YOUR WORK, TAKE CARE OF THINGS AT HOME, OR GET ALONG WITH OTHER PEOPLE: 0
SUM OF ALL RESPONSES TO PHQ QUESTIONS 1-9: 0
SUM OF ALL RESPONSES TO PHQ QUESTIONS 1-9: 0
9. THOUGHTS THAT YOU WOULD BE BETTER OFF DEAD, OR OF HURTING YOURSELF: 0
SUM OF ALL RESPONSES TO PHQ9 QUESTIONS 1 & 2: 0
1. LITTLE INTEREST OR PLEASURE IN DOING THINGS: 0
5. POOR APPETITE OR OVEREATING: 0
SUM OF ALL RESPONSES TO PHQ QUESTIONS 1-9: 0
6. FEELING BAD ABOUT YOURSELF - OR THAT YOU ARE A FAILURE OR HAVE LET YOURSELF OR YOUR FAMILY DOWN: 0
3. TROUBLE FALLING OR STAYING ASLEEP: 0
7. TROUBLE CONCENTRATING ON THINGS, SUCH AS READING THE NEWSPAPER OR WATCHING TELEVISION: 0
SUM OF ALL RESPONSES TO PHQ QUESTIONS 1-9: 0

## 2024-01-12 ASSESSMENT — ENCOUNTER SYMPTOMS
BLOOD IN STOOL: 0
WHEEZING: 0
NAUSEA: 1
ABDOMINAL PAIN: 1
RHINORRHEA: 0
CONSTIPATION: 1
BACK PAIN: 0
CHEST TIGHTNESS: 0
COUGH: 0
DIARRHEA: 0
SHORTNESS OF BREATH: 1
VOMITING: 0

## 2024-01-12 ASSESSMENT — ANXIETY QUESTIONNAIRES
4. TROUBLE RELAXING: 0
6. BECOMING EASILY ANNOYED OR IRRITABLE: 0
GAD7 TOTAL SCORE: 0
5. BEING SO RESTLESS THAT IT IS HARD TO SIT STILL: 0
3. WORRYING TOO MUCH ABOUT DIFFERENT THINGS: 0
2. NOT BEING ABLE TO STOP OR CONTROL WORRYING: 0
7. FEELING AFRAID AS IF SOMETHING AWFUL MIGHT HAPPEN: 0
1. FEELING NERVOUS, ANXIOUS, OR ON EDGE: 0

## 2024-01-12 NOTE — PROGRESS NOTES
Moiz Trevino is a 58 y.o. female presenting today for Follow-up (Follow up from fall at [facility], bug bites yeast and urine infection check)  .     Chief Complaint   Patient presents with    Follow-up     Follow up from fall at [facility], bug bites yeast and urine infection check       HPI:  Moiz Trevino presents to the office today for follow up.    Patient has a past medical history of multiple sclerosis, arthritis, chronic constipation with IBS.     Patient has multiple chronic complaints-generalized body aches, abdominal pain.  Patient has history of osteoarthritis-she is taking NSAIDs.  Last visit, she was started on Protonix and advised to cut down on NSAID use.  Patient was advised to try Cymbalta in the past by neurology-however patient refused due to concern of side effects.  She was started on gabapentin for pain by neurology but is not taking it.  She continues to complain of the pain.    Patient also reports feeling very depressed.  She is not interested in taking medications but is willing to see a therapist.    Patient was referred to urology due to increased urinary frequency.  She was also referred to home health for PT/OT     Chronic constipation with IBS: She has previously tried Linzess, admits but failed due to diarrhea.  Did not like lactulose.  Failed Dulcolax, Fleet enemas, Metamucil due to lack of improvement.  She is following with GI.       Multiple sclerosis: Patient is following with neurology.  She is undergoing Ocrevus infusions.  Recently has been reporting gait instability, palpitations.  States she felt unbalanced and has had near falls.    She is also following with podiatry.       She has been reporting episodes of dizziness/syncope along with LE swelling.  She saw cardiology in 9/22.  Echo was ordered along with a cardiac Holter monitor.  Echo showed LVEF 60-65% with normal diastolic function, mild MR.      Patient has been complaining of dysphagia.

## 2024-01-13 DIAGNOSIS — K21.01 GASTRO-ESOPHAGEAL REFLUX DISEASE WITH ESOPHAGITIS, WITH BLEEDING: ICD-10-CM

## 2024-01-15 ENCOUNTER — HOSPITAL ENCOUNTER (OUTPATIENT)
Facility: HOSPITAL | Age: 59
Setting detail: INFUSION SERIES
Discharge: HOME | End: 2024-01-18
Payer: MEDICARE

## 2024-01-15 VITALS
WEIGHT: 167 LBS | SYSTOLIC BLOOD PRESSURE: 118 MMHG | BODY MASS INDEX: 27.82 KG/M2 | DIASTOLIC BLOOD PRESSURE: 77 MMHG | TEMPERATURE: 97.8 F | HEART RATE: 83 BPM | RESPIRATION RATE: 18 BRPM | OXYGEN SATURATION: 98 % | HEIGHT: 65 IN

## 2024-01-15 DIAGNOSIS — G35 MULTIPLE SCLEROSIS (HCC): Primary | ICD-10-CM

## 2024-01-15 LAB
ALBUMIN SERPL-MCNC: 3.7 G/DL (ref 3.4–5)
ALBUMIN/GLOB SERPL: 1 (ref 0.8–1.7)
ALP SERPL-CCNC: 105 U/L (ref 45–117)
ALT SERPL-CCNC: 31 U/L (ref 13–56)
ANION GAP SERPL CALC-SCNC: 3 MMOL/L (ref 3–18)
AST SERPL-CCNC: 40 U/L (ref 10–38)
BASO+EOS+MONOS # BLD AUTO: 0.2 K/UL (ref 0–2.3)
BASO+EOS+MONOS NFR BLD AUTO: 4 % (ref 0.1–17)
BILIRUB SERPL-MCNC: 0.4 MG/DL (ref 0.2–1)
BUN SERPL-MCNC: 8 MG/DL (ref 7–18)
BUN/CREAT SERPL: 10 (ref 12–20)
CALCIUM SERPL-MCNC: 9.2 MG/DL (ref 8.5–10.1)
CHLORIDE SERPL-SCNC: 108 MMOL/L (ref 100–111)
CO2 SERPL-SCNC: 28 MMOL/L (ref 21–32)
CREAT SERPL-MCNC: 0.81 MG/DL (ref 0.6–1.3)
DIFFERENTIAL METHOD BLD: ABNORMAL
ERYTHROCYTE [DISTWIDTH] IN BLOOD BY AUTOMATED COUNT: 14.6 % (ref 11.5–14.5)
GLOBULIN SER CALC-MCNC: 3.6 G/DL (ref 2–4)
GLUCOSE SERPL-MCNC: 80 MG/DL (ref 74–99)
HCT VFR BLD AUTO: 36.3 % (ref 36–48)
HGB BLD-MCNC: 11.5 G/DL (ref 12–16)
LYMPHOCYTES # BLD: 2 K/UL (ref 1.1–5.9)
LYMPHOCYTES NFR BLD: 40 % (ref 14–44)
MCH RBC QN AUTO: 27.6 PG (ref 25–35)
MCHC RBC AUTO-ENTMCNC: 31.7 G/DL (ref 31–37)
MCV RBC AUTO: 87.1 FL (ref 78–102)
NEUTS SEG # BLD: 2.9 K/UL (ref 1.8–9.5)
NEUTS SEG NFR BLD: 56 % (ref 40–70)
PLATELET # BLD AUTO: 330 K/UL (ref 140–440)
POTASSIUM SERPL-SCNC: 5.7 MMOL/L (ref 3.5–5.5)
PROT SERPL-MCNC: 7.3 G/DL (ref 6.4–8.2)
RBC # BLD AUTO: 4.17 M/UL (ref 4.1–5.1)
SODIUM SERPL-SCNC: 139 MMOL/L (ref 136–145)
WBC # BLD AUTO: 5.1 K/UL (ref 4.5–13)

## 2024-01-15 PROCEDURE — 6370000000 HC RX 637 (ALT 250 FOR IP): Performed by: NURSE PRACTITIONER

## 2024-01-15 PROCEDURE — 96365 THER/PROPH/DIAG IV INF INIT: CPT

## 2024-01-15 PROCEDURE — 96366 THER/PROPH/DIAG IV INF ADDON: CPT

## 2024-01-15 PROCEDURE — 96375 TX/PRO/DX INJ NEW DRUG ADDON: CPT

## 2024-01-15 PROCEDURE — 2580000003 HC RX 258: Performed by: STUDENT IN AN ORGANIZED HEALTH CARE EDUCATION/TRAINING PROGRAM

## 2024-01-15 PROCEDURE — 6360000002 HC RX W HCPCS: Performed by: STUDENT IN AN ORGANIZED HEALTH CARE EDUCATION/TRAINING PROGRAM

## 2024-01-15 PROCEDURE — 80053 COMPREHEN METABOLIC PANEL: CPT

## 2024-01-15 PROCEDURE — 85025 COMPLETE CBC W/AUTO DIFF WBC: CPT

## 2024-01-15 PROCEDURE — 6360000002 HC RX W HCPCS: Performed by: NURSE PRACTITIONER

## 2024-01-15 PROCEDURE — 2580000003 HC RX 258: Performed by: NURSE PRACTITIONER

## 2024-01-15 RX ORDER — SODIUM CHLORIDE 0.9 % (FLUSH) 0.9 %
5-40 SYRINGE (ML) INJECTION PRN
OUTPATIENT
Start: 2024-07-14

## 2024-01-15 RX ORDER — SODIUM CHLORIDE 9 MG/ML
INJECTION, SOLUTION INTRAVENOUS CONTINUOUS
OUTPATIENT
Start: 2024-07-14

## 2024-01-15 RX ORDER — ACETAMINOPHEN 325 MG/1
650 TABLET ORAL
OUTPATIENT
Start: 2024-07-14

## 2024-01-15 RX ORDER — DIPHENHYDRAMINE HYDROCHLORIDE 50 MG/ML
25 INJECTION INTRAMUSCULAR; INTRAVENOUS ONCE
OUTPATIENT
Start: 2024-07-14 | End: 2024-07-14

## 2024-01-15 RX ORDER — ONDANSETRON 2 MG/ML
8 INJECTION INTRAMUSCULAR; INTRAVENOUS
OUTPATIENT
Start: 2024-07-14

## 2024-01-15 RX ORDER — SODIUM CHLORIDE 9 MG/ML
5-250 INJECTION, SOLUTION INTRAVENOUS PRN
Status: ACTIVE | OUTPATIENT
Start: 2024-01-15 | End: 2024-01-16

## 2024-01-15 RX ORDER — SODIUM CHLORIDE 9 MG/ML
5-250 INJECTION, SOLUTION INTRAVENOUS PRN
OUTPATIENT
Start: 2024-07-14

## 2024-01-15 RX ORDER — ACETAMINOPHEN 325 MG/1
650 TABLET ORAL ONCE
OUTPATIENT
Start: 2024-07-14 | End: 2024-07-14

## 2024-01-15 RX ORDER — HEPARIN 100 UNIT/ML
500 SYRINGE INTRAVENOUS PRN
OUTPATIENT
Start: 2024-07-14

## 2024-01-15 RX ORDER — ALBUTEROL SULFATE 90 UG/1
4 AEROSOL, METERED RESPIRATORY (INHALATION) PRN
OUTPATIENT
Start: 2024-07-14

## 2024-01-15 RX ORDER — ACETAMINOPHEN 325 MG/1
650 TABLET ORAL ONCE
Status: COMPLETED | OUTPATIENT
Start: 2024-01-15 | End: 2024-01-15

## 2024-01-15 RX ORDER — DIPHENHYDRAMINE HYDROCHLORIDE 50 MG/ML
25 INJECTION INTRAMUSCULAR; INTRAVENOUS ONCE
Status: COMPLETED | OUTPATIENT
Start: 2024-01-15 | End: 2024-01-15

## 2024-01-15 RX ORDER — BENZOCAINE/MENTHOL 6 MG-10 MG
1 LOZENGE MUCOUS MEMBRANE 2 TIMES DAILY
COMMUNITY

## 2024-01-15 RX ORDER — DIPHENHYDRAMINE HYDROCHLORIDE 50 MG/ML
50 INJECTION INTRAMUSCULAR; INTRAVENOUS
OUTPATIENT
Start: 2024-07-14

## 2024-01-15 RX ORDER — SODIUM CHLORIDE 0.9 % (FLUSH) 0.9 %
5-40 SYRINGE (ML) INJECTION PRN
Status: ACTIVE | OUTPATIENT
Start: 2024-01-15 | End: 2024-01-16

## 2024-01-15 RX ORDER — EPINEPHRINE 1 MG/ML
0.3 INJECTION, SOLUTION, CONCENTRATE INTRAVENOUS PRN
OUTPATIENT
Start: 2024-07-14

## 2024-01-15 RX ADMIN — SODIUM CHLORIDE 25 ML/HR: 9 INJECTION, SOLUTION INTRAVENOUS at 09:50

## 2024-01-15 RX ADMIN — METHYLPREDNISOLONE SODIUM SUCCINATE 125 MG: 125 INJECTION, POWDER, FOR SOLUTION INTRAMUSCULAR; INTRAVENOUS at 10:00

## 2024-01-15 RX ADMIN — SODIUM CHLORIDE, PRESERVATIVE FREE 10 ML: 5 INJECTION INTRAVENOUS at 09:50

## 2024-01-15 RX ADMIN — OCRELIZUMAB 600 MG: 300 INJECTION INTRAVENOUS at 11:10

## 2024-01-15 RX ADMIN — DIPHENHYDRAMINE HYDROCHLORIDE 25 MG: 50 INJECTION INTRAMUSCULAR; INTRAVENOUS at 10:03

## 2024-01-15 RX ADMIN — ACETAMINOPHEN 650 MG: 325 TABLET ORAL at 10:00

## 2024-01-15 NOTE — PROGRESS NOTES
[facility] Progress Note    Date: January 15, 2024    Name: Moiz Trevino    MRN: [id number]         : 1965    HERE TO RECEIVE OCREVUS INFUSION EVERY 6 MONTHS.    Ms. Trevino arrived to [facility] at 0910, ambulatory with a quad  cane and in stable condition.     Pt was assessed and education was provided.     Patient denies any recent fevers, infections, or use of antibiotics.    Patient denies any issues with previous infusions.    Ms. Trevino's vitals were reviewed and patient was observed for 5 minutes prior to treatment.   Vitals:    01/15/24 0915   BP: 134/81   Pulse: 68   Resp: 18   Temp: 97.9 °F (36.6 °C)   SpO2: 100%     24 g PIV placed in Left AC on 1st attempt. PIV flushed easily with NS and had brisk blood return.    CBC and CMP drawn. CBC run in house. CMP sent out via  to be run by hospital lab.       Latest Reference Range & Units 01/15/24 09:55   WBC 4.5 - 13.0 K/uL 5.1   RBC 4.10 - 5.10 M/uL 4.17   Hemoglobin Quant 12.0 - 16.0 g/dL 11.5 (L)   Hematocrit 36 - 48 % 36.3   MCV 78 - 102 FL 87.1   MCH 25.0 - 35.0 PG 27.6   MCHC 31 - 37 g/dL 31.7   RDW 11.5 - 14.5 % 14.6 (H)   Platelet Count 140 - 440 K/uL 330   Neutrophils % 40 - 70 % 56   Lymphocyte % 14 - 44 % 40   Neutrophils Absolute 1.8 - 9.5 K/UL 2.9   Lymphocytes Absolute 1.1 - 5.9 K/UL 2.0   Differential Type -   AUTOMATED   (L): Data is abnormally low  (H): Data is abnormally high    Pre-medications (Tylenol 650mg PO, Benadryl 25mg IV, Solu-medrol 125mg IV) were administered as ordered.     OCREVUS 600MG/500ML initiated at 100mL/hr for 15 minutes. VS checked and remained stable. Titrated as follows:    200mL for 15 minutes. VS checked and remained stable.   250mL for 30 minutes. VS checked and remained stable.   300mL for 30 minutes. VS checked and remained stable.   Infusion continued at 300mL/hour until completion per order.     VS remained stable and pt denied complaints of itching, lip/tongue/facial swelling, SOB,

## 2024-01-16 LAB
A VAGINAE DNA VAG QL NAA+PROBE: NORMAL SCORE
BVAB2 DNA VAG QL NAA+PROBE: NORMAL SCORE
C ALBICANS DNA VAG QL NAA+PROBE: NEGATIVE
C GLABRATA DNA VAG QL NAA+PROBE: NEGATIVE
C TRACH DNA VAG QL NAA+PROBE: NEGATIVE
HSV1 DNA SPEC QL NAA+PROBE: NEGATIVE
HSV2 DNA SPEC QL NAA+PROBE: NEGATIVE
MEGA1 DNA VAG QL NAA+PROBE: NORMAL SCORE
N GONORRHOEA DNA VAG QL NAA+PROBE: NEGATIVE
T VAGINALIS DNA VAG QL NAA+PROBE: NEGATIVE

## 2024-01-16 RX ORDER — PANTOPRAZOLE SODIUM 40 MG/1
TABLET, DELAYED RELEASE ORAL
Qty: 60 TABLET | Refills: 0 | Status: SHIPPED | OUTPATIENT
Start: 2024-01-16

## 2024-01-18 DIAGNOSIS — R89.9 ABNORMAL LABORATORY TEST RESULT: Primary | ICD-10-CM

## 2024-03-06 LAB
25(OH)D3+25(OH)D2 SERPL-MCNC: 21 NG/ML (ref 30–100)
ALBUMIN SERPL-MCNC: 4.8 G/DL (ref 3.8–4.9)
ALBUMIN/GLOB SERPL: 2 {RATIO} (ref 1.2–2.2)
ALP SERPL-CCNC: 109 IU/L (ref 44–121)
ALT SERPL-CCNC: 15 IU/L (ref 0–32)
AST SERPL-CCNC: 21 IU/L (ref 0–40)
BASOPHILS # BLD AUTO: 0 X10E3/UL (ref 0–0.2)
BASOPHILS NFR BLD AUTO: 1 %
BILIRUB SERPL-MCNC: 0.3 MG/DL (ref 0–1.2)
BUN SERPL-MCNC: 10 MG/DL (ref 8–27)
CHLORIDE SERPL-SCNC: 104 MMOL/L (ref 96–106)
CREAT SERPL-MCNC: 0.82 MG/DL (ref 0.57–1)
EGFRCR SERPLBLD CKD-EPI 2021: 82 ML/MIN/1.73
EOSINOPHIL # BLD AUTO: 0.1 X10E3/UL (ref 0–0.4)
EOSINOPHIL NFR BLD AUTO: 3 %
ERYTHROCYTE [DISTWIDTH] IN BLOOD BY AUTOMATED COUNT: 14.3 % (ref 11.7–15.4)
GLOBULIN SER CALC-MCNC: 2.4 G/DL (ref 1.5–4.5)
GLUCOSE SERPL-MCNC: 98 MG/DL (ref 70–99)
HBA1C MFR BLD: 6.2 % (ref 4.8–5.6)
HCT VFR BLD AUTO: 37 % (ref 34–46.6)
HGB BLD-MCNC: 12.3 G/DL (ref 11.1–15.9)
IMM GRANULOCYTES # BLD AUTO: 0 X10E3/UL (ref 0–0.1)
IMM GRANULOCYTES NFR BLD AUTO: 0 %
LYMPHOCYTES # BLD AUTO: 2 X10E3/UL (ref 0.7–3.1)
LYMPHOCYTES NFR BLD AUTO: 45 %
MCH RBC QN AUTO: 28.1 PG (ref 26.6–33)
MCHC RBC AUTO-ENTMCNC: 33.2 G/DL (ref 31.5–35.7)
MCV RBC AUTO: 85 FL (ref 79–97)
MONOCYTES # BLD AUTO: 0.4 X10E3/UL (ref 0.1–0.9)
MONOCYTES NFR BLD AUTO: 9 %
NEUTROPHILS # BLD AUTO: 1.8 X10E3/UL (ref 1.4–7)
NEUTROPHILS NFR BLD AUTO: 42 %
PLATELET # BLD AUTO: 307 X10E3/UL (ref 150–450)
PROT SERPL-MCNC: 7.2 G/DL (ref 6–8.5)
RBC # BLD AUTO: 4.38 X10E6/UL (ref 3.77–5.28)
SODIUM SERPL-SCNC: 142 MMOL/L (ref 134–144)
SPECIMEN STATUS REPORT: NORMAL
WBC # BLD AUTO: 4.3 X10E3/UL (ref 3.4–10.8)

## 2024-03-12 ENCOUNTER — HOSPITAL ENCOUNTER (OUTPATIENT)
Facility: HOSPITAL | Age: 59
Discharge: HOME | End: 2024-03-15
Payer: MEDICARE

## 2024-03-12 DIAGNOSIS — R35.0 URINARY FREQUENCY: ICD-10-CM

## 2024-03-12 DIAGNOSIS — G35 MULTIPLE SCLEROSIS (HCC): ICD-10-CM

## 2024-03-12 DIAGNOSIS — R26.81 GAIT INSTABILITY: ICD-10-CM

## 2024-03-12 PROCEDURE — A9577 INJ MULTIHANCE: HCPCS | Performed by: NURSE PRACTITIONER

## 2024-03-12 PROCEDURE — 72157 MRI CHEST SPINE W/O & W/DYE: CPT

## 2024-03-12 PROCEDURE — 72156 MRI NECK SPINE W/O & W/DYE: CPT

## 2024-03-12 PROCEDURE — 6360000004 HC RX CONTRAST MEDICATION: Performed by: NURSE PRACTITIONER

## 2024-03-12 RX ADMIN — GADOBENATE DIMEGLUMINE 16 ML: 529 INJECTION, SOLUTION INTRAVENOUS at 13:10

## 2024-03-14 ENCOUNTER — OFFICE VISIT (OUTPATIENT)
Facility: CLINIC | Age: 59
End: 2024-03-14
Payer: MEDICARE

## 2024-03-14 ENCOUNTER — HOSPITAL ENCOUNTER (OUTPATIENT)
Facility: HOSPITAL | Age: 59
Setting detail: SPECIMEN
Discharge: HOME | End: 2024-03-17

## 2024-03-14 VITALS
BODY MASS INDEX: 27.49 KG/M2 | SYSTOLIC BLOOD PRESSURE: 132 MMHG | DIASTOLIC BLOOD PRESSURE: 70 MMHG | WEIGHT: 165 LBS | RESPIRATION RATE: 16 BRPM | HEART RATE: 75 BPM | OXYGEN SATURATION: 95 % | HEIGHT: 65 IN

## 2024-03-14 DIAGNOSIS — R26.81 UNSTEADY GAIT: ICD-10-CM

## 2024-03-14 DIAGNOSIS — J30.2 SEASONAL ALLERGIES: ICD-10-CM

## 2024-03-14 DIAGNOSIS — Z71.89 ACP (ADVANCE CARE PLANNING): ICD-10-CM

## 2024-03-14 DIAGNOSIS — G35 MULTIPLE SCLEROSIS (HCC): ICD-10-CM

## 2024-03-14 DIAGNOSIS — R73.03 PREDIABETES: ICD-10-CM

## 2024-03-14 DIAGNOSIS — G89.29 CHRONIC MIDLINE LOW BACK PAIN WITHOUT SCIATICA: ICD-10-CM

## 2024-03-14 DIAGNOSIS — G89.4 CHRONIC PAIN SYNDROME: ICD-10-CM

## 2024-03-14 DIAGNOSIS — M79.89 LEG SWELLING: ICD-10-CM

## 2024-03-14 DIAGNOSIS — L24.0 IRRITANT CONTACT DERMATITIS DUE TO DETERGENT: ICD-10-CM

## 2024-03-14 DIAGNOSIS — Z00.00 MEDICARE ANNUAL WELLNESS VISIT, SUBSEQUENT: Primary | ICD-10-CM

## 2024-03-14 DIAGNOSIS — Z12.31 BREAST CANCER SCREENING BY MAMMOGRAM: ICD-10-CM

## 2024-03-14 DIAGNOSIS — M54.50 CHRONIC MIDLINE LOW BACK PAIN WITHOUT SCIATICA: ICD-10-CM

## 2024-03-14 LAB — LABCORP SPECIMEN COLLECTION: NORMAL

## 2024-03-14 PROCEDURE — 99214 OFFICE O/P EST MOD 30 MIN: CPT | Performed by: STUDENT IN AN ORGANIZED HEALTH CARE EDUCATION/TRAINING PROGRAM

## 2024-03-14 PROCEDURE — 99001 SPECIMEN HANDLING PT-LAB: CPT

## 2024-03-14 PROCEDURE — G0439 PPPS, SUBSEQ VISIT: HCPCS | Performed by: STUDENT IN AN ORGANIZED HEALTH CARE EDUCATION/TRAINING PROGRAM

## 2024-03-14 RX ORDER — FLUTICASONE PROPIONATE 50 MCG
2 SPRAY, SUSPENSION (ML) NASAL DAILY
Qty: 16 G | Refills: 1 | Status: SHIPPED | OUTPATIENT
Start: 2024-03-14

## 2024-03-14 RX ORDER — TRIAMCINOLONE ACETONIDE 1 MG/G
OINTMENT TOPICAL 2 TIMES DAILY
Qty: 30 G | Refills: 0 | Status: SHIPPED | OUTPATIENT
Start: 2024-03-14

## 2024-03-14 SDOH — ECONOMIC STABILITY: FOOD INSECURITY: WITHIN THE PAST 12 MONTHS, THE FOOD YOU BOUGHT JUST DIDN'T LAST AND YOU DIDN'T HAVE MONEY TO GET MORE.: NEVER TRUE

## 2024-03-14 SDOH — ECONOMIC STABILITY: FOOD INSECURITY: WITHIN THE PAST 12 MONTHS, YOU WORRIED THAT YOUR FOOD WOULD RUN OUT BEFORE YOU GOT MONEY TO BUY MORE.: NEVER TRUE

## 2024-03-14 SDOH — ECONOMIC STABILITY: INCOME INSECURITY: HOW HARD IS IT FOR YOU TO PAY FOR THE VERY BASICS LIKE FOOD, HOUSING, MEDICAL CARE, AND HEATING?: NOT VERY HARD

## 2024-03-14 ASSESSMENT — ENCOUNTER SYMPTOMS
SHORTNESS OF BREATH: 1
COUGH: 0
DIARRHEA: 0
CHEST TIGHTNESS: 0
BACK PAIN: 0
VOMITING: 0
BLOOD IN STOOL: 0
NAUSEA: 1
ABDOMINAL PAIN: 1
WHEEZING: 0
CONSTIPATION: 1
RHINORRHEA: 0

## 2024-03-14 ASSESSMENT — PATIENT HEALTH QUESTIONNAIRE - PHQ9
9. THOUGHTS THAT YOU WOULD BE BETTER OFF DEAD, OR OF HURTING YOURSELF: 0
7. TROUBLE CONCENTRATING ON THINGS, SUCH AS READING THE NEWSPAPER OR WATCHING TELEVISION: 0
8. MOVING OR SPEAKING SO SLOWLY THAT OTHER PEOPLE COULD HAVE NOTICED. OR THE OPPOSITE, BEING SO FIGETY OR RESTLESS THAT YOU HAVE BEEN MOVING AROUND A LOT MORE THAN USUAL: 0
SUM OF ALL RESPONSES TO PHQ QUESTIONS 1-9: 0
1. LITTLE INTEREST OR PLEASURE IN DOING THINGS: 0
4. FEELING TIRED OR HAVING LITTLE ENERGY: 0
SUM OF ALL RESPONSES TO PHQ9 QUESTIONS 1 & 2: 0
6. FEELING BAD ABOUT YOURSELF - OR THAT YOU ARE A FAILURE OR HAVE LET YOURSELF OR YOUR FAMILY DOWN: 0
10. IF YOU CHECKED OFF ANY PROBLEMS, HOW DIFFICULT HAVE THESE PROBLEMS MADE IT FOR YOU TO DO YOUR WORK, TAKE CARE OF THINGS AT HOME, OR GET ALONG WITH OTHER PEOPLE: 0
SUM OF ALL RESPONSES TO PHQ QUESTIONS 1-9: 0
SUM OF ALL RESPONSES TO PHQ QUESTIONS 1-9: 0
3. TROUBLE FALLING OR STAYING ASLEEP: 0
SUM OF ALL RESPONSES TO PHQ QUESTIONS 1-9: 0
5. POOR APPETITE OR OVEREATING: 0
2. FEELING DOWN, DEPRESSED OR HOPELESS: 0

## 2024-03-14 ASSESSMENT — ANXIETY QUESTIONNAIRES
4. TROUBLE RELAXING: 0
3. WORRYING TOO MUCH ABOUT DIFFERENT THINGS: 0
2. NOT BEING ABLE TO STOP OR CONTROL WORRYING: 0
6. BECOMING EASILY ANNOYED OR IRRITABLE: 0
IF YOU CHECKED OFF ANY PROBLEMS ON THIS QUESTIONNAIRE, HOW DIFFICULT HAVE THESE PROBLEMS MADE IT FOR YOU TO DO YOUR WORK, TAKE CARE OF THINGS AT HOME, OR GET ALONG WITH OTHER PEOPLE: NOT DIFFICULT AT ALL
5. BEING SO RESTLESS THAT IT IS HARD TO SIT STILL: 0
7. FEELING AFRAID AS IF SOMETHING AWFUL MIGHT HAPPEN: 0
1. FEELING NERVOUS, ANXIOUS, OR ON EDGE: 0
GAD7 TOTAL SCORE: 0

## 2024-03-14 NOTE — PATIENT INSTRUCTIONS
to:    Name someone you trust to make healthcare decisions for you, only if you can’t. (Healthcare Power of )    Document your wishes for care if you were seriously ill and not expected to recover or are approaching end of life. (Advance Directive or Living Will)    The same page can be found using the QR code below.

## 2024-03-14 NOTE — PROGRESS NOTES
Medicare Annual Wellness Visit    Moiz Trevino is here for Medicare AWV    Assessment & Plan   Medicare annual wellness visit, subsequent  Advanced Care Planning    Recommendations for Preventive Services Due: see orders and patient instructions/AVS.  Recommended screening schedule for the next 5-10 years is provided to the patient in written form: see Patient Instructions/AVS.     Return in about 6 months (around 9/14/2024).     Subjective       Patient's complete Health Risk Assessment and screening values have been reviewed and are found in Flowsheets. The following problems were reviewed today and where indicated follow up appointments were made and/or referrals ordered.    Positive Risk Factor Screenings with Interventions:    Fall Risk:  Do you feel unsteady or are you worried about falling? : (!) yes  2 or more falls in past year?: no  Fall with injury in past year?: no     Interventions:    Reviewed medications, home hazards, visual acuity, and co-morbidities that can increase risk for falls  Referral: Physical Therapy (PT)             Activity, Diet, and Weight:  On average, how many days per week do you engage in moderate to strenuous exercise (like a brisk walk)?: 0 days  On average, how many minutes do you engage in exercise at this level?: 0 min    Do you eat balanced/healthy meals regularly?: Yes    Body mass index is 27.46 kg/m².      Inactivity Interventions:  See AVS for additional education material             Advanced Directives:  Do you have a Living Will?: (!) No    Intervention:  has NO advanced directive - information provided                     Objective   Vitals:    03/14/24 1043 03/14/24 1137   BP: (!) 157/74 132/70   Site: Right Upper Arm    Position: Sitting    Cuff Size: Large Adult    Pulse: 75    Resp: 16    SpO2: 95%    Weight: 74.8 kg (165 lb)    Height: 1.651 m (5' 5\")       Body mass index is 27.46 kg/m².             Allergies   Allergen Reactions    Naproxen 
01/12/2024 Low - 0  Score Final    Megasphaera DNA, Vag 01/12/2024 Low - 0  Score Final    Comment: Calculate total score by adding the 3 individual bacterial  vaginosis (BV) marker scores together.  Total score is  interpreted as follows:  Total score 0-1: Indicates the absence of BV.  Total score   2: Indeterminate for BV. Additional clinical                   data should be evaluated to establish a                   diagnosis.  Total score 3-6: Indicates the presence of BV.     This test was developed and its performance characteristics  determined by [facility].  It has not been cleared or approved  by the Food and Drug Administration.      Willow albicans, MOLLY 01/12/2024 Negative  Negative Final    Candida glabrata 01/12/2024 Negative  Negative Final    Trichomonas Vaginalis by MOLLY 01/12/2024 Negative  Negative Final    Chlamydia trachomatis, MOLLY 01/12/2024 Negative  Negative Final    Neisseria Gonorrhoeae, MOLLY 01/12/2024 Negative  Negative Final    HSV 1, NaaT 01/12/2024 Negative  Negative Final    HSV 2, NaaT 01/12/2024 Negative  Negative Final   Hospital Outpatient Visit on 12/20/2023   Component Date Value Ref Range Status    [facility] Specimen Collection 12/20/2023 Specimens collected/sent to [facility]. Please direct inquiries to ([phone]).    Final   Hospital Outpatient Visit on 12/20/2023   Component Date Value Ref Range Status    Left arm BP 12/20/2023 129  mmHg Final    Left posterior tibial 12/20/2023 121  mmHg Final    Left dorsalis pedis BP 12/20/2023 122  mmHg Final    Left toe pressure 12/20/2023 114  mmHg Final    Right arm BP 12/20/2023 125  mmHg Final    Right posterior tibial 12/20/2023 137  mmHg Final    Right dorsalis pedis BP 12/20/2023 120  mmHg Final    Right toe pressure 12/20/2023 108  mmHg Final    Right TARA 12/20/2023 1.06   Final    Right TBI 12/20/2023 0.84   Final    Left TARA 12/20/2023 0.95   Final    Left TBI 12/20/2023 0.88   Final       No results found for any visits on

## 2024-03-16 LAB
ALBUMIN/CREAT UR: <4 MG/G CREAT (ref 0–29)
CREAT UR-MCNC: 67.2 MG/DL
MICROALBUMIN UR-MCNC: <3 UG/ML

## 2024-03-18 ENCOUNTER — TELEPHONE (OUTPATIENT)
Age: 59
End: 2024-03-18

## 2024-03-18 DIAGNOSIS — M50.30 DEGENERATIVE DISC DISEASE, CERVICAL: Primary | ICD-10-CM

## 2024-03-18 DIAGNOSIS — G89.29 CHRONIC BACK PAIN, UNSPECIFIED BACK LOCATION, UNSPECIFIED BACK PAIN LATERALITY: ICD-10-CM

## 2024-03-18 DIAGNOSIS — M54.9 CHRONIC BACK PAIN, UNSPECIFIED BACK LOCATION, UNSPECIFIED BACK PAIN LATERALITY: ICD-10-CM

## 2024-03-18 NOTE — TELEPHONE ENCOUNTER
Discussed results of MRI thoracic spine and cervical spine with patient.  MRI thoracic spine noted stable 2 small lesions in the thoracic spinal cord.  No new lesions or abnormal cord enhancement.  Mild degenerative disease.  MRI cervical spine reported no focal intrinsic spinal cord lesions.  No abnormal cord enhancement.  Slightly progressed degenerative disc disease.  She is reporting neck pain as well as back pain.  She is interested in seeing orthopedics/spine specialist.  Will provide referral.

## 2024-03-22 ENCOUNTER — TRANSCRIBE ORDERS (OUTPATIENT)
Facility: HOSPITAL | Age: 59
End: 2024-03-22

## 2024-03-22 DIAGNOSIS — Z12.31 VISIT FOR SCREENING MAMMOGRAM: Primary | ICD-10-CM

## 2024-03-23 NOTE — PROGRESS NOTES
Problem: Adult Mental Health  Goal: Reports or exhibits reduction in symptoms associated with elevated or labile mood/christal  Outcome: Monitoring/Evaluating progress  Goal: Reports or exhibits improvement in depressive mood signs/symptoms  Outcome: Monitoring/Evaluating progress  Goal: Reports or exhibits an improvement in mood signs/symptoms associated with anxiety  Outcome: Monitoring/Evaluating progress  Goal: Demonstrates ability to proactively perform self cares  Outcome: Monitoring/Evaluating progress  Goal: Demonstrates improved ability to track conversation, process information  Outcome: Monitoring/Evaluating progress  Goal: Verbalizes understanding of positive individual coping skills  Outcome: Monitoring/Evaluating progress  Goal: Verbalizes understanding of diagnosis, reason for treament and treatment program  Outcome: Monitoring/Evaluating progress  Goal: Verbalizes understanding of medication management/monitoring/assessment of effectiveness  Outcome: Monitoring/Evaluating progress     Problem: Depressive Symptoms  Goal: #Depressive s/s (self-reported/observed) are recognized and monitored  Outcome: Monitoring/Evaluating progress  Goal: #Verbalizes understanding of depressive symptoms management  Description: Document in Patient Education Activity  Outcome: Monitoring/Evaluating progress     Problem: Suicide, Risk for  Goal: #Verbalizes understanding of suicide monitoring, precautions, and prevention  Description: Document in Patient Education Activity   Outcome: Monitoring/Evaluating progress      Gave pt information

## 2024-03-27 DIAGNOSIS — K21.01 GASTRO-ESOPHAGEAL REFLUX DISEASE WITH ESOPHAGITIS, WITH BLEEDING: ICD-10-CM

## 2024-03-28 RX ORDER — PANTOPRAZOLE SODIUM 40 MG/1
TABLET, DELAYED RELEASE ORAL
Qty: 60 TABLET | Refills: 0 | Status: SHIPPED | OUTPATIENT
Start: 2024-03-28

## 2024-04-02 ENCOUNTER — HOSPITAL ENCOUNTER (OUTPATIENT)
Facility: HOSPITAL | Age: 59
Discharge: HOME | End: 2024-04-05
Attending: STUDENT IN AN ORGANIZED HEALTH CARE EDUCATION/TRAINING PROGRAM
Payer: MEDICARE

## 2024-04-02 DIAGNOSIS — Z12.31 VISIT FOR SCREENING MAMMOGRAM: ICD-10-CM

## 2024-04-02 PROCEDURE — 77063 BREAST TOMOSYNTHESIS BI: CPT

## 2024-04-30 ENCOUNTER — OFFICE VISIT (OUTPATIENT)
Age: 59
End: 2024-04-30
Payer: MEDICARE

## 2024-04-30 VITALS
BODY MASS INDEX: 27.32 KG/M2 | OXYGEN SATURATION: 100 % | DIASTOLIC BLOOD PRESSURE: 72 MMHG | HEART RATE: 67 BPM | WEIGHT: 164 LBS | HEIGHT: 65 IN | SYSTOLIC BLOOD PRESSURE: 119 MMHG

## 2024-04-30 DIAGNOSIS — M47.812 CERVICAL FACET JOINT SYNDROME: ICD-10-CM

## 2024-04-30 DIAGNOSIS — R29.898 BILATERAL ARM WEAKNESS: ICD-10-CM

## 2024-04-30 DIAGNOSIS — M48.02 CERVICAL SPINAL STENOSIS: Primary | ICD-10-CM

## 2024-04-30 DIAGNOSIS — M25.512 CHRONIC LEFT SHOULDER PAIN: ICD-10-CM

## 2024-04-30 DIAGNOSIS — M54.16 LUMBAR RADICULOPATHY: ICD-10-CM

## 2024-04-30 DIAGNOSIS — G89.29 CHRONIC LEFT SHOULDER PAIN: ICD-10-CM

## 2024-04-30 DIAGNOSIS — R79.89 LOW VITAMIN D LEVEL: ICD-10-CM

## 2024-04-30 DIAGNOSIS — M54.50 LUMBAR PAIN: ICD-10-CM

## 2024-04-30 DIAGNOSIS — G35 MULTIPLE SCLEROSIS (HCC): ICD-10-CM

## 2024-04-30 DIAGNOSIS — R29.898 WEAKNESS OF BOTH LOWER EXTREMITIES: ICD-10-CM

## 2024-04-30 DIAGNOSIS — F20.0 PARANOID SCHIZOPHRENIA (HCC): ICD-10-CM

## 2024-04-30 PROCEDURE — 99204 OFFICE O/P NEW MOD 45 MIN: CPT | Performed by: PHYSICAL MEDICINE & REHABILITATION

## 2024-04-30 PROCEDURE — 72110 X-RAY EXAM L-2 SPINE 4/>VWS: CPT | Performed by: PHYSICAL MEDICINE & REHABILITATION

## 2024-04-30 RX ORDER — ERGOCALCIFEROL 1.25 MG/1
50000 CAPSULE ORAL WEEKLY
Qty: 12 CAPSULE | Refills: 1 | Status: SHIPPED | OUTPATIENT
Start: 2024-04-30

## 2024-05-03 NOTE — PROGRESS NOTES
[facility]: Outside name: desonide (TRIDESILON) 0.05 % cream, Disp: , Rfl:     ketoconazole (NIZORAL) 2 % shampoo, ceived the following from [facility]: Outside name: ketoconazole (NIZORAL) 2 % shampoo, Disp: , Rfl:     loratadine (CLARITIN) 10 MG tablet, Take 1 tablet by mouth daily, Disp: , Rfl:     polyethylene glycol (GLYCOLAX) 17 GM/SCOOP powder, Take 17 g by mouth daily, Disp: , Rfl:     vitamin D (CHOLECALCIFEROL) 25 MCG (1000 UT) TABS tablet, Take 1 tablet by mouth daily, Disp: 30 tablet, Rfl: 5    diphenhydrAMINE (BENADRYL) 2 % cream, Apply 0.02 g topically 2 times daily as needed for Itching Apply topically 2 times daily as needed. (Patient not taking: Reported on 4/30/2024), Disp: , Rfl:     hydrocortisone 1 % cream, Apply 0.01 g topically 2 times daily Apply topically 2 times daily. (Patient not taking: Reported on 4/30/2024), Disp: , Rfl:     loteprednol (LOTEMAX) 0.5 % ophthalmic gel, Apply 1 drop to eye 3 times daily (Patient not taking: Reported on 4/30/2024), Disp: , Rfl:       Allergies   Allergen Reactions    Naproxen Shortness Of Breath    Shellfish Allergy Nausea And Vomiting     SEVERE NAUSEA AND VOMITING  Other reaction(s): gi distress  SEVERE NAUSEA AND VOMITING    Amoxicillin Nausea Only and Rash     Other reaction(s): unknown       REVIEW OF SYSTEMS    Constitutional: Negative for fever, chills, or weight change.   Respiratory: Negative for cough or shortness of breath.     Cardiovascular: Negative for chest pain or palpitations.  Gastrointestinal: Negative for acid reflux, change in bowel habits, or constipation.  Genitourinary: Negative for dysuria and flank pain.   Musculoskeletal: Positive for cervical pain, thoracic pain, shoulder, and lumbar pain.  Skin: Negative for rash.   Neurological: Negative for headaches, dizziness, or numbness. Positive for LLE radiculopathy.  Endo/Heme/Allergies: Negative for increased bruising.   Psychiatric/Behavioral: Negative for difficulty 
length. No cord enlargement or atrophy. No abnormal cord enhancement. No additional lesions. Conus medullaris ends at lower L1.   Mild posterior disc bulge at multiple thoracic levels. Slightly more focal right paracentral disc protrusion at T7-T8, stable. No cord contact or central stenosis.  T10-T11 show slightly more focal left-sided ligamentum flavum thickening, slightly effacing CSF but no cord contact or compression.  No additional significant degenerative disc disease.  Small bilateral renal cysts.     IMPRESSION:  1. Stable two small lesions in the thoracic spinal cord, presumed from multiple sclerosis. No new lesions or abnormal cord enhancement.  2. Mild degenerative disease as described.    Cervical MRI from 03/13/24 was personally reviewed with the patient and demonstrated:  NARRATIVE & IMPRESSION:  Sagittal and axial multisequence MR images of cervical spine without and with IV  contrast     HISTORY: Multiple sclerosis follow-up.     COMPARISON STUDY: 6/11/2022  Trace retrolisthesis of C3 and C4. No compression deformity. No pathologic marrow signal. Paravertebral soft tissues unremarkable.  Subtle T2 hyperintensity in the ayla, and medulla and cerebellar hemispheres again noted.  Spinal cord shows normal signal intensity and morphology. No abnormal enhancement.     C2-C3: No disc herniation, central or foraminal stenosis.     C3-C4: Central disc protrusion with disc bulge, effacing CSF, contacting spinal cord. No cord compression or edema. Mild to moderate central stenosis, perhaps slightly worse. Moderate bilateral foraminal stenosis from facet hypertrophy and uncovertebral hypertrophy.     C4-C5: Similar posterior disc bulge and central disc protrusion, effacing CSF, with cord contact. Moderate central stenosis. Slightly worse than before as well. Moderate right foraminal stenosis from facet and uncovertebral hypertrophy. Patent left foramen.     C5-C6: Mild posterior disc bulge. Mild cord contact

## 2024-05-31 DIAGNOSIS — K21.01 GASTRO-ESOPHAGEAL REFLUX DISEASE WITH ESOPHAGITIS, WITH BLEEDING: ICD-10-CM

## 2024-05-31 RX ORDER — PANTOPRAZOLE SODIUM 40 MG/1
TABLET, DELAYED RELEASE ORAL
Qty: 60 TABLET | Refills: 0 | Status: SHIPPED | OUTPATIENT
Start: 2024-05-31

## 2024-05-31 NOTE — TELEPHONE ENCOUNTER
Pt is requesting refill pantoprazole 40 mg      [facility]    [phone]

## 2024-06-04 ENCOUNTER — OFFICE VISIT (OUTPATIENT)
Age: 59
End: 2024-06-04
Payer: MEDICARE

## 2024-06-04 VITALS — RESPIRATION RATE: 16 BRPM | HEIGHT: 65 IN | BODY MASS INDEX: 27.29 KG/M2

## 2024-06-04 DIAGNOSIS — M75.102 ROTATOR CUFF SYNDROME, LEFT: ICD-10-CM

## 2024-06-04 DIAGNOSIS — G89.29 CHRONIC LEFT SHOULDER PAIN: Primary | ICD-10-CM

## 2024-06-04 DIAGNOSIS — M25.512 CHRONIC LEFT SHOULDER PAIN: Primary | ICD-10-CM

## 2024-06-04 PROCEDURE — 73030 X-RAY EXAM OF SHOULDER: CPT | Performed by: ORTHOPAEDIC SURGERY

## 2024-06-04 PROCEDURE — 99214 OFFICE O/P EST MOD 30 MIN: CPT | Performed by: ORTHOPAEDIC SURGERY

## 2024-06-04 NOTE — PROGRESS NOTES
daily 30 g 0    fluticasone (FLONASE) 50 MCG/ACT nasal spray 2 sprays by Each Nostril route daily 16 g 1    diphenhydrAMINE (BENADRYL) 2 % cream Apply 0.02 g topically 2 times daily as needed for Itching Apply topically 2 times daily as needed. (Patient not taking: Reported on 4/30/2024)      hydrocortisone 1 % cream Apply 0.01 g topically 2 times daily Apply topically 2 times daily. (Patient not taking: Reported on 4/30/2024)      furosemide (LASIX) 20 MG tablet Take 1 tablet by mouth daily as needed (swelling) 90 tablet 0    Misc. Devices (WALKER) MISC Front wheel walker      albuterol sulfate HFA (PROVENTIL;VENTOLIN;PROAIR) 108 (90 Base) MCG/ACT inhaler Inhale 1 puff into the lungs every 4 hours as needed      alclomethasone (ACLOVATE) 0.05 % ointment Apply 0.05 % topically 2 times daily as needed      betamethasone valerate (VALISONE) 0.1 % lotion Apply 1 applicator topically as needed      desonide (DESOWEN) 0.05 % cream ceived the following from [facility]: Outside name: desonide (TRIDESILON) 0.05 % cream      ketoconazole (NIZORAL) 2 % shampoo ceived the following from [facility]: Outside name: ketoconazole (NIZORAL) 2 % shampoo      loratadine (CLARITIN) 10 MG tablet Take 1 tablet by mouth daily      loteprednol (LOTEMAX) 0.5 % ophthalmic gel Apply 1 drop to eye 3 times daily (Patient not taking: Reported on 4/30/2024)      polyethylene glycol (GLYCOLAX) 17 GM/SCOOP powder Take 17 g by mouth daily       No current facility-administered medications for this visit.       Allergies   Allergen Reactions    Naproxen Shortness Of Breath    Shellfish Allergy Nausea And Vomiting     SEVERE NAUSEA AND VOMITING  Other reaction(s): gi distress  SEVERE NAUSEA AND VOMITING    Amoxicillin Nausea Only and Rash     Other reaction(s): unknown       Past Surgical History:   Procedure Laterality Date    BREAST BIOPSY Right 1/21/2015    RIGHT BREAST BIOPSY WITH NEEDLE LOCALIZATION ULTRASOUND

## 2024-07-08 NOTE — PROGRESS NOTES
[facility]  [address] [place] [address]., [address]  [place] [address]  Phone: [phone]  Fax: [phone]    Pt's YOB: 1965    ASSESSMENT   Moiz was seen today for neck pain.    Diagnoses and all orders for this visit:    Lumbar pain    Lumbar radiculopathy    Weakness of both lower extremities    Multiple sclerosis (HCC)    Low vitamin D level         IMPRESSION AND PLAN:  Moiz Trevino is a 58 y.o. LHD female with history of cervical and thoracic pain, as well as MS. Pain is overall controlled after a course of home PT and a HEP. She expresses she is not interested in medication management. She does have difficulty with her vision secondary to MS.    1) Patient advised to maintain close follow-up with her PCP in regards to previously identified Vit D deficiency.  2) Patient defers MRI of the lumbar spine for the time being.  3) Ms. Trevino has a reminder for a \"due or due soon\" health maintenance. I have asked that she contact her primary care provider, Marry Elkins MD, for follow-up on this health maintenance.  4)  demonstrated consistency with prescribing.   Return in about 6 months (around 1/9/2025) for follow up.        HISTORY OF PRESENT ILLNESS:  Moiz Trevino is a 58 y.o. LHD female with history of cervical and thoracic pain and presents to the office today for medication and diagnostic follow up.   At the time of her last OV on 4/30/2024, the patient was referred to Dr. Hendricks for left shoulder pain, who she saw on 6/4/2024. Additionally, x-rays were reviewed and the patient was referred to in-home PT at her request for for cervical pain, left shoulder pain, and increased strength exercises. She declined Voltaren 1% gel, but was agreeable with initiating use of Vit D.   Today, the patient states she has completed the home therapy with benefit. She does continue to report arthralgia but is overall

## 2024-07-09 ENCOUNTER — OFFICE VISIT (OUTPATIENT)
Age: 59
End: 2024-07-09
Payer: MEDICARE

## 2024-07-09 VITALS
TEMPERATURE: 96.8 F | DIASTOLIC BLOOD PRESSURE: 75 MMHG | WEIGHT: 168 LBS | HEIGHT: 65 IN | SYSTOLIC BLOOD PRESSURE: 123 MMHG | OXYGEN SATURATION: 98 % | HEART RATE: 61 BPM | BODY MASS INDEX: 27.99 KG/M2

## 2024-07-09 DIAGNOSIS — R29.898 WEAKNESS OF BOTH LOWER EXTREMITIES: ICD-10-CM

## 2024-07-09 DIAGNOSIS — M54.50 LUMBAR PAIN: Primary | ICD-10-CM

## 2024-07-09 DIAGNOSIS — M54.16 LUMBAR RADICULOPATHY: ICD-10-CM

## 2024-07-09 DIAGNOSIS — R79.89 LOW VITAMIN D LEVEL: ICD-10-CM

## 2024-07-09 DIAGNOSIS — G35 MULTIPLE SCLEROSIS (HCC): ICD-10-CM

## 2024-07-09 PROCEDURE — 99213 OFFICE O/P EST LOW 20 MIN: CPT | Performed by: PHYSICAL MEDICINE & REHABILITATION

## 2024-07-09 RX ORDER — DUPILUMAB 300 MG/2ML
300 INJECTION, SOLUTION SUBCUTANEOUS
COMMUNITY
Start: 2024-05-11

## 2024-07-09 RX ORDER — CICLOPIROX 1 G/100ML
SHAMPOO TOPICAL
COMMUNITY
Start: 2024-04-24

## 2024-07-09 NOTE — PROGRESS NOTES
Moiz Trevino presents today for   Chief Complaint   Patient presents with    Neck Pain       Is someone accompanying this pt? no    Is the patient using any DME equipment during OV? no    Coordination of Care:  1. Have you been to the ER, urgent care clinic since your last visit? no  Hospitalized since your last visit? no    2. Have you seen or consulted any other health care providers outside of the [facility] since your last visit? Yes, ortho, dermatology, GI Include any pap smears or colon screening. no

## 2024-07-11 RX ORDER — EPINEPHRINE 1 MG/ML
0.3 INJECTION, SOLUTION, CONCENTRATE INTRAVENOUS PRN
Status: CANCELLED | OUTPATIENT
Start: 2024-07-14

## 2024-07-11 RX ORDER — ONDANSETRON 2 MG/ML
8 INJECTION INTRAMUSCULAR; INTRAVENOUS
Status: CANCELLED | OUTPATIENT
Start: 2024-07-14

## 2024-07-11 RX ORDER — DIPHENHYDRAMINE HYDROCHLORIDE 50 MG/ML
50 INJECTION INTRAMUSCULAR; INTRAVENOUS
Status: CANCELLED | OUTPATIENT
Start: 2024-07-14

## 2024-07-11 RX ORDER — HEPARIN 100 UNIT/ML
500 SYRINGE INTRAVENOUS PRN
Status: CANCELLED | OUTPATIENT
Start: 2024-07-14

## 2024-07-11 RX ORDER — DIPHENHYDRAMINE HYDROCHLORIDE 50 MG/ML
25 INJECTION INTRAMUSCULAR; INTRAVENOUS ONCE
Status: CANCELLED | OUTPATIENT
Start: 2024-07-14 | End: 2024-07-14

## 2024-07-11 RX ORDER — SODIUM CHLORIDE 9 MG/ML
5-250 INJECTION, SOLUTION INTRAVENOUS PRN
Status: CANCELLED | OUTPATIENT
Start: 2024-07-14

## 2024-07-11 RX ORDER — ALBUTEROL SULFATE 90 UG/1
4 AEROSOL, METERED RESPIRATORY (INHALATION) PRN
Status: CANCELLED | OUTPATIENT
Start: 2024-07-14

## 2024-07-11 RX ORDER — ACETAMINOPHEN 325 MG/1
650 TABLET ORAL
Status: CANCELLED | OUTPATIENT
Start: 2024-07-14

## 2024-07-11 RX ORDER — SODIUM CHLORIDE 9 MG/ML
INJECTION, SOLUTION INTRAVENOUS CONTINUOUS
Status: CANCELLED | OUTPATIENT
Start: 2024-07-14

## 2024-07-15 ENCOUNTER — HOSPITAL ENCOUNTER (OUTPATIENT)
Facility: HOSPITAL | Age: 59
Setting detail: INFUSION SERIES
Discharge: HOME | End: 2024-07-18
Payer: MEDICARE

## 2024-07-15 VITALS
TEMPERATURE: 97.6 F | RESPIRATION RATE: 16 BRPM | DIASTOLIC BLOOD PRESSURE: 70 MMHG | OXYGEN SATURATION: 99 % | HEART RATE: 85 BPM | SYSTOLIC BLOOD PRESSURE: 120 MMHG

## 2024-07-15 DIAGNOSIS — G35 MULTIPLE SCLEROSIS (HCC): Primary | ICD-10-CM

## 2024-07-15 LAB
ALBUMIN SERPL-MCNC: 3.6 G/DL (ref 3.4–5)
ALBUMIN/GLOB SERPL: 1.1 (ref 0.8–1.7)
ALP SERPL-CCNC: 104 U/L (ref 45–117)
ALT SERPL-CCNC: 17 U/L (ref 13–56)
ANION GAP SERPL CALC-SCNC: 4 MMOL/L (ref 3–18)
AST SERPL-CCNC: 16 U/L (ref 10–38)
BASO+EOS+MONOS # BLD AUTO: 0.3 K/UL (ref 0–2.3)
BASO+EOS+MONOS NFR BLD AUTO: 7 % (ref 0.1–17)
BILIRUB SERPL-MCNC: 0.2 MG/DL (ref 0.2–1)
BUN SERPL-MCNC: 10 MG/DL (ref 7–18)
BUN/CREAT SERPL: 13 (ref 12–20)
CALCIUM SERPL-MCNC: 9 MG/DL (ref 8.5–10.1)
CHLORIDE SERPL-SCNC: 108 MMOL/L (ref 100–111)
CO2 SERPL-SCNC: 29 MMOL/L (ref 21–32)
CREAT SERPL-MCNC: 0.76 MG/DL (ref 0.6–1.3)
DIFFERENTIAL METHOD BLD: ABNORMAL
ERYTHROCYTE [DISTWIDTH] IN BLOOD BY AUTOMATED COUNT: 13.8 % (ref 11.5–14.5)
GLOBULIN SER CALC-MCNC: 3.2 G/DL (ref 2–4)
GLUCOSE SERPL-MCNC: 76 MG/DL (ref 74–99)
HCT VFR BLD AUTO: 37.9 % (ref 36–48)
HGB BLD-MCNC: 12.3 G/DL (ref 12–16)
LYMPHOCYTES # BLD: 2.7 K/UL (ref 1.1–5.9)
LYMPHOCYTES NFR BLD: 58 % (ref 14–44)
MCH RBC QN AUTO: 28.1 PG (ref 25–35)
MCHC RBC AUTO-ENTMCNC: 32.5 G/DL (ref 31–37)
MCV RBC AUTO: 86.7 FL (ref 78–102)
NEUTS SEG # BLD: 1.6 K/UL (ref 1.8–9.5)
NEUTS SEG NFR BLD: 34 % (ref 40–70)
PLATELET # BLD AUTO: 247 K/UL (ref 140–440)
POTASSIUM SERPL-SCNC: 4 MMOL/L (ref 3.5–5.5)
PROT SERPL-MCNC: 6.8 G/DL (ref 6.4–8.2)
RBC # BLD AUTO: 4.37 M/UL (ref 4.1–5.1)
SODIUM SERPL-SCNC: 141 MMOL/L (ref 136–145)
WBC # BLD AUTO: 4.6 K/UL (ref 4.5–13)

## 2024-07-15 PROCEDURE — 96366 THER/PROPH/DIAG IV INF ADDON: CPT

## 2024-07-15 PROCEDURE — 6370000000 HC RX 637 (ALT 250 FOR IP): Performed by: NURSE PRACTITIONER

## 2024-07-15 PROCEDURE — 2580000003 HC RX 258: Performed by: NURSE PRACTITIONER

## 2024-07-15 PROCEDURE — 6360000002 HC RX W HCPCS: Performed by: NURSE PRACTITIONER

## 2024-07-15 PROCEDURE — 85025 COMPLETE CBC W/AUTO DIFF WBC: CPT

## 2024-07-15 PROCEDURE — 80053 COMPREHEN METABOLIC PANEL: CPT

## 2024-07-15 PROCEDURE — 96365 THER/PROPH/DIAG IV INF INIT: CPT

## 2024-07-15 PROCEDURE — 96375 TX/PRO/DX INJ NEW DRUG ADDON: CPT

## 2024-07-15 RX ORDER — SODIUM CHLORIDE 0.9 % (FLUSH) 0.9 %
5-40 SYRINGE (ML) INJECTION PRN
Status: ACTIVE | OUTPATIENT
Start: 2024-07-15 | End: 2024-07-16

## 2024-07-15 RX ORDER — DIPHENHYDRAMINE HYDROCHLORIDE 50 MG/ML
50 INJECTION INTRAMUSCULAR; INTRAVENOUS EVERY 6 HOURS PRN
Status: DISCONTINUED | OUTPATIENT
Start: 2024-07-15 | End: 2024-07-19 | Stop reason: HOSPADM

## 2024-07-15 RX ORDER — ACETAMINOPHEN 325 MG/1
650 TABLET ORAL
OUTPATIENT
Start: 2025-01-12

## 2024-07-15 RX ORDER — ACETAMINOPHEN 325 MG/1
650 TABLET ORAL ONCE
Status: COMPLETED | OUTPATIENT
Start: 2024-07-15 | End: 2024-07-15

## 2024-07-15 RX ORDER — SODIUM CHLORIDE 9 MG/ML
5-250 INJECTION, SOLUTION INTRAVENOUS PRN
OUTPATIENT
Start: 2025-01-12

## 2024-07-15 RX ORDER — DIPHENHYDRAMINE HYDROCHLORIDE 50 MG/ML
50 INJECTION INTRAMUSCULAR; INTRAVENOUS EVERY 6 HOURS PRN
Status: CANCELLED
Start: 2025-01-12

## 2024-07-15 RX ORDER — SODIUM CHLORIDE 9 MG/ML
5-250 INJECTION, SOLUTION INTRAVENOUS PRN
Status: ACTIVE | OUTPATIENT
Start: 2024-07-15 | End: 2024-07-16

## 2024-07-15 RX ORDER — ACETAMINOPHEN 325 MG/1
650 TABLET ORAL ONCE
OUTPATIENT
Start: 2025-01-12 | End: 2025-01-12

## 2024-07-15 RX ORDER — SODIUM CHLORIDE 9 MG/ML
INJECTION, SOLUTION INTRAVENOUS CONTINUOUS
OUTPATIENT
Start: 2025-01-12

## 2024-07-15 RX ORDER — DIPHENHYDRAMINE HYDROCHLORIDE 50 MG/ML
50 INJECTION INTRAMUSCULAR; INTRAVENOUS
OUTPATIENT
Start: 2025-01-12

## 2024-07-15 RX ORDER — EPINEPHRINE 1 MG/ML
0.3 INJECTION, SOLUTION, CONCENTRATE INTRAVENOUS PRN
OUTPATIENT
Start: 2025-01-12

## 2024-07-15 RX ORDER — SODIUM CHLORIDE 0.9 % (FLUSH) 0.9 %
5-40 SYRINGE (ML) INJECTION PRN
OUTPATIENT
Start: 2025-01-12

## 2024-07-15 RX ORDER — ONDANSETRON 2 MG/ML
8 INJECTION INTRAMUSCULAR; INTRAVENOUS
OUTPATIENT
Start: 2025-01-12

## 2024-07-15 RX ORDER — ALBUTEROL SULFATE 90 UG/1
4 AEROSOL, METERED RESPIRATORY (INHALATION) PRN
OUTPATIENT
Start: 2025-01-12

## 2024-07-15 RX ORDER — HEPARIN 100 UNIT/ML
500 SYRINGE INTRAVENOUS PRN
OUTPATIENT
Start: 2025-01-12

## 2024-07-15 RX ADMIN — OCRELIZUMAB 600 MG: 300 INJECTION INTRAVENOUS at 10:51

## 2024-07-15 RX ADMIN — DIPHENHYDRAMINE HYDROCHLORIDE 50 MG: 50 INJECTION INTRAMUSCULAR; INTRAVENOUS at 10:04

## 2024-07-15 RX ADMIN — ACETAMINOPHEN 650 MG: 325 TABLET ORAL at 10:00

## 2024-07-15 RX ADMIN — SODIUM CHLORIDE, PRESERVATIVE FREE 10 ML: 5 INJECTION INTRAVENOUS at 09:51

## 2024-07-15 RX ADMIN — SODIUM CHLORIDE, PRESERVATIVE FREE 10 ML: 5 INJECTION INTRAVENOUS at 10:04

## 2024-07-15 RX ADMIN — METHYLPREDNISOLONE SODIUM SUCCINATE 125 MG: 125 INJECTION, POWDER, FOR SOLUTION INTRAMUSCULAR; INTRAVENOUS at 10:10

## 2024-07-15 RX ADMIN — SODIUM CHLORIDE, PRESERVATIVE FREE 10 ML: 5 INJECTION INTRAVENOUS at 10:09

## 2024-07-15 RX ADMIN — SODIUM CHLORIDE 25 ML/HR: 9 INJECTION, SOLUTION INTRAVENOUS at 09:54

## 2024-07-15 RX ADMIN — SODIUM CHLORIDE, PRESERVATIVE FREE 10 ML: 5 INJECTION INTRAVENOUS at 10:14

## 2024-07-15 ASSESSMENT — PAIN SCALES - GENERAL: PAINLEVEL_OUTOF10: 7

## 2024-07-15 ASSESSMENT — PAIN DESCRIPTION - LOCATION: LOCATION: HIP;LEG;KNEE

## 2024-07-15 NOTE — PROGRESS NOTES
[facility] Progress Note    Date: July 15, 2024    Name: Moiz Trevino    MRN: [id number]         : 1965    HERE TO RECEIVE OCREVUS INFUSION EVERY 6 MONTHS.    Ms. Trevino arrived to [facility] at 0910, ambulatory with a quad cane and in stable condition.     Pt was assessed and education was provided.     Patient denies any recent fevers, infections, or use of antibiotics.    Patient denies any issues with previous infusions.    Ms. Trevino's vitals were reviewed.  Vitals:    07/15/24 0913   BP: 129/74   Pulse: 64   Resp: 18   Temp: 97.6 °F (36.4 °C)   SpO2: 99%     22G PIV placed in RIGHT hand x2 attempt. PIV flushed easily with NS and had brisk blood return.    CBC and CMP drawn. CBC run in house. CMP sent out via  to be run by hospital lab.    Results for orders placed or performed during the hospital encounter of 07/15/24   CBC with Partial Differential   Result Value Ref Range    WBC 4.6 4.5 - 13.0 K/uL    RBC 4.37 4.10 - 5.10 M/uL    Hemoglobin 12.3 12.0 - 16.0 g/dL    Hematocrit 37.9 36 - 48 %    MCV 86.7 78 - 102 FL    MCH 28.1 25.0 - 35.0 PG    MCHC 32.5 31 - 37 g/dL    RDW 13.8 11.5 - 14.5 %    Platelets 247 140 - 440 K/uL    Neutrophils % 34 (L) 40 - 70 %    Mixed Cells 7 0.1 - 17 %    Lymphocytes % 58 (H) 14 - 44 %    Neutrophils Absolute 1.6 (L) 1.8 - 9.5 K/UL    ABSOLUTE MIXED CELLS 0.3 0.0 - 2.3 K/uL    Lymphocytes Absolute 2.7 1.1 - 5.9 K/UL    Differential Type AUTOMATED           Pre-medications (Tylenol 650 mg PO, Benadryl 50 mg IV, Solu-medrol 125 mg IV) were administered as ordered.     OCREVUS 600MG/500ML initiated at 100mL/hr for 15 minutes. VS checked and remained stable. Titrated as follows:    200mL for 15 minutes. VS checked and remained stable.   250mL for 30 minutes. VS checked and remained stable.   300mL for 30 minutes. VS checked and remained stable.   Infusion continued at 300mL/hour until completion per order.     VS remained stable and pt denied

## 2024-07-17 ENCOUNTER — OFFICE VISIT (OUTPATIENT)
Age: 59
End: 2024-07-17
Payer: MEDICARE

## 2024-07-17 VITALS
BODY MASS INDEX: 28.85 KG/M2 | OXYGEN SATURATION: 96 % | HEIGHT: 64 IN | HEART RATE: 82 BPM | TEMPERATURE: 98.1 F | SYSTOLIC BLOOD PRESSURE: 112 MMHG | DIASTOLIC BLOOD PRESSURE: 80 MMHG | WEIGHT: 169 LBS

## 2024-07-17 DIAGNOSIS — G35 MULTIPLE SCLEROSIS (HCC): ICD-10-CM

## 2024-07-17 PROCEDURE — 99213 OFFICE O/P EST LOW 20 MIN: CPT | Performed by: NURSE PRACTITIONER

## 2024-07-17 RX ORDER — PREGABALIN 25 MG/1
CAPSULE ORAL
Qty: 60 CAPSULE | Refills: 5 | Status: SHIPPED | OUTPATIENT
Start: 2024-07-17 | End: 2025-01-17

## 2024-07-17 RX ORDER — BACLOFEN 5 MG/1
5 TABLET ORAL 3 TIMES DAILY
Qty: 90 TABLET | Refills: 6 | Status: SHIPPED | OUTPATIENT
Start: 2024-07-17

## 2024-07-17 NOTE — PATIENT INSTRUCTIONS
Patient instructions:  -Please call scheduling to schedule the MRI of the brain: [phone].    -Start pregabalin (Lyrica) 25 mg once daily x 1 week, then can increase to 1 capsule twice daily.  This is to help for your pain.      -Let me know if spasms are still bad; we can consider increasing the baclofen.

## 2024-07-17 NOTE — PROGRESS NOTES
[facility]  [address]. [address], [place] [address]  Office:  [phone]  Fax: [phone]  Chief Complaint   Patient presents with    Follow-up     Multiple sclerosis       HPI: Moiz Radha Trevino presents in follow-up for multiple sclerosis.  She was last seen here on 4/16/2024.  She continues Ocrevus for DMT.  Reported she didn't feel good with gabapentin and had urinary incontinence with it.  That was when up to 100 mg BID.  Then stopped it.  It may have helped a little for pain, but made her dizzy and tired.  She reported she has had near falls.  She cannot have home health.  [facility] does not take her insurance.  She heard from  but could not get any help.  We tried [employer] network, but we were also unsuccessful with that.  She denied new complaints but just feels tired a lot, pain, anxious/nervous, afraid.  Vitamin D level was low, 21, in March.  Advised to take vitamin D 1000 units daily.  She continued on baclofen 5 mg 3 times daily.  She was referred again to urology due to urgency.  Her last eye exam report was requested.  Plan was for MRI brain with without contrast in July for follow-up MS 1 year since previous.  Encouraged to try duloxetine for pain and mood.  She would think about it.  She did not want to see psychiatry for medication management or therapy.  She was referred to ortho for her low back pain.    She presents today in follow-up.  Reports doing so-so.  Still achy pain all over.  Reports still having leg swelling.  Takes Lasix prn swelling.  Home PT was helpful.  They came 4 times.  Reports sometimes right hand locks up at night.  Gets charley horses in the legs and feet.    Saw Dr. Malave.  Started vitamin D weekly dosing.    Has Ocrevus infusion on 7/15/2024.  The next is scheduled for 1/15/25.    MRI brain not done.  She has not seen urology.  Continues to have urinary urgency.  States she is drinking plenty of water.

## 2024-07-25 ENCOUNTER — TELEPHONE (OUTPATIENT)
Facility: CLINIC | Age: 59
End: 2024-07-25

## 2024-07-25 DIAGNOSIS — K21.01 GASTRO-ESOPHAGEAL REFLUX DISEASE WITH ESOPHAGITIS, WITH BLEEDING: Primary | ICD-10-CM

## 2024-07-25 RX ORDER — OMEPRAZOLE 40 MG/1
40 CAPSULE, DELAYED RELEASE ORAL
Qty: 90 CAPSULE | Refills: 1 | Status: SHIPPED | OUTPATIENT
Start: 2024-07-25

## 2024-07-26 ENCOUNTER — TELEPHONE (OUTPATIENT)
Facility: CLINIC | Age: 59
End: 2024-07-26

## 2024-07-29 ENCOUNTER — TELEPHONE (OUTPATIENT)
Facility: CLINIC | Age: 59
End: 2024-07-29

## 2024-07-29 NOTE — TELEPHONE ENCOUNTER
Patient called and is asking about the following meds, she keeps going into Diarrhea -is not sure if its from this medication or from her gastro doctor. Is this a side effect from her meds prescribed by Dr Elkins. Patient is going to call her Gastro Dr Mast to see if its any medication they have prescribed that may be causing this.      furosemide (LASIX) 20 MG tablet     Pharmacy: [address] [facility] - P [phone] -  [phone]     Pt's # [phone]

## 2024-07-30 DIAGNOSIS — M79.89 LEG SWELLING: ICD-10-CM

## 2024-07-30 RX ORDER — FUROSEMIDE 20 MG/1
20 TABLET ORAL DAILY PRN
Qty: 90 TABLET | Refills: 0 | Status: SHIPPED | OUTPATIENT
Start: 2024-07-30

## 2024-07-30 NOTE — TELEPHONE ENCOUNTER
Patient was complaining of chronic constipation previously-she might be experiencing diarrhea due to a medication GI started her on.  Can you get a updated list of the medications that are ordered by GI?

## 2024-07-31 ENCOUNTER — TELEPHONE (OUTPATIENT)
Facility: CLINIC | Age: 59
End: 2024-07-31

## 2024-08-19 ENCOUNTER — TELEPHONE (OUTPATIENT)
Facility: CLINIC | Age: 59
End: 2024-08-19

## 2024-08-19 NOTE — TELEPHONE ENCOUNTER
Pt inquired on appointment with Dr. maxwell 9.20.2024 time 10:40 am, provided contact number for ORTHOPEDIC SURGERY pt wanted to reschedule appointment

## 2024-09-20 ENCOUNTER — OFFICE VISIT (OUTPATIENT)
Facility: CLINIC | Age: 59
End: 2024-09-20

## 2024-09-20 VITALS
DIASTOLIC BLOOD PRESSURE: 78 MMHG | HEIGHT: 64 IN | BODY MASS INDEX: 27.69 KG/M2 | OXYGEN SATURATION: 99 % | HEART RATE: 66 BPM | WEIGHT: 162.2 LBS | SYSTOLIC BLOOD PRESSURE: 111 MMHG

## 2024-09-20 DIAGNOSIS — G35 MULTIPLE SCLEROSIS (HCC): ICD-10-CM

## 2024-09-20 DIAGNOSIS — G89.29 CHRONIC MIDLINE LOW BACK PAIN WITHOUT SCIATICA: ICD-10-CM

## 2024-09-20 DIAGNOSIS — R73.03 PREDIABETES: ICD-10-CM

## 2024-09-20 DIAGNOSIS — F32.A ANXIETY AND DEPRESSION: Primary | ICD-10-CM

## 2024-09-20 DIAGNOSIS — R39.9 UTI SYMPTOMS: ICD-10-CM

## 2024-09-20 DIAGNOSIS — K21.01 GASTRO-ESOPHAGEAL REFLUX DISEASE WITH ESOPHAGITIS, WITH BLEEDING: ICD-10-CM

## 2024-09-20 DIAGNOSIS — M54.50 CHRONIC MIDLINE LOW BACK PAIN WITHOUT SCIATICA: ICD-10-CM

## 2024-09-20 DIAGNOSIS — J30.2 SEASONAL ALLERGIES: ICD-10-CM

## 2024-09-20 DIAGNOSIS — F41.9 ANXIETY AND DEPRESSION: Primary | ICD-10-CM

## 2024-09-20 LAB
BILIRUBIN, URINE, POC: NEGATIVE
BLOOD URINE, POC: NEGATIVE
GLUCOSE URINE, POC: NEGATIVE
KETONES, URINE, POC: NEGATIVE
LEUKOCYTE ESTERASE, URINE, POC: NEGATIVE
NITRITE, URINE, POC: NEGATIVE
PH, URINE, POC: 5.5 (ref 4.6–8)
PROTEIN,URINE, POC: NEGATIVE
SPECIFIC GRAVITY, URINE, POC: 1.03 (ref 1–1.03)
URINALYSIS CLARITY, POC: CLEAR
URINALYSIS COLOR, POC: YELLOW
UROBILINOGEN, POC: NORMAL

## 2024-09-20 RX ORDER — LORATADINE 10 MG/1
10 TABLET ORAL DAILY
Qty: 30 TABLET | Refills: 2 | Status: SHIPPED | OUTPATIENT
Start: 2024-09-20

## 2024-09-20 RX ORDER — FLUTICASONE PROPIONATE 50 MCG
2 SPRAY, SUSPENSION (ML) NASAL DAILY
Qty: 16 G | Refills: 1 | Status: SHIPPED | OUTPATIENT
Start: 2024-09-20

## 2024-09-20 RX ORDER — BACLOFEN 5 MG/1
5 TABLET ORAL 3 TIMES DAILY
Qty: 90 TABLET | Refills: 6 | Status: SHIPPED | OUTPATIENT
Start: 2024-09-20

## 2024-09-20 RX ORDER — DULOXETIN HYDROCHLORIDE 30 MG/1
30 CAPSULE, DELAYED RELEASE ORAL DAILY
Qty: 30 CAPSULE | Refills: 3 | Status: SHIPPED | OUTPATIENT
Start: 2024-09-20

## 2024-09-20 SDOH — ECONOMIC STABILITY: FOOD INSECURITY: WITHIN THE PAST 12 MONTHS, YOU WORRIED THAT YOUR FOOD WOULD RUN OUT BEFORE YOU GOT MONEY TO BUY MORE.: NEVER TRUE

## 2024-09-20 SDOH — ECONOMIC STABILITY: FOOD INSECURITY: WITHIN THE PAST 12 MONTHS, THE FOOD YOU BOUGHT JUST DIDN'T LAST AND YOU DIDN'T HAVE MONEY TO GET MORE.: NEVER TRUE

## 2024-09-20 SDOH — ECONOMIC STABILITY: INCOME INSECURITY: HOW HARD IS IT FOR YOU TO PAY FOR THE VERY BASICS LIKE FOOD, HOUSING, MEDICAL CARE, AND HEATING?: VERY HARD

## 2024-09-20 ASSESSMENT — ENCOUNTER SYMPTOMS
CHEST TIGHTNESS: 0
RHINORRHEA: 0
CONSTIPATION: 1
ABDOMINAL PAIN: 1
BACK PAIN: 0
SHORTNESS OF BREATH: 1
NAUSEA: 1
BLOOD IN STOOL: 0
DIARRHEA: 0
WHEEZING: 0
VOMITING: 0
COUGH: 0

## 2024-11-15 ENCOUNTER — TELEPHONE (OUTPATIENT)
Facility: CLINIC | Age: 59
End: 2024-11-15

## 2024-11-19 ENCOUNTER — OFFICE VISIT (OUTPATIENT)
Facility: CLINIC | Age: 59
End: 2024-11-19
Payer: MEDICARE

## 2024-11-19 VITALS
SYSTOLIC BLOOD PRESSURE: 129 MMHG | WEIGHT: 169.8 LBS | BODY MASS INDEX: 28.99 KG/M2 | HEIGHT: 64 IN | HEART RATE: 71 BPM | OXYGEN SATURATION: 99 % | DIASTOLIC BLOOD PRESSURE: 76 MMHG

## 2024-11-19 DIAGNOSIS — K58.2 MIXED IRRITABLE BOWEL SYNDROME: ICD-10-CM

## 2024-11-19 DIAGNOSIS — E78.00 PURE HYPERCHOLESTEROLEMIA: ICD-10-CM

## 2024-11-19 DIAGNOSIS — R73.03 PREDIABETES: ICD-10-CM

## 2024-11-19 DIAGNOSIS — Z91.81 AT HIGH RISK FOR FALLS: ICD-10-CM

## 2024-11-19 DIAGNOSIS — M79.89 LEG SWELLING: ICD-10-CM

## 2024-11-19 DIAGNOSIS — F32.A ANXIETY AND DEPRESSION: ICD-10-CM

## 2024-11-19 DIAGNOSIS — R26.81 UNSTEADINESS ON FEET: ICD-10-CM

## 2024-11-19 DIAGNOSIS — R26.81 UNSTEADY GAIT: ICD-10-CM

## 2024-11-19 DIAGNOSIS — G35 MULTIPLE SCLEROSIS (HCC): Primary | ICD-10-CM

## 2024-11-19 DIAGNOSIS — F41.9 ANXIETY AND DEPRESSION: ICD-10-CM

## 2024-11-19 DIAGNOSIS — E55.9 VITAMIN D DEFICIENCY, UNSPECIFIED: ICD-10-CM

## 2024-11-19 DIAGNOSIS — R79.89 LOW VITAMIN D LEVEL: ICD-10-CM

## 2024-11-19 PROCEDURE — 99214 OFFICE O/P EST MOD 30 MIN: CPT | Performed by: STUDENT IN AN ORGANIZED HEALTH CARE EDUCATION/TRAINING PROGRAM

## 2024-11-19 RX ORDER — ERGOCALCIFEROL 1.25 MG/1
50000 CAPSULE, LIQUID FILLED ORAL WEEKLY
Qty: 12 CAPSULE | Refills: 1 | Status: SHIPPED | OUTPATIENT
Start: 2024-11-19

## 2024-11-19 SDOH — ECONOMIC STABILITY: INCOME INSECURITY: HOW HARD IS IT FOR YOU TO PAY FOR THE VERY BASICS LIKE FOOD, HOUSING, MEDICAL CARE, AND HEATING?: VERY HARD

## 2024-11-19 SDOH — ECONOMIC STABILITY: FOOD INSECURITY: WITHIN THE PAST 12 MONTHS, THE FOOD YOU BOUGHT JUST DIDN'T LAST AND YOU DIDN'T HAVE MONEY TO GET MORE.: NEVER TRUE

## 2024-11-19 SDOH — ECONOMIC STABILITY: FOOD INSECURITY: WITHIN THE PAST 12 MONTHS, YOU WORRIED THAT YOUR FOOD WOULD RUN OUT BEFORE YOU GOT MONEY TO BUY MORE.: NEVER TRUE

## 2024-11-19 ASSESSMENT — ENCOUNTER SYMPTOMS
WHEEZING: 0
BACK PAIN: 0
SHORTNESS OF BREATH: 1
NAUSEA: 1
CHEST TIGHTNESS: 0
DIARRHEA: 0
BLOOD IN STOOL: 0
CONSTIPATION: 1
VOMITING: 0
RHINORRHEA: 0
COUGH: 0
ABDOMINAL PAIN: 1

## 2024-11-19 ASSESSMENT — PATIENT HEALTH QUESTIONNAIRE - PHQ9
SUM OF ALL RESPONSES TO PHQ9 QUESTIONS 1 & 2: 1
2. FEELING DOWN, DEPRESSED OR HOPELESS: SEVERAL DAYS
1. LITTLE INTEREST OR PLEASURE IN DOING THINGS: NOT AT ALL
SUM OF ALL RESPONSES TO PHQ QUESTIONS 1-9: 1

## 2024-11-19 NOTE — PROGRESS NOTES
Hospitalized since your last visit?\" NO    2. \"Have you seen or consulted any other health care providers outside of the [facility] since your last visit?\" NO    3. For patients aged 45-75: Has the patient had a colonoscopy / FIT/ Cologuard? YES    If the patient is female:    4. For patients aged 40-74: Has the patient had a mammogram within the past 2 years? YES    5. For patients aged 21-65: Has the patient had a pap smear? NO    Health Maintenance: reviewed and discussed and ordered per Provider.    Health Maintenance Due   Topic Date Due    Pneumococcal 0-64 years Vaccine (1 of 2 - PCV) Never done    DTaP/Tdap/Td vaccine (1 - Tdap) Never done    Shingles vaccine (1 of 2) Never done    Flu vaccine (1) Never done    COVID-19 Vaccine (5 - 2023-24 season) 09/01/2024        - [facility] [place]   [facility]        Click Here for Release of Records Request   
Positive for arthralgias, gait problem and myalgias. Negative for back pain and neck pain.   Skin:  Positive for rash.   Neurological:  Positive for syncope, weakness and numbness. Negative for dizziness, tremors, seizures, speech difficulty and headaches.   Psychiatric/Behavioral:  Negative for agitation and confusion. The patient is not nervous/anxious.    All other systems reviewed and are negative.        Allergies   Allergen Reactions    Naproxen Shortness Of Breath    Shellfish Allergy Nausea And Vomiting     SEVERE NAUSEA AND VOMITING  Other reaction(s): gi distress  SEVERE NAUSEA AND VOMITING    Amoxicillin Nausea Only and Rash     Other reaction(s): unknown       PHQ Screening   PHQ-9 Total Score: 1 (11/19/2024 11:19 AM)             History  Past Medical History:   Diagnosis Date    Bed bug bite     Chest pain 11/2014    neg EKG, non recurrent    Chronic pain     lower back and legs    Depression     Eczema     Fibroids     GERD (gastroesophageal reflux disease)     H/O seasonal allergies     Headache(784.0)     Hiatal hernia     IBS (irritable bowel syndrome)     Infestation by bed bug     Microscopic hematuria     MS (multiple sclerosis) (HCC)     MS (multiple sclerosis) (HCC)     Rectal bleeding     Stool color black     irritable bowel       Past Surgical History:   Procedure Laterality Date    BREAST BIOPSY Right 1/21/2015    RIGHT BREAST BIOPSY WITH NEEDLE LOCALIZATION ULTRASOUND performed by Zenon Hua MD at [facility] MAIN OR    CHOLECYSTECTOMY      CHOLECYSTECTOMY      GI      HYSTERECTOMY (CERVIX STATUS UNKNOWN)      TUBAL LIGATION         Social History     Socioeconomic History    Marital status: Single     Spouse name: Not on file    Number of children: Not on file    Years of education: Not on file    Highest education level: Not on file   Occupational History    Not on file   Tobacco Use    Smoking status: Former     Current packs/day: 0.00     Types: Cigarettes     Quit date: 6/28/2016

## 2024-12-03 ENCOUNTER — HOSPITAL ENCOUNTER (OUTPATIENT)
Facility: HOSPITAL | Age: 59
Discharge: HOME | End: 2024-12-06
Payer: MEDICARE

## 2024-12-03 DIAGNOSIS — G35 MULTIPLE SCLEROSIS (HCC): ICD-10-CM

## 2024-12-03 PROCEDURE — 82565 ASSAY OF CREATININE: CPT

## 2024-12-03 PROCEDURE — 6360000004 HC RX CONTRAST MEDICATION: Performed by: NURSE PRACTITIONER

## 2024-12-03 PROCEDURE — A9577 INJ MULTIHANCE: HCPCS | Performed by: NURSE PRACTITIONER

## 2024-12-03 PROCEDURE — 70553 MRI BRAIN STEM W/O & W/DYE: CPT

## 2024-12-03 RX ADMIN — GADOBENATE DIMEGLUMINE 16 ML: 529 INJECTION, SOLUTION INTRAVENOUS at 12:09

## 2024-12-04 LAB — CREAT UR-MCNC: 0.8 MG/DL (ref 0.6–1.3)

## 2024-12-10 ENCOUNTER — TELEPHONE (OUTPATIENT)
Age: 59
End: 2024-12-10

## 2024-12-10 NOTE — TELEPHONE ENCOUNTER
----- Message from TAWANA Garcia NP sent at 12/6/2024  9:35 AM EST -----  Please notify regarding MRI brain result: There is a new lesion which is located at an area between the right cerebellum and the brainstem.  Otherwise, the burden of MS changes is largely stable.  Can we please make a f/u appt.  It looks like the last one had to be cancelled.  For now I would proceed with the Ocrevus infusion in January and we can decide if we want to make any changes.  Thank you.  ----- Message -----  From: Alisha Young Incoming Orders Results To [facility]  Sent: 12/5/2024  11:32 AM EST  To: TAWANA Gómez NP

## 2024-12-10 NOTE — TELEPHONE ENCOUNTER
Call placed to the patient, name and  verified.  Patient was given/read the note from ROSSANA Bush several times. (See Note)  Patient transferred to the  to schedule follow-up.

## 2024-12-17 ENCOUNTER — OFFICE VISIT (OUTPATIENT)
Age: 59
End: 2024-12-17
Payer: MEDICARE

## 2024-12-17 VITALS
OXYGEN SATURATION: 98 % | RESPIRATION RATE: 16 BRPM | TEMPERATURE: 97.7 F | BODY MASS INDEX: 28.99 KG/M2 | WEIGHT: 174 LBS | DIASTOLIC BLOOD PRESSURE: 76 MMHG | SYSTOLIC BLOOD PRESSURE: 125 MMHG | HEART RATE: 77 BPM | HEIGHT: 65 IN

## 2024-12-17 DIAGNOSIS — M79.2 CHRONIC CENTRAL NEUROPATHIC PAIN DUE TO MULTIPLE SCLEROSIS (HCC): ICD-10-CM

## 2024-12-17 DIAGNOSIS — G89.29 CHRONIC CENTRAL NEUROPATHIC PAIN DUE TO MULTIPLE SCLEROSIS (HCC): ICD-10-CM

## 2024-12-17 DIAGNOSIS — G35 MULTIPLE SCLEROSIS (HCC): Primary | ICD-10-CM

## 2024-12-17 DIAGNOSIS — G35 CHRONIC CENTRAL NEUROPATHIC PAIN DUE TO MULTIPLE SCLEROSIS (HCC): ICD-10-CM

## 2024-12-17 PROCEDURE — 99213 OFFICE O/P EST LOW 20 MIN: CPT | Performed by: NURSE PRACTITIONER

## 2024-12-17 RX ORDER — BACLOFEN 5 MG/1
5 TABLET ORAL 3 TIMES DAILY
Qty: 90 TABLET | Refills: 6 | Status: SHIPPED | OUTPATIENT
Start: 2024-12-17

## 2024-12-17 RX ORDER — AMITRIPTYLINE HYDROCHLORIDE 10 MG/1
10 TABLET ORAL NIGHTLY
Qty: 90 TABLET | Refills: 1 | Status: SHIPPED | OUTPATIENT
Start: 2024-12-17

## 2024-12-17 NOTE — PATIENT INSTRUCTIONS
Patient instructions:  -referral to pain psychologist:    Dr. Hank Gagnon at [facility]  [phone]  [address] [place] [id number]    -start amitriptyline 10 mg nightly for pain    -updated eye exam    -you have a home health PT and OT referral in place

## 2024-12-17 NOTE — PROGRESS NOTES
Sosajossie Trevino is a 59 y.o. female (: 1965) presenting to address:    Chief Complaint   Patient presents with    Follow-up     Patient states that she continues to have headaches and she states that she continues to feel like something is biting her. She relays that she has pain in her feet and her ankles are swollen. She also states that she also continues to have memory loss.       Vitals:    24 1333   BP: 125/76   Pulse: 77   Resp: 16   Temp: 97.7 °F (36.5 °C)   SpO2: 98%       Coordination of Care Questionaire:     \"Have you been to the ER, urgent care clinic since your last visit?  Hospitalized since your last visit?\"    No     “Have you seen or consulted any other health care providers outside our system since your last visit?”    No             
not seen on the prior study     This is associated with mild increase on the diffusion sequences as well     T2 assessment: Same     Diffusion imaging: Abnormal increased signal in the right cerebellar track     Postcontrast images demonstrate an incidental developmental venous anomaly on  the right draining the mid and inferior cerebellar hemisphere     Iron rim detection none demonstrated     Blackhole evaluation: Moderate amount of blackhole formation stable from prior  study     Size comparison     Established lesions show no evidence of any increasing size compared to the 2023  study     PML: None     Atrophy: Hemispheres intact corpus callosum shows no significant atrophy     FINDINGS:     Parenchyma: Hemispheres demonstrate no atrophy     No acute infarction. No acute hemorrhage. No mass lesion. No pathologic  enhancement.     CSF spaces: Ventricles and cisterns remain midline in position     IAC regions: Unremarkable     Parasellar region: Unremarkable     Vasculature: Appropriate flow voids within the major skull base vasculature.     Cervicomedullary junction: Patent     Orbits: Unremarkable     Paranasal sinuses: Clear            IMPRESSION:     1. There is a new lesion at the base of the right cerebellar peduncular track  compared to the 2023 exam     Otherwise largely stable moderate burden MS changes     No evidence of PML     Evidence for brainstem and cerebellar involvement also stable with the exception  of new lesions described above     Developmental venous anomaly in the right cerebellar hemisphere\"      Impression/Plan:  This is a 59-year-old left-handed female who presents in follow-up for multiple sclerosis.  She is on a regimen of Ocrevus for DMT which was started in January 2020 after having side effects with Tecfidera.  She also has complaints of pain involving the back and lower extremities.  She does not want to start duloxetine due to concern over potential side effects.  She had side

## 2025-01-06 ENCOUNTER — TELEPHONE (OUTPATIENT)
Age: 60
End: 2025-01-06

## 2025-01-06 DIAGNOSIS — Z72.89 OTHER PROBLEMS RELATED TO LIFESTYLE: ICD-10-CM

## 2025-01-06 DIAGNOSIS — Z11.59 ENCOUNTER FOR SCREENING FOR OTHER VIRAL DISEASES: ICD-10-CM

## 2025-01-06 DIAGNOSIS — G35 MULTIPLE SCLEROSIS (HCC): Primary | ICD-10-CM

## 2025-01-07 DIAGNOSIS — J30.2 SEASONAL ALLERGIES: ICD-10-CM

## 2025-01-07 NOTE — TELEPHONE ENCOUNTER
Pt requesting     Loratadine 10 mg    [facility]    [address]    [facility]    [phone]

## 2025-01-09 RX ORDER — LORATADINE 10 MG/1
10 TABLET ORAL DAILY
Qty: 30 TABLET | Refills: 0 | Status: SHIPPED | OUTPATIENT
Start: 2025-01-09

## 2025-01-13 NOTE — PROGRESS NOTES
(around 2025) for follow up.      HISTORY OF PRESENT ILLNESS:  Moiz Trevino is a 59 y.o. LHD female who presents to the office today for follow up. At her last OV (24), advised to maintain close follow-up with her PCP in regards to previously identified Vit D deficiency. .    Patient c/o right arm and right leg pain radiating down to her foot. Patient states she does not take medication for pain relief. She has used gabapentin and lyrica but has not found them helpful and does not take them consistently.    Patient sees Rosa Isela Bush APRN-NP to manage MS. PT has been recommended but patient states she was not contacted to attend  and is not able to often go because she has substantial transportation problems.  Patient reports more right-sided symptoms in her right leg as well as her right arm in terms of numbness tingling.  She does overall feel she is getting weaker.Pt endorses  issues with balance.  She also reports difficulty with ambulation.  She does walk with the assistance of a cane.   Review of chart demonstrates Vit D level of 21 on 2024.    Patient states she is scheduled for Infusion Therapy tomorrow, 1/15/25.     Pain Scale: 8/10    PCP: Marry Elkins MD         Diagnosis Date    Bed bug bite     Chest pain 2014    neg EKG, non recurrent    Chronic pain     lower back and legs    Depression     Eczema     Fibroids     GERD (gastroesophageal reflux disease)     H/O seasonal allergies     Headache(784.0)     Hiatal hernia     IBS (irritable bowel syndrome)     Infestation by bed bug     Microscopic hematuria     MS (multiple sclerosis) (HCC)     MS (multiple sclerosis) (HCC)     Rectal bleeding     Stool color black     irritable bowel        Social History     Tobacco Use    Smoking status: Former     Current packs/day: 0.00     Types: Cigarettes     Quit date: 2016     Years since quittin.5     Passive exposure: Never    Smokeless tobacco: Former     Quit

## 2025-01-14 ENCOUNTER — OFFICE VISIT (OUTPATIENT)
Age: 60
End: 2025-01-14
Payer: MEDICARE

## 2025-01-14 VITALS — HEIGHT: 65 IN | WEIGHT: 168 LBS | TEMPERATURE: 96 F | BODY MASS INDEX: 27.99 KG/M2

## 2025-01-14 DIAGNOSIS — G35 MULTIPLE SCLEROSIS (HCC): ICD-10-CM

## 2025-01-14 DIAGNOSIS — R26.9 GAIT ABNORMALITY: ICD-10-CM

## 2025-01-14 DIAGNOSIS — M54.16 LUMBAR RADICULOPATHY: ICD-10-CM

## 2025-01-14 DIAGNOSIS — R53.1 WEAKNESS: ICD-10-CM

## 2025-01-14 DIAGNOSIS — R79.89 LOW VITAMIN D LEVEL: ICD-10-CM

## 2025-01-14 DIAGNOSIS — R29.898 WEAKNESS OF BOTH LOWER EXTREMITIES: Primary | ICD-10-CM

## 2025-01-14 DIAGNOSIS — R26.89 BALANCE PROBLEM: ICD-10-CM

## 2025-01-14 DIAGNOSIS — M54.50 LUMBAR PAIN: ICD-10-CM

## 2025-01-14 PROCEDURE — 99213 OFFICE O/P EST LOW 20 MIN: CPT | Performed by: PHYSICAL MEDICINE & REHABILITATION

## 2025-01-15 ENCOUNTER — HOSPITAL ENCOUNTER (OUTPATIENT)
Facility: HOSPITAL | Age: 60
Setting detail: SPECIMEN
Discharge: HOME | End: 2025-01-18

## 2025-01-15 ENCOUNTER — HOSPITAL ENCOUNTER (OUTPATIENT)
Facility: HOSPITAL | Age: 60
Setting detail: INFUSION SERIES
End: 2025-01-15

## 2025-01-15 LAB — LABCORP SPECIMEN COLLECTION: NORMAL

## 2025-01-15 PROCEDURE — 99001 SPECIMEN HANDLING PT-LAB: CPT

## 2025-01-16 LAB
ALBUMIN SERPL-MCNC: 4.6 G/DL (ref 3.9–4.9)
ALP SERPL-CCNC: 135 IU/L (ref 44–121)
ALT SERPL-CCNC: 14 IU/L (ref 0–32)
AST SERPL-CCNC: 13 IU/L (ref 0–40)
BASOPHILS # BLD AUTO: 0 X10E3/UL (ref 0–0.2)
BASOPHILS NFR BLD AUTO: 1 %
BILIRUB SERPL-MCNC: <0.2 MG/DL (ref 0–1.2)
BUN SERPL-MCNC: 14 MG/DL (ref 8–27)
BUN/CREAT SERPL: 16 (ref 12–28)
CALCIUM SERPL-MCNC: 9.7 MG/DL (ref 8.7–10.3)
CHLORIDE SERPL-SCNC: 103 MMOL/L (ref 96–106)
CO2 SERPL-SCNC: 19 MMOL/L (ref 20–29)
CREAT SERPL-MCNC: 0.85 MG/DL (ref 0.57–1)
EGFRCR SERPLBLD CKD-EPI 2021: 78 ML/MIN/1.73
EOSINOPHIL # BLD AUTO: 0.1 X10E3/UL (ref 0–0.4)
EOSINOPHIL NFR BLD AUTO: 1 %
ERYTHROCYTE [DISTWIDTH] IN BLOOD BY AUTOMATED COUNT: 13.9 % (ref 11.7–15.4)
GLOBULIN SER CALC-MCNC: 2.5 G/DL (ref 1.5–4.5)
GLUCOSE SERPL-MCNC: 104 MG/DL (ref 70–99)
HBV CORE AB SERPL QL IA: NEGATIVE
HBV SURFACE AG SERPL QL IA: NEGATIVE
HCT VFR BLD AUTO: 38.9 % (ref 34–46.6)
HGB BLD-MCNC: 12.3 G/DL (ref 11.1–15.9)
IMM GRANULOCYTES # BLD AUTO: 0 X10E3/UL (ref 0–0.1)
IMM GRANULOCYTES NFR BLD AUTO: 0 %
LYMPHOCYTES # BLD AUTO: 2.2 X10E3/UL (ref 0.7–3.1)
LYMPHOCYTES NFR BLD AUTO: 44 %
MCH RBC QN AUTO: 27.6 PG (ref 26.6–33)
MCHC RBC AUTO-ENTMCNC: 31.6 G/DL (ref 31.5–35.7)
MCV RBC AUTO: 87 FL (ref 79–97)
MONOCYTES # BLD AUTO: 0.4 X10E3/UL (ref 0.1–0.9)
MONOCYTES NFR BLD AUTO: 8 %
NEUTROPHILS # BLD AUTO: 2.3 X10E3/UL (ref 1.4–7)
NEUTROPHILS NFR BLD AUTO: 46 %
PLATELET # BLD AUTO: 323 X10E3/UL (ref 150–450)
POTASSIUM SERPL-SCNC: 4.4 MMOL/L (ref 3.5–5.2)
PROT SERPL-MCNC: 7.1 G/DL (ref 6–8.5)
RBC # BLD AUTO: 4.45 X10E6/UL (ref 3.77–5.28)
SODIUM SERPL-SCNC: 143 MMOL/L (ref 134–144)
SPECIMEN STATUS REPORT: NORMAL
WBC # BLD AUTO: 5 X10E3/UL (ref 3.4–10.8)

## 2025-01-20 ENCOUNTER — TELEPHONE (OUTPATIENT)
Age: 60
End: 2025-01-20

## 2025-01-20 LAB
GAMMA INTERFERON BACKGROUND BLD IA-ACNC: 0.04 IU/ML
M TB IFN-G BLD-IMP: NEGATIVE
M TB IFN-G CD4+ BCKGRND COR BLD-ACNC: 0.05 IU/ML
M TB IFN-G CD4+CD8+ BCKGRND COR BLD-ACNC: 0.05 IU/ML
MITOGEN IGNF BCKGRD COR BLD-ACNC: 8.56 IU/ML
QUANTIFERON, INCUBATION: NORMAL
SERVICE CMNT-IMP: NORMAL

## 2025-01-20 NOTE — TELEPHONE ENCOUNTER
[facility] Authorization Department requested a new order for the infusion so the authorization department can resubmit the claim to the insurance. Please advise.

## 2025-01-22 RX ORDER — DIPHENHYDRAMINE HYDROCHLORIDE 50 MG/ML
25 INJECTION INTRAMUSCULAR; INTRAVENOUS ONCE
Status: CANCELLED
Start: 2025-01-29 | End: 2025-01-22

## 2025-01-22 RX ORDER — ACETAMINOPHEN 325 MG/1
650 TABLET ORAL ONCE
Status: CANCELLED | OUTPATIENT
Start: 2025-01-29 | End: 2025-01-22

## 2025-01-28 ENCOUNTER — TELEPHONE (OUTPATIENT)
Age: 60
End: 2025-01-28

## 2025-01-29 ENCOUNTER — HOSPITAL ENCOUNTER (OUTPATIENT)
Facility: HOSPITAL | Age: 60
Setting detail: INFUSION SERIES
End: 2025-01-29
Payer: MEDICARE

## 2025-02-03 ENCOUNTER — TELEPHONE (OUTPATIENT)
Facility: CLINIC | Age: 60
End: 2025-02-03

## 2025-02-03 DIAGNOSIS — M79.89 LEG SWELLING: ICD-10-CM

## 2025-02-03 NOTE — TELEPHONE ENCOUNTER
Pt requesting refill    Furosemide 20 MG    [facility]  [address]    [phone]

## 2025-02-04 RX ORDER — FUROSEMIDE 20 MG/1
20 TABLET ORAL DAILY PRN
Qty: 90 TABLET | Refills: 0 | Status: SHIPPED | OUTPATIENT
Start: 2025-02-04

## 2025-02-06 ENCOUNTER — HOSPITAL ENCOUNTER (OUTPATIENT)
Facility: HOSPITAL | Age: 60
Setting detail: INFUSION SERIES
Discharge: HOME | End: 2025-02-09
Payer: MEDICARE

## 2025-02-06 VITALS
OXYGEN SATURATION: 99 % | SYSTOLIC BLOOD PRESSURE: 131 MMHG | TEMPERATURE: 97.4 F | DIASTOLIC BLOOD PRESSURE: 87 MMHG | HEART RATE: 82 BPM | RESPIRATION RATE: 16 BRPM

## 2025-02-06 DIAGNOSIS — G35 MULTIPLE SCLEROSIS (HCC): Primary | ICD-10-CM

## 2025-02-06 LAB
ALBUMIN SERPL-MCNC: 3.9 G/DL (ref 3.4–5)
ALBUMIN/GLOB SERPL: 1.1 (ref 0.8–1.7)
ALP SERPL-CCNC: 125 U/L (ref 45–117)
ALT SERPL-CCNC: 22 U/L (ref 13–56)
ANION GAP SERPL CALC-SCNC: 6 MMOL/L (ref 3–18)
AST SERPL-CCNC: 20 U/L (ref 10–38)
BASO+EOS+MONOS # BLD AUTO: 0.4 K/UL (ref 0–2.3)
BASO+EOS+MONOS NFR BLD AUTO: 8 % (ref 0.1–17)
BILIRUB SERPL-MCNC: 0.8 MG/DL (ref 0.2–1)
BUN SERPL-MCNC: 11 MG/DL (ref 7–18)
BUN/CREAT SERPL: 16 (ref 12–20)
CALCIUM SERPL-MCNC: 9.1 MG/DL (ref 8.5–10.1)
CHLORIDE SERPL-SCNC: 108 MMOL/L (ref 100–111)
CO2 SERPL-SCNC: 27 MMOL/L (ref 21–32)
CREAT SERPL-MCNC: 0.7 MG/DL (ref 0.6–1.3)
DIFFERENTIAL METHOD BLD: ABNORMAL
ERYTHROCYTE [DISTWIDTH] IN BLOOD BY AUTOMATED COUNT: 13.9 % (ref 11.5–14.5)
GLOBULIN SER CALC-MCNC: 3.4 G/DL (ref 2–4)
GLUCOSE SERPL-MCNC: 83 MG/DL (ref 74–99)
HCT VFR BLD AUTO: 37.4 % (ref 36–48)
HGB BLD-MCNC: 11.8 G/DL (ref 12–16)
LYMPHOCYTES # BLD: 2.2 K/UL (ref 1.1–5.9)
LYMPHOCYTES NFR BLD: 45.9 % (ref 14–44)
MCH RBC QN AUTO: 26.9 PG (ref 25–35)
MCHC RBC AUTO-ENTMCNC: 31.6 G/DL (ref 31–37)
MCV RBC AUTO: 85.4 FL (ref 78–102)
NEUTS SEG # BLD: 2.3 K/UL (ref 1.8–9.5)
NEUTS SEG NFR BLD: 46.5 % (ref 40–70)
PLATELET # BLD AUTO: 274 K/UL (ref 140–440)
POTASSIUM SERPL-SCNC: 3.7 MMOL/L (ref 3.5–5.5)
PROT SERPL-MCNC: 7.3 G/DL (ref 6.4–8.2)
RBC # BLD AUTO: 4.38 M/UL (ref 4.1–5.1)
SODIUM SERPL-SCNC: 141 MMOL/L (ref 136–145)
WBC # BLD AUTO: 4.9 K/UL (ref 4.5–13)

## 2025-02-06 PROCEDURE — 2500000003 HC RX 250 WO HCPCS: Performed by: NURSE PRACTITIONER

## 2025-02-06 PROCEDURE — 96365 THER/PROPH/DIAG IV INF INIT: CPT

## 2025-02-06 PROCEDURE — 96375 TX/PRO/DX INJ NEW DRUG ADDON: CPT

## 2025-02-06 PROCEDURE — 80053 COMPREHEN METABOLIC PANEL: CPT

## 2025-02-06 PROCEDURE — 96366 THER/PROPH/DIAG IV INF ADDON: CPT

## 2025-02-06 PROCEDURE — 6360000002 HC RX W HCPCS: Performed by: NURSE PRACTITIONER

## 2025-02-06 PROCEDURE — 2580000003 HC RX 258: Performed by: NURSE PRACTITIONER

## 2025-02-06 PROCEDURE — 85025 COMPLETE CBC W/AUTO DIFF WBC: CPT

## 2025-02-06 PROCEDURE — 6370000000 HC RX 637 (ALT 250 FOR IP): Performed by: NURSE PRACTITIONER

## 2025-02-06 RX ORDER — SODIUM CHLORIDE 9 MG/ML
5-250 INJECTION, SOLUTION INTRAVENOUS PRN
OUTPATIENT
Start: 2025-07-13

## 2025-02-06 RX ORDER — SODIUM CHLORIDE 9 MG/ML
INJECTION, SOLUTION INTRAVENOUS CONTINUOUS
OUTPATIENT
Start: 2025-07-13

## 2025-02-06 RX ORDER — ACETAMINOPHEN 325 MG/1
650 TABLET ORAL
OUTPATIENT
Start: 2025-07-13

## 2025-02-06 RX ORDER — DIPHENHYDRAMINE HYDROCHLORIDE 50 MG/ML
50 INJECTION INTRAMUSCULAR; INTRAVENOUS ONCE
Status: COMPLETED | OUTPATIENT
Start: 2025-02-06 | End: 2025-02-06

## 2025-02-06 RX ORDER — DIPHENHYDRAMINE HYDROCHLORIDE 50 MG/ML
50 INJECTION INTRAMUSCULAR; INTRAVENOUS
OUTPATIENT
Start: 2025-07-13

## 2025-02-06 RX ORDER — EPINEPHRINE 1 MG/ML
0.3 INJECTION, SOLUTION, CONCENTRATE INTRAVENOUS PRN
OUTPATIENT
Start: 2025-07-13

## 2025-02-06 RX ORDER — DIPHENHYDRAMINE HYDROCHLORIDE 50 MG/ML
50 INJECTION INTRAMUSCULAR; INTRAVENOUS ONCE
Start: 2025-07-13 | End: 2025-07-13

## 2025-02-06 RX ORDER — HYDROCORTISONE SODIUM SUCCINATE 100 MG/2ML
100 INJECTION INTRAMUSCULAR; INTRAVENOUS
OUTPATIENT
Start: 2025-07-13

## 2025-02-06 RX ORDER — ACETAMINOPHEN 325 MG/1
650 TABLET ORAL ONCE
Status: COMPLETED | OUTPATIENT
Start: 2025-02-06 | End: 2025-02-06

## 2025-02-06 RX ORDER — SODIUM CHLORIDE 0.9 % (FLUSH) 0.9 %
5-40 SYRINGE (ML) INJECTION PRN
Status: ACTIVE | OUTPATIENT
Start: 2025-02-06 | End: 2025-02-07

## 2025-02-06 RX ORDER — ALBUTEROL SULFATE 90 UG/1
4 INHALANT RESPIRATORY (INHALATION) PRN
OUTPATIENT
Start: 2025-07-13

## 2025-02-06 RX ORDER — ONDANSETRON 2 MG/ML
8 INJECTION INTRAMUSCULAR; INTRAVENOUS
OUTPATIENT
Start: 2025-07-13

## 2025-02-06 RX ORDER — HEPARIN 100 UNIT/ML
500 SYRINGE INTRAVENOUS PRN
OUTPATIENT
Start: 2025-07-13

## 2025-02-06 RX ORDER — SODIUM CHLORIDE 0.9 % (FLUSH) 0.9 %
5-40 SYRINGE (ML) INJECTION PRN
OUTPATIENT
Start: 2025-07-13

## 2025-02-06 RX ORDER — SODIUM CHLORIDE 9 MG/ML
5-250 INJECTION, SOLUTION INTRAVENOUS PRN
Status: ACTIVE | OUTPATIENT
Start: 2025-02-06 | End: 2025-02-07

## 2025-02-06 RX ORDER — ACETAMINOPHEN 325 MG/1
650 TABLET ORAL ONCE
OUTPATIENT
Start: 2025-07-13 | End: 2025-07-13

## 2025-02-06 RX ADMIN — SODIUM CHLORIDE, PRESERVATIVE FREE 10 ML: 5 INJECTION INTRAVENOUS at 10:53

## 2025-02-06 RX ADMIN — DIPHENHYDRAMINE HYDROCHLORIDE 50 MG: 50 INJECTION INTRAMUSCULAR; INTRAVENOUS at 10:56

## 2025-02-06 RX ADMIN — METHYLPREDNISOLONE SODIUM SUCCINATE 125 MG: 125 INJECTION, POWDER, FOR SOLUTION INTRAMUSCULAR; INTRAVENOUS at 10:55

## 2025-02-06 RX ADMIN — SODIUM CHLORIDE 25 ML/HR: 9 INJECTION, SOLUTION INTRAVENOUS at 10:58

## 2025-02-06 RX ADMIN — OCRELIZUMAB 600 MG: 300 INJECTION INTRAVENOUS at 11:40

## 2025-02-06 RX ADMIN — ACETAMINOPHEN 650 MG: 325 TABLET ORAL at 10:54

## 2025-02-06 ASSESSMENT — PAIN DESCRIPTION - LOCATION: LOCATION: GENERALIZED

## 2025-02-06 ASSESSMENT — PAIN SCALES - GENERAL: PAINLEVEL_OUTOF10: 9

## 2025-02-06 NOTE — PROGRESS NOTES
[facility] Progress Note    Date: 2025    Name: Moiz Trevino    MRN: [id number]         : 1965    OCREVUS INFUSION EVERY 6 MONTHS    Ms. Trevino arrived to hospitals at 1005, ambulatory with a quad cane and in stable condition.     Pt was assessed and education was provided.     Patient denies any recent fevers, infections, or use of antibiotics.    Patient denies any issues with previous infusions.    Ms. Trevino's vitals were reviewed.  Vitals:    25 1354   BP: 127/82   Pulse: 93   Resp: 16   Temp: 97.7 °F (36.5 °C)   SpO2: 100%     24G PIV placed in LEFT hand x2 attempt. PIV flushed easily with NS and had brisk blood return.    CBC and CMP drawn. CBC run in house. CMP sent out via  to be run by hospital lab.    Results for orders placed or performed during the hospital encounter of 25   CBC with Partial Differential   Result Value Ref Range    WBC 4.9 4.5 - 13.0 K/uL    RBC 4.38 4.10 - 5.10 M/uL    Hemoglobin 11.8 (L) 12.0 - 16.0 g/dL    Hematocrit 37.4 36 - 48 %    MCV 85.4 78 - 102 FL    MCH 26.9 25.0 - 35.0 PG    MCHC 31.6 31 - 37 g/dL    RDW 13.9 11.5 - 14.5 %    Platelets 274 140 - 440 K/uL    Neutrophils % 46.5 40 - 70 %    Mixed Cells 8 0.1 - 17 %    Lymphocytes % 45.9 (H) 14 - 44 %    Neutrophils Absolute 2.30 1.8 - 9.5 K/UL    ABSOLUTE MIXED CELLS 0.4 0.0 - 2.3 K/uL    Lymphocytes Absolute 2.20 1.1 - 5.9 K/UL    Differential Type AUTOMATED     Comprehensive Metabolic Panel   Result Value Ref Range    Sodium 141 136 - 145 mmol/L    Potassium 3.7 3.5 - 5.5 mmol/L    Chloride 108 100 - 111 mmol/L    CO2 27 21 - 32 mmol/L    Anion Gap 6 3.0 - 18 mmol/L    Glucose 83 74 - 99 mg/dL    BUN 11 7.0 - 18 MG/DL    Creatinine 0.70 0.6 - 1.3 MG/DL    BUN/Creatinine Ratio 16 12 - 20      Est, Glom Filt Rate >90 >60 ml/min/1.73m2    Calcium 9.1 8.5 - 10.1 MG/DL    Total Bilirubin 0.8 0.2 - 1.0 MG/DL    ALT 22 13 - 56 U/L    AST 20 10 - 38 U/L    Alk Phosphatase 125

## 2025-02-16 DIAGNOSIS — J30.2 SEASONAL ALLERGIES: ICD-10-CM

## 2025-02-20 RX ORDER — LORATADINE 10 MG/1
10 TABLET ORAL DAILY
Qty: 30 TABLET | Refills: 0 | Status: SHIPPED | OUTPATIENT
Start: 2025-02-20

## 2025-03-19 ENCOUNTER — OFFICE VISIT (OUTPATIENT)
Facility: CLINIC | Age: 60
End: 2025-03-19
Payer: MEDICARE

## 2025-03-19 VITALS
DIASTOLIC BLOOD PRESSURE: 82 MMHG | OXYGEN SATURATION: 99 % | SYSTOLIC BLOOD PRESSURE: 124 MMHG | WEIGHT: 176.4 LBS | BODY MASS INDEX: 29.39 KG/M2 | HEIGHT: 65 IN | HEART RATE: 72 BPM

## 2025-03-19 DIAGNOSIS — Z12.31 BREAST CANCER SCREENING BY MAMMOGRAM: ICD-10-CM

## 2025-03-19 DIAGNOSIS — G35 MULTIPLE SCLEROSIS (HCC): ICD-10-CM

## 2025-03-19 DIAGNOSIS — E55.9 VITAMIN D DEFICIENCY, UNSPECIFIED: ICD-10-CM

## 2025-03-19 DIAGNOSIS — E78.00 PURE HYPERCHOLESTEROLEMIA: ICD-10-CM

## 2025-03-19 DIAGNOSIS — R73.03 PREDIABETES: ICD-10-CM

## 2025-03-19 DIAGNOSIS — K21.01 GASTRO-ESOPHAGEAL REFLUX DISEASE WITH ESOPHAGITIS, WITH BLEEDING: ICD-10-CM

## 2025-03-19 DIAGNOSIS — Z12.11 SCREENING FOR COLON CANCER: ICD-10-CM

## 2025-03-19 DIAGNOSIS — F41.9 ANXIETY AND DEPRESSION: ICD-10-CM

## 2025-03-19 DIAGNOSIS — R26.81 UNSTEADY GAIT: ICD-10-CM

## 2025-03-19 DIAGNOSIS — Z00.00 MEDICARE ANNUAL WELLNESS VISIT, SUBSEQUENT: Primary | ICD-10-CM

## 2025-03-19 DIAGNOSIS — M15.0 PRIMARY OSTEOARTHRITIS INVOLVING MULTIPLE JOINTS: ICD-10-CM

## 2025-03-19 DIAGNOSIS — F20.0 PARANOID SCHIZOPHRENIA (HCC): ICD-10-CM

## 2025-03-19 DIAGNOSIS — K58.2 MIXED IRRITABLE BOWEL SYNDROME: ICD-10-CM

## 2025-03-19 DIAGNOSIS — M79.89 LEG SWELLING: ICD-10-CM

## 2025-03-19 DIAGNOSIS — F32.A ANXIETY AND DEPRESSION: ICD-10-CM

## 2025-03-19 PROCEDURE — G0439 PPPS, SUBSEQ VISIT: HCPCS | Performed by: STUDENT IN AN ORGANIZED HEALTH CARE EDUCATION/TRAINING PROGRAM

## 2025-03-19 PROCEDURE — G2211 COMPLEX E/M VISIT ADD ON: HCPCS | Performed by: STUDENT IN AN ORGANIZED HEALTH CARE EDUCATION/TRAINING PROGRAM

## 2025-03-19 PROCEDURE — 99214 OFFICE O/P EST MOD 30 MIN: CPT | Performed by: STUDENT IN AN ORGANIZED HEALTH CARE EDUCATION/TRAINING PROGRAM

## 2025-03-19 RX ORDER — OMEPRAZOLE 40 MG/1
40 CAPSULE, DELAYED RELEASE ORAL
Qty: 90 CAPSULE | Refills: 1 | Status: SHIPPED | OUTPATIENT
Start: 2025-03-19

## 2025-03-19 RX ORDER — FUROSEMIDE 20 MG/1
20 TABLET ORAL DAILY PRN
Qty: 90 TABLET | Refills: 0 | Status: SHIPPED | OUTPATIENT
Start: 2025-03-19

## 2025-03-19 SDOH — ECONOMIC STABILITY: FOOD INSECURITY: WITHIN THE PAST 12 MONTHS, THE FOOD YOU BOUGHT JUST DIDN'T LAST AND YOU DIDN'T HAVE MONEY TO GET MORE.: NEVER TRUE

## 2025-03-19 SDOH — ECONOMIC STABILITY: FOOD INSECURITY: WITHIN THE PAST 12 MONTHS, YOU WORRIED THAT YOUR FOOD WOULD RUN OUT BEFORE YOU GOT MONEY TO BUY MORE.: NEVER TRUE

## 2025-03-19 ASSESSMENT — ENCOUNTER SYMPTOMS
CONSTIPATION: 1
DIARRHEA: 0
COUGH: 0
NAUSEA: 1
ABDOMINAL PAIN: 1
VOMITING: 0
CHEST TIGHTNESS: 0
RHINORRHEA: 0
BACK PAIN: 0
SHORTNESS OF BREATH: 1
BLOOD IN STOOL: 0
WHEEZING: 0

## 2025-03-19 ASSESSMENT — PATIENT HEALTH QUESTIONNAIRE - PHQ9
SUM OF ALL RESPONSES TO PHQ QUESTIONS 1-9: 1
2. FEELING DOWN, DEPRESSED OR HOPELESS: SEVERAL DAYS
SUM OF ALL RESPONSES TO PHQ QUESTIONS 1-9: 1
SUM OF ALL RESPONSES TO PHQ QUESTIONS 1-9: 1
1. LITTLE INTEREST OR PLEASURE IN DOING THINGS: NOT AT ALL
SUM OF ALL RESPONSES TO PHQ QUESTIONS 1-9: 1

## 2025-03-19 NOTE — PATIENT INSTRUCTIONS
Chest pain or pressure, or a strange feeling in the chest.     Sweating.     Shortness of breath.     Pain, pressure, or a strange feeling in the back, neck, jaw, or upper belly or in one or both shoulders or arms.     Lightheadedness or sudden weakness.     A fast or irregular heartbeat.   After you call 911, the  may tell you to chew 1 adult-strength or 2 to 4 low-dose aspirin. Wait for an ambulance. Do not try to drive yourself.  Watch closely for changes in your health, and be sure to contact your doctor if you have any problems.  Where can you learn more?  Go to https://www.[employer].net/patientEd and enter F075 to learn more about \"A Healthy Heart: Care Instructions.\"  Current as of: July 31, 2024  Content Version: 14.4  © 4269-7537 [employer].   Care instructions adapted under license by [employer]. If you have questions about a medical condition or this instruction, always ask your healthcare professional. [employer], disclaims any warranty or liability for your use of this information.    Personalized Preventive Plan for Moiz Trevino - 3/19/2025  Medicare offers a range of preventive health benefits. Some of the tests and screenings are paid in full while other may be subject to a deductible, co-insurance, and/or copay.  Some of these benefits include a comprehensive review of your medical history including lifestyle, illnesses that may run in your family, and various assessments and screenings as appropriate.  After reviewing your medical record and screening and assessments performed today your provider may have ordered immunizations, labs, imaging, and/or referrals for you.  A list of these orders (if applicable) as well as your Preventive Care list are included within your After Visit Summary for your review.

## 2025-03-19 NOTE — PROGRESS NOTES
Moiz Trevino is a 59 y.o. female presenting today for Medicare AWV  .     Chief Complaint   Patient presents with    Medicare AW       HPI:  Moiz Trevino presents to the office today for follow up.    Patient has a past medical history of multiple sclerosis, arthritis, chronic constipation with IBS, prediabetes.     Patient has multiple chronic complaints-generalized body aches, abdominal pain.  Patient has history of osteoarthritis-she is taking NSAIDs.  She was started on Protonix and advised to cut down on NSAID use.      Patient was advised to try Cymbalta in the past by neurology-however patient refused due to concern of side effects.  She was prescribed gabapentin but had side effects.  She was recently prescribed Lyrica but could not tolerate..  Continued on baclofen.    She was prescribed Elavil but did not take it.      Patient also reports feeling very depressed.  She is not interested in taking medications but is willing to see a therapist and was referred.  She was complaining of increased anxiety, paresthesias.  She was agreeable to take Cymbalta and it was prescribed but she never took it    Patient was referred to urology due to increased urinary frequency.  She was also referred to home health for PT/OT/home health aide..  Patient continues to complain of gait instability,      Chronic constipation with IBS: She has previously tried Linzess, but failed due to diarrhea.  Did not like lactulose.  Failed Dulcolax, Fleet enemas, Metamucil due to lack of improvement.  She is following with GI.        Multiple sclerosis: Patient is following with neurology.  She is undergoing Ocrevus infusions.  Recently has been reporting gait instability, palpitations.  States she felt unbalanced and has had near falls.    She is also following with podiatry.  MRI brain in 12/2024 showed a new lesion at the base of the right cerebellar peduncular tract compared to 2023 exam.       She has

## 2025-03-19 NOTE — PROGRESS NOTES
Medicare Annual Wellness Visit    Moiz Trevino is here for Medicare AWV    Assessment & Plan   Medicare annual wellness visit, subsequent    Provided with ACP document     Return in about 6 months (around 9/19/2025).     Subjective       Patient's complete Health Risk Assessment and screening values have been reviewed and are found in Flowsheets. The following problems were reviewed today and where indicated follow up appointments were made and/or referrals ordered.    Positive Risk Factor Screenings with Interventions:    Fall Risk:  Do you feel unsteady or are you worried about falling? : (!) yes  2 or more falls in past year?: (!) yes  Fall with injury in past year?: no     Interventions:    Reviewed medications, home hazards, visual acuity, and co-morbidities that can increase risk for falls  Refer for home health physical therapy                General HRA Questions:  Select all that apply: (!) New or Increased Pain  Interventions - Pain:  Patient has declined multiple different medication options      Inactivity:  On average, how many days per week do you engage in moderate to strenuous exercise (like a brisk walk)?: 0 days (!) Abnormal  On average, how many minutes do you engage in exercise at this level?: 0 min  Interventions:  Encouraged walking.  Referred to PT    Poor Eating Habits/Diet:  Do you eat balanced/healthy meals regularly?: (!) No  Interventions:  Counseled on healthy eating-lean meats more fruits, vegetables.  Provided AVS       Vision Screen:  Do you have difficulty driving, watching TV, or doing any of your daily activities because of your eyesight?: (!) Yes  Have you had an eye exam within the past year?: Yes  Interventions:   Patient encouraged to make appointment with their eye specialist    Safety:  Do you have non-slip mats or non-slip surfaces or shower bars or grab bars in your shower or bathtub?: (!) No  Interventions:  Counseled on safety    ADL's:   Patient reports

## 2025-03-21 ENCOUNTER — OFFICE VISIT (OUTPATIENT)
Age: 60
End: 2025-03-21
Payer: MEDICARE

## 2025-03-21 VITALS
OXYGEN SATURATION: 99 % | BODY MASS INDEX: 29.32 KG/M2 | TEMPERATURE: 97 F | HEIGHT: 65 IN | WEIGHT: 176 LBS | HEART RATE: 62 BPM | DIASTOLIC BLOOD PRESSURE: 79 MMHG | SYSTOLIC BLOOD PRESSURE: 127 MMHG

## 2025-03-21 DIAGNOSIS — G89.29 CHRONIC CENTRAL NEUROPATHIC PAIN DUE TO MULTIPLE SCLEROSIS (HCC): ICD-10-CM

## 2025-03-21 DIAGNOSIS — M79.2 CHRONIC CENTRAL NEUROPATHIC PAIN DUE TO MULTIPLE SCLEROSIS (HCC): ICD-10-CM

## 2025-03-21 DIAGNOSIS — G35 CHRONIC CENTRAL NEUROPATHIC PAIN DUE TO MULTIPLE SCLEROSIS (HCC): ICD-10-CM

## 2025-03-21 DIAGNOSIS — G35 MULTIPLE SCLEROSIS (HCC): ICD-10-CM

## 2025-03-21 PROCEDURE — 99213 OFFICE O/P EST LOW 20 MIN: CPT | Performed by: NURSE PRACTITIONER

## 2025-03-21 RX ORDER — BACLOFEN 5 MG/1
5 TABLET ORAL 3 TIMES DAILY
Qty: 90 TABLET | Refills: 6 | Status: SHIPPED | OUTPATIENT
Start: 2025-03-21

## 2025-03-21 NOTE — PROGRESS NOTES
called the pain psychologist office during the visit- appointment made for April 7 (she would like it to be virtual).  She agrees to proceed with this.  Continue Ocrevus infusions.  Continue vitamin D supplement.  Will obtain next MRIs in December.  Will request notes from eye exam.  She will follow with ortho spine.  She will see urologist for urinary frequency.  Follow up in 4 months.      Moiz was seen today for follow-up.    Diagnoses and all orders for this visit:    Multiple sclerosis (HCC)  -     Baclofen (LIORESAL) 5 MG tablet; Take 1 tablet by mouth 3 times daily    Chronic central neuropathic pain due to multiple sclerosis (HCC)      Signed By: TAWANA GEORGE NP        PLEASE NOTE:   Portions of this document may have been produced using voice recognition software. Unrecognized errors in transcription may be present.

## 2025-03-21 NOTE — PATIENT INSTRUCTIONS
Patient instructions:    -appointment with pain psychologist on Monday 4/7 at 11:00 with Dr. Hank Gagnon.  They will email you a link.  Please log in by that time.

## 2025-03-23 DIAGNOSIS — J30.2 SEASONAL ALLERGIES: ICD-10-CM

## 2025-03-24 RX ORDER — FLUTICASONE PROPIONATE 50 MCG
SPRAY, SUSPENSION (ML) NASAL
Qty: 16 G | Refills: 0 | Status: SHIPPED | OUTPATIENT
Start: 2025-03-24

## 2025-03-24 RX ORDER — LORATADINE 10 MG/1
10 TABLET ORAL DAILY
Qty: 30 TABLET | Refills: 0 | Status: SHIPPED | OUTPATIENT
Start: 2025-03-24

## 2025-03-25 ENCOUNTER — HOSPITAL ENCOUNTER (OUTPATIENT)
Facility: HOSPITAL | Age: 60
Setting detail: SPECIMEN
Discharge: HOME | End: 2025-03-28

## 2025-03-25 LAB — LABCORP SPECIMEN COLLECTION: NORMAL

## 2025-03-25 PROCEDURE — 99001 SPECIMEN HANDLING PT-LAB: CPT

## 2025-03-26 LAB
25(OH)D3+25(OH)D2 SERPL-MCNC: 55 NG/ML (ref 30–100)
ALBUMIN SERPL-MCNC: 4.4 G/DL (ref 3.9–4.9)
ALP SERPL-CCNC: 141 IU/L (ref 44–121)
ALT SERPL-CCNC: 15 IU/L (ref 0–32)
AST SERPL-CCNC: 16 IU/L (ref 0–40)
BASOPHILS # BLD AUTO: 0 X10E3/UL (ref 0–0.2)
BASOPHILS NFR BLD AUTO: 1 %
BILIRUB SERPL-MCNC: 0.3 MG/DL (ref 0–1.2)
BUN SERPL-MCNC: 9 MG/DL (ref 8–27)
BUN/CREAT SERPL: 12 (ref 12–28)
CALCIUM SERPL-MCNC: 9.3 MG/DL (ref 8.7–10.3)
CHLORIDE SERPL-SCNC: 101 MMOL/L (ref 96–106)
CHOLEST SERPL-MCNC: 209 MG/DL (ref 100–199)
CO2 SERPL-SCNC: 22 MMOL/L (ref 20–29)
CREAT SERPL-MCNC: 0.75 MG/DL (ref 0.57–1)
EGFRCR SERPLBLD CKD-EPI 2021: 91 ML/MIN/1.73
EOSINOPHIL # BLD AUTO: 0.1 X10E3/UL (ref 0–0.4)
EOSINOPHIL NFR BLD AUTO: 1 %
ERYTHROCYTE [DISTWIDTH] IN BLOOD BY AUTOMATED COUNT: 14.1 % (ref 11.7–15.4)
GLOBULIN SER CALC-MCNC: 2.6 G/DL (ref 1.5–4.5)
GLUCOSE SERPL-MCNC: 99 MG/DL (ref 70–99)
HBA1C MFR BLD: 6.4 % (ref 4.8–5.6)
HCT VFR BLD AUTO: 38.3 % (ref 34–46.6)
HDLC SERPL-MCNC: 71 MG/DL
HGB BLD-MCNC: 12.1 G/DL (ref 11.1–15.9)
IMM GRANULOCYTES # BLD AUTO: 0 X10E3/UL (ref 0–0.1)
IMM GRANULOCYTES NFR BLD AUTO: 0 %
LDLC SERPL CALC-MCNC: 128 MG/DL (ref 0–99)
LYMPHOCYTES # BLD AUTO: 2.2 X10E3/UL (ref 0.7–3.1)
LYMPHOCYTES NFR BLD AUTO: 44 %
MCH RBC QN AUTO: 27.1 PG (ref 26.6–33)
MCHC RBC AUTO-ENTMCNC: 31.6 G/DL (ref 31.5–35.7)
MCV RBC AUTO: 86 FL (ref 79–97)
MONOCYTES # BLD AUTO: 0.4 X10E3/UL (ref 0.1–0.9)
MONOCYTES NFR BLD AUTO: 8 %
NEUTROPHILS # BLD AUTO: 2.3 X10E3/UL (ref 1.4–7)
NEUTROPHILS NFR BLD AUTO: 46 %
PLATELET # BLD AUTO: 325 X10E3/UL (ref 150–450)
POTASSIUM SERPL-SCNC: 3.8 MMOL/L (ref 3.5–5.2)
PROT SERPL-MCNC: 7 G/DL (ref 6–8.5)
RBC # BLD AUTO: 4.47 X10E6/UL (ref 3.77–5.28)
SODIUM SERPL-SCNC: 140 MMOL/L (ref 134–144)
TRIGL SERPL-MCNC: 57 MG/DL (ref 0–149)
VLDLC SERPL CALC-MCNC: 10 MG/DL (ref 5–40)
WBC # BLD AUTO: 5 X10E3/UL (ref 3.4–10.8)

## 2025-04-02 NOTE — PERIOP NOTE
Instructions for your procedure at [facility]      Today's Date: 4/2/2025      Patient's Name: Moiz Trevino      Procedure Date: 4/16/2025        Please enter the main entrance of the hospital and check-in at the  located in the lobby.      Do NOT eat or drink anything, including candy, gum, or ice chips after midnight prior to your procedure, unless it is part of your prep.  Brush your teeth before coming to the hospital.You may swish with water, but do not swallow.  No smoking/Vaping/E-Cigarettes 24 hours prior to the day of procedure.  No alcohol 24 hours prior to the day of procedure.  No recreational drugs for one week prior to the day of procedure.  Bring Photo ID, Insurance information, and Co-pay if required on day of procedure.  Bring in pertinent legal documents, such as, Medical Power of , DNR, Advance Directive, etc.  Leave all other valuables, including money/purse, weapons at home.  Remove jewelry, including ALL body piercings, nail polish, acrylic nails, and makeup (including mascara); no lotions, powders, deodorant, and/or perfume/cologne/after shave on the skin.  Glasses and dentures may be worn to the hospital.  They must be removed prior to procedure. Please bring case/container for glasses or dentures.  11. Contacts should not be worn on day of procedure.   12. Call the office ([phone]) if you have symptoms of a cold or illness within 24-48 hours prior to your procedure.   13. AN ADULT (relative or friend 18 years or older) MUST DRIVE YOU HOME AFTER YOUR PROCEDURE.   14. Please make arrangements for a responsible adult (18 years or older) to be with you for 24 hours after your procedure.   15. TWO VISITORS will be allowed in the waiting area during your procedure.       Special Instructions:      Bring list of CURRENT medications.  Follow instructions from the office regarding Bowel Prep, Vitamins, Iron, Blood Thinners, Insulin,

## 2025-04-30 ENCOUNTER — ANESTHESIA EVENT (OUTPATIENT)
Facility: HOSPITAL | Age: 60
End: 2025-04-30
Payer: MEDICARE

## 2025-05-01 ENCOUNTER — HOSPITAL ENCOUNTER (OUTPATIENT)
Facility: HOSPITAL | Age: 60
Setting detail: OUTPATIENT SURGERY
Discharge: HOME | End: 2025-05-01
Attending: INTERNAL MEDICINE | Admitting: INTERNAL MEDICINE
Payer: MEDICARE

## 2025-05-01 ENCOUNTER — ANESTHESIA (OUTPATIENT)
Facility: HOSPITAL | Age: 60
End: 2025-05-01
Payer: MEDICARE

## 2025-05-01 VITALS
BODY MASS INDEX: 28.94 KG/M2 | OXYGEN SATURATION: 100 % | WEIGHT: 173.7 LBS | DIASTOLIC BLOOD PRESSURE: 66 MMHG | RESPIRATION RATE: 19 BRPM | HEART RATE: 72 BPM | TEMPERATURE: 97.6 F | SYSTOLIC BLOOD PRESSURE: 122 MMHG | HEIGHT: 65 IN

## 2025-05-01 PROCEDURE — 3700000001 HC ADD 15 MINUTES (ANESTHESIA): Performed by: INTERNAL MEDICINE

## 2025-05-01 PROCEDURE — 6360000002 HC RX W HCPCS: Performed by: ANESTHESIOLOGY

## 2025-05-01 PROCEDURE — 7100000010 HC PHASE II RECOVERY - FIRST 15 MIN: Performed by: INTERNAL MEDICINE

## 2025-05-01 PROCEDURE — 2580000003 HC RX 258: Performed by: NURSE ANESTHETIST, CERTIFIED REGISTERED

## 2025-05-01 PROCEDURE — 7100000000 HC PACU RECOVERY - FIRST 15 MIN: Performed by: INTERNAL MEDICINE

## 2025-05-01 PROCEDURE — 3700000000 HC ANESTHESIA ATTENDED CARE: Performed by: INTERNAL MEDICINE

## 2025-05-01 PROCEDURE — 2709999900 HC NON-CHARGEABLE SUPPLY: Performed by: INTERNAL MEDICINE

## 2025-05-01 PROCEDURE — 6370000000 HC RX 637 (ALT 250 FOR IP): Performed by: INTERNAL MEDICINE

## 2025-05-01 PROCEDURE — 3600007503: Performed by: INTERNAL MEDICINE

## 2025-05-01 PROCEDURE — 88305 TISSUE EXAM BY PATHOLOGIST: CPT

## 2025-05-01 PROCEDURE — 3600007513: Performed by: INTERNAL MEDICINE

## 2025-05-01 RX ORDER — SODIUM CHLORIDE, SODIUM LACTATE, POTASSIUM CHLORIDE, CALCIUM CHLORIDE 600; 310; 30; 20 MG/100ML; MG/100ML; MG/100ML; MG/100ML
INJECTION, SOLUTION INTRAVENOUS CONTINUOUS
Status: DISCONTINUED | OUTPATIENT
Start: 2025-05-01 | End: 2025-05-01 | Stop reason: HOSPADM

## 2025-05-01 RX ORDER — SODIUM CHLORIDE 0.9 % (FLUSH) 0.9 %
5-40 SYRINGE (ML) INJECTION PRN
Status: DISCONTINUED | OUTPATIENT
Start: 2025-05-01 | End: 2025-05-01 | Stop reason: HOSPADM

## 2025-05-01 RX ORDER — SODIUM CHLORIDE 0.9 % (FLUSH) 0.9 %
5-40 SYRINGE (ML) INJECTION EVERY 12 HOURS SCHEDULED
Status: DISCONTINUED | OUTPATIENT
Start: 2025-05-01 | End: 2025-05-01 | Stop reason: HOSPADM

## 2025-05-01 RX ORDER — SIMETHICONE 40MG/0.6ML
SUSPENSION, DROPS(FINAL DOSAGE FORM)(ML) ORAL PRN
Status: DISCONTINUED | OUTPATIENT
Start: 2025-05-01 | End: 2025-05-01 | Stop reason: ALTCHOICE

## 2025-05-01 RX ORDER — PROPOFOL 10 MG/ML
INJECTION, EMULSION INTRAVENOUS
Status: DISCONTINUED | OUTPATIENT
Start: 2025-05-01 | End: 2025-05-01 | Stop reason: SDUPTHER

## 2025-05-01 RX ORDER — SODIUM CHLORIDE 9 MG/ML
INJECTION, SOLUTION INTRAVENOUS PRN
Status: DISCONTINUED | OUTPATIENT
Start: 2025-05-01 | End: 2025-05-01 | Stop reason: HOSPADM

## 2025-05-01 RX ADMIN — PROPOFOL 25 MG: 10 INJECTION, EMULSION INTRAVENOUS at 08:27

## 2025-05-01 RX ADMIN — PROPOFOL 25 MG: 10 INJECTION, EMULSION INTRAVENOUS at 08:16

## 2025-05-01 RX ADMIN — PROPOFOL 25 MG: 10 INJECTION, EMULSION INTRAVENOUS at 08:21

## 2025-05-01 RX ADMIN — SODIUM CHLORIDE, SODIUM LACTATE, POTASSIUM CHLORIDE, AND CALCIUM CHLORIDE: .6; .31; .03; .02 INJECTION, SOLUTION INTRAVENOUS at 08:02

## 2025-05-01 RX ADMIN — PROPOFOL 100 MG: 10 INJECTION, EMULSION INTRAVENOUS at 08:10

## 2025-05-01 RX ADMIN — PROPOFOL 25 MG: 10 INJECTION, EMULSION INTRAVENOUS at 08:24

## 2025-05-01 RX ADMIN — PROPOFOL 25 MG: 10 INJECTION, EMULSION INTRAVENOUS at 08:12

## 2025-05-01 RX ADMIN — PROPOFOL 25 MG: 10 INJECTION, EMULSION INTRAVENOUS at 08:14

## 2025-05-01 RX ADMIN — PROPOFOL 25 MG: 10 INJECTION, EMULSION INTRAVENOUS at 08:18

## 2025-05-01 ASSESSMENT — PAIN - FUNCTIONAL ASSESSMENT
PAIN_FUNCTIONAL_ASSESSMENT: NONE - DENIES PAIN
PAIN_FUNCTIONAL_ASSESSMENT: 0-10
PAIN_FUNCTIONAL_ASSESSMENT: 0-10
PAIN_FUNCTIONAL_ASSESSMENT: NONE - DENIES PAIN

## 2025-05-01 NOTE — H&P
[email or web address]  [phone]      History and Physical    Patient: Moiz Trevino MRN: [id number]  SSN: [id number]    YOB: 1965  Age: 59 y.o.  Sex: female      Subjective:      Moiz Trevino is a 59-year-old female who is being seen today for a follow-up visit. She was last seen in 6/2024.     -Constipation/bloating: She reports that her constipation has continued with associated hard stools and straining. She tried MiraLAX without success. She did a retrial of Amitiza which also did not help.  At her appt in 6/2024 she was prescribed Ibsrela and hyoscyamine for abdominal cramping after eating a [employer] sandwich.  She would like to restart Ibsrela. Colonoscopy was recommended as she is due, which has not been done.    -Abdominal bloating: Bloating is very bothersome to pt.  She does drink with a straw at times.  Eating indiscriminate diet, but avoids fried foods.    -Dysphagia:  Prior EGD 2022 with dilation for nonobstructive dysphagia. She reports intermittent dysphagia now.  She has a h/o gastroparesis based on prior GES.  Denies n/v.     -GERD: Taking Omeprazole for reflux sxs with improvement.     -CRCS: Due for colonoscopy    Previous history:    1. Constipation - Previously failed Linzess (72mcg, 145mcg) and Amitiza(24mcg BID) due to diarrhea. She did not like Lactulose. Failed dulcolax, fleet enemas, metamucil, fiber con due to lack of improvement. BMs were every 2-4 days with small pellet like stools and straining. Associated w/ bloating and symptomatic external hemorrhoid, diffuse abdominal cramping. No recent rectal bleeding, however has had it previously. Prior trial of Trulance but she had to discontinue it due diarrhea side effects. Re-try of Linzess 72 mcg after last visit, again gave her diarrhea. Started Benefiber ~1 week ago, feels this helps but does note increased gas and bloating. Restarted Miralax, currently taking 2 capful daily, BMs daily to every other

## 2025-05-01 NOTE — ANESTHESIA POSTPROCEDURE EVALUATION
Department of Anesthesiology  Postprocedure Note    Patient: Moiz Trevino  MRN: [id number]  YOB: 1965  Date of evaluation: 5/1/2025    Procedure Summary       Date: 05/01/25 Room / Location: [facility] ENDO 02 / [facility] ENDOSCOPY    Anesthesia Start: 0803 Anesthesia Stop: 0834    Procedures:       ESOPHAGOGASTRODUODENOSCOPY w/ Bx, 52 Fr Dilatation (Upper GI Region)      COLONOSCOPY w/ Cold Snare Polypectomy (Abdomen) Diagnosis:       Constipation, unspecified constipation type      Gastroesophageal reflux disease, unspecified whether esophagitis present      Dysphagia, unspecified type      Gastroparesis      Hemorrhoids, unspecified hemorrhoid type      Body mass index 29.0-29.9, adult      Bloating symptom      (Constipation, unspecified constipation type [K59.00])      (Gastroesophageal reflux disease, unspecified whether esophagitis present [K21.9])      (Dysphagia, unspecified type [R13.10])      (Gastroparesis [K31.84])      (Hemorrhoids, unspecified hemorrhoid type [K64.9])      (Body mass index 29.0-29.9, adult [Z68.29])      (Bloating symptom [R14.0])    Surgeons: Renée Pang MD Responsible Provider: Avery Mojica MD    Anesthesia Type: MAC ASA Status: 3            Anesthesia Type: MAC    Teresa Phase I: Teresa Score: 10    Teresa Phase II: Teresa Score: 10    Anesthesia Post Evaluation    Patient location during evaluation: bedside  Patient participation: complete - patient participated  Level of consciousness: responsive to verbal stimuli  Airway patency: patent  Nausea & Vomiting: no nausea  Respiratory status: acceptable  Hydration status: euvolemic    No notable events documented.

## 2025-05-01 NOTE — ANESTHESIA PRE PROCEDURE
Code   • Microscopic hematuria R31.29   • Spells of decreased attentiveness R68.89   • Perennial allergic rhinitis J30.89   • Rectal bleeding K62.5   • Unsteady gait R26.81   • Chronic left-sided low back pain with left-sided sciatica M54.42, G89.29   • Diffuse cystic mastopathy N60.19   • Paranoid schizophrenia (HCC) F20.0   • Chronic pain G89.29   • Multiple sclerosis (HCC) G35   • Quit using tobacco in remote past Z91.89   • Syncope R55   • GERD (gastroesophageal reflux disease) K21.9       Past Medical History:        Diagnosis Date   • Bed bug bite    • Chest pain 2014    neg EKG, non recurrent   • Chronic pain     lower back and legs   • Depression    • Eczema    • Fibroids    • GERD (gastroesophageal reflux disease)    • H/O seasonal allergies    • Headache(784.0)    • Hiatal hernia    • IBS (irritable bowel syndrome)    • Infestation by bed bug    • Microscopic hematuria    • MS (multiple sclerosis) (HCC)    • MS (multiple sclerosis) (HCC)    • Rectal bleeding    • Stool color black     irritable bowel       Past Surgical History:        Procedure Laterality Date   • BREAST BIOPSY Right 2015    RIGHT BREAST BIOPSY WITH NEEDLE LOCALIZATION ULTRASOUND performed by Zenon Hau MD at [facility] MAIN OR   • CHOLECYSTECTOMY     • CHOLECYSTECTOMY     • GI     • HYSTERECTOMY (CERVIX STATUS UNKNOWN)     • TUBAL LIGATION         Social History:    Social History     Tobacco Use   • Smoking status: Former     Current packs/day: 0.00     Types: Cigarettes     Quit date: 2016     Years since quittin.8     Passive exposure: Never   • Smokeless tobacco: Former     Quit date: 2016   Substance Use Topics   • Alcohol use: No     Alcohol/week: 10.0 standard drinks of alcohol                                Counseling given: Not Answered      Vital Signs (Current):   Vitals:    25 1128 25 0743   BP:  (!) 145/68   Pulse:  72   Resp:  15   Temp:  98 °F (36.7 °C)   TempSrc:  Oral   SpO2:  97%   Weight:

## 2025-05-06 ENCOUNTER — HOSPITAL ENCOUNTER (OUTPATIENT)
Facility: HOSPITAL | Age: 60
Discharge: HOME | End: 2025-05-09
Attending: STUDENT IN AN ORGANIZED HEALTH CARE EDUCATION/TRAINING PROGRAM
Payer: MEDICARE

## 2025-05-06 VITALS — WEIGHT: 174 LBS | BODY MASS INDEX: 28.99 KG/M2 | HEIGHT: 65 IN

## 2025-05-06 DIAGNOSIS — Z12.31 BREAST CANCER SCREENING BY MAMMOGRAM: ICD-10-CM

## 2025-05-06 PROCEDURE — 77063 BREAST TOMOSYNTHESIS BI: CPT

## 2025-05-07 DIAGNOSIS — J30.2 SEASONAL ALLERGIES: ICD-10-CM

## 2025-05-08 RX ORDER — LORATADINE 10 MG/1
10 TABLET ORAL DAILY
Qty: 30 TABLET | Refills: 0 | Status: SHIPPED | OUTPATIENT
Start: 2025-05-08

## 2025-06-18 DIAGNOSIS — J30.2 SEASONAL ALLERGIES: ICD-10-CM

## 2025-06-18 RX ORDER — LORATADINE 10 MG/1
10 TABLET ORAL DAILY
Qty: 30 TABLET | Refills: 0 | Status: SHIPPED | OUTPATIENT
Start: 2025-06-18

## 2025-07-08 ENCOUNTER — OFFICE VISIT (OUTPATIENT)
Age: 60
End: 2025-07-08
Payer: MEDICARE

## 2025-07-08 VITALS
DIASTOLIC BLOOD PRESSURE: 81 MMHG | WEIGHT: 174 LBS | RESPIRATION RATE: 18 BRPM | BODY MASS INDEX: 28.99 KG/M2 | TEMPERATURE: 98.2 F | HEART RATE: 73 BPM | OXYGEN SATURATION: 95 % | SYSTOLIC BLOOD PRESSURE: 133 MMHG | HEIGHT: 65 IN

## 2025-07-08 DIAGNOSIS — M54.16 LUMBAR RADICULOPATHY: ICD-10-CM

## 2025-07-08 DIAGNOSIS — R26.89 BALANCE PROBLEM: ICD-10-CM

## 2025-07-08 DIAGNOSIS — G35 MULTIPLE SCLEROSIS (HCC): ICD-10-CM

## 2025-07-08 DIAGNOSIS — M54.50 LUMBAR PAIN: ICD-10-CM

## 2025-07-08 DIAGNOSIS — R53.1 WEAKNESS: ICD-10-CM

## 2025-07-08 DIAGNOSIS — R29.898 WEAKNESS OF BOTH LOWER EXTREMITIES: Primary | ICD-10-CM

## 2025-07-08 PROCEDURE — 99214 OFFICE O/P EST MOD 30 MIN: CPT | Performed by: PHYSICAL MEDICINE & REHABILITATION

## 2025-07-08 RX ORDER — ERYTHROMYCIN 20 MG/G
GEL TOPICAL
COMMUNITY
Start: 2025-05-20

## 2025-07-08 RX ORDER — CLINDAMYCIN PHOSPHATE 10 MG/ML
SOLUTION TOPICAL
COMMUNITY
Start: 2025-07-03

## 2025-07-08 RX ORDER — IVERMECTIN 10 MG/G
CREAM TOPICAL
COMMUNITY
Start: 2025-06-09

## 2025-07-08 RX ORDER — DOXYCYCLINE HYCLATE 100 MG
100 TABLET ORAL 2 TIMES DAILY
COMMUNITY
Start: 2025-06-27

## 2025-07-08 RX ORDER — PANTOPRAZOLE SODIUM 40 MG/1
TABLET, DELAYED RELEASE ORAL
COMMUNITY
Start: 2025-05-01

## 2025-07-08 NOTE — PROGRESS NOTES
Moiz Trevino presents today for   Chief Complaint   Patient presents with    Back Problem    Pain    Back Pain       Is someone accompanying this pt? no    Is the patient using any DME equipment during OV? Yes cane      Depression Screening:       No data to display                Learning Assessment:  Failed to redirect to the Timeline version of the [employer] SmartLink.    Abuse Screening:       No data to display                Fall Risk  Failed to redirect to the Timeline version of the [employer] SmartLink.    OPIOID RISK TOOL  Failed to redirect to the Timeline version of the [employer] SmartLink.    Coordination of Care:  1. Have you been to the ER, urgent care clinic since your last visit? no  Hospitalized since your last visit? no    2. Have you seen or consulted any other health care providers outside of the [facility] since your last visit? no Include any pap smears or colon screening. no      
Wrist Flex Hand Intrin    Right 4/5 4/5 4/5 4/5 4/5 4/5   Left 4/5 4/5 4/5 4/5 4/5 4/5        Hip Flex  Quads Hamstrings Ankle DF EHL Ankle PF   Right 4-/5 4-/5 4-/5 4-/5 4-/5 4-/5   Left 4/5 4/5 4/5 4/5 4/5 4/5      IMAGING:     Lumbar 4V x-rays from 04/30/24 was personally reviewed with the patient and demonstrated:  Shows multilevel degenerative facets and mild atherosclerosis.     Thoracic MRI from 03/13/24 was personally reviewed with the patient and demonstrated:  COMPARISON: 12/10/2019.  Normal alignment. No spondylolisthesis. No compression deformity. No pathologic marrow signal. Paravertebral soft tissues unremarkable.   High T2 signal lesions along the spinal cord at T4-T5 and T5-T6, grossly unchanged, each about 8 mm in length. No cord enlargement or atrophy. No abnormal cord enhancement. No additional lesions. Conus medullaris ends at lower L1.   Mild posterior disc bulge at multiple thoracic levels. Slightly more focal right paracentral disc protrusion at T7-T8, stable. No cord contact or central stenosis.  T10-T11 show slightly more focal left-sided ligamentum flavum thickening, slightly effacing CSF but no cord contact or compression.  No additional significant degenerative disc disease.  Small bilateral renal cysts.     IMPRESSION:  1. Stable two small lesions in the thoracic spinal cord, presumed from multiple sclerosis. No new lesions or abnormal cord enhancement.  2. Mild degenerative disease as described.     Cervical MRI from 03/13/24 was personally reviewed with the patient and demonstrated:  COMPARISON STUDY: 6/11/2022  Trace retrolisthesis of C3 and C4. No compression deformity. No pathologic marrow signal. Paravertebral soft tissues unremarkable.  Subtle T2 hyperintensity in the ayla, and medulla and cerebellar hemispheres again noted.  Spinal cord shows normal signal intensity and morphology. No abnormal enhancement.     C2-C3: No disc herniation, central or foraminal stenosis.     C3-C4:

## 2025-07-08 NOTE — PATIENT INSTRUCTIONS
[id number] [employer].   Care instructions adapted under license by [email or web address]. If you have questions about a medical condition or this instruction, always ask your healthcare professional. [employer] disclaims any warranty or liability for your use of this information.      Low Back Arthritis: Exercises  Introduction  Here are some examples of typical rehabilitation exercises for your condition. Start each exercise slowly. Ease off the exercise if you start to have pain.  Your doctor or physical therapist will tell you when you can start these exercises and which ones will work best for you.  When you are not being active, find a comfortable position for rest. Some people are comfortable on the floor or a medium-firm bed with a small pillow under their head and another under their knees. Some people prefer to lie on their side with a pillow between their knees. Don't stay in one position for too long.  Take short walks (10 to 20 minutes) every 2 to 3 hours. Avoid slopes, hills, and stairs until you feel better. Walk only distances you can manage without pain, especially leg pain.  How to do the exercises  Pelvic tilt  Lie on your back with your knees bent.  \"Brace\" your stomach--tighten your muscles by pulling in and imagining your belly button moving toward your spine.  Press your lower back into the floor. You should feel your hips and pelvis rock back.  Hold for 6 seconds while breathing smoothly.  Relax and allow your pelvis and hips to rock forward.  Repeat 8 to 12 times.  Back stretches  Get down on your hands and knees on the floor.  Relax your head and allow it to droop. Round your back up toward the ceiling until you feel a nice stretch in your upper, middle, and lower back. Hold this stretch for as long as it feels comfortable, or about 15 to 30 seconds.  Return to the starting position with a flat back while you are on your hands and knees.  Let your back sway by pressing

## 2025-07-18 ENCOUNTER — OFFICE VISIT (OUTPATIENT)
Age: 60
End: 2025-07-18
Payer: MEDICARE

## 2025-07-18 VITALS
TEMPERATURE: 98.7 F | BODY MASS INDEX: 28.16 KG/M2 | OXYGEN SATURATION: 92 % | WEIGHT: 169 LBS | HEIGHT: 65 IN | HEART RATE: 68 BPM | SYSTOLIC BLOOD PRESSURE: 120 MMHG | DIASTOLIC BLOOD PRESSURE: 67 MMHG

## 2025-07-18 DIAGNOSIS — Z74.09 IMPAIRED MOBILITY AND ADLS: ICD-10-CM

## 2025-07-18 DIAGNOSIS — Z91.81 AT HIGH RISK FOR FALLS: ICD-10-CM

## 2025-07-18 DIAGNOSIS — G35 MULTIPLE SCLEROSIS (HCC): Primary | ICD-10-CM

## 2025-07-18 DIAGNOSIS — M54.2 NECK PAIN: ICD-10-CM

## 2025-07-18 DIAGNOSIS — R26.89 IMPAIRED GAIT AND MOBILITY: ICD-10-CM

## 2025-07-18 DIAGNOSIS — R26.81 GAIT INSTABILITY: ICD-10-CM

## 2025-07-18 DIAGNOSIS — M54.50 LUMBAR PAIN: ICD-10-CM

## 2025-07-18 DIAGNOSIS — Z78.9 IMPAIRED MOBILITY AND ADLS: ICD-10-CM

## 2025-07-18 PROCEDURE — 99214 OFFICE O/P EST MOD 30 MIN: CPT | Performed by: NURSE PRACTITIONER

## 2025-07-18 RX ORDER — OLOPATADINE HYDROCHLORIDE 2 MG/ML
1 SOLUTION OPHTHALMIC 2 TIMES DAILY
COMMUNITY

## 2025-07-18 RX ORDER — BACLOFEN 5 MG/1
5 TABLET ORAL 3 TIMES DAILY
Qty: 90 TABLET | Refills: 6 | Status: SHIPPED | OUTPATIENT
Start: 2025-07-18

## 2025-07-18 NOTE — PROGRESS NOTES
[facility]  [address] [id number]  Office:  [phone]  Fax: [phone]  Chief Complaint   Patient presents with    Multiple Sclerosis       HPI: Moiz Trevino presents in follow-up for multiple sclerosis. She was last seen here on 3/21/2025.  She did not want to start amitriptyline 10 mg nightly due to potential side effects.  She did not see the pain psychologist.  She had upcoming appointment to see urologist.  She has seen ortho and was referred to home health but only had 2 visits.  Continued Ocrevus infusions.  Taking vitamin D weekly.  Called the pain psychologist office and made her an appointment for April 7.  Notes requested from eye exam.  Follow with orthopedics/spine.    She presents today in follow-up.  She didn't do the virtual visit with the pain psychologist.  She might try again.  MS symptoms: reports the same.  Reports near falls.  Needs to move, has stairs at home.  On baclofen 5 mg TID.  Has next Ocrevus infusion on 8/6.  Reports rash on her face from dust mites; following with dermatology.  Has f/u eye exam in February.  [facility] note from Dr. Head from 1/30/25 reviewed.  She saw urology for urinary frequency and she will follow up at 1 year.  She has seen ortho spine and will f/u there prn.  Not on vitamin D weekly any longer.  Will f/u with PCP soon.  Last vitamin D level was normal, 55.0, on 3/25/2025.    Past Medical History:   Diagnosis Date    Bed bug bite     Chest pain 11/2014    neg EKG, non recurrent    Chronic pain     lower back and legs    Depression     Eczema     Fibroids     GERD (gastroesophageal reflux disease)     H/O seasonal allergies     Headache(784.0)     Hiatal hernia     IBS (irritable bowel syndrome)     Infestation by bed bug     Microscopic hematuria     MS (multiple sclerosis) (HCC)     MS (multiple sclerosis) (HCC)     Rectal bleeding     Stool color black     irritable bowel

## 2025-07-18 NOTE — PATIENT INSTRUCTIONS
Patient instructions:  -call pain psychologist to reschedule   -continue Ocrevus infusions  -continue baclofen  -MRIs on or around Dec. 3  -follow up in 2nd week of December after MRIs   -I placed a referral to home health

## 2025-07-23 ENCOUNTER — OFFICE VISIT (OUTPATIENT)
Facility: CLINIC | Age: 60
End: 2025-07-23
Payer: MEDICARE

## 2025-07-23 VITALS
DIASTOLIC BLOOD PRESSURE: 68 MMHG | BODY MASS INDEX: 28.66 KG/M2 | WEIGHT: 172 LBS | HEART RATE: 68 BPM | OXYGEN SATURATION: 98 % | SYSTOLIC BLOOD PRESSURE: 127 MMHG | HEIGHT: 65 IN

## 2025-07-23 DIAGNOSIS — R79.89 LOW VITAMIN D LEVEL: ICD-10-CM

## 2025-07-23 DIAGNOSIS — G35 MULTIPLE SCLEROSIS (HCC): ICD-10-CM

## 2025-07-23 DIAGNOSIS — E55.9 VITAMIN D DEFICIENCY, UNSPECIFIED: ICD-10-CM

## 2025-07-23 DIAGNOSIS — R73.03 PREDIABETES: ICD-10-CM

## 2025-07-23 DIAGNOSIS — N89.8 VAGINAL DISCHARGE: Primary | ICD-10-CM

## 2025-07-23 DIAGNOSIS — E78.00 PURE HYPERCHOLESTEROLEMIA: ICD-10-CM

## 2025-07-23 DIAGNOSIS — M15.0 PRIMARY OSTEOARTHRITIS INVOLVING MULTIPLE JOINTS: ICD-10-CM

## 2025-07-23 DIAGNOSIS — J30.2 SEASONAL ALLERGIES: ICD-10-CM

## 2025-07-23 DIAGNOSIS — F32.A ANXIETY AND DEPRESSION: ICD-10-CM

## 2025-07-23 DIAGNOSIS — R26.81 UNSTEADY GAIT: ICD-10-CM

## 2025-07-23 DIAGNOSIS — F41.9 ANXIETY AND DEPRESSION: ICD-10-CM

## 2025-07-23 PROCEDURE — G2211 COMPLEX E/M VISIT ADD ON: HCPCS | Performed by: STUDENT IN AN ORGANIZED HEALTH CARE EDUCATION/TRAINING PROGRAM

## 2025-07-23 PROCEDURE — 99214 OFFICE O/P EST MOD 30 MIN: CPT | Performed by: STUDENT IN AN ORGANIZED HEALTH CARE EDUCATION/TRAINING PROGRAM

## 2025-07-23 RX ORDER — ERGOCALCIFEROL 1.25 MG/1
50000 CAPSULE, LIQUID FILLED ORAL WEEKLY
Qty: 12 CAPSULE | Refills: 1 | Status: SHIPPED | OUTPATIENT
Start: 2025-07-23

## 2025-07-23 RX ORDER — LORATADINE 10 MG/1
10 TABLET ORAL DAILY
Qty: 90 TABLET | Refills: 0 | Status: SHIPPED | OUTPATIENT
Start: 2025-07-23

## 2025-07-23 SDOH — ECONOMIC STABILITY: FOOD INSECURITY: WITHIN THE PAST 12 MONTHS, YOU WORRIED THAT YOUR FOOD WOULD RUN OUT BEFORE YOU GOT MONEY TO BUY MORE.: NEVER TRUE

## 2025-07-23 SDOH — ECONOMIC STABILITY: FOOD INSECURITY: WITHIN THE PAST 12 MONTHS, THE FOOD YOU BOUGHT JUST DIDN'T LAST AND YOU DIDN'T HAVE MONEY TO GET MORE.: NEVER TRUE

## 2025-07-23 ASSESSMENT — ENCOUNTER SYMPTOMS
RHINORRHEA: 0
VOMITING: 0
CHEST TIGHTNESS: 0
DIARRHEA: 0
ABDOMINAL PAIN: 1
COUGH: 0
CONSTIPATION: 1
NAUSEA: 1
BACK PAIN: 0
WHEEZING: 0
BLOOD IN STOOL: 0
SHORTNESS OF BREATH: 1

## 2025-07-23 ASSESSMENT — PATIENT HEALTH QUESTIONNAIRE - PHQ9
2. FEELING DOWN, DEPRESSED OR HOPELESS: SEVERAL DAYS
SUM OF ALL RESPONSES TO PHQ QUESTIONS 1-9: 1
1. LITTLE INTEREST OR PLEASURE IN DOING THINGS: NOT AT ALL

## 2025-07-23 NOTE — PROGRESS NOTES
Final       No results found for any visits on 07/23/25.          Patient Care Team:  Patient Care Team:  Marry Elkins MD as PCP - General  Marry Elkins MD as PCP - Empaneled Provider  Xenia Hummel PA-C as Physician Assistant      Assessment / Plan:     Diagnosis Orders   1. Vaginal discharge  NuSwab VG Plus+Mycoplasmas,MOLLY      2. Low vitamin D level  vitamin D (ERGOCALCIFEROL) 1.25 MG (25070 UT) CAPS capsule      3. Multiple sclerosis (HCC)        4. Anxiety and depression        5. Prediabetes  Albumin/Creatinine Ratio, Urine    Hemoglobin A1C      6. Seasonal allergies  loratadine (CLARITIN) 10 MG tablet      7. Pure hypercholesterolemia  Lipid Panel      8. Primary osteoarthritis involving multiple joints        9. Unsteady gait        10. Vitamin D deficiency, unspecified            Notes and labs reviewed    Vaginal discharge: Swab collected.    Anxiety and depression: Patient declined medication or referral to psychiatry/therapist    LE swelling:  Continue Lasix as needed.    Seasonal allergies: Loratadine refilled.    Back pain: Continue Tylenol as needed.      MS: Following with neurology.  Continue  baclofen, Ocrevus infusion.  Patient continues to complain of generalized bodyaches, paresthesias.  She has declined to try Cymbalta, Lyrica, Gabapentin, amitriptyline    GERD: Continue PPI    Prediabetes: Counseled on dietary and lifestyle changes.  Her HbA1c has been trending up.    Atopic dermatitis: Following with dermatology.     Vitamin D deficiency: Refill sent    Labs prior to next visit    Declined vaccines    Return in about 6 months (around 1/23/2026).     I asked the patient if she  had any questions and answered her  questions.  The patient stated that she understands the treatment plan and agrees with the treatment plan    This document was created with a voice activated dictation system and may contain transcription errors.

## 2025-07-31 LAB
A VAGINAE DNA VAG QL NAA+PROBE: ABNORMAL SCORE
BVAB2 DNA VAG QL NAA+PROBE: ABNORMAL SCORE
C ALBICANS DNA VAG QL NAA+PROBE: NEGATIVE
C GLABRATA DNA VAG QL NAA+PROBE: POSITIVE
C TRACH DNA SPEC QL NAA+PROBE: NEGATIVE
M GENITALIUM DNA SPEC QL NAA+PROBE: NEGATIVE
M HOMINIS DNA SPEC QL NAA+PROBE: NEGATIVE
MEGA1 DNA VAG QL NAA+PROBE: ABNORMAL SCORE
N GONORRHOEA DNA VAG QL NAA+PROBE: NEGATIVE
T VAGINALIS DNA VAG QL NAA+PROBE: NEGATIVE
UREAPLASMA DNA SPEC QL NAA+PROBE: NEGATIVE

## 2025-08-06 ENCOUNTER — HOSPITAL ENCOUNTER (OUTPATIENT)
Facility: HOSPITAL | Age: 60
Setting detail: INFUSION SERIES
Discharge: HOME | End: 2025-08-09
Payer: MEDICARE

## 2025-08-06 VITALS
OXYGEN SATURATION: 100 % | HEART RATE: 78 BPM | DIASTOLIC BLOOD PRESSURE: 78 MMHG | SYSTOLIC BLOOD PRESSURE: 126 MMHG | TEMPERATURE: 98 F | RESPIRATION RATE: 16 BRPM

## 2025-08-06 DIAGNOSIS — G35 MULTIPLE SCLEROSIS (HCC): Primary | ICD-10-CM

## 2025-08-06 LAB
ALBUMIN SERPL-MCNC: 3.7 G/DL (ref 3.4–5)
ALBUMIN/GLOB SERPL: 1.3 (ref 0.8–1.7)
ALP SERPL-CCNC: 117 U/L (ref 45–117)
ALT SERPL-CCNC: 13 U/L (ref 10–35)
ANION GAP SERPL CALC-SCNC: 12 MMOL/L (ref 3–18)
AST SERPL-CCNC: 18 U/L (ref 10–38)
BASO+EOS+MONOS # BLD AUTO: 0.4 K/UL (ref 0–2.3)
BASO+EOS+MONOS NFR BLD AUTO: 8 % (ref 0.1–17)
BILIRUB SERPL-MCNC: 0.2 MG/DL (ref 0.2–1)
BUN SERPL-MCNC: 10 MG/DL (ref 6–23)
BUN/CREAT SERPL: 11 (ref 12–20)
CALCIUM SERPL-MCNC: 9.5 MG/DL (ref 8.5–10.1)
CHLORIDE SERPL-SCNC: 104 MMOL/L (ref 98–107)
CO2 SERPL-SCNC: 24 MMOL/L (ref 21–32)
CREAT SERPL-MCNC: 0.88 MG/DL (ref 0.6–1.3)
DIFFERENTIAL METHOD BLD: ABNORMAL
ERYTHROCYTE [DISTWIDTH] IN BLOOD BY AUTOMATED COUNT: 14.6 % (ref 11.5–14.5)
GLOBULIN SER CALC-MCNC: 3 G/DL (ref 2–4)
GLUCOSE SERPL-MCNC: 85 MG/DL (ref 74–108)
HCT VFR BLD AUTO: 37.7 % (ref 36–48)
HGB BLD-MCNC: 12 G/DL (ref 12–16)
LYMPHOCYTES # BLD: 2.6 K/UL (ref 1.1–5.9)
LYMPHOCYTES NFR BLD: 53.6 % (ref 14–44)
MCH RBC QN AUTO: 26.5 PG (ref 25–35)
MCHC RBC AUTO-ENTMCNC: 31.8 G/DL (ref 31–37)
MCV RBC AUTO: 83.4 FL (ref 78–102)
NEUTS SEG # BLD: 1.8 K/UL (ref 1.8–9.5)
NEUTS SEG NFR BLD: 38.2 % (ref 40–70)
PLATELET # BLD AUTO: 244 K/UL (ref 140–440)
POTASSIUM SERPL-SCNC: 4.5 MMOL/L (ref 3.5–5.5)
PROT SERPL-MCNC: 6.7 G/DL (ref 6.4–8.2)
RBC # BLD AUTO: 4.52 M/UL (ref 4.1–5.1)
SODIUM SERPL-SCNC: 141 MMOL/L (ref 136–145)
WBC # BLD AUTO: 4.8 K/UL (ref 4.5–13)

## 2025-08-06 PROCEDURE — 80053 COMPREHEN METABOLIC PANEL: CPT

## 2025-08-06 PROCEDURE — 2580000003 HC RX 258: Performed by: NURSE PRACTITIONER

## 2025-08-06 PROCEDURE — 96365 THER/PROPH/DIAG IV INF INIT: CPT

## 2025-08-06 PROCEDURE — 96375 TX/PRO/DX INJ NEW DRUG ADDON: CPT

## 2025-08-06 PROCEDURE — 6370000000 HC RX 637 (ALT 250 FOR IP): Performed by: NURSE PRACTITIONER

## 2025-08-06 PROCEDURE — 85025 COMPLETE CBC W/AUTO DIFF WBC: CPT

## 2025-08-06 PROCEDURE — 2500000003 HC RX 250 WO HCPCS: Performed by: NURSE PRACTITIONER

## 2025-08-06 PROCEDURE — 96366 THER/PROPH/DIAG IV INF ADDON: CPT

## 2025-08-06 PROCEDURE — 96415 CHEMO IV INFUSION ADDL HR: CPT

## 2025-08-06 PROCEDURE — 6360000002 HC RX W HCPCS: Performed by: NURSE PRACTITIONER

## 2025-08-06 PROCEDURE — 96413 CHEMO IV INFUSION 1 HR: CPT

## 2025-08-06 RX ORDER — DIPHENHYDRAMINE HYDROCHLORIDE 50 MG/ML
50 INJECTION, SOLUTION INTRAMUSCULAR; INTRAVENOUS ONCE
Status: CANCELLED
Start: 2026-01-11 | End: 2026-01-11

## 2025-08-06 RX ORDER — DIPHENHYDRAMINE HYDROCHLORIDE 50 MG/ML
50 INJECTION, SOLUTION INTRAMUSCULAR; INTRAVENOUS ONCE
Status: COMPLETED | OUTPATIENT
Start: 2025-08-06 | End: 2025-08-06

## 2025-08-06 RX ORDER — ALBUTEROL SULFATE 90 UG/1
4 INHALANT RESPIRATORY (INHALATION) PRN
OUTPATIENT
Start: 2026-01-11

## 2025-08-06 RX ORDER — ACETAMINOPHEN 325 MG/1
650 TABLET ORAL ONCE
OUTPATIENT
Start: 2026-01-11 | End: 2026-01-11

## 2025-08-06 RX ORDER — HEPARIN 100 UNIT/ML
500 SYRINGE INTRAVENOUS PRN
OUTPATIENT
Start: 2026-01-11

## 2025-08-06 RX ORDER — ACETAMINOPHEN 325 MG/1
650 TABLET ORAL
OUTPATIENT
Start: 2026-01-11

## 2025-08-06 RX ORDER — SODIUM CHLORIDE 9 MG/ML
5-250 INJECTION, SOLUTION INTRAVENOUS PRN
Status: ACTIVE | OUTPATIENT
Start: 2025-08-06 | End: 2025-08-07

## 2025-08-06 RX ORDER — SODIUM CHLORIDE 0.9 % (FLUSH) 0.9 %
5-40 SYRINGE (ML) INJECTION PRN
OUTPATIENT
Start: 2026-01-11

## 2025-08-06 RX ORDER — ONDANSETRON 2 MG/ML
8 INJECTION INTRAMUSCULAR; INTRAVENOUS
OUTPATIENT
Start: 2026-01-11

## 2025-08-06 RX ORDER — HYDROCORTISONE SODIUM SUCCINATE 100 MG/2ML
100 INJECTION INTRAMUSCULAR; INTRAVENOUS
OUTPATIENT
Start: 2026-01-11

## 2025-08-06 RX ORDER — SODIUM CHLORIDE 9 MG/ML
5-250 INJECTION, SOLUTION INTRAVENOUS PRN
OUTPATIENT
Start: 2026-01-11

## 2025-08-06 RX ORDER — DIPHENHYDRAMINE HYDROCHLORIDE 50 MG/ML
50 INJECTION, SOLUTION INTRAMUSCULAR; INTRAVENOUS
OUTPATIENT
Start: 2026-01-11

## 2025-08-06 RX ORDER — ACETAMINOPHEN 325 MG/1
650 TABLET ORAL ONCE
Status: COMPLETED | OUTPATIENT
Start: 2025-08-06 | End: 2025-08-06

## 2025-08-06 RX ORDER — SODIUM CHLORIDE 9 MG/ML
INJECTION, SOLUTION INTRAVENOUS CONTINUOUS
OUTPATIENT
Start: 2026-01-11

## 2025-08-06 RX ORDER — SODIUM CHLORIDE 0.9 % (FLUSH) 0.9 %
5-40 SYRINGE (ML) INJECTION PRN
Status: ACTIVE | OUTPATIENT
Start: 2025-08-06 | End: 2025-08-07

## 2025-08-06 RX ORDER — CLINDAMYCIN PHOSPHATE 10 MG/ML
SOLUTION TOPICAL
COMMUNITY

## 2025-08-06 RX ORDER — EPINEPHRINE 1 MG/ML
0.3 INJECTION, SOLUTION, CONCENTRATE INTRAVENOUS PRN
OUTPATIENT
Start: 2026-01-11

## 2025-08-06 RX ADMIN — DIPHENHYDRAMINE HYDROCHLORIDE 50 MG: 50 INJECTION INTRAMUSCULAR; INTRAVENOUS at 10:49

## 2025-08-06 RX ADMIN — METHYLPREDNISOLONE SODIUM SUCCINATE 125 MG: 125 INJECTION, POWDER, FOR SOLUTION INTRAMUSCULAR; INTRAVENOUS at 10:53

## 2025-08-06 RX ADMIN — SODIUM CHLORIDE 25 ML/HR: 0.9 INJECTION, SOLUTION INTRAVENOUS at 10:54

## 2025-08-06 RX ADMIN — OCRELIZUMAB 600 MG: 300 INJECTION INTRAVENOUS at 11:25

## 2025-08-06 RX ADMIN — SODIUM CHLORIDE, PRESERVATIVE FREE 10 ML: 5 INJECTION INTRAVENOUS at 10:47

## 2025-08-06 RX ADMIN — ACETAMINOPHEN 650 MG: 325 TABLET, FILM COATED ORAL at 10:51

## 2025-08-06 ASSESSMENT — PAIN SCALES - GENERAL: PAINLEVEL_OUTOF10: 7

## 2025-08-13 ENCOUNTER — RESULTS FOLLOW-UP (OUTPATIENT)
Age: 60
End: 2025-08-13

## 2025-08-21 ENCOUNTER — CLINICAL DOCUMENTATION (OUTPATIENT)
Age: 60
End: 2025-08-21

## (undated) DEVICE — BASIN EMESIS 500CC ROSE 250/CS 60/PLT: Brand: MEDEGEN MEDICAL PRODUCTS, LLC

## (undated) DEVICE — MEDI-VAC SUCTION HIGH CAPACITY: Brand: CARDINAL HEALTH

## (undated) DEVICE — BASIN EMSIS 16OZ GRAPHITE PLAS KID SHP MOLD GRAD FOR ORAL

## (undated) DEVICE — FLEX ADVANTAGE 3000CC: Brand: FLEX ADVANTAGE

## (undated) DEVICE — FLUFF AND POLYMER UNDERPAD,EXTRA HEAVY: Brand: WINGS

## (undated) DEVICE — SYRINGE MED 25GA 3ML L5/8IN SUBQ PLAS W/ DETACH NDL SFTY

## (undated) DEVICE — STERILE POLYISOPRENE POWDER-FREE SURGICAL GLOVES: Brand: PROTEXIS

## (undated) DEVICE — CANNULA ORIG TL CLR W FOAM CUSHIONS AND 14FT SUPL TB 3 CHN

## (undated) DEVICE — SNARE VASC L240CM LOOP W10MM SHTH DIA2.4MM RND STIFF CLD

## (undated) DEVICE — ENDOSCOPY PUMP TUBING/ CAP SET: Brand: ERBE

## (undated) DEVICE — SYR 50ML SLIP TIP NSAF LF STRL --

## (undated) DEVICE — SNARE POLYP M W27MMXL240CM OVL STIFF DISP CAPTIVATOR

## (undated) DEVICE — CANNULA NSL AD TBNG L14FT STD PVC O2 CRV CONN NONFLARED NSL

## (undated) DEVICE — FCPS RAD JAW 4LC 240CM W/NDL -- BX/20 RADIAL JAW 4

## (undated) DEVICE — GOWN PLASTIC FILM THMBHKS UNIV BLUE: Brand: CARDINAL HEALTH

## (undated) DEVICE — SOLUTION IRRIG 1000ML H2O STRL BLT

## (undated) DEVICE — MEDI-VAC NON-CONDUCTIVE SUCTION TUBING: Brand: CARDINAL HEALTH

## (undated) DEVICE — TRAP SPEC POLYP REM STRNR CLN DSGN MAGNIFYING WIND DISP

## (undated) DEVICE — YANKAUER,SMOOTH HANDLE,HIGH CAPACITY: Brand: MEDLINE INDUSTRIES, INC.

## (undated) DEVICE — CATHETER SUCT TR FL TIP 14FR W/ O CTRL

## (undated) DEVICE — SYRINGE MED 10ML LUERLOCK TIP W/O SFTY DISP

## (undated) DEVICE — FORCEPS BX L240CM JAW DIA2.8MM L CAP W/ NDL MIC MESH TOOTH

## (undated) DEVICE — LINER SUCT CANSTR 3000CC PLAS SFT PRE ASSEMB W/OUT TBNG W/

## (undated) DEVICE — GAUZE,SPONGE,4"X4",16PLY,STRL,LF,10/TRAY: Brand: MEDLINE

## (undated) DEVICE — BITE BLOCK ENDOSCP UNIV AD 6 TO 9.4 MM

## (undated) DEVICE — SYRINGE MED 50ML LUERSLIP TIP

## (undated) DEVICE — AIRLIFE™ NASAL OXYGEN CANNULA CURVED, FLARED TIP WITH 14 FOOT (4.3 M) CRUSH-RESISTANT TUBING, OVER-THE-EAR STYLE: Brand: AIRLIFE™

## (undated) DEVICE — UNDERPAD INCONT W23XL36IN STD BLU POLYPR BK FLUF SFT

## (undated) DEVICE — SYRINGE 20ML LL S/C 50